# Patient Record
Sex: FEMALE | Race: BLACK OR AFRICAN AMERICAN | Employment: OTHER | URBAN - METROPOLITAN AREA
[De-identification: names, ages, dates, MRNs, and addresses within clinical notes are randomized per-mention and may not be internally consistent; named-entity substitution may affect disease eponyms.]

---

## 2019-02-06 ENCOUNTER — HOSPITAL ENCOUNTER (OUTPATIENT)
Age: 79
Discharge: HOME | End: 2019-02-08
Payer: MEDICARE

## 2019-02-06 LAB
ALBUMIN SERPL-MCNC: 4 G/DL (ref 3.5–5.2)
ALP BLD-CCNC: 128 U/L (ref 35–104)
ALT SERPL-CCNC: 8 U/L (ref 0–32)
ANION GAP SERPL CALCULATED.3IONS-SCNC: 11 MMOL/L (ref 7–16)
AST SERPL-CCNC: 13 U/L (ref 0–31)
BASOPHILS ABSOLUTE: 0.05 E9/L (ref 0–0.2)
BASOPHILS RELATIVE PERCENT: 0.5 % (ref 0–2)
BILIRUB SERPL-MCNC: 0.4 MG/DL (ref 0–1.2)
BUN BLDV-MCNC: 43 MG/DL (ref 8–23)
CALCIUM SERPL-MCNC: 9.5 MG/DL (ref 8.6–10.2)
CHLORIDE BLD-SCNC: 112 MMOL/L (ref 98–107)
CHOLESTEROL, TOTAL: 129 MG/DL (ref 0–199)
CO2: 19 MMOL/L (ref 22–29)
CREAT SERPL-MCNC: 2.7 MG/DL (ref 0.5–1)
EOSINOPHILS ABSOLUTE: 0.17 E9/L (ref 0.05–0.5)
EOSINOPHILS RELATIVE PERCENT: 1.8 % (ref 0–6)
FOLATE: >20 NG/ML (ref 4.8–24.2)
GFR AFRICAN AMERICAN: 21
GFR NON-AFRICAN AMERICAN: 21 ML/MIN/1.73
GLUCOSE BLD-MCNC: 130 MG/DL (ref 74–99)
HCT VFR BLD CALC: 33.3 % (ref 34–48)
HDLC SERPL-MCNC: 37 MG/DL
HEMOGLOBIN: 10.4 G/DL (ref 11.5–15.5)
IMMATURE GRANULOCYTES #: 0.02 E9/L
IMMATURE GRANULOCYTES %: 0.2 % (ref 0–5)
LDL CHOLESTEROL CALCULATED: 68 MG/DL (ref 0–99)
LYMPHOCYTES ABSOLUTE: 3.55 E9/L (ref 1.5–4)
LYMPHOCYTES RELATIVE PERCENT: 38 % (ref 20–42)
MCH RBC QN AUTO: 33.5 PG (ref 26–35)
MCHC RBC AUTO-ENTMCNC: 31.2 % (ref 32–34.5)
MCV RBC AUTO: 107.4 FL (ref 80–99.9)
MONOCYTES ABSOLUTE: 0.64 E9/L (ref 0.1–0.95)
MONOCYTES RELATIVE PERCENT: 6.9 % (ref 2–12)
NEUTROPHILS ABSOLUTE: 4.91 E9/L (ref 1.8–7.3)
NEUTROPHILS RELATIVE PERCENT: 52.6 % (ref 43–80)
PDW BLD-RTO: 13.4 FL (ref 11.5–15)
PLATELET # BLD: 295 E9/L (ref 130–450)
PMV BLD AUTO: 12.2 FL (ref 7–12)
POTASSIUM SERPL-SCNC: 5.4 MMOL/L (ref 3.5–5)
RBC # BLD: 3.1 E12/L (ref 3.5–5.5)
SODIUM BLD-SCNC: 142 MMOL/L (ref 132–146)
T4 FREE: 1.13 NG/DL (ref 0.93–1.7)
TOTAL PROTEIN: 7 G/DL (ref 6.4–8.3)
TRIGL SERPL-MCNC: 120 MG/DL (ref 0–149)
TSH SERPL DL<=0.05 MIU/L-ACNC: 1.87 UIU/ML (ref 0.27–4.2)
VITAMIN B-12: 511 PG/ML (ref 211–946)
VITAMIN D 25-HYDROXY: 30 NG/ML (ref 30–100)
VLDLC SERPL CALC-MCNC: 24 MG/DL
WBC # BLD: 9.3 E9/L (ref 4.5–11.5)

## 2019-02-06 PROCEDURE — 80053 COMPREHEN METABOLIC PANEL: CPT

## 2019-02-06 PROCEDURE — 85025 COMPLETE CBC W/AUTO DIFF WBC: CPT

## 2019-02-06 PROCEDURE — 82607 VITAMIN B-12: CPT

## 2019-02-06 PROCEDURE — 84443 ASSAY THYROID STIM HORMONE: CPT

## 2019-02-06 PROCEDURE — 82306 VITAMIN D 25 HYDROXY: CPT

## 2019-02-06 PROCEDURE — 84439 ASSAY OF FREE THYROXINE: CPT

## 2019-02-06 PROCEDURE — 80061 LIPID PANEL: CPT

## 2019-02-06 PROCEDURE — 82746 ASSAY OF FOLIC ACID SERUM: CPT

## 2019-02-19 ENCOUNTER — HOSPITAL ENCOUNTER (OUTPATIENT)
Age: 79
Discharge: HOME | End: 2019-02-21
Payer: MEDICARE

## 2019-02-19 LAB
ANION GAP SERPL CALCULATED.3IONS-SCNC: 10 MMOL/L (ref 7–16)
BUN BLDV-MCNC: 42 MG/DL (ref 8–23)
CALCIUM SERPL-MCNC: 9.4 MG/DL (ref 8.6–10.2)
CHLORIDE BLD-SCNC: 109 MMOL/L (ref 98–107)
CO2: 17 MMOL/L (ref 22–29)
CREAT SERPL-MCNC: 2.5 MG/DL (ref 0.5–1)
GFR AFRICAN AMERICAN: 23
GFR NON-AFRICAN AMERICAN: 23 ML/MIN/1.73
GLUCOSE BLD-MCNC: 102 MG/DL (ref 74–99)
POTASSIUM SERPL-SCNC: 5.2 MMOL/L (ref 3.5–5)
SODIUM BLD-SCNC: 136 MMOL/L (ref 132–146)

## 2019-02-19 PROCEDURE — 80048 BASIC METABOLIC PNL TOTAL CA: CPT

## 2019-03-13 ENCOUNTER — APPOINTMENT (OUTPATIENT)
Dept: GENERAL RADIOLOGY | Age: 79
End: 2019-03-13
Payer: MEDICARE

## 2019-03-13 ENCOUNTER — HOSPITAL ENCOUNTER (EMERGENCY)
Age: 79
Discharge: HOME | End: 2019-03-13
Attending: EMERGENCY MEDICINE
Payer: MEDICARE

## 2019-03-13 VITALS
WEIGHT: 130 LBS | OXYGEN SATURATION: 97 % | HEART RATE: 66 BPM | SYSTOLIC BLOOD PRESSURE: 129 MMHG | DIASTOLIC BLOOD PRESSURE: 73 MMHG | TEMPERATURE: 97.6 F | HEIGHT: 64 IN | BODY MASS INDEX: 22.2 KG/M2 | RESPIRATION RATE: 16 BRPM

## 2019-03-13 DIAGNOSIS — M25.562 ACUTE PAIN OF LEFT KNEE: Primary | ICD-10-CM

## 2019-03-13 PROCEDURE — 99283 EMERGENCY DEPT VISIT LOW MDM: CPT

## 2019-03-13 PROCEDURE — 73560 X-RAY EXAM OF KNEE 1 OR 2: CPT

## 2019-03-13 PROCEDURE — G0382 LEV 3 HOSP TYPE B ED VISIT: HCPCS

## 2019-03-13 RX ORDER — ERGOCALCIFEROL 1.25 MG/1
50000 CAPSULE ORAL EVERY OTHER DAY
Status: ON HOLD | COMMUNITY
End: 2021-04-19 | Stop reason: HOSPADM

## 2019-03-13 ASSESSMENT — PAIN DESCRIPTION - ORIENTATION: ORIENTATION: LEFT

## 2019-03-13 ASSESSMENT — ENCOUNTER SYMPTOMS
EYE PAIN: 0
EYE REDNESS: 0
SINUS PRESSURE: 0
COUGH: 0
WHEEZING: 0
VOMITING: 0
ABDOMINAL DISTENTION: 0
SORE THROAT: 0
SHORTNESS OF BREATH: 0
BACK PAIN: 0
DIARRHEA: 0
EYE DISCHARGE: 0
NAUSEA: 0

## 2019-03-13 ASSESSMENT — PAIN DESCRIPTION - ONSET: ONSET: SUDDEN

## 2019-03-13 ASSESSMENT — PAIN DESCRIPTION - LOCATION: LOCATION: KNEE

## 2019-03-13 ASSESSMENT — PAIN DESCRIPTION - PAIN TYPE: TYPE: ACUTE PAIN

## 2019-03-13 ASSESSMENT — PAIN DESCRIPTION - DESCRIPTORS: DESCRIPTORS: ACHING;SHARP

## 2019-03-13 ASSESSMENT — PAIN DESCRIPTION - PROGRESSION: CLINICAL_PROGRESSION: NOT CHANGED

## 2019-03-13 ASSESSMENT — PAIN DESCRIPTION - FREQUENCY: FREQUENCY: CONTINUOUS

## 2019-03-13 ASSESSMENT — PAIN SCALES - GENERAL: PAINLEVEL_OUTOF10: 9

## 2019-05-07 ENCOUNTER — HOSPITAL ENCOUNTER (OUTPATIENT)
Age: 79
Discharge: HOME | End: 2019-05-07
Payer: MEDICARE

## 2019-05-07 LAB
ALBUMIN SERPL-MCNC: 3.8 G/DL (ref 3.5–5.2)
ALP BLD-CCNC: 130 U/L (ref 35–104)
ALT SERPL-CCNC: 8 U/L (ref 0–32)
ANION GAP SERPL CALCULATED.3IONS-SCNC: 12 MMOL/L (ref 7–16)
AST SERPL-CCNC: 10 U/L (ref 0–31)
BASOPHILS ABSOLUTE: 0.03 E9/L (ref 0–0.2)
BASOPHILS RELATIVE PERCENT: 0.4 % (ref 0–2)
BILIRUB SERPL-MCNC: 0.3 MG/DL (ref 0–1.2)
BUN BLDV-MCNC: 42 MG/DL (ref 8–23)
BURR CELLS: ABNORMAL
CALCIUM SERPL-MCNC: 9.2 MG/DL (ref 8.6–10.2)
CHLORIDE BLD-SCNC: 104 MMOL/L (ref 98–107)
CO2: 27 MMOL/L (ref 22–29)
CREAT SERPL-MCNC: 2.5 MG/DL (ref 0.5–1)
EOSINOPHILS ABSOLUTE: 0.14 E9/L (ref 0.05–0.5)
EOSINOPHILS RELATIVE PERCENT: 1.7 % (ref 0–6)
GFR AFRICAN AMERICAN: 23
GFR NON-AFRICAN AMERICAN: 23 ML/MIN/1.73
GLUCOSE BLD-MCNC: 199 MG/DL (ref 74–99)
HCT VFR BLD CALC: 29.4 % (ref 34–48)
HEMOGLOBIN: 9.4 G/DL (ref 11.5–15.5)
IMMATURE GRANULOCYTES #: 0.02 E9/L
IMMATURE GRANULOCYTES %: 0.2 % (ref 0–5)
IRON SATURATION: 23 % (ref 15–50)
IRON: 55 MCG/DL (ref 37–145)
LYMPHOCYTES ABSOLUTE: 3.22 E9/L (ref 1.5–4)
LYMPHOCYTES RELATIVE PERCENT: 38.4 % (ref 20–42)
MCH RBC QN AUTO: 34.2 PG (ref 26–35)
MCHC RBC AUTO-ENTMCNC: 32 % (ref 32–34.5)
MCV RBC AUTO: 106.9 FL (ref 80–99.9)
MONOCYTES ABSOLUTE: 0.62 E9/L (ref 0.1–0.95)
MONOCYTES RELATIVE PERCENT: 7.4 % (ref 2–12)
NEUTROPHILS ABSOLUTE: 4.36 E9/L (ref 1.8–7.3)
NEUTROPHILS RELATIVE PERCENT: 51.9 % (ref 43–80)
OVALOCYTES: ABNORMAL
PARATHYROID HORMONE INTACT: 508 PG/ML (ref 15–65)
PDW BLD-RTO: 12.9 FL (ref 11.5–15)
PHOSPHORUS: 3.8 MG/DL (ref 2.5–4.5)
PLATELET # BLD: 276 E9/L (ref 130–450)
PMV BLD AUTO: 10.8 FL (ref 7–12)
POIKILOCYTES: ABNORMAL
POLYCHROMASIA: ABNORMAL
POTASSIUM SERPL-SCNC: 4.2 MMOL/L (ref 3.5–5)
RBC # BLD: 2.75 E12/L (ref 3.5–5.5)
SODIUM BLD-SCNC: 143 MMOL/L (ref 132–146)
TOTAL IRON BINDING CAPACITY: 237 MCG/DL (ref 250–450)
TOTAL PROTEIN: 6.9 G/DL (ref 6.4–8.3)
VITAMIN D 25-HYDROXY: 24 NG/ML (ref 30–100)
WBC # BLD: 8.4 E9/L (ref 4.5–11.5)

## 2019-05-07 PROCEDURE — 80053 COMPREHEN METABOLIC PANEL: CPT

## 2019-05-07 PROCEDURE — 82306 VITAMIN D 25 HYDROXY: CPT

## 2019-05-07 PROCEDURE — 36415 COLL VENOUS BLD VENIPUNCTURE: CPT

## 2019-05-07 PROCEDURE — 85025 COMPLETE CBC W/AUTO DIFF WBC: CPT

## 2019-05-07 PROCEDURE — 84100 ASSAY OF PHOSPHORUS: CPT

## 2019-05-07 PROCEDURE — 84165 PROTEIN E-PHORESIS SERUM: CPT

## 2019-05-07 PROCEDURE — 83550 IRON BINDING TEST: CPT

## 2019-05-07 PROCEDURE — 83970 ASSAY OF PARATHORMONE: CPT

## 2019-05-07 PROCEDURE — 83540 ASSAY OF IRON: CPT

## 2019-05-11 LAB
ALBUMIN SERPL-MCNC: 3.4 G/DL (ref 3.5–4.7)
ALPHA-1-GLOBULIN: 0.2 G/DL (ref 0.2–0.4)
ALPHA-2-GLOBULIN: 1 G/FL (ref 0.5–1)
BETA GLOBULIN: 0.8 G/DL (ref 0.8–1.3)
ELECTROPHORESIS: ABNORMAL
GAMMA GLOBULIN: 1.4 G/DL (ref 0.7–1.6)

## 2019-06-03 ENCOUNTER — HOSPITAL ENCOUNTER (EMERGENCY)
Age: 79
Discharge: HOME | End: 2019-06-03
Payer: MEDICARE

## 2019-06-03 ENCOUNTER — APPOINTMENT (OUTPATIENT)
Dept: GENERAL RADIOLOGY | Age: 79
End: 2019-06-03
Payer: MEDICARE

## 2019-06-03 VITALS
RESPIRATION RATE: 18 BRPM | WEIGHT: 130 LBS | HEART RATE: 66 BPM | BODY MASS INDEX: 21.66 KG/M2 | DIASTOLIC BLOOD PRESSURE: 90 MMHG | OXYGEN SATURATION: 99 % | HEIGHT: 65 IN | TEMPERATURE: 97.3 F | SYSTOLIC BLOOD PRESSURE: 153 MMHG

## 2019-06-03 DIAGNOSIS — L03.115 CELLULITIS OF RIGHT FOOT: Primary | ICD-10-CM

## 2019-06-03 PROCEDURE — 73630 X-RAY EXAM OF FOOT: CPT

## 2019-06-03 PROCEDURE — G0382 LEV 3 HOSP TYPE B ED VISIT: HCPCS

## 2019-06-03 PROCEDURE — 6360000002 HC RX W HCPCS: Performed by: EMERGENCY MEDICINE

## 2019-06-03 PROCEDURE — 99283 EMERGENCY DEPT VISIT LOW MDM: CPT

## 2019-06-03 PROCEDURE — 96372 THER/PROPH/DIAG INJ SC/IM: CPT

## 2019-06-03 RX ORDER — CEFTRIAXONE 1 G/1
1 INJECTION, POWDER, FOR SOLUTION INTRAMUSCULAR; INTRAVENOUS ONCE
Status: COMPLETED | OUTPATIENT
Start: 2019-06-03 | End: 2019-06-03

## 2019-06-03 RX ORDER — CEPHALEXIN 500 MG/1
500 CAPSULE ORAL 3 TIMES DAILY
Qty: 30 CAPSULE | Refills: 0 | Status: SHIPPED | OUTPATIENT
Start: 2019-06-03 | End: 2019-06-13

## 2019-06-03 RX ADMIN — CEFTRIAXONE 1 G: 1 INJECTION, POWDER, FOR SOLUTION INTRAMUSCULAR; INTRAVENOUS at 14:36

## 2019-06-03 ASSESSMENT — PAIN SCALES - GENERAL: PAINLEVEL_OUTOF10: 0

## 2019-06-03 NOTE — ED PROVIDER NOTES
HPI:  6/3/19, Time: 1:55 PM         Jennifer Maruer is a 70 y.o. female presenting to the ED for evaluation of right foot pain, erythema. The patient said she woke up approximately 5 days ago noticed some swelling, erythema and tenderness of her right foot, also reports erythema over the top of her foot at that time. She says the swelling, erythema and pain have significantly improved since that time. This patient is a diabetic, denies any known injury or ulceration of her foot. Review of Systems:   Pertinent positives and negatives are stated within HPI, all other systems reviewed and are negative.          --------------------------------------------- PAST HISTORY ---------------------------------------------  Past Medical History:  has a past medical history of Arthritis, Atrial fibrillation ([facility] Blood circulation, collateral, CHF (congestive heart failure) ([facility] [address] Chronic renal insufficiency, stage IV (severe) ([facility] [address] History of cardiovascular stress test, History of echocardiogram, Hyperlipidemia, and Hypertension. Past Surgical History:  has a past surgical history that includes Ectopic pregnancy surgery. Social History:  reports that she has been smoking. She has been smoking about 0.50 packs per day. She has never used smokeless tobacco. She reports that she drank alcohol. She reports that she does not use drugs. Family History: family history is not on file. The patients home medications have been reviewed. Allergies: Patient has no known allergies. -------------------------------------------------- RESULTS -------------------------------------------------  All laboratory and radiology results have been personally reviewed by myself   LABS:  No results found for this visit on 06/03/19. RADIOLOGY:  Interpreted by Radiologist.  XR FOOT RIGHT (MIN 3 VIEWS)   Final Result   1. No radiographic evidence of osteomyelitis.  If persistent clinical   concern recommend MRI or three-phase bone scan. 2. Fracture distal aspect proximal phalanx fifth digit of uncertain   age.          ------------------------- NURSING NOTES AND VITALS REVIEWED ---------------------------   The nursing notes within the ED encounter and vital signs as below have been reviewed. BP (!) 153/90   Pulse 66   Temp 97.3 °F (36.3 °C)   Resp 18   Ht 5' 4.5\" (1.638 m)   Wt 130 lb (59 kg)   SpO2 99%   BMI 21.97 kg/m²   Oxygen Saturation Interpretation: Normal      ---------------------------------------------------PHYSICAL EXAM--------------------------------------      Constitutional/General: Alert and oriented x3, well appearing, non toxic in NAD  Head: Normocephalic and atraumatic  Eyes: PERRL, EOMI, nonicteric  Cardiovascular:  Regular rate and rhythm, no murmurs, gallops, or rubs. 2+ distal pulses, foot is pink warm and dry other than the erythema as stated below. Abdomen: Soft, non tender, non distended,   Extremities: Moves all extremities x 4. Warm and well perfused. Patient is able ambulate unassisted. Skin: warm and dry, mild erythema about the medial portion of the top of her right foot into the base of the right great toe. The area is edematous, mildly tender. Neurologic: GCS 15,  Psych: Normal Affect      ------------------------------ ED COURSE/MEDICAL DECISION MAKING----------------------  Medications   cefTRIAXone (ROCEPHIN) injection 1 g (1 g Intramuscular Given 6/3/19 1279)         ED COURSE:       Medical Decision Making:    Patient appears to have a mild cellulitis which is improving by her own report. She says the pain and swelling have also improved  She denies any known trauma, denies any cut or wound preceding symptoms. We'll give the patient a shot of Rocephin, get an x-ray of the foot to rule out osteomyelitis. Believe this is a simple cellulitis, and there is no wound or skin lesions found. The patient's foot x-rays are negative for osteomyelitis.   Patient had a small fracture of the

## 2019-10-03 ENCOUNTER — HOSPITAL ENCOUNTER (INPATIENT)
Age: 79
LOS: 5 days | Discharge: HOME HEALTH | DRG: 291 | End: 2019-10-08
Attending: EMERGENCY MEDICINE | Admitting: INTERNAL MEDICINE
Payer: MEDICARE

## 2019-10-03 ENCOUNTER — APPOINTMENT (OUTPATIENT)
Dept: GENERAL RADIOLOGY | Age: 79
DRG: 291 | End: 2019-10-03
Payer: MEDICARE

## 2019-10-03 DIAGNOSIS — N18.9 CHRONIC RENAL IMPAIRMENT, UNSPECIFIED CKD STAGE: ICD-10-CM

## 2019-10-03 DIAGNOSIS — I50.9 ACUTE ON CHRONIC CONGESTIVE HEART FAILURE, UNSPECIFIED HEART FAILURE TYPE (HCC): Primary | ICD-10-CM

## 2019-10-03 PROBLEM — I50.43 ACUTE ON CHRONIC COMBINED SYSTOLIC AND DIASTOLIC CHF (CONGESTIVE HEART FAILURE) (HCC): Status: ACTIVE | Noted: 2019-10-03

## 2019-10-03 LAB
ANION GAP SERPL CALCULATED.3IONS-SCNC: 10 MMOL/L (ref 7–16)
B.E.: -8 MMOL/L (ref -3–3)
BUN BLDV-MCNC: 27 MG/DL (ref 8–23)
CALCIUM SERPL-MCNC: 8.7 MG/DL (ref 8.6–10.2)
CHLORIDE BLD-SCNC: 109 MMOL/L (ref 98–107)
CO2: 20 MMOL/L (ref 22–29)
CREAT SERPL-MCNC: 1.9 MG/DL (ref 0.5–1)
DELIVERY SYSTEMS: ABNORMAL
DEVICE: ABNORMAL
FIO2 ARTERIAL: 4
GFR AFRICAN AMERICAN: 31
GFR NON-AFRICAN AMERICAN: 31 ML/MIN/1.73
GLUCOSE BLD-MCNC: 158 MG/DL (ref 74–99)
HCO3 ARTERIAL: 17.3 MMOL/L (ref 22–26)
HCT VFR BLD CALC: 33 % (ref 34–48)
HEMOGLOBIN: 10.7 G/DL (ref 11.5–15.5)
LACTIC ACID: 1 MMOL/L (ref 0.5–2.2)
MCH RBC QN AUTO: 33.6 PG (ref 26–35)
MCHC RBC AUTO-ENTMCNC: 32.4 % (ref 32–34.5)
MCV RBC AUTO: 103.8 FL (ref 80–99.9)
O2 SATURATION: 95.1 % (ref 92–98.5)
OPERATOR ID: 797
PCO2 ARTERIAL: 34.1 MMHG (ref 35–45)
PDW BLD-RTO: 14.3 FL (ref 11.5–15)
PH BLOOD GAS: 7.31 (ref 7.35–7.45)
PLATELET # BLD: 289 E9/L (ref 130–450)
PMV BLD AUTO: 10.8 FL (ref 7–12)
PO2 ARTERIAL: 82 MMHG (ref 60–80)
POTASSIUM SERPL-SCNC: 5.1 MMOL/L (ref 3.5–5)
PRO-BNP: ABNORMAL PG/ML (ref 0–450)
RBC # BLD: 3.18 E12/L (ref 3.5–5.5)
SODIUM BLD-SCNC: 139 MMOL/L (ref 132–146)
SOURCE, BLOOD GAS: ABNORMAL
TROPONIN: 0.02 NG/ML (ref 0–0.03)
WBC # BLD: 11.9 E9/L (ref 4.5–11.5)

## 2019-10-03 PROCEDURE — 85027 COMPLETE CBC AUTOMATED: CPT

## 2019-10-03 PROCEDURE — 36415 COLL VENOUS BLD VENIPUNCTURE: CPT

## 2019-10-03 PROCEDURE — 2700000000 HC OXYGEN THERAPY PER DAY

## 2019-10-03 PROCEDURE — 1200000000 HC SEMI PRIVATE

## 2019-10-03 PROCEDURE — 6360000002 HC RX W HCPCS: Performed by: EMERGENCY MEDICINE

## 2019-10-03 PROCEDURE — 83605 ASSAY OF LACTIC ACID: CPT

## 2019-10-03 PROCEDURE — 83880 ASSAY OF NATRIURETIC PEPTIDE: CPT

## 2019-10-03 PROCEDURE — 84484 ASSAY OF TROPONIN QUANT: CPT

## 2019-10-03 PROCEDURE — 99285 EMERGENCY DEPT VISIT HI MDM: CPT

## 2019-10-03 PROCEDURE — 93005 ELECTROCARDIOGRAM TRACING: CPT | Performed by: EMERGENCY MEDICINE

## 2019-10-03 PROCEDURE — 71045 X-RAY EXAM CHEST 1 VIEW: CPT

## 2019-10-03 PROCEDURE — 82803 BLOOD GASES ANY COMBINATION: CPT

## 2019-10-03 PROCEDURE — 6370000000 HC RX 637 (ALT 250 FOR IP): Performed by: EMERGENCY MEDICINE

## 2019-10-03 PROCEDURE — 80048 BASIC METABOLIC PNL TOTAL CA: CPT

## 2019-10-03 PROCEDURE — 87040 BLOOD CULTURE FOR BACTERIA: CPT

## 2019-10-03 RX ORDER — ASPIRIN 81 MG/1
324 TABLET, CHEWABLE ORAL ONCE
Status: COMPLETED | OUTPATIENT
Start: 2019-10-03 | End: 2019-10-03

## 2019-10-03 RX ORDER — IPRATROPIUM BROMIDE AND ALBUTEROL SULFATE 2.5; .5 MG/3ML; MG/3ML
3 SOLUTION RESPIRATORY (INHALATION) CONTINUOUS
Status: DISCONTINUED | OUTPATIENT
Start: 2019-10-03 | End: 2019-10-03

## 2019-10-03 RX ORDER — LISINOPRIL 10 MG/1
10 TABLET ORAL DAILY
Status: CANCELLED | OUTPATIENT
Start: 2019-10-04

## 2019-10-03 RX ORDER — FUROSEMIDE 10 MG/ML
40 INJECTION INTRAMUSCULAR; INTRAVENOUS ONCE
Status: COMPLETED | OUTPATIENT
Start: 2019-10-03 | End: 2019-10-03

## 2019-10-03 RX ADMIN — FUROSEMIDE 40 MG: 10 INJECTION, SOLUTION INTRAMUSCULAR; INTRAVENOUS at 23:16

## 2019-10-03 RX ADMIN — NITROGLYCERIN 1 INCH: 20 OINTMENT TOPICAL at 23:13

## 2019-10-03 RX ADMIN — ASPIRIN 81 MG 324 MG: 81 TABLET ORAL at 23:09

## 2019-10-03 ASSESSMENT — ENCOUNTER SYMPTOMS
COUGH: 1
ABDOMINAL DISTENTION: 0
WHEEZING: 0
BACK PAIN: 0
NAUSEA: 0
SORE THROAT: 0
SINUS PRESSURE: 0
EYE REDNESS: 0
DIARRHEA: 0
EYE PAIN: 0
VOMITING: 0
RHINORRHEA: 1
SHORTNESS OF BREATH: 1
EYE DISCHARGE: 0

## 2019-10-04 LAB
ANION GAP SERPL CALCULATED.3IONS-SCNC: 11 MMOL/L (ref 7–16)
BUN BLDV-MCNC: 27 MG/DL (ref 8–23)
CALCIUM SERPL-MCNC: 9.1 MG/DL (ref 8.6–10.2)
CHLORIDE BLD-SCNC: 110 MMOL/L (ref 98–107)
CHOLESTEROL, TOTAL: 116 MG/DL (ref 0–199)
CO2: 19 MMOL/L (ref 22–29)
CREAT SERPL-MCNC: 1.9 MG/DL (ref 0.5–1)
FOLATE: >20 NG/ML (ref 4.8–24.2)
GFR AFRICAN AMERICAN: 31
GFR NON-AFRICAN AMERICAN: 31 ML/MIN/1.73
GLUCOSE BLD-MCNC: 121 MG/DL (ref 74–99)
HBA1C MFR BLD: 5.6 % (ref 4–5.6)
HCT VFR BLD CALC: 28.8 % (ref 34–48)
HDLC SERPL-MCNC: 41 MG/DL
HEMOGLOBIN: 9.5 G/DL (ref 11.5–15.5)
IRON SATURATION: 23 % (ref 15–50)
IRON: 48 MCG/DL (ref 37–145)
LDL CHOLESTEROL CALCULATED: 58 MG/DL (ref 0–99)
LV EF: 28 %
LVEF MODALITY: NORMAL
MAGNESIUM: 1.9 MG/DL (ref 1.6–2.6)
MCH RBC QN AUTO: 33.8 PG (ref 26–35)
MCHC RBC AUTO-ENTMCNC: 33 % (ref 32–34.5)
MCV RBC AUTO: 102.5 FL (ref 80–99.9)
METER GLUCOSE: 116 MG/DL (ref 74–99)
METER GLUCOSE: 119 MG/DL (ref 74–99)
METER GLUCOSE: 172 MG/DL (ref 74–99)
METER GLUCOSE: 216 MG/DL (ref 74–99)
PDW BLD-RTO: 14.3 FL (ref 11.5–15)
PHOSPHORUS: 3.6 MG/DL (ref 2.5–4.5)
PLATELET # BLD: 253 E9/L (ref 130–450)
PMV BLD AUTO: 10.6 FL (ref 7–12)
POTASSIUM SERPL-SCNC: 5 MMOL/L (ref 3.5–5)
RBC # BLD: 2.81 E12/L (ref 3.5–5.5)
SODIUM BLD-SCNC: 140 MMOL/L (ref 132–146)
TOTAL IRON BINDING CAPACITY: 210 MCG/DL (ref 250–450)
TRIGL SERPL-MCNC: 83 MG/DL (ref 0–149)
TROPONIN: 0.02 NG/ML (ref 0–0.03)
TROPONIN: 0.02 NG/ML (ref 0–0.03)
TSH SERPL DL<=0.05 MIU/L-ACNC: 1.37 UIU/ML (ref 0.27–4.2)
VITAMIN B-12: 495 PG/ML (ref 211–946)
VLDLC SERPL CALC-MCNC: 17 MG/DL
WBC # BLD: 10.1 E9/L (ref 4.5–11.5)

## 2019-10-04 PROCEDURE — 82607 VITAMIN B-12: CPT

## 2019-10-04 PROCEDURE — 6370000000 HC RX 637 (ALT 250 FOR IP): Performed by: INTERNAL MEDICINE

## 2019-10-04 PROCEDURE — 80061 LIPID PANEL: CPT

## 2019-10-04 PROCEDURE — 6360000002 HC RX W HCPCS: Performed by: INTERNAL MEDICINE

## 2019-10-04 PROCEDURE — 80048 BASIC METABOLIC PNL TOTAL CA: CPT

## 2019-10-04 PROCEDURE — 84443 ASSAY THYROID STIM HORMONE: CPT

## 2019-10-04 PROCEDURE — 2700000000 HC OXYGEN THERAPY PER DAY

## 2019-10-04 PROCEDURE — 83550 IRON BINDING TEST: CPT

## 2019-10-04 PROCEDURE — 85027 COMPLETE CBC AUTOMATED: CPT

## 2019-10-04 PROCEDURE — 83540 ASSAY OF IRON: CPT

## 2019-10-04 PROCEDURE — 2580000003 HC RX 258: Performed by: INTERNAL MEDICINE

## 2019-10-04 PROCEDURE — 84484 ASSAY OF TROPONIN QUANT: CPT

## 2019-10-04 PROCEDURE — 82746 ASSAY OF FOLIC ACID SERUM: CPT

## 2019-10-04 PROCEDURE — 82962 GLUCOSE BLOOD TEST: CPT

## 2019-10-04 PROCEDURE — 36415 COLL VENOUS BLD VENIPUNCTURE: CPT

## 2019-10-04 PROCEDURE — 93306 TTE W/DOPPLER COMPLETE: CPT

## 2019-10-04 PROCEDURE — 83036 HEMOGLOBIN GLYCOSYLATED A1C: CPT

## 2019-10-04 PROCEDURE — 1200000000 HC SEMI PRIVATE

## 2019-10-04 PROCEDURE — 83735 ASSAY OF MAGNESIUM: CPT

## 2019-10-04 PROCEDURE — 84100 ASSAY OF PHOSPHORUS: CPT

## 2019-10-04 RX ORDER — NICOTINE POLACRILEX 4 MG
15 LOZENGE BUCCAL PRN
Status: DISCONTINUED | OUTPATIENT
Start: 2019-10-04 | End: 2019-10-08 | Stop reason: HOSPADM

## 2019-10-04 RX ORDER — ASPIRIN 81 MG/1
81 TABLET, CHEWABLE ORAL DAILY
Status: DISCONTINUED | OUTPATIENT
Start: 2019-10-04 | End: 2019-10-08 | Stop reason: HOSPADM

## 2019-10-04 RX ORDER — DEXTROSE MONOHYDRATE 50 MG/ML
100 INJECTION, SOLUTION INTRAVENOUS PRN
Status: DISCONTINUED | OUTPATIENT
Start: 2019-10-04 | End: 2019-10-08 | Stop reason: HOSPADM

## 2019-10-04 RX ORDER — CLONIDINE HYDROCHLORIDE 0.2 MG/1
0.2 TABLET ORAL 2 TIMES DAILY
Status: DISCONTINUED | OUTPATIENT
Start: 2019-10-04 | End: 2019-10-04

## 2019-10-04 RX ORDER — DEXTROSE MONOHYDRATE 25 G/50ML
12.5 INJECTION, SOLUTION INTRAVENOUS PRN
Status: DISCONTINUED | OUTPATIENT
Start: 2019-10-04 | End: 2019-10-08 | Stop reason: HOSPADM

## 2019-10-04 RX ORDER — SODIUM CHLORIDE 0.9 % (FLUSH) 0.9 %
10 SYRINGE (ML) INJECTION EVERY 12 HOURS SCHEDULED
Status: DISCONTINUED | OUTPATIENT
Start: 2019-10-04 | End: 2019-10-08 | Stop reason: HOSPADM

## 2019-10-04 RX ORDER — ATORVASTATIN CALCIUM 40 MG/1
40 TABLET, FILM COATED ORAL NIGHTLY
Status: DISCONTINUED | OUTPATIENT
Start: 2019-10-04 | End: 2019-10-08 | Stop reason: HOSPADM

## 2019-10-04 RX ORDER — METOLAZONE 2.5 MG/1
5 TABLET ORAL ONCE
Status: COMPLETED | OUTPATIENT
Start: 2019-10-04 | End: 2019-10-04

## 2019-10-04 RX ORDER — SODIUM CHLORIDE 0.9 % (FLUSH) 0.9 %
10 SYRINGE (ML) INJECTION PRN
Status: DISCONTINUED | OUTPATIENT
Start: 2019-10-04 | End: 2019-10-08 | Stop reason: HOSPADM

## 2019-10-04 RX ORDER — CLONIDINE HYDROCHLORIDE 0.2 MG/1
0.2 TABLET ORAL 2 TIMES DAILY
Status: DISCONTINUED | OUTPATIENT
Start: 2019-10-04 | End: 2019-10-05

## 2019-10-04 RX ORDER — CARVEDILOL 6.25 MG/1
6.25 TABLET ORAL 2 TIMES DAILY
Status: DISCONTINUED | OUTPATIENT
Start: 2019-10-04 | End: 2019-10-08 | Stop reason: HOSPADM

## 2019-10-04 RX ADMIN — CARVEDILOL 6.25 MG: 6.25 TABLET, FILM COATED ORAL at 20:12

## 2019-10-04 RX ADMIN — CLONIDINE HYDROCHLORIDE 0.2 MG: 0.2 TABLET ORAL at 07:37

## 2019-10-04 RX ADMIN — APIXABAN 5 MG: 5 TABLET, FILM COATED ORAL at 20:13

## 2019-10-04 RX ADMIN — FUROSEMIDE 40 MG: 10 INJECTION, SOLUTION INTRAMUSCULAR; INTRAVENOUS at 07:37

## 2019-10-04 RX ADMIN — METOLAZONE 5 MG: 2.5 TABLET ORAL at 20:55

## 2019-10-04 RX ADMIN — FUROSEMIDE 40 MG: 10 INJECTION, SOLUTION INTRAMUSCULAR; INTRAVENOUS at 20:12

## 2019-10-04 RX ADMIN — Medication 10 ML: at 07:38

## 2019-10-04 RX ADMIN — INSULIN LISPRO 1 UNITS: 100 INJECTION, SOLUTION INTRAVENOUS; SUBCUTANEOUS at 20:20

## 2019-10-04 RX ADMIN — CARVEDILOL 6.25 MG: 6.25 TABLET, FILM COATED ORAL at 07:37

## 2019-10-04 RX ADMIN — CLONIDINE HYDROCHLORIDE 0.2 MG: 0.2 TABLET ORAL at 20:13

## 2019-10-04 RX ADMIN — ATORVASTATIN CALCIUM 40 MG: 40 TABLET, FILM COATED ORAL at 20:13

## 2019-10-04 RX ADMIN — ASPIRIN 81 MG 81 MG: 81 TABLET ORAL at 20:13

## 2019-10-04 RX ADMIN — Medication 10 ML: at 20:12

## 2019-10-04 RX ADMIN — INSULIN LISPRO 1 UNITS: 100 INJECTION, SOLUTION INTRAVENOUS; SUBCUTANEOUS at 12:41

## 2019-10-04 RX ADMIN — APIXABAN 5 MG: 5 TABLET, FILM COATED ORAL at 07:37

## 2019-10-04 ASSESSMENT — PAIN SCALES - GENERAL
PAINLEVEL_OUTOF10: 0

## 2019-10-05 LAB
ANION GAP SERPL CALCULATED.3IONS-SCNC: 8 MMOL/L (ref 7–16)
BUN BLDV-MCNC: 35 MG/DL (ref 8–23)
CALCIUM SERPL-MCNC: 9 MG/DL (ref 8.6–10.2)
CHLORIDE BLD-SCNC: 112 MMOL/L (ref 98–107)
CO2: 21 MMOL/L (ref 22–29)
CREAT SERPL-MCNC: 2.3 MG/DL (ref 0.5–1)
GFR AFRICAN AMERICAN: 25
GFR NON-AFRICAN AMERICAN: 25 ML/MIN/1.73
GLUCOSE BLD-MCNC: 114 MG/DL (ref 74–99)
HCT VFR BLD CALC: 29 % (ref 34–48)
HEMOGLOBIN: 9.3 G/DL (ref 11.5–15.5)
MCH RBC QN AUTO: 33.1 PG (ref 26–35)
MCHC RBC AUTO-ENTMCNC: 32.1 % (ref 32–34.5)
MCV RBC AUTO: 103.2 FL (ref 80–99.9)
METER GLUCOSE: 120 MG/DL (ref 74–99)
METER GLUCOSE: 149 MG/DL (ref 74–99)
METER GLUCOSE: 207 MG/DL (ref 74–99)
METER GLUCOSE: 307 MG/DL (ref 74–99)
PDW BLD-RTO: 14.4 FL (ref 11.5–15)
PLATELET # BLD: 244 E9/L (ref 130–450)
PMV BLD AUTO: 11.4 FL (ref 7–12)
POTASSIUM SERPL-SCNC: 4.6 MMOL/L (ref 3.5–5)
RBC # BLD: 2.81 E12/L (ref 3.5–5.5)
SODIUM BLD-SCNC: 141 MMOL/L (ref 132–146)
WBC # BLD: 8.4 E9/L (ref 4.5–11.5)

## 2019-10-05 PROCEDURE — 2580000003 HC RX 258: Performed by: INTERNAL MEDICINE

## 2019-10-05 PROCEDURE — 90653 IIV ADJUVANT VACCINE IM: CPT | Performed by: INTERNAL MEDICINE

## 2019-10-05 PROCEDURE — 36415 COLL VENOUS BLD VENIPUNCTURE: CPT

## 2019-10-05 PROCEDURE — 6370000000 HC RX 637 (ALT 250 FOR IP): Performed by: INTERNAL MEDICINE

## 2019-10-05 PROCEDURE — 85027 COMPLETE CBC AUTOMATED: CPT

## 2019-10-05 PROCEDURE — 2700000000 HC OXYGEN THERAPY PER DAY

## 2019-10-05 PROCEDURE — 99222 1ST HOSP IP/OBS MODERATE 55: CPT | Performed by: INTERNAL MEDICINE

## 2019-10-05 PROCEDURE — 80048 BASIC METABOLIC PNL TOTAL CA: CPT

## 2019-10-05 PROCEDURE — 6360000002 HC RX W HCPCS: Performed by: INTERNAL MEDICINE

## 2019-10-05 PROCEDURE — 1200000000 HC SEMI PRIVATE

## 2019-10-05 PROCEDURE — G0008 ADMIN INFLUENZA VIRUS VAC: HCPCS | Performed by: INTERNAL MEDICINE

## 2019-10-05 PROCEDURE — 82962 GLUCOSE BLOOD TEST: CPT

## 2019-10-05 RX ORDER — HYDRALAZINE HYDROCHLORIDE 25 MG/1
25 TABLET, FILM COATED ORAL EVERY 8 HOURS SCHEDULED
Status: DISCONTINUED | OUTPATIENT
Start: 2019-10-05 | End: 2019-10-08 | Stop reason: HOSPADM

## 2019-10-05 RX ORDER — ISOSORBIDE MONONITRATE 30 MG/1
30 TABLET, EXTENDED RELEASE ORAL DAILY
Status: DISCONTINUED | OUTPATIENT
Start: 2019-10-05 | End: 2019-10-08 | Stop reason: HOSPADM

## 2019-10-05 RX ADMIN — INFLUENZA VACCINE, ADJUVANTED 0.5 ML: 15; 15; 15 INJECTION, SUSPENSION INTRAMUSCULAR at 09:44

## 2019-10-05 RX ADMIN — CARVEDILOL 6.25 MG: 6.25 TABLET, FILM COATED ORAL at 21:14

## 2019-10-05 RX ADMIN — Medication 10 ML: at 21:14

## 2019-10-05 RX ADMIN — HYDRALAZINE HYDROCHLORIDE 25 MG: 25 TABLET, FILM COATED ORAL at 21:14

## 2019-10-05 RX ADMIN — CLONIDINE HYDROCHLORIDE 0.2 MG: 0.2 TABLET ORAL at 09:43

## 2019-10-05 RX ADMIN — INSULIN LISPRO 2 UNITS: 100 INJECTION, SOLUTION INTRAVENOUS; SUBCUTANEOUS at 12:01

## 2019-10-05 RX ADMIN — ASPIRIN 81 MG 81 MG: 81 TABLET ORAL at 09:43

## 2019-10-05 RX ADMIN — INSULIN LISPRO 2 UNITS: 100 INJECTION, SOLUTION INTRAVENOUS; SUBCUTANEOUS at 21:15

## 2019-10-05 RX ADMIN — FUROSEMIDE 40 MG: 10 INJECTION, SOLUTION INTRAMUSCULAR; INTRAVENOUS at 09:43

## 2019-10-05 RX ADMIN — HYDRALAZINE HYDROCHLORIDE 25 MG: 25 TABLET, FILM COATED ORAL at 17:17

## 2019-10-05 RX ADMIN — APIXABAN 5 MG: 5 TABLET, FILM COATED ORAL at 09:43

## 2019-10-05 RX ADMIN — INSULIN LISPRO 1 UNITS: 100 INJECTION, SOLUTION INTRAVENOUS; SUBCUTANEOUS at 17:18

## 2019-10-05 RX ADMIN — CARVEDILOL 6.25 MG: 6.25 TABLET, FILM COATED ORAL at 09:43

## 2019-10-05 RX ADMIN — ATORVASTATIN CALCIUM 40 MG: 40 TABLET, FILM COATED ORAL at 21:14

## 2019-10-05 RX ADMIN — Medication 10 ML: at 09:42

## 2019-10-05 RX ADMIN — APIXABAN 5 MG: 5 TABLET, FILM COATED ORAL at 21:14

## 2019-10-05 RX ADMIN — FUROSEMIDE 40 MG: 10 INJECTION, SOLUTION INTRAMUSCULAR; INTRAVENOUS at 21:14

## 2019-10-05 RX ADMIN — ISOSORBIDE MONONITRATE 30 MG: 30 TABLET, EXTENDED RELEASE ORAL at 17:18

## 2019-10-05 ASSESSMENT — PAIN SCALES - GENERAL
PAINLEVEL_OUTOF10: 0

## 2019-10-06 PROBLEM — I50.21 ACUTE SYSTOLIC CHF (CONGESTIVE HEART FAILURE) (HCC): Status: ACTIVE | Noted: 2019-10-06

## 2019-10-06 LAB
ANION GAP SERPL CALCULATED.3IONS-SCNC: 10 MMOL/L (ref 7–16)
BUN BLDV-MCNC: 39 MG/DL (ref 8–23)
BUN BLDV-MCNC: 43 MG/DL (ref 8–23)
CALCIUM SERPL-MCNC: 8.8 MG/DL (ref 8.6–10.2)
CHLORIDE BLD-SCNC: 112 MMOL/L (ref 98–107)
CHLORIDE URINE RANDOM: 98 MMOL/L
CO2: 22 MMOL/L (ref 22–29)
CREAT SERPL-MCNC: 2.6 MG/DL (ref 0.5–1)
CREATININE URINE: 87 MG/DL (ref 29–226)
EKG ATRIAL RATE: 108 BPM
EKG Q-T INTERVAL: 408 MS
EKG QRS DURATION: 128 MS
EKG QTC CALCULATION (BAZETT): 512 MS
EKG R AXIS: 128 DEGREES
EKG T AXIS: 83 DEGREES
EKG VENTRICULAR RATE: 95 BPM
GFR AFRICAN AMERICAN: 22
GFR NON-AFRICAN AMERICAN: 22 ML/MIN/1.73
GLUCOSE BLD-MCNC: 123 MG/DL (ref 74–99)
HCT VFR BLD CALC: 28.6 % (ref 34–48)
HEMOGLOBIN: 9.1 G/DL (ref 11.5–15.5)
MCH RBC QN AUTO: 32.7 PG (ref 26–35)
MCHC RBC AUTO-ENTMCNC: 31.8 % (ref 32–34.5)
MCV RBC AUTO: 102.9 FL (ref 80–99.9)
METER GLUCOSE: 138 MG/DL (ref 74–99)
METER GLUCOSE: 146 MG/DL (ref 74–99)
METER GLUCOSE: 167 MG/DL (ref 74–99)
METER GLUCOSE: 175 MG/DL (ref 74–99)
PDW BLD-RTO: 14.3 FL (ref 11.5–15)
PLATELET # BLD: 251 E9/L (ref 130–450)
PMV BLD AUTO: 11.1 FL (ref 7–12)
POTASSIUM SERPL-SCNC: 4.9 MMOL/L (ref 3.5–5)
RBC # BLD: 2.78 E12/L (ref 3.5–5.5)
SODIUM BLD-SCNC: 144 MMOL/L (ref 132–146)
SODIUM URINE: 111 MMOL/L
UREA NITROGEN, UR: 162 MG/DL (ref 800–1666)
WBC # BLD: 9.8 E9/L (ref 4.5–11.5)

## 2019-10-06 PROCEDURE — 6370000000 HC RX 637 (ALT 250 FOR IP): Performed by: INTERNAL MEDICINE

## 2019-10-06 PROCEDURE — 6360000002 HC RX W HCPCS: Performed by: INTERNAL MEDICINE

## 2019-10-06 PROCEDURE — 84520 ASSAY OF UREA NITROGEN: CPT

## 2019-10-06 PROCEDURE — 99233 SBSQ HOSP IP/OBS HIGH 50: CPT | Performed by: INTERNAL MEDICINE

## 2019-10-06 PROCEDURE — 90732 PPSV23 VACC 2 YRS+ SUBQ/IM: CPT | Performed by: INTERNAL MEDICINE

## 2019-10-06 PROCEDURE — 1200000000 HC SEMI PRIVATE

## 2019-10-06 PROCEDURE — 36415 COLL VENOUS BLD VENIPUNCTURE: CPT

## 2019-10-06 PROCEDURE — G0009 ADMIN PNEUMOCOCCAL VACCINE: HCPCS | Performed by: INTERNAL MEDICINE

## 2019-10-06 PROCEDURE — 84540 ASSAY OF URINE/UREA-N: CPT

## 2019-10-06 PROCEDURE — 82436 ASSAY OF URINE CHLORIDE: CPT

## 2019-10-06 PROCEDURE — 82962 GLUCOSE BLOOD TEST: CPT

## 2019-10-06 PROCEDURE — 84300 ASSAY OF URINE SODIUM: CPT

## 2019-10-06 PROCEDURE — 80048 BASIC METABOLIC PNL TOTAL CA: CPT

## 2019-10-06 PROCEDURE — 2580000003 HC RX 258: Performed by: INTERNAL MEDICINE

## 2019-10-06 PROCEDURE — 2700000000 HC OXYGEN THERAPY PER DAY

## 2019-10-06 PROCEDURE — 85027 COMPLETE CBC AUTOMATED: CPT

## 2019-10-06 PROCEDURE — 93010 ELECTROCARDIOGRAM REPORT: CPT | Performed by: INTERNAL MEDICINE

## 2019-10-06 PROCEDURE — 82570 ASSAY OF URINE CREATININE: CPT

## 2019-10-06 RX ADMIN — CARVEDILOL 6.25 MG: 6.25 TABLET, FILM COATED ORAL at 20:22

## 2019-10-06 RX ADMIN — ISOSORBIDE MONONITRATE 30 MG: 30 TABLET, EXTENDED RELEASE ORAL at 09:29

## 2019-10-06 RX ADMIN — APIXABAN 5 MG: 5 TABLET, FILM COATED ORAL at 20:22

## 2019-10-06 RX ADMIN — HYDRALAZINE HYDROCHLORIDE 25 MG: 25 TABLET, FILM COATED ORAL at 06:06

## 2019-10-06 RX ADMIN — HYDRALAZINE HYDROCHLORIDE 25 MG: 25 TABLET, FILM COATED ORAL at 14:44

## 2019-10-06 RX ADMIN — APIXABAN 5 MG: 5 TABLET, FILM COATED ORAL at 09:29

## 2019-10-06 RX ADMIN — FUROSEMIDE 40 MG: 10 INJECTION, SOLUTION INTRAMUSCULAR; INTRAVENOUS at 09:28

## 2019-10-06 RX ADMIN — ASPIRIN 81 MG 81 MG: 81 TABLET ORAL at 09:29

## 2019-10-06 RX ADMIN — PNEUMOCOCCAL VACCINE POLYVALENT 0.5 ML
25; 25; 25; 25; 25; 25; 25; 25; 25; 25; 25; 25; 25; 25; 25; 25; 25; 25; 25; 25; 25; 25; 25 INJECTION, SOLUTION INTRAMUSCULAR; SUBCUTANEOUS at 14:46

## 2019-10-06 RX ADMIN — INSULIN LISPRO 1 UNITS: 100 INJECTION, SOLUTION INTRAVENOUS; SUBCUTANEOUS at 11:45

## 2019-10-06 RX ADMIN — FUROSEMIDE 40 MG: 10 INJECTION, SOLUTION INTRAMUSCULAR; INTRAVENOUS at 20:22

## 2019-10-06 RX ADMIN — INSULIN LISPRO 1 UNITS: 100 INJECTION, SOLUTION INTRAVENOUS; SUBCUTANEOUS at 16:44

## 2019-10-06 RX ADMIN — CARVEDILOL 6.25 MG: 6.25 TABLET, FILM COATED ORAL at 09:28

## 2019-10-06 RX ADMIN — HYDRALAZINE HYDROCHLORIDE 25 MG: 25 TABLET, FILM COATED ORAL at 20:22

## 2019-10-06 RX ADMIN — ATORVASTATIN CALCIUM 40 MG: 40 TABLET, FILM COATED ORAL at 20:22

## 2019-10-06 RX ADMIN — Medication 10 ML: at 20:23

## 2019-10-06 RX ADMIN — Medication 10 ML: at 09:29

## 2019-10-06 RX ADMIN — INSULIN LISPRO 1 UNITS: 100 INJECTION, SOLUTION INTRAVENOUS; SUBCUTANEOUS at 20:22

## 2019-10-06 ASSESSMENT — PAIN SCALES - GENERAL
PAINLEVEL_OUTOF10: 0

## 2019-10-07 LAB
ANION GAP SERPL CALCULATED.3IONS-SCNC: 14 MMOL/L (ref 7–16)
ANION GAP SERPL CALCULATED.3IONS-SCNC: 16 MMOL/L (ref 7–16)
BUN BLDV-MCNC: 51 MG/DL (ref 8–23)
BUN BLDV-MCNC: 52 MG/DL (ref 8–23)
CALCIUM SERPL-MCNC: 8.3 MG/DL (ref 8.6–10.2)
CALCIUM SERPL-MCNC: 8.6 MG/DL (ref 8.6–10.2)
CHLORIDE BLD-SCNC: 103 MMOL/L (ref 98–107)
CHLORIDE BLD-SCNC: 104 MMOL/L (ref 98–107)
CO2: 21 MMOL/L (ref 22–29)
CO2: 22 MMOL/L (ref 22–29)
CREAT SERPL-MCNC: 2.7 MG/DL (ref 0.5–1)
CREAT SERPL-MCNC: 2.8 MG/DL (ref 0.5–1)
GFR AFRICAN AMERICAN: 20
GFR AFRICAN AMERICAN: 21
GFR NON-AFRICAN AMERICAN: 20 ML/MIN/1.73
GFR NON-AFRICAN AMERICAN: 21 ML/MIN/1.73
GLUCOSE BLD-MCNC: 135 MG/DL (ref 74–99)
GLUCOSE BLD-MCNC: 191 MG/DL (ref 74–99)
HCT VFR BLD CALC: 29.8 % (ref 34–48)
HEMOGLOBIN: 9.5 G/DL (ref 11.5–15.5)
MAGNESIUM: 1.8 MG/DL (ref 1.6–2.6)
MCH RBC QN AUTO: 32.4 PG (ref 26–35)
MCHC RBC AUTO-ENTMCNC: 31.9 % (ref 32–34.5)
MCV RBC AUTO: 101.7 FL (ref 80–99.9)
METER GLUCOSE: 131 MG/DL (ref 74–99)
METER GLUCOSE: 137 MG/DL (ref 74–99)
METER GLUCOSE: 190 MG/DL (ref 74–99)
METER GLUCOSE: 196 MG/DL (ref 74–99)
PDW BLD-RTO: 14.1 FL (ref 11.5–15)
PHOSPHORUS: 4.8 MG/DL (ref 2.5–4.5)
PLATELET # BLD: 277 E9/L (ref 130–450)
PMV BLD AUTO: 11.4 FL (ref 7–12)
POTASSIUM SERPL-SCNC: 4.4 MMOL/L (ref 3.5–5)
POTASSIUM SERPL-SCNC: 4.8 MMOL/L (ref 3.5–5)
RBC # BLD: 2.93 E12/L (ref 3.5–5.5)
SODIUM BLD-SCNC: 140 MMOL/L (ref 132–146)
SODIUM BLD-SCNC: 140 MMOL/L (ref 132–146)
WBC # BLD: 8.9 E9/L (ref 4.5–11.5)

## 2019-10-07 PROCEDURE — 6370000000 HC RX 637 (ALT 250 FOR IP): Performed by: INTERNAL MEDICINE

## 2019-10-07 PROCEDURE — 2580000003 HC RX 258: Performed by: INTERNAL MEDICINE

## 2019-10-07 PROCEDURE — 99233 SBSQ HOSP IP/OBS HIGH 50: CPT | Performed by: INTERNAL MEDICINE

## 2019-10-07 PROCEDURE — 83735 ASSAY OF MAGNESIUM: CPT

## 2019-10-07 PROCEDURE — 84100 ASSAY OF PHOSPHORUS: CPT

## 2019-10-07 PROCEDURE — 80048 BASIC METABOLIC PNL TOTAL CA: CPT

## 2019-10-07 PROCEDURE — 1200000000 HC SEMI PRIVATE

## 2019-10-07 PROCEDURE — 2580000003 HC RX 258: Performed by: NURSE PRACTITIONER

## 2019-10-07 PROCEDURE — 82962 GLUCOSE BLOOD TEST: CPT

## 2019-10-07 PROCEDURE — 2700000000 HC OXYGEN THERAPY PER DAY

## 2019-10-07 PROCEDURE — 36415 COLL VENOUS BLD VENIPUNCTURE: CPT

## 2019-10-07 PROCEDURE — 85027 COMPLETE CBC AUTOMATED: CPT

## 2019-10-07 RX ORDER — 0.9 % SODIUM CHLORIDE 0.9 %
500 INTRAVENOUS SOLUTION INTRAVENOUS ONCE
Status: COMPLETED | OUTPATIENT
Start: 2019-10-07 | End: 2019-10-07

## 2019-10-07 RX ADMIN — ATORVASTATIN CALCIUM 40 MG: 40 TABLET, FILM COATED ORAL at 21:02

## 2019-10-07 RX ADMIN — APIXABAN 2.5 MG: 2.5 TABLET, FILM COATED ORAL at 21:02

## 2019-10-07 RX ADMIN — HYDRALAZINE HYDROCHLORIDE 25 MG: 25 TABLET, FILM COATED ORAL at 05:57

## 2019-10-07 RX ADMIN — HYDRALAZINE HYDROCHLORIDE 25 MG: 25 TABLET, FILM COATED ORAL at 12:47

## 2019-10-07 RX ADMIN — INSULIN LISPRO 1 UNITS: 100 INJECTION, SOLUTION INTRAVENOUS; SUBCUTANEOUS at 12:50

## 2019-10-07 RX ADMIN — CARVEDILOL 6.25 MG: 6.25 TABLET, FILM COATED ORAL at 08:45

## 2019-10-07 RX ADMIN — INSULIN LISPRO 1 UNITS: 100 INJECTION, SOLUTION INTRAVENOUS; SUBCUTANEOUS at 18:28

## 2019-10-07 RX ADMIN — APIXABAN 5 MG: 5 TABLET, FILM COATED ORAL at 08:45

## 2019-10-07 RX ADMIN — SODIUM CHLORIDE 500 ML: 9 INJECTION, SOLUTION INTRAVENOUS at 11:08

## 2019-10-07 RX ADMIN — ISOSORBIDE MONONITRATE 30 MG: 30 TABLET, EXTENDED RELEASE ORAL at 08:45

## 2019-10-07 RX ADMIN — ASPIRIN 81 MG 81 MG: 81 TABLET ORAL at 08:45

## 2019-10-07 RX ADMIN — CARVEDILOL 6.25 MG: 6.25 TABLET, FILM COATED ORAL at 21:02

## 2019-10-07 RX ADMIN — HYDRALAZINE HYDROCHLORIDE 25 MG: 25 TABLET, FILM COATED ORAL at 21:02

## 2019-10-07 RX ADMIN — Medication 10 ML: at 08:46

## 2019-10-07 RX ADMIN — Medication 10 ML: at 21:03

## 2019-10-07 ASSESSMENT — PAIN SCALES - GENERAL
PAINLEVEL_OUTOF10: 0
PAINLEVEL_OUTOF10: 0

## 2019-10-08 ENCOUNTER — TELEPHONE (OUTPATIENT)
Dept: ADMINISTRATIVE | Age: 79
End: 2019-10-08

## 2019-10-08 VITALS
OXYGEN SATURATION: 99 % | HEART RATE: 82 BPM | BODY MASS INDEX: 19.47 KG/M2 | TEMPERATURE: 98.1 F | WEIGHT: 116.9 LBS | RESPIRATION RATE: 16 BRPM | HEIGHT: 65 IN | DIASTOLIC BLOOD PRESSURE: 90 MMHG | SYSTOLIC BLOOD PRESSURE: 139 MMHG

## 2019-10-08 LAB
ANION GAP SERPL CALCULATED.3IONS-SCNC: 14 MMOL/L (ref 7–16)
BUN BLDV-MCNC: 55 MG/DL (ref 8–23)
CALCIUM SERPL-MCNC: 8.6 MG/DL (ref 8.6–10.2)
CHLORIDE BLD-SCNC: 105 MMOL/L (ref 98–107)
CO2: 21 MMOL/L (ref 22–29)
CREAT SERPL-MCNC: 2.6 MG/DL (ref 0.5–1)
GFR AFRICAN AMERICAN: 22
GFR NON-AFRICAN AMERICAN: 22 ML/MIN/1.73
GLUCOSE BLD-MCNC: 136 MG/DL (ref 74–99)
HCT VFR BLD CALC: 28.8 % (ref 34–48)
HEMOGLOBIN: 9.3 G/DL (ref 11.5–15.5)
MAGNESIUM: 1.8 MG/DL (ref 1.6–2.6)
MCH RBC QN AUTO: 33.3 PG (ref 26–35)
MCHC RBC AUTO-ENTMCNC: 32.3 % (ref 32–34.5)
MCV RBC AUTO: 103.2 FL (ref 80–99.9)
METER GLUCOSE: 134 MG/DL (ref 74–99)
PARATHYROID HORMONE INTACT: 465 PG/ML (ref 15–65)
PDW BLD-RTO: 14.2 FL (ref 11.5–15)
PHOSPHORUS: 4.6 MG/DL (ref 2.5–4.5)
PLATELET # BLD: 277 E9/L (ref 130–450)
PMV BLD AUTO: 11.6 FL (ref 7–12)
POTASSIUM SERPL-SCNC: 4.8 MMOL/L (ref 3.5–5)
RBC # BLD: 2.79 E12/L (ref 3.5–5.5)
SODIUM BLD-SCNC: 140 MMOL/L (ref 132–146)
URIC ACID, SERUM: 10.7 MG/DL (ref 2.4–5.7)
VITAMIN D 25-HYDROXY: 24 NG/ML (ref 30–100)
WBC # BLD: 8.6 E9/L (ref 4.5–11.5)

## 2019-10-08 PROCEDURE — 80048 BASIC METABOLIC PNL TOTAL CA: CPT

## 2019-10-08 PROCEDURE — 36415 COLL VENOUS BLD VENIPUNCTURE: CPT

## 2019-10-08 PROCEDURE — 6370000000 HC RX 637 (ALT 250 FOR IP): Performed by: INTERNAL MEDICINE

## 2019-10-08 PROCEDURE — 83735 ASSAY OF MAGNESIUM: CPT

## 2019-10-08 PROCEDURE — 84550 ASSAY OF BLOOD/URIC ACID: CPT

## 2019-10-08 PROCEDURE — 83970 ASSAY OF PARATHORMONE: CPT

## 2019-10-08 PROCEDURE — 82306 VITAMIN D 25 HYDROXY: CPT

## 2019-10-08 PROCEDURE — 85027 COMPLETE CBC AUTOMATED: CPT

## 2019-10-08 PROCEDURE — 2580000003 HC RX 258: Performed by: INTERNAL MEDICINE

## 2019-10-08 PROCEDURE — 84100 ASSAY OF PHOSPHORUS: CPT

## 2019-10-08 PROCEDURE — 2700000000 HC OXYGEN THERAPY PER DAY

## 2019-10-08 PROCEDURE — 82962 GLUCOSE BLOOD TEST: CPT

## 2019-10-08 RX ORDER — HYDRALAZINE HYDROCHLORIDE 25 MG/1
25 TABLET, FILM COATED ORAL EVERY 8 HOURS SCHEDULED
Qty: 90 TABLET | Refills: 3 | Status: SHIPPED | OUTPATIENT
Start: 2019-10-08

## 2019-10-08 RX ORDER — FUROSEMIDE 40 MG/1
40 TABLET ORAL DAILY
Qty: 60 TABLET | Refills: 3 | Status: ON HOLD | OUTPATIENT
Start: 2019-10-10 | End: 2021-04-19 | Stop reason: HOSPADM

## 2019-10-08 RX ORDER — CARVEDILOL 6.25 MG/1
6.25 TABLET ORAL 2 TIMES DAILY
Qty: 60 TABLET | Refills: 3 | Status: ON HOLD | OUTPATIENT
Start: 2019-10-08 | End: 2021-04-19 | Stop reason: HOSPADM

## 2019-10-08 RX ORDER — ISOSORBIDE MONONITRATE 30 MG/1
30 TABLET, EXTENDED RELEASE ORAL DAILY
Qty: 30 TABLET | Refills: 3 | Status: ON HOLD | OUTPATIENT
Start: 2019-10-09 | End: 2021-04-19 | Stop reason: HOSPADM

## 2019-10-08 RX ADMIN — APIXABAN 2.5 MG: 2.5 TABLET, FILM COATED ORAL at 08:15

## 2019-10-08 RX ADMIN — HYDRALAZINE HYDROCHLORIDE 25 MG: 25 TABLET, FILM COATED ORAL at 05:45

## 2019-10-08 RX ADMIN — Medication 10 ML: at 08:15

## 2019-10-08 RX ADMIN — ASPIRIN 81 MG 81 MG: 81 TABLET ORAL at 08:15

## 2019-10-08 RX ADMIN — ISOSORBIDE MONONITRATE 30 MG: 30 TABLET, EXTENDED RELEASE ORAL at 08:15

## 2019-10-08 RX ADMIN — CARVEDILOL 6.25 MG: 6.25 TABLET, FILM COATED ORAL at 08:15

## 2019-10-08 ASSESSMENT — PAIN SCALES - GENERAL: PAINLEVEL_OUTOF10: 0

## 2019-10-09 ENCOUNTER — TELEPHONE (OUTPATIENT)
Dept: NON INVASIVE DIAGNOSTICS | Age: 79
End: 2019-10-09

## 2019-10-09 LAB
BLOOD CULTURE, ROUTINE: NORMAL
CULTURE, BLOOD 2: NORMAL

## 2021-03-31 ENCOUNTER — APPOINTMENT (OUTPATIENT)
Dept: CT IMAGING | Age: 81
DRG: 871 | End: 2021-03-31
Payer: MEDICARE

## 2021-03-31 ENCOUNTER — HOSPITAL ENCOUNTER (INPATIENT)
Age: 81
LOS: 22 days | Discharge: NURSING, REHAB OR LONG-TERM CARE | DRG: 871 | End: 2021-04-22
Attending: EMERGENCY MEDICINE | Admitting: INTERNAL MEDICINE
Payer: MEDICARE

## 2021-03-31 DIAGNOSIS — K86.89 PANCREATIC MASS: ICD-10-CM

## 2021-03-31 DIAGNOSIS — N18.9 CHRONIC KIDNEY DISEASE, UNSPECIFIED CKD STAGE: ICD-10-CM

## 2021-03-31 DIAGNOSIS — D64.9 ANEMIA, UNSPECIFIED TYPE: ICD-10-CM

## 2021-03-31 DIAGNOSIS — K63.1 BOWEL PERFORATION (HCC): Primary | ICD-10-CM

## 2021-03-31 DIAGNOSIS — K62.5 RECTAL BLEEDING: ICD-10-CM

## 2021-03-31 PROBLEM — K92.2 GI BLEED: Status: ACTIVE | Noted: 2021-03-31

## 2021-03-31 LAB
ABO/RH: NORMAL
ALBUMIN SERPL-MCNC: 2.6 G/DL (ref 3.5–5.2)
ALP BLD-CCNC: 118 U/L (ref 35–104)
ALT SERPL-CCNC: 14 U/L (ref 0–32)
ANION GAP SERPL CALCULATED.3IONS-SCNC: 11 MMOL/L (ref 7–16)
ANISOCYTOSIS: ABNORMAL
ANTIBODY SCREEN: NORMAL
APTT: 29.4 SEC (ref 24.5–35.1)
AST SERPL-CCNC: 20 U/L (ref 0–31)
BASOPHILS ABSOLUTE: 0.12 E9/L (ref 0–0.2)
BASOPHILS RELATIVE PERCENT: 0.9 % (ref 0–2)
BILIRUB SERPL-MCNC: 0.3 MG/DL (ref 0–1.2)
BUN BLDV-MCNC: 42 MG/DL (ref 8–23)
BURR CELLS: ABNORMAL
CALCIUM SERPL-MCNC: 8.1 MG/DL (ref 8.6–10.2)
CEA: 3.2 NG/ML (ref 0–5.2)
CHLORIDE BLD-SCNC: 109 MMOL/L (ref 98–107)
CO2: 20 MMOL/L (ref 22–29)
CREAT SERPL-MCNC: 2.8 MG/DL (ref 0.5–1)
D DIMER: 1723 NG/ML DDU
EOSINOPHILS ABSOLUTE: 0 E9/L (ref 0.05–0.5)
EOSINOPHILS RELATIVE PERCENT: 0.4 % (ref 0–6)
GFR AFRICAN AMERICAN: 20
GFR NON-AFRICAN AMERICAN: 20 ML/MIN/1.73
GLUCOSE BLD-MCNC: 138 MG/DL (ref 74–99)
HCT VFR BLD CALC: 22.3 % (ref 34–48)
HCT VFR BLD CALC: 24.5 % (ref 34–48)
HEMOGLOBIN: 6.9 G/DL (ref 11.5–15.5)
HEMOGLOBIN: 7.6 G/DL (ref 11.5–15.5)
INR BLD: 1.8
LACTIC ACID, SEPSIS: 1.2 MMOL/L (ref 0.5–1.9)
LIPASE: 40 U/L (ref 13–60)
LYMPHOCYTES ABSOLUTE: 1.3 E9/L (ref 1.5–4)
LYMPHOCYTES RELATIVE PERCENT: 9.6 % (ref 20–42)
MCH RBC QN AUTO: 33.6 PG (ref 26–35)
MCHC RBC AUTO-ENTMCNC: 31 % (ref 32–34.5)
MCV RBC AUTO: 108.4 FL (ref 80–99.9)
MONOCYTES ABSOLUTE: 0.39 E9/L (ref 0.1–0.95)
MONOCYTES RELATIVE PERCENT: 2.6 % (ref 2–12)
MYELOCYTE PERCENT: 0.9 % (ref 0–0)
NEUTROPHILS ABSOLUTE: 11.31 E9/L (ref 1.8–7.3)
NEUTROPHILS RELATIVE PERCENT: 86.1 % (ref 43–80)
NUCLEATED RED BLOOD CELLS: 0.9 /100 WBC
OVALOCYTES: ABNORMAL
PDW BLD-RTO: 14.8 FL (ref 11.5–15)
PLATELET # BLD: 511 E9/L (ref 130–450)
PMV BLD AUTO: 11 FL (ref 7–12)
POIKILOCYTES: ABNORMAL
POLYCHROMASIA: ABNORMAL
POTASSIUM REFLEX MAGNESIUM: 3.9 MMOL/L (ref 3.5–5)
PROTHROMBIN TIME: 20.7 SEC (ref 9.3–12.4)
RBC # BLD: 2.26 E12/L (ref 3.5–5.5)
SODIUM BLD-SCNC: 140 MMOL/L (ref 132–146)
TOTAL PROTEIN: 5.6 G/DL (ref 6.4–8.3)
TROPONIN: 0.24 NG/ML (ref 0–0.03)
TROPONIN: 0.27 NG/ML (ref 0–0.03)
WBC # BLD: 13 E9/L (ref 4.5–11.5)

## 2021-03-31 PROCEDURE — 82378 CARCINOEMBRYONIC ANTIGEN: CPT

## 2021-03-31 PROCEDURE — 85730 THROMBOPLASTIN TIME PARTIAL: CPT

## 2021-03-31 PROCEDURE — 6360000002 HC RX W HCPCS: Performed by: INTERNAL MEDICINE

## 2021-03-31 PROCEDURE — 96375 TX/PRO/DX INJ NEW DRUG ADDON: CPT

## 2021-03-31 PROCEDURE — 2580000003 HC RX 258: Performed by: EMERGENCY MEDICINE

## 2021-03-31 PROCEDURE — 85025 COMPLETE CBC W/AUTO DIFF WBC: CPT

## 2021-03-31 PROCEDURE — 99285 EMERGENCY DEPT VISIT HI MDM: CPT

## 2021-03-31 PROCEDURE — 1200000000 HC SEMI PRIVATE

## 2021-03-31 PROCEDURE — 87040 BLOOD CULTURE FOR BACTERIA: CPT

## 2021-03-31 PROCEDURE — 80053 COMPREHEN METABOLIC PANEL: CPT

## 2021-03-31 PROCEDURE — 6360000002 HC RX W HCPCS: Performed by: EMERGENCY MEDICINE

## 2021-03-31 PROCEDURE — 86301 IMMUNOASSAY TUMOR CA 19-9: CPT

## 2021-03-31 PROCEDURE — 2580000003 HC RX 258: Performed by: SURGERY

## 2021-03-31 PROCEDURE — 99222 1ST HOSP IP/OBS MODERATE 55: CPT | Performed by: TRANSPLANT SURGERY

## 2021-03-31 PROCEDURE — 51798 US URINE CAPACITY MEASURE: CPT

## 2021-03-31 PROCEDURE — 86900 BLOOD TYPING SEROLOGIC ABO: CPT

## 2021-03-31 PROCEDURE — 96374 THER/PROPH/DIAG INJ IV PUSH: CPT

## 2021-03-31 PROCEDURE — 84484 ASSAY OF TROPONIN QUANT: CPT

## 2021-03-31 PROCEDURE — 74176 CT ABD & PELVIS W/O CONTRAST: CPT

## 2021-03-31 PROCEDURE — P9016 RBC LEUKOCYTES REDUCED: HCPCS

## 2021-03-31 PROCEDURE — 86923 COMPATIBILITY TEST ELECTRIC: CPT

## 2021-03-31 PROCEDURE — 6370000000 HC RX 637 (ALT 250 FOR IP): Performed by: INTERNAL MEDICINE

## 2021-03-31 PROCEDURE — 86901 BLOOD TYPING SEROLOGIC RH(D): CPT

## 2021-03-31 PROCEDURE — 6360000002 HC RX W HCPCS: Performed by: SPECIALIST

## 2021-03-31 PROCEDURE — 96376 TX/PRO/DX INJ SAME DRUG ADON: CPT

## 2021-03-31 PROCEDURE — C9113 INJ PANTOPRAZOLE SODIUM, VIA: HCPCS | Performed by: INTERNAL MEDICINE

## 2021-03-31 PROCEDURE — 85018 HEMOGLOBIN: CPT

## 2021-03-31 PROCEDURE — 6360000002 HC RX W HCPCS: Performed by: SURGERY

## 2021-03-31 PROCEDURE — 2580000003 HC RX 258: Performed by: INTERNAL MEDICINE

## 2021-03-31 PROCEDURE — 83690 ASSAY OF LIPASE: CPT

## 2021-03-31 PROCEDURE — 36415 COLL VENOUS BLD VENIPUNCTURE: CPT

## 2021-03-31 PROCEDURE — 86850 RBC ANTIBODY SCREEN: CPT

## 2021-03-31 PROCEDURE — 85014 HEMATOCRIT: CPT

## 2021-03-31 PROCEDURE — 83605 ASSAY OF LACTIC ACID: CPT

## 2021-03-31 PROCEDURE — 85378 FIBRIN DEGRADE SEMIQUANT: CPT

## 2021-03-31 PROCEDURE — 85610 PROTHROMBIN TIME: CPT

## 2021-03-31 RX ORDER — MORPHINE SULFATE 2 MG/ML
2 INJECTION, SOLUTION INTRAMUSCULAR; INTRAVENOUS EVERY 4 HOURS PRN
Status: DISCONTINUED | OUTPATIENT
Start: 2021-03-31 | End: 2021-04-22 | Stop reason: HOSPADM

## 2021-03-31 RX ORDER — ERGOCALCIFEROL 1.25 MG/1
50000 CAPSULE ORAL WEEKLY
Status: DISCONTINUED | OUTPATIENT
Start: 2021-04-07 | End: 2021-04-22 | Stop reason: HOSPADM

## 2021-03-31 RX ORDER — ONDANSETRON 2 MG/ML
4 INJECTION INTRAMUSCULAR; INTRAVENOUS EVERY 6 HOURS PRN
Status: DISCONTINUED | OUTPATIENT
Start: 2021-03-31 | End: 2021-04-22 | Stop reason: HOSPADM

## 2021-03-31 RX ORDER — FENTANYL CITRATE 50 UG/ML
50 INJECTION, SOLUTION INTRAMUSCULAR; INTRAVENOUS ONCE
Status: COMPLETED | OUTPATIENT
Start: 2021-03-31 | End: 2021-03-31

## 2021-03-31 RX ORDER — HYDRALAZINE HYDROCHLORIDE 25 MG/1
25 TABLET, FILM COATED ORAL EVERY 8 HOURS SCHEDULED
Status: DISCONTINUED | OUTPATIENT
Start: 2021-03-31 | End: 2021-04-14

## 2021-03-31 RX ORDER — CARVEDILOL 6.25 MG/1
6.25 TABLET ORAL 2 TIMES DAILY
Status: DISCONTINUED | OUTPATIENT
Start: 2021-03-31 | End: 2021-04-14

## 2021-03-31 RX ORDER — ALBUTEROL SULFATE 2.5 MG/3ML
2.5 SOLUTION RESPIRATORY (INHALATION) EVERY 6 HOURS PRN
Status: DISCONTINUED | OUTPATIENT
Start: 2021-03-31 | End: 2021-04-22 | Stop reason: HOSPADM

## 2021-03-31 RX ORDER — SODIUM CHLORIDE 9 MG/ML
INJECTION, SOLUTION INTRAVENOUS EVERY 12 HOURS
Status: DISCONTINUED | OUTPATIENT
Start: 2021-03-31 | End: 2021-03-31 | Stop reason: ALTCHOICE

## 2021-03-31 RX ORDER — FLUCONAZOLE 2 MG/ML
400 INJECTION, SOLUTION INTRAVENOUS EVERY 24 HOURS
Status: DISCONTINUED | OUTPATIENT
Start: 2021-03-31 | End: 2021-04-04

## 2021-03-31 RX ORDER — PANTOPRAZOLE SODIUM 40 MG/10ML
40 INJECTION, POWDER, LYOPHILIZED, FOR SOLUTION INTRAVENOUS 2 TIMES DAILY
Status: CANCELLED | OUTPATIENT
Start: 2021-03-31

## 2021-03-31 RX ORDER — PROMETHAZINE HYDROCHLORIDE 25 MG/ML
25 INJECTION, SOLUTION INTRAMUSCULAR; INTRAVENOUS EVERY 6 HOURS PRN
Status: DISCONTINUED | OUTPATIENT
Start: 2021-03-31 | End: 2021-04-22 | Stop reason: HOSPADM

## 2021-03-31 RX ORDER — SODIUM CHLORIDE 9 MG/ML
INJECTION, SOLUTION INTRAVENOUS CONTINUOUS
Status: DISCONTINUED | OUTPATIENT
Start: 2021-03-31 | End: 2021-04-03

## 2021-03-31 RX ORDER — 0.9 % SODIUM CHLORIDE 0.9 %
1000 INTRAVENOUS SOLUTION INTRAVENOUS ONCE
Status: COMPLETED | OUTPATIENT
Start: 2021-03-31 | End: 2021-03-31

## 2021-03-31 RX ORDER — ISOSORBIDE MONONITRATE 30 MG/1
30 TABLET, EXTENDED RELEASE ORAL DAILY
Status: DISCONTINUED | OUTPATIENT
Start: 2021-04-01 | End: 2021-04-14

## 2021-03-31 RX ORDER — MORPHINE SULFATE 5 MG/ML
5 INJECTION, SOLUTION INTRAMUSCULAR; INTRAVENOUS ONCE
Status: COMPLETED | OUTPATIENT
Start: 2021-03-31 | End: 2021-03-31

## 2021-03-31 RX ORDER — ATORVASTATIN CALCIUM 40 MG/1
40 TABLET, FILM COATED ORAL NIGHTLY
Status: DISCONTINUED | OUTPATIENT
Start: 2021-03-31 | End: 2021-04-22 | Stop reason: HOSPADM

## 2021-03-31 RX ADMIN — MORPHINE SULFATE 5 MG: 5 INJECTION, SOLUTION INTRAMUSCULAR; INTRAVENOUS at 11:05

## 2021-03-31 RX ADMIN — MORPHINE SULFATE 5 MG: 5 INJECTION, SOLUTION INTRAMUSCULAR; INTRAVENOUS at 13:08

## 2021-03-31 RX ADMIN — SODIUM CHLORIDE 8 MG/HR: 9 INJECTION, SOLUTION INTRAVENOUS at 17:05

## 2021-03-31 RX ADMIN — MORPHINE SULFATE 2 MG: 2 INJECTION, SOLUTION INTRAMUSCULAR; INTRAVENOUS at 18:02

## 2021-03-31 RX ADMIN — MEROPENEM 500 MG: 500 INJECTION, POWDER, FOR SOLUTION INTRAVENOUS at 17:31

## 2021-03-31 RX ADMIN — FENTANYL CITRATE 50 MCG: 50 INJECTION, SOLUTION INTRAMUSCULAR; INTRAVENOUS at 11:37

## 2021-03-31 RX ADMIN — SODIUM CHLORIDE: 9 INJECTION, SOLUTION INTRAVENOUS at 17:04

## 2021-03-31 RX ADMIN — FENTANYL CITRATE 50 MCG: 50 INJECTION, SOLUTION INTRAMUSCULAR; INTRAVENOUS at 14:25

## 2021-03-31 RX ADMIN — FLUCONAZOLE 400 MG: 400 INJECTION, SOLUTION INTRAVENOUS at 17:31

## 2021-03-31 RX ADMIN — SODIUM CHLORIDE 1000 ML: 9 INJECTION, SOLUTION INTRAVENOUS at 11:20

## 2021-03-31 ASSESSMENT — ENCOUNTER SYMPTOMS
SHORTNESS OF BREATH: 0
DIARRHEA: 1
NAUSEA: 0
BACK PAIN: 0
CONSTIPATION: 0
RHINORRHEA: 0
BLOOD IN STOOL: 1
SHORTNESS OF BREATH: 1
EYE DISCHARGE: 0
EYE PAIN: 0
COLOR CHANGE: 0
DIARRHEA: 0
ABDOMINAL PAIN: 1
ABDOMINAL DISTENTION: 0
COUGH: 0
PHOTOPHOBIA: 0
VOMITING: 0

## 2021-03-31 ASSESSMENT — PAIN SCALES - GENERAL
PAINLEVEL_OUTOF10: 0
PAINLEVEL_OUTOF10: 10

## 2021-03-31 ASSESSMENT — PAIN DESCRIPTION - ORIENTATION: ORIENTATION: LEFT;LOWER

## 2021-03-31 ASSESSMENT — PAIN DESCRIPTION - DESCRIPTORS: DESCRIPTORS: SHARP

## 2021-03-31 NOTE — LETTER
[facility]  Chelsey Parrish Encounter Date/Time: 3/31/2021 [address] Account: [de-identified]    MRN: [id number]    Patient: Stephen Boyce    Contact Serial #: [id number]      ENCOUNTER          Patient Class: I Private Enc? No Unit RM BD: [address] [place] Service: Intermediate   Encounter DX: GI bleed [K92.2]   ADM Provider: Chery Sahu DO   Procedure:     ATT Provider: Chery Sahu DO   REF Provider:        Admission DX: GI bleed and codes: 578. 9      PATIENT                 Name: Stephen Boyce : 1940 (80 yrs)   Address: [address] [place] [address] Sex: Female   [place] city: [place] [address]         Marital Status:    Employer: RETIRED         [employer]: [employer]   Primary Care Provider: Torie Gupta DO         Primary Phone: [phone]   EMERGENCY CONTACT   Contact Name Legal Guardian? Relationship to Patient Home Phone Work Phone   1. Sabrina Ireland  2. Karen Matters No  No Brother/Sister  Child [phone]              GUARANTOR            Guarantor: Stephen Boyce     : 1940   Address: [place] Sex: Female     [address] [facility] [address]     Relation to Patient: Self       Home Phone: [phone]   Guarantor ID: [id number]       Work Phone:     Guarantor Employer: RETIRED         Status: RETIRED      COVERAGE        PRIMARY INSURANCE   Payor: [employer] MEDICARE Plan: Reeda Puff PLUS HMO   Payor Address: [facility] [id number] [facility] [address]       Group Number:   Insurance Type: [id number] Name: Tony Anthony : 1940   Subscriber ID: [id number] Pat. Rel. to Sub: Self   SECONDARY INSURANCE   Payor:   Plan:     Payor Address:  ,           Group Number:   Insurance Type:     Subscriber Name:   Subscriber :     Subscriber ID:   Pat. Rel. to Sub:              [place] Department Medicaid  CERTIFICATION OF NECESSITY  FOR NON-EMERGENCY TRANSPORTATION   BY GROUND AMBULANCE      Individual Information   1. Name: Stephen Boyce 2.  [facility] Medicaid Billing Number:    3. Address: [address] [place] [address] [address] [address]      Transportation Provider Information   4. Provider Name: Claudette Goldstein   5. [place] Medicaid Provider Number:  National Provider Identifier (NPI):      Certification  7. Criteria:  During transport, this individual requires:  [x] Medical treatment or continuous     supervision by an EMT. [x] The administration or regulation of oxygen by another person. [] Supervised protective restraint. 04/22/2021   9. Length  [x] Not more than {#:01660} day(s)  [] One Year     Additional Information Relevant to Certification   10. Comments or Explanations, If Necessary or Appropriate     CHF, AFIB, PANCREATIC PSEUDO CYST, CHRONIC RESPIRATORY FAILURE WITH HYPOXIA, ACUTE KIDNEY DISEASE- ON HEMODIALYSIS, FALLS RISK, LETHARGY, O2 AT 6 LITERS PNC CONTINUOSE. Certifying Practitioner Information   11. Name of Practitioner: DR Sharita Ordoñez    12. [place] Medicaid Provider Number, If Applicable:  [address] Provider Identifier (NPI): [id number]     Signature Information   14. Date of Signature: 04/22/2021 15. Name of Joanna Pantoja   16. Signature and Professional Designation: Aura Metzger. 4/22/2021.9:35 AM.      ODM 66037  Rev. 7/2015

## 2021-03-31 NOTE — H&P
 Chronic renal insufficiency, stage IV (severe) ([facility] [address] 11/19/2016    History of cardiovascular stress test 07/2015    Lexiscan stress test    History of echocardiogram 07/27/2015    EF 29%    Hyperlipidemia     Hypertension        PAST SURGICAL Hx:   Past Surgical History:   Procedure Laterality Date    ECTOPIC PREGNANCY SURGERY         FAMILY Hx:  History reviewed. No pertinent family history. HOME MEDICATIONS:  Prior to Admission medications    Medication Sig Start Date End Date Taking? Authorizing Provider   apixaban (ELIQUIS) 2.5 MG TABS tablet Take 1 tablet by mouth 2 times daily 10/8/19   [facility], DO   carvedilol (COREG) 6.25 MG tablet Take 1 tablet by mouth 2 times daily 10/8/19   [facility], DO   furosemide (LASIX) 40 MG tablet Take 1 tablet by mouth daily 10/10/19   [facility], DO   hydrALAZINE (APRESOLINE) 25 MG tablet Take 1 tablet by mouth every 8 hours 10/8/19   [facility], DO   isosorbide mononitrate (IMDUR) 30 MG extended release tablet Take 1 tablet by mouth daily 10/9/19   [facility], DO   vitamin D (ERGOCALCIFEROL) 78527 units CAPS capsule Take 50,000 Units by mouth once a week    Historical Provider, MD   atorvastatin (LIPITOR) 40 MG tablet Take 1 tablet by mouth nightly 7/28/15   [facility], DO       ALLERGIES:  Patient has no known allergies.     SOCIAL Hx:  Social History     Socioeconomic History    Marital status:      Spouse name: Not on file    Number of children: Not on file    Years of education: Not on file    Highest education level: Not on file   Occupational History    Not on file   Social Needs    Financial resource strain: Not on file    Food insecurity     Worry: Not on file     Inability: Not on file    Transportation needs     Medical: Not on file     Non-medical: Not on file   Tobacco Use    Smoking status: Current Every Day Smoker     Packs/day: 0.50    Smokeless tobacco: Never Used   Substance and Sexual Activity    Alcohol use: Not Currently Comment: socially    Drug use: Never    Sexual activity: Not Currently   Lifestyle    Physical activity     Days per week: Not on file     Minutes per session: Not on file    Stress: Not on file   Relationships    Social connections     Talks on phone: Not on file     Gets together: Not on file     Attends Rest[employer] service: Not on file     Active member of club or organization: Not on file     Attends meetings of clubs or organizations: Not on file     Relationship status: Not on file    Intimate partner violence     Fear of current or ex partner: Not on file     Emotionally abused: Not on file     Physically abused: Not on file     Forced sexual activity: Not on file   Other Topics Concern    Not on file   Social History Narrative    Not on file       ROS: 12 point review of symptoms was conducted, pertinent positives and negative were reviewed, aside from that all 12 systems were reviewed and negative. PHYSICAL EXAM:  VITALS:  Vitals:    03/31/21 1313   BP: (!) 152/95   Pulse: 83   Resp: 20   Temp:    SpO2: 96%         CONSTITUTIONAL:    Awake, alert, cooperative, uncomfortable appearing, mildly distressed due to pain    EYES:    PERRL, EOMI, sclera clear without icterus, conjunctiva normal    ENT:    Normocephalic, atraumatic, sinuses nontender on palpation. External ears without lesions. Oral pharynx with moist mucus membranes. NECK:    Supple, symmetrical, trachea midline, no adenopathy, thyroid symmetric, not enlarged and no tenderness, skin normal, no bruits, no JVD    HEMATOLOGIC/LYMPHATICS:    No cervical lymphadenopathy and no supraclavicular lymphadenopathy    LUNGS:    Symmetric.  No increased work of breathing, diminished air exchange bibasilar air exchange, clear to auscultation bilaterally, no wheezes, rhonchi, or rales,     CARDIOVASCULAR:    Normal apical impulse, regular rate and rhythm, normal S1 and S2, systolic murmur noted    ABDOMEN:    The abdomen is soft, mildly distended and diffusely tender disproportionate to examination. There is some degree of guarding elicited on examination but there is no rigidity. Bowel sounds are diffusely hypoactive. MUSCULOSKELETAL:    There is no redness, warmth, or swelling of the joints. Tone is normal.    NEUROLOGIC:    Awake, alert, oriented to name, place and time. Cranial nerves II-XII are grossly intact. Generally weak and deconditioned. SKIN:    No bruising or bleeding. No redness, warmth, or swelling    EXTREMITIES:    Peripheral pulses present. No edema, cyanosis, or swelling.     LABORATORY DATA:  CBC with Differential:    Lab Results   Component Value Date    WBC 13.0 03/31/2021    RBC 2.26 03/31/2021    HGB 7.6 03/31/2021    HCT 24.5 03/31/2021     03/31/2021    .4 03/31/2021    MCH 33.6 03/31/2021    MCHC 31.0 03/31/2021    RDW 14.8 03/31/2021    NRBC 0.9 03/31/2021    LYMPHOPCT 9.6 03/31/2021    MONOPCT 2.6 03/31/2021    MYELOPCT 0.9 03/31/2021    BASOPCT 0.9 03/31/2021    MONOSABS 0.39 03/31/2021    LYMPHSABS 1.30 03/31/2021    EOSABS 0.00 03/31/2021    BASOSABS 0.12 03/31/2021     BMP:    Lab Results   Component Value Date     03/31/2021    K 3.9 03/31/2021     03/31/2021    CO2 20 03/31/2021    BUN 42 03/31/2021    LABALBU 2.6 03/31/2021    CREATININE 2.8 03/31/2021    CALCIUM 8.1 03/31/2021    GFRAA 20 03/31/2021    LABGLOM 20 03/31/2021    GLUCOSE 138 03/31/2021    GLUCOSE 102 11/17/2010       ASSESSMENT:  · Sepsis secondary to colitis   · Acute gastrointestinal bleeding with hematochezia  · Acute on chronic macrocytic normochromic anemia  · Chronic kidney disease stage IV  · Non-anion gap metabolic acidosis  · Intractable abdominal pain  · Hemorrhagic pancreatic mass vs pseudocyst measuring 7.4 x 3.2 x 3.3 cm, with well contained perforation with extraluminal air  · 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 She meets criteria for sepsis and based on examination colitis is presumed the source. Further complicating matters would be 2 separate areas of perforation from 2 separate issues. There is a well contained extraluminal perforation associated with what appears to be a pancreatic mass versus cyst as well as a GIST tumor of the stomach with what appears to be gastric perforation. Appropriate broad-spectrum antibiotics have been instituted, symptomatic and supportive care are being given as well and we will have surgery and infectious disease. In the setting of questionable splenic artery aneurysm contributing to the development of the upper quadrant mass as well as abdominal aortic aneurysm that is rather large in size with chronic dissection appearance we will asked the vascular team to provide consultation as well. Anemia is noted secondary to hematochezia and we will monitor blood counts accordingly and transfuse to maintain hemoglobin greater than 7. We will monitor volume status closely in the setting of known chronic systolic and diastolic congestive heart failure. Patient has a history of atrial fibrillation as well and Eliquis is being held in the setting of GI bleeding as well as the potential need for intervention. Underlying co-morbidites will be addressed during hospitalization as well. Labs and vital signs will be monitored closely and addressed accordingly. See additional orders for details.      KAYLA Poole  1:29 PM  3/31/2021

## 2021-03-31 NOTE — PROGRESS NOTES
Full consult to follow, evaluation of CT scan shows concerning for either hemorrhagic conversion of a pseudocyst posterior to the stomach and the lesser sac or gastric malignancy which is less likely. I will ask [facility] surgery to evaluate and review the CT scan I will also await GIs evaluation with upper endoscopy. Should the patient show signs of massive hemorrhage she would need to be transferred to a tertiary center due to the location of the presumed bleed. Eli Soria MD, MS  Minimally Invasive and Bariatric Surgery  [phone] (p)  3/31/2021  2:57 PM

## 2021-03-31 NOTE — CONSULTS
[facility]  Consult Note    [address] [address]  [place], [facility] [address]  Phone [phone]   [phone]      Admit Date: 3/31/2021 10:32 AM  Pt Name: Madeleine Saavedra  MRN: [id number]  : 1940  Reason for Consult:    Chief Complaint   Patient presents with    Abdominal Pain     to er via ems from home for evaluation of LLQ pain and rectal bleeding that started today.  Rectal Bleeding     Requesting Physician:  Chey Horan DO  PCP: Benitez Worthy DO  History Obtained From:  patient, chart   ID consulted for GI bleed [K92.2]  on hospital day 0  CHIEF COMPLAINT       Chief Complaint   Patient presents with    Abdominal Pain     to er via ems from home for evaluation of LLQ pain and rectal bleeding that started today.  Rectal Bleeding     HISTORYOF PRESENT ILLNESS   Madeleine Saavedra is a 68 y.o. female who presents with significant past medical history of  has a past medical history of Arthritis, Atrial fibrillation ([address] Blood circulation, collateral, CHF (congestive heart failure) ([address] Chronic renal insufficiency, stage IV (severe) ([address] History of cardiovascular stress test, History of echocardiogram, Hyperlipidemia, and Hypertension. ED TRIAGEVITALS  BP: (!) 138/90, Temp: 97.4 °F (36.3 °C), Pulse: 102, Resp: 20, SpO2: 95 %  HPI  Pt was brought in to ER for left sided abd pain   SHE WAS HAVING BLOODY STOOLS  SHE IS ON CHRONIC O2 3L AT NIGHT  NO F/C  S/p d/c3/-3/29 from [facility] for kidney infection/Urine cx Klebsiella pan (S) amp (R) pt was d/c with cefdinir for 5 days  CT [facility] \"Chest:  1. Limited study with no acute findings. Abdomen and pelvis:  1. Limited evaluation from motion artifact. 2. Findings of chronic pancreatitis with a cystic lesion in the tail favoring a pseudocyst. Further assessment would require MRI however, this may be nondiagnostic if patient is unable to breath hold.   3. Gas within the urinary bladder may be infectious. 4. 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 \"    She has abd pain/bloody BM that occurred today   No n/v /RASH/ITCH   wbc13 hgb7.6 plt 511 bun42 cr2.8   Ct  1.  Presence of a 7.4 x 3.2 x 3.3 cm expansile complex  density mass lesion   in the left upper quadrant interposed between the posterior wall of the   fundal region of the stomach, the spleen and the splenic flexure of the colon   and the body/tail of the pancreas, likely with a hemorrhagic component. 2. Lenetta Cave is a component of a well contained perforation with extraluminal   air but not conspicuously free intraperitoneal air associated with this mass   lesion. 3. Lenetta Cave are free fluid accumulation in the left subphrenic space and   stranding of the fat planes in between the stomach with spleen in the splenic   flexure of the colon. 4. Differential diagnosis would include an exophytic  GIST tumor of the   stomach with the gastric perforation. 5.  Further evaluation with CT abdomen pelvis with oral and IV contrast is   recommended.  The IV contrast needed to exclude a bleeding aneurysm of the   splenic artery due the location of the lesion.       piperacillin-tazobactam (ZOSYN) 3,375 mg in dextrose 5 % 50 mL IVPB extended infusion (mini-bag), Q12H  meropenem (MERREM) 500 mg in sodium chloride 0.9 % 100 mL IVPB, Q12H  0.9 % sodium chloride infusion, Q12H    Currently in bed has abd pain and c/o bloody bm   No f/c  daughter present  REVIEW OF SYSTEMS       REVIEW OFSYSTEMS:    CONSTITUTIONAL:   No fever, chills, weight loss  ALLERGIES:    No urticaria, hay fever,    EYES:     No blurry vision, loss of vision,eye pain  ENT:      No hearing loss, sore throat  CARDIOVASCULAR:  No chest pain or palpitations  RESPIRATORY:   No cough, sob-CHRONIC O2  ENDOCRINE:    No increase thirst, urination   HEME-LYMPH:   No easy bruising or bleeding  GI:     No nausea, vomiting + diarrhea  :     No urinary complaints NEURO:    No seizures, stroke, HA  MUSCULOSKELETAL:  No muscle aches or pain, no joint pain  SKIN:     No rash or itch  PSYCH:    No depression or anxiety    Medications Prior to Admission: apixaban (ELIQUIS) 2.5 MG TABS tablet, Take 1 tablet by mouth 2 times daily  carvedilol (COREG) 6.25 MG tablet, Take 1 tablet by mouth 2 times daily  furosemide (LASIX) 40 MG tablet, Take 1 tablet by mouth daily  hydrALAZINE (APRESOLINE) 25 MG tablet, Take 1 tablet by mouth every 8 hours  isosorbide mononitrate (IMDUR) 30 MG extended release tablet, Take 1 tablet by mouth daily  vitamin D (ERGOCALCIFEROL) 46505 units CAPS capsule, Take 50,000 Units by mouth every other day   atorvastatin (LIPITOR) 40 MG tablet, Take 1 tablet by mouth nightly  CURRENT MEDICATIONS     Current Facility-Administered Medications:     atorvastatin (LIPITOR) tablet 40 mg, 40 mg, Oral, Nightly, Idella Gain, DO    carvedilol (COREG) tablet 6.25 mg, 6.25 mg, Oral, BID, Idella Gain, DO    hydrALAZINE (APRESOLINE) tablet 25 mg, 25 mg, Oral, 3 times per day, Idella Gain, DO    [START ON 4/1/2021] isosorbide mononitrate (IMDUR) extended release tablet 30 mg, 30 mg, Oral, Daily, Idella Gain, DO    [START ON 4/7/2021] vitamin D (ERGOCALCIFEROL) capsule 50,000 Units, 50,000 Units, Oral, Weekly, Idella Gain, DO    albuterol (PROVENTIL) nebulizer solution 2.5 mg, 2.5 mg, Nebulization, Q6H PRN, Idella Gain, DO    ondansetron [facility]) injection 4 mg, 4 mg, Intravenous, Q6H PRN, Idella Gain, DO    promethazine (PHENERGAN) injection 25 mg, 25 mg, Intravenous, Q6H PRN, Idella Gain, DO    0.9 % sodium chloride infusion, , Intravenous, Continuous, Idella Gain, DO    pantoprazole (PROTONIX) 80 mg in sodium chloride 0.9 % 100 mL infusion, 8 mg/hr, Intravenous, Continuous, Idella Gain, DO    morphine (PF) injection 2 mg, 2 mg, Intravenous, Q4H PRN, Idella Gain, DO    meropenem (MERREM) 500 mg in sodium chloride 0.9 % 100 mL IVPB, 500 mg, Intravenous, Q12H, Parrish Mcclain MD  ALLERGIES     Patient has no known allergies. Immunization History   Administered Date(s) Administered    Influenza, High Dose (Fluzone 65 yrs and older) 10/30/2018    Influenza, Triv, inactivated, subunit, adjuvanted, IM (Fluad 65 yrs and older) 10/05/2019    Pneumococcal Polysaccharide ([id number]) 10/06/2019     PAST MEDICAL HISTORY     Past Medical History:   Diagnosis Date    Arthritis     [facility]     Blood circulation, collateral     CHF (congestive heart failure) ([facility]     Chronic [facility] insufficiency, [facility] IV (severe) [facility] 11/19/2016    History of cardiovascular stress test 07/2015    Lexiscan stress test    History of echocardiogram 07/27/2015    EF 29%    Hyperlipidemia     Hypertension      SURGICAL HISTORY       Past Surgical History:   Procedure Laterality Date    ECTOPIC PREGNANCY SURGERY       FAMILY HISTORY     History reviewed. No pertinent family history.   SOCIAL HISTORY       Social History     Socioeconomic History    Marital status:      Spouse name: None    Number of children: None    Years of education: None    Highest education level: None   Occupational History    None   Social Needs    Financial resource strain: None    Food insecurity     Worry: None     Inability: None    Transportation needs     Medical: None     Non-medical: None   Tobacco Use    Smoking status: Current Every Day Smoker     Packs/day: 0.50    Smokeless tobacco: Never Used   Substance and Sexual Activity    Alcohol use: Not Currently     Comment: socially    Drug use: Never    Sexual activity: Not Currently   Lifestyle    Physical activity     Days per week: None     Minutes per session: None    Stress: None   Relationships    Social connections     Talks on phone: None     Gets together: None     Attends Protestant service: None     Active member of club or organization: None     Attends meetings of clubs or organizations: None Relationship status: None    Intimate partner violence     Fear of current or ex partner: None     Emotionally abused: None     Physically abused: None     Forced sexual activity: None   Other Topics Concern    None   Social History Narrative    None     · LIVES ALONE RETIRED FROM [employer]    PHYSICAL EXAM       ED Triage Vitals [03/31/21 1039]   BP Temp Temp Source Pulse Resp SpO2 Height Weight   (!) 140/87 97.4 °F (36.3 °C) Oral 84 24 98 % 5' 5\" (1.651 m) 134 lb 8 oz (61 kg)     Vitals:    Vitals:    03/31/21 1228 03/31/21 1313 03/31/21 1423 03/31/21 1457   BP: (!) 140/95 (!) 152/95 (!) 137/99 (!) 138/90   Pulse: 79 83 95 102   Resp: 20 20 22 20   Temp:   97.6 °F (36.4 °C) 97.4 °F (36.3 °C)   TempSrc:   Oral Oral   SpO2: 93% 96% 97% 95%   Weight:       Height:         Physical Exam   Constitutional/General: Alert and oriented, NAD  Head: NC/AT  Eyes: PERRL, EOMI  Mouth: Normal mucosa, no thrush   Neck: Supple, full ROM,    Pulmonary: Lungs DEC to auscultation bilaterally. Not in respiratory distress  Cardiovascular:  Regular rate and rhythm, no murmurs, gallops, or rubs. Abdomen: Soft, + BS.    distension. tender. Extremities: Moves all extremities x 4. Warm and well perfused  Pulses:  Distal pulses DEC B EDEMA   Skin: Warm and dry without rash  Neurologic:    No focal deficits  Psych: Normal Affect     DIAGNOSTIC RESULTS   RADIOLOGY:   Ct Abdomen Pelvis Wo Contrast Additional Contrast? None    Result Date: 3/31/2021  EXAMINATION: CT OF THE ABDOMEN AND PELVIS WITHOUT CONTRAST 3/31/2021 12:51 pm TECHNIQUE: CT of the abdomen and pelvis was performed without the administration of intravenous contrast. Multiplanar reformatted images are provided for review. Dose modulation, iterative reconstruction, and/or weight based adjustment of the mA/kV was utilized to reduce the radiation dose to as low as reasonably achievable. COMPARISON: None.  HISTORY: ORDERING SYSTEM PROVIDED HISTORY: abd pain TECHNOLOGIST PROVIDED HISTORY: Reason for exam:->abd pain Additional Contrast?->None Decision Support Exception->Emergency Medical Condition (MA) FINDINGS: In the left upper quadrant there is no expansile amorphous a lesion of mixed density measuring 7.4 x 3.2 x 3.3 cm interposed between the posterior wall of the fundal region of the stomach, the spleen which is posterior located, the tail of the pancreas, and the splenic flexure of the colon particularly the proximal descending colon. There is stranding of the adjacent fat planes and there are indication for extraluminal air associated with this lesion. The findings are restricted to the left upper quadrant. There is also fluid accumulation in the left subphrenic space around the spleen. Stranding of the fat planes are seen. The areas of a relative increased density associated with the core of this lesion can indicate a hemorrhagic component. Differential diagnosis would indicated an exophytic perforate lesion of the stomach as seen with a just a type of tumor but it is in close proximity with the vessels of the body/tail of the spleen and atherosclerotic changes are seen in the splenic artery. The a leaking aneurysm of the splenic artery cannot be entirely excluded the. The proper evaluation will required oral and IV contrast study. There is no evidence for free intraperitoneal air although there is extraluminal air associated with the left upper quadrant mass like lesion formation. Free fluid accumulation is seen in the lower pelvic cavity in the cul-de-sac. The spleen appears to be intrinsically unremarkable though surrounded by stranding of fat planes and the fluid accumulation in the left subphrenic space. Stranding of the pericolonic fat planes seen around the splenic flexure of the colon relate with the amorphous complex most likely hemorrhagic mass lesion with a component of bowel perforation likely from the stomach in the left upper quadrant.  The liver has borderline size. The small size gallstones are seen in a well distended gallbladder. There is no dilatation of the biliary tree or pancreatic ductal system. There is some atrophy of the pancreas. The have calcifications are seen in the area of the head of the pancreas. Additional calcifications seen throughout the pancreas in part are vascular related but can be relate with previous chronic calcified pancreatitis. The kidneys are preserved size. There is no hydronephrosis. Vascular calcifications are seen both kidneys. 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 Bladder has unremarkable appearance. The uterus and ovaries have unremarkable appearance. Calcified myoma is seen in the uterus. Appendix seen and has unremarkable appearance. There is no indication for bowel obstruction. There is a component of anasarca with bilateral pleural effusions of mild-to-moderate degree. Delete there are compression atelectasis in both lower lobes by the pleural effusion. Heart is diffusely enlarged. Degenerative changes are seen in the lumbar spine and particularly in the lower thoracic spine segments. 1.  Presence of a 7.4 x 3.2 x 3.3 cm expansile complex  density mass lesion in the left upper quadrant interposed between the posterior wall of the fundal region of the stomach, the spleen and the splenic flexure of the colon and the body/tail of the pancreas, likely with a hemorrhagic component. 2.  There is a component of a well contained perforation with extraluminal air but not conspicuously free intraperitoneal air associated with this mass lesion. 3.  There are free fluid accumulation in the left subphrenic space and stranding of the fat planes in between the stomach with spleen in the splenic flexure of the colon.  4. Differential diagnosis would include an exophytic  GIST tumor of the stomach with the gastric perforation. 5.  Further evaluation with CT abdomen pelvis with oral and IV contrast is recommended. The IV contrast needed to exclude a bleeding aneurysm of the splenic artery due the location of the lesion. Preliminary report given to Dr. Diana Gates, ER Physician in [address]     03/31/21  1118   WBC 13.0*   HGB 7.6*   HCT 24.5*   .4*   *     Recent Labs     03/31/21  1118      K 3.9   *   CO2 20*   BUN 42*   CREATININE 2.8*   GFRAA 20   LABGLOM 20   GLUCOSE 138*   PROT 5.6*   LABALBU 2.6*   CALCIUM 8.1*   BILITOT 0.3   ALKPHOS 118*   AST 20   ALT 14     COVID-19/TOMAS-COV2 LABS  Recent Labs     03/31/21  1118 03/31/21  1545   TROPONINI  --  0.27*   INR 1.8  --    PROTIME 20.7*  --    AST 20  --    ALT 14  --      Lab Results   Component Value Date    CHOL 116 10/04/2019    TRIG 83 10/04/2019    HDL 41 10/04/2019    LDLCALC 58 10/04/2019    LABVLDL 17 10/04/2019       MICROBIOLOGY:     Cultures :   Lab Results   Component Value Date    BC 5 Days- no growth 10/03/2019     Lab Results   Component Value Date    BLOODCULT2 5 Days- no growth 10/03/2019       Patient is a 68 y.o. female who presented with   Chief Complaint   Patient presents with    Abdominal Pain     to er via ems from home for evaluation of LLQ pain and rectal bleeding that started today.  Rectal Bleeding      FINAL IMPRESSION    PT CAME IN WITH ABD PAIN AND DIARRHEA  SHE HAS LEUKOCYTOSIS/anemia  GIB/pancreatic mass?hemorrhagic/bowel/gi perforation   S/P RX UTI KLEBSIELLA /CEFDINIR  CKD    cover with atbx   Cont meropenem (MERREM) 500 mg in sodium chloride 0.9 % 100 mL IVPB, Q12H    CHECK BLOOD CX  ADD ANTIFUNGAL  BLADDER SCAN    UPDATED DAUGHTER  SURGERY/GI TO see    Imaging and labs were reviewed per medical records and any ID pertinent labs were addressed with the patient.      The patient/FAMILY  was educated about the diagnosis, prognosis, indications, risks and benefits of treatment. An opportunity to ask questions was given to the patient/FAMILY and questions were answered. Thank you for involving me in the care of Enedina Carias. Please do not hesitate to call for any questions or concerns.          Electronically signed by Dominique Solorzano MD on 3/31/2021 at 4:33 PM

## 2021-03-31 NOTE — ED PROVIDER NOTES
Patient presents to the ED for evaluation of left-sided abdominal pain. Patient admits that she was recently admitted at [facility] and was diagnosed with a kidney infection. She states she was having the pain at that time but they did not address it. She was discharged recently and since she has been home the pain is not improved. She reports having blood in her bowel movements. She is also been having diarrhea. No associated nausea or vomiting. No associated chest pain or worsening shortness of breath. She states she is short of breath at baseline and  is on oxygen. Patient states that she has not noted any to make her symptoms better or worse. No associated fever or chills. Symptoms are moderate to severe in severity. They have been persistent. Review of Systems   Constitutional: Negative for chills, diaphoresis, fatigue and fever. HENT: Negative for congestion and rhinorrhea. Eyes: Negative for visual disturbance. Respiratory: Positive for shortness of breath ( Chronic). Negative for cough. Cardiovascular: Negative for chest pain and palpitations. Gastrointestinal: Positive for abdominal pain, blood in stool and diarrhea. Negative for abdominal distention, nausea and vomiting. Genitourinary: Negative for difficulty urinating, frequency, hematuria and urgency. Musculoskeletal: Negative for back pain and myalgias. Skin: Negative for color change and pallor. Neurological: Negative for dizziness, syncope and light-headedness. Hematological: Does not bruise/bleed easily. All other systems reviewed and are negative. Physical Exam  Vitals signs and nursing note reviewed. Constitutional:       General: She is not in acute distress. Appearance: She is well-developed. She is ill-appearing. She is not diaphoretic. Comments: Patient appears uncomfortable but is in no significant distress. HENT:      Head: Normocephalic and atraumatic. Eyes:      General: No scleral icterus. Conjunctiva/sclera: Conjunctivae normal.   Neck:      Musculoskeletal: Normal range of motion and neck supple. Cardiovascular:      Rate and Rhythm: Normal rate and regular rhythm. Heart sounds: Normal heart sounds. No murmur. Pulmonary:      Effort: Pulmonary effort is normal. No respiratory distress. Breath sounds: Normal breath sounds. No wheezing or rales. Abdominal:      General: Bowel sounds are normal. There is no distension. Palpations: Abdomen is soft. Tenderness: There is abdominal tenderness in the left upper quadrant and left lower quadrant. There is no guarding or rebound. Skin:     General: Skin is warm and dry. Coloration: Skin is not jaundiced or pale. Neurological:      Mental Status: She is alert and oriented to person, place, and time. Cranial Nerves: No cranial nerve deficit. Coordination: Coordination normal.          Procedures     MDM   Patient presents to the ED with a complaint of left-sided abdominal pain. She was recently admitted at [facility]. After speaking with the patient's sister they were told that she had a lesion on her pancreas as well as a urinary tract infection and a \"abdominal infection. She was recently discharged from that facility. Today labs reveal a leukocytosis with left shift. She has got anemia which is slightly lower than her baseline. Patient is also had rectal bleeding which was new over the past couple days. CMP shows no electrolyte abnormalities but does show chronic renal insufficiency. CT of the abdomen pelvis was obtained. This does show the presence of a 7.4 x 3.2 x 3.3 cm expansile complex density mass lesion in the left upper quadrant between the wall of the fundal region of the stomach as well as the spleen. There is a component of contained perforation near this mass. There is also the likelihood of a small hemorrhagic component.   Results of the CT from to [facility] showed findings consistent with chronic pancreatitis and a cystic lesion in the tail favoring a pseudocyst.  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 Patient was treated here with multiple pain meds. She has remained hemodynamically stable. She is being admitted to medicine with surgical consult. Surgery has been notified. I did discuss all the findings today with the patient and sister who is present. ED Course as of Mar 31 1413   Wed Mar 31, 2021   1128 Patient requesting something more for pain control. Fentanyl ordered and will reassess. [MS]   8648 Resting comfortably in bed in no distress. Discussed results of labs thus far. She states the pain is much better with the fentanyl.    [MS]   1240 Patient reports that her pain is coming back. Additional medications ordered and will reassess. [MS]      ED Course User Index  [MS] Anumaurora Shows, DO       --------------------------------------------- PAST HISTORY ---------------------------------------------  Past Medical History:  has a past medical history of Arthritis, Atrial fibrillation ([facility] [address] Blood circulation, collateral, CHF (congestive heart failure) ([facility] [address] Chronic renal insufficiency, stage IV (severe) ([facility] [address] History of cardiovascular stress test, History of echocardiogram, Hyperlipidemia, and Hypertension. Past Surgical History:  has a past surgical history that includes Ectopic pregnancy surgery. Social History:  reports that she has been smoking. She has been smoking about 0.50 packs per day. She has never used smokeless tobacco. She reports previous alcohol use. She reports that she does not use drugs. Family History: family history is not on file. The patients home medications have been reviewed. Allergies: Patient has no known allergies.     -------------------------------------------------- RESULTS -------------------------------------------------    LABS:  Results for orders placed or performed during the hospital encounter of 03/31/21   CBC Auto Differential   Result Value Ref Range    WBC 13.0 (H) 4.5 - 11.5 E9/L    RBC 2.26 (L) 3.50 - 5.50 E12/L    Hemoglobin 7.6 (L) 11.5 - 15.5 g/dL    Hematocrit 24.5 (L) 34.0 - 48.0 %    .4 (H) 80.0 - 99.9 fL    MCH 33.6 26.0 - 35.0 pg    MCHC 31.0 (L) 32.0 - 34.5 %    RDW 14.8 11.5 - 15.0 fL    Platelets 152 (H) 949 - 450 E9/L    MPV 11.0 7.0 - 12.0 fL    Neutrophils % 86.1 (H) 43.0 - 80.0 %    Lymphocytes % 9.6 (L) 20.0 - 42.0 %    Monocytes % 2.6 2.0 - 12.0 %    Eosinophils % 0.4 0.0 - 6.0 %    Basophils % 0.9 0.0 - 2.0 %    Neutrophils Absolute 11.31 (H) 1.80 - 7.30 E9/L    Lymphocytes Absolute 1.30 (L) 1.50 - 4.00 E9/L    Monocytes Absolute 0.39 0.10 - 0.95 E9/L    Eosinophils Absolute 0.00 (L) 0.05 - 0.50 E9/L    Basophils Absolute 0.12 0.00 - 0.20 E9/L    Myelocyte Percent 0.9 0 - 0 %    nRBC 0.9 /100 WBC    Anisocytosis 1+     Polychromasia 1+     Poikilocytes 1+     [place] Cells 1+     Ovalocytes 1+    Comprehensive Metabolic Panel w/ Reflex to MG   Result Value Ref Range    Sodium 140 132 - 146 mmol/L    Potassium reflex Magnesium 3.9 3.5 - 5.0 mmol/L    Chloride 109 (H) 98 - 107 mmol/L    CO2 20 (L) 22 - 29 mmol/L    Anion Gap 11 7 - 16 mmol/L    Glucose 138 (H) 74 - 99 mg/dL    BUN 42 (H) 8 - 23 mg/dL    CREATININE 2.8 (H) 0.5 - 1.0 mg/dL    GFR Non-African American 20 >=60 mL/min/1.73    GFR African American 20     Calcium 8.1 (L) 8.6 - 10.2 mg/dL    Total Protein 5.6 (L) 6.4 - 8.3 g/dL    Albumin 2.6 (L) 3.5 - 5.2 g/dL    Total Bilirubin 0.3 0.0 - 1.2 mg/dL    Alkaline Phosphatase 118 (H) 35 - 104 U/L    ALT 14 0 - 32 U/L    AST 20 0 - 31 U/L   Lipase   Result Value Ref Range    Lipase 40 13 - 60 U/L   Protime-INR   Result Value Ref Range    Protime 20.7 (H) 9.3 - 12.4 sec    INR 1.8    APTT   Result Value Ref Range    aPTT 29.4 24.5 - 35.1 sec Lactate, Sepsis   Result Value Ref Range    Lactic Acid, Sepsis 1.2 0.5 - 1.9 mmol/L   TYPE AND SCREEN   Result Value Ref Range    ABO/Rh B POS     Antibody Screen NEG        RADIOLOGY:  CT ABDOMEN PELVIS WO CONTRAST Additional Contrast? None   Final Result   1. Presence of a 7.4 x 3.2 x 3.3 cm expansile complex  density mass lesion   in the left upper quadrant interposed between the posterior wall of the   fundal region of the stomach, the spleen and the splenic flexure of the colon   and the body/tail of the pancreas, likely with a hemorrhagic component. 2.  There is a component of a well contained perforation with extraluminal   air but not conspicuously free intraperitoneal air associated with this mass   lesion. 3.  There are free fluid accumulation in the left subphrenic space and   stranding of the fat planes in between the stomach with spleen in the splenic   flexure of the colon. 4. Differential diagnosis would include an exophytic  GIST tumor of the   stomach with the gastric perforation. 5.  Further evaluation with CT abdomen pelvis with oral and IV contrast is   recommended. The IV contrast needed to exclude a bleeding aneurysm of the   splenic artery due the location of the lesion. Preliminary report given to Dr. Shelbie Pyle, ER Physician in [facility].               ------------------------- NURSING NOTES AND VITALS REVIEWED ---------------------------  Date / Time Roomed:  3/31/2021 10:32 AM  ED Bed Assignment:  12/12    The nursing notes within the ED encounter and vital signs as below have been reviewed.      Patient Vitals for the past 24 hrs:   BP Temp Temp src Pulse Resp SpO2 Height Weight   03/31/21 1313 (!) 152/95   83 20 96 %     03/31/21 1228 (!) 140/95   79 20 93 %     03/31/21 1039 (!) 140/87 97.4 °F (36.3 °C) Oral 84 24 98 % 5' 5\" (1.651 m) 134 lb 8 oz (61 kg)       Oxygen Saturation Interpretation: Normal    ------------------------------------------

## 2021-03-31 NOTE — LETTER
[facility] Encounter Date/Time: 3/31/2021 [address] Account: [de-identified]    MRN: [id number]    Patient: Arnold Butts    Contact Serial #: [id number]      ENCOUNTER          Patient Class: I Private Enc? No Unit RM BD: [facility]    Hospital Service: Med/Surg   Encounter DX: GI bleed [K92.2]   ADM Provider: Rachell Joseph DO   Procedure:     ATT Provider: Rachell Joseph DO   REF Provider:        Admission DX: GI bleed and codes: 578. 9      PATIENT                 Name: Arnold Butts : 1940 (80 yrs)   Address: [address] [place] [address] Street Sex: Female   [place] city: [facility] [address] [place] [address]         Marital Status:    Employer: RETIRED         [employer]: [employer]   Primary Care Provider: Harvey Milton DO         Primary Phone: [phone]   EMERGENCY CONTACT   Contact Name Legal Guardian? Relationship to Patient Home Phone Work Phone   1. Sabrina Ireland  2. [place] Melia No  No Brother/Sister  Child [phone]              GUARANTOR  Guarantor: Arnold Butts     : 1940   Address: [place] Sex: Female     [address] [facility] [address]     Relation to Patient: Self       Home Phone: [phone]   Guarantor ID: [id number]       Work Phone:     Guarantor Employer: RETIRED         Status: RETIRED      COVERAGE        PRIMARY INSURANCE   Payor: [employer] MEDICARE Plan: Hugo Dunn PLUS HMO   Payor Address: [facility] [id number] [place] [address]       Group Number:   Insurance Type: [id number] Name: Tonia Sol : 1940   Subscriber ID: [id number] Pat. Rel. to Sub: Self   SECONDARY INSURANCE   Payor:   Plan:     Payor Address:  ,           Group Number:   Insurance Type:     Subscriber Name:   Subscriber :     Subscriber ID:   Pat. Rel. to Sub:              [place] Department Medicaid  CERTIFICATION OF NECESSITY  FOR NON-EMERGENCY TRANSPORTATION   BY GROUND AMBULANCE      Individual Information   1. Name: Domingomeliton Benjamín 2.  [facility] Number:    Address: [address] [place] [address]     Transportation Provider Information   4. Provider Name:    5. [place] Medicaid Provider Number:  National Provider Identifier (NPI):      Certification  7. Criteria:  During transport, this individual requires:  [x] Medical treatment or continuous     supervision by an EMT. [x] The administration or regulation of oxygen by another person. 4L NC  [] Supervised protective restraint. 8. Period Beginning Date:   5. Length  [x] Not more than 1 day(s)  [] One Year     Additional Information Relevant to Certification   10. Comments or Explanations, If Necessary or Appropriate     Acute blood loss anemia,   AMPAC 13,  MOD A x2, Unsafe to sit in chair unattended for duration of trip,  Disoriented at times , o2 4L NC     Certifying Practitioner Information   11. Name of Practitioner: Dr Jayce Estrada   12. [place] Medicaid Provider Number, If Applicable:  [address] Provider Identifier (NPI):   [id number]     Signature Information   14. Date of Signature:  13. Name of Person Signing:CHRISTINE Kline RN   16.  Signature and Professional Designation: Electronically signed by Lay Meadows RN on 4/8/2021 at 1:40 PM       [facility] [address]  Rev. 7/2015

## 2021-03-31 NOTE — CONSULTS
2.5 mg Nebulization Q6H PRN Kendra Alonzo, DO        ondansetron [facility]) injection 4 mg  4 mg Intravenous Q6H PRN Kendra Alonzo, DO        promethazine (PHENERGAN) injection 25 mg  25 mg Intravenous Q6H PRN Kendar Alonzo, DO        0.9 % sodium chloride infusion   Intravenous Continuous Kendra Alonzo,  mL/hr at 21 1704 New Bag at 21 1704    pantoprazole (PROTONIX) 80 mg in sodium chloride 0.9 % 100 mL infusion  8 mg/hr Intravenous Continuous Kendra Alonzo, DO 10 mL/hr at 21 1705 8 mg/hr at 21 1705    morphine (PF) injection 2 mg  2 mg Intravenous Q4H PRN Kendra Alonzo, DO   2 mg at 21 1802    meropenem (MERREM) 500 mg in sodium chloride 0.9 % 100 mL IVPB  500 mg Intravenous Q12H Bernard Scott  mL/hr at 21 1731 500 mg at 21 1731    fluconazole (DIFLUCAN) in 0.9 % sodium chloride IVPB 400 mg  400 mg Intravenous Q24H Moriah Oliva  mL/hr at 21 1731 400 mg at 21 1731       No Known Allergies    Family history: son with pancreatic cancer whom is .     Social History     Socioeconomic History    Marital status:      Spouse name: Not on file    Number of children: Not on file    Years of education: Not on file    Highest education level: Not on file   Occupational History    Not on file   Social Needs    Financial resource strain: Not on file    Food insecurity     Worry: Not on file     Inability: Not on file    Transportation needs     Medical: Not on file     Non-medical: Not on file   Tobacco Use    Smoking status: Current Every Day Smoker     Packs/day: 0.50    Smokeless tobacco: Never Used   Substance and Sexual Activity    Alcohol use: Not Currently     Comment: socially    Drug use: Never    Sexual activity: Not Currently   Lifestyle    Physical activity     Days per week: Not on file     Minutes per session: Not on file    Stress: Not on file   Relationships    Social connections     Talks on phone: Not on file Gets together: Not on file     Attends Mandaen service: Not on file     Active member of club or organization: Not on file     Attends meetings of clubs or organizations: Not on file     Relationship status: Not on file    Intimate partner violence     Fear of current or ex partner: Not on file     Emotionally abused: Not on file     Physically abused: Not on file     Forced sexual activity: Not on file   Other Topics Concern    Not on file   Social History Narrative    Not on file       ROS:   Review of Systems   Constitutional: Negative for chills, diaphoresis and fever. HENT: Negative for congestion, ear discharge, ear pain, hearing loss, nosebleeds and tinnitus. Eyes: Negative for photophobia, pain and discharge. Respiratory: Negative for shortness of breath. Cardiovascular: Negative for palpitations and leg swelling. Gastrointestinal: Positive for abdominal pain and blood in stool. Negative for constipation, diarrhea, nausea and vomiting. Endocrine: Negative for polydipsia. Genitourinary: Negative for frequency, hematuria and urgency. Musculoskeletal: Negative for back pain and neck pain. Skin: Negative for rash. Allergic/Immunologic: Negative for environmental allergies. Neurological: Negative for tremors and seizures. Psychiatric/Behavioral: Negative for hallucinations and suicidal ideas. The patient is not nervous/anxious. Physical Exam:  Vitals:    03/31/21 1457   BP: (!) 138/90   Pulse: 102   Resp: 20   Temp: 97.4 °F (36.3 °C)   SpO2: 95%       PSYCH: mood and affect normal, alert and oriented x 3: No apparent distress, comfortable  EYES: Sclera white, pupils equal round and reactive to light  ENMT:  Hearing normal, trachea midline, ears externally intact  LYMPH: no obvious lympadenopathy in neck. RESP: Respiratory effort was normal with no retractions or use of accessory muscles.   CV:  No pedal edema  GI/ Abdomen: Soft, nondistended, tender in left lower

## 2021-04-01 ENCOUNTER — APPOINTMENT (OUTPATIENT)
Dept: MRI IMAGING | Age: 81
DRG: 871 | End: 2021-04-01
Payer: MEDICARE

## 2021-04-01 LAB
ALBUMIN SERPL-MCNC: 2.1 G/DL (ref 3.5–5.2)
ALP BLD-CCNC: 102 U/L (ref 35–104)
ALT SERPL-CCNC: 12 U/L (ref 0–32)
ANION GAP SERPL CALCULATED.3IONS-SCNC: 13 MMOL/L (ref 7–16)
AST SERPL-CCNC: 15 U/L (ref 0–31)
BACTERIA: ABNORMAL /HPF
BASOPHILS ABSOLUTE: 0 E9/L (ref 0–0.2)
BASOPHILS RELATIVE PERCENT: 0.1 % (ref 0–2)
BILIRUB SERPL-MCNC: 0.3 MG/DL (ref 0–1.2)
BILIRUBIN URINE: NEGATIVE
BLOOD, URINE: NEGATIVE
BUN BLDV-MCNC: 44 MG/DL (ref 8–23)
BURR CELLS: ABNORMAL
CALCIUM SERPL-MCNC: 7.7 MG/DL (ref 8.6–10.2)
CHLORIDE BLD-SCNC: 112 MMOL/L (ref 98–107)
CHOLESTEROL, TOTAL: 76 MG/DL (ref 0–199)
CLARITY: CLEAR
CO2: 15 MMOL/L (ref 22–29)
COLOR: YELLOW
CREAT SERPL-MCNC: 3.2 MG/DL (ref 0.5–1)
EOSINOPHILS ABSOLUTE: 0 E9/L (ref 0.05–0.5)
EOSINOPHILS RELATIVE PERCENT: 0.1 % (ref 0–6)
EPITHELIAL CELLS, UA: ABNORMAL /HPF
FERRITIN: 428 NG/ML
FOLATE: 10.7 NG/ML (ref 4.8–24.2)
GFR AFRICAN AMERICAN: 17
GFR NON-AFRICAN AMERICAN: 17 ML/MIN/1.73
GLUCOSE BLD-MCNC: 96 MG/DL (ref 74–99)
GLUCOSE URINE: NEGATIVE MG/DL
HCT VFR BLD CALC: 22.5 % (ref 34–48)
HCT VFR BLD CALC: 22.5 % (ref 34–48)
HCT VFR BLD CALC: 23.1 % (ref 34–48)
HCT VFR BLD CALC: 27.5 % (ref 34–48)
HDLC SERPL-MCNC: 22 MG/DL
HEMOGLOBIN: 6.8 G/DL (ref 11.5–15.5)
HEMOGLOBIN: 7 G/DL (ref 11.5–15.5)
HEMOGLOBIN: 7.4 G/DL (ref 11.5–15.5)
HEMOGLOBIN: 8.5 G/DL (ref 11.5–15.5)
IMMATURE RETIC FRACT: 43.2 % (ref 3–15.9)
IRON SATURATION: 17 % (ref 15–50)
IRON: 26 MCG/DL (ref 37–145)
KETONES, URINE: NEGATIVE MG/DL
LDL CHOLESTEROL CALCULATED: 35 MG/DL (ref 0–99)
LEUKOCYTE ESTERASE, URINE: NEGATIVE
LYMPHOCYTES ABSOLUTE: 0.97 E9/L (ref 1.5–4)
LYMPHOCYTES RELATIVE PERCENT: 6.1 % (ref 20–42)
MAGNESIUM: 1.8 MG/DL (ref 1.6–2.6)
MCH RBC QN AUTO: 33.3 PG (ref 26–35)
MCHC RBC AUTO-ENTMCNC: 30.2 % (ref 32–34.5)
MCV RBC AUTO: 110.3 FL (ref 80–99.9)
MONOCYTES ABSOLUTE: 0.48 E9/L (ref 0.1–0.95)
MONOCYTES RELATIVE PERCENT: 2.6 % (ref 2–12)
NEUTROPHILS ABSOLUTE: 14.65 E9/L (ref 1.8–7.3)
NEUTROPHILS RELATIVE PERCENT: 91.3 % (ref 43–80)
NITRITE, URINE: NEGATIVE
OVALOCYTES: ABNORMAL
PDW BLD-RTO: 15.5 FL (ref 11.5–15)
PH UA: 7.5 (ref 5–9)
PHOSPHORUS: 5 MG/DL (ref 2.5–4.5)
PLATELET # BLD: 429 E9/L (ref 130–450)
PMV BLD AUTO: 11 FL (ref 7–12)
POIKILOCYTES: ABNORMAL
POLYCHROMASIA: ABNORMAL
POTASSIUM SERPL-SCNC: 4.4 MMOL/L (ref 3.5–5)
PROTEIN UA: 30 MG/DL
RBC # BLD: 2.04 E12/L (ref 3.5–5.5)
RBC UA: ABNORMAL /HPF (ref 0–2)
RETIC HGB EQUIVALENT: 35.9 PG (ref 28.2–36.6)
RETICULOCYTE ABSOLUTE COUNT: 0.11 E12/L
RETICULOCYTE COUNT PCT: 5.3 % (ref 0.4–1.9)
SODIUM BLD-SCNC: 140 MMOL/L (ref 132–146)
SPECIFIC GRAVITY UA: 1.01 (ref 1–1.03)
TOTAL IRON BINDING CAPACITY: 153 MCG/DL (ref 250–450)
TOTAL PROTEIN: 5.2 G/DL (ref 6.4–8.3)
TRIGL SERPL-MCNC: 95 MG/DL (ref 0–149)
TSH SERPL DL<=0.05 MIU/L-ACNC: 3.48 UIU/ML (ref 0.27–4.2)
UROBILINOGEN, URINE: 0.2 E.U./DL
VITAMIN B-12: 1275 PG/ML (ref 211–946)
VLDLC SERPL CALC-MCNC: 19 MG/DL
WBC # BLD: 16.1 E9/L (ref 4.5–11.5)
WBC UA: ABNORMAL /HPF (ref 0–5)
YEAST: PRESENT /HPF

## 2021-04-01 PROCEDURE — 82728 ASSAY OF FERRITIN: CPT

## 2021-04-01 PROCEDURE — 36430 TRANSFUSION BLD/BLD COMPNT: CPT

## 2021-04-01 PROCEDURE — 6360000002 HC RX W HCPCS: Performed by: SURGERY

## 2021-04-01 PROCEDURE — 82746 ASSAY OF FOLIC ACID SERUM: CPT

## 2021-04-01 PROCEDURE — 1200000000 HC SEMI PRIVATE

## 2021-04-01 PROCEDURE — 74181 MRI ABDOMEN W/O CONTRAST: CPT

## 2021-04-01 PROCEDURE — 99222 1ST HOSP IP/OBS MODERATE 55: CPT | Performed by: SURGERY

## 2021-04-01 PROCEDURE — 36415 COLL VENOUS BLD VENIPUNCTURE: CPT

## 2021-04-01 PROCEDURE — 2580000003 HC RX 258: Performed by: SURGERY

## 2021-04-01 PROCEDURE — 2580000003 HC RX 258: Performed by: INTERNAL MEDICINE

## 2021-04-01 PROCEDURE — C9113 INJ PANTOPRAZOLE SODIUM, VIA: HCPCS | Performed by: INTERNAL MEDICINE

## 2021-04-01 PROCEDURE — 6370000000 HC RX 637 (ALT 250 FOR IP): Performed by: STUDENT IN AN ORGANIZED HEALTH CARE EDUCATION/TRAINING PROGRAM

## 2021-04-01 PROCEDURE — 85025 COMPLETE CBC W/AUTO DIFF WBC: CPT

## 2021-04-01 PROCEDURE — 87088 URINE BACTERIA CULTURE: CPT

## 2021-04-01 PROCEDURE — 6370000000 HC RX 637 (ALT 250 FOR IP): Performed by: INTERNAL MEDICINE

## 2021-04-01 PROCEDURE — 85018 HEMOGLOBIN: CPT

## 2021-04-01 PROCEDURE — 6360000002 HC RX W HCPCS: Performed by: INTERNAL MEDICINE

## 2021-04-01 PROCEDURE — 83550 IRON BINDING TEST: CPT

## 2021-04-01 PROCEDURE — 80053 COMPREHEN METABOLIC PANEL: CPT

## 2021-04-01 PROCEDURE — 83735 ASSAY OF MAGNESIUM: CPT

## 2021-04-01 PROCEDURE — 85045 AUTOMATED RETICULOCYTE COUNT: CPT

## 2021-04-01 PROCEDURE — 84100 ASSAY OF PHOSPHORUS: CPT

## 2021-04-01 PROCEDURE — 83540 ASSAY OF IRON: CPT

## 2021-04-01 PROCEDURE — 85014 HEMATOCRIT: CPT

## 2021-04-01 PROCEDURE — 2700000000 HC OXYGEN THERAPY PER DAY

## 2021-04-01 PROCEDURE — 80061 LIPID PANEL: CPT

## 2021-04-01 PROCEDURE — P9016 RBC LEUKOCYTES REDUCED: HCPCS

## 2021-04-01 PROCEDURE — 84443 ASSAY THYROID STIM HORMONE: CPT

## 2021-04-01 PROCEDURE — 81001 URINALYSIS AUTO W/SCOPE: CPT

## 2021-04-01 PROCEDURE — 82607 VITAMIN B-12: CPT

## 2021-04-01 PROCEDURE — 6360000002 HC RX W HCPCS: Performed by: SPECIALIST

## 2021-04-01 RX ORDER — LORAZEPAM 0.5 MG/1
0.5 TABLET ORAL ONCE
Status: COMPLETED | OUTPATIENT
Start: 2021-04-01 | End: 2021-04-01

## 2021-04-01 RX ORDER — SODIUM CHLORIDE 9 MG/ML
INJECTION, SOLUTION INTRAVENOUS PRN
Status: DISCONTINUED | OUTPATIENT
Start: 2021-04-01 | End: 2021-04-22 | Stop reason: HOSPADM

## 2021-04-01 RX ADMIN — MEROPENEM 500 MG: 500 INJECTION, POWDER, FOR SOLUTION INTRAVENOUS at 03:40

## 2021-04-01 RX ADMIN — ATORVASTATIN CALCIUM 40 MG: 40 TABLET, FILM COATED ORAL at 21:20

## 2021-04-01 RX ADMIN — SODIUM CHLORIDE 8 MG/HR: 9 INJECTION, SOLUTION INTRAVENOUS at 23:19

## 2021-04-01 RX ADMIN — SODIUM CHLORIDE: 9 INJECTION, SOLUTION INTRAVENOUS at 19:04

## 2021-04-01 RX ADMIN — SODIUM CHLORIDE 8 MG/HR: 9 INJECTION, SOLUTION INTRAVENOUS at 03:36

## 2021-04-01 RX ADMIN — LORAZEPAM 0.5 MG: 0.5 TABLET ORAL at 15:06

## 2021-04-01 RX ADMIN — SODIUM CHLORIDE: 9 INJECTION, SOLUTION INTRAVENOUS at 02:10

## 2021-04-01 RX ADMIN — HYDRALAZINE HYDROCHLORIDE 25 MG: 25 TABLET, FILM COATED ORAL at 21:20

## 2021-04-01 RX ADMIN — MEROPENEM 500 MG: 500 INJECTION, POWDER, FOR SOLUTION INTRAVENOUS at 16:33

## 2021-04-01 RX ADMIN — FLUCONAZOLE 400 MG: 400 INJECTION, SOLUTION INTRAVENOUS at 16:53

## 2021-04-01 RX ADMIN — CARVEDILOL 6.25 MG: 6.25 TABLET, FILM COATED ORAL at 21:20

## 2021-04-01 ASSESSMENT — PAIN SCALES - GENERAL
PAINLEVEL_OUTOF10: 0
PAINLEVEL_OUTOF10: 0

## 2021-04-01 NOTE — PROGRESS NOTES
Occupational Therapy  OT SESSION ATTEMPT     Date:2021  Patient Name: Lindsay Raymond  MRN: [id number]  : 1940  Room: [address] [place] [address]     Attempted OT session this date:    [] unavailable due to other medical staff currently with pt   [] on hold per nursing staff   [] on hold per nursing staff secondary to lab / radiology results    [x] pt declined due to _patient sleeping, and receiving blood transfusion. Family request to let rest as also has MRI to go to today.___. Benefits of participation in therapy reviewed with pt.    [] off unit   [] Other:     Will reattempt OT evaluation and/or treatment at a later time.     James Love, OTR/L #[id number]

## 2021-04-01 NOTE — PROGRESS NOTES
[facility]  [facility]  Progress Note    NAME: Burgess Glover  MR:  [id number]  :   1940  DATE OF SERVICE:21    This is a face to face encounter with Burgess Glover 68 y.o. female on 21  ID following for   Chief Complaint   Patient presents with    Abdominal Pain     to er via ems from home for evaluation of LLQ pain and rectal bleeding that started today.  Rectal Bleeding     SUBJECTIVE:  To receive blood  Feels better   no abd pain   Patient is tolerating medications. No reported adverse drug reactions. Review of systems:  As stated above in the chief complaint, otherwise negative. Medications:  Scheduled Meds:   LORazepam  0.5 mg Oral Once    atorvastatin  40 mg Oral Nightly    carvedilol  6.25 mg Oral BID    hydrALAZINE  25 mg Oral 3 times per day    isosorbide mononitrate  30 mg Oral Daily    [START ON 2021] vitamin D  50,000 Units Oral Weekly    meropenem (MERREM) IVPB  500 mg Intravenous Q12H    fluconazole  400 mg Intravenous Q24H     Continuous Infusions:   sodium chloride      sodium chloride      sodium chloride 125 mL/hr at 21 0210    pantoprozole (PROTONIX) infusion 8 mg/hr (21 0336)     PRN Meds:sodium chloride, sodium chloride, albuterol, ondansetron, promethazine, morphine    OBJECTIVE:  BP (!) 118/57   Pulse 89   Temp 98 °F (36.7 °C) (Oral)   Resp 18   Ht 5' 5\" (1.651 m)   Wt 134 lb 8 oz (61 kg)   SpO2 99%   BMI 22.38 kg/m²   Temp  Av.8 °F (36.6 °C)  Min: 97.4 °F (36.3 °C)  Max: 98.2 °F (36.8 °C)  Constitutional:  The patient is awake, alert, and oriented. Skin:    Warm and dry. No rashes were noted. HEENT:   Round and reactive pupils. AT/NC  Neck:    Supple to movements. Chest:   No use of accessory muscles to breathe. Symmetrical expansion. Cardiovascular:  S1 and S2 are rhythmic and regular. No murmurs appreciated. Abdomen:   Positive bowel sounds to auscultation.  Benign to palpation. Extremities:   No clubbing, no cyanosis,  edema. CNS    AAxO   Lines: piv    Radiology:  Laboratory and Tests Review:  Lab Results   Component Value Date    WBC 16.1 (H) 04/01/2021    WBC 13.0 (H) 03/31/2021    WBC 8.6 10/08/2019    HGB 6.8 (L) 04/01/2021    HCT 22.5 (L) 04/01/2021    .3 (H) 04/01/2021     04/01/2021     No results found for: CRP  Lab Results   Component Value Date    ALT 12 04/01/2021    AST 15 04/01/2021    ALKPHOS 102 04/01/2021    BILITOT 0.3 04/01/2021     Lab Results   Component Value Date     04/01/2021    K 4.4 04/01/2021    K 3.9 03/31/2021     04/01/2021    CO2 15 04/01/2021    BUN 44 04/01/2021    CREATININE 3.2 04/01/2021    CREATININE 2.8 03/31/2021    CREATININE 2.6 10/08/2019    GFRAA 17 04/01/2021    LABGLOM 17 04/01/2021    GLUCOSE 96 04/01/2021    GLUCOSE 102 11/17/2010    PROT 5.2 04/01/2021    LABALBU 2.1 04/01/2021    CALCIUM 7.7 04/01/2021    BILITOT 0.3 04/01/2021    ALKPHOS 102 04/01/2021    AST 15 04/01/2021    ALT 12 04/01/2021     No results found for: CRP  No results found for: SEDRATE  Recent Labs     03/31/21  1118 03/31/21  1545 03/31/21  1856 03/31/21  2048 04/01/21  0742   TROPONINI  --  0.27*  --  0.24*  --    DDIMER  --   --  1723  --   --    INR 1.8  --   --   --   --    PROTIME 20.7*  --   --   --   --    AST 20  --   --   --  15   ALT 14  --   --   --  12   TRIG  --   --   --   --  95     Lab Results   Component Value Date    CHOL 76 04/01/2021    TRIG 95 04/01/2021    HDL 22 04/01/2021    LDLCALC 35 04/01/2021    LABVLDL 19 04/01/2021     Lab Results   Component Value Date/Time    VITD25 24 (L) 10/08/2019 05:13 AM       Microbiology:   No results for input(s): COVID19 in the last 72 hours.   Lab Results   Component Value Date    BC 5 Days- no growth 10/03/2019    BLOODCULT2 5 Days- no growth 10/03/2019        ASSESSMENT/PLAN:  PT CAME IN WITH ABD PAIN AND DIARRHEA  SHE HAS LEUKOCYTOSIS/anemia  GIB/pancreatic mass?hemorrhagic/bowel/gi perforation   S/P RX UTI KLEBSIELLA /CEFDINIR from Replaced by [facility]  CKD check bladder scan sanchez today   AAA seen by vasc    meropenem (MERREM) 500 mg in sodium chloride 0.9 % 100 mL IVPB, Q12H  fluconazole (DIFLUCAN) in 0.9 % sodium chloride IVPB 400 mg, Q24H    CHECK BLOOD CX  BLADDER SCAN     followe by GI/surgery   · Monitor labs    Imaging and labs were reviewed per medical records. The patient was educated about the diagnosis, prognosis, indications, risks and benefits of treatment. An opportunity to ask questions was given to the patient/FAMILY. Thank you for involving me in the care of Jessica Oakes will continue to follow. Please do not hesitate to call for any questions or concerns.     Electronically signed by Moriah Oliva MD on 4/1/2021 at 12:47 PM

## 2021-04-01 NOTE — PROGRESS NOTES
Occupational Therapy  OT SESSION ATTEMPT     Date:2021  Patient Name: Kylah Santiago  MRN: [id number]  : 1940  Room: [address] [place] [address]     Attempted OT session this date:    [] unavailable due to other medical staff currently with pt   [] on hold per nursing staff   [] on hold per nursing staff secondary to lab / radiology results    [] pt declined due to ____. Benefits of participation in therapy reviewed with pt.    [] off unit   [x] Other: Pt currently receiving blood    Will reattempt OT evaluation and/or treatment at a later time.     Beni Porter, OTR/L #[id number]

## 2021-04-01 NOTE — CONSULTS
[facility]  Dr. Yasmine Norwood M.D., Dr. Sarita Cranker, M.D., CANDI Dutta.O., Dr. Venkata Barth M.D., Dr. Rita Li D.O. Patient Name: Sohail Sultana  MRN: [id number]  : 1940 (68 y.o. female)  Allergies: has No Known Allergies. Date of Service: 2021       Reason for Consultation: Hematochezia    HISTORY OF PRESENT ILLNESS:      The patient is a 68 y.o. female with history of CKD, HFrEF and pAFIb on Eliquis who presents from home with large volume hematochezia and clots. Patient states that she was feeling unwell for several weeks. Admits to recurrent abdominal pain that she presented to [facility] last week for. Was discharged with no diagnosis 4 days ago. Yesterday, she was still having diffuse abdominal pain, worse on left side. There was associated with large volume bright red blood and diarrhea. Her bleeding and pain have completely resided. CT imaging showed large cystic lesion with possible perforation in the left upper quadrant with unknown origin. GI was consulted for further recommendations. According to patient, she has never had a GI bleed in the past. Denies prior EGD and colonoscopy. Is on Eliquis for Afib with last dose yesterday morning. Denies NSAID and antiplatelet use. REVIEW OF SYSTEMS:   Constitutional: Denies fever, chills, or unintentional weight loss. HEENT: Denies double or blurry vision, headaches, ear pain or ringing in the ears. No drainage from the ears, nose or throat. Cardiovascular: Denies any chest pain, irregular heartbeats, or palpitations. Respiratory: Denies shortness of breath, coughing, sputum production, hemoptysis, or wheezing. Gastrointestinal: admits to resolved hematochezia and abdominal pain  Genitourinary: Denies any urinary urgency, frequency, hematuria. Voiding without difficulty. Endocrine: Denies sensitivity to heat or cold. Denies changes in hair, skin or nails.  Denies excessive thirst, hunger or going to the bathroom more frequently than usual.  Extremities: Denies swelling or calf pain. Musculoskeletal: Denies muscle or joint pain, stiffness, arthritis, gout, or instability. Dermatology / Skin: Denies any rashes, ulcers, or excoriations. Denies bruising. Neurology: Denies any bowel or bladder incontinence, headache or focal neurological deficits. No weakness or paresthesia. Denies numbness or tingling in the hands or feet. Psychiatric: Denies nervousness/anxiety, depression or memory loss. Past Medical History:  Past Medical History:   Diagnosis Date    Arthritis     Atrial fibrillation ([facility] [address]     Blood circulation, collateral     [facility]congestive [facility] failure) ([facility]     Chronic renal insufficiency, stage IV (severe) ([facility] [address] 11/19/2016    History of cardiovascular stress test 07/2015    Lexiscan stress test    History of echocardiogram 07/27/2015    EF 29%    Hyperlipidemia     Hypertension        Past Surgical History:  Past Surgical History:   Procedure Laterality Date    ECTOPIC PREGNANCY SURGERY         Home Medications:  Prior to Admission medications    Medication Sig Start Date End Date Taking?  Authorizing Provider   apixaban (ELIQUIS) 2.5 MG TABS tablet Take 1 tablet by mouth 2 times daily 10/8/19  Yes Chey Horan DO   carvedilol (COREG) 6.25 MG tablet Take 1 tablet by mouth 2 times daily 10/8/19  Yes Chey Horan DO   furosemide (LASIX) 40 MG tablet Take 1 tablet by mouth daily 10/10/19  Yes Chey Horan DO   hydrALAZINE (APRESOLINE) 25 MG tablet Take 1 tablet by mouth every 8 hours 10/8/19  Yes hCey Horan DO   isosorbide mononitrate (IMDUR) 30 MG extended release tablet Take 1 tablet by mouth daily 10/9/19  Yes Chey Horan DO   vitamin D (ERGOCALCIFEROL) 68295 units CAPS capsule Take 50,000 Units by mouth every other day    Yes Historical Provider, MD   atorvastatin (LIPITOR) 40 MG tablet Take 1 tablet by mouth nightly 7/28/15  Yes Chey Horan DO       Allergies: Patient has no known allergies. Social History:  Social History     Socioeconomic History    Marital status:      Spouse name: Not on file    Number of children: Not on file    Years of education: Not on file    Highest education level: Not on file   Occupational History    Not on file   Social Needs    Financial resource strain: Not on file    Food insecurity     Worry: Not on file     Inability: Not on file    Transportation needs     Medical: Not on file     Non-medical: Not on file   Tobacco Use    Smoking status: Current Every Day Smoker     Packs/day: 0.50    Smokeless tobacco: Never Used   Substance and Sexual Activity    Alcohol use: Not Currently     Comment: socially    Drug use: Never    Sexual activity: Not Currently   Lifestyle    Physical activity     Days per week: Not on file     Minutes per session: Not on file    Stress: Not on file   Relationships    Social connections     Talks on phone: Not on file     Gets together: Not on file     Attends Rastafari service: Not on file     Active member of club or organization: Not on file     Attends meetings of clubs or organizations: Not on file     Relationship status: Not on file    Intimate partner violence     Fear of current or ex partner: Not on file     Emotionally abused: Not on file     Physically abused: Not on file     Forced sexual activity: Not on file   Other Topics Concern    Not on file   Social History Narrative    Not on file       Family History:  History reviewed. No pertinent family history.       PHYSICAL EXAM:  Vital Signs: BP (!) 118/57   Pulse 89   Temp 98 °F (36.7 °C) (Oral)   Resp 18   Ht 5' 5\" (1.651 m)   Wt 134 lb 8 oz (61 kg)   SpO2 99%   BMI 22.38 kg/m²   GENERAL APPEARANCE:  awake, alert, oriented, cooperative, and in no acute distress  EYES:  Lids and lashes normal, PERRLA, EOMI, sclera clear, conjunctiva normal  HENT:  Normocephalic, without obvious abnormality, atramatic, oral pharynx with moist mucus membranes, tonsils without erythema or exudates  NECK:  Supple with no carotid bruits, JVD or thyromegaly. No cervical adenopathy  LUNGS:  Clear to auscultation bilaterally with no wheezes, rales or rhonchi. No increased work of breathing, good air exchange. CARDIOVASCULAR: Regular rate and rhythm, no murmur  ABDOMEN:  normal bowel sounds in all 4 quadrants, soft, non-distended, non-tender, no masses palpated, no hepatosplenomegally  MUSCULOSKELETAL:  There is no redness, warmth, or swelling of the joints. Full range of motion noted. Motor strength is 5 out of 5 all extremities bilaterally. Tone is normal.  EXTREMITIES: No edema, 2+ pulses bilaterally (radial and dorsalis pedis)  NEUROLOGIC:  Awake, alert, oriented to name, place and time. Cranial nerves II-XII are grossly intact. Motor is 5 out of 5 bilaterally. SKIN: Normal skin color, texture, and turgor. There is no redness, warmth, or swelling. No bruising or bleeding, no mottling. PSYCH: Affect, behavior and insight are all within normal limits.       DATA:  Results for orders placed or performed during the hospital encounter of 03/31/21   CBC Auto Differential   Result Value Ref Range    WBC 13.0 (H) 4.5 - 11.5 E9/L    RBC 2.26 (L) 3.50 - 5.50 E12/L    Hemoglobin 7.6 (L) 11.5 - 15.5 g/dL    Hematocrit 24.5 (L) 34.0 - 48.0 %    .4 (H) 80.0 - 99.9 fL    MCH 33.6 26.0 - 35.0 pg    MCHC 31.0 (L) 32.0 - 34.5 %    RDW 14.8 11.5 - 15.0 fL    Platelets 239 (H) 363 - 450 E9/L    MPV 11.0 7.0 - 12.0 fL    Neutrophils % 86.1 (H) 43.0 - 80.0 %    Lymphocytes % 9.6 (L) 20.0 - 42.0 %    Monocytes % 2.6 2.0 - 12.0 %    Eosinophils % 0.4 0.0 - 6.0 %    Basophils % 0.9 0.0 - 2.0 %    Neutrophils Absolute 11.31 (H) 1.80 - 7.30 E9/L    Lymphocytes Absolute 1.30 (L) 1.50 - 4.00 E9/L    Monocytes Absolute 0.39 0.10 - 0.95 E9/L    Eosinophils Absolute 0.00 (L) 0.05 - 0.50 E9/L    Basophils Absolute 0.12 0.00 - 0.20 E9/L    Myelocyte Percent 0.9 0 - 0 %    nRBC 0.9 /100 WBC    Anisocytosis 1+     Polychromasia 1+     Poikilocytes 1+     [place] Cells 1+     Ovalocytes 1+    Comprehensive Metabolic Panel w/ Reflex to MG   Result Value Ref Range    Sodium 140 132 - 146 mmol/L    Potassium reflex Magnesium 3.9 3.5 - 5.0 mmol/L    Chloride 109 (H) 98 - 107 mmol/L    CO2 20 (L) 22 - 29 mmol/L    Anion Gap 11 7 - 16 mmol/L    Glucose 138 (H) 74 - 99 mg/dL    BUN 42 (H) 8 - 23 mg/dL    CREATININE 2.8 (H) 0.5 - 1.0 mg/dL    GFR Non-African American 20 >=60 mL/min/1.73    GFR African American 20     Calcium 8.1 (L) 8.6 - 10.2 mg/dL    Total Protein 5.6 (L) 6.4 - 8.3 g/dL    Albumin 2.6 (L) 3.5 - 5.2 g/dL    Total Bilirubin 0.3 0.0 - 1.2 mg/dL    Alkaline Phosphatase 118 (H) 35 - 104 U/L    ALT 14 0 - 32 U/L    AST 20 0 - 31 U/L   Lipase   Result Value Ref Range    Lipase 40 13 - 60 U/L   Protime-INR   Result Value Ref Range    Protime 20.7 (H) 9.3 - 12.4 sec    INR 1.8    APTT   Result Value Ref Range    aPTT 29.4 24.5 - 35.1 sec   Lactate, Sepsis   Result Value Ref Range    Lactic Acid, Sepsis 1.2 0.5 - 1.9 mmol/L   Hemoglobin and hematocrit, blood   Result Value Ref Range    Hemoglobin 6.9 (L) 11.5 - 15.5 g/dL    Hematocrit 22.3 (L) 34.0 - 48.0 %   Troponin   Result Value Ref Range    Troponin 0.27 (H) 0.00 - 0.03 ng/mL   Troponin   Result Value Ref Range    Troponin 0.24 (H) 0.00 - 0.03 ng/mL   CEA   Result Value Ref Range    CEA 3.2 0.0 - 5.2 ng/mL   D-dimer, quantitative   Result Value Ref Range    D-Dimer, Quant 1723 ng/mL DDU   CBC Auto Differential   Result Value Ref Range    WBC 16.1 (H) 4.5 - 11.5 E9/L    RBC 2.04 (L) 3.50 - 5.50 E12/L    Hemoglobin 6.8 (L) 11.5 - 15.5 g/dL    Hematocrit 22.5 (L) 34.0 - 48.0 %    .3 (H) 80.0 - 99.9 fL    MCH 33.3 26.0 - 35.0 pg    MCHC 30.2 (L) 32.0 - 34.5 %    RDW 15.5 (H) 11.5 - 15.0 fL    Platelets 022 358 - 688 E9/L    MPV 11.0 7.0 - 12.0 fL    Neutrophils % 91.3 (H) 43.0 - 80.0 %    Lymphocytes % 6.1 (L) 20.0 - 42.0 % Blood Bank [id number]     Description Blood Bank Red Blood Cells, Leuko-reduced     Unit Number [id number]     Dispense Status Blood Bank issued          IMAGING:  Ct Abdomen Pelvis Wo Contrast Additional Contrast? None    Result Date: 3/31/2021  EXAMINATION: CT OF THE ABDOMEN AND PELVIS WITHOUT CONTRAST 3/31/2021 12:51 pm TECHNIQUE: CT of the abdomen and pelvis was performed without the administration of intravenous contrast. Multiplanar reformatted images are provided for review. Dose modulation, iterative reconstruction, and/or weight based adjustment of the mA/kV was utilized to reduce the radiation dose to as low as reasonably achievable. COMPARISON: None. HISTORY: ORDERING SYSTEM PROVIDED HISTORY: abd pain TECHNOLOGIST PROVIDED HISTORY: Reason for exam:->abd pain Additional Contrast?->None Decision Support Exception->Emergency Medical Condition (MA) FINDINGS: In the left upper quadrant there is no expansile amorphous a lesion of mixed density measuring 7.4 x 3.2 x 3.3 cm interposed between the posterior wall of the fundal region of the stomach, the spleen which is posterior located, the tail of the pancreas, and the splenic flexure of the colon particularly the proximal descending colon. There is stranding of the adjacent fat planes and there are indication for extraluminal air associated with this lesion. The findings are restricted to the left upper quadrant. There is also fluid accumulation in the left subphrenic space around the spleen. Stranding of the fat planes are seen. The areas of a relative increased density associated with the core of this lesion can indicate a hemorrhagic component. Differential diagnosis would indicated an exophytic perforate lesion of the stomach as seen with a just a type of tumor but it is in close proximity with the vessels of the body/tail of the spleen and atherosclerotic changes are seen in the splenic artery.   The a leaking aneurysm of the splenic artery cannot be entirely excluded the. The proper evaluation will required oral and IV contrast study. There is no evidence for free intraperitoneal air although there is extraluminal air associated with the left upper quadrant mass like lesion formation. Free fluid accumulation is seen in the lower pelvic cavity in the cul-de-sac. The spleen appears to be intrinsically unremarkable though surrounded by stranding of fat planes and the fluid accumulation in the left subphrenic space. Stranding of the pericolonic fat planes seen around the splenic flexure of the colon relate with the amorphous complex most likely hemorrhagic mass lesion with a component of bowel perforation likely from the stomach in the left upper quadrant. The liver has borderline size. The small size gallstones are seen in a well distended gallbladder. There is no dilatation of the biliary tree or pancreatic ductal system. There is some atrophy of the pancreas. The have calcifications are seen in the area of the head of the pancreas. Additional calcifications seen throughout the pancreas in part are vascular related but can be relate with previous chronic calcified pancreatitis. The kidneys are preserved size. There is no hydronephrosis. Vascular calcifications are seen both kidneys. 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 Bladder has unremarkable appearance. The uterus and ovaries have unremarkable appearance. Calcified myoma is seen in the uterus. Appendix seen and has unremarkable appearance. There is no indication for bowel obstruction. There is a component of anasarca with bilateral pleural effusions of mild-to-moderate degree. Delete there are compression atelectasis in both lower lobes by the pleural effusion. Heart is diffusely enlarged.  Degenerative changes are seen in the lumbar spine and particularly in the lower thoracic spine segments. 1.  Presence of a 7.4 x 3.2 x 3.3 cm expansile complex  density mass lesion in the left upper quadrant interposed between the posterior wall of the fundal region of the stomach, the spleen and the splenic flexure of the colon and the body/tail of the pancreas, likely with a hemorrhagic component. 2.  There is a component of a well contained perforation with extraluminal air but not conspicuously free intraperitoneal air associated with this mass lesion. 3.  There are free fluid accumulation in the left subphrenic space and stranding of the fat planes in between the stomach with spleen in the splenic flexure of the colon. 4. Differential diagnosis would include an exophytic  GIST tumor of the stomach with the gastric perforation. 5.  Further evaluation with CT abdomen pelvis with oral and IV contrast is recommended. The IV contrast needed to exclude a bleeding aneurysm of the splenic artery due the location of the lesion. Preliminary report given to Dr. Diana Gates, ER Physician in [facility]-ER.         ASSESSMENT/PLAN:      1. Hematochezia, acute blood loss anemia  - likely distal source, possible resolved diverticular/dieulafoy lesion vs. LUQ lesion  - no overt bleeding currently, H/H below baseline and currently being transfused  - continue to monitor H/H and for signs of overt GI bleeding  - continue IV PPI  - eliquis on hold x 24 hours, will need 48 hours prior to endoscopy  - due to possible perforation, will need to obtain further imaging to clarify lesion, see below  - maintain NPO until MRI completed    2.  LUQ abdominal lesion  - indeterminate etiology on non-contrasted CT, possible pancreatic vs gastric vs. Colonic  - obtain records from recent hospitalization at [facility]  - obtain non-contrasted MRI of abdomen to further delineate source   - appreciate general surgery/hepatobiliary surgery     Leandro Jolly D.O.   GI fellow  4/1/2021 at 12:15 PM    Pt seen and independently examined. Pertinent notes and lab work reviewed. D/w Dr. Luis Manuel Jackson with physical exam and A&P. Discussed with family at bedside - all questions answered - agreeable with the plan as delineated. Thank you for the opportunity to see this patient in consultation.     Sue Roth MD  4/1/2021  12:31 PM

## 2021-04-01 NOTE — CONSULTS
Chino Duran is a 68 y.o. female with the following history:    Past Medical History:   Diagnosis Date    Arthritis     Atrial fibrillation ([facility] [address]     Blood circulation, collateral     CHF (congestive heart failure) (HCC)     Chronic renal insufficiency, stage IV (severe) ([facility] [address] 11/19/2016    History of cardiovascular stress test 07/2015    Lexiscan stress test    History of echocardiogram 07/27/2015    EF 29%    Hyperlipidemia     Hypertension      Consult requested for AAA  Past Surgical History:   Procedure Laterality Date    ECTOPIC PREGNANCY SURGERY         History reviewed. No pertinent family history. Prior to Admission medications    Medication Sig Start Date End Date Taking? Authorizing Provider   apixaban (ELIQUIS) 2.5 MG TABS tablet Take 1 tablet by mouth 2 times daily 10/8/19  Yes Claudia Aldana DO   carvedilol (COREG) 6.25 MG tablet Take 1 tablet by mouth 2 times daily 10/8/19  Yes Claudia Aldana DO   furosemide (LASIX) 40 MG tablet Take 1 tablet by mouth daily 10/10/19  Yes Claudia Aldana DO   hydrALAZINE (APRESOLINE) 25 MG tablet Take 1 tablet by mouth every 8 hours 10/8/19  Yes Claudia Aldana DO   isosorbide mononitrate (IMDUR) 30 MG extended release tablet Take 1 tablet by mouth daily 10/9/19  Yes Claudia Aldana DO   vitamin D (ERGOCALCIFEROL) 12631 units CAPS capsule Take 50,000 Units by mouth every other day    Yes Historical Provider, MD   atorvastatin (LIPITOR) 40 MG tablet Take 1 tablet by mouth nightly 7/28/15  Yes Claudia Aldana DO         Patient has no known allergies. Social History     Tobacco Use    Smoking status: Current Every Day Smoker     Packs/day: 0.50    Smokeless tobacco: Never Used   Substance Use Topics    Alcohol use: Not Currently     Comment: socially          Physical Exam:  Vitals:    04/01/21 0730   BP: (!) 120/58   Pulse: 89   Resp: 20   Temp: 98.2 °F (36.8 °C)   SpO2: 96%      Skin:  Warm and dry.   No rash or bruises  HEENT:  PERRLA, EOMI  Neck:  No JVD, No thyromegaly, No carotid bruit  Cardiac:  RRR, No gallop,SYSTOLIC murmur  Lungs:  CTA, Normal percussion  Abdomen: Normal bowel sounds, no HSM, non-tender. +pulsatile mass  Extremities:  No clubbing, edema or cyanosis,  Pulses 1+ bilaterally  Neurological:  Moves all extremities, normal DTR    EXAMINATION:   CT OF THE ABDOMEN AND PELVIS WITHOUT CONTRAST 3/31/2021 12:51 pm       TECHNIQUE:   CT of the abdomen and pelvis was performed without the administration of   intravenous contrast. Multiplanar reformatted images are provided for review.    Dose modulation, iterative reconstruction, and/or weight based adjustment of   the mA/kV was utilized to reduce the radiation dose to as low as reasonably   achievable.       COMPARISON:   None.       HISTORY:   ORDERING SYSTEM PROVIDED HISTORY: abd pain   TECHNOLOGIST PROVIDED HISTORY:   Reason for exam:->abd pain   Additional Contrast?->None   Decision Support Exception->Emergency Medical Condition (MA)       FINDINGS:   In the left upper quadrant there is no expansile amorphous a lesion of mixed   density measuring 7.4 x 3.2 x 3.3 cm interposed between the posterior wall of   the fundal region of the stomach, the spleen which is posterior located, the   tail of the pancreas, and the splenic flexure of the colon particularly the   proximal descending colon.  There is stranding of the adjacent fat planes and   there are indication for extraluminal air associated with this lesion.  The   findings are restricted to the left upper quadrant.  There is also fluid   accumulation in the left subphrenic space around the spleen.  Stranding of   the fat planes are seen.  The areas of a relative increased density   associated with the core of this lesion can indicate a hemorrhagic component.       Differential diagnosis would indicated an exophytic perforate lesion of the   stomach as seen with a just a type of tumor but it is in close proximity with   the vessels of the body/tail of the spleen and atherosclerotic changes are   seen in the splenic artery.  The a leaking aneurysm of the splenic artery   cannot be entirely excluded the.  The proper evaluation will required oral   and IV contrast study.       There is no evidence for free intraperitoneal air although there is   extraluminal air associated with the left upper quadrant mass like lesion   formation.  Free fluid accumulation is seen in the lower pelvic cavity in the   cul-de-sac.       The spleen appears to be intrinsically unremarkable though surrounded by   stranding of fat planes and the fluid accumulation in the left subphrenic   space.       Stranding of the pericolonic fat planes seen around the splenic flexure of   the colon relate with the amorphous complex most likely hemorrhagic mass   lesion with a component of bowel perforation likely from the stomach in the   left upper quadrant.       The liver has borderline size.  The small size gallstones are seen in a well   distended gallbladder. Kreg Bull is no dilatation of the biliary tree or   pancreatic ductal system. Kreg Bull is some atrophy of the pancreas.  The have   calcifications are seen in the area of the head of the pancreas.  Additional   calcifications seen throughout the pancreas in part are vascular related but   can be relate with previous chronic calcified pancreatitis.       The kidneys are preserved size.  There is no hydronephrosis.  Vascular   calcifications are seen both kidneys.       There is a large aneurysm of the distal abdominal aorta towards the   bifurcation measuring up to 3.7 x 4.8 cm.  There is a component of chronic   appearing dissection.       Bladder has unremarkable appearance.       The uterus and ovaries have unremarkable appearance.  Calcified myoma is seen   in the uterus.       Appendix seen and has unremarkable appearance. Kreg Bull is no indication for   bowel obstruction.       There is a component of anasarca with bilateral pleural effusions of mild-to-moderate degree.  Delete there are compression atelectasis in both   lower lobes by the pleural effusion.  Heart is diffusely enlarged.       Degenerative changes are seen in the lumbar spine and particularly in the   lower thoracic spine segments.           Impression   1.  Presence of a 7.4 x 3.2 x 3.3 cm expansile complex  density mass lesion   in the left upper quadrant interposed between the posterior wall of the   fundal region of the stomach, the spleen and the splenic flexure of the colon   and the body/tail of the pancreas, likely with a hemorrhagic component.       2. Josiephine Payer is a component of a well contained perforation with extraluminal   air but not conspicuously free intraperitoneal air associated with this mass   lesion.       3.  There are free fluid accumulation in the left subphrenic space and   stranding of the fat planes in between the stomach with spleen in the splenic   flexure of the colon.       4.  Differential diagnosis would include an exophytic  GIST tumor of the   stomach with the gastric perforation.       5.  Further evaluation with CT abdomen pelvis with oral and IV contrast is   recommended.  The IV contrast needed to exclude a bleeding aneurysm of the   splenic artery due the location of the lesion.       Preliminary report given to Dr. Diana Gates, ER Physician in [facility]-ER.               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

## 2021-04-01 NOTE — PLAN OF CARE
Problem: Falls - Risk of:  Goal: Will remain free from falls  Description: Will remain free from falls  4/1/2021 0139 by Bashir Thomas RN  Outcome: Met This Shift  3/31/2021 2051 by Daphney Green RN  Outcome: Met This Shift  Goal: Absence of physical injury  Description: Absence of physical injury  4/1/2021 0139 by Bashir Thomas RN  Outcome: Met This Shift  3/31/2021 2051 by Daphney Green RN  Outcome: Met This Shift

## 2021-04-01 NOTE — CARE COORDINATION
4/1/2021 1324 CM note: No covid test. Pt trying to sleep. CM met with pt's dtr Sacha Oswald ([phone]) at bedside for transition of care needs. Pt resides alone in 1 story home,no steps. Tavo Galindo reports pt was fairly independent pta and drives. She has home O2 at [place] through [employer], but mostly uses it at night. PCP is Dr Antony Aldridge and she uses [employer]. She owns/uses a cane. Hx [facility], no hx SNF. Current plan is home. Tavo Galindo will be staying to assist her mother until mid April. She will then fly  back to [place] for few days then return to her mothers home. Will await PT/OT onelia to assist with dc plan. CM/SW to follow.  Charlene THEODORE

## 2021-04-01 NOTE — CONSULTS
General Surgery Consult  Sherl Lesch, MD, MS    Patient's Name/Date of Birth: Calvin Record / 1940    Date: April 1, 2021     Consulting Surgeon: Joann Og MD    PCP: Mendez Delong DO     Chief Complaint: GI bleed    HPI:   Calvin Record is a 68 y.o. female who presents for evaluation of GI bleed. Timing is constant, radiation to midline, alleviated by none and started two days ago and severity is 7/10. history of CKD, HFrEF and pAFIb on Eliquis who presents from home with large volume hematochezia and clots. Patient states that she was feeling unwell for several weeks. Admits to recurrent abdominal pain that she presented to [facility] [place] last week for. Was discharged with no diagnosis 4 days ago. Yesterday, she was still having diffuse abdominal pain, worse on left side. There was associated with large volume bright red blood and diarrhea. Her bleeding and pain have completely resided. CT imaging showed large cystic lesion with possible perforation in the left upper quadrant with unknown origin. GI was consulted for further recommendations. According to patient, she has never had a GI bleed in the past. Denies prior EGD and colonoscopy. Is on Eliquis for Afib with last dose yesterday morning. Denies NSAID and antiplatelet use.       Patient Active Problem List   Diagnosis    Shortness of breath    Chronic combined systolic and diastolic congestive heart failure (HCC)    Chronic renal insufficiency, stage IV (severe) (HCC)    Atrial fibrillation (HCC)    Hypertension    Abnormal chest x-ray    Anemia of chronic disease    Opacity of lung on imaging study    Cigarette smoker    Tobacco abuse counseling    Acute on chronic combined systolic and diastolic CHF (congestive heart failure) (HCC)    Acute on chronic congestive heart failure (HCC)    Acute systolic CHF (congestive heart failure) (HCC)    GI bleed       No Known Allergies    Past Medical History:   Diagnosis Date    Arthritis     Atrial fibrillation ([facility] [address]     Blood circulation, collateral     CHF (congestive heart failure) (HCC)     Chronic renal insufficiency, stage IV (severe) ([facility] [address] 11/19/2016    History of cardiovascular stress test 07/2015    Lexiscan stress test    History of echocardiogram 07/27/2015    EF 29%    Hyperlipidemia     Hypertension        Past Surgical History:   Procedure Laterality Date    ECTOPIC PREGNANCY SURGERY         Social History     Socioeconomic History    Marital status:      Spouse name: Not on file    Number of children: Not on file    Years of education: Not on file    Highest education level: Not on file   Occupational History    Not on file   Social Needs    Financial resource strain: Not on file    Food insecurity     Worry: Not on file     Inability: Not on file    Transportation needs     Medical: Not on file     Non-medical: Not on file   Tobacco Use    Smoking status: Current Every Day Smoker     Packs/day: 0.50    Smokeless tobacco: Never Used   Substance and Sexual Activity    Alcohol use: Not Currently     Comment: socially    Drug use: Never    Sexual activity: Not Currently   Lifestyle    Physical activity     Days per week: Not on file     Minutes per session: Not on file    Stress: Not on file   Relationships    Social connections     Talks on phone: Not on file     Gets together: Not on file     Attends Rastafari service: Not on file     Active member of club or organization: Not on file     Attends meetings of clubs or organizations: Not on file     Relationship status: Not on file    Intimate partner violence     Fear of current or ex partner: Not on file     Emotionally abused: Not on file     Physically abused: Not on file     Forced sexual activity: Not on file   Other Topics Concern    Not on file   Social History Narrative    Not on file       The patient has a family history that is negative for severe cardiovascular or respiratory issues, negative for reaction to anesthesia. Recent Labs     03/31/21  1118 03/31/21 2048 04/01/21  0133 04/01/21  0742   WBC 13.0*  --   --  16.1*   HGB 7.6* 6.9* 7.0* 6.8*   HCT 24.5* 22.3* 22.5* 22.5*   .4*  --   --  110.3*   *  --   --  429       Recent Labs     03/31/21  1118 04/01/21  0742    140   K 3.9 4.4   CO2 20* 15*   PHOS  --  5.0*   BUN 42* 44*   CREATININE 2.8* 3.2*       Recent Labs     03/31/21  1118 04/01/21  0742   PROT 5.6* 5.2*   INR 1.8  --    LIPASE 40  --          Intake/Output Summary (Last 24 hours) at 4/1/2021 1027  Last data filed at 4/1/2021 0404  Gross per 24 hour   Intake 1484.83 ml   Output    Net 1484.83 ml       I have reviewed relevant labs from this admission and interpretation is included in my assessment and plan      Review of Systems  A complete 10 system review was performed and are otherwise negative unless mentioned in the above HPI. Specific negatives are listed below but may not include all those reviewed.     General ROS: negative obtundation, AMS  ENT ROS: negative rhinorrhea, epistaxis  Allergy and Immunology ROS: negative itchy/watery eyes or nasal congestion  Hematological and Lymphatic ROS: negative spontaneous bleeding or bruising  Endocrine ROS: negative  lethargy, mood swings, palpitations or polydipsia/polyuria  Respiratory ROS: negative sputum changes, stridor, tachypnea or wheezing  Cardiovascular ROS: negative for - loss of consciousness, murmur or orthopnea  Gastrointestinal ROS: negative for - hematochezia or hematemesis  Genito-Urinary ROS: negative for -  genital discharge or hematuria  Musculoskeletal ROS: negative for - focal weakness, gangrene  Psych/Neuro ROS: negative for - visual or auditory hallucinations, suicidal ideation      Physical exam:   BP (!) 118/57   Pulse 89   Temp 98 °F (36.7 °C) (Oral)   Resp 18   Ht 5' 5\" (1.651 m)   Wt 134 lb 8 oz (61 kg)   SpO2 99%   BMI 22.38 kg/m² General appearance:  NAD, appears stated age  Head: NCAT, PERRLA, EOMI, red conjunctiva  Neck: supple, no masses, trachea midline  Lungs: Equal chest rise bilateral, no retractions, no wheezing  Heart: Reg rate  Abdomen: soft, minimal tenderness epigastric left upper quadrant no signs of peritonitis  Skin; warm and dry, no cyanosis  Gu: no cva tenderness  Extremities: atraumatic, no focal motor deficits, no open wounds  Psych: No tremor, visual hallucinations      Radiology: I reviewed relevant abdominal imaging from this admission and that available in the EMR including CT abd/pel from admission as well as MRI.  My assessment is lesser sac fluid collection with a aerated concerning for possibility of a hemorrhagic pseudocyst less likely pancreatic necrosis also on the differential I would keep a perforated posterior gastric ulcer due to the ongoing evidence of GI bleed    Assessment:  Lindsay Raymond is a 68 y.o. female with GI bleed abdominal pain abnormal fluid collection in the lesser sac at the tail of the pancreas or posterior wall of the stomach    Patient Active Problem List   Diagnosis    Shortness of breath    Chronic combined systolic and diastolic congestive heart failure (HCC)    Chronic renal insufficiency, stage IV (severe) (HCC)    Atrial fibrillation ([address]    Hypertension    Abnormal chest x-ray    Anemia of chronic disease    Opacity of lung on imaging study    Cigarette smoker    Tobacco abuse counseling    Acute on chronic combined systolic and diastolic CHF (congestive heart failure) ([address]    Acute on chronic congestive heart failure (HCC)    Acute systolic CHF (congestive heart failure) (HCC)    GI bleed       Plan:  Is relatively unclear without IV and oral contrast the origin of the fluid collection behind the stomach and near the pancreas  I reviewed the images and discussed them with hepatobiliary surgery and GI  Recommend oral contrast and IV contrasted CT however it appears the family has requested not to pursue any IV contrast due to the patient's history of chronic kidney disease  This severely limits her ability to diagnose her ongoing problem although her pain seems to is improved now her hemoglobin stabilized  Would recommend upper endoscopies if GI agrees to evaluate for evidence of ulcer  Hold anticoagulants  No plans for surgical intervention at this time  Should she show signs of worsening or ongoing hemorrhage would recommend transfer to tertiary center for management in the surgical intensive care unit and possibly by hepatobiliary surgery  Will follow  Time spent reviewing past medical, surgical, social and family history, vitals, nursing assessment and images. Time spent face to face with patient and family counciling and discussing care exceeded 50% of the time of the consult. Additional time spent reviewing images and labs, discussing case with nursing, support staff and other physicians; as well as coordinating care.         Physician Signature: Electronically signed by Dr. Nigel Estrada  [phone] (p)

## 2021-04-01 NOTE — PROGRESS NOTES
Internal Medicine Progress Note    [facility]    ANA ROSA Pineda D.O., F.A.C.O.I. Jeff Ped, D.O. [place], MSN, APRN, NP-C  Ericka Barrientos. Jacquie Hull, MSN, APRN-CNP     Primary Care Physician: Cherylene Rains, DO   Admitting Physician:  Claudia Aldana DO  Admission date and time: 3/31/2021 10:32 AM    Room:  [address] [place] [address]  Admitting diagnosis: GI bleed [K92.2]    Patient Name: Chino Duran  MRN: [id number]    Date of Service: 4/1/2021     Subjective:  Anna Izquierdo is a 68 y.o. female who was seen and examined today,4/1/2021, at the bedside. She has improved dramatically when compared to my examination yesterday. She has near complete resolution of her presenting abdominal pain. In fact, she is requesting escalation in her diet. Her respiratory status is stable. She would like to become more ambulatory today. We discussed the recommendations from the multiple consultants providing care. Review of System:   Constitutional:   Admits to generalized weakness and deconditioning. HEENT:   Denies ear pain, sore throat, sinus or eye problems. Cardiovascular:   Denies any chest pain, irregular heartbeats, or palpitations. Respiratory:   Admits to mild shortness of breath without significant coughing or sputum production. Gastrointestinal:   Significant improvement in her presenting abdominal discomfort. Genitourinary:    Denies any urgency, frequency, hematuria. Voiding without difficulty. Extremities:   Denies lower extremity swelling, edema or cyanosis. Neurology:    Denies any headache or focal neurological deficits, Denies generalized weakness or memory difficulty. Psch:   Denies being anxious or depressed. Musculoskeletal:    Denies  myalgias, joint complaints or back pain. Integumentary:   Denies any rashes, ulcers, or excoriations. Denies bruising. Hematologic/Lymphatic:  Denies bruising or bleeding. Physical Exam:  No intake/output data recorded. Intake/Output Summary (Last 24 hours) at 4/1/2021 0844  Last data filed at 4/1/2021 0404  Gross per 24 hour   Intake 1484.83 ml   Output    Net 1484.83 ml   I/O last 3 completed shifts: In: 1484.8 [I.V.:1484.8]  Out: -   Patient Vitals for the past 96 hrs (Last 3 readings):   Weight   03/31/21 1039 134 lb 8 oz (61 kg)     Vital Signs:   Blood pressure (!) 120/58, pulse 89, temperature 98.2 °F (36.8 °C), temperature source Oral, resp. rate 20, height 5' 5\" (1.651 m), weight 134 lb 8 oz (61 kg), SpO2 96 %. General appearance:  Awake and alert. She is feeling much better today and appears clinically improved as well. Head:  Normocephalic. No masses, lesions or tenderness. Eyes:  PERRLA. EOMI. Sclera clear. Buccal mucosa moist.  ENT:  Ears normal. Mucosa normal.  Nasal cannula oxygen is in place. Neck:    Supple. Trachea midline. No thyromegaly. No JVD. No bruits. Heart:    Rhythm regular. Rate controlled. Mild systolic murmur. Lungs:    Diminished at the bases posteriorly. No overt wheezes or rales. Abdomen:   Soft. Very mildly tender to palpation with some localization to the epigastric and left upper quadrants. Extremities:    Peripheral pulses present. No peripheral edema. No ulcers. No cyanosis. No clubbing. Neurologic:    Alert x 3. No focal deficit. Cranial nerves grossly intact. No focal weakness. Psych:   Behavior is normal. Mood appears normal. Speech is not rapid and/or pressured. Musculoskeletal:   Spine ROM normal. Muscular strength intact. Gait not assessed. Integumentary:  No rashes  Skin normal color and texture.   Genitalia/Breast:  Deferred    Medication:  Scheduled Meds:   atorvastatin  40 mg Oral Nightly    carvedilol  6.25 mg Oral BID    hydrALAZINE  25 mg Oral 3 times per day    isosorbide mononitrate  30 mg Oral Daily    [START ON 4/7/2021] vitamin D  50,000 Units Oral Weekly    meropenem (MERREM) IVPB  500 mg Intravenous Q12H    fluconazole  400 mg Intravenous Q24H     Continuous Infusions:   sodium chloride      sodium chloride      sodium chloride 125 mL/hr at 04/01/21 0210    pantoprozole (PROTONIX) infusion 8 mg/hr (04/01/21 0336)       Objective Data:  CBC with Differential:    Lab Results   Component Value Date    WBC 16.1 04/01/2021    RBC 2.04 04/01/2021    HGB 6.8 04/01/2021    HCT 22.5 04/01/2021     04/01/2021    .3 04/01/2021    MCH 33.3 04/01/2021    MCHC 30.2 04/01/2021    RDW 15.5 04/01/2021    NRBC 0.9 03/31/2021    LYMPHOPCT 6.1 04/01/2021    MONOPCT 2.6 04/01/2021    MYELOPCT 0.9 03/31/2021    BASOPCT 0.1 04/01/2021    MONOSABS 0.48 04/01/2021    LYMPHSABS 0.97 04/01/2021    EOSABS 0.00 04/01/2021    BASOSABS 0.00 04/01/2021     CMP:    Lab Results   Component Value Date     03/31/2021    K 3.9 03/31/2021     03/31/2021    CO2 20 03/31/2021    BUN 42 03/31/2021    CREATININE 2.8 03/31/2021    GFRAA 20 03/31/2021    LABGLOM 20 03/31/2021    GLUCOSE 138 03/31/2021    GLUCOSE 102 11/17/2010    PROT 5.6 03/31/2021    LABALBU 2.6 03/31/2021    CALCIUM 8.1 03/31/2021    BILITOT 0.3 03/31/2021    ALKPHOS 118 03/31/2021    AST 20 03/31/2021    ALT 14 03/31/2021       Assessment:  1. 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 Acute gastrointestinal bleeding with hematochezia with suspicion for colitis  3. Acute on chronic macrocytic normochromic anemia with some component of blood loss  4. Chronic kidney disease stage IV  5. Abdominal aortic aneurysm toward the bifurcation measuring up to 3.7 x 4.8 cm a component of chronic dissection appearance  6. Chronic compensated systolic and diastolic congestive heart failure  7. Paroxysmal atrial fibrillation on chronic anticoagulation with Eliquis  8.  Essential hypertension    Plan:   I have discussed the case extensively with the surgical team.  The hepatobiliary team along with the infectious disease and vascular teams are providing consultation. The patient appears significantly better from a clinical standpoint. We will continue to hold anticoagulation therapy. Further advancement in the patient's diet as per the general surgery team.  EUS is being considered as well. We will maintain broad-spectrum antibiotic and antifungal therapy in the setting of perforation. Continue current therapy. See orders for further plan of care. More than 50% of my  time was spent at the bedside counseling/coordinating care with the patient and/or family with face to face contact. This time was spent reviewing notes and laboratory data as well as instructing and counseling the patient. Time I spent with the family or surrogate(s) is included only if the patient was incapable of providing the necessary information or participating in medical decisions. I also discussed the differential diagnosis and all of the proposed management plans with the patient and individuals accompanying the patient. Kylah Romero requires this high level of physician care and nursing on the IMC/Telemetry unit due the complexity of decision management and chance of rapid decline or death. Continued cardiac monitoring and higher level of nursing are required. I am readily available for any further decision-making and intervention.      Jignesh López DO, F.A.C.O.I.  4/1/2021  8:44 AM

## 2021-04-02 ENCOUNTER — APPOINTMENT (OUTPATIENT)
Dept: ULTRASOUND IMAGING | Age: 81
DRG: 871 | End: 2021-04-02
Payer: MEDICARE

## 2021-04-02 ENCOUNTER — ANESTHESIA EVENT (OUTPATIENT)
Dept: ENDOSCOPY | Age: 81
DRG: 871 | End: 2021-04-02
Payer: MEDICARE

## 2021-04-02 ENCOUNTER — APPOINTMENT (OUTPATIENT)
Dept: GENERAL RADIOLOGY | Age: 81
DRG: 871 | End: 2021-04-02
Payer: MEDICARE

## 2021-04-02 ENCOUNTER — APPOINTMENT (OUTPATIENT)
Dept: NUCLEAR MEDICINE | Age: 81
DRG: 871 | End: 2021-04-02
Payer: MEDICARE

## 2021-04-02 LAB
ALBUMIN SERPL-MCNC: 2.4 G/DL (ref 3.5–5.2)
ALP BLD-CCNC: 108 U/L (ref 35–104)
ALT SERPL-CCNC: 13 U/L (ref 0–32)
ANION GAP SERPL CALCULATED.3IONS-SCNC: 13 MMOL/L (ref 7–16)
AST SERPL-CCNC: 20 U/L (ref 0–31)
BASOPHILS ABSOLUTE: 0.03 E9/L (ref 0–0.2)
BASOPHILS RELATIVE PERCENT: 0.3 % (ref 0–2)
BILIRUB SERPL-MCNC: 0.4 MG/DL (ref 0–1.2)
BUN BLDV-MCNC: 43 MG/DL (ref 8–23)
CALCIUM SERPL-MCNC: 7.7 MG/DL (ref 8.6–10.2)
CHLORIDE BLD-SCNC: 114 MMOL/L (ref 98–107)
CO2: 16 MMOL/L (ref 22–29)
CREAT SERPL-MCNC: 3.5 MG/DL (ref 0.5–1)
EOSINOPHILS ABSOLUTE: 0.08 E9/L (ref 0.05–0.5)
EOSINOPHILS RELATIVE PERCENT: 0.7 % (ref 0–6)
GFR AFRICAN AMERICAN: 15
GFR NON-AFRICAN AMERICAN: 15 ML/MIN/1.73
GLUCOSE BLD-MCNC: 138 MG/DL (ref 74–99)
HCT VFR BLD CALC: 25.1 % (ref 34–48)
HCT VFR BLD CALC: 26.9 % (ref 34–48)
HCT VFR BLD CALC: 29.5 % (ref 34–48)
HCT VFR BLD CALC: 29.9 % (ref 34–48)
HEMOGLOBIN: 7.8 G/DL (ref 11.5–15.5)
HEMOGLOBIN: 8.6 G/DL (ref 11.5–15.5)
HEMOGLOBIN: 9.5 G/DL (ref 11.5–15.5)
HEMOGLOBIN: 9.5 G/DL (ref 11.5–15.5)
IMMATURE GRANULOCYTES #: 0.08 E9/L
IMMATURE GRANULOCYTES %: 0.7 % (ref 0–5)
LYMPHOCYTES ABSOLUTE: 1.91 E9/L (ref 1.5–4)
LYMPHOCYTES RELATIVE PERCENT: 17.9 % (ref 20–42)
MCH RBC QN AUTO: 32.6 PG (ref 26–35)
MCHC RBC AUTO-ENTMCNC: 31.1 % (ref 32–34.5)
MCV RBC AUTO: 105 FL (ref 80–99.9)
MONOCYTES ABSOLUTE: 1.04 E9/L (ref 0.1–0.95)
MONOCYTES RELATIVE PERCENT: 9.7 % (ref 2–12)
NEUTROPHILS ABSOLUTE: 7.53 E9/L (ref 1.8–7.3)
NEUTROPHILS RELATIVE PERCENT: 70.7 % (ref 43–80)
PDW BLD-RTO: 17.2 FL (ref 11.5–15)
PLATELET # BLD: 374 E9/L (ref 130–450)
PMV BLD AUTO: 10.6 FL (ref 7–12)
POTASSIUM SERPL-SCNC: 4 MMOL/L (ref 3.5–5)
RBC # BLD: 2.39 E12/L (ref 3.5–5.5)
SODIUM BLD-SCNC: 143 MMOL/L (ref 132–146)
TOTAL PROTEIN: 5.6 G/DL (ref 6.4–8.3)
WBC # BLD: 10.7 E9/L (ref 4.5–11.5)

## 2021-04-02 PROCEDURE — 6370000000 HC RX 637 (ALT 250 FOR IP): Performed by: STUDENT IN AN ORGANIZED HEALTH CARE EDUCATION/TRAINING PROGRAM

## 2021-04-02 PROCEDURE — APPSS180 APP SPLIT SHARED TIME > 60 MINUTES: Performed by: NURSE PRACTITIONER

## 2021-04-02 PROCEDURE — A9560 TC99M LABELED RBC: HCPCS | Performed by: RADIOLOGY

## 2021-04-02 PROCEDURE — 93971 EXTREMITY STUDY: CPT

## 2021-04-02 PROCEDURE — 6360000002 HC RX W HCPCS: Performed by: INTERNAL MEDICINE

## 2021-04-02 PROCEDURE — C9113 INJ PANTOPRAZOLE SODIUM, VIA: HCPCS | Performed by: INTERNAL MEDICINE

## 2021-04-02 PROCEDURE — 93005 ELECTROCARDIOGRAM TRACING: CPT | Performed by: NURSE PRACTITIONER

## 2021-04-02 PROCEDURE — 1200000000 HC SEMI PRIVATE

## 2021-04-02 PROCEDURE — 78278 ACUTE GI BLOOD LOSS IMAGING: CPT

## 2021-04-02 PROCEDURE — 3430000000 HC RX DIAGNOSTIC RADIOPHARMACEUTICAL: Performed by: RADIOLOGY

## 2021-04-02 PROCEDURE — P9016 RBC LEUKOCYTES REDUCED: HCPCS

## 2021-04-02 PROCEDURE — 85014 HEMATOCRIT: CPT

## 2021-04-02 PROCEDURE — 85018 HEMOGLOBIN: CPT

## 2021-04-02 PROCEDURE — 2580000003 HC RX 258: Performed by: INTERNAL MEDICINE

## 2021-04-02 PROCEDURE — 36430 TRANSFUSION BLD/BLD COMPNT: CPT

## 2021-04-02 PROCEDURE — 6360000002 HC RX W HCPCS: Performed by: STUDENT IN AN ORGANIZED HEALTH CARE EDUCATION/TRAINING PROGRAM

## 2021-04-02 PROCEDURE — 85025 COMPLETE CBC W/AUTO DIFF WBC: CPT

## 2021-04-02 PROCEDURE — 2500000003 HC RX 250 WO HCPCS: Performed by: INTERNAL MEDICINE

## 2021-04-02 PROCEDURE — 99223 1ST HOSP IP/OBS HIGH 75: CPT | Performed by: STUDENT IN AN ORGANIZED HEALTH CARE EDUCATION/TRAINING PROGRAM

## 2021-04-02 PROCEDURE — 6360000002 HC RX W HCPCS: Performed by: SURGERY

## 2021-04-02 PROCEDURE — 6370000000 HC RX 637 (ALT 250 FOR IP): Performed by: INTERNAL MEDICINE

## 2021-04-02 PROCEDURE — 2580000003 HC RX 258: Performed by: SURGERY

## 2021-04-02 PROCEDURE — 99232 SBSQ HOSP IP/OBS MODERATE 35: CPT | Performed by: SURGERY

## 2021-04-02 PROCEDURE — 6360000002 HC RX W HCPCS: Performed by: SPECIALIST

## 2021-04-02 PROCEDURE — 71045 X-RAY EXAM CHEST 1 VIEW: CPT

## 2021-04-02 PROCEDURE — 80053 COMPREHEN METABOLIC PANEL: CPT

## 2021-04-02 PROCEDURE — 36415 COLL VENOUS BLD VENIPUNCTURE: CPT

## 2021-04-02 RX ORDER — FUROSEMIDE 10 MG/ML
20 INJECTION INTRAMUSCULAR; INTRAVENOUS ONCE
Status: COMPLETED | OUTPATIENT
Start: 2021-04-02 | End: 2021-04-02

## 2021-04-02 RX ORDER — SODIUM CHLORIDE 9 MG/ML
INJECTION, SOLUTION INTRAVENOUS PRN
Status: COMPLETED | OUTPATIENT
Start: 2021-04-02 | End: 2021-04-03

## 2021-04-02 RX ORDER — SODIUM BICARBONATE 650 MG/1
650 TABLET ORAL 4 TIMES DAILY
Status: DISCONTINUED | OUTPATIENT
Start: 2021-04-02 | End: 2021-04-11

## 2021-04-02 RX ORDER — POLYETHYLENE GLYCOL 3350, SODIUM CHLORIDE, SODIUM BICARBONATE, POTASSIUM CHLORIDE 420; 11.2; 5.72; 1.48 G/4L; G/4L; G/4L; G/4L
4000 POWDER, FOR SOLUTION ORAL ONCE
Status: COMPLETED | OUTPATIENT
Start: 2021-04-02 | End: 2021-04-02

## 2021-04-02 RX ADMIN — HYDRALAZINE HYDROCHLORIDE 25 MG: 25 TABLET, FILM COATED ORAL at 16:20

## 2021-04-02 RX ADMIN — POLYETHYLENE GLYCOL 3350, SODIUM CHLORIDE, SODIUM BICARBONATE AND POTASSIUM CHLORIDE 4000 ML: 420; 5.72; 11.2; 1.48 POWDER, FOR SOLUTION ORAL at 16:31

## 2021-04-02 RX ADMIN — ISOSORBIDE MONONITRATE 30 MG: 30 TABLET, EXTENDED RELEASE ORAL at 09:06

## 2021-04-02 RX ADMIN — MEROPENEM 500 MG: 500 INJECTION, POWDER, FOR SOLUTION INTRAVENOUS at 03:36

## 2021-04-02 RX ADMIN — CARVEDILOL 6.25 MG: 6.25 TABLET, FILM COATED ORAL at 09:06

## 2021-04-02 RX ADMIN — SODIUM BICARBONATE 650 MG: 650 TABLET ORAL at 22:42

## 2021-04-02 RX ADMIN — Medication 20 MILLICURIE: at 15:40

## 2021-04-02 RX ADMIN — MEROPENEM 500 MG: 500 INJECTION, POWDER, FOR SOLUTION INTRAVENOUS at 16:31

## 2021-04-02 RX ADMIN — BUMETANIDE 1 MG/HR: 0.25 INJECTION INTRAMUSCULAR; INTRAVENOUS at 16:24

## 2021-04-02 RX ADMIN — SODIUM CHLORIDE: 9 INJECTION, SOLUTION INTRAVENOUS at 03:37

## 2021-04-02 RX ADMIN — SODIUM CHLORIDE 8 MG/HR: 9 INJECTION, SOLUTION INTRAVENOUS at 13:02

## 2021-04-02 RX ADMIN — FLUCONAZOLE 400 MG: 400 INJECTION, SOLUTION INTRAVENOUS at 17:13

## 2021-04-02 RX ADMIN — HYDRALAZINE HYDROCHLORIDE 25 MG: 25 TABLET, FILM COATED ORAL at 22:43

## 2021-04-02 RX ADMIN — SODIUM BICARBONATE 650 MG: 650 TABLET ORAL at 17:13

## 2021-04-02 RX ADMIN — CARVEDILOL 6.25 MG: 6.25 TABLET, FILM COATED ORAL at 22:42

## 2021-04-02 RX ADMIN — FUROSEMIDE 20 MG: 10 INJECTION, SOLUTION INTRAMUSCULAR; INTRAVENOUS at 12:17

## 2021-04-02 RX ADMIN — ATORVASTATIN CALCIUM 40 MG: 40 TABLET, FILM COATED ORAL at 22:43

## 2021-04-02 ASSESSMENT — PAIN SCALES - GENERAL: PAINLEVEL_OUTOF10: 2

## 2021-04-02 ASSESSMENT — PAIN DESCRIPTION - DESCRIPTORS: DESCRIPTORS: ACHING

## 2021-04-02 ASSESSMENT — PAIN DESCRIPTION - LOCATION: LOCATION: ABDOMEN

## 2021-04-02 ASSESSMENT — LIFESTYLE VARIABLES: SMOKING_STATUS: 1

## 2021-04-02 ASSESSMENT — ENCOUNTER SYMPTOMS: SHORTNESS OF BREATH: 1

## 2021-04-02 NOTE — PROGRESS NOTES
GENERAL SURGERY  DAILY PROGRESS NOTE  4/2/2021    Chief Complaint   Patient presents with    Abdominal Pain     to er via ems from home for evaluation of LLQ pain and rectal bleeding that started today.  Rectal Bleeding       Subjective:  States that she has not had any pain since she presented to the hospital. Also denies black or bloody BMs     Objective:  /78   Pulse 75   Temp 97.8 °F (36.6 °C) (Oral)   Resp 18   Ht 5' 5\" (1.651 m)   Wt 134 lb 8 oz (61 kg)   SpO2 97%   BMI 22.38 kg/m²     GENERAL:  Laying in bed, awake, alert, cooperative, no apparent distress  LUNGS:  No increased work of breathing  CARDIOVASCULAR:  RR  ABDOMEN:  Soft, non-tender, non-distended  EXTREMITIES: No edema or swelling  SKIN: Warm and dry    Assessment/Plan:  68 y.o. female admitted with abdominal pain resolved, GI bleeding, possible hemorrhagic pancreatic pseudocyst vs mass within the lesser sac between the posterior wall of the stomach and pancreatic tail    Ok for diet from GS perspective  Monitor H&H  Transfuse as needed  Recommended CT with PO and IV contrast which family is deferring at present due to hx CKD  Would recommend endoscopies, will defer to GI  MRI abdomen to further evaluate pancreas/mass as per GI  No plans for surgical intervention at this time     Electronically signed by Mari Arita DO on 4/2/2021 at 7:13 AM      Surgery Progress Note            Chief complaint:   Chief Complaint   Patient presents with    Abdominal Pain     to er via ems from home for evaluation of LLQ pain and rectal bleeding that started today.     Rectal Bleeding      Patient Active Problem List   Diagnosis    Shortness of breath    Chronic combined systolic and diastolic congestive heart failure (HCC)    Chronic renal insufficiency, stage IV (severe) (HCC)    Atrial fibrillation (HCC)    Hypertension    Abnormal chest x-ray    Anemia of chronic disease    Opacity of lung on imaging study    Cigarette smoker  Tobacco abuse counseling    Acute on chronic combined systolic and diastolic CHF (congestive heart failure) (HCC)    Acute on chronic congestive heart failure (HCC)    Acute systolic CHF (congestive heart failure) (HCC)    GI bleed    Preoperative cardiovascular examination       S: as abov    O:   Vitals:    04/02/21 1245   BP: 107/74   Pulse: 81   Resp: 18   Temp: 97.6 °F (36.4 °C)   SpO2: 95%       Intake/Output Summary (Last 24 hours) at 4/2/2021 1538  Last data filed at 4/2/2021 1315  Gross per 24 hour   Intake 350 ml   Output 200 ml   Net 150 ml           Labs:  Lab Results   Component Value Date    WBC 10.7 04/02/2021    WBC 16.1 04/01/2021    WBC 13.0 03/31/2021    HGB 8.6 04/02/2021    HGB 7.8 04/02/2021    HGB 7.4 04/01/2021    HCT 26.9 04/02/2021    HCT 25.1 04/02/2021    HCT 23.1 04/01/2021     Lab Results   Component Value Date    CREATININE 3.5 (H) 04/02/2021    BUN 43 (H) 04/02/2021     04/02/2021    K 4.0 04/02/2021     (H) 04/02/2021    CO2 16 (L) 04/02/2021     Lab Results   Component Value Date    LIPASE 40 03/31/2021         Physical exam:   /74   Pulse 81   Temp 97.6 °F (36.4 °C) (Oral)   Resp 18   Ht 5' 5\" (1.651 m)   Wt 134 lb 8 oz (61 kg)   SpO2 95%   BMI 22.38 kg/m²   General appearance: NAD  Head: NCAT  Neck: supple, no masses  Lungs: equal chest rise bilateral  Heart: S1S2 present  Abdomen: soft, nontender, nondistended  Skin; no lesions  Gu: no cva tenderness  Extremities: extremities normal, atraumatic, no cyanosis or edema    A:  gib and panc tail mass    P: no additional recs, will follow prn    Shubham Silveira MD  4/2/2021

## 2021-04-02 NOTE — PROGRESS NOTES
[facility]  [facility]  Progress Note    NAME: Kylah Santiago  MR:  [id number]  :   1940  DATE OF SERVICE:21    This is a face to face encounter with Kylah Santiago 68 y.o. female on 21  ID following for   Chief Complaint   Patient presents with    Abdominal Pain     to er via ems from home for evaluation of LLQ pain and rectal bleeding that started today.  Rectal Bleeding     SUBJECTIVE:  Receiving PRBC  Feels better - resting in bed. no abd pain   No fevers. No chills. Patient is on Protonix gtt   Patient is tolerating medications. No reported adverse drug reactions. Review of systems:  As stated above in the chief complaint, otherwise negative. Medications:  Scheduled Meds:   polyethylene glycol-electrolytes  4,000 mL Oral Once    sodium bicarbonate  650 mg Oral 4x Daily    atorvastatin  40 mg Oral Nightly    carvedilol  6.25 mg Oral BID    hydrALAZINE  25 mg Oral 3 times per day    isosorbide mononitrate  30 mg Oral Daily    [START ON 2021] vitamin D  50,000 Units Oral Weekly    meropenem (MERREM) IVPB  500 mg Intravenous Q12H    fluconazole  400 mg Intravenous Q24H     Continuous Infusions:   sodium chloride      bumetanide 0.1 mg/mL infusion      sodium chloride      sodium chloride      [Held by provider] sodium chloride 125 mL/hr at 21 0337    pantoprozole (PROTONIX) infusion 8 mg/hr (21 2319)     PRN Meds:sodium chloride, sodium chloride, sodium chloride, albuterol, ondansetron, promethazine, morphine    OBJECTIVE:  /74   Pulse 81   Temp 97.6 °F (36.4 °C) (Oral)   Resp 18   Ht 5' 5\" (1.651 m)   Wt 134 lb 8 oz (61 kg)   SpO2 95%   BMI 22.38 kg/m²   Temp  Av.7 °F (36.5 °C)  Min: 97.5 °F (36.4 °C)  Max: 98 °F (36.7 °C)  Constitutional:  The patient is awake, alert, and oriented. In bed- receiving blood products  Skin:    Warm and dry. No rashes were noted. HEENT:   Round and reactive pupils. AT/NC  Neck:    Supple to movements. Chest:   No use of accessory muscles to breathe. Symmetrical expansion. Cardiovascular:  S1 and S2 are rhythmic and regular. Abdomen:   Positive bowel sounds to auscultation. Benign to palpation. Extremities:   No clubbing, no cyanosis,  edema. CNS    AAxO   Lines: piv  Jane    Radiology:  MRI:  Impression:        Exam is moderately degraded by patient motion. 1. Approximate 5.5 cm probable collection adjacent to the pancreatic tail,   incompletely characterized in the absence of contrast material but felt to   most likely represent an acute peripancreatic fluid collection or pseudocyst   related to prior pancreatitis. 2. Moderate to severe pancreatic ductal dilatation.  No obstructing stone is   seen accounting for noncontrast technique and motion degraded imaging. Recommend further evaluation with contrast-enhanced pancreas protocol MRI. Attention to the finding in impression #1 is recommended at the time of   pancreas protocol MRI. 3. Small bilateral pleural effusions with severe body wall edema.       Laboratory and Tests Review:  Lab Results   Component Value Date    WBC 10.7 04/02/2021    WBC 16.1 (H) 04/01/2021    WBC 13.0 (H) 03/31/2021    HGB 8.6 (L) 04/02/2021    HCT 26.9 (L) 04/02/2021    .0 (H) 04/02/2021     04/02/2021     No results found for: RUST  Lab Results   Component Value Date    ALT 13 04/02/2021    AST 20 04/02/2021    ALKPHOS 108 (H) 04/02/2021    BILITOT 0.4 04/02/2021     Lab Results   Component Value Date     04/02/2021    K 4.0 04/02/2021    K 3.9 03/31/2021     04/02/2021    CO2 16 04/02/2021    BUN 43 04/02/2021    CREATININE 3.5 04/02/2021    CREATININE 3.2 04/01/2021    CREATININE 2.8 03/31/2021    GFRAA 15 04/02/2021    LABGLOM 15 04/02/2021    GLUCOSE 138 04/02/2021    GLUCOSE 102 11/17/2010    PROT 5.6 04/02/2021    LABALBU 2.4 04/02/2021    CALCIUM 7.7 04/02/2021    BILITOT 0.4 04/02/2021 This is a face to face encounter with Mira Hernández on 04/02/21. I have performed and participated in the history, exam, medical decision making, and  POC  with the NURSE PRACTITIONER and provided the instruction and education regarding this patient's care. Imaging and labs were reviewed per medical records and any ID pertinent labs were addressed with the patient. The patient was educated about the diagnosis, prognosis, indications, risks and benefits of treatment. Sounds flat today   No c/o f/c/n/v/d  For egd/cscope  Cont atbx  Wbc better  candiduria     meropenem (MERREM) 500 mg in sodium chloride 0.9 % 100 mL IVPB, Q12H  fluconazole (DIFLUCAN) in 0.9 % sodium chloride IVPB 400 mg, Q24H    no change in RX plan     Pt had the opportunity to ask questions. All questions were answered. Thank you for involving me in the care of Mira Hernández. Please do not hesitate to call for any questions or concerns.     Electronically signed by Sammy Espinosa MD on 4/2/2021 at 1:27 PM    Phone [phone]  Fax [phone]

## 2021-04-02 NOTE — CARE COORDINATION
4-2-Cm note: pt scheduled for upper and lower scopes tomorrow, pt was supposed to have an EGD today, GI cancelled due large bloody stool this am, pt also more short of breath. Pt plans on going home at [place] , her daughter will stay with her and care for her.  Electronically signed by Mai Stock RN on 4/2/2021 at 1:26 PM

## 2021-04-02 NOTE — PROGRESS NOTES
PROGRESS NOTE    By Alejandro Dixon D.O GI Fellow    [facility]  Dr. Diana Patiño MD, Dr. Robert Naylor MD, Dr Danette Hernandez, Dr. Doc Bowen MD, Dr. Sacha Melo, DO Kylah Santiago  68 y.o.  female    SUBJECTIVE:  Patient seen and examined today at bedside. Had another episode of large volume hematochezia this AM. Denies melena and hematemesis. Now more short of breath a rest and fatigue. Requiring 4L NC continuously, baseline 3L. Otherwise, abdominal pain resolved and feeling better. OBJECTIVE:    /78   Pulse 75   Temp 97.8 °F (36.6 °C) (Oral)   Resp 18   Ht 5' 5\" (1.651 m)   Wt 134 lb 8 oz (61 kg)   SpO2 97%   BMI 22.38 kg/m²     Gen: NAD, AAO x 3  HEENT:PEERL, no icterus  Heart: RRR, no M/R/G  Lungs: diminished bilateral bases posterior  Abd.: soft, NT, ND, BS +, no G/R, no HSM, + hematochezia   Extr.: no C/C/E, no bruising      Stool (measured) : 0 mL  Lab Results   Component Value Date    WBC 10.7 04/02/2021    WBC 16.1 04/01/2021    WBC 13.0 03/31/2021    HGB 7.8 04/02/2021    HGB 7.4 04/01/2021    HGB 8.5 04/01/2021    HCT 25.1 04/02/2021    .0 04/02/2021    RDW 17.2 04/02/2021     04/02/2021     04/01/2021     03/31/2021     Lab Results   Component Value Date     04/02/2021    K 4.0 04/02/2021    K 3.9 03/31/2021     04/02/2021    CO2 16 04/02/2021    BUN 43 04/02/2021    CREATININE 3.5 04/02/2021    CALCIUM 7.7 04/02/2021    PROT 5.6 04/02/2021    LABALBU 2.4 04/02/2021    BILITOT 0.4 04/02/2021    BILITOT 0.3 04/01/2021    BILITOT 0.3 03/31/2021    ALKPHOS 108 04/02/2021    ALKPHOS 102 04/01/2021    ALKPHOS 118 03/31/2021    AST 20 04/02/2021    AST 15 04/01/2021    AST 20 03/31/2021    ALT 13 04/02/2021    ALT 12 04/01/2021    ALT 14 03/31/2021     Lab Results   Component Value Date    LIPASE 40 03/31/2021     No results found for: AMYLASE      ASSESSMENT/PLAN:    1.  Hematochezia, acute blood loss anemia  - recurrent hematochezia this AM, VSS, H/H stable  - transfuse 1 unit PRBC as she is actively bleeding, lasix IV 20mg once  - follow H/H closely, transfuse as needed  - continue IV PPI  - eliquis on hold x 48 hours  - MRI shows pancreatic pseduocyst with no perforation or communication with stomach/colon  - will cancel EGD today and plan for EGD/colonoscopy tomorrow  - 4L PEG due to CKD  - cardiology consulted for help in optimization of cardiac function   - clear liquid diet, NPO after midnight    2. LUQ abdominal lesion, Pancreatic pseudocyst  - resolved  - likely related to pancreatic cyst  - continue supportive care  - endoscopy as above  - recommend repeat MRI as outpatient    Jimy Castellano DO  4/2/2021  9:09 AM    Pt seen and independently examined. Pertinent notes and lab work reviewed. Monitor reviewed showing atrial fibrillation  D/w Dr. Colette Paredes with physical exam and A&P. Discussed with patient - all questions answered - agreeable with the plan as delineated.     Bala Lomas MD  4/2/2021  11:41 AM

## 2021-04-02 NOTE — PROGRESS NOTES
Occupational Therapy  OT SESSION ATTEMPT     Date:2021  Patient Name: Vivienne Gama  MRN: [id number]  : 1940  Room: [address] [place] [address]     Attempted OT session this date:    [] unavailable due to other medical staff currently with pt   [] on hold per nursing staff   [] on hold per nursing staff secondary to lab / radiology results    [] pt declined due to ____. Benefits of participation in therapy reviewed with pt. [x] off unit   [] Other:     Will reattempt OT evaluation and/or treatment at a later time.     Monica English, OTR/L #[id number]

## 2021-04-02 NOTE — ANESTHESIA PRE PROCEDURE
Department of Anesthesiology  Preprocedure Note       Name:  Burgess Glover   Age:  68 y.o.  :  1940                                          MRN:  [id number]         Date:  2021      Surgeon: Elaine Murphy):  Pao Hogan DO    Procedure: Procedure(s):  EGD ESOPHAGOGASTRODUODENOSCOPY  COLONOSCOPY DIAGNOSTIC    Medications prior to admission:   Prior to Admission medications    Medication Sig Start Date End Date Taking?  Authorizing Provider   apixaban (ELIQUIS) 2.5 MG TABS tablet Take 1 tablet by mouth 2 times daily 10/8/19  Yes Seble Moore, DO   carvedilol (COREG) 6.25 MG tablet Take 1 tablet by mouth 2 times daily 10/8/19  Yes Seble Moore DO   furosemide (LASIX) 40 MG tablet Take 1 tablet by mouth daily 10/10/19  Yes Seble Moore DO   hydrALAZINE (APRESOLINE) 25 MG tablet Take 1 tablet by mouth every 8 hours 10/8/19  Yes Seble Moore DO   isosorbide mononitrate (IMDUR) 30 MG extended release tablet Take 1 tablet by mouth daily 10/9/19  Yes Seble Moore DO   vitamin D (ERGOCALCIFEROL) 50341 units CAPS capsule Take 50,000 Units by mouth every other day    Yes Historical Provider, MD   atorvastatin (LIPITOR) 40 MG tablet Take 1 tablet by mouth nightly 7/28/15  Yes Seble Moore DO       Current medications:    Current Facility-Administered Medications   Medication Dose Route Frequency Provider Last Rate Last Admin    polyethylene glycol-electrolytes (NULYTELY) solution 4,000 mL  4,000 mL Oral Once Amaya Lion,         0.9 % sodium chloride infusion   Intravenous PRN Suhail Aldrich DO        bumetanide (BUMEX) 12.5 mg in sodium chloride 0.9 % 125 mL infusion  1 mg/hr Intravenous Continuous Stephani English MD        sodium bicarbonate tablet 650 mg  650 mg Oral 4x Daily Stephani English MD        0.9 % sodium chloride infusion   Intravenous PRN Myrtle Lawrence DO        0.9 % sodium chloride infusion   Intravenous PRN Seble Moore DO        atorvastatin (LIPITOR) tablet 40 mg  40 mg Oral Nightly Susu Najjar, DO   40 mg at 04/01/21 2120    carvedilol (COREG) tablet 6.25 mg  6.25 mg Oral BID Susu Najjar, DO   6.25 mg at 04/02/21 5113    hydrALAZINE (APRESOLINE) tablet 25 mg  25 mg Oral 3 times per day Susu Najjar, DO   25 mg at 04/01/21 2120    isosorbide mononitrate (IMDUR) extended release tablet 30 mg  30 mg Oral Daily Susu Najjar, DO   30 mg at 04/02/21 2020    [START ON 4/7/2021] vitamin D (ERGOCALCIFEROL) capsule 50,000 Units  50,000 Units Oral Weekly Susu Najjar, DO        albuterol (PROVENTIL) nebulizer solution 2.5 mg  2.5 mg Nebulization Q6H PRN Susu Najjar, DO        ondansetron [facility]) injection 4 mg  4 mg Intravenous Q6H PRN Susu Najjar, DO        promethazine (PHENERGAN) injection 25 mg  25 mg Intravenous Q6H PRN Susu Najjar, DO        [Held by provider] 0.9 % sodium chloride infusion   Intravenous Continuous Susu Najjar,  mL/hr at 04/02/21 0337 New Bag at 04/02/21 0337    pantoprazole (PROTONIX) 80 mg in sodium chloride 0.9 % 100 mL infusion  8 mg/hr Intravenous Continuous Susu Najjar, DO 10 mL/hr at 04/02/21 1302 8 mg/hr at 04/02/21 1302    morphine (PF) injection 2 mg  2 mg Intravenous Q4H PRN Susu Najjar, DO   2 mg at 03/31/21 1802    meropenem (MERREM) 500 mg in sodium chloride 0.9 % 100 mL IVPB  500 mg Intravenous Q12H Amaryllis Boeck, MD   Stopped at 04/02/21 0421    fluconazole (DIFLUCAN) in 0.9 % sodium chloride IVPB 400 mg  400 mg Intravenous Q24H Alicia Ortega  mL/hr at 04/01/21 1653 400 mg at 04/01/21 1653       Allergies:  No Known Allergies    Problem List:    Patient Active Problem List   Diagnosis Code    Shortness of breath R06.02    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    Chronic renal insufficiency, stage IV (severe) (HCC) N18.4    Atrial fibrillation (HCC) I48.91    Hypertension I10    Abnormal chest x-ray R93.89    Anemia of chronic disease D63.8    Opacity of lung on imaging study R91.8    Cigarette smoker F17.210    Tobacco abuse counseling Z71.6    Acute on chronic combined systolic and diastolic CHF (congestive heart failure) ([facility]) I50.43    Acute on chronic congestive heart failure ([facility]) A07.1    Acute systolic CHF (congestive heart failure) ([facility]) I50.21    GI bleed K92.2    Preoperative cardiovascular examination Z01.810    Bowel perforation ([facility]) K63.1       Past Medical History:        Diagnosis Date    Arthritis     Atrial fibrillation ([facility]     Blood circulation, collateral     CHF (congestive heart failure) ([facility])     Chronic renal insufficiency, stage IV (severe) ([facility] 11/19/2016    History of cardiovascular stress test 07/2015    Lexiscan stress test    History of echocardiogram 07/27/2015    EF 29%    Hyperlipidemia     Hypertension        Past Surgical History:        Procedure Laterality Date    ECTOPIC PREGNANCY SURGERY         Social History:    Social History     Tobacco Use    Smoking status: Current Every Day Smoker     Packs/day: 0.50    Smokeless tobacco: Never Used   Substance Use Topics    Alcohol use: Not Currently     Comment: socially                                Ready to quit: Not Answered  Counseling given: Not Answered      Vital Signs (Current):   Vitals:    04/02/21 0530 04/02/21 0937 04/02/21 1145 04/02/21 1245   BP: 130/78 134/89 (!) 112/55 107/74   Pulse: 75 100 75 81   Resp:  18 18 18   Temp:  98 °F (36.7 °C) 97.6 °F (36.4 °C) 97.6 °F (36.4 °C)   TempSrc:  Oral Oral Oral   SpO2:  93%  95%   Weight:       Height:                                                  BP Readings from Last 3 Encounters:   04/02/21 107/74   10/08/19 (!) 139/90   06/03/19 (!) 153/90       NPO Status:                                                                                 BMI:   Wt Readings from Last 3 Encounters:   03/31/21 134 lb 8 oz (61 kg)   10/08/19 116 lb 14.4 oz (53 kg)   06/03/19 130 lb (59 kg)     Body mass index is 22.38 kg/m².     CBC:   Lab Results Component Value Date    WBC 10.7 04/02/2021    RBC 2.39 04/02/2021    HGB 8.6 04/02/2021    HCT 26.9 04/02/2021    .0 04/02/2021    RDW 17.2 04/02/2021     04/02/2021       CMP:   Lab Results   Component Value Date     04/02/2021    K 4.0 04/02/2021    K 3.9 03/31/2021     04/02/2021    CO2 16 04/02/2021    BUN 43 04/02/2021    CREATININE 3.5 04/02/2021    GFRAA 15 04/02/2021    LABGLOM 15 04/02/2021    GLUCOSE 138 04/02/2021    GLUCOSE 102 11/17/2010    PROT 5.6 04/02/2021    CALCIUM 7.7 04/02/2021    BILITOT 0.4 04/02/2021    ALKPHOS 108 04/02/2021    AST 20 04/02/2021    ALT 13 04/02/2021       POC Tests: No results for input(s): POCGLU, POCNA, POCK, POCCL, POCBUN, POCHEMO, POCHCT in the last 72 hours. Coags:   Lab Results   Component Value Date    PROTIME 20.7 03/31/2021    INR 1.8 03/31/2021    APTT 29.4 03/31/2021       HCG (If Applicable): No results found for: PREGTESTUR, PREGSERUM, HCG, HCGQUANT     ABGs:   Lab Results   Component Value Date    PO2ART 82.0 10/03/2019    [id number] 34.1 10/03/2019    RHH2HJU 17.3 10/03/2019        Type & Screen (If Applicable):  No results found for: LABABO, LABRH    Drug/Infectious Status (If Applicable):  No results found for: HIV, HEPCAB    COVID-19 Screening (If Applicable): No results found for: COVID19        Anesthesia Evaluation    Airway:         Dental:          Pulmonary:   (+) shortness of breath: new,  current smoker                           Cardiovascular:    (+) hypertension:, dysrhythmias: atrial fibrillation, CHF: systolic,          Beta Blocker:  Dose within 24 Hrs         Neuro/Psych:               GI/Hepatic/Renal:   (+) renal disease: CRI,           Endo/Other:    (+) blood dyscrasia: anemia:., .                 Abdominal:           Vascular:                                        Anesthesia Plan      MAC     ASA 3     (GI bleed HGB 8.6)  Induction: intravenous. Chart review pt not in room. Final disposition by attending anesthesiologist.        Daryl Gamble MD   4/2/2021

## 2021-04-02 NOTE — PROGRESS NOTES
Occupational Therapy  OT SESSION ATTEMPT     Date:2021  Patient Name: Kit Record  MRN: [id number]  : 1940  Room: [address] [place] [address]     Attempted OT session this date:    [] unavailable due to other medical staff currently with pt   [] on hold per nursing staff   [] on hold per nursing staff secondary to lab / radiology results    [x] pt adamantly declined due to \"I was just up with that doctor\". Benefits of participation in therapy reviewed with pt.    [] off unit   [] Other:     Will reattempt OT evaluation and/or treatment at a later time.     Antwan Sims, OTR/L #[id number]

## 2021-04-02 NOTE — PROGRESS NOTES
Internal Medicine Progress Note    [facility]    Kelly Cevallos. Kaykay Parrish., LEROY.A.CChepeOChepeI. Sandra Jimenez D.O., DALEOChepeIChepe Zamudio D.O. Conrado Ormond, MSN, APRN, NP-C  Jessica Urias. Faith Ulrich, MSN, APRN-CNP     Primary Care Physician: Haim Elder DO   Admitting Physician:  Hardy Madrigal DO  Admission date and time: 3/31/2021 10:32 AM    Room:  [address] [place] [address]  Admitting diagnosis: GI bleed [K92.2]    Patient Name: Fred Wheeler  MRN: [id number]    Date of Service: 4/2/2021     Subjective:  Lea Rosa is a 68 y.o. female who was seen and examined today,4/2/2021, at the bedside. She had hematochezia earlier today and we discuss that her EGD has been cancelled today and that she will undergo upper and lower endoscopic examination tomorrow. Denies abdominal pain. Admits to being more short of breath. No family present. Review of System:   Constitutional:   Admits to generalized weakness and deconditioning. No fever or chills. HEENT:   Denies ear pain, sore throat, sinus or eye problems. Cardiovascular:   Denies any chest pain, irregular heartbeats, or palpitations. Respiratory:   Admits to mild shortness of breath without significant coughing or sputum production. Gastrointestinal:   No abdominal discomfort.  + for hematochezia. Poor appetite. Genitourinary:    Denies any urgency, frequency, hematuria. Voiding without difficulty. Extremities:   Denies lower extremity swelling, edema or cyanosis. Neurology:    Denies any headache or focal neurological deficits, + generalized weakness    Psch:   Denies being anxious or depressed. Musculoskeletal:    Denies  myalgias, joint complaints or back pain. Integumentary:   Denies any rashes, ulcers, or excoriations. Denies bruising. Hematologic/Lymphatic:  Denies bruising or bleeding.     Physical Exam:  I/O this shift:  In: 350 [Blood:350]  Out: -     Intake/Output Summary (Last 24 hours) at 4/2/2021 1309 Last data filed at 4/2/2021 1157  Gross per 24 hour   Intake 350 ml   Output 200 ml   Net 150 ml   I/O last 3 completed shifts: In: 0   Out: 200 [Urine:200]  Patient Vitals for the past 96 hrs (Last 3 readings):   Weight   03/31/21 1039 134 lb 8 oz (61 kg)     Vital Signs:   Blood pressure 107/74, pulse 81, temperature 97.6 °F (36.4 °C), temperature source Oral, resp. rate 18, height 5' 5\" (1.651 m), weight 134 lb 8 oz (61 kg), SpO2 95 %. General appearance:  Awake and alert. She is feeling much better today and appears clinically improved as well. Head:  Normocephalic. No masses, lesions or tenderness. Eyes:  PERRLA. EOMI. Sclera clear. Impaired vision. ENT:  Ears normal. Mucosa normal.  Nasal cannula oxygen is in place. impaired hearing. Neck:    Supple. Trachea midline. No thyromegaly. No JVD. No bruits. Heart:    Irregularly irregular rate and rhythm. Atrial fibrillation on the monitor. Rate controlled. Mild systolic murmur. Lungs:    Diminished at the bases posteriorly. No overt wheezes or rales. Abdomen:   Soft. Very mildly tender to palpation with some localization to the epigastric and left upper quadrants. Extremities:    Peripheral pulses present. 2+ pitting edema the bilateral lower extremities. Swelling noted of the right upper extremity. Patient indicated there had been an IV there swelling may be consistent with IV infiltrate. .  Neurologic:    Alert x 3. No focal deficit. Cranial nerves grossly intact. No focal weakness. Psych:   Behavior is normal. Mood appears normal. Speech is not rapid and/or pressured. Musculoskeletal:   Spine ROM normal. Muscular strength intact. Gait not assessed. Integumentary:  No rashes  Skin normal color and texture.   Genitalia/Breast:  Deferred    Medication:  Scheduled Meds:   polyethylene glycol-electrolytes  4,000 mL Oral Once    sodium bicarbonate  650 mg Oral 4x Daily    atorvastatin  40 mg Oral Nightly    carvedilol  6.25 mg Oral BID  hydrALAZINE  25 mg Oral 3 times per day    isosorbide mononitrate  30 mg Oral Daily    [START ON 4/7/2021] vitamin D  50,000 Units Oral Weekly    meropenem (MERREM) IVPB  500 mg Intravenous Q12H    fluconazole  400 mg Intravenous Q24H     Continuous Infusions:   sodium chloride      bumetanide 0.1 mg/mL infusion      sodium chloride      sodium chloride      [Held by provider] sodium chloride 125 mL/hr at 04/02/21 0337    pantoprozole (PROTONIX) infusion 8 mg/hr (04/02/21 1302)       Objective Data:  CBC with Differential:    Lab Results   Component Value Date    WBC 10.7 04/02/2021    RBC 2.39 04/02/2021    HGB 8.6 04/02/2021    HCT 26.9 04/02/2021     04/02/2021    .0 04/02/2021    MCH 32.6 04/02/2021    MCHC 31.1 04/02/2021    RDW 17.2 04/02/2021    NRBC 0.9 03/31/2021    LYMPHOPCT 17.9 04/02/2021    MONOPCT 9.7 04/02/2021    MYELOPCT 0.9 03/31/2021    BASOPCT 0.3 04/02/2021    MONOSABS 1.04 04/02/2021    LYMPHSABS 1.91 04/02/2021    EOSABS 0.08 04/02/2021    BASOSABS 0.03 04/02/2021     CMP:    Lab Results   Component Value Date     04/02/2021    K 4.0 04/02/2021    K 3.9 03/31/2021     04/02/2021    CO2 16 04/02/2021    BUN 43 04/02/2021    CREATININE 3.5 04/02/2021    GFRAA 15 04/02/2021    LABGLOM 15 04/02/2021    GLUCOSE 138 04/02/2021    GLUCOSE 102 11/17/2010    PROT 5.6 04/02/2021    LABALBU 2.4 04/02/2021    CALCIUM 7.7 04/02/2021    BILITOT 0.4 04/02/2021    ALKPHOS 108 04/02/2021    AST 20 04/02/2021    ALT 13 04/02/2021       Assessment:  1. 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 Acute gastrointestinal bleeding with hematochezia with suspicion for colitis  3. Acute on chronic macrocytic normochromic anemia with some component of blood loss  4. Chronic kidney disease stage IV  5. Generalized edema  6. Right upper extremity swelling-ultrasound pending.   7. Abdominal aortic aneurysm toward the bifurcation measuring up to 3.7 x 4.8 cm a component of chronic dissection appearance  8. 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 Paroxysmal atrial fibrillation on chronic anticoagulation with Eliquis  10. Essential hypertension    Plan:   Patient had a large amount of hematochezia earlier today. As per discussion with GI team the upper endoscopic examination has been canceled for today and the patient will undergo upper and lower endoscopic examination tomorrow. Patient is aware and agreeable. Patient does have complaint of right upper extremity swelling. Patient did have recent IV in the right antecubital will obtain ultrasound to rule out DVT versus IV infiltrate. The hepatobiliary team along with the infectious disease and vascular teams are providing consultation. Recommendations of been reviewed. Antibiotics are at the discretion of infectious disease. We will continue to hold anticoagulation therapy. Await findings from endoscopic examination and await further recommendations from the GI and surgical team.  Courage patient to increase activity. Continue current therapy. See orders for further plan of care. More than 50% of my  time was spent at the bedside counseling/coordinating care with the patient and/or family with face to face contact. This time was spent reviewing notes and laboratory data as well as instructing and counseling the patient. Time I spent with the family or surrogate(s) is included only if the patient was incapable of providing the necessary information or participating in medical decisions. I also discussed the differential diagnosis and all of the proposed management plans with the patient and individuals accompanying the patient. Mir Yuen requires this high level of physician care and nursing on the IMC/Telemetry unit due the complexity of decision management and chance of rapid decline or death.   Continued cardiac monitoring and higher level of nursing are

## 2021-04-02 NOTE — PROGRESS NOTES
Room #:   2888/2696-67  Date: 2021       Patient Name: Vitaly Grier  : 1940      MRN: [id number]     Patient unavailable for physical therapy eval due to off floor at [address]. Will attempt PT evaluation at a later time. Thank you.        Zoey Pefiffer, PT

## 2021-04-02 NOTE — CONSULTS
Inpatient Cardiology Consultation      Reason for Consult:  Cardiac Evaluation Prior to Endoscopy with Known Hx of HFrEF and PAF    Consulting Physician: Dr. Brendan Preciado     Requesting Physician:  Dr. Anisa Davis    Date of Consultation: 4/2/2021    HISTORY OF PRESENT ILLNESS:   Ms. Arina Kulkarni is a 68year old  female who was most recently seen in consultation with Dr. Nestor Joshi on 10/05/2019. Her medical history includes cardiomyopathy with EF 28% per most recent echo in 10/2019, HLD, HTN, PAF, chronic anticoagulation with Eliquis, CKD Stage IV, CHF, CVA, and continued tobacco abuse. Ms. Arina Kulkarni presented to [facility] ED on 03/31/2021 with complaints of \"feeling sick\". She states that prior to presentation, she was recently discharged from [facility] after reportedly diagnosed with a UTI and pancreas lesion. She states that upon coming home she had GUERRERO and edema to BLE. She denies orthopnea and PND, fever, chills, palpitations. She states that she has chronic, unchanged black stools that she attributes to iron ingestion. She denies abdominal pain, bloody and tarry stools. Upon arrival to the ED her VS were 97.4-84-/87-98% on 4 liters via nasal cannula. CT of the abdomen and pelvis with the presence of a 7.4 x 3.2 x 3.3 cm expansile complex  density mass lesion in the left upper quadrant interposed between the posterior wall of the fundal region of the stomach, the spleen and the splenic flexure of the colon  and the body/tail of the pancreas, likely with a hemorrhagic component. There is a component of a well contained perforation with extraluminal  air but not conspicuously free intraperitoneal air associated with this mass lesion. There are free fluid accumulation in the left subphrenic space and stranding of the fat planes in between the stomach with spleen in the splenic flexure of the colon. Differential diagnosis would include an exophytic  GIST tumor of the stomach with the gastric perforation. Further evaluation with CT abdomen pelvis with oral and IV contrast is recommended.  The IV contrast needed to exclude a bleeding aneurysm of the splenic artery due the location of the lesion. She received Morphine, Fentanyl, and NS. General Surgery and Vascular Surgery were consulted. She underwent an MRI of the abdomen on 04/01/2021 that revealed an approximate 5.5 cm probable collection adjacent to the pancreatic tail, incompletely characterized in the absence of contrast material but felt to most likely represent an acute peripancreatic fluid collection or pseudocyst related to prior pancreatitis. Moderate to severe pancreatic ductal dilatation.  No obstructing stone is seen accounting for noncontrast technique and motion degraded imaging. Recommend further evaluation with contrast-enhanced pancreas protocol MRI. Attention to the finding in impression #1 is recommended at the time of pancreas protocol MRI. Small bilateral pleural effusions with severe body wall edema. She was admitted to a telemetry monitored unit. Cardiology was consulted by Dr. Ambrocio Simms for cardiac preoperative evaluation. Please note: past medical records were reviewed per electronic medical record (EMR) - see detailed reports under Past Medical/ Surgical History. Past Medical and Surgical History:    1. Echocardiogram 07/27/2015 (Dr. Avery Centeno):  Left ventricle is moderately enlarged. Severe asymmetric septal hypertrophy. Ejection fraction is measured at 29%. Posterior and inferior wall akinetic. No evidence of left ventricular mass or thrombus noted. The left atrium is moderately dilated. Interatrial septum appears intact. No evidence of thrombus within left atrium. Mildly dilated right ventricle. No evidence of a thrombus in the right ventricle. Right Atrium is not clearly visualized. Moderate mitral regurgitation is present. No mitral valve stenosis present. Physiologic and/or trace tricuspid regurgitation. Regular rhythm.   2. Lexiscan MPS 07/28/2015: Fixed defect of the inferior wall consistent with scar. Global hypokinesis. EF 21%. 3. Echocardiogram 11/21/2016 (Dr. Galen Márquez): Compared to prior echo, no significant changes noted. Technically adequate study. Severe asymmetric septal hypertrophy. Ejection fraction is visually estimated at 20-25% Inferior wall akinesis. Indeterminate diastolic function. No evidence of left ventricular mass or thrombus noted. Right ventricle global systolic function is mildly reduced. Moderate to severe mitral regurgitation is present. Mild tricuspid regurgitation. 4. Echocardiogram 10/04/2019 (Dr. Geneva Almonter): Mildly dilated left ventricle. Moderate asymmetric septal hypertrophy. Ejection fraction is measured at 28%. No evidence of left ventricular mass or thrombus noted. Posterior wall akinetic. Basal to mid-inferior wall akinetic. The left atrium is mildly dilated. Interatrial septum appears intact. No evidence of thrombus within left atrium. Mildly dilated right ventricle. No evidence of a thrombus in the right ventricle. Mildly enlarged right atrium size. Moderate mitral regurgitation is present. No evidence of mitral valve stenosis. Physiologic and/or trace tricuspid regurgitation. Regular rhythm. 5. PAF  6. Chronic anticoagulation with Eliquis  7. HTN  8. HLD  9. CHF  10. Continued tobacco abuse  11. Hx of CVA  12. CKD Stage IV  13. S/p ectopic pregnancy removal       Medications Prior to admit:  Prior to Admission medications    Medication Sig Start Date End Date Taking?  Authorizing Provider   apixaban (ELIQUIS) 2.5 MG TABS tablet Take 1 tablet by mouth 2 times daily 10/8/19  Yes Getachew Pink DO   carvedilol (COREG) 6.25 MG tablet Take 1 tablet by mouth 2 times daily 10/8/19  Yes Getachew Pink DO   furosemide (LASIX) 40 MG tablet Take 1 tablet by mouth daily 10/10/19  Yes Getachew Pink DO   hydrALAZINE (APRESOLINE) 25 MG tablet Take 1 tablet by mouth every 8 hours 10/8/19  Yes Getachew Pink DO isosorbide mononitrate (IMDUR) 30 MG extended release tablet Take 1 tablet by mouth daily 10/9/19  Yes Cassie Wharton DO   vitamin D (ERGOCALCIFEROL) 82129 units CAPS capsule Take 50,000 Units by mouth every other day    Yes Historical Provider, MD   atorvastatin (LIPITOR) 40 MG tablet Take 1 tablet by mouth nightly 7/28/15  Yes Cassie Wharton DO       Current Medications:    Current Facility-Administered Medications: polyethylene glycol-electrolytes (NULYTELY) solution 4,000 mL, 4,000 mL, Oral, Once  0.9 % sodium chloride infusion, , Intravenous, PRN  furosemide (LASIX) injection 20 mg, 20 mg, Intravenous, Once  0.9 % sodium chloride infusion, , Intravenous, PRN  0.9 % sodium chloride infusion, , Intravenous, PRN  atorvastatin (LIPITOR) tablet 40 mg, 40 mg, Oral, Nightly  carvedilol (COREG) tablet 6.25 mg, 6.25 mg, Oral, BID  hydrALAZINE (APRESOLINE) tablet 25 mg, 25 mg, Oral, 3 times per day  isosorbide mononitrate (IMDUR) extended release tablet 30 mg, 30 mg, Oral, Daily  [START ON 4/7/2021] vitamin D (ERGOCALCIFEROL) capsule 50,000 Units, 50,000 Units, Oral, Weekly  albuterol (PROVENTIL) nebulizer solution 2.5 mg, 2.5 mg, Nebulization, Q6H PRN  ondansetron (ZOFRAN) injection 4 mg, 4 mg, Intravenous, Q6H PRN  promethazine (PHENERGAN) injection 25 mg, 25 mg, Intravenous, Q6H PRN  0.9 % sodium chloride infusion, , Intravenous, Continuous  pantoprazole (PROTONIX) 80 mg in sodium chloride 0.9 % 100 mL infusion, 8 mg/hr, Intravenous, Continuous  morphine (PF) injection 2 mg, 2 mg, Intravenous, Q4H PRN  meropenem (MERREM) 500 mg in sodium chloride 0.9 % 100 mL IVPB, 500 mg, Intravenous, Q12H  fluconazole (DIFLUCAN) in 0.9 % sodium chloride IVPB 400 mg, 400 mg, Intravenous, Q24H    Allergies:  Patient has no known allergies. Social History:    Smokes 0.5ppd for 40+ years. Denies alcohol and illicit drug use. Caffeine intake includes coffee daily.      Family History:   Please note this information was not obtained at 3.2 x 3.3 cm expansile complex  density mass lesion in the left upper quadrant interposed between the posterior wall of the fundal region of the stomach, the spleen and the splenic flexure of the colon and the body/tail of the pancreas, likely with a hemorrhagic component. 2. Suzanne Short is a component of a well contained perforation with extraluminal air but not conspicuously free intraperitoneal air associated with this mass lesion. 3. Suzanne Short are free fluid accumulation in the left subphrenic space and stranding of the fat planes in between the stomach with spleen in the splenic flexure of the colon. 4. Differential diagnosis would include an exophytic  GIST tumor of the stomach with the gastric perforation. 5.  Further evaluation with CT abdomen pelvis with oral and IV contrast is recommended.  The IV contrast needed to exclude a bleeding aneurysm of the splenic artery due the location of the lesion. 04/01/2021 MRI of Abdomen:   1. Approximate 5.5 cm probable collection adjacent to the pancreatic tail, incompletely characterized in the absence of contrast material but felt to most likely represent an acute peripancreatic fluid collection or pseudocyst related to prior pancreatitis. 2. Moderate to severe pancreatic ductal dilatation.  No obstructing stone is seen accounting for noncontrast technique and motion degraded imaging. Recommend further evaluation with contrast-enhanced pancreas protocol MRI. Attention to the finding in impression #1 is recommended at the time of pancreas protocol MRI. 3. Small bilateral pleural effusions with severe body wall edema. 04/02/2021 CXR: Pending     IMPRESSION and PLAN to follow per Dr. Malvin Thompson    Electronically signed by MADELAINE Ceja CNP on 4/2/2021 at 9:30 AM     ______________________________________________________________________  I independently interviewed and examined the patient. I have reviewed the above documentation completed by the ANTHONY.  Please see my additional contributions to the HPI, physical exam, and assessment / medical decision making. HPI, ROS, PMH, PSH, FMH, SH, and medications independently reviewed (agree; see above documentation)    Review of Systems:  Cardiac: As per HPI  General: No fever, chills  Pulmonary: As per HPI  GI: No nausea, vomiting  Musculoskeletal: SZYMANSKI x 4, no focal motor deficits  Skin: Intact, no rashes  Neuro/Psych: No headache or seizures    Physical Exam:  /74   Pulse 81   Temp 97.6 °F (36.4 °C) (Oral)   Resp 18   Ht 5' 5\" (1.651 m)   Wt 134 lb 8 oz (61 kg)   SpO2 95%   BMI 22.38 kg/m²   Appearance: Awake, alert, no acute respiratory distress  Skin: Intact, no rash  Head: Normocephalic, atraumatic  Neck: Supple, no carotid bruits  Lungs: Clear to auscultation bilaterally. No wheezes, rales, or rhonchi. Cardiac: Regular rate and rhythm, +S1S2, no murmurs apparent  Abdomen: Soft, +bowel sounds  Extremities: Moves all extremities x 4, no lower extremity edema  Neurologic: No focal motor deficits apparent, normal mood and affect    Assessment/Plan:  68-year-old female with medical history of ischemic cardiomyopathy ejection fraction 30% with evidence of inferior infarction on nuclear imaging years ago and akinesis of the inferior and posterior walls, chronic kidney disease baseline creatinine 2.6, CVA, smoking, abdominal aortic aneurysm 4.8 cm on most recent CT scan, moderate mitral regurgitation, paroxysmal atrial fibrillation on apixaban, recent pancreatitis and pancreatic pseudocyst recent presents with gastrointestinal bleeding requiring blood transfusion, REYES creatinine 3.5. Labs were also significant for elevated troponin 0.3 trending down since then. We are consulted for preoperative evaluation prior to EGD and colonoscopy. Recommendations  Patient has type II myocardial infarction. She most likely has underlying obstructive coronary artery disease.   However, revascularization is not an option at this point and should be done electively after discussing risks and benefits. I recommend proceeding with any urgent or emergent procedures. Continue beta-blocker perioperatively. Agree with discontinuing apixaban. ICD has not been discussed with the patient in the past.  Will plan on addressing ICD option during this hospitalization. Hold isosorbide, hydralazine in the setting of hypotension.     Holger Almonte MD  [facility]) Cardiology

## 2021-04-02 NOTE — PLAN OF CARE
Problem: Falls - Risk of:  Goal: Will remain free from falls  Description: Will remain free from falls  4/2/2021 0237 by Telly Montesinos  Outcome: Met This Shift  4/1/2021 2238 by Lilia Talley RN  Outcome: Met This Shift  Goal: Absence of physical injury  Description: Absence of physical injury  4/2/2021 0237 by Telly Montesinos  Outcome: Met This Shift  4/1/2021 2238 by Lilia Talley RN  Outcome: Met This Shift

## 2021-04-02 NOTE — CONSULTS
[facility] Nephrology Consult       Patient's Name:  Kylah Santiago  Date of Service:  4/2/2021  Requesting    Tiago Mclaughlino, DO     Reason for Consult:              Acute renal failure superimposed on CKD 4    Chief Complaint:                   Abdo Pains , dark stools     Information obtained from: Chart     History of Present Illness:80 yrs old AAF     Known CKD stage 4 cr at baseline 2.8 range     Came in with Abdo pains , dark stools  Work up suggested anemia , CT abdo   Pancreatic tail mass/cyst , was reviewed by GI , Surgery , patient possible   Going for EGD/Colonoscopy soon     Renal functions worse since admission .  Was receiving IVF which now are on hold   Currently receiving Blood transfusion , feels ok today abdo pain better   However significantly volume overloaded and edematous    IVF are on hold now     Past Medical History:   Diagnosis Date    Arthritis     Atrial fibrillation ([address]     Blood circulation, collateral     CHF (congestive heart failure) ([address]     Chronic renal insufficiency, stage IV (severe) ([address] 11/19/2016    History of cardiovascular stress test 07/2015    Lexiscan stress test    History of echocardiogram 07/27/2015    EF 29%    Hyperlipidemia     Hypertension          Past Surgical History:   Procedure Laterality Date    ECTOPIC PREGNANCY SURGERY           Social History     Socioeconomic History    Marital status:      Spouse name: Not on file    Number of children: Not on file    Years of education: Not on file    Highest education level: Not on file   Occupational History    Not on file   Social Needs    Financial resource strain: Not on file    Food insecurity     Worry: Not on file     Inability: Not on file    Transportation needs     Medical: Not on file     Non-medical: Not on file   Tobacco Use    Smoking status: Current Every Day Smoker     Packs/day: 0.50    Smokeless tobacco: Never Used   Substance and Sexual Activity    20 15 20       LAST 3 CBC:  Recent Labs     03/31/21  1118 03/31/21  1118 04/01/21  0742 04/01/21  1615 04/01/21  2113 04/02/21  0812   WBC 13.0*  --  16.1*  --   --  10.7   RBC 2.26*  --  2.04*  --   --  2.39*   HGB 7.6*   < > 6.8* 8.5* 7.4* 7.8*   HCT 24.5*   < > 22.5* 27.5* 23.1* 25.1*   *  --  429  --   --  374    < > = values in this interval not displayed. Intake/Output Summary (Last 24 hours) at 4/2/2021 1224  Last data filed at 4/2/2021 1157  Gross per 24 hour   Intake 350 ml   Output 200 ml   Net 150 ml     Patient Vitals for the past 24 hrs:   BP Temp Temp src Pulse Resp SpO2   04/02/21 1145 (!) 112/55 97.6 °F (36.4 °C) Oral 75 18    04/02/21 0937 134/89 98 °F (36.7 °C) Oral 100 18 93 %   04/02/21 0530 130/78   75     04/02/21 0236    78     04/01/21 2115 115/69 97.8 °F (36.6 °C) Oral 87 18 97 %   04/01/21 1400 139/66 97.5 °F (36.4 °C) Oral  18 99 %       General appearance:  Appears stated age  Skin: color, texture, turgor normal. No rashes or lesions. Neck: supple, no masses, no JVD, No carotid bruits, No thyromegaly  Lungs: respirations unlabored. Clear to auscultation. No rales, wheezes, no rhonchi. Equal chest excursion with respirations. Heart RRR. pmi not laterally displaced. No S3 or S4, no rub  Abdomen:  Soft, ND, NT. Bowel sounds present. No HSM. No epigastric bruit, no increased Ao pulsation,  Extremities:edema +++ up to the flanks   Neuro: Cr N 2-12 grossly intact. No focal motor neuro deficit. No alteration in recent remote memory.     Assessment/Plan    1) REYES superimposed on CKD 4 , in the setting of GI bleed ,low EF /CHF and receiving       IVF , she is currently significantly volume overloaded -- will initiate Bumex drip / place a        And diurese her     2) S anemia - GI bleed , received a unit of blood today , for EGD / Colonoscopy in due course     3) Mass at the tail of pancreas - GI and surgical team comments noted , CT / MRI results noted     4) CHF low EF at 29% , Chronic a fib , anticoagulants on hold     Her baseline GFR is at around 20 ml/min , current at 15 ml/min , will monitor the response   To diuretics , Metabolic acidosis of REYES , will add po bicarb           Thank you for allowing us to participate in the care of [address] [place] [address]  4/2/2021  12:24 PM

## 2021-04-02 NOTE — PROGRESS NOTES
Room #:   0156/6211-40  Date: 2021       Patient Name: Kit Record  : 1940      MRN: [id number]     Patient unavailable for physical therapy eval due to patient eating breakfast, receiving blood transfusion. . Will attempt PT evaluation at a later time. Thank you.        John Johnson, PT

## 2021-04-02 NOTE — PROGRESS NOTES
[facility] Quality Flow/Interdisciplinary Rounds Progress Note        Quality Flow Rounds held on April 2, 2021    Disciplines Attending:  Bedside Nurse, ,  and Nursing Unit Leadership    Arnold Butts was admitted on 3/31/2021 10:32 AM    Anticipated Discharge Date:  Expected Discharge Date: 04/04/21    Disposition:    Estuardo Score:  Estuardo Scale Score: 19    Readmission Risk              Risk of Unplanned Readmission:        16           Discussed patient goal for the day, patient clinical progression, and barriers to discharge.   The following Goal(s) of the Day/Commitment(s) have been identified:  Scopes upper/lower tomorrow, blood today, activity progression, has home O2, PT/OT      Lien Isabel  April 2, 2021

## 2021-04-03 ENCOUNTER — ANESTHESIA (OUTPATIENT)
Dept: ENDOSCOPY | Age: 81
DRG: 871 | End: 2021-04-03
Payer: MEDICARE

## 2021-04-03 VITALS — OXYGEN SATURATION: 93 % | SYSTOLIC BLOOD PRESSURE: 131 MMHG | DIASTOLIC BLOOD PRESSURE: 86 MMHG

## 2021-04-03 LAB
ACANTHOCYTES: ABNORMAL
ALBUMIN SERPL-MCNC: 2.3 G/DL (ref 3.5–5.2)
ALP BLD-CCNC: 103 U/L (ref 35–104)
ALT SERPL-CCNC: 12 U/L (ref 0–32)
ANION GAP SERPL CALCULATED.3IONS-SCNC: 13 MMOL/L (ref 7–16)
ANISOCYTOSIS: ABNORMAL
AST SERPL-CCNC: 15 U/L (ref 0–31)
BASOPHILS ABSOLUTE: 0 E9/L (ref 0–0.2)
BASOPHILS RELATIVE PERCENT: 0.2 % (ref 0–2)
BILIRUB SERPL-MCNC: 0.5 MG/DL (ref 0–1.2)
BUN BLDV-MCNC: 40 MG/DL (ref 8–23)
BURR CELLS: ABNORMAL
CA 19-9: 17 U/ML (ref 0–37)
CALCIUM SERPL-MCNC: 7.9 MG/DL (ref 8.6–10.2)
CHLORIDE BLD-SCNC: 111 MMOL/L (ref 98–107)
CO2: 16 MMOL/L (ref 22–29)
CREAT SERPL-MCNC: 3.4 MG/DL (ref 0.5–1)
EOSINOPHILS ABSOLUTE: 0 E9/L (ref 0.05–0.5)
EOSINOPHILS RELATIVE PERCENT: 0.4 % (ref 0–6)
GFR AFRICAN AMERICAN: 16
GFR NON-AFRICAN AMERICAN: 16 ML/MIN/1.73
GLUCOSE BLD-MCNC: 110 MG/DL (ref 74–99)
HCT VFR BLD CALC: 27 % (ref 34–48)
HCT VFR BLD CALC: 28.7 % (ref 34–48)
HCT VFR BLD CALC: 29 % (ref 34–48)
HEMOGLOBIN: 8.7 G/DL (ref 11.5–15.5)
HEMOGLOBIN: 9 G/DL (ref 11.5–15.5)
HEMOGLOBIN: 9.2 G/DL (ref 11.5–15.5)
LYMPHOCYTES ABSOLUTE: 0.51 E9/L (ref 1.5–4)
LYMPHOCYTES RELATIVE PERCENT: 5.2 % (ref 20–42)
MAGNESIUM: 1.6 MG/DL (ref 1.6–2.6)
MCH RBC QN AUTO: 31.2 PG (ref 26–35)
MCHC RBC AUTO-ENTMCNC: 32.2 % (ref 32–34.5)
MCV RBC AUTO: 96.8 FL (ref 80–99.9)
MONOCYTES ABSOLUTE: 0.41 E9/L (ref 0.1–0.95)
MONOCYTES RELATIVE PERCENT: 3.5 % (ref 2–12)
MYELOCYTE PERCENT: 0.9 % (ref 0–0)
NEUTROPHILS ABSOLUTE: 9.28 E9/L (ref 1.8–7.3)
NEUTROPHILS RELATIVE PERCENT: 90.4 % (ref 43–80)
OVALOCYTES: ABNORMAL
PAPPENHEIMER BODIES: ABNORMAL
PDW BLD-RTO: 20.8 FL (ref 11.5–15)
PLATELET # BLD: 293 E9/L (ref 130–450)
PMV BLD AUTO: 10.1 FL (ref 7–12)
POIKILOCYTES: ABNORMAL
POLYCHROMASIA: ABNORMAL
POTASSIUM SERPL-SCNC: 3.7 MMOL/L (ref 3.5–5)
RBC # BLD: 2.79 E12/L (ref 3.5–5.5)
SCHISTOCYTES: ABNORMAL
SODIUM BLD-SCNC: 140 MMOL/L (ref 132–146)
TARGET CELLS: ABNORMAL
TEAR DROP CELLS: ABNORMAL
TOTAL PROTEIN: 5.3 G/DL (ref 6.4–8.3)
WBC # BLD: 10.2 E9/L (ref 4.5–11.5)

## 2021-04-03 PROCEDURE — 36415 COLL VENOUS BLD VENIPUNCTURE: CPT

## 2021-04-03 PROCEDURE — 2580000003 HC RX 258: Performed by: INTERNAL MEDICINE

## 2021-04-03 PROCEDURE — 80053 COMPREHEN METABOLIC PANEL: CPT

## 2021-04-03 PROCEDURE — 85018 HEMOGLOBIN: CPT

## 2021-04-03 PROCEDURE — 6360000002 HC RX W HCPCS: Performed by: INTERNAL MEDICINE

## 2021-04-03 PROCEDURE — 6360000002 HC RX W HCPCS: Performed by: SPECIALIST

## 2021-04-03 PROCEDURE — 2720000010 HC SURG SUPPLY STERILE: Performed by: INTERNAL MEDICINE

## 2021-04-03 PROCEDURE — 0DB78ZX EXCISION OF STOMACH, PYLORUS, VIA NATURAL OR ARTIFICIAL OPENING ENDOSCOPIC, DIAGNOSTIC: ICD-10-PCS | Performed by: INTERNAL MEDICINE

## 2021-04-03 PROCEDURE — 83735 ASSAY OF MAGNESIUM: CPT

## 2021-04-03 PROCEDURE — 7100000010 HC PHASE II RECOVERY - FIRST 15 MIN: Performed by: INTERNAL MEDICINE

## 2021-04-03 PROCEDURE — 97110 THERAPEUTIC EXERCISES: CPT

## 2021-04-03 PROCEDURE — 6370000000 HC RX 637 (ALT 250 FOR IP): Performed by: INTERNAL MEDICINE

## 2021-04-03 PROCEDURE — 3609009900 HC COLONOSCOPY W/CONTROL BLEEDING ANY METHOD: Performed by: INTERNAL MEDICINE

## 2021-04-03 PROCEDURE — 0DB98ZX EXCISION OF DUODENUM, VIA NATURAL OR ARTIFICIAL OPENING ENDOSCOPIC, DIAGNOSTIC: ICD-10-PCS | Performed by: INTERNAL MEDICINE

## 2021-04-03 PROCEDURE — 88305 TISSUE EXAM BY PATHOLOGIST: CPT

## 2021-04-03 PROCEDURE — 2580000003 HC RX 258: Performed by: STUDENT IN AN ORGANIZED HEALTH CARE EDUCATION/TRAINING PROGRAM

## 2021-04-03 PROCEDURE — 0DBL8ZX EXCISION OF TRANSVERSE COLON, VIA NATURAL OR ARTIFICIAL OPENING ENDOSCOPIC, DIAGNOSTIC: ICD-10-PCS | Performed by: INTERNAL MEDICINE

## 2021-04-03 PROCEDURE — 6360000002 HC RX W HCPCS: Performed by: NURSE ANESTHETIST, CERTIFIED REGISTERED

## 2021-04-03 PROCEDURE — 88342 IMHCHEM/IMCYTCHM 1ST ANTB: CPT

## 2021-04-03 PROCEDURE — 1200000000 HC SEMI PRIVATE

## 2021-04-03 PROCEDURE — C9113 INJ PANTOPRAZOLE SODIUM, VIA: HCPCS | Performed by: INTERNAL MEDICINE

## 2021-04-03 PROCEDURE — 2709999900 HC NON-CHARGEABLE SUPPLY: Performed by: INTERNAL MEDICINE

## 2021-04-03 PROCEDURE — 3700000001 HC ADD 15 MINUTES (ANESTHESIA): Performed by: INTERNAL MEDICINE

## 2021-04-03 PROCEDURE — 7100000011 HC PHASE II RECOVERY - ADDTL 15 MIN: Performed by: INTERNAL MEDICINE

## 2021-04-03 PROCEDURE — 6360000002 HC RX W HCPCS: Performed by: SURGERY

## 2021-04-03 PROCEDURE — 85014 HEMATOCRIT: CPT

## 2021-04-03 PROCEDURE — 97161 PT EVAL LOW COMPLEX 20 MIN: CPT

## 2021-04-03 PROCEDURE — 2500000003 HC RX 250 WO HCPCS: Performed by: INTERNAL MEDICINE

## 2021-04-03 PROCEDURE — 85025 COMPLETE CBC W/AUTO DIFF WBC: CPT

## 2021-04-03 PROCEDURE — 3700000000 HC ANESTHESIA ATTENDED CARE: Performed by: INTERNAL MEDICINE

## 2021-04-03 PROCEDURE — 3609010400 HC COLONOSCOPY POLYPECTOMY HOT BIOPSY: Performed by: INTERNAL MEDICINE

## 2021-04-03 PROCEDURE — 2580000003 HC RX 258: Performed by: SURGERY

## 2021-04-03 PROCEDURE — 3609010300 HC COLONOSCOPY W/BIOPSY SINGLE/MULTIPLE: Performed by: INTERNAL MEDICINE

## 2021-04-03 PROCEDURE — 3609019800 HC COLONOSCOPY WITH SUBMUCOSAL INJECTION: Performed by: INTERNAL MEDICINE

## 2021-04-03 PROCEDURE — 3609012400 HC EGD TRANSORAL BIOPSY SINGLE/MULTIPLE: Performed by: INTERNAL MEDICINE

## 2021-04-03 DEVICE — WORKING LENGTH 235CM, WORKING CHANNEL 2.8MM
Type: IMPLANTABLE DEVICE | Status: FUNCTIONAL
Brand: RESOLUTION 360 CLIP

## 2021-04-03 RX ORDER — PROPOFOL 10 MG/ML
INJECTION, EMULSION INTRAVENOUS CONTINUOUS PRN
Status: DISCONTINUED | OUTPATIENT
Start: 2021-04-03 | End: 2021-04-03 | Stop reason: SDUPTHER

## 2021-04-03 RX ORDER — PROPOFOL 10 MG/ML
INJECTION, EMULSION INTRAVENOUS PRN
Status: DISCONTINUED | OUTPATIENT
Start: 2021-04-03 | End: 2021-04-03 | Stop reason: SDUPTHER

## 2021-04-03 RX ORDER — PANTOPRAZOLE SODIUM 40 MG/1
40 TABLET, DELAYED RELEASE ORAL
Status: DISCONTINUED | OUTPATIENT
Start: 2021-04-04 | End: 2021-04-22 | Stop reason: HOSPADM

## 2021-04-03 RX ORDER — CHLOROTHIAZIDE SODIUM 500 MG/1
500 INJECTION INTRAVENOUS 2 TIMES DAILY
Status: DISCONTINUED | OUTPATIENT
Start: 2021-04-03 | End: 2021-04-12

## 2021-04-03 RX ADMIN — HYDRALAZINE HYDROCHLORIDE 25 MG: 25 TABLET, FILM COATED ORAL at 20:45

## 2021-04-03 RX ADMIN — SODIUM CHLORIDE 8 MG/HR: 9 INJECTION, SOLUTION INTRAVENOUS at 01:14

## 2021-04-03 RX ADMIN — SODIUM CHLORIDE: 9 INJECTION, SOLUTION INTRAVENOUS at 08:03

## 2021-04-03 RX ADMIN — ATORVASTATIN CALCIUM 40 MG: 40 TABLET, FILM COATED ORAL at 20:45

## 2021-04-03 RX ADMIN — ISOSORBIDE MONONITRATE 30 MG: 30 TABLET, EXTENDED RELEASE ORAL at 10:31

## 2021-04-03 RX ADMIN — BUMETANIDE 1 MG/HR: 0.25 INJECTION INTRAMUSCULAR; INTRAVENOUS at 05:39

## 2021-04-03 RX ADMIN — MEROPENEM 500 MG: 500 INJECTION, POWDER, FOR SOLUTION INTRAVENOUS at 02:50

## 2021-04-03 RX ADMIN — CARVEDILOL 6.25 MG: 6.25 TABLET, FILM COATED ORAL at 10:31

## 2021-04-03 RX ADMIN — CARVEDILOL 6.25 MG: 6.25 TABLET, FILM COATED ORAL at 20:45

## 2021-04-03 RX ADMIN — PROPOFOL 20 MG: 10 INJECTION, EMULSION INTRAVENOUS at 08:12

## 2021-04-03 RX ADMIN — SODIUM BICARBONATE 650 MG: 650 TABLET ORAL at 16:44

## 2021-04-03 RX ADMIN — HYDRALAZINE HYDROCHLORIDE 25 MG: 25 TABLET, FILM COATED ORAL at 05:38

## 2021-04-03 RX ADMIN — BUMETANIDE 1 MG/HR: 0.25 INJECTION INTRAMUSCULAR; INTRAVENOUS at 14:26

## 2021-04-03 RX ADMIN — PROPOFOL 20 MG: 10 INJECTION, EMULSION INTRAVENOUS at 08:09

## 2021-04-03 RX ADMIN — PROPOFOL 80 MCG/KG/MIN: 10 INJECTION, EMULSION INTRAVENOUS at 08:09

## 2021-04-03 RX ADMIN — HYDRALAZINE HYDROCHLORIDE 25 MG: 25 TABLET, FILM COATED ORAL at 13:32

## 2021-04-03 RX ADMIN — SODIUM BICARBONATE 650 MG: 650 TABLET ORAL at 10:31

## 2021-04-03 RX ADMIN — SODIUM BICARBONATE 650 MG: 650 TABLET ORAL at 13:31

## 2021-04-03 RX ADMIN — SODIUM BICARBONATE 650 MG: 650 TABLET ORAL at 20:45

## 2021-04-03 RX ADMIN — FLUCONAZOLE 400 MG: 400 INJECTION, SOLUTION INTRAVENOUS at 16:44

## 2021-04-03 RX ADMIN — CHLOROTHIAZIDE SODIUM 500 MG: 500 INJECTION, POWDER, LYOPHILIZED, FOR SOLUTION INTRAVENOUS at 16:53

## 2021-04-03 RX ADMIN — MEROPENEM 500 MG: 500 INJECTION, POWDER, FOR SOLUTION INTRAVENOUS at 14:26

## 2021-04-03 ASSESSMENT — ENCOUNTER SYMPTOMS: SHORTNESS OF BREATH: 1

## 2021-04-03 ASSESSMENT — LIFESTYLE VARIABLES: SMOKING_STATUS: 1

## 2021-04-03 NOTE — PROCEDURES
Stephen Boyce is a 68 y.o. female patient. 1. Bowel perforation ([address]    2. Pancreatic mass    3. Anemia, unspecified type    4. Rectal bleeding    5. Chronic kidney disease, unspecified CKD stage      Past Medical History:   Diagnosis Date    Arthritis     Atrial fibrillation ([facility]     Blood circulation, collateral     CHF (congestive heart failure) (HCC)     Chronic renal insufficiency, stage IV (severe) ([facility] 11/19/2016    History of cardiovascular stress test 07/2015    Lexiscan stress test    History of echocardiogram 07/27/2015    EF 29%    Hyperlipidemia     Hypertension      Blood pressure (!) 131/91, pulse 92, temperature 98.2 °F (36.8 °C), temperature source Oral, resp. rate 18, height 5' 5\" (1.651 m), weight 140 lb 8 oz (63.7 kg), SpO2 96 %. Procedures     Colonoscopy note    Indication: Hematochezia, acute blood loss anemia, abnormal CT    Sedation:  MAC    Estimated Blood Loss: 2mL      Endoscope was advanced through anus to cecum and terminal ileum, ileocecal valve and appendiceal orifice were identified and pictures were taken. Preparation is good. Patient tolerated procedure well. Terminal ileum x3cm is normal. No fresh or old blood noted. Cecum is normal.  Ascending colon is normal.  Transverse colon showed three flat polyps. 3mm polyp located at hepatic flexure removed via cold forceps biopsy. Two flat polyps, 4mm and 5mm located in distal transverse colon removed via hot snare polypectomy. Descending colon is showed few small flat polyps not removed due to high risk of bleeding and her chronic medical conditions. Sigmoid colon have few small widely scattered diverticula. There was a Dieulafoy lesion with adherent clot at 35cm with mild oozing. Multiple attempts at mechanical deroofing failed. The lesion was then treated with 1 endoclip and APC at 30W with adequate hemostasis. 3cc SPOT tattoo  Rectum direct views are normal  JOSE MIGUEL showed 1 external hemorrhoid. IMPRESSION AND PLAN:    1.  3mm flat polyp at hepatic flexure removed via cold forceps biopsy. 4mm and 5mm flat polyp in distal transverse removed via hot snare polypectomy. 2. Few small (< 3mm) flat polyps located in left colon left in place due to risk of bleeding and chronic medical conditions. 3. Dieulafoy lesion with adherent clot and active oozing located at 35cm. After failed deroofing, the lesion was treated with 1 endoclip and cautery with APC at [address]. Hemostasis was achieved. 4. Few small and widely scattered diverticuli in the sigmoid colon. 5. OK for general diet. 6.  Follow up as outpatient in office, call [phone] to schedule for appointment if needed. Pt was seen and procedure was performed with Dr. Shantanu Mojica present for the entire procedure. Tolu Georges DO  4/3/2021    I was present at bedside for the entire procedure and participated in key and non-key portions. I agree with the above findings and plan. No fresh or old blood seen during exam.  3 polyps removed per above, a few other small polyps not removed that were diminutive and appeared benign given bleeding risk. At ~35cm near proximal sigmoid colon was a visible vessel treated with clip and apc per above with 3cc SPOT to jahaira in future. This was likely bleeding source. Can consider capsule or repeat flex sig if rebleed. Mild scattered DDC with no stigmata of bleeding. Ok to restart diet and our service will follow closely. Consider F/U colonoscopy within 1 year if in good health.     DO Louie  4/3/2021  12:50 PM

## 2021-04-03 NOTE — PROGRESS NOTES
Genitourinary:no urinary retention , no burning , dysuria . No polyuria   Hematologic/lymphatic: no bleeding , no cougulation issues . Musculoskeletal:no joint pain , no swelling . Neurological: no headaches ,no weakness , no numbness . Endocrine: no thirst , no weight issues .      MEDS (scheduled):    [START ON 4/4/2021] pantoprazole  40 mg Oral QAM AC    chlorothiazide  500 mg Intravenous BID    sodium bicarbonate  650 mg Oral 4x Daily    atorvastatin  40 mg Oral Nightly    carvedilol  6.25 mg Oral BID    hydrALAZINE  25 mg Oral 3 times per day    isosorbide mononitrate  30 mg Oral Daily    [START ON 4/7/2021] vitamin D  50,000 Units Oral Weekly    meropenem (MERREM) IVPB  500 mg Intravenous Q12H    fluconazole  400 mg Intravenous Q24H       MEDS (infusions):   bumetanide 0.1 mg/mL infusion 1 mg/hr (04/03/21 0539)    sodium chloride      sodium chloride         MEDS (prn):  sodium chloride, sodium chloride, albuterol, ondansetron, promethazine, morphine    PHYSICAL EXAM:     Patient Vitals for the past 24 hrs:   BP Temp Temp src Pulse Resp SpO2 Weight   04/03/21 1015 (!) 140/86 97.3 °F (36.3 °C) Axillary 101 18 95 %    04/03/21 1001      93 %    04/03/21 1000      92 %    04/03/21 0959      93 %    04/03/21 0958      93 %    04/03/21 0957      93 %    04/03/21 0956      92 %    04/03/21 0955      92 %    04/03/21 0954      92 %    04/03/21 0953      92 %    04/03/21 0945      93 %    04/03/21 0943    85 15 92 %    04/03/21 0939 (!) 161/103   84 18 91 %    04/03/21 0936    86 16 92 %    04/03/21 0935  97.4 °F (36.3 °C) Infrared 93 12 91 %    04/03/21 0930      93 %    04/03/21 0928    91 12 93 %    04/03/21 0927 (!) 139/95   81 14 91 %    04/03/21 0924    84 12 94 %    04/03/21 0922    87 12 95 %    04/03/21 0921    85 16 94 %    04/03/21 0917 (!) 122/91   82 14 94 %    04/03/21 0913    82 13 93 %    04/03/21 0911    86 12 91 %    04/03/21 0907 111/84 97.6 °F (36.4 °C) Infrared 82 21 (!) 88 %    04/03/21 0538 (!) 131/91         04/03/21 0352       140 lb 8 oz (63.7 kg)   04/02/21 2230 (!) 140/89 98.2 °F (36.8 °C) Oral 92 18 96 %    04/02/21 1620 (!) 140/64         04/02/21 1245 107/74 97.6 °F (36.4 °C) Oral 81 18 95 %    04/02/21 1145 (!) 112/55 97.6 °F (36.4 °C) Oral 75 18     @      Intake/Output Summary (Last 24 hours) at 4/3/2021 1059  Last data filed at 4/3/2021 1000  Gross per 24 hour   Intake 1080 ml   Output 1150 ml   Net -70 ml         Wt Readings from Last 3 Encounters:   04/03/21 140 lb 8 oz (63.7 kg)   10/08/19 116 lb 14.4 oz (53 kg)   06/03/19 130 lb (59 kg)       Constitutional:  in no acute distress  Oral: mucus membranes moist  Neck: 2+ JVD   Cardiovascular: S1, S2 regular rhythm, no murmur,or rub  Respiratory:  No crackles, no wheeze  Gastrointestinal:  Soft, nontender, nondistended, NABS  Ext: 2+ edema bl   Skin: dry, no rash  Neuro: awake, alert, interactive      DATA:    Recent Labs     04/01/21  0742 04/01/21  0742 04/02/21  0812 04/02/21  0812 04/02/21  1725 04/02/21  2317 04/03/21  0444   WBC 16.1*  --  10.7  --   --   --  10.2   HGB 6.8*   < > 7.8*   < > 9.5* 9.5* 8.7*   HCT 22.5*   < > 25.1*   < > 29.9* 29.5* 27.0*   .3*  --  105.0*  --   --   --  96.8     --  374  --   --   --  293    < > = values in this interval not displayed.      Recent Labs     04/01/21  0742 04/02/21  0812 04/03/21  0444    143 140   K 4.4 4.0 3.7   * 114* 111*   CO2 15* 16* 16*   MG 1.8  --  1.6   PHOS 5.0*  --   --    BUN 44* 43* 40*   CREATININE 3.2* 3.5* 3.4*   ALT 12 13 12   AST 15 20 15   BILITOT 0.3 0.4 0.5   ALKPHOS 102 108* 103       No results found for: LABPROT    ASSESSMENT / RECOMMENDATIONS:      1) REYES  in the setting of GI bleed ,low EF /CHF     2) Chronic kidney disease stage IV baseline cr 2-2.7     3) Mass at the tail of

## 2021-04-03 NOTE — PROCEDURES
Sarah Estrella is a 68 y.o. female patient. 1. Bowel perforation ([address]    2. Pancreatic mass    3. Anemia, unspecified type    4. Rectal bleeding    5. Chronic kidney disease, unspecified CKD stage      Past Medical History:   Diagnosis Date    Arthritis     [facility]     Blood circulation, collateral     CHF (congestive heart failure) (HCC)     Chronic renal insufficiency, stage IV (severe) ([facility] 11/19/2016    History of cardiovascular stress test 07/2015    Lexiscan stress test    History of echocardiogram 07/27/2015    EF 29%    Hyperlipidemia     Hypertension      Blood pressure (!) 131/91, pulse 92, temperature 98.2 °F (36.8 °C), temperature source Oral, resp. rate 18, height 5' 5\" (1.651 m), weight 140 lb 8 oz (63.7 kg), SpO2 96 %. Procedures     Procedure:  Esophagogastroduodenoscopy    Indication:  Hematochezia/GI Bleed, acute blood loss anemia, abnormal CT    Sedation:  MAC    Estimated Blood Loss: 0mL      Endoscope was advanced easily through mouth to second portion of duodenum      Oropharynx views are limited but grossly normal.    Esophagus:   Mucosa showed four esophageal diverticulum located at 28cm, 30cm, 32cm and 33cm. GEJ at 40 cm. Stomach:   Antrum showed mild gastritis with granular mucosa. Biopsies of granular antral mucosa taken for histology. Gastric body is normal.    Retroflexed views show normal fundus and cardia. Duodenum: Bulb showed 1cm exophytic circular lesion with fibrillary tissue 1cm distal to pylorus on the anterolateral side. Biopsies taken of lesion for histology. Second portion of duodenum is normal. No fresh or old blood seen. Ampulla of Vater seen with free flowing bile and no blood. IMPRESSION AND PLAN:     1.  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 2. Antral gastritis with granular tissue. Biopsies taken for histology.     3. 1cm circular and exophytic lesion located on the anterolateral portion of duodenal bulb 1cm distal to pyloric channel. Biopsies taken for histology. 4. See colonoscopy report same day for further findings and plan. PPI daily x 8 weeks then switch to pepcid. Follow up as outpatient in office, call [phone] to schedule for appointment. Pt was seen and procedure was performed with Dr. Sathya Bradshaw present for the entire procedure. Amaya Lion DO  4/3/2021    I was present at bedside for the entire procedure and participated in key and non-key portions. I agree with the above findings and plan. Pt with 4 epiphrenic esophageal diverticulum per above, no stigmata of bleeding. No fresh or old blood seen on exam.  Mild gastritis with biopsy. In duodenum bulb was 1cm lesion, had central umbilication questionable for GIST lesion, biopsy taken, less likely and did not appear typical for brunner gland hyperplasia. No bile seen coming from this. 2nd portion normal and patent with good bile flow from ampulla. No obvious ampullary lesion with straight viewing scope. Given overall findings on CT and MRI, I would recommend MRCP with no contrast for further investigation of pancreatic dilation to see if dominant stricture or stone in pancreatic duct vs inflammatory stricture. Pt with cholelithiasis, no obvious choledocholithiasis but may need to consider lap juliane here to prevent reoccurence. Pending biopsies of duodenal bulb lesion as well. EUS would be useful but may be difficult or unable to do given the 4 epiphrenic DDC pt has and will be up to the advanced endoscopist.      See colonoscopy report same day for further findings and plan.     DO Louie  4/3/2021  9:16 AM

## 2021-04-03 NOTE — H&P
H&P reviewed. Changes below. BP (!) 131/91   Pulse 92   Temp 98.2 °F (36.8 °C) (Oral)   Resp 18   Ht 5' 5\" (1.651 m)   Wt 140 lb 8 oz (63.7 kg)   SpO2 96%   BMI 23.38 kg/m²     Patient seen and examined today at bedside. Complaining of generalized muscle aches. Tolerated half of prep with still murky brown output. Denies any overt GI bleeding since yesterday. Will proceed with tap water enemas until clear. Proceed with EGD and colonoscopy. Saira Helms DO  GI Fellow    Immediately prior to the procedure the patient's History and Physical was reviewed- there are no changes with the current vitals. BP (!) 131/91   Pulse 92   Temp 98.2 °F (36.8 °C) (Oral)   Resp 18   Ht 5' 5\" (1.651 m)   Wt 140 lb 8 oz (63.7 kg)   SpO2 96%   BMI 23.38 kg/m²     No CP/SOB. Risks/benefits d/w pt. All questions answered. Proceed with EGD and Colonoscopy. All risks d/w pt including her cardiac and renal function. Given large bleeding and need for 2 separate transfusions needs to proceed. Cardiology note reviewed and appreciated. See separate EGD and colonoscopy report.     DO Louie  4/3/2021  9:04 AM

## 2021-04-03 NOTE — PROGRESS NOTES
[facility]  [facility]  Progress Note    NAME: Viktoria Barber  MR:  [id number]  :   1940  DATE OF SERVICE:21    This is a face to face encounter with Viktoria Barber 68 y.o. female on 21  ID following for   Chief Complaint   Patient presents with    Abdominal Pain     to er via ems from home for evaluation of LLQ pain and rectal bleeding that started today.  Rectal Bleeding     SUBJECTIVE:  daughter present and updated  S/p egd/cscope   No f/c/n/v/d/abd pain  tired  Patient is tolerating medications. No reported adverse drug reactions. Review of systems:  As stated above in the chief complaint, otherwise negative. Medications:  Scheduled Meds:   [START ON 2021] pantoprazole  40 mg Oral QAM AC    chlorothiazide  500 mg Intravenous BID    sodium bicarbonate  650 mg Oral 4x Daily    atorvastatin  40 mg Oral Nightly    carvedilol  6.25 mg Oral BID    hydrALAZINE  25 mg Oral 3 times per day    isosorbide mononitrate  30 mg Oral Daily    [START ON 2021] vitamin D  50,000 Units Oral Weekly    meropenem (MERREM) IVPB  500 mg Intravenous Q12H    fluconazole  400 mg Intravenous Q24H     Continuous Infusions:   bumetanide 0.1 mg/mL infusion 1 mg/hr (21 0539)    sodium chloride      sodium chloride       PRN Meds:sodium chloride, sodium chloride, albuterol, ondansetron, promethazine, morphine    OBJECTIVE:  BP (!) 140/86   Pulse 101   Temp 97.3 °F (36.3 °C) (Axillary)   Resp 18   Ht 5' 5\" (1.651 m)   Wt 140 lb 8 oz (63.7 kg)   SpO2 95%   BMI 23.38 kg/m²   Temp  Av.6 °F (36.4 °C)  Min: 97.3 °F (36.3 °C)  Max: 98.2 °F (36.8 °C)  Constitutional:  The patient is awake, alert, and oriented. Skin:    Warm and dry. No rashes were noted. HEENT:      AT/NC   Chest:   No use of accessory muscles to breathe. Symmetrical expansion. Cardiovascular:  S1 and S2 are rhythmic and regular.   Abdomen:   Soft Positive bowel sounds to auscultation. Benign to palpation. Extremities:    ,  edema. CNS    AAxO more awake  Lines: piv  Jane    Radiology:  MRI:  Impression:        Exam is moderately degraded by patient motion. 1. Approximate 5.5 cm probable collection adjacent to the pancreatic tail,   incompletely characterized in the absence of contrast material but felt to   most likely represent an acute peripancreatic fluid collection or pseudocyst   related to prior pancreatitis. 2. Moderate to severe pancreatic ductal dilatation.  No obstructing stone is   seen accounting for noncontrast technique and motion degraded imaging. Recommend further evaluation with contrast-enhanced pancreas protocol MRI. Attention to the finding in impression #1 is recommended at the time of   pancreas protocol MRI. 3. Small bilateral pleural effusions with severe body wall edema.       Laboratory and Tests Review:  Lab Results   Component Value Date    WBC 10.2 04/03/2021    WBC 10.7 04/02/2021    WBC 16.1 (H) 04/01/2021    HGB 8.7 (L) 04/03/2021    HCT 27.0 (L) 04/03/2021    MCV 96.8 04/03/2021     04/03/2021     No results found for: [facility]  Lab Results   Component Value Date    ALT 12 04/03/2021    AST 15 04/03/2021    ALKPHOS 103 04/03/2021    BILITOT 0.5 04/03/2021     Lab Results   Component Value Date     04/03/2021    K 3.7 04/03/2021    K 3.9 03/31/2021     04/03/2021    CO2 16 04/03/2021    BUN 40 04/03/2021    CREATININE 3.4 04/03/2021    CREATININE 3.5 04/02/2021    CREATININE 3.2 04/01/2021    GFRAA 16 04/03/2021    LABGLOM 16 04/03/2021    GLUCOSE 110 04/03/2021    GLUCOSE 102 11/17/2010    PROT 5.3 04/03/2021    LABALBU 2.3 04/03/2021    CALCIUM 7.9 04/03/2021    BILITOT 0.5 04/03/2021    ALKPHOS 103 04/03/2021    AST 15 04/03/2021    ALT 12 04/03/2021     Recent Labs     03/31/21  1545 03/31/21  1856 03/31/21  2048 04/01/21  0742 04/02/21  0812 04/03/21  0444   FERRITIN  --   --   --  428  --   --    TROPONINI 0.27* --  0.24*  --   --   --    DDIMER  --  1723  --   --   --   --    AST  --   --   --  15 20 15   ALT  --   --   --  12 13 12   TRIG  --   --   --  95  --   --      Lab Results   Component Value Date    CHOL 76 04/01/2021    TRIG 95 04/01/2021    HDL 22 04/01/2021    LDLCALC 35 04/01/2021    LABVLDL 19 04/01/2021     Lab Results   Component Value Date/Time    VITD25 24 (L) 10/08/2019 05:13 AM     Microbiology:   No results for input(s): COVID19 in the last 72 hours. Lab Results   Component Value Date    BC 24 Hours no growth 03/31/2021    BC 5 Days- no growth 10/03/2019    BLOODCULT2 24 Hours no growth 03/31/2021    BLOODCULT2 5 Days- no growth 10/03/2019        ASSESSMENT:  PT CAME IN WITH ABD PAIN AND DIARRHEA  SHE HAS LEUKOCYTOSIS/anemia  GIB/pancreatic mass?hemorrhagic/bowel/gi perforation   S/P RX UTI KLEBSIELLA /CEFDINIR from [facility]  CKD check bladder scan sanchez today   AAA seen by vasc    Plan:   · Continue meropenem (MERREM) 500 mg in sodium chloride 0.9 % 100 mL IVPB, Q12H  · fluconazole (DIFLUCAN) in 0.9 % sodium chloride IVPB 400 mg, Q24H  · Monitor labs-    · Follow-up cultures   · S/p  EGD/ colonoscopy           Imaging and labs were reviewed per medical records and any ID pertinent labs were addressed with the patient. The patient was educated about the diagnosis, prognosis, indications, risks and benefits of treatment. Pt and family had the opportunity to ask questions. All questions were answered. Thank you for involving me in the care of Jennifer Rodriguez. Please do not hesitate to call for any questions or concerns.     Electronically signed by Fanny Dyer MD on 4/3/2021 at 12:23 PM    Phone [phone]  Fax [phone]

## 2021-04-03 NOTE — PROGRESS NOTES
Spoke to Dr. Brooks Rodriguez, he was notified of the patient's BP, okay to discharge to the floor. No new orders.

## 2021-04-03 NOTE — PLAN OF CARE
Problem: Falls - Risk of:  Goal: Will remain free from falls  Description: Will remain free from falls  Outcome: Met This Shift     Problem: Falls - Risk of:  Goal: Absence of physical injury  Description: Absence of physical injury  Outcome: Met This Shift     Problem: Skin Integrity:  Goal: Will show no infection signs and symptoms  Description: Will show no infection signs and symptoms  Outcome: Met This Shift     Problem: Skin Integrity:  Goal: Absence of new skin breakdown  Description: Absence of new skin breakdown  Outcome: Met This Shift     Problem: Pain:  Goal: Pain level will decrease  Description: Pain level will decrease  Outcome: Met This Shift     Problem: Pain:  Goal: Control of acute pain  Description: Control of acute pain  Outcome: Met This Shift

## 2021-04-03 NOTE — PROGRESS NOTES
Room #:  5486/1885-27    Date: 4/3/2021       Patient Name: Sohail Sultana  : 1940      MRN: [id number]     Patient unavailable for physical therapy treatment due to off the floor for scope. Will attempt PT evaluation at a later time.      Sia November, PT

## 2021-04-03 NOTE — ANESTHESIA PRE PROCEDURE
Department of Anesthesiology  Preprocedure Note       Name:  Vivienne Gama   Age:  68 y.o.  :  1940                                          MRN:  [id number]         Date:  4/3/2021      Surgeon: Nelson Salas):  Sunil Morales DO    Procedure: Procedure(s):  EGD ESOPHAGOGASTRODUODENOSCOPY  COLONOSCOPY DIAGNOSTIC    Medications prior to admission:   Prior to Admission medications    Medication Sig Start Date End Date Taking? Authorizing Provider   apixaban (ELIQUIS) 2.5 MG TABS tablet Take 1 tablet by mouth 2 times daily 10/8/19   Kendra Alonzo DO   carvedilol (COREG) 6.25 MG tablet Take 1 tablet by mouth 2 times daily 10/8/19   Kendra Alonzo DO   furosemide (LASIX) 40 MG tablet Take 1 tablet by mouth daily 10/10/19   Kendra Alonzo DO   hydrALAZINE (APRESOLINE) 25 MG tablet Take 1 tablet by mouth every 8 hours 10/8/19   Kendra Alonzo DO   isosorbide mononitrate (IMDUR) 30 MG extended release tablet Take 1 tablet by mouth daily 10/9/19   Kendra Alonzo DO   vitamin D (ERGOCALCIFEROL) 22931 units CAPS capsule Take 50,000 Units by mouth every other day     Historical Provider, MD   atorvastatin (LIPITOR) 40 MG tablet Take 1 tablet by mouth nightly 7/28/15   Kendra Alonzo DO       Current medications:    No current facility-administered medications for this visit. No current outpatient medications on file.      Facility-Administered Medications Ordered in Other Visits   Medication Dose Route Frequency Provider Last Rate Last Admin    0.9 % sodium chloride infusion   Intravenous PRN Kalen Limon DO        bumetanide (BUMEX) 12.5 mg in sodium chloride 0.9 % 125 mL infusion  1 mg/hr Intravenous Continuous Stephani Gomez MD 10 mL/hr at 21 0539 1 mg/hr at 2139    sodium bicarbonate tablet 650 mg  650 mg Oral 4x Daily Stephani Gomez MD   650 mg at 21 2242    0.9 % sodium chloride infusion   Intravenous PRN Lacy Garcia DO        0.9 % sodium chloride infusion   Intravenous PRN Kendra Alonzo DO        atorvastatin (LIPITOR) tablet 40 mg  40 mg Oral Nightly Lorenzo Emperor, DO   40 mg at 04/02/21 2243    carvedilol (COREG) tablet 6.25 mg  6.25 mg Oral BID Lorenzo Emperor, DO   6.25 mg at 04/02/21 2242    hydrALAZINE (APRESOLINE) tablet 25 mg  25 mg Oral 3 times per day Lorenzo Emperor, DO   25 mg at 04/03/21 0538    isosorbide mononitrate (IMDUR) extended release tablet 30 mg  30 mg Oral Daily Lorenzo Emperor, DO   30 mg at 04/02/21 0906    [START ON 4/7/2021] vitamin D (ERGOCALCIFEROL) capsule 50,000 Units  50,000 Units Oral Weekly Lorenzo Emperor, DO        albuterol (PROVENTIL) nebulizer solution 2.5 mg  2.5 mg Nebulization Q6H PRN Lorenzo Emperor, DO        ondansetron [facility]) injection 4 mg  4 mg Intravenous Q6H PRN Lorenzo Emperor, DO        promethazine (PHENERGAN) injection 25 mg  25 mg Intravenous Q6H PRN Lorenzo Emperor, DO        [Held by provider] 0.9 % sodium chloride infusion   Intravenous Continuous Lorenzo Emperor,  mL/hr at 04/02/21 0337 New Bag at 04/02/21 0337    pantoprazole (PROTONIX) 80 mg in sodium chloride 0.9 % 100 mL infusion  8 mg/hr Intravenous Continuous Lorenzo Emperor, DO 10 mL/hr at 04/03/21 0114 8 mg/hr at 04/03/21 0114    morphine (PF) injection 2 mg  2 mg Intravenous Q4H PRN Lorenzo Emperor, DO   2 mg at 03/31/21 1802    meropenem (MERREM) 500 mg in sodium chloride 0.9 % 100 mL IVPB  500 mg Intravenous Q12H Rody Knight MD   Stopped at 04/03/21 0400    fluconazole (DIFLUCAN) in 0.9 % sodium chloride IVPB 400 mg  400 mg Intravenous Q24H Justin Chambers  mL/hr at 04/02/21 1713 400 mg at 04/02/21 1713       Allergies:  No Known Allergies    Problem List:    Patient Active Problem List   Diagnosis Code    Shortness of breath R06.02    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    Chronic renal insufficiency, stage IV (severe) (HCC) N18.4    Atrial fibrillation (HCC) I48.91    Hypertension I10    Abnormal chest x-ray R93.89    Anemia of chronic disease D63.8    Opacity of lung on imaging study R91.8    Cigarette smoker F17.210    Tobacco abuse counseling Z71.6    Acute on chronic combined systolic and diastolic CHF (congestive heart failure) ([facility]) I50.43    Acute on chronic congestive heart failure ([facility]) K08.0    Acute systolic CHF (congestive heart failure) ([facility]) I50.21    GI bleed K92.2    Preoperative cardiovascular examination Z01.810    Bowel perforation ([facility]) K63.1       Past Medical History:        Diagnosis Date    Arthritis     Atrial fibrillation ([facility] [address]     Blood circulation, collateral     CHF (congestive heart failure) ([facility])     Chronic renal insufficiency, stage IV (severe) ([facility] [address] 11/19/2016    History of cardiovascular stress test 07/2015    Lexiscan stress test    History of echocardiogram 07/27/2015    EF 29%    Hyperlipidemia     Hypertension        Past Surgical History:        Procedure Laterality Date    ECTOPIC PREGNANCY SURGERY         Social History:    Social History     Tobacco Use    Smoking status: Current Every Day Smoker     Packs/day: 0.50    Smokeless tobacco: Never Used   Substance Use Topics    Alcohol use: Not Currently     Comment: socially                                Ready to quit: Not Answered  Counseling given: Not Answered      Vital Signs (Current): There were no vitals filed for this visit.                                            BP Readings from Last 3 Encounters:   04/03/21 (!) 131/91   10/08/19 (!) 139/90   06/03/19 (!) 153/90       NPO Status:                                                                                 BMI:   Wt Readings from Last 3 Encounters:   04/03/21 140 lb 8 oz (63.7 kg)   10/08/19 116 lb 14.4 oz (53 kg)   06/03/19 130 lb (59 kg)     There is no height or weight on file to calculate BMI.    CBC:   Lab Results   Component Value Date    WBC 10.2 04/03/2021    RBC 2.79 04/03/2021    HGB 8.7 04/03/2021    HCT 27.0 04/03/2021    MCV 96.8 04/03/2021    RDW 20.8 04/03/2021     04/03/2021       CMP:   Lab Results   Component Value Date     04/03/2021    K 3.7 04/03/2021    K 3.9 03/31/2021     04/03/2021    CO2 16 04/03/2021    BUN 40 04/03/2021    CREATININE 3.4 04/03/2021    GFRAA 16 04/03/2021    LABGLOM 16 04/03/2021    GLUCOSE 110 04/03/2021    GLUCOSE 102 11/17/2010    PROT 5.3 04/03/2021    CALCIUM 7.9 04/03/2021    BILITOT 0.5 04/03/2021    ALKPHOS 103 04/03/2021    AST 15 04/03/2021    ALT 12 04/03/2021       POC Tests: No results for input(s): POCGLU, POCNA, POCK, POCCL, POCBUN, POCHEMO, POCHCT in the last 72 hours. Coags:   Lab Results   Component Value Date    PROTIME 20.7 03/31/2021    INR 1.8 03/31/2021    APTT 29.4 03/31/2021       HCG (If Applicable): No results found for: PREGTESTUR, PREGSERUM, HCG, HCGQUANT     ABGs:   Lab Results   Component Value Date    PO2ART 82.0 10/03/2019    BXE2HGJ 34.1 10/03/2019    DZN9BEU 17.3 10/03/2019        Type & Screen (If Applicable):  No results found for: LABABO, LABRH    Drug/Infectious Status (If Applicable):  No results found for: HIV, HEPCAB    COVID-19 Screening (If Applicable): No results found for: COVID19        Anesthesia Evaluation  Patient summary reviewed  Airway: Mallampati: IV  TM distance: <3 FB   Neck ROM: limited  Mouth opening: < 3 FB Dental:    (+) upper dentures, lower dentures and edentulous      Pulmonary: breath sounds clear to auscultation  (+) shortness of breath: new,  current smoker                           Cardiovascular:    (+) hypertension:, dysrhythmias: atrial fibrillation, CHF: systolic,       ECG reviewed  Rhythm: irregular  Rate: abnormal  Echocardiogram reviewed         Beta Blocker:  Dose within 24 Hrs      ROS comment: EKG: Atrial Fibrillation, Rt Bundle Branch Block, Left posterior Winston block. ECHO:   Mildly dilated left ventricle. Moderate asymmetric septal hypertrophy. Ejection fraction is measured at 28%.    No evidence of left ventricular mass or thrombus noted. Posterior wall akinetic. Basal to mid-inferior wall akinetic. The left atrium is mildly dilated. Interatrial septum appears intact. No evidence of thrombus within left atrium. Mildly dilated right ventricle. No evidence of a thrombus in the right ventricle. Mildly enlarged right atrium size. Moderate mitral regurgitation is present. No evidence of mitral valve stenosis. Physiologic and/or trace tricuspid regurgitation. Regular rhythm. Neuro/Psych:               GI/Hepatic/Renal:   (+) renal disease: CRI,           Endo/Other:    (+) blood dyscrasia: anemia:., .                 Abdominal:           Vascular:                                          Anesthesia Plan      MAC     ASA 4     (GI bleed HGB 8.6)  Induction: intravenous. Anesthetic plan and risks discussed with patient. Plan discussed with CRNA. Attending anesthesiologist reviewed and agrees with Pre Eval content      Chart review pt not in room. Final disposition by attending anesthesiologist.        Fabrizio Sorensen MD   4/3/2021

## 2021-04-03 NOTE — PROGRESS NOTES
Last data filed at 4/3/2021 1413  Gross per 24 hour   Intake 850 ml   Output 1900 ml   Net -1050 ml   I/O last 3 completed shifts: In: 830 [P.O.:480; Blood:350]  Out: 1150 [NQNUW:1361]  Patient Vitals for the past 96 hrs (Last 3 readings):   Weight   04/03/21 0352 140 lb 8 oz (63.7 kg)   03/31/21 1039 134 lb 8 oz (61 kg)     Vital Signs:   Blood pressure (!) 140/86, pulse 101, temperature 97.3 °F (36.3 °C), temperature source Axillary, resp. rate 18, height 5' 5\" (1.651 m), weight 140 lb 8 oz (63.7 kg), SpO2 95 %. General appearance:  Awake and alert. She is feeling much better today and appears clinically improved as well. Head:  Normocephalic. No masses, lesions or tenderness. Eyes:  PERRLA. EOMI. Sclera clear. Impaired vision. ENT:  Ears normal. Mucosa normal.  Nasal cannula oxygen is in place. impaired hearing. Neck:    Supple. Trachea midline. No thyromegaly. No JVD. No bruits. Heart:    Irregularly irregular rate and rhythm. Atrial fibrillation on the monitor. Rate controlled. Mild systolic murmur. Lungs:    Diminished at the bases posteriorly. No overt wheezes or rales. Abdomen:   Soft. Very mildly tender to palpation with some localization to the epigastric and left upper quadrants. Extremities:    Peripheral pulses present. 2+ pitting edema the bilateral lower extremities. Swelling noted of the right upper extremity-IV infiltrate. Neurologic:    Alert x 3. No focal deficit. Cranial nerves grossly intact. No focal weakness. Psych:   Behavior is normal. Mood appears normal. Speech is not rapid and/or pressured. Musculoskeletal:   Spine ROM normal. Muscular strength intact. Gait not assessed. Integumentary:  No rashes  Skin normal color and texture.   Genitalia/Breast:  Deferred    Medication:  Scheduled Meds:   [START ON 4/4/2021] pantoprazole  40 mg Oral QAM AC    chlorothiazide  500 mg Intravenous BID    sodium bicarbonate  650 mg Oral 4x Daily    atorvastatin  40 mg Oral Nightly    carvedilol  6.25 mg Oral BID    hydrALAZINE  25 mg Oral 3 times per day    isosorbide mononitrate  30 mg Oral Daily    [START ON 4/7/2021] vitamin D  50,000 Units Oral Weekly    meropenem (MERREM) IVPB  500 mg Intravenous Q12H    fluconazole  400 mg Intravenous Q24H     Continuous Infusions:   bumetanide 0.1 mg/mL infusion 1 mg/hr (04/03/21 1426)    sodium chloride      sodium chloride         Objective Data:  CBC with Differential:    Lab Results   Component Value Date    WBC 10.2 04/03/2021    RBC 2.79 04/03/2021    HGB 8.7 04/03/2021    HCT 27.0 04/03/2021     04/03/2021    MCV 96.8 04/03/2021    MCH 31.2 04/03/2021    MCHC 32.2 04/03/2021    RDW 20.8 04/03/2021    NRBC 0.9 03/31/2021    LYMPHOPCT 5.2 04/03/2021    MONOPCT 3.5 04/03/2021    MYELOPCT 0.9 04/03/2021    BASOPCT 0.2 04/03/2021    MONOSABS 0.41 04/03/2021    LYMPHSABS 0.51 04/03/2021    EOSABS 0.00 04/03/2021    BASOSABS 0.00 04/03/2021     CMP:    Lab Results   Component Value Date     04/03/2021    K 3.7 04/03/2021    K 3.9 03/31/2021     04/03/2021    CO2 16 04/03/2021    BUN 40 04/03/2021    CREATININE 3.4 04/03/2021    GFRAA 16 04/03/2021    LABGLOM 16 04/03/2021    GLUCOSE 110 04/03/2021    GLUCOSE 102 11/17/2010    PROT 5.3 04/03/2021    LABALBU 2.3 04/03/2021    CALCIUM 7.9 04/03/2021    BILITOT 0.5 04/03/2021    ALKPHOS 103 04/03/2021    AST 15 04/03/2021    ALT 12 04/03/2021       Assessment:  1. 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 Acute gastrointestinal bleeding with hematochezia with endoscopic findings of Dieulafoy lesion with adherent clot and active oozing and multiple polyps. 3. Acute on chronic macrocytic normochromic anemia with some component of blood loss  4. Multiple esophageal diverticulum found on upper endoscopic examination. 5. Antral gastritis  6. Chronic kidney disease stage IV  7. Generalized edema  8.  Right upper extremity swelling-secondary to IV infiltrate  9. Abdominal aortic aneurysm toward the bifurcation measuring up to 3.7 x 4.8 cm a component of chronic dissection appearance  10. Chronic compensated systolic and diastolic congestive heart failure  11. Paroxysmal atrial fibrillation on chronic anticoagulation with Eliquis  12. Essential hypertension    Plan:   Patient tolerated upper and lower endoscopic examination earlier today. Patient was found to have dieulafoy lesion with adherent clot and actively oozing for which hemostasis was achieved. Patient also had small and widely scattered diverticuli in the sigmoid colon, 3 mm flat polyp at the hepatic flexure, few small less than 3 mm flat polyps located in the left colon. Upper endoscopic examination revealed multiple esophageal diverticulum and antral gastritis. No reported hematochezia today. General diet as per GI. PPI daily for 8 weeks. Will need outpatient follow-up with GI. Increase activity as tolerated. Discharge planning was discussed. The patient's daughter indicates that she will be here from [place] with the patient for a little while. Has concerned about the patient's weakness and deconditioning. Question placement versus home. Discussed that patient is not quite ready for discharge therefore will have PT/OT continue to evaluate and treat the patient. Will await further recommendations on Monday. Both patient and daughter agree. Labs as ordered. Continue current therapy. See orders for further plan of care. More than 50% of my  time was spent at the bedside counseling/coordinating care with the patient and/or family with face to face contact. This time was spent reviewing notes and laboratory data as well as instructing and counseling the patient. Time I spent with the family or surrogate(s) is included only if the patient was incapable of providing the necessary information or participating in medical decisions.  I also discussed the differential diagnosis and all of the proposed management plans with the patient and individuals accompanying the patient. Roise Overall requires this high level of physician care and nursing on the IMC/Telemetry unit due the complexity of decision management and chance of rapid decline or death. Continued cardiac monitoring and higher level of nursing are required. I am readily available for any further decision-making and intervention. The patient was seen, examined and then discussed with Dr. Lakeisha Jimenez. MADELAINE Prajapati - CNP,  4/3/2021  2:36 PM       I saw and evaluated the patient. I agree with the findings and the plan of care as documented in Yoko Manning NP-C's  note.     Tia Parsons D.O., FACOI  3:16 PM  4/3/2021

## 2021-04-03 NOTE — PLAN OF CARE
Problem: Falls - Risk of:  Goal: Will remain free from falls  Description: Will remain free from falls  4/3/2021 1102 by Olivia Melchor RN  Outcome: Met This Shift  4/3/2021 0336 by Radha Cronin RN  Outcome: Met This Shift  Goal: Absence of physical injury  Description: Absence of physical injury  4/3/2021 1102 by Olivia Melchor RN  Outcome: Met This Shift  4/3/2021 0336 by Radha Cronin RN  Outcome: Met This Shift     Problem: Skin Integrity:  Goal: Will show no infection signs and symptoms  Description: Will show no infection signs and symptoms  4/3/2021 1102 by Olivia Melchor RN  Outcome: Met This Shift  4/3/2021 0336 by Radha Cronin RN  Outcome: Met This Shift  Goal: Absence of new skin breakdown  Description: Absence of new skin breakdown  4/3/2021 1102 by Olivia Melchor RN  Outcome: Met This Shift  4/3/2021 0336 by Radha Cronin RN  Outcome: Met This Shift     Problem: Pain:  Goal: Pain level will decrease  Description: Pain level will decrease  4/3/2021 1102 by Olivia Melchor RN  Outcome: Met This Shift  4/3/2021 0336 by Radha Cronin RN  Outcome: Met This Shift  Goal: Control of acute pain  Description: Control of acute pain  4/3/2021 1102 by Olivia Melchor RN  Outcome: Met This Shift  4/3/2021 0336 by Radha Cronin RN  Outcome: Met This Shift  Goal: Control of chronic pain  Description: Control of chronic pain  4/3/2021 1102 by Olivia Melchor RN  Outcome: Met This Shift  4/3/2021 0336 by Radha Cronin RN  Outcome: Met This Shift

## 2021-04-03 NOTE — ANESTHESIA POSTPROCEDURE EVALUATION
Department of Anesthesiology  Postprocedure Note    Patient: Julian Winn  MRN: [id number]  [place]: 1940  Date of evaluation: 4/3/2021  Time:  9:30 AM     Procedure Summary     Date: 04/03/21 Room / Location: [facility] 01 / [address] [facility]    Anesthesia Start: 3324 Anesthesia Stop: 0908    Procedures:       EGD ESOPHAGOGASTRODUODENOSCOPY (N/A )      COLONOSCOPY DIAGNOSTIC (N/A ) Diagnosis: (GI BLEED)    Surgeons: Bria Altman DO Responsible Provider: Valencia Heath MD    Anesthesia Type: MAC ASA Status: 4          Anesthesia Type: MAC    Meghan Phase I:      Meghan Phase II: Meghan Score: 9    Last vitals: Reviewed and per EMR flowsheets.        Anesthesia Post Evaluation    Patient location during evaluation: PACU  Patient participation: complete - patient participated  Level of consciousness: awake  Pain score: 0  Airway patency: patent  Nausea & Vomiting: no nausea  Complications: no  Cardiovascular status: blood pressure returned to baseline  Respiratory status: acceptable  Hydration status: euvolemic

## 2021-04-03 NOTE — PROGRESS NOTES
Physical Therapy Initial Evaluation    Room #:  0430/0430-01  Patient Name: Robyn Paredes  YOB: 1940  MRN: [id number]    Referring Provider:   Jennifer Bates DO      Date of Service: 4/3/2021    Evaluating Physical Therapist: Paula Elizabeth, PT #[id number]      Diagnosis:   GI bleed [K92.2]       Patient Active Problem List   Diagnosis    Shortness of breath    Chronic combined systolic and diastolic congestive heart failure ([address]    Chronic renal insufficiency, stage IV (severe) (HCC)    Atrial fibrillation ([address]    Hypertension    Abnormal chest x-ray    Anemia of chronic disease    Opacity of lung on imaging study    Cigarette smoker    Tobacco abuse counseling    Acute on chronic combined systolic and diastolic CHF (congestive heart failure) ([address]    Acute on chronic congestive heart failure (HCC)    Acute systolic CHF (congestive heart failure) ([address]    GI bleed    Preoperative cardiovascular examination    Bowel perforation ([address]       Tentative placement recommendation: Subacute rehab  Equipment recommendation: To be determined      Prior Level of Function: Patient ambulated independently   Rehab Potential: good  for baseline    Past medical history:   Past Medical History:   Diagnosis Date    Arthritis     Atrial fibrillation ([address]     Blood circulation, collateral     CHF (congestive heart failure) ([address]     Chronic renal insufficiency, stage IV (severe) ([address] 11/19/2016    History of cardiovascular stress test 07/2015    Lexiscan stress test    History of echocardiogram 07/27/2015    EF 29%    Hyperlipidemia     Hypertension      Past Surgical History:   Procedure Laterality Date    ECTOPIC PREGNANCY SURGERY         Precautions:  Activity as tolerated, falls and alarm , encouragement required for participation, 4L of O2 nc (wears 4L of O2 at  home as well)    SUBJECTIVE:    Social history: Patient lives alone  in a ranch home  with No steps  to enter Tub shower grab bars Equipment owned: Chloé Barth,      [address] [facility],[address] Mobility Inpatient   How much difficulty turning over in bed?: A Little  How much difficulty sitting down on / standing up from a chair with arms?: A Little  How much difficulty moving from lying on back to sitting on side of bed?: A Little  How much help from another person moving to and from a bed to a chair?: A Little  How much help from another person needed to walk in hospital room?: A Lot  How much help from another person for climbing 3-5 steps with a railing?: A Lot  AM-PAC Inpatient Mobility Raw Score : 16  AM-PAC Inpatient T-Scale Score : 40.78  Mobility Inpatient CMS 0-100% Score: 54.16  Mobility Inpatient CMS G-Code Modifier : CK    Nursing cleared patient for PT evaluation. The admitting diagnosis and active problem list as listed above have been reviewed prior to the initiation of this evaluation. Nurse reports patient just had scope done this morning and is very tired but okay for therapy to work with her. OBJECTIVE;   Initial Evaluation  Date: 4/3/2021 Treatment Date:     Short Term/ Long Term   Goals   Was pt agreeable to Eval/treatment? Yes   To be met in 4 days   Pain level   10/10  Stomach      Bed Mobility    Rolling: Supervision    Supine to sit: Minimal assist of 1   Sit to supine: Minimal assist of 1   Scooting: Minimal assist of 1   Rolling: Independent   Supine to sit: Independent   Sit to supine: Independent   Scooting: Independent    Transfers Sit to stand: Not assessed  patient refusing to stand today to due pain and fatigue.    Sit to stand: Independent    Ambulation    not assessed    50 feet using  wheeled walker with Modified Independent    ROM Within functional limits       Strength BUE:  4/5  RLE:  4/5  LLE:  4/5  Increase strength in affected mm groups by 1/3 grade   Balance Sitting EOB:  fair +  Dynamic Standing:  not assessed   Sitting EOB:  good   Dynamic Standing: good      Patient is Alert & Oriented x person, place, time and situation and follows directions   Sensation:  Patient  denies numbness and tingling     Edema:  no   Endurance: fair  -, due to pain and fatigue     Vitals: 4 liters nasal cannula   Blood Pressure at rest  Blood Pressure during session    Heart Rate at rest  Heart Rate during session    SPO2 at rest 98%  SPO2 during session 90-95%     Patient education  Patient educated on role of Physical Therapy, risks of immobility, safety and plan of care, energy conservation, importance of positional changes for oxygen exchange,  importance of mobility while in hospital , ankle pumps, quad set and glut set for edema control, blood clot prevention and safety      Patient response to education:   Pt verbalized understanding Pt demonstrated skill Pt requires further education in this area   Yes Partial Yes      Treatment:  Patient practiced and was instructed/facilitated in the following treatment: Patient required maximal encouragement to participate. Sat edge of bed 10 minutes with Supervision  to increase dynamic sitting balance and activity tolerance. Patient refused to stand, states fear of falling. Patient performed exercises EOB. Therapeutic Exercises:  ankle pumps, long arc quad and seated marching  x 10 reps. At end of session, patient in bed with alarm and daughter present call light and phone within reach,  all lines and tubes intact, nursing notified. Patient would benefit from continued skilled Physical Therapy to improve functional independence and quality of life. Patient's/ family goals   none stated      ASSESSMENT: Patient exhibits decreased strength, balance, endurance and pain in stomach  impairing functional mobility, transfers, gait distance and tolerance to activity.       Plan of Care:     -Sitting Balance: Incorporate reaching activities to activate trunk muscles   -Standing Balance: Perform strengthening exercises in standing to promote motor

## 2021-04-04 LAB
ALBUMIN SERPL-MCNC: 2.4 G/DL (ref 3.5–5.2)
ALP BLD-CCNC: 111 U/L (ref 35–104)
ALT SERPL-CCNC: 10 U/L (ref 0–32)
ANION GAP SERPL CALCULATED.3IONS-SCNC: 13 MMOL/L (ref 7–16)
ANISOCYTOSIS: ABNORMAL
AST SERPL-CCNC: 16 U/L (ref 0–31)
BASOPHILS ABSOLUTE: 0 E9/L (ref 0–0.2)
BASOPHILS RELATIVE PERCENT: 0 % (ref 0–2)
BILIRUB SERPL-MCNC: 0.5 MG/DL (ref 0–1.2)
BLOOD BANK DISPENSE STATUS: NORMAL
BLOOD BANK PRODUCT CODE: NORMAL
BPU ID: NORMAL
BUN BLDV-MCNC: 37 MG/DL (ref 8–23)
CALCIUM SERPL-MCNC: 8 MG/DL (ref 8.6–10.2)
CHLORIDE BLD-SCNC: 113 MMOL/L (ref 98–107)
CO2: 18 MMOL/L (ref 22–29)
CREAT SERPL-MCNC: 3.3 MG/DL (ref 0.5–1)
DESCRIPTION BLOOD BANK: NORMAL
EOSINOPHIL, URINE: 0 % (ref 0–1)
EOSINOPHILS ABSOLUTE: 0 E9/L (ref 0.05–0.5)
EOSINOPHILS RELATIVE PERCENT: 0 % (ref 0–6)
GFR AFRICAN AMERICAN: 16
GFR NON-AFRICAN AMERICAN: 16 ML/MIN/1.73
GLUCOSE BLD-MCNC: 115 MG/DL (ref 74–99)
HCT VFR BLD CALC: 28.8 % (ref 34–48)
HEMOGLOBIN: 9.4 G/DL (ref 11.5–15.5)
LYMPHOCYTES ABSOLUTE: 0.96 E9/L (ref 1.5–4)
LYMPHOCYTES RELATIVE PERCENT: 9 % (ref 20–42)
MCH RBC QN AUTO: 31.2 PG (ref 26–35)
MCHC RBC AUTO-ENTMCNC: 32.6 % (ref 32–34.5)
MCV RBC AUTO: 95.7 FL (ref 80–99.9)
MONOCYTES ABSOLUTE: 0.75 E9/L (ref 0.1–0.95)
MONOCYTES RELATIVE PERCENT: 7 % (ref 2–12)
NEUTROPHILS ABSOLUTE: 8.99 E9/L (ref 1.8–7.3)
NEUTROPHILS RELATIVE PERCENT: 84 % (ref 43–80)
PDW BLD-RTO: 20.5 FL (ref 11.5–15)
PLATELET # BLD: 285 E9/L (ref 130–450)
PMV BLD AUTO: 11 FL (ref 7–12)
POTASSIUM SERPL-SCNC: 3.8 MMOL/L (ref 3.5–5)
RBC # BLD: 3.01 E12/L (ref 3.5–5.5)
SODIUM BLD-SCNC: 144 MMOL/L (ref 132–146)
TOTAL PROTEIN: 5.5 G/DL (ref 6.4–8.3)
URINE CULTURE, ROUTINE: NORMAL
WBC # BLD: 10.7 E9/L (ref 4.5–11.5)

## 2021-04-04 PROCEDURE — 2500000003 HC RX 250 WO HCPCS: Performed by: INTERNAL MEDICINE

## 2021-04-04 PROCEDURE — 36415 COLL VENOUS BLD VENIPUNCTURE: CPT

## 2021-04-04 PROCEDURE — 6370000000 HC RX 637 (ALT 250 FOR IP): Performed by: INTERNAL MEDICINE

## 2021-04-04 PROCEDURE — 1200000000 HC SEMI PRIVATE

## 2021-04-04 PROCEDURE — 80053 COMPREHEN METABOLIC PANEL: CPT

## 2021-04-04 PROCEDURE — 87205 SMEAR GRAM STAIN: CPT

## 2021-04-04 PROCEDURE — 2580000003 HC RX 258: Performed by: INTERNAL MEDICINE

## 2021-04-04 PROCEDURE — 6370000000 HC RX 637 (ALT 250 FOR IP): Performed by: SPECIALIST

## 2021-04-04 PROCEDURE — 85025 COMPLETE CBC W/AUTO DIFF WBC: CPT

## 2021-04-04 PROCEDURE — 6360000002 HC RX W HCPCS: Performed by: SURGERY

## 2021-04-04 PROCEDURE — 6370000000 HC RX 637 (ALT 250 FOR IP): Performed by: STUDENT IN AN ORGANIZED HEALTH CARE EDUCATION/TRAINING PROGRAM

## 2021-04-04 PROCEDURE — 2580000003 HC RX 258: Performed by: SURGERY

## 2021-04-04 PROCEDURE — 6360000002 HC RX W HCPCS: Performed by: INTERNAL MEDICINE

## 2021-04-04 PROCEDURE — P9047 ALBUMIN (HUMAN), 25%, 50ML: HCPCS | Performed by: INTERNAL MEDICINE

## 2021-04-04 RX ORDER — FLUCONAZOLE 100 MG/1
100 TABLET ORAL DAILY
Status: DISCONTINUED | OUTPATIENT
Start: 2021-04-04 | End: 2021-04-11

## 2021-04-04 RX ORDER — ALBUMIN (HUMAN) 12.5 G/50ML
25 SOLUTION INTRAVENOUS EVERY 12 HOURS
Status: COMPLETED | OUTPATIENT
Start: 2021-04-04 | End: 2021-04-06

## 2021-04-04 RX ADMIN — BUMETANIDE 1 MG/HR: 0.25 INJECTION INTRAMUSCULAR; INTRAVENOUS at 05:35

## 2021-04-04 RX ADMIN — FLUCONAZOLE 100 MG: 100 TABLET ORAL at 16:35

## 2021-04-04 RX ADMIN — HYDRALAZINE HYDROCHLORIDE 25 MG: 25 TABLET, FILM COATED ORAL at 22:12

## 2021-04-04 RX ADMIN — ATORVASTATIN CALCIUM 40 MG: 40 TABLET, FILM COATED ORAL at 22:12

## 2021-04-04 RX ADMIN — HYDRALAZINE HYDROCHLORIDE 25 MG: 25 TABLET, FILM COATED ORAL at 13:07

## 2021-04-04 RX ADMIN — BUMETANIDE 1 MG/HR: 0.25 INJECTION INTRAMUSCULAR; INTRAVENOUS at 16:35

## 2021-04-04 RX ADMIN — SODIUM BICARBONATE 650 MG: 650 TABLET ORAL at 08:39

## 2021-04-04 RX ADMIN — PANTOPRAZOLE SODIUM 40 MG: 40 TABLET, DELAYED RELEASE ORAL at 05:35

## 2021-04-04 RX ADMIN — MEROPENEM 500 MG: 500 INJECTION, POWDER, FOR SOLUTION INTRAVENOUS at 03:40

## 2021-04-04 RX ADMIN — CHLOROTHIAZIDE SODIUM 500 MG: 500 INJECTION, POWDER, LYOPHILIZED, FOR SOLUTION INTRAVENOUS at 22:11

## 2021-04-04 RX ADMIN — SODIUM BICARBONATE 650 MG: 650 TABLET ORAL at 13:07

## 2021-04-04 RX ADMIN — SODIUM BICARBONATE 650 MG: 650 TABLET ORAL at 16:35

## 2021-04-04 RX ADMIN — MEROPENEM 500 MG: 500 INJECTION, POWDER, FOR SOLUTION INTRAVENOUS at 14:28

## 2021-04-04 RX ADMIN — SODIUM BICARBONATE 650 MG: 650 TABLET ORAL at 22:12

## 2021-04-04 RX ADMIN — CHLOROTHIAZIDE SODIUM 500 MG: 500 INJECTION, POWDER, LYOPHILIZED, FOR SOLUTION INTRAVENOUS at 09:23

## 2021-04-04 RX ADMIN — ALBUMIN (HUMAN) 25 G: 0.25 INJECTION, SOLUTION INTRAVENOUS at 15:35

## 2021-04-04 RX ADMIN — CARVEDILOL 6.25 MG: 6.25 TABLET, FILM COATED ORAL at 22:12

## 2021-04-04 RX ADMIN — HYDRALAZINE HYDROCHLORIDE 25 MG: 25 TABLET, FILM COATED ORAL at 05:35

## 2021-04-04 RX ADMIN — ISOSORBIDE MONONITRATE 30 MG: 30 TABLET, EXTENDED RELEASE ORAL at 08:39

## 2021-04-04 RX ADMIN — CARVEDILOL 6.25 MG: 6.25 TABLET, FILM COATED ORAL at 08:39

## 2021-04-04 ASSESSMENT — PAIN DESCRIPTION - LOCATION: LOCATION: ABDOMEN

## 2021-04-04 ASSESSMENT — PAIN SCALES - GENERAL: PAINLEVEL_OUTOF10: 2

## 2021-04-04 ASSESSMENT — PAIN DESCRIPTION - ORIENTATION: ORIENTATION: RIGHT;LEFT;LOWER;MID;OUTER

## 2021-04-04 NOTE — PROGRESS NOTES
PROGRESS NOTE    By Aye Sweet D.O GI Fellow    [facility]  Dr. Lula Li MD, Dr. Jan Guthrie MD, Dr Gunner Lowry, Dr. Karla Ortega MD, Dr. James Melgoza, DO Anabell Gonzalez  68 y.o.  female    SUBJECTIVE:  Patient seen and examined today at bedside. No overt bleeding. Tolerating diet. Still with leg swelling and SOB at rest. Overall improved. OBJECTIVE:    BP (!) 155/87   Pulse 90   Temp 98.2 °F (36.8 °C) (Oral)   Resp 18   Ht 5' 5\" (1.651 m)   Wt 135 lb (61.2 kg)   SpO2 96%   BMI 22.47 kg/m²     Gen: NAD, AAO x 3  HEENT:PEERL, no icterus  Heart: RRR, no M/R/G  Lungs: diminished bilateral bases posterior  Abd.: soft, NT, ND, BS +, no G/R, no HSM  Extr.: no C/C, + pitting edema bilaterally, no bruising      Stool (measured) : 0 mL  Lab Results   Component Value Date    WBC 10.7 04/04/2021    WBC 10.2 04/03/2021    WBC 10.7 04/02/2021    HGB 9.4 04/04/2021    HGB 9.0 04/03/2021    HGB 9.2 04/03/2021    HCT 28.8 04/04/2021    MCV 95.7 04/04/2021    RDW 20.5 04/04/2021     04/04/2021     04/03/2021     04/02/2021     Lab Results   Component Value Date     04/04/2021    K 3.8 04/04/2021    K 3.9 03/31/2021     04/04/2021    CO2 18 04/04/2021    BUN 37 04/04/2021    CREATININE 3.3 04/04/2021    CALCIUM 8.0 04/04/2021    PROT 5.5 04/04/2021    LABALBU 2.4 04/04/2021    BILITOT 0.5 04/04/2021    BILITOT 0.5 04/03/2021    BILITOT 0.4 04/02/2021    ALKPHOS 111 04/04/2021    ALKPHOS 103 04/03/2021    ALKPHOS 108 04/02/2021    AST 16 04/04/2021    AST 15 04/03/2021    AST 20 04/02/2021    ALT 10 04/04/2021    ALT 12 04/03/2021    ALT 13 04/02/2021     Lab Results   Component Value Date    LIPASE 40 03/31/2021     No results found for: AMYLASE      ASSESSMENT/PLAN:    1.  Hematochezia, acute blood loss anemia  - no overt GI bleeding, H/H stable, VSS  -EGD revealed multiple esophageal diverticuli, gastritis and duodenal bulb lesion 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 (biopsies pending of antrum and bulb lesion)  - colonoscopy revealed few small polyps s/p polypectomy and sigmoid dieulafoy lesion with adherent clot s/p clip/cautery and tattoo  - continue to follow H/H and monitor for signs of bleeding  - transfuse as needed  - diet as tolerated  - oral PPI once daily x 8 weeks  - follow up as outpatient    2. LUQ abdominal lesion, Pancreatic pseudocyst  - resolved  - likely related to pancreatic cyst  - continue supportive care  - recommend repeat MRI as outpatient    3. HFrEF, pAFib, volume overload, REYES/CKD  - appreciate cardiology/nephrology management and recommendations     Amaya Lion DO  4/4/2021  4:17 PM    I was present at bedside and performed my own exam.  I agree with the above findings and plan. EGD and Colonoscopy reviewed with pt and daughter. Pt with no further bleeding. EGD results and discussed results of duodenum bulb pending for possible GIST, NET, or brunner gland hyperplasia. Will d/w radiology doing MRCP no contrast given renal function. Pt had regular MRI. Need to see if stone in pancreatic duct vs inflammatory stricture. Pt would benefit from EUS but may be technically difficult and risky given the 4 esophageal diverticular pockets but will leave up to advanced endoscopist.  Alessandra chavira, NT, ND. Our service will follow.     DO Louie  4/4/2021  10:25 PM

## 2021-04-04 NOTE — PROGRESS NOTES
this shift:  In: 120 [P.O.:120]  Out: -     Intake/Output Summary (Last 24 hours) at 4/4/2021 1418  Last data filed at 4/4/2021 0819  Gross per 24 hour   Intake 240 ml   Output 3100 ml   Net -2860 ml   I/O last 3 completed shifts: In: 56 [P.O.:240; I.V.:250]  Out: 7886 [Urine:3850]  Patient Vitals for the past 96 hrs (Last 3 readings):   Weight   04/04/21 0533 135 lb (61.2 kg)   04/03/21 0352 140 lb 8 oz (63.7 kg)     Vital Signs:   Blood pressure (!) 155/87, pulse 90, temperature 98.2 °F (36.8 °C), temperature source Oral, resp. rate 18, height 5' 5\" (1.651 m), weight 135 lb (61.2 kg), SpO2 96 %. General appearance:  Awake and alert. She is feeling much better today and appears clinically improved as well. Head:  Normocephalic. No masses, lesions or tenderness. Eyes:  PERRLA. EOMI. Sclera clear. Impaired vision. ENT:  Ears normal. Mucosa normal.  Nasal cannula oxygen is in place. impaired hearing. Neck:    Supple. Trachea midline. No thyromegaly. No JVD. No bruits. Heart:    Irregularly irregular rate and rhythm. Atrial fibrillation on the monitor. Rate controlled. Mild systolic murmur. Lungs:    Diminished at the bases posteriorly. No overt wheezes or rales. Abdomen:   Soft. Very mildly tender to palpation with some localization to the epigastric and left upper quadrants. Extremities:    Peripheral pulses present. 2 to 3+ pitting edema the bilateral lower extremities. Swelling noted of the right upper extremity-IV infiltrate. Neurologic:    Alert x 3. No focal deficit. Cranial nerves grossly intact. No focal weakness. Psych:   Behavior is normal. Mood appears normal. Speech is not rapid and/or pressured. Musculoskeletal:   Spine ROM normal. Muscular strength intact. Gait not assessed. Integumentary:  No rashes  Skin normal color and texture.   Genitalia/Breast:  Deferred    Medication:  Scheduled Meds:   pantoprazole  40 mg Oral QAM AC    chlorothiazide  500 mg Intravenous BID    sodium bicarbonate  650 mg Oral 4x Daily    atorvastatin  40 mg Oral Nightly    carvedilol  6.25 mg Oral BID    hydrALAZINE  25 mg Oral 3 times per day    isosorbide mononitrate  30 mg Oral Daily    [START ON 4/7/2021] vitamin D  50,000 Units Oral Weekly    meropenem (MERREM) IVPB  500 mg Intravenous Q12H    fluconazole  400 mg Intravenous Q24H     Continuous Infusions:   bumetanide 0.1 mg/mL infusion 1 mg/hr (04/04/21 0535)    sodium chloride      sodium chloride         Objective Data:  CBC with Differential:    Lab Results   Component Value Date    WBC 10.7 04/04/2021    RBC 3.01 04/04/2021    HGB 9.4 04/04/2021    HCT 28.8 04/04/2021     04/04/2021    MCV 95.7 04/04/2021    MCH 31.2 04/04/2021    MCHC 32.6 04/04/2021    RDW 20.5 04/04/2021    NRBC 0.9 03/31/2021    LYMPHOPCT 9.0 04/04/2021    MONOPCT 7.0 04/04/2021    MYELOPCT 0.9 04/03/2021    BASOPCT 0.0 04/04/2021    MONOSABS 0.75 04/04/2021    LYMPHSABS 0.96 04/04/2021    EOSABS 0.00 04/04/2021    BASOSABS 0.00 04/04/2021     CMP:    Lab Results   Component Value Date     04/04/2021    K 3.8 04/04/2021    K 3.9 03/31/2021     04/04/2021    CO2 18 04/04/2021    BUN 37 04/04/2021    CREATININE 3.3 04/04/2021    GFRAA 16 04/04/2021    LABGLOM 16 04/04/2021    GLUCOSE 115 04/04/2021    GLUCOSE 102 11/17/2010    PROT 5.5 04/04/2021    LABALBU 2.4 04/04/2021    CALCIUM 8.0 04/04/2021    BILITOT 0.5 04/04/2021    ALKPHOS 111 04/04/2021    AST 16 04/04/2021    ALT 10 04/04/2021       Assessment:  1. 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 Acute gastrointestinal bleeding with hematochezia with endoscopic findings of Dieulafoy lesion with adherent clot and active oozing and multiple polyps. 3. Acute on chronic macrocytic normochromic anemia with some component of blood loss  4. Multiple esophageal diverticulum found on upper endoscopic examination. 5. Antral gastritis  6.  Chronic kidney disease stage IV  7. Generalized edema  8. Right upper extremity swelling-secondary to IV infiltrate  9. Abdominal aortic aneurysm toward the bifurcation measuring up to 3.7 x 4.8 cm a component of chronic dissection appearance  10. Chronic compensated systolic and diastolic congestive heart failure  11. Paroxysmal atrial fibrillation on chronic anticoagulation with Eliquis  12. Essential hypertension    Plan:   No further hematochezia since procedure. Patient did admit to mild normal discomfort. Continue general diet. Recommendation as per GI is to continue PPI daily for 8 weeks. Will need outpatient follow-up with GI. Nephrology is following and the patient remains on a Bumex drip. Patient did have approximately 3800 mL of urinary output over the past 24 hours. Patient does continue to have lower leg edema. Continue to treat and monitor. Antibiotics are at the discretion of infectious disease. Encourage the patient to increase activity as tolerated. Discharge planning-PT/OT to evaluate, treat and make recommendations in regard to home versus rehab at skilled nursing facility at discharge. The patient's daughter, Kranthi Killian, is in town from [place] and we did discuss discharge planning yesterday. She does have concerns that her mother may not be strong enough to come home and we did discuss with her also that PT/OT evaluation will be performed. Labs as ordered. Continue current therapy. See orders for further plan of care. More than 50% of my  time was spent at the bedside counseling/coordinating care with the patient and/or family with face to face contact. This time was spent reviewing notes and laboratory data as well as instructing and counseling the patient. Time I spent with the family or surrogate(s) is included only if the patient was incapable of providing the necessary information or participating in medical decisions.  I also discussed the differential diagnosis and all of the proposed management plans with the patient and individuals accompanying the patient. Ignacio Hampton requires this high level of physician care and nursing on the IMC/Telemetry unit due the complexity of decision management and chance of rapid decline or death. Continued cardiac monitoring and higher level of nursing are required. I am readily available for any further decision-making and intervention. The patient was seen, examined and then discussed with Dr. Shravan Galloway. Francia Saavedra, MADELAINE - CNP,  4/4/2021  2:18 PM       I saw and evaluated the patient. I agree with the findings and the plan of care as documented in Francia Saavedra NP-C's  note.     Lee Lal D.O., [facility]  3:00 PM  4/4/2021

## 2021-04-04 NOTE — PROGRESS NOTES
[facility] [employer] [facility]  [facility]  Progress Note    NAME: Enedina Carias  MR:  [id number]  :   1940  DATE OF SERVICE:21    This is a face to face encounter with Enedina Carias 68 y.o. female on 21  ID following for   Chief Complaint   Patient presents with    Abdominal Pain     to er via ems from home for evaluation of LLQ pain and rectal bleeding that started today.  Rectal Bleeding     SUBJECTIVE:  daughter present and updated  S/p egd/cscope  In chair  Sachin abd discomforrt      Patient is tolerating medications. No reported adverse drug reactions. Review of systems:  As stated above in the chief complaint, otherwise negative. Medications:  Scheduled Meds:   albumin human  25 g Intravenous Q12H    pantoprazole  40 mg Oral QAM AC    chlorothiazide  500 mg Intravenous BID    sodium bicarbonate  650 mg Oral 4x Daily    atorvastatin  40 mg Oral Nightly    carvedilol  6.25 mg Oral BID    hydrALAZINE  25 mg Oral 3 times per day    isosorbide mononitrate  30 mg Oral Daily    [START ON 2021] vitamin D  50,000 Units Oral Weekly    meropenem (MERREM) IVPB  500 mg Intravenous Q12H    fluconazole  400 mg Intravenous Q24H     Continuous Infusions:   bumetanide 0.1 mg/mL infusion 1 mg/hr (21 0535)    sodium chloride      sodium chloride       PRN Meds:sodium chloride, sodium chloride, albuterol, ondansetron, promethazine, morphine    OBJECTIVE:  BP (!) 155/87   Pulse 90   Temp 98.2 °F (36.8 °C) (Oral)   Resp 18   Ht 5' 5\" (1.651 m)   Wt 135 lb (61.2 kg)   SpO2 96%   BMI 22.47 kg/m²   Temp  Av.9 °F (36.6 °C)  Min: 97.8 °F (36.6 °C)  Max: 98.2 °F (36.8 °C)  Constitutional:  The patient is awake, alert,   Skin:     No rashes were noted. HEENT:      AT/NC   Chest:   No use of accessory muscles to breathe. Symmetrical expansion. Dec bs post   Cardiovascular:  S1 and S2 are rhythmic and regular.   Abdomen:   Soft Positive bowel sounds to 04/01/2021    HDL 22 04/01/2021    LDLCALC 35 04/01/2021    LABVLDL 19 04/01/2021     Lab Results   Component Value Date/Time    VITD25 24 (L) 10/08/2019 05:13 AM     Microbiology:   No results for input(s): COVID19 in the last 72 hours. Lab Results   Component Value Date    BC 24 Hours no growth 03/31/2021    BC 5 Days- no growth 10/03/2019    BLOODCULT2 24 Hours no growth 03/31/2021    BLOODCULT2 5 Days- no growth 10/03/2019        ASSESSMENT:  PT CAME IN WITH ABD PAIN AND DIARRHEA  SHE HAS LEUKOCYTOSIS/anemia better  GIB/pancreatic mass?hemorrhagic/bowel/gi perforation   S/P RX UTI KLEBSIELLA /CEFDINIR from [facility]  candiduria   CKD   AAA seen by vasc    Plan:   · Continue meropenem (MERREM) 500 mg in sodium chloride 0.9 % 100 mL IVPB, Q12H  · fluconazole (DIFLUCAN) in 0.9 % sodium chloride IVPB 400 mg, Q24H switch to orals  · Monitor labs-    · Follow-up cultures   · S/p  EGD/ colonoscopy           Imaging and labs were reviewed per medical records and any ID pertinent labs were addressed with the patient. The patient was educated about the diagnosis, prognosis, indications, risks and benefits of treatment. Pt and family had the opportunity to ask questions. All questions were answered. Thank you for involving me in the care of Esperanza Olivarez. Please do not hesitate to call for any questions or concerns.     Electronically signed by Alicia Ortega MD on 4/4/2021 at 3:25 PM    Phone [phone]  Fax [phone]

## 2021-04-04 NOTE — PROGRESS NOTES
[facility] MD Primo Dekcer MD. Fani Billings MD   Progress Note    4/4/2021    SUBJECTIVE:     Pt known to me from office . I actually saw her in [facility] 1-2 weeks back . She was last seen as inpt in [facility] system by our group back in 2016 . Case discussed with Dr Angel Luis Oscar . 4/3: Seen this am in her room , o2/nc at 4 L which is her home o2 . She did undergo EGD/colonscopy this am . Results noted   Continue to look volume overloaded with 2 + edema and JVD . 4/4 ; stable vitlas , good UO on bumex drip/diuril . Will assess in am if we can wean her off drip     PROBLEM LIST:    Principal Problem:    GI bleed  Active Problems:    Preoperative cardiovascular examination    Bowel perforation (HCC)  Resolved Problems:    * No resolved hospital problems. *       DIET:    DIET GENERAL; Low Sodium (2 GM)       Allergies : Patient has no known allergies. Past Medical History:   Diagnosis Date    Arthritis     Atrial fibrillation ([address]     Blood circulation, collateral     CHF (congestive heart failure) (HCC)     Chronic renal insufficiency, stage IV (severe) ([address] 11/19/2016    History of cardiovascular stress test 07/2015    Lexiscan stress test    History of echocardiogram 07/27/2015    EF 29%    Hyperlipidemia     Hypertension        Past Surgical History:   Procedure Laterality Date    ECTOPIC PREGNANCY SURGERY         History reviewed. No pertinent family history. reports that she has been smoking. She has been smoking about 0.50 packs per day. She has never used smokeless tobacco. She reports previous alcohol use. She reports that she does not use drugs. Review of Systems:   Constitutional:weak   Eyes: no eye pain , no itching , no drainage  Ears, nose, mouth, throat, and face: no ear ,nose pain , hearing is ok ,no nasal drainage   Respiratory: no sob ,no cough ,no wheezing .    Cardiovascular: no chest pain , no palpitation ,no sob . Gastrointestinal: no nausea, vomiting , constipation , no abdominal pain . Genitourinary:no urinary retention , no burning , dysuria . No polyuria   Hematologic/lymphatic: no bleeding , no cougulation issues . Musculoskeletal:no joint pain , no swelling . Neurological: no headaches ,no weakness , no numbness . Endocrine: no thirst , no weight issues .      MEDS (scheduled):    pantoprazole  40 mg Oral QAM AC    chlorothiazide  500 mg Intravenous BID    sodium bicarbonate  650 mg Oral 4x Daily    atorvastatin  40 mg Oral Nightly    carvedilol  6.25 mg Oral BID    hydrALAZINE  25 mg Oral 3 times per day    isosorbide mononitrate  30 mg Oral Daily    [START ON 4/7/2021] vitamin D  50,000 Units Oral Weekly    meropenem (MERREM) IVPB  500 mg Intravenous Q12H    fluconazole  400 mg Intravenous Q24H       MEDS (infusions):   bumetanide 0.1 mg/mL infusion 1 mg/hr (04/04/21 0535)    sodium chloride      sodium chloride         MEDS (prn):  sodium chloride, sodium chloride, albuterol, ondansetron, promethazine, morphine    PHYSICAL EXAM:     Patient Vitals for the past 24 hrs:   BP Temp Temp src Pulse Resp SpO2 Weight   04/04/21 0715 (!) 155/87 98.2 °F (36.8 °C) Oral 90 18 96 %    04/04/21 0535 (!) 142/89         04/04/21 0533       135 lb (61.2 kg)   04/03/21 2045 136/84 97.8 °F (36.6 °C) Oral 81 16 97 %    04/03/21 1605 (!) 152/81 97.8 °F (36.6 °C) Oral 96 16 97 %    @      Intake/Output Summary (Last 24 hours) at 4/4/2021 1313  Last data filed at 4/4/2021 0819  Gross per 24 hour   Intake 360 ml   Output 3850 ml   Net -3490 ml         Wt Readings from Last 3 Encounters:   04/04/21 135 lb (61.2 kg)   10/08/19 116 lb 14.4 oz (53 kg)   06/03/19 130 lb (59 kg)       Constitutional:  in no acute distress  Oral: mucus membranes moist  Neck: 2+ JVD   Cardiovascular: S1, S2 regular rhythm, no murmur,or rub  Respiratory:  No crackles, no wheeze  Gastrointestinal:  Soft, nontender, nondistended, NABS  Ext: 2+ edema bl   Skin: dry, no rash  Neuro: awake, alert, interactive      DATA:    Recent Labs     04/02/21  0812 04/02/21  0812 04/03/21 0444 04/03/21  1358 04/03/21  2158 04/04/21  0612   WBC 10.7  --  10.2  --   --  10.7   HGB 7.8*   < > 8.7* 9.2* 9.0* 9.4*   HCT 25.1*   < > 27.0* 29.0* 28.7* 28.8*   .0*  --  96.8  --   --  95.7     --  293  --   --  285    < > = values in this interval not displayed. Recent Labs     04/02/21  0812 04/03/21 0444 04/04/21  0612    140 144   K 4.0 3.7 3.8   * 111* 113*   CO2 16* 16* 18*   MG  --  1.6  --    BUN 43* 40* 37*   CREATININE 3.5* 3.4* 3.3*   ALT 13 12 10   AST 20 15 16   BILITOT 0.4 0.5 0.5   ALKPHOS 108* 103 111*       No results found for: LABPROT    ASSESSMENT / RECOMMENDATIONS:      1) REYES  in the setting of GI bleed ,low EF /CHF     2) Chronic kidney disease stage IV baseline cr 2-2.7     3) 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 Results noted      5) ischaemic cardiomyopathy low EF at 29% decompensated     6) chronic respiratory failure on 3-4 L o2/home       Plan :     Pt continue to look volume overloaded with 2+ edema and 2+ JVD . BP stable . She is s/p EGD/colonscopy   Continue bumex drip at 1mg/hr . Will add diuril 500 mg bid iv .   F/u serial BMPs , UO           Electronically signed by Joe Holt MD on 4/4/2021 at 1:13 PM

## 2021-04-05 LAB
ALBUMIN SERPL-MCNC: 3.3 G/DL (ref 3.5–5.2)
ALP BLD-CCNC: 99 U/L (ref 35–104)
ALT SERPL-CCNC: 9 U/L (ref 0–32)
ANION GAP SERPL CALCULATED.3IONS-SCNC: 11 MMOL/L (ref 7–16)
AST SERPL-CCNC: 29 U/L (ref 0–31)
BASOPHILS ABSOLUTE: 0.03 E9/L (ref 0–0.2)
BASOPHILS RELATIVE PERCENT: 0.3 % (ref 0–2)
BILIRUB SERPL-MCNC: 0.6 MG/DL (ref 0–1.2)
BLOOD CULTURE, ROUTINE: NORMAL
BUN BLDV-MCNC: 36 MG/DL (ref 8–23)
CALCIUM SERPL-MCNC: 8.3 MG/DL (ref 8.6–10.2)
CHLORIDE BLD-SCNC: 108 MMOL/L (ref 98–107)
CO2: 22 MMOL/L (ref 22–29)
CREAT SERPL-MCNC: 3.3 MG/DL (ref 0.5–1)
CULTURE, BLOOD 2: NORMAL
EKG ATRIAL RATE: 125 BPM
EKG Q-T INTERVAL: 420 MS
EKG QRS DURATION: 152 MS
EKG QTC CALCULATION (BAZETT): 490 MS
EKG R AXIS: 117 DEGREES
EKG T AXIS: 81 DEGREES
EKG VENTRICULAR RATE: 82 BPM
EOSINOPHILS ABSOLUTE: 0.08 E9/L (ref 0.05–0.5)
EOSINOPHILS RELATIVE PERCENT: 0.8 % (ref 0–6)
GFR AFRICAN AMERICAN: 16
GFR NON-AFRICAN AMERICAN: 16 ML/MIN/1.73
GLUCOSE BLD-MCNC: 125 MG/DL (ref 74–99)
HCT VFR BLD CALC: 25.7 % (ref 34–48)
HEMOGLOBIN: 8.3 G/DL (ref 11.5–15.5)
IMMATURE GRANULOCYTES #: 0.06 E9/L
IMMATURE GRANULOCYTES %: 0.6 % (ref 0–5)
LYMPHOCYTES ABSOLUTE: 1.82 E9/L (ref 1.5–4)
LYMPHOCYTES RELATIVE PERCENT: 17.4 % (ref 20–42)
MCH RBC QN AUTO: 31.7 PG (ref 26–35)
MCHC RBC AUTO-ENTMCNC: 32.3 % (ref 32–34.5)
MCV RBC AUTO: 98.1 FL (ref 80–99.9)
MONOCYTES ABSOLUTE: 0.9 E9/L (ref 0.1–0.95)
MONOCYTES RELATIVE PERCENT: 8.6 % (ref 2–12)
NEUTROPHILS ABSOLUTE: 7.55 E9/L (ref 1.8–7.3)
NEUTROPHILS RELATIVE PERCENT: 72.3 % (ref 43–80)
PDW BLD-RTO: 19.9 FL (ref 11.5–15)
PLATELET # BLD: 210 E9/L (ref 130–450)
PMV BLD AUTO: 11 FL (ref 7–12)
POTASSIUM SERPL-SCNC: 3.5 MMOL/L (ref 3.5–5)
RBC # BLD: 2.62 E12/L (ref 3.5–5.5)
SODIUM BLD-SCNC: 141 MMOL/L (ref 132–146)
TOTAL PROTEIN: 5.8 G/DL (ref 6.4–8.3)
WBC # BLD: 10.4 E9/L (ref 4.5–11.5)

## 2021-04-05 PROCEDURE — 2580000003 HC RX 258: Performed by: INTERNAL MEDICINE

## 2021-04-05 PROCEDURE — 97165 OT EVAL LOW COMPLEX 30 MIN: CPT

## 2021-04-05 PROCEDURE — 2580000003 HC RX 258: Performed by: SURGERY

## 2021-04-05 PROCEDURE — 36415 COLL VENOUS BLD VENIPUNCTURE: CPT

## 2021-04-05 PROCEDURE — P9047 ALBUMIN (HUMAN), 25%, 50ML: HCPCS | Performed by: INTERNAL MEDICINE

## 2021-04-05 PROCEDURE — 6370000000 HC RX 637 (ALT 250 FOR IP): Performed by: INTERNAL MEDICINE

## 2021-04-05 PROCEDURE — 6360000002 HC RX W HCPCS: Performed by: SURGERY

## 2021-04-05 PROCEDURE — 97535 SELF CARE MNGMENT TRAINING: CPT

## 2021-04-05 PROCEDURE — 80053 COMPREHEN METABOLIC PANEL: CPT

## 2021-04-05 PROCEDURE — 6370000000 HC RX 637 (ALT 250 FOR IP): Performed by: STUDENT IN AN ORGANIZED HEALTH CARE EDUCATION/TRAINING PROGRAM

## 2021-04-05 PROCEDURE — 6370000000 HC RX 637 (ALT 250 FOR IP): Performed by: SPECIALIST

## 2021-04-05 PROCEDURE — 6360000002 HC RX W HCPCS: Performed by: INTERNAL MEDICINE

## 2021-04-05 PROCEDURE — 2500000003 HC RX 250 WO HCPCS: Performed by: INTERNAL MEDICINE

## 2021-04-05 PROCEDURE — 2700000000 HC OXYGEN THERAPY PER DAY

## 2021-04-05 PROCEDURE — 97530 THERAPEUTIC ACTIVITIES: CPT

## 2021-04-05 PROCEDURE — 85025 COMPLETE CBC W/AUTO DIFF WBC: CPT

## 2021-04-05 PROCEDURE — 97110 THERAPEUTIC EXERCISES: CPT

## 2021-04-05 PROCEDURE — 1200000000 HC SEMI PRIVATE

## 2021-04-05 RX ADMIN — CARVEDILOL 6.25 MG: 6.25 TABLET, FILM COATED ORAL at 21:00

## 2021-04-05 RX ADMIN — SODIUM BICARBONATE 650 MG: 650 TABLET ORAL at 18:01

## 2021-04-05 RX ADMIN — ATORVASTATIN CALCIUM 40 MG: 40 TABLET, FILM COATED ORAL at 21:00

## 2021-04-05 RX ADMIN — MEROPENEM 500 MG: 500 INJECTION, POWDER, FOR SOLUTION INTRAVENOUS at 15:25

## 2021-04-05 RX ADMIN — HYDRALAZINE HYDROCHLORIDE 25 MG: 25 TABLET, FILM COATED ORAL at 20:59

## 2021-04-05 RX ADMIN — HYDRALAZINE HYDROCHLORIDE 25 MG: 25 TABLET, FILM COATED ORAL at 15:25

## 2021-04-05 RX ADMIN — CHLOROTHIAZIDE SODIUM 500 MG: 500 INJECTION, POWDER, LYOPHILIZED, FOR SOLUTION INTRAVENOUS at 21:00

## 2021-04-05 RX ADMIN — FLUCONAZOLE 100 MG: 100 TABLET ORAL at 08:25

## 2021-04-05 RX ADMIN — CHLOROTHIAZIDE SODIUM 500 MG: 500 INJECTION, POWDER, LYOPHILIZED, FOR SOLUTION INTRAVENOUS at 09:26

## 2021-04-05 RX ADMIN — BUMETANIDE 1 MG/HR: 0.25 INJECTION INTRAMUSCULAR; INTRAVENOUS at 21:00

## 2021-04-05 RX ADMIN — CARVEDILOL 6.25 MG: 6.25 TABLET, FILM COATED ORAL at 08:25

## 2021-04-05 RX ADMIN — BUMETANIDE 1 MG/HR: 0.25 INJECTION INTRAMUSCULAR; INTRAVENOUS at 05:43

## 2021-04-05 RX ADMIN — SODIUM BICARBONATE 650 MG: 650 TABLET ORAL at 08:25

## 2021-04-05 RX ADMIN — MEROPENEM 500 MG: 500 INJECTION, POWDER, FOR SOLUTION INTRAVENOUS at 03:01

## 2021-04-05 RX ADMIN — SODIUM BICARBONATE 650 MG: 650 TABLET ORAL at 13:12

## 2021-04-05 RX ADMIN — HYDRALAZINE HYDROCHLORIDE 25 MG: 25 TABLET, FILM COATED ORAL at 05:43

## 2021-04-05 RX ADMIN — ALBUMIN (HUMAN) 25 G: 0.25 INJECTION, SOLUTION INTRAVENOUS at 15:25

## 2021-04-05 RX ADMIN — ISOSORBIDE MONONITRATE 30 MG: 30 TABLET, EXTENDED RELEASE ORAL at 08:25

## 2021-04-05 RX ADMIN — ALBUMIN (HUMAN) 25 G: 0.25 INJECTION, SOLUTION INTRAVENOUS at 03:55

## 2021-04-05 RX ADMIN — SODIUM BICARBONATE 650 MG: 650 TABLET ORAL at 20:59

## 2021-04-05 RX ADMIN — PANTOPRAZOLE SODIUM 40 MG: 40 TABLET, DELAYED RELEASE ORAL at 05:43

## 2021-04-05 ASSESSMENT — PAIN SCALES - GENERAL: PAINLEVEL_OUTOF10: 0

## 2021-04-05 NOTE — PROGRESS NOTES
Occupational Therapy  OCCUPATIONAL THERAPY INITIAL EVALUATION      Date:2021  Patient Name: Shraddha Lynn  MRN: [id number]  : 1940  Room: [address] [place] [address]    Referring Provider:       Niya Duffy DO       Evaluating OT: Britany Mclean OTR/L #[id number]    AM-PAC Daily Activity Raw Score:     Recommended Placement: Subacute rehab  Recommended Adaptive Equipment: TBD     Diagnosis:   1. Bowel perforation ([facility]    2. Pancreatic mass    3. Anemia, unspecified type    4. Rectal bleeding    5. Chronic kidney disease, unspecified CKD stage        Surgery: N/A     Pertinent Medical History:    Past Medical History:   Diagnosis Date    Arthritis     Atrial fibrillation ([facility]     Blood circulation, collateral     CHF (congestive heart failure) (HCC)     Chronic renal insufficiency, stage IV (severe) ([facility] 2016    History of cardiovascular stress test 2015    Lexiscan stress test    History of echocardiogram 2015    EF 29%    Hyperlipidemia     Hypertension       Precautions:  Falls, alarm, O2     Home Living: Pt lives alone in a 1 story home with 0 LINDA with 0 rail(s). Bathroom setup: tub/shower with grab bars   Equipment owned: [employer], cane    Prior Level of Function: Independent with ADLs , Independent  with IADLs; ambulated with cane  Driving: No  Occupation: Not reported    Pain Level: -/10  Cognition: A&O: 4/4; Follows 1-2 step directions   Memory:  Fair+   Sequencing:  fair   Problem solving:  fair   Judgement/safety:  fair     Functional Assessment:   Initial Eval Status  Date: 21 Treatment Status  Date: STGs = LTGs  Time frame: 5-7 days   Feeding Independent        Grooming Minimal Assist     Independent    UB Dressing Minimal Assist     Independent    LB Dressing Maximal Assist     Minimal Assist    Bathing Maximal Assist    Minimal Assist    Toileting Maximal Assist   Incontinent of bowel, assist of 2 to stand for hygiene and clothing management.    Pt using bedpan while up in chair, assist with hygiene and clothing management. Cuing on body mechanics, safety, sequencing and PLB. Minimal Assist    Bed Mobility  Supine to sit: Moderate Assist   Sit to supine: NT  Pt up with PT   Assist with BLE due to swelling and weakness, assist with scooting out to EOB. Supine to sit: Minimal Assist   Sit to supine: Minimal Assist    Functional Transfers Moderate Assist of 2   Sit<>stand  Bed, chair  Cuing on hand placement, body mechanics, balance, sequencing, and safety   Stand by Assist    Functional Mobility Minimal Assist of 2  Using [employer]  Several steps at bedside  B knees buckling, L more than R. Cuing on being pelvis forward, balance, posture and body mechanics,   Stand by Assist    Balance Sitting:     Static:  fair    Dynamic:fair  Standing: fair-  Sitting:     Static:  Fair+    Dynamic:fair+  Standing: fair+   Activity Tolerance Fair-  Pt very weak, SOB throughout session. SpO2 94-97% on 4L  Fair+   Visual/  Perceptual Glasses: Not reported   [facility]       Hand Dominance Not reported     Strength ROM Additional Info:    RUE  4-/5  WFL good  and wfl FMC/dexterity noted during ADL tasks       LUE 4-/5  WFL good  and wfl FMC/dexterity noted during ADL tasks       Hearing: WFL   Sensation:  No c/o numbness or tingling   Tone: WFL   Edema: None noted    Comments:   Nursing approved therapy session. Upon arrival, patient supine in bed and agreeable to OT session. Therapist educated pt on role of OT. At end of session, patient up with PT with call light and phone within reach, all lines and tubes intact; nursing aware. Pt demonstrated fair understanding of education/techniques and decreased independence and safety during completion of ADL/functional transfer/mobility tasks. Pt would benefit from continued skilled OT to increase safety and independence with completion of ADL/IADL tasks for functional independence and quality of life.     Treatment:   Skilled occupational therapy services provided include instruction/training on safety and adapted techniques for completion of therapeutic activities, ADLs/IADLs. Skilled monitoring of O2 sats, HR, and pt response throughout treatment. Prior to and at the end of session, environmental modifications  completed for patients safety and efficiency of treatment session.  Therapist facilitated therapeutic activities: bed mobility, functional transfers, graded functional activities (static/dynamic sitting, static/dynamic standing, functional reaching), and functional ambulation - providing mod cuing (verbal, visual, tactile) on AD management, hand placement, body mechanics, posture, breathing techniques, energy conservation, compensatory strategies, and safety.  Therapist facilitated self-care retraining: LB dressing, grooming, toileting tasks - providing mod cuing (verbal, visual, tactile) on body mechanics, posture, breathing techniques, energy conservation, compensatory strategies, and safety. Therapist educated pt on compensatory strategies/energy conservation techniques to safely complete ADLs/IADLs.      Eval Complexity: Low    Assessment of current deficits   Functional mobility [x]  ADLs [x] Strength [x]  Cognition []  Functional transfers  [x] IADLs [x] Safety Awareness [x]  Endurance [x]  Fine Motor Coordination [] Balance [x] Vision/perception [] Sensation []   Gross Motor Coordination [] ROM [] Delirium []                  Motor Control []    Plan of Care: 1-3 days/week for 1-2 weeks PRN   Instruction/training on adapted ADL techniques and AE recommendations to increase functional independence within precautions  Training on energy conservation strategies/techniques to improve independence/tolerance for self-care routine  Functional transfer/mobility training/DME recommendations for increased independence, safety, and fall prevention  Patient/Family education to increase follow through with safety techniques and functional independence Recommendation of environmental modifications for increased safety with functional transfers/mobility and ADLs  Cognitive retraining/development of therapeutic activities to improve problem solving, judgement, memory, and attention for increased safety/participation in ADL/IADL tasks  Therapeutic exercise to improve motor endurance, ROM, and functional strength for ADLs/functional transfers  Therapeutic activities to facilitate/challenge dynamic balance, stand tolerance, fine motor dexterity/in-hand manipulation for increased independence with ADLs  Neuro-muscular re-education: facilitation of righting/equilibrium reactions, midline orientation, scapular stability/mobility, normalization of muscle tone, and facilitation of volitional active controled movement    Rehab Potential: Good for established goals     Patient / Family Goal: Return home with assist from daughter      Patient and/or family were instructed on functional diagnosis, prognosis/goals and OT plan of care. Demonstrated fair understanding. Eval Complexity: Low      Time In: 0840  Time Out: 5563  Total Treatment Time: 10    Min Units   OT Eval Low 97165  X  1   OT Eval Medium 50931      OT Eval High 24315       OT Re-Eval [id number]       Therapeutic Ex 58848       Therapeutic Activities 17054  2     ADL/Self Care 36931  8 1    Orthotic Management 78324       Neuro Re-Ed 21297       Non-Billable Time          Evaluation Time includes thorough review of current medical information, gathering information on past medical history/social history and prior level of function, completion of standardized testing/informal observation of tasks, assessment of data and education on plan of care and goals.     Cari Fagan OTR/L #[id number]

## 2021-04-05 NOTE — PROGRESS NOTES
performed by Cristian Schaffer DO at [address] [facility] GASTROINTESTINAL ENDOSCOPY N/A 4/3/2021    EGD BIOPSY performed by Cristian Schaffer DO at [address] [facility] reviewed. No pertinent family history. reports that she has been smoking. She has been smoking about 0.50 packs per day. She has never used smokeless tobacco. She reports previous alcohol use. She reports that she does not use drugs. Review of Systems:   Constitutional:weak   Eyes: no eye pain , no itching , no drainage  Ears, nose, mouth, throat, and face: no ear ,nose pain , hearing is ok ,no nasal drainage   Respiratory: no sob ,no cough ,no wheezing . Cardiovascular: no chest pain , no palpitation ,no sob . Gastrointestinal: no nausea, vomiting , constipation , no abdominal pain . Genitourinary:no urinary retention , no burning , dysuria . No polyuria   Hematologic/lymphatic: no bleeding , no cougulation issues . Musculoskeletal:no joint pain , no swelling . Neurological: no headaches ,no weakness , no numbness . Endocrine: no thirst , no weight issues .      MEDS (scheduled):    albumin human  25 g Intravenous Q12H    fluconazole  100 mg Oral Daily    pantoprazole  40 mg Oral QAM AC    chlorothiazide  500 mg Intravenous BID    sodium bicarbonate  650 mg Oral 4x Daily    atorvastatin  40 mg Oral Nightly    carvedilol  6.25 mg Oral BID    hydrALAZINE  25 mg Oral 3 times per day    isosorbide mononitrate  30 mg Oral Daily    [START ON 4/7/2021] vitamin D  50,000 Units Oral Weekly    meropenem (MERREM) IVPB  500 mg Intravenous Q12H       MEDS (infusions):   bumetanide 0.1 mg/mL infusion 1 mg/hr (04/05/21 0543)    sodium chloride      sodium chloride         MEDS (prn):  sodium chloride, sodium chloride, albuterol, ondansetron, promethazine, morphine    PHYSICAL EXAM:     Patient Vitals for the past 24 hrs:   BP Temp Temp src Pulse Resp SpO2 Weight   04/05/21 0815 125/86 97.3 °F (36.3 °C) Infrared 87 18 98 %    04/05/21 0604       137 lb 4.8 oz (62.3 kg)   04/05/21 0543 (!) 141/72   94      04/04/21 2200 132/87 98.3 °F (36.8 °C) Oral 96 18 96 %    @      Intake/Output Summary (Last 24 hours) at 4/5/2021 1031  Last data filed at 4/5/2021 0925  Gross per 24 hour   Intake 240 ml   Output 3650 ml   Net -3410 ml         Wt Readings from Last 3 Encounters:   04/05/21 137 lb 4.8 oz (62.3 kg)   10/08/19 116 lb 14.4 oz (53 kg)   06/03/19 130 lb (59 kg)       Constitutional:  in no acute distress  Oral: mucus membranes moist  Neck: 2+ JVD   Cardiovascular: S1, S2 regular rhythm, no murmur,or rub  Respiratory:  No crackles, no wheeze  Gastrointestinal:  Soft, nontender, nondistended, NABS  Ext: 2+ edema bl   Skin: dry, no rash  Neuro: awake, alert, interactive      DATA:    Recent Labs     04/03/21 0444 04/03/21  0444 04/03/21  2158 04/04/21  0612 04/05/21  0532   WBC 10.2  --   --  10.7 10.4   HGB 8.7*   < > 9.0* 9.4* 8.3*   HCT 27.0*   < > 28.7* 28.8* 25.7*   MCV 96.8  --   --  95.7 98.1     --   --  285 210    < > = values in this interval not displayed. Recent Labs     04/03/21 0444 04/04/21  0612 04/05/21  0531    144 141   K 3.7 3.8 3.5   * 113* 108*   CO2 16* 18* 22   MG 1.6  --   --    BUN 40* 37* 36*   CREATININE 3.4* 3.3* 3.3*   ALT 12 10 9   AST 15 16 29   BILITOT 0.5 0.5 0.6   ALKPHOS 103 111* 99       No results found for: LABPROT    ASSESSMENT / RECOMMENDATIONS:      1) REYES  in the setting of GI bleed ,low EF /CHF     2) Chronic kidney disease stage IV baseline cr 2-2.7     3) 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 Results noted      5) ischaemic cardiomyopathy low EF at 29% decompensated     6) chronic respiratory failure on 3-4 L o2/home       Plan :     Pt improving with decreasing volume overloaded with 2+ edema and 2+ JVD . BP stable .  She is s/p EGD/colonscopy   Continue bumex drip at 1mg/hr . Will add diuril 500 mg bid iv .   F/u serial BMPs , UO   Will continue same plan for diuresing today, will reassess tomorrow morning        Electronically signed by Laxmi Bhandari MD on 4/5/2021 at 10:31 AM

## 2021-04-05 NOTE — CARE COORDINATION
4/5/2021 1130 CM note: No covid test. Met with pt for follow up visit. Pt lives alone. She has home O2 at 4L NC through [employer]. Pt gave CM permission to speak with her dtr Jonathan. Spoke with CHANELLE([phone]) for transition of care needs. PT/OT evals/recommendations reviewed. Xiao García requesting [facility] and in network SNF facilities reviewed. Xiao García prefers 1)[facility] 2)[facility] 3)[facility]. Referrals placed to above facility liasons-await review/acceptance. Xiao García states she will speak with her brother and aunt then speak with pt regarding [facility]. For SNF, will need PRECERT, COVID TEST,HENS and signed LUBA.  Charlene THEODORE

## 2021-04-05 NOTE — PROGRESS NOTES
[address] [place] [address] [facility]  Progress Note    Chief complain:  Fatigue       SUBJECTIVE:    Patient is awake, alert , seen at bedside  Daughter present   Afebrile, no leukocytosis   Jane intact   No abd pain, no nausea, vomiting, diarrhea, fever, chills, rash     ROS:  Negative except as above     OBJECTIVE:    Vitals:    04/04/21 2200 04/05/21 0543 04/05/21 0604 04/05/21 0815   BP: 132/87 (!) 141/72  125/86   Pulse: 96 94  87   Resp: 18   18   Temp: 98.3 °F (36.8 °C)   97.3 °F (36.3 °C)   TempSrc: Oral   Infrared   SpO2: 96%   98%   Weight:   137 lb 4.8 oz (62.3 kg)    Height:           HEENT :         unremarkable   Heart:             RRR,  No murmurs, no gallops  Lungs:            clear to auscultation, no wheezing , no rales  Abdomen:       soft, non tender, bowel sounds present  Extremites:     No edema, no ulcers,  Skin:                Normal turgor,normal texture  piv             Jane     Current Facility-Administered Medications   Medication Dose Route Frequency Provider Last Rate Last Admin    albumin human 25 % IV solution 25 g  25 g Intravenous Q12H Jose Rodrigez DO   Stopped at 04/05/21 0455    fluconazole (DIFLUCAN) tablet 100 mg  100 mg Oral Daily Aura Costa MD   100 mg at 04/05/21 0825    pantoprazole (PROTONIX) tablet 40 mg  40 mg Oral QAM AC Suhail Aldrich, DO   40 mg at 04/05/21 0543    chlorothiazide (DIURIL) injection 500 mg  500 mg Intravenous BID Yamilet Chavez MD   500 mg at 04/05/21 0926    bumetanide (BUMEX) 12.5 mg in sodium chloride 0.9 % 125 mL infusion  1 mg/hr Intravenous Continuous Hasit Melissa Ceja MD 10 mL/hr at 04/05/21 0543 1 mg/hr at 04/05/21 0543    sodium bicarbonate tablet 650 mg  650 mg Oral 4x Daily Stephani Gomez MD   650 mg at 04/05/21 0825    0.9 % sodium chloride infusion   Intravenous PRN [address], DO        0.9 % sodium chloride infusion   Intravenous PRN Mariam Mccabe DO        atorvastatin (LIPITOR) tablet 40 mg  40 mg Oral Nightly Niya Clive, DO   40 mg at 04/04/21 2212    carvedilol (COREG) tablet 6.25 mg  6.25 mg Oral BID Niya Duffy, DO   6.25 mg at 04/05/21 0825    hydrALAZINE (APRESOLINE) tablet 25 mg  25 mg Oral 3 times per day Niya Clive, DO   25 mg at 04/05/21 0543    isosorbide mononitrate (IMDUR) extended release tablet 30 mg  30 mg Oral Daily Niya Duffy, DO   30 mg at 04/05/21 0825    [START ON 4/7/2021] vitamin D (ERGOCALCIFEROL) capsule 50,000 Units  50,000 Units Oral Weekly Niya Duffy, DO        albuterol (PROVENTIL) nebulizer solution 2.5 mg  2.5 mg Nebulization Q6H PRN Niya Duffy, DO        ondansetron [facility]) injection 4 mg  4 mg Intravenous Q6H PRN Niya Duffy, DO        promethazine (PHENERGAN) injection 25 mg  25 mg Intravenous Q6H PRN Niya Duffy, DO        morphine (PF) injection 2 mg  2 mg Intravenous Q4H PRN Niya Duffy, DO   2 mg at 03/31/21 1802    meropenem (MERREM) 500 mg in sodium chloride 0.9 % 100 mL IVPB  500 mg Intravenous Q12H Nevaeh Hernandez MD   Stopped at 04/05/21 0331        LABS    CBC:     WBC   Date Value Ref Range Status   04/05/2021 10.4 4.5 - 11.5 E9/L Final   04/04/2021 10.7 4.5 - 11.5 E9/L Final   04/03/2021 10.2 4.5 - 11.5 E9/L Final     Hematocrit   Date Value Ref Range Status   04/05/2021 25.7 (L) 34.0 - 48.0 % Final   04/04/2021 28.8 (L) 34.0 - 48.0 % Final   04/03/2021 28.7 (L) 34.0 - 48.0 % Final     Platelets   Date Value Ref Range Status   04/05/2021 210 130 - 450 E9/L Final   04/04/2021 285 130 - 450 E9/L Final   04/03/2021 293 130 - 450 E9/L Final         BMP:      Sodium   Date Value Ref Range Status   04/05/2021 141 132 - 146 mmol/L Final   04/04/2021 144 132 - 146 mmol/L Final   04/03/2021 140 132 - 146 mmol/L Final     Potassium   Date Value Ref Range Status   04/05/2021 3.5 3.5 - 5.0 mmol/L Final   04/04/2021 3.8 3.5 - 5.0 mmol/L Final   04/03/2021 3.7 3.5 - 5.0 mmol/L Final     Chloride   Date Value Ref Range Status   04/05/2021 108 (H) 98 - 107 mmol/L Final   04/04/2021 113 (H) 98 - 107 mmol/L Final   04/03/2021 111 (H) 98 - 107 mmol/L Final     CO2   Date Value Ref Range Status   04/05/2021 22 22 - 29 mmol/L Final   04/04/2021 18 (L) 22 - 29 mmol/L Final   04/03/2021 16 (L) 22 - 29 mmol/L Final     BUN   Date Value Ref Range Status   04/05/2021 36 (H) 8 - 23 mg/dL Final   04/04/2021 37 (H) 8 - 23 mg/dL Final   04/03/2021 40 (H) 8 - 23 mg/dL Final     CREATININE   Date Value Ref Range Status   04/05/2021 3.3 (H) 0.5 - 1.0 mg/dL Final   04/04/2021 3.3 (H) 0.5 - 1.0 mg/dL Final   04/03/2021 3.4 (H) 0.5 - 1.0 mg/dL Final     Glucose   Date Value Ref Range Status   04/05/2021 125 (H) 74 - 99 mg/dL Final   04/04/2021 115 (H) 74 - 99 mg/dL Final   04/03/2021 110 (H) 74 - 99 mg/dL Final         Hepatic Function Panel:    Lab Results   Component Value Date    ALKPHOS 99 04/05/2021    ALT 9 04/05/2021    AST 29 04/05/2021    PROT 5.8 04/05/2021    BILITOT 0.6 04/05/2021    LABALBU 3.3 04/05/2021        No results found for: SEDRATE    No results found for: CRP    Microbiology :  Blood Culture, Routine   Date Value Ref Range Status   03/31/2021 24 Hours no growth  Preliminary     Culture, Blood 2   Date Value Ref Range Status   03/31/2021 24 Hours no growth  Preliminary     Urine Culture, Routine   Date Value Ref Range Status   04/01/2021 <10,000 CFU/mL  Mixed gram positive organisms    Final     No results found for: CULTRESP  No results found for: [facility]    Radiology :  Reviewed     ASSESSMENT:   PT CAME IN WITH ABD PAIN AND DIARRHEA  SHE HAS LEUKOCYTOSIS/anemia better  GIB/pancreatic mass?hemorrhagic/bowel/gi perforation   S/P RX UTI KLEBSIELLA /CEFDINIR from [facility]  candiduria   CKD   AAA seen by vasc    -EGD revealed multiple esophageal diverticuli, gastritis and 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 I have seen and examined the patient and the key elements of the encounter have been performed by me. I agree with the assessment, plan and orders as documented.       Treatment plan as per my recommendation     Vivek Landa MD, FACP  4/5/2021  10:56 PM

## 2021-04-05 NOTE — PROGRESS NOTES
Physical Therapy Treatment Note    Room #:  0430/0430-01  Patient Name: Shraddha Lynn  YOB: 1940  MRN: [id number]    Referring Provider:   Niya Duffy DO      Date of Service: 4/5/2021    Evaluating Physical Therapist: Yumi Pearson, PT #[id number]      Diagnosis:   GI bleed [K92.2]       Patient Active Problem List   Diagnosis    Shortness of breath    Chronic combined systolic and diastolic congestive heart failure ([address]    Chronic renal insufficiency, stage IV (severe) (HCC)    Atrial fibrillation (HCC)    Hypertension    Abnormal chest x-ray    Anemia of chronic disease    Opacity of lung on imaging study    Cigarette smoker    Tobacco abuse counseling    Acute on chronic combined systolic and diastolic CHF (congestive heart failure) ([address]    Acute on chronic congestive heart failure (HCC)    Acute systolic CHF (congestive heart failure) ([address]    GI bleed    Preoperative cardiovascular examination    Bowel perforation ([address]       Tentative placement recommendation: Subacute rehab  Equipment recommendation:  To be determined      Prior Level of Function: Patient ambulated independently   Rehab Potential: good  for baseline    Past medical history:   Past Medical History:   Diagnosis Date    Arthritis     Atrial fibrillation ([address]     Blood circulation, collateral     CHF (congestive heart failure) ([address]     Chronic renal insufficiency, stage IV (severe) ([address] 11/19/2016    History of cardiovascular stress test 07/2015    Lexiscan stress test    History of echocardiogram 07/27/2015    EF 29%    Hyperlipidemia     Hypertension      Past Surgical History:   Procedure Laterality Date    COLONOSCOPY N/A 4/3/2021    COLONOSCOPY POLYPECTOMY HOT SNARE performed by Kori Smyth DO at [facility]  4/3/2021    COLONOSCOPY WITH BIOPSY performed by Kori Smyth DO at [facility]  4/3/2021    COLONOSCOPY CONTROL HEMORRHAGE performed by Elliot De La O Cheryl Morocho DO at [address]  4/3/2021    COLONOSCOPY SUBMUCOSAL SPOT INJECTION performed by Rafaela Wang DO at [place] [address]      UPPER GASTROINTESTINAL ENDOSCOPY N/A 4/3/2021    EGD BIOPSY performed by Rafaela Wang DO at [facility] ENDOSCOPY       Precautions: Activity as tolerated, falls and alarm , encouragement required for participation, 4L of O2 nc (wears 4L of O2 at  home as well)    SUBJECTIVE:    Social history: Patient lives alone  in a ranch home  with No steps  to enter Tub shower grab bars    Equipment owned: Gene Veloz,      [facility]   How much difficulty turning over in bed?: A Little  How much difficulty sitting down on / standing up from a chair with arms?: A Lot  How much difficulty moving from lying on back to sitting on side of bed?: A Lot  How much help from another person moving to and from a bed to a chair?: A Lot  How much help from another person needed to walk in hospital room?: A Lot  How much help from another person for climbing 3-5 steps with a railing?: A Lot  AM-PAC Inpatient Mobility Raw Score : 13  AM-PAC Inpatient T-Scale Score : 36.74  Mobility Inpatient CMS 0-100% Score: 64.91  Mobility Inpatient CMS G-Code Modifier : CL    Nursing cleared patient for PT treatment. Patient sitting EOB with OT when arriving to room. OBJECTIVE;   Initial Evaluation  Date: 4/3/2021 Treatment Date:  4/5/2021     Short Term/ Long Term   Goals   Was pt agreeable to Eval/treatment? Yes  Yes To be met in 4 days   Pain level   10/10  Stomach  5/10  stomach    Bed Mobility    Rolling: Supervision    Supine to sit: Minimal assist of 1   Sit to supine: Minimal assist of 1   Scooting: Minimal assist of 1  Rolling: Not assessed    Supine to sit: Not assessed    Sit to supine: Moderate assist of  2   Scooting: Minimal assist of 2    Rolling: Independent   Supine to sit:  Independent   Sit to supine: Independent Scooting: Independent    Transfers Sit to stand: Not assessed  patient refusing to stand today to due pain and fatigue. Sit to stand: Moderate assist of  2      Sit to stand: Independent    Ambulation    not assessed  5 steps using  wheeled walker with Minimal assist of 2   to chair by bedside. Right knee buckling greater than left knee. Verbal cues for foot and hand placement. 50 feet using  wheeled walker with Modified Independent    ROM Within functional limits       Strength BUE:  4/5  RLE:  4/5  LLE:  4/5  Increase strength in affected mm groups by 1/3 grade   Balance Sitting EOB:  fair +  Dynamic Standing:  not assessed  Sitting EOB: good   Dynamic Standing: fair - with [employer] Sitting EOB:  good   Dynamic Standing: good      Patient is Alert & Oriented x person, place, time and situation and follows directions   Sensation:  Patient  denies numbness and tingling     Edema:  no   Endurance: fair  -, due to pain and fatigue     Vitals: 4 liters nasal cannula   Blood Pressure at rest  Blood Pressure during session    Heart Rate at rest  Heart Rate during session    SPO2 at rest 98%  SPO2 during session 95-97%     Patient education  Patient educated on role of Physical Therapy, risks of immobility, safety and plan of care, energy conservation, importance of positional changes for oxygen exchange,  importance of mobility while in hospital , ankle pumps, quad set and glut set for edema control, blood clot prevention and safety      Patient response to education:   Pt verbalized understanding Pt demonstrated skill Pt requires further education in this area   Yes Partial Yes      Treatment:  Patient practiced and was instructed/facilitated in the following treatment: Patient required maximal encouragement to participate. Sat edge of bed 5 minutes with Supervision  to increase dynamic sitting balance and activity tolerance.   Patient stood with mod assist of 2 with [employer], dependent with self hygiene, incontinent of stool. Patient took steps to chair and sat down. Knee buckling bilaterally noted. LE exercises in chair. Patient stood again requesting to use bedpan in chair, bedpan placed under patient. Patient assisted in self care and back to supine position in bed. Therapeutic Exercises:  ankle pumps, glut sets, long arc quad and seated marching  x 10 reps. At end of session, patient in bed with alarm call light and phone within reach,  all lines and tubes intact, nursing notified. Patient would benefit from continued skilled Physical Therapy to improve functional independence and quality of life. Patient's/ family goals   none stated      ASSESSMENT: Patient exhibits decreased strength, balance, endurance and pain in stomach  impairing functional mobility, transfers, gait distance and tolerance to activity. Patient presents with decreased balance and endurance today, knee buckling noted and unsteadiness on feet. Plan of Care:     -Sitting Balance: Incorporate reaching activities to activate trunk muscles   -Standing Balance: Perform strengthening exercises in standing to promote motor control with or without upper extremity support   -Transfers: Provide instruction on proper hand and foot position for adequate transfer of weight onto lower extremities and use of gait device  -Gait: Gait training and Standing activities to improve: base of support, weight shift, weight bearing      Patient and or family understand(s) diagnosis, prognosis, and plan of care. Frequency of treatments: Patient will be seen  daily.        Time in  0900  Time out  0924    Total Treatment Time  24 minutes    CPT codes:  Therapeutic activities (29405)   15 minutes  1 unit(s)  Therapeutic exercises (48806)   9 minutes  1 unit(s)    Loraine Fuentes, PT

## 2021-04-05 NOTE — PROGRESS NOTES
Internal Medicine Progress Note    [facility]    Leah Caba. Cleo Rodriguez., [email or web address]. Mariely Good D.O., DALEOCHRISTY Herrera D.O. Rupinder Steiner, MSN, APRN, NP-C  Jagdish Kelly. Freedom Arteaga, MSN, APRN-CNP     Primary Care Physician: Dejan Sales DO   Admitting Physician:  Jennifer Bates DO  Admission date and time: 3/31/2021 10:32 AM    Room:  [address] [place] [address]  Admitting diagnosis: GI bleed [K92.2]    Patient Name: Robyn Paredes  MRN: [id number]    Date of Service: 4/5/2021     Subjective:  Tiffanie Flores is a 68 y.o. female who was seen and examined today,4/5/2021, at the bedside. Tiffanie Flores states that she is feeling better on a day-to-day basis. She would like to become more ambulatory today. Multiple subspecialists are providing consultation. She continues to diurese accordingly and believes she is decompressing. She has no overt abdominal pain during my examination today. Review of System:   Constitutional:   Ongoing weakness and deconditioning. HEENT:   Denies ear pain, sore throat, sinus or eye problems. Admits to irritation associated with the nasal cannula oxygen. Cardiovascular:   Denies any chest pain, irregular heartbeats, or palpitations. Respiratory:   Ongoing exertional dyspnea. No coughing or sputum production. Gastrointestinal:   Resolution of her presenting abdominal pain. She is otherwise tolerating a diet. Genitourinary:    Denies any urgency, frequency, hematuria. Voiding with the use of a Jane catheter. Extremities:   Improving lower extremity edema. Neurology:    Admits to improving weakness and deconditioning. Psch:   Denies being anxious or depressed. Musculoskeletal:    Denies  myalgias, joint complaints or back pain. Integumentary:   Denies any rashes, ulcers, or excoriations. Denies bruising. Hematologic/Lymphatic:  Denies bruising or bleeding.     Physical Exam:  I/O this shift:  In: 120 [P.O.:120]  Out: - Intake/Output Summary (Last 24 hours) at 4/5/2021 0932  Last data filed at 4/5/2021 0925  Gross per 24 hour   Intake 240 ml   Output 3650 ml   Net -3410 ml   I/O last 3 completed shifts: In: 240 [P.O.:240]  Out: 3650 [Urine:3650]  Patient Vitals for the past 96 hrs (Last 3 readings):   Weight   04/05/21 0604 137 lb 4.8 oz (62.3 kg)   04/04/21 0533 135 lb (61.2 kg)   04/03/21 0352 140 lb 8 oz (63.7 kg)     Vital Signs:   Blood pressure 125/86, pulse 87, temperature 97.3 °F (36.3 °C), temperature source Infrared, resp. rate 18, height 5' 5\" (1.651 m), weight 137 lb 4.8 oz (62.3 kg), SpO2 98 %. General appearance:  Awake and alert. No apparent distress. Head:  Normocephalic. No masses, lesions or tenderness. Eyes:  PERRLA. EOMI. Sclera clear. Impaired vision. ENT:  Ears normal. Mucosa normal. Nasal cannula oxygen is in place. Neck:    Supple. Trachea midline. No thyromegaly. No JVD. No bruits. Heart:    Atrial fibrillation with current rate control. Mild systolic murmur. Lungs:    Fairly good aeration throughout. No significant wheezes, rhonchi, or rales. Abdomen:   Sof mildly tender to palpation. Voluntary guarding is elicited. Bowel sounds are active. Extremities:    Peripheral pulses present. 1-2+ pitting edema involving bilateral lower extremities. Neurologic:    Alert x 3. No focal deficit. Cranial nerves grossly intact. No focal weakness. Psych:   Behavior is normal. Mood appears normal. Speech is not rapid and/or pressured. Musculoskeletal:   Spine ROM normal. Muscular strength intact. Gait not assessed. Integumentary:  No rashes  Skin normal color and texture. Genitalia/Breast:  Voiding with the use of a Jane catheter.     Medication:  Scheduled Meds:   albumin human  25 g Intravenous Q12H    fluconazole  100 mg Oral Daily    pantoprazole  40 mg Oral QAM AC    chlorothiazide  500 mg Intravenous BID    sodium bicarbonate  650 mg Oral 4x Daily    atorvastatin  40 mg Oral Nightly  carvedilol  6.25 mg Oral BID    hydrALAZINE  25 mg Oral 3 times per day    isosorbide mononitrate  30 mg Oral Daily    [START ON 4/7/2021] vitamin D  50,000 Units Oral Weekly    meropenem (MERREM) IVPB  500 mg Intravenous Q12H     Continuous Infusions:   bumetanide 0.1 mg/mL infusion 1 mg/hr (04/05/21 0543)    sodium chloride      sodium chloride         Objective Data:  CBC with Differential:    Lab Results   Component Value Date    WBC 10.4 04/05/2021    RBC 2.62 04/05/2021    HGB 8.3 04/05/2021    HCT 25.7 04/05/2021     04/05/2021    MCV 98.1 04/05/2021    MCH 31.7 04/05/2021    MCHC 32.3 04/05/2021    RDW 19.9 04/05/2021    NRBC 0.9 03/31/2021    LYMPHOPCT 17.4 04/05/2021    MONOPCT 8.6 04/05/2021    MYELOPCT 0.9 04/03/2021    BASOPCT 0.3 04/05/2021    MONOSABS 0.90 04/05/2021    LYMPHSABS 1.82 04/05/2021    EOSABS 0.08 04/05/2021    BASOSABS 0.03 04/05/2021     CMP:    Lab Results   Component Value Date     04/05/2021    K 3.5 04/05/2021    K 3.9 03/31/2021     04/05/2021    CO2 22 04/05/2021    BUN 36 04/05/2021    CREATININE 3.3 04/05/2021    GFRAA 16 04/05/2021    LABGLOM 16 04/05/2021    GLUCOSE 125 04/05/2021    GLUCOSE 102 11/17/2010    PROT 5.8 04/05/2021    LABALBU 3.3 04/05/2021    CALCIUM 8.3 04/05/2021    BILITOT 0.6 04/05/2021    ALKPHOS 99 04/05/2021    AST 29 04/05/2021    ALT 9 04/05/2021       Assessment:  1. 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 Acute on chronic kidney disease stage IV currently undergoing diuresis  4. Abdominal aortic aneurysm toward the bifurcation measuring up to 3.7 x 4.8 cm a component of chronic dissection appearance  5.  Chronic, compensated systolic and diastolic congestive heart failure  6. Paroxysmal atrial fibrillation on chronic anticoagulation with Eliquis  7. Essential hypertension    Plan:   Multiple subspecialists are providing consultation and recommendations have been reviewed. Antibiotics are being administered as per the infectious disease team.  I will discuss the case with the GI and surgery teams today regarding MRCP and endoscopic ultrasound. Endoscopic biopsies are pending at this point. The patient is maintained on a Bumex infusion with adequate diuresis being followed closely by the nephrology team.  I have encouraged the patient to become more ambulatory today. We will attempt to wean off the nasal cannula oxygen. Chronic comorbidities are being monitored. More than 50% of my  time was spent at the bedside counseling/coordinating care with the patient and/or family with face to face contact. This time was spent reviewing notes and laboratory data as well as instructing and counseling the patient. Time I spent with the family or surrogate(s) is included only if the patient was incapable of providing the necessary information or participating in medical decisions. I also discussed the differential diagnosis and all of the proposed management plans with the patient and individuals accompanying the patient. Bert England requires this high level of physician care and nursing on the IMC/Telemetry unit due the complexity of decision management and chance of rapid decline or death. Continued cardiac monitoring and higher level of nursing are required. I am readily available for any further decision-making and intervention.      Kendra Alonzo D.O., [facility]  9:32 AM  4/5/2021

## 2021-04-05 NOTE — PROGRESS NOTES
PROGRESS NOTE    By Arnie Oreilly D.O GI Fellow    [facility]  Dr. Lucrecia Lewis MD, Dr. Brianna Pires MD, Dr Justyna Blanca, Dr. Omkar Jolly MD, Dr. Archer,       Yelena Cobos  68 y.o.  female    SUBJECTIVE:  Patient seen and examined today at bedside. Overall feeling better. Still with lower extremity edema and mild GUERRERO. Has some mild abdominal pain that is improved. Denies overt GI bleeding. Tolerating diet. OBJECTIVE:    /86   Pulse 87   Temp 97.3 °F (36.3 °C) (Infrared)   Resp 18   Ht 5' 5\" (1.651 m)   Wt 137 lb 4.8 oz (62.3 kg)   SpO2 98%   BMI 22.85 kg/m²     Gen: NAD, AAO x 3  HEENT:PEERL, no icterus  Heart: RRR, no M/R/G  Lungs: CTAB  Abd.: soft, NT, ND, BS +, no G/R, no HSM  Extr.: no C/C, + pitting edema bilaterally, no bruising      Stool (measured) : 0 mL  Lab Results   Component Value Date    WBC 10.4 04/05/2021    WBC 10.7 04/04/2021    WBC 10.2 04/03/2021    HGB 8.3 04/05/2021    HGB 9.4 04/04/2021    HGB 9.0 04/03/2021    HCT 25.7 04/05/2021    MCV 98.1 04/05/2021    RDW 19.9 04/05/2021     04/05/2021     04/04/2021     04/03/2021     Lab Results   Component Value Date     04/05/2021    K 3.5 04/05/2021    K 3.9 03/31/2021     04/05/2021    CO2 22 04/05/2021    BUN 36 04/05/2021    CREATININE 3.3 04/05/2021    CALCIUM 8.3 04/05/2021    PROT 5.8 04/05/2021    LABALBU 3.3 04/05/2021    BILITOT 0.6 04/05/2021    BILITOT 0.5 04/04/2021    BILITOT 0.5 04/03/2021    ALKPHOS 99 04/05/2021    ALKPHOS 111 04/04/2021    ALKPHOS 103 04/03/2021    AST 29 04/05/2021    AST 16 04/04/2021    AST 15 04/03/2021    ALT 9 04/05/2021    ALT 10 04/04/2021    ALT 12 04/03/2021     Lab Results   Component Value Date    LIPASE 40 03/31/2021     No results found for: AMYLASE      ASSESSMENT/PLAN:    1.  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 LUQ abdominal lesion, Pancreatic pseudocyst  - likely related to pancreatic cyst, currently resolving   - continue supportive care  - recommend repeat MRI/MRCP as outpatient as department will not repeat it during this admission     3. HFrEF, pAFib, volume overload, REYES/CKD  - appreciate cardiology/nephrology management and recommendations     Rosa Isela Borges DO  4/5/2021  10:36 AM    Pt seen and independently examined. Pertinent notes and lab work reviewed. D/w Dr. Betty Leach with physical exam and A&P. Discussed with patient - all questions answered - agreeable with the plan as delineated.     J Luis Kruger MD  4/5/2021  11:01 AM

## 2021-04-06 LAB
ALBUMIN SERPL-MCNC: 3.6 G/DL (ref 3.5–5.2)
ALP BLD-CCNC: 94 U/L (ref 35–104)
ALT SERPL-CCNC: 7 U/L (ref 0–32)
ANION GAP SERPL CALCULATED.3IONS-SCNC: 14 MMOL/L (ref 7–16)
AST SERPL-CCNC: 13 U/L (ref 0–31)
BASOPHILS ABSOLUTE: 0.03 E9/L (ref 0–0.2)
BASOPHILS RELATIVE PERCENT: 0.3 % (ref 0–2)
BILIRUB SERPL-MCNC: 1 MG/DL (ref 0–1.2)
BUN BLDV-MCNC: 35 MG/DL (ref 8–23)
CALCIUM SERPL-MCNC: 8.6 MG/DL (ref 8.6–10.2)
CHLORIDE BLD-SCNC: 102 MMOL/L (ref 98–107)
CO2: 26 MMOL/L (ref 22–29)
CREAT SERPL-MCNC: 2.9 MG/DL (ref 0.5–1)
EOSINOPHILS ABSOLUTE: 0.04 E9/L (ref 0.05–0.5)
EOSINOPHILS RELATIVE PERCENT: 0.3 % (ref 0–6)
GFR AFRICAN AMERICAN: 19
GFR NON-AFRICAN AMERICAN: 19 ML/MIN/1.73
GLUCOSE BLD-MCNC: 118 MG/DL (ref 74–99)
HCT VFR BLD CALC: 27.2 % (ref 34–48)
HEMOGLOBIN: 8.5 G/DL (ref 11.5–15.5)
IMMATURE GRANULOCYTES #: 0.05 E9/L
IMMATURE GRANULOCYTES %: 0.4 % (ref 0–5)
LYMPHOCYTES ABSOLUTE: 1.96 E9/L (ref 1.5–4)
LYMPHOCYTES RELATIVE PERCENT: 16.4 % (ref 20–42)
MAGNESIUM: 1.5 MG/DL (ref 1.6–2.6)
MCH RBC QN AUTO: 30.6 PG (ref 26–35)
MCHC RBC AUTO-ENTMCNC: 31.3 % (ref 32–34.5)
MCV RBC AUTO: 97.8 FL (ref 80–99.9)
MONOCYTES ABSOLUTE: 1.03 E9/L (ref 0.1–0.95)
MONOCYTES RELATIVE PERCENT: 8.6 % (ref 2–12)
NEUTROPHILS ABSOLUTE: 8.85 E9/L (ref 1.8–7.3)
NEUTROPHILS RELATIVE PERCENT: 74 % (ref 43–80)
PDW BLD-RTO: 19.5 FL (ref 11.5–15)
PHOSPHORUS: 4.3 MG/DL (ref 2.5–4.5)
PLATELET # BLD: 196 E9/L (ref 130–450)
PMV BLD AUTO: 11.4 FL (ref 7–12)
POTASSIUM SERPL-SCNC: 3.3 MMOL/L (ref 3.5–5)
RBC # BLD: 2.78 E12/L (ref 3.5–5.5)
SODIUM BLD-SCNC: 142 MMOL/L (ref 132–146)
TOTAL PROTEIN: 5.9 G/DL (ref 6.4–8.3)
WBC # BLD: 12 E9/L (ref 4.5–11.5)

## 2021-04-06 PROCEDURE — 6370000000 HC RX 637 (ALT 250 FOR IP): Performed by: SPECIALIST

## 2021-04-06 PROCEDURE — 1200000000 HC SEMI PRIVATE

## 2021-04-06 PROCEDURE — 36415 COLL VENOUS BLD VENIPUNCTURE: CPT

## 2021-04-06 PROCEDURE — 6360000002 HC RX W HCPCS: Performed by: INTERNAL MEDICINE

## 2021-04-06 PROCEDURE — 80053 COMPREHEN METABOLIC PANEL: CPT

## 2021-04-06 PROCEDURE — 6370000000 HC RX 637 (ALT 250 FOR IP): Performed by: NURSE PRACTITIONER

## 2021-04-06 PROCEDURE — 97530 THERAPEUTIC ACTIVITIES: CPT

## 2021-04-06 PROCEDURE — 84100 ASSAY OF PHOSPHORUS: CPT

## 2021-04-06 PROCEDURE — 6370000000 HC RX 637 (ALT 250 FOR IP): Performed by: INTERNAL MEDICINE

## 2021-04-06 PROCEDURE — 2500000003 HC RX 250 WO HCPCS: Performed by: INTERNAL MEDICINE

## 2021-04-06 PROCEDURE — 6360000002 HC RX W HCPCS: Performed by: SURGERY

## 2021-04-06 PROCEDURE — 6360000002 HC RX W HCPCS: Performed by: NURSE PRACTITIONER

## 2021-04-06 PROCEDURE — 6370000000 HC RX 637 (ALT 250 FOR IP): Performed by: STUDENT IN AN ORGANIZED HEALTH CARE EDUCATION/TRAINING PROGRAM

## 2021-04-06 PROCEDURE — 83735 ASSAY OF MAGNESIUM: CPT

## 2021-04-06 PROCEDURE — 2700000000 HC OXYGEN THERAPY PER DAY

## 2021-04-06 PROCEDURE — 85025 COMPLETE CBC W/AUTO DIFF WBC: CPT

## 2021-04-06 PROCEDURE — 97535 SELF CARE MNGMENT TRAINING: CPT

## 2021-04-06 PROCEDURE — P9047 ALBUMIN (HUMAN), 25%, 50ML: HCPCS | Performed by: INTERNAL MEDICINE

## 2021-04-06 PROCEDURE — 2580000003 HC RX 258: Performed by: INTERNAL MEDICINE

## 2021-04-06 PROCEDURE — 99222 1ST HOSP IP/OBS MODERATE 55: CPT | Performed by: SURGERY

## 2021-04-06 PROCEDURE — 2580000003 HC RX 258: Performed by: SURGERY

## 2021-04-06 RX ORDER — POTASSIUM BICARBONATE 25 MEQ/1
50 TABLET, EFFERVESCENT ORAL ONCE
Status: DISCONTINUED | OUTPATIENT
Start: 2021-04-06 | End: 2021-04-06 | Stop reason: CLARIF

## 2021-04-06 RX ORDER — ACETAMINOPHEN 325 MG/1
650 TABLET ORAL EVERY 6 HOURS PRN
Status: DISCONTINUED | OUTPATIENT
Start: 2021-04-06 | End: 2021-04-22 | Stop reason: HOSPADM

## 2021-04-06 RX ORDER — MAGNESIUM SULFATE IN WATER 40 MG/ML
2000 INJECTION, SOLUTION INTRAVENOUS ONCE
Status: COMPLETED | OUTPATIENT
Start: 2021-04-06 | End: 2021-04-06

## 2021-04-06 RX ADMIN — MEROPENEM 500 MG: 500 INJECTION, POWDER, FOR SOLUTION INTRAVENOUS at 02:06

## 2021-04-06 RX ADMIN — HYDRALAZINE HYDROCHLORIDE 25 MG: 25 TABLET, FILM COATED ORAL at 05:07

## 2021-04-06 RX ADMIN — FLUCONAZOLE 100 MG: 100 TABLET ORAL at 09:02

## 2021-04-06 RX ADMIN — CARVEDILOL 6.25 MG: 6.25 TABLET, FILM COATED ORAL at 21:36

## 2021-04-06 RX ADMIN — HYDRALAZINE HYDROCHLORIDE 25 MG: 25 TABLET, FILM COATED ORAL at 21:36

## 2021-04-06 RX ADMIN — PANTOPRAZOLE SODIUM 40 MG: 40 TABLET, DELAYED RELEASE ORAL at 05:07

## 2021-04-06 RX ADMIN — MEROPENEM 500 MG: 500 INJECTION, POWDER, FOR SOLUTION INTRAVENOUS at 14:16

## 2021-04-06 RX ADMIN — CHLOROTHIAZIDE SODIUM 500 MG: 500 INJECTION, POWDER, LYOPHILIZED, FOR SOLUTION INTRAVENOUS at 09:33

## 2021-04-06 RX ADMIN — POTASSIUM BICARBONATE 40 MEQ: 782 TABLET, EFFERVESCENT ORAL at 11:49

## 2021-04-06 RX ADMIN — BUMETANIDE 1 MG/HR: 0.25 INJECTION INTRAMUSCULAR; INTRAVENOUS at 14:16

## 2021-04-06 RX ADMIN — ALBUMIN (HUMAN) 25 G: 0.25 INJECTION, SOLUTION INTRAVENOUS at 02:35

## 2021-04-06 RX ADMIN — SODIUM BICARBONATE 650 MG: 650 TABLET ORAL at 14:16

## 2021-04-06 RX ADMIN — SODIUM BICARBONATE 650 MG: 650 TABLET ORAL at 09:03

## 2021-04-06 RX ADMIN — MORPHINE SULFATE 2 MG: 2 INJECTION, SOLUTION INTRAMUSCULAR; INTRAVENOUS at 21:37

## 2021-04-06 RX ADMIN — MAGNESIUM SULFATE HEPTAHYDRATE 2000 MG: 40 INJECTION, SOLUTION INTRAVENOUS at 11:49

## 2021-04-06 RX ADMIN — ATORVASTATIN CALCIUM 40 MG: 40 TABLET, FILM COATED ORAL at 21:36

## 2021-04-06 RX ADMIN — SODIUM BICARBONATE 650 MG: 650 TABLET ORAL at 21:36

## 2021-04-06 RX ADMIN — ISOSORBIDE MONONITRATE 30 MG: 30 TABLET, EXTENDED RELEASE ORAL at 09:02

## 2021-04-06 RX ADMIN — CHLOROTHIAZIDE SODIUM 500 MG: 500 INJECTION, POWDER, LYOPHILIZED, FOR SOLUTION INTRAVENOUS at 22:52

## 2021-04-06 RX ADMIN — HYDRALAZINE HYDROCHLORIDE 25 MG: 25 TABLET, FILM COATED ORAL at 14:16

## 2021-04-06 RX ADMIN — CARVEDILOL 6.25 MG: 6.25 TABLET, FILM COATED ORAL at 09:02

## 2021-04-06 ASSESSMENT — PAIN DESCRIPTION - ORIENTATION: ORIENTATION: RIGHT;LEFT

## 2021-04-06 ASSESSMENT — PAIN DESCRIPTION - DESCRIPTORS: DESCRIPTORS: BURNING;SORE

## 2021-04-06 ASSESSMENT — PAIN DESCRIPTION - PAIN TYPE: TYPE: ACUTE PAIN

## 2021-04-06 ASSESSMENT — PAIN SCALES - GENERAL: PAINLEVEL_OUTOF10: 0

## 2021-04-06 NOTE — CONSULTS
Surgical Endoscopy Consult  [facility], [place]    Patient's Name/Date of Birth: Stephen Boyce / 1940    Date: April 6, 2021     Consulting Surgeon: Destin Mann MD    PCP: Torie Gupta DO     Chief Complaint: pancreatic pseudocyst    HPI:   Stephen Boyce is a 68 y.o. female who presents for evaluation of anemia and GI bleed. She had imaging that revealed large peripancreatic collection concerning for pseudocyst. 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 She was seen by [facility] surgery and EUS was recommended for pancreatic collection. I was consulted for further evaluation.        Patient Active Problem List   Diagnosis    Shortness of breath    Chronic combined systolic and diastolic congestive heart failure (HCC)    Chronic renal insufficiency, stage IV (severe) (HCC)    Atrial fibrillation (HCC)    Hypertension    Abnormal chest x-ray    Anemia of chronic disease    Opacity of lung on imaging study    Cigarette smoker    Tobacco abuse counseling    Acute on chronic combined systolic and diastolic CHF (congestive heart failure) (HCC)    Acute on chronic congestive heart failure (HCC)    Acute systolic CHF (congestive heart failure) (HCC)    GI bleed    Preoperative cardiovascular examination    Bowel perforation (HCC)       No Known Allergies    Past Medical History:   Diagnosis Date    Arthritis     Atrial fibrillation ([address]     Blood circulation, collateral     CHF (congestive heart failure) ([address]     Chronic renal insufficiency, stage IV (severe) ([address] 11/19/2016    History of cardiovascular stress test 07/2015    Lexiscan stress test    History of echocardiogram 07/27/2015    EF 29%    Hyperlipidemia     Hypertension        Past Surgical History:   Procedure Laterality Date    COLONOSCOPY N/A 4/3/2021    COLONOSCOPY POLYPECTOMY HOT SNARE performed by Denia Laguna DO at [id number] [place] [address] COLONOSCOPY  4/3/2021    COLONOSCOPY WITH BIOPSY performed by Colin Hahn DO at [facility]  4/3/2021    COLONOSCOPY CONTROL HEMORRHAGE performed by Colin Hanh DO at [facility]  4/3/2021    COLONOSCOPY SUBMUCOSAL SPOT INJECTION performed by Colin Hahn DO at [place] [address]      UPPER GASTROINTESTINAL ENDOSCOPY N/A 4/3/2021    EGD BIOPSY performed by Colin Hahn DO at [facility] Marital status:      Spouse name: Not on file    Number of children: Not on file    Years of education: Not on file    Highest education level: Not on file   Occupational History    Not on file   Social Needs    Financial resource strain: Not on file    Food insecurity     Worry: Not on file     Inability: Not on file    Transportation needs     Medical: Not on file     Non-medical: Not on file   Tobacco Use    Smoking status: Current Every Day Smoker     Packs/day: 0.50    Smokeless tobacco: Never Used   Substance and Sexual Activity    Alcohol use: Not Currently     Comment: socially    Drug use: Never    Sexual activity: Not Currently   Lifestyle    Physical activity     Days per week: Not on file     Minutes per session: Not on file    Stress: Not on file   Relationships    Social connections     Talks on phone: Not on file     Gets together: Not on file     Attends Orthodoxy service: Not on file     Active member of club or organization: Not on file     Attends meetings of clubs or organizations: Not on file     Relationship status: Not on file    Intimate partner violence     Fear of current or ex partner: Not on file     Emotionally abused: Not on file     Physically abused: Not on file     Forced sexual activity: Not on file   Other Topics Concern    Not on file   Social History Narrative    Not on file       The patient has a family history that is negative for severe cardiovascular or respiratory issues, negative for reaction to anesthesia. Recent Labs     04/03/21  2158 04/04/21  0612 04/05/21  0532   WBC  --  10.7 10.4   HGB 9.0* 9.4* 8.3*   HCT 28.7* 28.8* 25.7*   MCV  --  95.7 98.1   PLT  --  285 210       Recent Labs     04/04/21  0612 04/05/21  0531    141   K 3.8 3.5   CO2 18* 22   BUN 37* 36*   CREATININE 3.3* 3.3*       Recent Labs     04/04/21  0612 04/05/21  0531   PROT 5.5* 5.8*         Intake/Output Summary (Last 24 hours) at 4/6/2021 4474  Last data filed at 4/6/2021 0551  Gross per 24 hour   Intake 530 ml   Output 3800 ml   Net -3270 ml       I have reviewed relevant labs from this admission and interpretation is included in my assessment and plan      Review of Systems  A complete 10 system review was performed and are otherwise negative unless mentioned in the above HPI. Specific negatives are listed below but may not include all those reviewed.     General ROS: negative obtundation, AMS  ENT ROS: negative rhinorrhea, epistaxis  Allergy and Immunology ROS: negative itchy/watery eyes or nasal congestion  Hematological and Lymphatic ROS: negative spontaneous bleeding or bruising  Endocrine ROS: negative  lethargy, mood swings, palpitations or polydipsia/polyuria  Respiratory ROS: negative sputum changes, stridor, tachypnea or wheezing  Cardiovascular ROS: negative for - loss of consciousness, murmur or orthopnea  Gastrointestinal ROS: negative for - hematochezia or hematemesis  Genito-Urinary ROS: negative for -  genital discharge or hematuria  Musculoskeletal ROS: negative for - focal weakness, gangrene  Psych/Neuro ROS: negative for - visual or auditory hallucinations, suicidal ideation      Physical exam:   BP (!) 149/81   Pulse 93   Temp 97.5 °F (36.4 °C) (Infrared)   Resp 18   Ht 5' 5\" (1.651 m)   Wt 138 lb (62.6 kg)   SpO2 95%   BMI 22.96 kg/m²   General appearance:  NAD, appears stated age  Head: NCAT, PERRLA, EOMI, red conjunctiva  Neck: supple, no masses, trachea midline  Lungs: Equal chest rise bilateral, no retractions, no wheezing  Heart: Reg rate  Abdomen: soft, Nontender, non distended. Skin; warm and dry, no cyanosis  Gu: no cva tenderness  Extremities: atraumatic, no focal motor deficits, no open wounds  Psych: No tremor, visual hallucinations    Pathology: pending    Radiology: I reviewed relevant abdominal imaging from this admission and that available in the EMR including CT abd/pel from 3/31/21. My assessment is pancreatic tail pseudocyst    Assessment:  Vitaly Grier is a 68 y.o. female with pancreatic pseudocyst, possible subepithelial lesion of duodenal bulb    Patient Active Problem List   Diagnosis    Shortness of breath    Chronic combined systolic and diastolic congestive heart failure (HCC)    Chronic renal insufficiency, stage IV (severe) (HCC)    Atrial fibrillation (HCC)    Hypertension    Abnormal chest x-ray    Anemia of chronic disease    Opacity of lung on imaging study    Cigarette smoker    Tobacco abuse counseling    Acute on chronic combined systolic and diastolic CHF (congestive heart failure) (HCC)    Acute on chronic congestive heart failure (HCC)    Acute systolic CHF (congestive heart failure) (HCC)    GI bleed    Preoperative cardiovascular examination    Bowel perforation ([address]       Plan:  Discussed proceeding with EUS prior to Discharge. Patient is in agreement. Likely 4/7/21  -The procedure, risks, benefits and alternatives were discussed with patient. she   agrees to proceed. Time spent reviewing past medical, surgical, social and family history, vitals, nursing assessment and images. Time spent face to face with patient and family counciling and discussing care exceeded 50% of the time of the consult. Additional time spent reviewing images and labs, discussing case with nursing, support staff and other physicians; as well as coordinating care.         Physician Signature: Electronically

## 2021-04-06 NOTE — PROGRESS NOTES
Intake/Output Summary (Last 24 hours) at 4/6/2021 0946  Last data filed at 4/6/2021 0551  Gross per 24 hour   Intake 410 ml   Output 3800 ml   Net -3390 ml   I/O last 3 completed shifts: In: 530 [P.O.:380; IV Piggyback:150]  Out: 3800 [Urine:3800]  Patient Vitals for the past 96 hrs (Last 3 readings):   Weight   04/06/21 0527 138 lb (62.6 kg)   04/05/21 0604 137 lb 4.8 oz (62.3 kg)   04/04/21 0533 135 lb (61.2 kg)     Vital Signs:   Blood pressure (!) 149/81, pulse 93, temperature 97.5 °F (36.4 °C), temperature source Infrared, resp. rate 18, height 5' 5\" (1.651 m), weight 138 lb (62.6 kg), SpO2 95 %. General appearance:  Awake and alert. No apparent distress. Head:  Normocephalic. No masses, lesions or tenderness. Eyes:  PERRLA. EOMI. Sclera clear. Impaired vision. ENT:  Ears normal. Mucosa normal. Nasal cannula oxygen is in place. Neck:    Supple. Trachea midline. No thyromegaly. No JVD. No bruits. Heart:    S1 and S2, somewhat irregular rhythm with controlled rate, systolic murmur appreciated  Lungs:    Fairly good aeration throughout. No significant wheezes, rhonchi, or rales. Abdomen:   Sof mildly tender to palpation. Voluntary guarding is elicited. Bowel sounds are active. Extremities:    Peripheral pulses present, decreased in bilateral lower extremities. Small partial-thickness wound noted to the lateral aspect of the left fifth toe. 1-2+ pitting edema involving bilateral lower extremities. Neurologic:    Alert x 3. No focal deficit. Cranial nerves grossly intact. No focal weakness. Psych:   Behavior is normal. Mood appears normal. Speech is not rapid and/or pressured. Musculoskeletal:   Spine ROM normal. Muscular strength intact. Gait not assessed. Integumentary:  No rashes  Skin normal color and texture. Genitalia/Breast:  Voiding with the use of a Jane catheter.     Medication:  Scheduled Meds:   fluconazole  100 mg Oral Daily    pantoprazole  40 mg Oral QAM AC    chlorothiazide  500 mg Intravenous BID    sodium bicarbonate  650 mg Oral 4x Daily    atorvastatin  40 mg Oral Nightly    carvedilol  6.25 mg Oral BID    hydrALAZINE  25 mg Oral 3 times per day    isosorbide mononitrate  30 mg Oral Daily    [START ON 4/7/2021] vitamin D  50,000 Units Oral Weekly    meropenem (MERREM) IVPB  500 mg Intravenous Q12H     Continuous Infusions:   bumetanide 0.1 mg/mL infusion 1 mg/hr (04/05/21 2100)    sodium chloride      sodium chloride         Objective Data:  CBC with Differential:    Lab Results   Component Value Date    WBC 12.0 04/06/2021    RBC 2.78 04/06/2021    HGB 8.5 04/06/2021    HCT 27.2 04/06/2021     04/06/2021    MCV 97.8 04/06/2021    MCH 30.6 04/06/2021    MCHC 31.3 04/06/2021    RDW 19.5 04/06/2021    NRBC 0.9 03/31/2021    LYMPHOPCT 16.4 04/06/2021    MONOPCT 8.6 04/06/2021    MYELOPCT 0.9 04/03/2021    BASOPCT 0.3 04/06/2021    MONOSABS 1.03 04/06/2021    LYMPHSABS 1.96 04/06/2021    EOSABS 0.04 04/06/2021    BASOSABS 0.03 04/06/2021     CMP:    Lab Results   Component Value Date     04/06/2021    K 3.3 04/06/2021    K 3.9 03/31/2021     04/06/2021    CO2 26 04/06/2021    BUN 35 04/06/2021    CREATININE 2.9 04/06/2021    GFRAA 19 04/06/2021    LABGLOM 19 04/06/2021    GLUCOSE 118 04/06/2021    GLUCOSE 102 11/17/2010    PROT 5.9 04/06/2021    LABALBU 3.6 04/06/2021    CALCIUM 8.6 04/06/2021    BILITOT 1.0 04/06/2021    ALKPHOS 94 04/06/2021    AST 13 04/06/2021    ALT 7 04/06/2021       Assessment:  1. 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 Acute on chronic kidney disease stage IV currently undergoing diuresis  4.  Abdominal aortic participating in medical decisions. I also discussed the differential diagnosis and all of the proposed management plans with the patient and individuals accompanying the patient. Claribel Jeffers requires this high level of physician care and nursing on the IMC/Telemetry unit due the complexity of decision management and chance of rapid decline or death. Continued cardiac monitoring and higher level of nursing are required. I am readily available for any further decision-making and intervention.      Anastacio Cheadle, MADELAINE-CNP  9:46 AM  4/6/2021

## 2021-04-06 NOTE — PROGRESS NOTES
PROGRESS NOTE  By Fiorella Serna M.D. [facility]  Dr. Mame Porter M.D.,  Dr. Angelo Davison M.D.,   Dr. Rupinder Martinez D.O.,  Dr. Esau Lafleur M.D.,  Dr. Jenae Beltran D.O.,  Maxx Gonzalez D.O. Viktoria Barber  68 y.o.  female    SUBJECTIVE:  Complains of diffuse abdominal pain    OBJECTIVE:    BP (!) 149/81   Pulse 93   Temp 97.5 °F (36.4 °C) (Infrared)   Resp 18   Ht 5' 5\" (1.651 m)   Wt 138 lb (62.6 kg)   SpO2 95%   BMI 22.96 kg/m²     General:NAD/elderly  female. HEENT: Anicteric sclera/moist oral mucosa  Neck: Supple/trachea is midline  Chest: CTA B  Cor: Regular  Abd.: Soft/diffusely tender. No guarding  Extr.:  Decreased muscle tone and bulk throughout  Skin: Warm and dry        DATA:    Monitor data reviewed -sinus rhythm with PACs versus atrial fibrillation . Stool (measured) : 0 mL  Lab Results   Component Value Date    WBC 12.0 04/06/2021    RBC 2.78 04/06/2021    HGB 8.5 04/06/2021    HCT 27.2 04/06/2021    MCV 97.8 04/06/2021    MCH 30.6 04/06/2021    MCHC 31.3 04/06/2021    RDW 19.5 04/06/2021     04/06/2021    MPV 11.4 04/06/2021     Lab Results   Component Value Date     04/06/2021    K 3.3 04/06/2021    K 3.9 03/31/2021     04/06/2021    CO2 26 04/06/2021    BUN 35 04/06/2021    CREATININE 2.9 04/06/2021    CALCIUM 8.6 04/06/2021    PROT 5.9 04/06/2021    LABALBU 3.6 04/06/2021    BILITOT 1.0 04/06/2021    ALKPHOS 94 04/06/2021    AST 13 04/06/2021    ALT 7 04/06/2021     Lab Results   Component Value Date    LIPASE 40 03/31/2021     No results found for: AMYLASE      ASSESSMENT/PLAN:       1.  Hematochezia, acute blood loss anemia  - no overt GI bleeding, H/H within variance, VSS  -EGD revealed multiple esophageal diverticuli, gastritis and duodenal bulb lesion 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 (biopsies pending of antrum and bulb lesion)  - colonoscopy revealed few small polyps s/p polypectomy and sigmoid dieulafoy lesion with adherent clot s/p clip/cautery and tattoo  - continue to follow H/H and monitor for signs of bleeding  -Currently stable H&H  - transfuse as needed  - diet as tolerated  - oral PPI once daily x 8 weeks  - follow up as outpatient     2. LUQ abdominal lesion, Pancreatic pseudocyst  - likely related to pancreatic cyst, currently resolving   - continue supportive care  - recommend repeat MRI/MRCP as outpatient as department will not repeat it during this admission   -General surgery input appreciated     3. HFrEF, pAFib, volume overload, REYES/CKD  - appreciate cardiology/nephrology management and recommendations        Soco Greene MD  4/6/2021  9:52 AM    NOTE:  This report was transcribed using voice recognition software. Every effort was made to ensure accuracy; however, inadvertent computerized transcription errors may be present.

## 2021-04-06 NOTE — PLAN OF CARE
Problem: Falls - Risk of:  Goal: Will remain free from falls  Description: Will remain free from falls  4/6/2021 1438 by Radha Hood RN  Outcome: Met This Shift     Problem: Falls - Risk of:  Goal: Absence of physical injury  Description: Absence of physical injury  Outcome: Met This Shift     Problem: Skin Integrity:  Goal: Will show no infection signs and symptoms  Description: Will show no infection signs and symptoms  Outcome: Met This Shift

## 2021-04-06 NOTE — PROGRESS NOTES
Adiel Ovalle DO at [facility] 9  4/3/2021    COLONOSCOPY SUBMUCOSAL SPOT INJECTION performed by Iker Marsh DO at [place] [address]      UPPER GASTROINTESTINAL ENDOSCOPY N/A 4/3/2021    EGD BIOPSY performed by Iker Marsh DO at [facility] ENDOSCOPY       Precautions: Activity as tolerated, falls and alarm ,  4L of O2 nc (wears 4L of O2 at  home as well)    SUBJECTIVE:    Social history: Patient lives alone  in a ranch home  with No steps  to enter Tub shower grab bars    Equipment owned: Omari Terry 108 cleared patient for PT treatment. pt agreed to therapy, sit at side of bed      OBJECTIVE;   Initial Evaluation  Date: 4/3/2021 Treatment Date:  4/6/2021     Short Term/ Long Term   Goals   Was pt agreeable to Eval/treatment? Yes  Yes To be met in 4 days   Pain level   10/10  Stomach  Not rated    Bed Mobility    Rolling: Supervision    Supine to sit: Minimal assist of 1   Sit to supine: Minimal assist of 1   Scooting: Minimal assist of 1  Rolling: Minimal assist of 1   Supine to sit: Moderate assist of 1performed by SOUSA   Sit to supine: Minimal assist of 2   Scooting: Minimal assist of 2    Rolling: Independent   Supine to sit: Independent   Sit to supine: Independent   Scooting: Independent    Transfers Sit to stand: Not assessed  patient refusing to stand today to due pain and fatigue. Sit to stand: Moderate assist of  2      Sit to stand: Independent    Ambulation    not assessed  3 feet using  wheeled walker with Moderate assist of  2  cues for upright posture, pt sidestepped to head of bed.       50 feet using  wheeled walker with Modified Independent    ROM Within functional limits       Strength BUE:  4/5  RLE:  4/5  LLE:  4/5  Increase strength in affected mm groups by 1/3 grade   Balance Sitting EOB:  fair +  Dynamic Standing:  not assessed  Sitting EOB: good   Dynamic Standing: fair with [employer] Sitting EOB:  good Dynamic Standing: good      Patient is Alert & Oriented x person, place, time and situation and follows directions   Sensation:  Patient  denies numbness and tingling     Edema:  no   Endurance: fair  -, due to pain and fatigue     Vitals: 4 liters nasal cannula   Blood Pressure at rest  Blood Pressure during session    Heart Rate at rest  Heart Rate during session 104   SPO2 at rest%  SPO2 during session 94%     Patient education  Patient educated on role of Physical Therapy, risks of immobility, safety and plan of care, energy conservation, importance of positional changes for oxygen exchange,  importance of mobility while in hospital , ankle pumps, quad set and glut set for edema control, blood clot prevention and safety    Patient was educated and facilitated on techniques to increase safety and independence with bed mobility, balance, functional transfers, and functional mobility. Patient was explained the the benefits of mobility and risks of immobility. Patient response to education:   Pt verbalized understanding Pt demonstrated skill Pt requires further education in this area   Yes Partial Yes      Treatment:  Patient practiced and was instructed/facilitated in the following treatment: Sat edge of bed 15 minutes with Supervision  to increase dynamic sitting balance and activity tolerance. Patient stood with mod assist of 2 with WW, sidestepped to head of bed, pt stood x 2 reps. Returned to supine with head of bed elevated, bilateral lower extremities elevated on pillow. Therapeutic Exercises:  ankle pumps and long arc quad   x 10-20 reps. At end of session, patient in bed with alarm call light and phone within reach,  all lines and tubes intact, nursing notified. Patient would benefit from continued skilled Physical Therapy to improve functional independence and quality of life.      Patient required co-treat with occupational therapy due to the level of physical assistance needed and the patients endurance level to tolerate separate sessions. Patient's/ family goals   none stated      ASSESSMENT: Patient exhibits decreased strength, balance, endurance and pain in stomach  impairing functional mobility, transfers, gait distance and tolerance to activity. Patient  with decreased balance and endurance today limiting function. At risk for falls . Pt with increased fatigue . Plan of Care:     -Sitting Balance: Incorporate reaching activities to activate trunk muscles   -Standing Balance: Perform strengthening exercises in standing to promote motor control with or without upper extremity support   -Transfers: Provide instruction on proper hand and foot position for adequate transfer of weight onto lower extremities and use of gait device  -Gait: Gait training and Standing activities to improve: base of support, weight shift, weight bearing      Patient and or family understand(s) diagnosis, prognosis, and plan of care. Frequency of treatments: Patient will be seen  daily.        Time in  322  Time out  347    Total Treatment Time  8 minutes    CPT codes:  Therapeutic activities (44856)   8 minutes  1 unit(s)  Non billable time 17 minutes    Dani [place], [place] [address]

## 2021-04-06 NOTE — PROGRESS NOTES
OT BEDSIDE TREATMENT NOTE      Date:2021  Patient Name: Enedina Carias  MRN: [id number]  : 1940  Room: [address] [place] [address]     Referring Provider:        Jessy Oro DO         Evaluating OT: GÓMEZ Canales/L #[id number]     AM-PAC Daily Activity Raw Score:      Recommended Placement: Subacute rehab  Recommended Adaptive Equipment: TBD      Diagnosis:   1. Bowel perforation ([facility]    2. Pancreatic mass    3. Anemia, unspecified type    4. Rectal bleeding    5. Chronic kidney disease, unspecified CKD stage          Surgery: N/A      Pertinent Medical History:    Past Medical History        Past Medical History:   Diagnosis Date    Arthritis      Atrial fibrillation (HCC)      Blood circulation, collateral      CHF (congestive heart failure) (HCC)      Chronic renal insufficiency, stage IV (severe) ([facility] 2016    History of cardiovascular stress test 2015     Lexiscan stress test    History of echocardiogram 2015     EF 29%    Hyperlipidemia      Hypertension           Precautions:  Falls, alarm, O2     Home Living: Pt lives alone in a 1 story home with 0 LINDA with 0 rail(s).   Bathroom setup: tub/shower with grab bars   Equipment owned: [employer], cane     Prior Level of Function: Independent with ADLs , Independent  with IADLs; ambulated with cane  Driving: No  Occupation: Not reported     Pain Level: -/10  Cognition: A&O: 4/4; Follows 1-2 step directions              Memory:  Fair+              Sequencing:  fair              Problem solving:  fair              Judgement/safety:  fair                Functional Assessment:    Initial Eval Status  Date: 21 Treatment Status  Date: 21 STGs = LTGs  Time frame: 5-7 days   Feeding Independent     n/t      Grooming Minimal Assist    MIN A seated EOB  Independent    UB Dressing Minimal Assist     MIN A pt requiring assist to thread hospital gown over B UE shoulders, tie/adjust in back  Independent    LB Dressing Maximal Assist     MAX A to raghu/doff socks bed level  Minimal Assist    Bathing Maximal Assist    n/t Minimal Assist    Toileting Maximal Assist   Incontinent of bowel, assist of 2 to stand for hygiene and clothing management. Pt using bedpan while up in chair, assist with hygiene and clothing management. Cuing on body mechanics, safety, sequencing and PLB. n/t Minimal Assist    Bed Mobility  Supine to sit: Moderate Assist   Sit to supine: NT  Pt up with PT   Assist with BLE due to swelling and weakness, assist with scooting out to EOB. Supine>sit MOD A   Sit>supine MIN A x2   Pt requiring assist to bring B LE in/out of bed v/c's for hand placement   Supine to sit: Minimal Assist   Sit to supine: Minimal Assist    Functional Transfers Moderate Assist of 2   Sit<>stand  Bed, chair  Cuing on hand placement, body mechanics, balance, sequencing, and safety  MOD A x2 sit<>stand from EOB v/c's for hand placement and safety  Stand by Assist    Functional Mobility Minimal Assist of 2  Using [employer]  Several steps at bedside  B knees buckling, L more than R. Cuing on being pelvis forward, balance, posture and body mechanics,  MOD A x2 side step to [facility] with HHA  Stand by Assist    Balance Sitting:     Static:  fair    Dynamic:fair  Standing: fair- Sitting:   Static: MIN A   Dynamic: MIN A   Standing: MOD A   Sitting:     Static:  Fair+    Dynamic:fair+  Standing: fair+   Activity Tolerance Fair-  Pt very weak, SOB throughout session. SpO2 94-97% on 4L Fair- completing light functional transfers, sitting balance, and ADL tasks  Fair+   Visual/  Perceptual Glasses: Not reported   [facility]             Comments: Upon arrival pt supine in bed agreeable to therapy session. Pt educated with regards to bed mobility, functional transfers, static/dynamic sitting balance, UE/LE dressing. At end of session pt supine in bed,  all lines and tubes intact, call light within reach. · Pt has made fair  progress towards set goals.    · Continue with current plan of care      Treatment Time STU:6157            Treatment Time Out: 2447                Treatment Charges: Mins Units   Ther Ex  81557     Manual Therapy 03110     Thera Activities 60424 14 1   ADL/Home Mgt [id number] 10    [facility] Re-ed [id number]     Group Therapy      Orthotic manage/training  98415     Non-Billable Time 8     Total Timed Treatment 32 [address] [facility] [id number]

## 2021-04-06 NOTE — CARE COORDINATION
4/6/2021 1245 CM note: No covid test. Pt has home O2 at 4L NC through [facility]. Currently on IV bumex gtt. Per [facility], pt accepted. Pt;s dtr Tyler Morris informed. João Munroe at [facility] informed) For SNF, will need PRECERT, rapid COVID TEST,HENS and signed LUBA.  Charlene THEODORE

## 2021-04-06 NOTE — PROGRESS NOTES
[facility] MD Sachin Cantrell MD. Salma Carranza MD   Progress Note    4/6/2021    SUBJECTIVE:     Pt known to me from office . I actually saw her in [facility] 1-2 weeks back . She was last seen as inpt in [facility] by our group back in 2016 . Case discussed with Dr Gautam Juarez . 4/3: Seen this am in her room , o2/nc at 4 L which is her home o2 . She did undergo EGD/colonscopy this am . Results noted   Continue to look volume overloaded with 2 + edema and JVD . 4/4 ; stable vitlas , good UO on bumex drip/diuril . Will assess in am if we can wean her off drip   4/5: Patient was doing well this morning, she is awake and alert with no acute distress. We will continue same medications and same plan of care for today. 4/6 \" Seen this am , pleasant , o2/nc . Good UO . Cr stable . Plan for EUS in am     PROBLEM LIST:    Principal Problem:    GI bleed  Active Problems:    Preoperative cardiovascular examination    Bowel perforation (HCC)  Resolved Problems:    * No resolved hospital problems. *       DIET:    DIET GENERAL; Low Sodium (2 GM)  Diet NPO, After Midnight Exceptions are: Sips of Water with Meds       Allergies : Patient has no known allergies.     Past Medical History:   Diagnosis Date    Arthritis     Atrial fibrillation ([address]     Blood circulation, collateral     CHF (congestive heart failure) (HCC)     Chronic renal insufficiency, stage IV (severe) ([address] 11/19/2016    History of cardiovascular stress test 07/2015    Lexiscan stress test    History of echocardiogram 07/27/2015    EF 29%    Hyperlipidemia     Hypertension        Past Surgical History:   Procedure Laterality Date    COLONOSCOPY N/A 4/3/2021    COLONOSCOPY POLYPECTOMY HOT SNARE performed by Linda Diez DO at [address]  4/3/2021    COLONOSCOPY WITH BIOPSY performed by Linda Diez DO at [address] 4/3/2021    COLONOSCOPY CONTROL HEMORRHAGE performed by Leticia Sainz DO at [address]  4/3/2021    COLONOSCOPY SUBMUCOSAL SPOT INJECTION performed by Leticia Sainz DO at [address] GASTROINTESTINAL ENDOSCOPY N/A 4/3/2021    EGD BIOPSY performed by Leticia Sainz DO at [address] reviewed. No pertinent family history. reports that she has been smoking. She has been smoking about 0.50 packs per day. She has never used smokeless tobacco. She reports previous alcohol use. She reports that she does not use drugs. Review of Systems:   Constitutional:weak   Eyes: no eye pain , no itching , no drainage  Ears, nose, mouth, throat, and face: no ear ,nose pain , hearing is ok ,no nasal drainage   Respiratory: no sob ,no cough ,no wheezing . Cardiovascular: no chest pain , no palpitation ,no sob . Gastrointestinal: no nausea, vomiting , constipation , no abdominal pain . Genitourinary:no urinary retention , no burning , dysuria . No polyuria   Hematologic/lymphatic: no bleeding , no cougulation issues . Musculoskeletal:no joint pain , no swelling . Neurological: no headaches ,no weakness , no numbness . Endocrine: no thirst , no weight issues .      MEDS (scheduled):    fluconazole  100 mg Oral Daily    pantoprazole  40 mg Oral QAM AC    chlorothiazide  500 mg Intravenous BID    sodium bicarbonate  650 mg Oral 4x Daily    atorvastatin  40 mg Oral Nightly    carvedilol  6.25 mg Oral BID    hydrALAZINE  25 mg Oral 3 times per day    isosorbide mononitrate  30 mg Oral Daily    [START ON 4/7/2021] vitamin D  50,000 Units Oral Weekly    meropenem (MERREM) IVPB  500 mg Intravenous Q12H       MEDS (infusions):   bumetanide 0.1 mg/mL infusion 1 mg/hr (04/06/21 1416)    sodium chloride      sodium chloride         MEDS (prn):  acetaminophen, sodium chloride, sodium chloride, albuterol, ondansetron, promethazine, morphine PHYSICAL EXAM:     Patient Vitals for the past 24 hrs:   BP Temp Temp src Pulse Resp SpO2 Weight   04/06/21 0930 (!) 156/85 98.6 °F (37 °C) Oral 98 18 93 %    04/06/21 0527       138 lb (62.6 kg)   04/06/21 0507 (!) 149/81         04/05/21 1930 (!) 152/89 97.5 °F (36.4 °C) Infrared 93 18 95 %    @      Intake/Output Summary (Last 24 hours) at 4/6/2021 1526  Last data filed at 4/6/2021 1512  Gross per 24 hour   Intake 550 ml   Output 5200 ml   Net -4650 ml         Wt Readings from Last 3 Encounters:   04/06/21 138 lb (62.6 kg)   10/08/19 116 lb 14.4 oz (53 kg)   06/03/19 130 lb (59 kg)       Constitutional:  in no acute distress  Oral: mucus membranes moist  Neck: 2+ JVD   Cardiovascular: S1, S2 regular rhythm, no murmur,or rub  Respiratory:  No crackles, no wheeze  Gastrointestinal:  Soft, nontender, nondistended, NABS  Ext: 2+ edema bl   Skin: dry, no rash  Neuro: awake, alert, interactive      DATA:    Recent Labs     04/04/21  0612 04/05/21  0532 04/06/21  0644   WBC 10.7 10.4 12.0*   HGB 9.4* 8.3* 8.5*   HCT 28.8* 25.7* 27.2*   MCV 95.7 98.1 97.8    210 196     Recent Labs     04/04/21  0612 04/05/21  0531 04/06/21  0644    141 142   K 3.8 3.5 3.3*   * 108* 102   CO2 18* 22 26   MG  --   --  1.5*   PHOS  --   --  4.3   BUN 37* 36* 35*   CREATININE 3.3* 3.3* 2.9*   ALT 10 9 7   AST 16 29 13   BILITOT 0.5 0.6 1.0   ALKPHOS 111* 99 94       No results found for: LABPROT    ASSESSMENT / RECOMMENDATIONS:      1) REYES  in the setting of GI bleed ,low EF /CHF     2) Chronic kidney disease stage IV baseline cr 2-2.7     3) 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  Results noted (multiple esophageal diverticuli, gastritis and duodenal bulb lesion 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 (biopsies pending of antrum and bulb lesion))     5) ischaemic cardiomyopathy low EF at 29% decompensated     6) chronic respiratory failure on 3-4 L o2/home Plan :     Continue bumex drip at 1mg/hr /diuril 500 mg bid iv .   F/u serial BMPs , UO           Electronically signed by Fabrizio Palomo MD on 4/6/2021 at 3:26 PM

## 2021-04-06 NOTE — PROGRESS NOTES
Patient refusing both ultrasound and vascular studies today due to fatigue and wants them done tomorrow per [facility]/transport. Nurse notified by Theodoro Baumgarten.

## 2021-04-06 NOTE — CARE COORDINATION
4/6/2021 1140 CM note: No covid test. Pt has home O2 at 4L NC through [address]. Currently on IV bumex gtt. Per [place] [facility], facility can accept pt pending iv abx/ EUS findings/plan of treatment. Pt's dtr Laurel Saint informed of above and now prefers [facility] as first choice-will await review/acceptance. For SNF, will need PRECERT, COVID TEST,HENS and signed LUBA.  Charlene THEODORE

## 2021-04-07 ENCOUNTER — APPOINTMENT (OUTPATIENT)
Dept: INTERVENTIONAL RADIOLOGY/VASCULAR | Age: 81
DRG: 871 | End: 2021-04-07
Payer: MEDICARE

## 2021-04-07 ENCOUNTER — ANESTHESIA (OUTPATIENT)
Dept: ENDOSCOPY | Age: 81
DRG: 871 | End: 2021-04-07
Payer: MEDICARE

## 2021-04-07 ENCOUNTER — ANESTHESIA EVENT (OUTPATIENT)
Dept: ENDOSCOPY | Age: 81
DRG: 871 | End: 2021-04-07
Payer: MEDICARE

## 2021-04-07 ENCOUNTER — APPOINTMENT (OUTPATIENT)
Dept: GENERAL RADIOLOGY | Age: 81
DRG: 871 | End: 2021-04-07
Payer: MEDICARE

## 2021-04-07 VITALS — OXYGEN SATURATION: 89 % | DIASTOLIC BLOOD PRESSURE: 61 MMHG | SYSTOLIC BLOOD PRESSURE: 94 MMHG

## 2021-04-07 LAB
ALBUMIN SERPL-MCNC: 3.1 G/DL (ref 3.5–5.2)
ALP BLD-CCNC: 107 U/L (ref 35–104)
ALT SERPL-CCNC: 7 U/L (ref 0–32)
ANION GAP SERPL CALCULATED.3IONS-SCNC: 13 MMOL/L (ref 7–16)
AST SERPL-CCNC: 18 U/L (ref 0–31)
BASOPHILS ABSOLUTE: 0.05 E9/L (ref 0–0.2)
BASOPHILS RELATIVE PERCENT: 0.4 % (ref 0–2)
BILIRUB SERPL-MCNC: 0.9 MG/DL (ref 0–1.2)
BUN BLDV-MCNC: 38 MG/DL (ref 8–23)
CALCIUM SERPL-MCNC: 8.7 MG/DL (ref 8.6–10.2)
CHLORIDE BLD-SCNC: 98 MMOL/L (ref 98–107)
CO2: 28 MMOL/L (ref 22–29)
CREAT SERPL-MCNC: 2.8 MG/DL (ref 0.5–1)
EOSINOPHILS ABSOLUTE: 0.03 E9/L (ref 0.05–0.5)
EOSINOPHILS RELATIVE PERCENT: 0.2 % (ref 0–6)
GFR AFRICAN AMERICAN: 20
GFR NON-AFRICAN AMERICAN: 20 ML/MIN/1.73
GLUCOSE BLD-MCNC: 118 MG/DL (ref 74–99)
HCT VFR BLD CALC: 29.4 % (ref 34–48)
HEMOGLOBIN: 9.3 G/DL (ref 11.5–15.5)
IMMATURE GRANULOCYTES #: 0.06 E9/L
IMMATURE GRANULOCYTES %: 0.4 % (ref 0–5)
LYMPHOCYTES ABSOLUTE: 1.86 E9/L (ref 1.5–4)
LYMPHOCYTES RELATIVE PERCENT: 13.4 % (ref 20–42)
MCH RBC QN AUTO: 30.8 PG (ref 26–35)
MCHC RBC AUTO-ENTMCNC: 31.6 % (ref 32–34.5)
MCV RBC AUTO: 97.4 FL (ref 80–99.9)
MONOCYTES ABSOLUTE: 1.18 E9/L (ref 0.1–0.95)
MONOCYTES RELATIVE PERCENT: 8.5 % (ref 2–12)
NEUTROPHILS ABSOLUTE: 10.71 E9/L (ref 1.8–7.3)
NEUTROPHILS RELATIVE PERCENT: 77.1 % (ref 43–80)
PDW BLD-RTO: 19.1 FL (ref 11.5–15)
PLATELET # BLD: 197 E9/L (ref 130–450)
PMV BLD AUTO: 11.8 FL (ref 7–12)
POTASSIUM SERPL-SCNC: 3.8 MMOL/L (ref 3.5–5)
RBC # BLD: 3.02 E12/L (ref 3.5–5.5)
SARS-COV-2, NAAT: NOT DETECTED
SODIUM BLD-SCNC: 139 MMOL/L (ref 132–146)
TOTAL PROTEIN: 6 G/DL (ref 6.4–8.3)
WBC # BLD: 13.9 E9/L (ref 4.5–11.5)

## 2021-04-07 PROCEDURE — 2500000003 HC RX 250 WO HCPCS: Performed by: INTERNAL MEDICINE

## 2021-04-07 PROCEDURE — 3609020800 HC EGD W/EUS FNA: Performed by: SURGERY

## 2021-04-07 PROCEDURE — 2700000000 HC OXYGEN THERAPY PER DAY

## 2021-04-07 PROCEDURE — 2580000003 HC RX 258: Performed by: INTERNAL MEDICINE

## 2021-04-07 PROCEDURE — 93970 EXTREMITY STUDY: CPT

## 2021-04-07 PROCEDURE — 88305 TISSUE EXAM BY PATHOLOGIST: CPT

## 2021-04-07 PROCEDURE — 6360000002 HC RX W HCPCS: Performed by: INTERNAL MEDICINE

## 2021-04-07 PROCEDURE — 7100000011 HC PHASE II RECOVERY - ADDTL 15 MIN: Performed by: SURGERY

## 2021-04-07 PROCEDURE — 0FBD8ZX EXCISION OF PANCREATIC DUCT, VIA NATURAL OR ARTIFICIAL OPENING ENDOSCOPIC, DIAGNOSTIC: ICD-10-PCS | Performed by: SURGERY

## 2021-04-07 PROCEDURE — 6360000002 HC RX W HCPCS: Performed by: SURGERY

## 2021-04-07 PROCEDURE — 6370000000 HC RX 637 (ALT 250 FOR IP): Performed by: INTERNAL MEDICINE

## 2021-04-07 PROCEDURE — 1200000000 HC SEMI PRIVATE

## 2021-04-07 PROCEDURE — 87635 SARS-COV-2 COVID-19 AMP PRB: CPT

## 2021-04-07 PROCEDURE — 88173 CYTOPATH EVAL FNA REPORT: CPT

## 2021-04-07 PROCEDURE — 85025 COMPLETE CBC W/AUTO DIFF WBC: CPT

## 2021-04-07 PROCEDURE — 6370000000 HC RX 637 (ALT 250 FOR IP): Performed by: STUDENT IN AN ORGANIZED HEALTH CARE EDUCATION/TRAINING PROGRAM

## 2021-04-07 PROCEDURE — 7100000010 HC PHASE II RECOVERY - FIRST 15 MIN: Performed by: SURGERY

## 2021-04-07 PROCEDURE — 6370000000 HC RX 637 (ALT 250 FOR IP): Performed by: SPECIALIST

## 2021-04-07 PROCEDURE — 36415 COLL VENOUS BLD VENIPUNCTURE: CPT

## 2021-04-07 PROCEDURE — 43242 EGD US FINE NEEDLE BX/ASPIR: CPT | Performed by: SURGERY

## 2021-04-07 PROCEDURE — 3700000001 HC ADD 15 MINUTES (ANESTHESIA): Performed by: SURGERY

## 2021-04-07 PROCEDURE — 2709999900 HC NON-CHARGEABLE SUPPLY: Performed by: SURGERY

## 2021-04-07 PROCEDURE — 2580000003 HC RX 258: Performed by: SURGERY

## 2021-04-07 PROCEDURE — 2720000010 HC SURG SUPPLY STERILE: Performed by: SURGERY

## 2021-04-07 PROCEDURE — 6360000002 HC RX W HCPCS: Performed by: NURSE ANESTHETIST, CERTIFIED REGISTERED

## 2021-04-07 PROCEDURE — 80053 COMPREHEN METABOLIC PANEL: CPT

## 2021-04-07 PROCEDURE — 3700000000 HC ANESTHESIA ATTENDED CARE: Performed by: SURGERY

## 2021-04-07 PROCEDURE — 74018 RADEX ABDOMEN 1 VIEW: CPT

## 2021-04-07 RX ORDER — PROPOFOL 10 MG/ML
INJECTION, EMULSION INTRAVENOUS CONTINUOUS PRN
Status: DISCONTINUED | OUTPATIENT
Start: 2021-04-07 | End: 2021-04-07 | Stop reason: SDUPTHER

## 2021-04-07 RX ADMIN — MEROPENEM 500 MG: 500 INJECTION, POWDER, FOR SOLUTION INTRAVENOUS at 03:34

## 2021-04-07 RX ADMIN — HYDRALAZINE HYDROCHLORIDE 25 MG: 25 TABLET, FILM COATED ORAL at 05:15

## 2021-04-07 RX ADMIN — MORPHINE SULFATE 2 MG: 2 INJECTION, SOLUTION INTRAMUSCULAR; INTRAVENOUS at 18:24

## 2021-04-07 RX ADMIN — CHLOROTHIAZIDE SODIUM 500 MG: 500 INJECTION, POWDER, LYOPHILIZED, FOR SOLUTION INTRAVENOUS at 08:57

## 2021-04-07 RX ADMIN — SODIUM BICARBONATE 650 MG: 650 TABLET ORAL at 08:58

## 2021-04-07 RX ADMIN — CHLOROTHIAZIDE SODIUM 500 MG: 500 INJECTION, POWDER, LYOPHILIZED, FOR SOLUTION INTRAVENOUS at 22:27

## 2021-04-07 RX ADMIN — HYDRALAZINE HYDROCHLORIDE 25 MG: 25 TABLET, FILM COATED ORAL at 22:26

## 2021-04-07 RX ADMIN — BUMETANIDE 1 MG/HR: 0.25 INJECTION INTRAMUSCULAR; INTRAVENOUS at 01:45

## 2021-04-07 RX ADMIN — ERGOCALCIFEROL 50000 UNITS: 1.25 CAPSULE ORAL at 08:58

## 2021-04-07 RX ADMIN — CARVEDILOL 6.25 MG: 6.25 TABLET, FILM COATED ORAL at 08:58

## 2021-04-07 RX ADMIN — MORPHINE SULFATE 2 MG: 2 INJECTION, SOLUTION INTRAMUSCULAR; INTRAVENOUS at 22:24

## 2021-04-07 RX ADMIN — PANTOPRAZOLE SODIUM 40 MG: 40 TABLET, DELAYED RELEASE ORAL at 05:15

## 2021-04-07 RX ADMIN — SODIUM BICARBONATE 650 MG: 650 TABLET ORAL at 14:22

## 2021-04-07 RX ADMIN — FLUCONAZOLE 100 MG: 100 TABLET ORAL at 08:58

## 2021-04-07 RX ADMIN — ISOSORBIDE MONONITRATE 30 MG: 30 TABLET, EXTENDED RELEASE ORAL at 08:58

## 2021-04-07 RX ADMIN — CARVEDILOL 6.25 MG: 6.25 TABLET, FILM COATED ORAL at 22:26

## 2021-04-07 RX ADMIN — HYDRALAZINE HYDROCHLORIDE 25 MG: 25 TABLET, FILM COATED ORAL at 14:21

## 2021-04-07 RX ADMIN — SODIUM BICARBONATE 650 MG: 650 TABLET ORAL at 22:25

## 2021-04-07 RX ADMIN — MEROPENEM 500 MG: 500 INJECTION, POWDER, FOR SOLUTION INTRAVENOUS at 18:21

## 2021-04-07 RX ADMIN — PROPOFOL 50 MCG/KG/MIN: 10 INJECTION, EMULSION INTRAVENOUS at 16:36

## 2021-04-07 RX ADMIN — BUMETANIDE 1 MG/HR: 0.25 INJECTION INTRAMUSCULAR; INTRAVENOUS at 19:18

## 2021-04-07 RX ADMIN — ATORVASTATIN CALCIUM 40 MG: 40 TABLET, FILM COATED ORAL at 22:26

## 2021-04-07 ASSESSMENT — LIFESTYLE VARIABLES: SMOKING_STATUS: 1

## 2021-04-07 ASSESSMENT — ENCOUNTER SYMPTOMS: SHORTNESS OF BREATH: 1

## 2021-04-07 ASSESSMENT — PAIN SCALES - GENERAL: PAINLEVEL_OUTOF10: 10

## 2021-04-07 NOTE — OP NOTE
Endoscopic Ultrasound Procedure Note    Date of Procedure: 4/7/2021    Pre-procedure Diagnosis: Pancreatic tail cystic mass    Post-procedure Diagnosis: Pancreatic tail mass with solid component, choledocholithiasis, gallbladder sludge, pancreatic duct stone, chronic pancreatitis    Physician: Abena Arriaga MD    Assistant: None    Estimated Blood Loss: Estimated amount of blood loss is 5ml. Anesthesia: LMAC     Complications: None    Indications and History:  The patient is a 68 y.o. female. The risks, benefits, complications, treatment options and expected outcomes were discussed with the patient. The possibilities of reaction to medication, pulmonary aspiration, perforation of the gastrointestinal tract, bleeding requiring transfusion or operation, respiratory failure requiring placement on a ventilator and failure to diagnose a condition were discussed with the patient who freely signed the consent. Description of Procedure: The patient was taken to the endoscopy suite, identified as Julian Winn and the procedure verified as Endoscopic Ultrasound (EUS). A Time Out was held and the above information confirmed. The patient was positioned in the left lateral position with an oral bite block and anesthesia was provided for sedation and comfort. The echoendoscope was passed to the duodenal bulb. EGD/EUS findings:   Esophagus: normal   Stomach: Gastritis   Duodenum: normal   Pancreas: 1.2 cm oblong cyst with multiple septations adjacent to the distal common bile duct. Dilated pancreatic duct to 5 mm in the pancreatic head with a large pancreatic duct stone in the pancreatic body. Lobular contour of the pancreas with calcifications consistent with chronic pancreatitis. At least 5 cm irregular hyperdense mass in the tail end of the pancreas. This is likely a large calcified collection versus a walled off necrotic collection.   It does not have the appearance of a pancreatic pseudocyst or hematoma. This mass was biopsied using it 22-gauge FNA needle making 2 passes. There was a solid component of the mass and passing the needle. Bile Duct: Dilated to 11 mm with 5 mm shadowing stone in the distal common bile duct   Gallbladder: Gallbladder wall with sludge      Specimens:  1. FNA pancreatic tail abnormality    The Patient was taken to the Endoscopy Recovery area in satisfactory condition.       Electronically signed by Jack Farmer MD on 4/7/2021 at 5:05 PM

## 2021-04-07 NOTE — CARE COORDINATION
4/7/2021 1317 CM note: Rapid covid to be sent today 4/7/21-await results. Pt has home O2 at 4L NC through [facility]. Currently on IV bumex gtt and for EUS today. Per [facility], pt accepted and will initiate PRECERT. For SNF, will need PRECERT, rapid COVID TEST, and signed LUBA. SW completed AMA Chang RN

## 2021-04-07 NOTE — PROGRESS NOTES
Internal Medicine Progress Note    [facility]    Leah Caba. Cleo Rodriguez., F.KRISTY.[email or web address]. Mariely Good D.O., DALEOCHRISTY Herrera D.O. Rupinder Steiner, MSN, APRN, NP-C  Jagdish Kelly. Freedom Arteaga, MSN, APRN-CNP     Primary Care Physician: Dejan Sales DO   Admitting Physician:  Jennifer Bates DO  Admission date and time: 3/31/2021 10:32 AM    Room:  [facility]6139-54  Admitting diagnosis: GI bleed [K92.2]    Patient Name: Robyn Paredes  MRN: [id number]    Date of Service: 4/7/2021     Subjective:  Tiffanie Flores is a 68 y.o. female who was seen and examined today,4/7/2021, at the bedside. Tiffanie Flores is feeling much better today. She is more energetic. She is not complaining of pain any longer. She is tentatively scheduled for EUS today. She is eager for discharge once this can be arranged. Review of System:   Constitutional:   Ongoing weakness and deconditioning. HEENT:   Denies ear pain, sore throat, sinus or eye problems. Admits to irritation associated with the nasal cannula oxygen. Cardiovascular:   Denies any chest pain, irregular heartbeats, or palpitations. Respiratory:   No resting dyspnea, no coughing or sputum production. Gastrointestinal:   Resolution of her presenting abdominal pain. She is otherwise tolerating a diet. Genitourinary:    Voiding with the use of a Jane catheter. Extremities:   Improving lower extremity edema, calf and foot cramping reported. Denies sensation changes or loss. Neurology:    Admits to improving weakness and deconditioning. Psch:   Denies being anxious or depressed. Musculoskeletal:    Denies  myalgias, joint complaints or back pain. Integumentary:   Denies any rashes, ulcers, or excoriations. Denies bruising. Hematologic/Lymphatic:  Denies bruising or bleeding. Physical Exam:  No intake/output data recorded.     Intake/Output Summary (Last 24 hours) at 4/7/2021 1128  Last data filed at 4/7/2021 0845  Gross per 24 hour   Intake 160 ml   Output 2650 ml   Net -2490 ml   I/O last 3 completed shifts: In: 220 [P.O.:220]  Out: 2650 [Urine:2650]  Patient Vitals for the past 96 hrs (Last 3 readings):   Weight   04/07/21 0525 143 lb (64.9 kg)   04/06/21 0527 138 lb (62.6 kg)   04/05/21 0604 137 lb 4.8 oz (62.3 kg)     Vital Signs:   Blood pressure (!) 164/83, pulse 90, temperature 98.6 °F (37 °C), temperature source Oral, resp. rate 18, height 5' 5\" (1.651 m), weight 143 lb (64.9 kg), SpO2 95 %. General appearance:  Awake and alert. More comfortable without distress. Head:  Normocephalic. No masses, lesions or tenderness. Eyes:  PERRLA. EOMI. Sclera clear. Impaired vision. ENT:  Ears normal. Mucosa normal. Nasal cannula oxygen is in place. Neck:    Supple. Trachea midline. No thyromegaly. No JVD. No bruits. Heart:    S1 and S2, regular rate and rhythm, systolic murmur  Lungs:    Fairly good aeration throughout. No significant wheezes, rhonchi, or rales. Abdomen:   Sof mildly tender to palpation. Voluntary guarding is elicited. Bowel sounds are active. Extremities:    Peripheral pulses present, decreased in bilateral lower extremities. Small partial-thickness wound noted to the lateral aspect of the left fifth toe. Trace to 1+ pitting edema involving bilateral lower extremities. Neurologic:    Alert x 3. No focal deficit. Cranial nerves grossly intact. No focal weakness. Psych:   Behavior is normal. Mood appears normal. Speech is not rapid and/or pressured. Musculoskeletal:   Spine ROM normal. Muscular strength intact. Gait not assessed. Integumentary:  No rashes  Skin normal color and texture. Genitalia/Breast:  Voiding with the use of a Jane catheter.     Medication:  Scheduled Meds:   fluconazole  100 mg Oral Daily    pantoprazole  40 mg Oral QAM AC    chlorothiazide  500 mg Intravenous BID    sodium bicarbonate  650 mg Oral 4x Daily    atorvastatin  40 mg Oral Nightly    carvedilol  6.25 mg Oral BID  hydrALAZINE  25 mg Oral 3 times per day    isosorbide mononitrate  30 mg Oral Daily    vitamin D  50,000 Units Oral Weekly    meropenem (MERREM) IVPB  500 mg Intravenous Q12H     Continuous Infusions:   bumetanide 0.1 mg/mL infusion 1 mg/hr (04/07/21 0145)    sodium chloride      sodium chloride         Objective Data:  CBC with Differential:    Lab Results   Component Value Date    WBC 13.9 04/07/2021    RBC 3.02 04/07/2021    HGB 9.3 04/07/2021    HCT 29.4 04/07/2021     04/07/2021    MCV 97.4 04/07/2021    MCH 30.8 04/07/2021    MCHC 31.6 04/07/2021    RDW 19.1 04/07/2021    NRBC 0.9 03/31/2021    LYMPHOPCT 13.4 04/07/2021    MONOPCT 8.5 04/07/2021    MYELOPCT 0.9 04/03/2021    BASOPCT 0.4 04/07/2021    MONOSABS 1.18 04/07/2021    LYMPHSABS 1.86 04/07/2021    EOSABS 0.03 04/07/2021    BASOSABS 0.05 04/07/2021     CMP:    Lab Results   Component Value Date     04/07/2021    K 3.8 04/07/2021    K 3.9 03/31/2021    CL 98 04/07/2021    CO2 28 04/07/2021    BUN 38 04/07/2021    CREATININE 2.8 04/07/2021    GFRAA 20 04/07/2021    LABGLOM 20 04/07/2021    GLUCOSE 118 04/07/2021    GLUCOSE 102 11/17/2010    PROT 6.0 04/07/2021    LABALBU 3.1 04/07/2021    CALCIUM 8.7 04/07/2021    BILITOT 0.9 04/07/2021    ALKPHOS 107 04/07/2021    AST 18 04/07/2021    ALT 7 04/07/2021       Assessment:  1. 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 Acute on chronic kidney disease stage IV currently undergoing diuresis  4. Abdominal aortic aneurysm toward the bifurcation measuring up to 3.7 x 4.8 cm a component of chronic dissection appearance  5.  Acutely decompensated systolic and diastolic congestive heart failure medical decisions. I also discussed the differential diagnosis and all of the proposed management plans with the patient and individuals accompanying the patient. Tiffanie Flores requires this high level of physician care and nursing on the IMC/Telemetry unit due the complexity of decision management and chance of rapid decline or death. Continued cardiac monitoring and higher level of nursing are required. I am readily available for any further decision-making and intervention.      MADELAINE Lyle-CNP  11:28 AM  4/7/2021

## 2021-04-07 NOTE — DISCHARGE INSTR - COC
Continuity of Care Form    Patient Name: Aquiles Keller   :  1940  MRN:  [id number]    Admit date:  3/31/2021  Discharge date:  21    Code Status Order: Full Code   Advance Directives:   [facility] Documentation       Date/Time Healthcare Directive Type of Healthcare Directive Copy in [facility] [address] Agent's Name Healthcare Agent's Phone Number    21 1500  No, patient does not have an advance directive for healthcare treatment -- -- -- -- --            Admitting Physician:  Jignesh López DO  PCP: Natale Sandifer, DO    Discharging Nurse: [facility] Unit/Room#: 7394/1529-27  Discharging Unit Phone Number: [phone]    Emergency Contact:   Extended Emergency Contact Information  Primary Emergency Contact: [employer] Phone: [phone]  Mobile Phone: [phone]  Relation: Brother/Sister   needed? No  Secondary Emergency Contact: [facility] [address] Phone: [phone]  Mobile Phone: [phone]  Relation: Child   needed?  No    Past Surgical History:  Past Surgical History:   Procedure Laterality Date    COLONOSCOPY N/A 4/3/2021    COLONOSCOPY POLYPECTOMY HOT SNARE performed by Ruy Gillespie DO at [place] [address]  4/3/2021    COLONOSCOPY WITH BIOPSY performed by Ruy Gillespie DO at [place] [address]  4/3/2021    COLONOSCOPY CONTROL HEMORRHAGE performed by Ruy Gillespie DO at [place] [address]  4/3/2021    COLONOSCOPY SUBMUCOSAL SPOT INJECTION performed by Ruy Gillespie DO at [address] [facility] GASTROINTESTINAL ENDOSCOPY N/A 4/3/2021    EGD BIOPSY performed by Ruy Gillespie DO at [address] [facility] History:   Immunization History   Administered Date(s) Administered    Influenza, High Dose (Fluzone 65 yrs and older) 10/30/2018    Influenza, Triv, inactivated, subunit, adjuvanted, IM (Fluad 65 yrs and older) 10/05/2019    Pneumococcal Polysaccharide ([id number]) 10/06/2019       Active Problems:  Patient Active Problem List   Diagnosis Code    Shortness of breath R06.02    Chronic combined systolic and diastolic congestive heart failure ([facility]) I50.42    Chronic renal insufficiency, stage IV (severe) ([facility]) N18.4    Atrial fibrillation ([facility]) I48.91    Hypertension I10    Abnormal chest x-ray R93.89    Anemia of chronic disease D63.8    Opacity of lung on imaging study R91.8    Cigarette smoker F17.210    Tobacco abuse counseling Z71.6    Acute on chronic combined systolic and diastolic CHF (congestive heart failure) ([facility]) I50.43    Acute on chronic congestive heart failure ([facility]) H16.8    Acute systolic CHF (congestive heart failure) ([facility]) I50.21    GI bleed K92.2    Preoperative cardiovascular examination Z01.810    Bowel perforation ([facility]) K63.1       Isolation/Infection:   Isolation            No Isolation          Patient Infection Status       None to display            Nurse Assessment:  Last Vital Signs: BP (!) 164/83   Pulse 90   Temp 98.6 °F (37 °C) (Oral)   Resp 18   Ht 5' 5\" (1.651 m)   Wt 143 lb (64.9 kg)   SpO2 95%   BMI 23.80 kg/m²     Last documented pain score (0-10 scale): Pain Level: 0  Last Weight:   Wt Readings from Last 1 Encounters:   04/07/21 143 lb (64.9 kg)     Mental Status:  oriented and alert    IV Access:  - Dialysis Catheter  - site  right, insertion date: 4/15/21    Nursing Mobility/ADLs:  Walking   Assisted  Transfer  Assisted  Bathing  Assisted  Dressing  Assisted  Toileting  Assisted  Feeding  Independent  Med Admin  Independent  Med Delivery   whole    Wound Care Documentation and Therapy:        Elimination:  Continence:   · Bowel:  Yes  · Bladder: Yes and anuric  Urinary Catheter: None   Colostomy/Ileostomy/Ileal Conduit: No       Date of Last BM: 4/21/21    Intake/Output Summary (Last 24 hours) at 4/7/2021 1323  Last data filed at 4/7/2021 0845  Gross per 24 hour Intake 160 ml   Output 2650 ml   Net -2490 ml     I/O last 3 completed shifts: In: 220 [P.O.:220]  Out: 2650 [Urine:2650]    Safety Concerns: At Risk for Falls    Impairments/Disabilities:      None    Nutrition Therapy:  Current Nutrition Therapy:   - Oral Diet:  Low Fat and Low Sodium (3-4gm)    Routes of Feeding: Oral  Liquids: Thin Liquids  Daily Fluid Restriction: no  Last Modified Barium Swallow with Video (Video Swallowing Test): not done    Treatments at the Time of Hospital Discharge:   Respiratory Treatments: NA  Oxygen Therapy:  is on oxygen at 6 L/min per nasal cannula.   Ventilator:    - No ventilator support  - BiPAP   IPAP: 14 cmH20, CPAP/EPAP: 6 cmH2O only when sleeping    Rehab Therapies: Physical Therapy and Occupational Therapy  Weight Bearing Status/Restrictions: No weight bearing restirctions  Other Medical Equipment (for information only, NOT a DME order):  walker  Other Treatments: NONE    Patient's personal belongings (please select all that are sent with patient):  Dentures upper and lower    RN SIGNATURE:  Electronically signed by Jia Oreilly RN on 4/22/21 at 12:14 PM EDT    CASE MANAGEMENT/SOCIAL WORK SECTION    Inpatient Status Date: 3/31/2021     Readmission Risk Assessment Score:  Readmission Risk              Risk of Unplanned Readmission:        18           Discharging to Facility/ Agency   · Name: [facility]  · Address: [address]  · Phone: [phone]  · Fax:[phone]    Dialysis Facility (if applicable)   · [address] [facility]  · Address:  · Dialysis Schedule: Benita Moreno 11:15am  · Phone: [phone]  · Fax: [phone]    / signature: Electronically signed by Bahman Herrera RN on 4/7/21 at 1:24 PM EDT    PHYSICIAN SECTION    Prognosis: Fair    Condition at Discharge: Stable    Rehab Potential (if transferring to Rehab): Good    Recommended Labs or Other Treatments After Discharge:   CBC with dif and CMP on Monday    Physician Certification: I certify the above information and transfer of Sarah Estrella  is necessary for the continuing treatment of the diagnosis listed and that she requires [facility] for less 30 days.      Update Admission H&P: {CHP DME Changes in EDEZJ:[id number]}    PHYSICIAN SIGNATURE:  Electronically signed by Irwin Ram DO on 4/19/21 at 11:21 AM EDT

## 2021-04-07 NOTE — ANESTHESIA POSTPROCEDURE EVALUATION
Department of Anesthesiology  Postprocedure Note    Patient: Lazarus Rancher  MRN: [id number]  YOB: 1940  Date of evaluation: 4/7/2021  Time:  6:19 PM     Procedure Summary     Date: 04/07/21 Room / Location: [address] [place] [address] / [address] [place] [address]    Anesthesia Start: 8776 Anesthesia Stop: 9142    Procedure: EGD W/EUS FNA (N/A ) Diagnosis: (GIST)    Surgeons: Santo Cuello MD Responsible Provider: Brian Spencer MD    Anesthesia Type: MAC ASA Status: 4          Anesthesia Type: MAC    Meghan Phase I:      Meghan Phase II: Meghan Score: 9    Last vitals: Reviewed and per EMR flowsheets.        Anesthesia Post Evaluation    Patient location during evaluation: PACU  Patient participation: complete - patient participated  Level of consciousness: awake  Pain score: 0  Airway patency: patent  Nausea & Vomiting: no nausea  Complications: no  Cardiovascular status: blood pressure returned to baseline  Respiratory status: acceptable  Hydration status: euvolemic

## 2021-04-07 NOTE — PLAN OF CARE
Problem: Falls - Risk of:  Goal: Will remain free from falls  Description: Will remain free from falls  4/6/2021 2334 by Lucio Mcclain RN  Outcome: Met This Shift     Problem: Falls - Risk of:  Goal: Absence of physical injury  Description: Absence of physical injury  4/6/2021 2334 by Lucio Mcclain RN  Outcome: Met This Shift     Problem: Skin Integrity:  Goal: Will show no infection signs and symptoms  Description: Will show no infection signs and symptoms  4/6/2021 2334 by Lucio Mcclain RN  Outcome: Met This Shift     Problem: Skin Integrity:  Goal: Absence of new skin breakdown  Description: Absence of new skin breakdown  4/6/2021 2334 by Lucio Mcclain RN  Outcome: Met This Shift     Problem: Pain:  Goal: Pain level will decrease  Description: Pain level will decrease  4/6/2021 2334 by Lucio Mcclain RN  Outcome: Met This Shift     Problem: Pain:  Goal: Control of acute pain  Description: Control of acute pain  4/6/2021 2334 by Lucio Mcclain RN  Outcome: Met This Shift

## 2021-04-07 NOTE — PROGRESS NOTES
Nightly Rachell Nettle, DO   40 mg at 04/06/21 2136    carvedilol (COREG) tablet 6.25 mg  6.25 mg Oral BID Rachell Nettle, DO   6.25 mg at 04/07/21 0834    hydrALAZINE (APRESOLINE) tablet 25 mg  25 mg Oral 3 times per day Rachell Nettle, DO   25 mg at 04/07/21 0515    isosorbide mononitrate (IMDUR) extended release tablet 30 mg  30 mg Oral Daily Rachell Nettle, DO   30 mg at 04/07/21 5079    vitamin D (ERGOCALCIFEROL) capsule 50,000 Units  50,000 Units Oral Weekly Rachell Nettle, DO   50,000 Units at 04/07/21 0858    albuterol (PROVENTIL) nebulizer solution 2.5 mg  2.5 mg Nebulization Q6H PRN Rachell Nettle, DO        ondansetron [facility]) injection 4 mg  4 mg Intravenous Q6H PRN Rachell Nettle, DO        promethazine (PHENERGAN) injection 25 mg  25 mg Intravenous Q6H PRN Rachell Nettle, DO        morphine (PF) injection 2 mg  2 mg Intravenous Q4H PRN Rachell Nettle, DO   2 mg at 04/06/21 2137    meropenem (MERREM) 500 mg in sodium chloride 0.9 % 100 mL IVPB  500 mg Intravenous Q12H Brain Felix MD   Stopped at 04/07/21 0400        LABS    CBC:     WBC   Date Value Ref Range Status   04/07/2021 13.9 (H) 4.5 - 11.5 E9/L Final   04/06/2021 12.0 (H) 4.5 - 11.5 E9/L Final   04/05/2021 10.4 4.5 - 11.5 E9/L Final     Hematocrit   Date Value Ref Range Status   04/07/2021 29.4 (L) 34.0 - 48.0 % Final   04/06/2021 27.2 (L) 34.0 - 48.0 % Final   04/05/2021 25.7 (L) 34.0 - 48.0 % Final     Platelets   Date Value Ref Range Status   04/07/2021 197 130 - 450 E9/L Final   04/06/2021 196 130 - 450 E9/L Final   04/05/2021 210 130 - 450 E9/L Final         BMP:      Sodium   Date Value Ref Range Status   04/07/2021 139 132 - 146 mmol/L Final   04/06/2021 142 132 - 146 mmol/L Final   04/05/2021 141 132 - 146 mmol/L Final     Potassium   Date Value Ref Range Status   04/07/2021 3.8 3.5 - 5.0 mmol/L Final   04/06/2021 3.3 (L) 3.5 - 5.0 mmol/L Final   04/05/2021 3.5 3.5 - 5.0 mmol/L Final     Chloride   Date Value Ref Range Status   04/07/2021 98 98 - 107 mmol/L Final   04/06/2021 102 98 - 107 mmol/L Final   04/05/2021 108 (H) 98 - 107 mmol/L Final     CO2   Date Value Ref Range Status   04/07/2021 28 22 - 29 mmol/L Final   04/06/2021 26 22 - 29 mmol/L Final   04/05/2021 22 22 - 29 mmol/L Final     BUN   Date Value Ref Range Status   04/07/2021 38 (H) 8 - 23 mg/dL Final   04/06/2021 35 (H) 8 - 23 mg/dL Final   04/05/2021 36 (H) 8 - 23 mg/dL Final     CREATININE   Date Value Ref Range Status   04/07/2021 2.8 (H) 0.5 - 1.0 mg/dL Final   04/06/2021 2.9 (H) 0.5 - 1.0 mg/dL Final   04/05/2021 3.3 (H) 0.5 - 1.0 mg/dL Final     Glucose   Date Value Ref Range Status   04/07/2021 118 (H) 74 - 99 mg/dL Final   04/06/2021 118 (H) 74 - 99 mg/dL Final   04/05/2021 125 (H) 74 - 99 mg/dL Final         Hepatic Function Panel:    Lab Results   Component Value Date    ALKPHOS 107 04/07/2021    ALT 7 04/07/2021    AST 18 04/07/2021    PROT 6.0 04/07/2021    BILITOT 0.9 04/07/2021    LABALBU 3.1 04/07/2021        No results found for: [employer]    No results found for: CRP    Microbiology :  Blood Culture, Routine   Date Value Ref Range Status   03/31/2021 5 Days no growth  Final     Culture, Blood 2   Date Value Ref Range Status   03/31/2021 5 Days no growth  Final     Urine Culture, Routine   Date Value Ref Range Status   04/01/2021 <10,000 CFU/mL  Mixed gram positive organisms    Final     No results found for: CULTRESP  No results found for: [facility]    Radiology :  Reviewed     ASSESSMENT:   PT CAME IN WITH ABD PAIN AND DIARRHEA  SHE HAS LEUKOCYTOSIS/anemia better  GIB/pancreatic mass?hemorrhagic/bowel/gi perforation   S/P RX UTI KLEBSIELLA /CEFDINIR from [facility]  candiduria   CKD   AAA seen by vasc    -EGD revealed multiple esophageal diverticuli, gastritis and duodenal bulb lesion 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

## 2021-04-07 NOTE — PROGRESS NOTES
[facility] MD Gloria Villeda MD. Joanna Costa MD   Progress Note    4/7/2021    SUBJECTIVE:     Pt known to me from office . I actually saw her in [facility] 1-2 weeks back . She was last seen as inpt in [facility] system by our group back in 2016 . Case discussed with Dr Yoselyn Ramos . 4/3: Seen this am in her room , o2/nc at 4 L which is her home o2 . She did undergo EGD/colonscopy this am . Results noted   Continue to look volume overloaded with 2 + edema and JVD . 4/4 ; stable vitlas , good UO on bumex drip/diuril . Will assess in am if we can wean her off drip   4/5: Patient was doing well this morning, she is awake and alert with no acute distress. We will continue same medications and same plan of care for today. 4/6 \" Seen this am , pleasant , o2/nc . Good UO . Cr stable . Plan for EUS in am     4/7 : good UO with negative balance . In good spirit . For EUS today . PROBLEM LIST:    Principal Problem:    GI bleed  Active Problems:    Preoperative cardiovascular examination    Bowel perforation (HCC)  Resolved Problems:    * No resolved hospital problems. *       DIET:    Diet NPO, After Midnight Exceptions are: Sips of Water with Meds       Allergies : Patient has no known allergies.     Past Medical History:   Diagnosis Date    Arthritis     Atrial fibrillation ([address]     Blood circulation, collateral     CHF (congestive heart failure) (HCC)     Chronic renal insufficiency, stage IV (severe) ([address] 11/19/2016    History of cardiovascular stress test 07/2015    Lexiscan stress test    History of echocardiogram 07/27/2015    EF 29%    Hyperlipidemia     Hypertension        Past Surgical History:   Procedure Laterality Date    COLONOSCOPY N/A 4/3/2021    COLONOSCOPY POLYPECTOMY HOT SNARE performed by Yusef Rubi DO at [address]  4/3/2021    COLONOSCOPY WITH BIOPSY performed by Yusef Rubi DO at [address] [facility] COLONOSCOPY  4/3/2021    COLONOSCOPY CONTROL HEMORRHAGE performed by Aidee Maria DO at [address]  4/3/2021    COLONOSCOPY SUBMUCOSAL SPOT INJECTION performed by Aidee Maria DO at [address] GASTROINTESTINAL ENDOSCOPY N/A 4/3/2021    EGD BIOPSY performed by Aidee Maria DO at [address] [facility] reviewed. No pertinent family history. reports that she has been smoking. She has been smoking about 0.50 packs per day. She has never used smokeless tobacco. She reports previous alcohol use. She reports that she does not use drugs. Review of Systems:   Constitutional:weak   Eyes: no eye pain , no itching , no drainage  Ears, nose, mouth, throat, and face: no ear ,nose pain , hearing is ok ,no nasal drainage   Respiratory: no sob ,no cough ,no wheezing . Cardiovascular: no chest pain , no palpitation ,no sob . Gastrointestinal: no nausea, vomiting , constipation , no abdominal pain . Genitourinary:no urinary retention , no burning , dysuria . No polyuria   Hematologic/lymphatic: no bleeding , no cougulation issues . Musculoskeletal:no joint pain , no swelling . Neurological: no headaches ,no weakness , no numbness . Endocrine: no thirst , no weight issues .      MEDS (scheduled):    fluconazole  100 mg Oral Daily    pantoprazole  40 mg Oral QAM AC    chlorothiazide  500 mg Intravenous BID    sodium bicarbonate  650 mg Oral 4x Daily    atorvastatin  40 mg Oral Nightly    carvedilol  6.25 mg Oral BID    hydrALAZINE  25 mg Oral 3 times per day    isosorbide mononitrate  30 mg Oral Daily    vitamin D  50,000 Units Oral Weekly    meropenem (MERREM) IVPB  500 mg Intravenous Q12H       MEDS (infusions):   bumetanide 0.1 mg/mL infusion Stopped (04/07/21 1532)    sodium chloride      sodium chloride         MEDS (prn):  acetaminophen, sodium chloride, sodium chloride, albuterol, ondansetron, promethazine, morphine    PHYSICAL EXAM:     Patient Vitals for the past 24 hrs:   BP Temp Temp src Pulse Resp SpO2 Height Weight   04/07/21 1415 136/85 98.5 °F (36.9 °C) Oral 82 16 95 %     04/07/21 1341       5' 5\" (1.651 m)    04/07/21 0845 (!) 164/83 98.6 °F (37 °C) Oral 90 18 95 %     04/07/21 0525        143 lb (64.9 kg)   04/07/21 0515 (!) 160/72          04/06/21 2130 (!) 156/74 98 °F (36.7 °C) Oral 96 18 96 %     04/06/21 1645 (!) 171/94 98.7 °F (37.1 °C) Oral 87 18 95 %     @      Intake/Output Summary (Last 24 hours) at 4/7/2021 1633  Last data filed at 4/7/2021 1405  Gross per 24 hour   Intake 80 ml   Output 2550 ml   Net -2470 ml         Wt Readings from Last 3 Encounters:   04/07/21 143 lb (64.9 kg)   10/08/19 116 lb 14.4 oz (53 kg)   06/03/19 130 lb (59 kg)       Constitutional:  in no acute distress  Oral: mucus membranes moist  Neck: 2+ JVD   Cardiovascular: S1, S2 regular rhythm, no murmur,or rub  Respiratory:  No crackles, no wheeze  Gastrointestinal:  Soft, nontender, nondistended, NABS  Ext: 2+ edema bl   Skin: dry, no rash  Neuro: awake, alert, interactive      DATA:    Recent Labs     04/05/21  0532 04/06/21  0644 04/07/21  0653   WBC 10.4 12.0* 13.9*   HGB 8.3* 8.5* 9.3*   HCT 25.7* 27.2* 29.4*   MCV 98.1 97.8 97.4    196 197     Recent Labs     04/05/21  0531 04/06/21  0644 04/07/21  0653    142 139   K 3.5 3.3* 3.8   * 102 98   CO2 22 26 28   MG  --  1.5*  --    PHOS  --  4.3  --    BUN 36* 35* 38*   CREATININE 3.3* 2.9* 2.8*   ALT 9 7 7   AST 29 13 18   BILITOT 0.6 1.0 0.9   ALKPHOS 99 94 107*       No results found for: LABPROT    ASSESSMENT / RECOMMENDATIONS:      1) REYES  in the setting of GI bleed ,low EF /CHF     2) Chronic kidney disease stage IV baseline cr 2-2.7     3) 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 She continue to look hypervolemic  Continue bumex drip at 1mg/hr withdiuril 500 mg bid iv .   F/u serial BMPs , UO           Electronically signed by Joe Holt MD on 4/7/2021 at 4:33 PM

## 2021-04-07 NOTE — PLAN OF CARE
Problem: Falls - Risk of:  Goal: Will remain free from falls  Description: Will remain free from falls  4/7/2021 1139 by Vivienne Casiano RN  Outcome: Met This Shift     Problem: Skin Integrity:  Goal: Absence of new skin breakdown  Description: Absence of new skin breakdown  4/7/2021 1139 by Vivienne Casiano RN  Outcome: Met This Shift     Problem: Pain:  Goal: Pain level will decrease  Description: Pain level will decrease  4/7/2021 1139 by Vivienne Casiano RN  Outcome: Met This Shift

## 2021-04-07 NOTE — PROGRESS NOTES
Physical Therapy      0430/0430-01    Patient unavailable for physical therapy treatment due to patient refusal d/t fatigue. Sister present and encouraging to pt, however pt refused exercises in bed. Assisted pt to middle of bed as R LE was hanging off. Will try later, however pt does have procedure at 2:30.     Elias Lee, PTA #[id number]

## 2021-04-07 NOTE — ANESTHESIA PRE PROCEDURE
Continuous Hasit Maxx Power MD 10 mL/hr at 04/07/21 0145 1 mg/hr at 04/07/21 0145    sodium bicarbonate tablet 650 mg  650 mg Oral 4x Daily Hasit BRIAN Lerma MD   650 mg at 04/06/21 2136    0.9 % sodium chloride infusion   Intravenous PRN Taniya Bustillo DO        0.9 % sodium chloride infusion   Intravenous PRN Jennifer Bates DO        atorvastatin (LIPITOR) tablet 40 mg  40 mg Oral Nightly Jennifer Bates DO   40 mg at 04/06/21 2136    carvedilol (COREG) tablet 6.25 mg  6.25 mg Oral BID Jennifer Bates DO   6.25 mg at 04/06/21 2136    hydrALAZINE (APRESOLINE) tablet 25 mg  25 mg Oral 3 times per day Jennifer Bates DO   25 mg at 04/07/21 0515    isosorbide mononitrate (IMDUR) extended release tablet 30 mg  30 mg Oral Daily Jennifer Bates DO   30 mg at 04/06/21 0972    vitamin D (ERGOCALCIFEROL) capsule 50,000 Units  50,000 Units Oral Weekly Jennifer Bates DO        albuterol (PROVENTIL) nebulizer solution 2.5 mg  2.5 mg Nebulization Q6H PRN Jennifer Bates DO        ondansetron [facility]) injection 4 mg  4 mg Intravenous Q6H PRN Jennifer Bates DO        promethazine (PHENERGAN) injection 25 mg  25 mg Intravenous Q6H PRN Jennifer Bates DO        morphine (PF) injection 2 mg  2 mg Intravenous Q4H PRN Jennifer Bates DO   2 mg at 04/06/21 2137    meropenem (MERREM) 500 mg in sodium chloride 0.9 % 100 mL IVPB  500 mg Intravenous Q12H Bess Go MD   Stopped at 04/07/21 0400       Allergies:  No Known Allergies    Problem List:    Patient Active Problem List   Diagnosis Code    Shortness of breath R06.02    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    Chronic renal insufficiency, stage IV (severe) (HCC) N18.4    Atrial fibrillation (HCC) I48.91    Hypertension I10    Abnormal chest x-ray R93.89    Anemia of chronic disease D63.8    Opacity of lung on imaging study R91.8    Cigarette smoker F17.210    Tobacco abuse counseling Z71.6    Acute on chronic combined systolic and diastolic CHF (congestive heart failure) ([facility]) I50.43    Acute on chronic congestive heart failure ([facility]) E05.4    Acute systolic CHF (congestive heart failure) ([facility]) I50.21    GI bleed K92.2    Preoperative cardiovascular examination Z01.810    Bowel perforation ([facility]) K63.1       Past Medical History:        Diagnosis Date    Arthritis     Atrial fibrillation ([facility]     Blood circulation, collateral     CHF (congestive heart failure) ([facility])     Chronic renal insufficiency, stage IV (severe) ([facility] [address] 11/19/2016    History of cardiovascular stress test 07/2015    Lexiscan stress test    History of echocardiogram 07/27/2015    EF 29%    Hyperlipidemia     Hypertension        Past Surgical History:        Procedure Laterality Date    COLONOSCOPY N/A 4/3/2021    COLONOSCOPY POLYPECTOMY HOT SNARE performed by Radha Bernard DO at [address]  4/3/2021    COLONOSCOPY WITH BIOPSY performed by Radha Bernard DO at [address]  4/3/2021    COLONOSCOPY CONTROL HEMORRHAGE performed by Radha Bernard DO at [address]  4/3/2021    COLONOSCOPY SUBMUCOSAL SPOT INJECTION performed by Radha Bernard DO at [address] GASTROINTESTINAL ENDOSCOPY N/A 4/3/2021    EGD BIOPSY performed by Radha Bernard DO at [address] History:    Social History     Tobacco Use    Smoking status: Current Every Day Smoker     Packs/day: 0.50    Smokeless tobacco: Never Used   Substance Use Topics    Alcohol use: Not Currently     Comment: socially                                Ready to quit: Not Answered  Counseling given: Not Answered      Vital Signs (Current): There were no vitals filed for this visit.                                            BP Readings from Last 3 Encounters:   04/07/21 (!) 164/83   04/03/21 131/86   10/08/19 (!) 139/90       NPO Status: BMI:   Wt Readings from Last 3 Encounters:   04/07/21 143 lb (64.9 kg)   10/08/19 116 lb 14.4 oz (53 kg)   06/03/19 130 lb (59 kg)     There is no height or weight on file to calculate BMI.    CBC:   Lab Results   Component Value Date    WBC 13.9 04/07/2021    RBC 3.02 04/07/2021    HGB 9.3 04/07/2021    HCT 29.4 04/07/2021    MCV 97.4 04/07/2021    RDW 19.1 04/07/2021     04/07/2021       CMP:   Lab Results   Component Value Date     04/07/2021    K 3.8 04/07/2021    K 3.9 03/31/2021    CL 98 04/07/2021    CO2 28 04/07/2021    BUN 38 04/07/2021    CREATININE 2.8 04/07/2021    GFRAA 20 04/07/2021    LABGLOM 20 04/07/2021    GLUCOSE 118 04/07/2021    GLUCOSE 102 11/17/2010    PROT 6.0 04/07/2021    CALCIUM 8.7 04/07/2021    BILITOT 0.9 04/07/2021    ALKPHOS 107 04/07/2021    AST 18 04/07/2021    ALT 7 04/07/2021       POC Tests: No results for input(s): POCGLU, POCNA, POCK, POCCL, POCBUN, POCHEMO, POCHCT in the last 72 hours.     Coags:   Lab Results   Component Value Date    PROTIME 20.7 03/31/2021    INR 1.8 03/31/2021    APTT 29.4 03/31/2021       HCG (If Applicable): No results found for: PREGTESTUR, PREGSERUM, HCG, HCGQUANT     ABGs:   Lab Results   Component Value Date    PO2ART 82.0 10/03/2019    AJL8ARJ 34.1 10/03/2019    GGQ7LUG 17.3 10/03/2019        Type & Screen (If Applicable):  No results found for: LABABO, LABRH    Drug/Infectious Status (If Applicable):  No results found for: HIV, HEPCAB    COVID-19 Screening (If Applicable): No results found for: COVID19        Anesthesia Evaluation  Patient summary reviewed  Airway: Mallampati: IV  TM distance: <3 FB   Neck ROM: limited  Mouth opening: < 3 FB Dental:    (+) upper dentures, lower dentures and edentulous      Pulmonary: breath sounds clear to auscultation  (+) shortness of breath: new,  current smoker                           Cardiovascular:    (+) hypertension:, dysrhythmias: atrial fibrillation, CHF: systolic,       ECG reviewed Rhythm: irregular  Rate: abnormal  Echocardiogram reviewed         Beta Blocker:  Dose within 24 Hrs      ROS comment: EKG: Atrial Fibrillation 82, Rt Bundle Branch Block, Left posterior Winston block. ECHO:    Mildly dilated left ventricle. Moderate asymmetric septal hypertrophy. Ejection fraction is measured at 28%. No evidence of left ventricular mass or thrombus noted. Posterior wall akinetic. Basal to mid-inferior wall akinetic. The left atrium is mildly dilated. Interatrial septum appears intact. No evidence of thrombus within left atrium. Mildly dilated right ventricle. No evidence of a thrombus in the right ventricle. Mildly enlarged right atrium size. Moderate mitral regurgitation is present. No evidence of mitral valve stenosis. Physiologic and/or trace tricuspid regurgitation. Regular rhythm. Neuro/Psych:               GI/Hepatic/Renal:   (+) renal disease: CRI,           Endo/Other:    (+) blood dyscrasia: anemia:., .                 Abdominal:           Vascular:                                          Anesthesia Plan      MAC     ASA 4     (GI bleed HGB 8.6)  Induction: intravenous. Anesthetic plan and risks discussed with patient. Plan discussed with CRNA. Attending anesthesiologist reviewed and agrees with Pre Eval content      Chart review pt not in room. Final disposition by attending anesthesiologist.        Harpreet Oconnor MD   4/7/2021

## 2021-04-07 NOTE — PROGRESS NOTES
Comprehensive Nutrition Assessment    Type and Reason for Visit:  Initial, RD Nutrition Re-Screen/LOS    Nutrition Recommendations/Plan:  Continue NPO, Monitor Nutrition Progression     Nutrition Assessment:  Pt admit d/t abd pain & rectal bleeding 2/2 GI bleed. Abd pain since resolved. H/o CHF & CKD. NPO d/t pending EUS. Will monitor nutrition progression. Malnutrition Assessment:  Malnutrition Status: At risk for malnutrition (Comment)    Context:  Acute Illness     Findings of the 6 clinical characteristics of malnutrition:  Energy Intake:  1 - 75% or less of estimated energy requirements for 7 or more days  Weight Loss:  No significant weight loss     Body Fat Loss:  No significant body fat loss     Muscle Mass Loss:  No significant muscle mass loss    Fluid Accumulation:  No significant fluid accumulation     Strength:  Not Performed    Estimated Daily Nutrient Needs:  Energy (kcal):  5389-6091(MSJ x 1.2 SF); Weight Used for Energy Requirements:  Current     Protein (g):  75-90(1.2-1.4g/kg CBW); Weight Used for Protein Requirements:  Current  Fluid (ml/day):  3745-2418; Method Used for Fluid Requirements:  1 ml/kcal      Nutrition Related Findings:  A&Ox4, -I&Os (-11.5L), U/L dentures, active BS, RUE & LUE +1 edema, RLE +4 pitting edema, LLE +4 trace edema, elevated renal labs.       Wounds:  None       Current Nutrition Therapies:    Diet NPO, After Midnight Exceptions are: Sips of Water with Meds    Anthropometric Measures:  · Height: 5' 5\" (165.1 cm)  · Current Body Weight: 138 lb (62.6 kg)(4/6, bed scale)   · Admission Body Weight: 134 lb (60.8 kg)(3/31, actual)    · Usual Body Weight: (UTO d/t no wt hx in EMR)     · Ideal Body Weight: 125 lbs; % Ideal Body Weight 110.4 %   · BMI: 23  · BMI Categories: Normal Weight (BMI 22.0 to 24.9) age over 72       Nutrition Diagnosis:   · Inadequate energy intake related to altered GI function as evidenced by NPO or clear liquid status due to medical condition    Nutrition Interventions:   Food and/or Nutrient Delivery:  Continue NPO  Nutrition Education/Counseling:  Education not indicated   Coordination of Nutrition Care:  Continue to monitor while inpatient    Goals:  Nutrition Progression       Nutrition Monitoring and Evaluation:   Behavioral-Environmental Outcomes:  None Identified   Food/Nutrient Intake Outcomes:  Diet Advancement/Tolerance  Physical Signs/Symptoms Outcomes:  Biochemical Data, GI Status, Fluid Status or Edema, Nutrition Focused Physical Findings, Skin, Weight     Discharge Planning:     Too soon to determine     Electronically signed by Collette Sale, RD, LD on 4/7/21 at 2:03 PM EDT    Contact: [phone]

## 2021-04-08 ENCOUNTER — APPOINTMENT (OUTPATIENT)
Dept: INTERVENTIONAL RADIOLOGY/VASCULAR | Age: 81
DRG: 871 | End: 2021-04-08
Payer: MEDICARE

## 2021-04-08 LAB
ALBUMIN SERPL-MCNC: 3 G/DL (ref 3.5–5.2)
ALP BLD-CCNC: 110 U/L (ref 35–104)
ALT SERPL-CCNC: 7 U/L (ref 0–32)
ANION GAP SERPL CALCULATED.3IONS-SCNC: 13 MMOL/L (ref 7–16)
AST SERPL-CCNC: 20 U/L (ref 0–31)
BASOPHILS ABSOLUTE: 0.04 E9/L (ref 0–0.2)
BASOPHILS RELATIVE PERCENT: 0.3 % (ref 0–2)
BILIRUB SERPL-MCNC: 0.9 MG/DL (ref 0–1.2)
BUN BLDV-MCNC: 43 MG/DL (ref 8–23)
CALCIUM SERPL-MCNC: 8.6 MG/DL (ref 8.6–10.2)
CHLORIDE BLD-SCNC: 95 MMOL/L (ref 98–107)
CO2: 34 MMOL/L (ref 22–29)
CREAT SERPL-MCNC: 2.9 MG/DL (ref 0.5–1)
EOSINOPHILS ABSOLUTE: 0.06 E9/L (ref 0.05–0.5)
EOSINOPHILS RELATIVE PERCENT: 0.4 % (ref 0–6)
GFR AFRICAN AMERICAN: 19
GFR NON-AFRICAN AMERICAN: 19 ML/MIN/1.73
GLUCOSE BLD-MCNC: 119 MG/DL (ref 74–99)
HCT VFR BLD CALC: 30 % (ref 34–48)
HEMOGLOBIN: 9.4 G/DL (ref 11.5–15.5)
IMMATURE GRANULOCYTES #: 0.05 E9/L
IMMATURE GRANULOCYTES %: 0.4 % (ref 0–5)
LYMPHOCYTES ABSOLUTE: 1.81 E9/L (ref 1.5–4)
LYMPHOCYTES RELATIVE PERCENT: 12.7 % (ref 20–42)
MCH RBC QN AUTO: 30.6 PG (ref 26–35)
MCHC RBC AUTO-ENTMCNC: 31.3 % (ref 32–34.5)
MCV RBC AUTO: 97.7 FL (ref 80–99.9)
MONOCYTES ABSOLUTE: 1.35 E9/L (ref 0.1–0.95)
MONOCYTES RELATIVE PERCENT: 9.5 % (ref 2–12)
NEUTROPHILS ABSOLUTE: 10.89 E9/L (ref 1.8–7.3)
NEUTROPHILS RELATIVE PERCENT: 76.7 % (ref 43–80)
PDW BLD-RTO: 18.5 FL (ref 11.5–15)
PLATELET # BLD: 209 E9/L (ref 130–450)
PMV BLD AUTO: 12.1 FL (ref 7–12)
POTASSIUM SERPL-SCNC: 3.7 MMOL/L (ref 3.5–5)
RBC # BLD: 3.07 E12/L (ref 3.5–5.5)
SODIUM BLD-SCNC: 142 MMOL/L (ref 132–146)
TOTAL PROTEIN: 5.9 G/DL (ref 6.4–8.3)
WBC # BLD: 14.2 E9/L (ref 4.5–11.5)

## 2021-04-08 PROCEDURE — 6370000000 HC RX 637 (ALT 250 FOR IP): Performed by: STUDENT IN AN ORGANIZED HEALTH CARE EDUCATION/TRAINING PROGRAM

## 2021-04-08 PROCEDURE — 2580000003 HC RX 258: Performed by: SURGERY

## 2021-04-08 PROCEDURE — 2500000003 HC RX 250 WO HCPCS: Performed by: INTERNAL MEDICINE

## 2021-04-08 PROCEDURE — 2580000003 HC RX 258: Performed by: INTERNAL MEDICINE

## 2021-04-08 PROCEDURE — 2700000000 HC OXYGEN THERAPY PER DAY

## 2021-04-08 PROCEDURE — 6360000002 HC RX W HCPCS: Performed by: INTERNAL MEDICINE

## 2021-04-08 PROCEDURE — 6370000000 HC RX 637 (ALT 250 FOR IP): Performed by: INTERNAL MEDICINE

## 2021-04-08 PROCEDURE — 1200000000 HC SEMI PRIVATE

## 2021-04-08 PROCEDURE — 6370000000 HC RX 637 (ALT 250 FOR IP): Performed by: SPECIALIST

## 2021-04-08 PROCEDURE — 36415 COLL VENOUS BLD VENIPUNCTURE: CPT

## 2021-04-08 PROCEDURE — 85025 COMPLETE CBC W/AUTO DIFF WBC: CPT

## 2021-04-08 PROCEDURE — 80053 COMPREHEN METABOLIC PANEL: CPT

## 2021-04-08 PROCEDURE — 6360000002 HC RX W HCPCS: Performed by: SURGERY

## 2021-04-08 PROCEDURE — 93923 UPR/LXTR ART STDY 3+ LVLS: CPT

## 2021-04-08 RX ORDER — BUMETANIDE 1 MG/1
2 TABLET ORAL DAILY
Status: DISCONTINUED | OUTPATIENT
Start: 2021-04-09 | End: 2021-04-12

## 2021-04-08 RX ADMIN — MEROPENEM 500 MG: 500 INJECTION, POWDER, FOR SOLUTION INTRAVENOUS at 05:04

## 2021-04-08 RX ADMIN — ISOSORBIDE MONONITRATE 30 MG: 30 TABLET, EXTENDED RELEASE ORAL at 08:39

## 2021-04-08 RX ADMIN — HYDRALAZINE HYDROCHLORIDE 25 MG: 25 TABLET, FILM COATED ORAL at 21:18

## 2021-04-08 RX ADMIN — BUMETANIDE 1 MG/HR: 0.25 INJECTION INTRAMUSCULAR; INTRAVENOUS at 08:41

## 2021-04-08 RX ADMIN — MEROPENEM 500 MG: 500 INJECTION, POWDER, FOR SOLUTION INTRAVENOUS at 17:41

## 2021-04-08 RX ADMIN — SODIUM BICARBONATE 650 MG: 650 TABLET ORAL at 17:40

## 2021-04-08 RX ADMIN — SODIUM BICARBONATE 650 MG: 650 TABLET ORAL at 13:06

## 2021-04-08 RX ADMIN — SODIUM BICARBONATE 650 MG: 650 TABLET ORAL at 21:18

## 2021-04-08 RX ADMIN — PANTOPRAZOLE SODIUM 40 MG: 40 TABLET, DELAYED RELEASE ORAL at 05:04

## 2021-04-08 RX ADMIN — CARVEDILOL 6.25 MG: 6.25 TABLET, FILM COATED ORAL at 08:39

## 2021-04-08 RX ADMIN — CHLOROTHIAZIDE SODIUM 500 MG: 500 INJECTION, POWDER, LYOPHILIZED, FOR SOLUTION INTRAVENOUS at 21:18

## 2021-04-08 RX ADMIN — CARVEDILOL 6.25 MG: 6.25 TABLET, FILM COATED ORAL at 21:18

## 2021-04-08 RX ADMIN — FLUCONAZOLE 100 MG: 100 TABLET ORAL at 08:39

## 2021-04-08 RX ADMIN — ATORVASTATIN CALCIUM 40 MG: 40 TABLET, FILM COATED ORAL at 21:18

## 2021-04-08 RX ADMIN — HYDRALAZINE HYDROCHLORIDE 25 MG: 25 TABLET, FILM COATED ORAL at 13:06

## 2021-04-08 RX ADMIN — HYDRALAZINE HYDROCHLORIDE 25 MG: 25 TABLET, FILM COATED ORAL at 05:04

## 2021-04-08 RX ADMIN — SODIUM BICARBONATE 650 MG: 650 TABLET ORAL at 08:39

## 2021-04-08 RX ADMIN — CHLOROTHIAZIDE SODIUM 500 MG: 500 INJECTION, POWDER, LYOPHILIZED, FOR SOLUTION INTRAVENOUS at 10:25

## 2021-04-08 ASSESSMENT — PAIN SCALES - GENERAL: PAINLEVEL_OUTOF10: 0

## 2021-04-08 NOTE — PROGRESS NOTES
Room #:  2961/3355-78    Date: 2021       Patient Name: Kit Record  : 1940      MRN: [id number]     Patient unavailable for physical therapy treatment due to refusal. Patient reports she didn't get to eat her lunch and is very tired and does not want to work with therapy today.      Guerda Coleman, PT

## 2021-04-08 NOTE — PROGRESS NOTES
Internal Medicine Progress Note    [facility]    Reyes Prima. Soniya Rey., LEROY.A.CChepeOLIZETH. Daniel Santo D.O., DALEOCHRISTY Wynne D.O. Kiley Villeda, MSN, APRN, NP-C  Ayush Rodney. Tess Hilario, MSN, APRN-CNP     Primary Care Physician: Edil Nielsen DO   Admitting Physician:  Ninfa Mulligan DO  Admission date and time: 3/31/2021 10:32 AM    Room:  [facility]9378-60  Admitting diagnosis: GI bleed [K92.2]    Patient Name: Jennifer Rodriguez  MRN: [id number]    Date of Service: 4/8/2021     Subjective:  Tricia Ricci is a 68 y.o. female who was seen and examined today,4/8/2021, at the bedside. Patient just returned from arterial Doppler studies. Overall she does seem to be improving. Renal function seem to be improving with pulse dose diuretics. Results of endoscopy biopsy were discussed      Review of System:   Constitutional:   Ongoing weakness and deconditioning. HEENT:   Denies ear pain, sore throat, sinus or eye problems. Admits to irritation associated with the nasal cannula oxygen. Cardiovascular:   Denies any chest pain, irregular heartbeats, or palpitations. Respiratory:   No resting dyspnea, no coughing or sputum production. Gastrointestinal:   Resolution of her presenting abdominal pain. She is otherwise tolerating a diet. Genitourinary:    Voiding with the use of a Jane catheter. Extremities:   Improving lower extremity edema, calf and foot cramping reported. Denies sensation changes or loss. Neurology:    Admits to improving weakness and deconditioning. Psch:   Denies being anxious or depressed. Musculoskeletal:    Denies  myalgias, joint complaints or back pain. Integumentary:   Denies any rashes, ulcers, or excoriations. Denies bruising. Hematologic/Lymphatic:  Denies bruising or bleeding. Physical Exam:  No intake/output data recorded.     Intake/Output Summary (Last 24 hours) at 4/8/2021 1533  Last data filed at 4/8/2021 1438  Gross per 24 hour   Intake 60 ml   Output 2525 ml   Net -2465 ml   I/O last 3 completed shifts: In: 61 [P.O.:60]  Out: 2525 [Urine:2525]  Patient Vitals for the past 96 hrs (Last 3 readings):   Weight   04/08/21 0515 145 lb 4.8 oz (65.9 kg)   04/07/21 1930 144 lb (65.3 kg)   04/07/21 0525 143 lb (64.9 kg)     Vital Signs:   Blood pressure 126/78, pulse 85, temperature 98.1 °F (36.7 °C), temperature source Oral, resp. rate 16, height 5' 5\" (1.651 m), weight 145 lb 4.8 oz (65.9 kg), SpO2 95 %. General appearance:  Awake and alert. More comfortable without distress. Head:  Normocephalic. No masses, lesions or tenderness. Eyes:  PERRLA. EOMI. Sclera clear. Impaired vision. ENT:  Ears normal. Mucosa normal. Nasal cannula oxygen is in place. Neck:    Supple. Trachea midline. No thyromegaly. No JVD. No bruits. Heart:    S1 and S2, regular rate and rhythm, systolic murmur  Lungs:    Fairly good aeration throughout. No significant wheezes, rhonchi, or rales. Abdomen:   Sof mildly tender to palpation. Voluntary guarding is elicited. Bowel sounds are active. Extremities:    Peripheral pulses present, decreased in bilateral lower extremities. Small partial-thickness wound noted to the lateral aspect of the left fifth toe. Trace to 1+ pitting edema involving bilateral lower extremities. Neurologic:    Alert x 3. No focal deficit. Cranial nerves grossly intact. No focal weakness. Psych:   Behavior is normal. Mood appears normal. Speech is not rapid and/or pressured. Musculoskeletal:   Spine ROM normal. Muscular strength intact. Gait not assessed. Integumentary:  No rashes  Skin normal color and texture. Genitalia/Breast:  Voiding with the use of a Jane catheter.     Medication:  Scheduled Meds:   fluconazole  100 mg Oral Daily    pantoprazole  40 mg Oral QAM AC    chlorothiazide  500 mg Intravenous BID    sodium bicarbonate  650 mg Oral 4x Daily    atorvastatin  40 mg Oral Nightly    carvedilol  6.25 mg Oral BID    hydrALAZINE  25 mg Oral 3 times per day    isosorbide mononitrate  30 mg Oral Daily    vitamin D  50,000 Units Oral Weekly    meropenem (MERREM) IVPB  500 mg Intravenous Q12H     Continuous Infusions:   sodium chloride      sodium chloride         Objective Data:  CBC with Differential:    Lab Results   Component Value Date    WBC 14.2 04/08/2021    RBC 3.07 04/08/2021    HGB 9.4 04/08/2021    HCT 30.0 04/08/2021     04/08/2021    MCV 97.7 04/08/2021    MCH 30.6 04/08/2021    MCHC 31.3 04/08/2021    RDW 18.5 04/08/2021    NRBC 0.9 03/31/2021    LYMPHOPCT 12.7 04/08/2021    MONOPCT 9.5 04/08/2021    MYELOPCT 0.9 04/03/2021    BASOPCT 0.3 04/08/2021    MONOSABS 1.35 04/08/2021    LYMPHSABS 1.81 04/08/2021    EOSABS 0.06 04/08/2021    BASOSABS 0.04 04/08/2021     CMP:    Lab Results   Component Value Date     04/08/2021    K 3.7 04/08/2021    K 3.9 03/31/2021    CL 95 04/08/2021    CO2 34 04/08/2021    BUN 43 04/08/2021    CREATININE 2.9 04/08/2021    GFRAA 19 04/08/2021    LABGLOM 19 04/08/2021    GLUCOSE 119 04/08/2021    GLUCOSE 102 11/17/2010    PROT 5.9 04/08/2021    LABALBU 3.0 04/08/2021    CALCIUM 8.6 04/08/2021    BILITOT 0.9 04/08/2021    ALKPHOS 110 04/08/2021    AST 20 04/08/2021    ALT 7 04/08/2021       Assessment:      · Large pancreatic pseudocyst with concern for hemorrhagic transformation  · Acute blood loss anemia with EGD revealing multiple esophageal diverticuli, gastritis, and duodenal bulb lesion 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 as well as colonoscopic results revealing small polyps in sigmoid Dieulafoy lesion with adherent clot status post clip/cautery  · Acute on chronic kidney disease stage IV currently undergoing diuresis  · Abdominal aortic aneurysm toward the bifurcation measuring up to 3.7 x 4.8 cm a component of chronic dissection appearance  · Acutely decompensated systolic and diastolic congestive heart failure  · Paroxysmal atrial fibrillation on chronic anticoagulation with Eliquis  · Essential hypertension  · Moderate peripheral artery disease    Plan:       · Patient appears relatively stable at the present time  · Patient being followed by GI and surgery  · Renal function seem to be improving  · Antibiotic care per infectious disease  · Plan for nursing home upon discharge      More than 50% of my  time was spent at the bedside counseling/coordinating care with the patient and/or family with face to face contact. This time was spent reviewing notes and laboratory data as well as instructing and counseling the patient. Time I spent with the family or surrogate(s) is included only if the patient was incapable of providing the necessary information or participating in medical decisions. I also discussed the differential diagnosis and all of the proposed management plans with the patient and individuals accompanying the patient. Enedina Begin requires this high level of physician care and nursing on the Telemetry unit due the complexity of decision management and chance of rapid decline or death. Continued cardiac monitoring and higher level of nursing are required. I am readily available for any further decision-making and intervention.      Edel Sol DO [facility]  3:33 PM  4/8/2021

## 2021-04-08 NOTE — PROGRESS NOTES
[address] [place] [address] [facility]  [facility]  Progress Note  F/u atbx  Chief complain:  Fatigue       SUBJECTIVE:  S/p EUS  DX  Pancreatic tail mass with solid component, choledocholithiasis, gallbladder sludge, pancreatic duct stone, chronic pancreatitis    Patient is awake,   seen at bedside  No abd pain, no nausea, vomiting, diarrhea, fever, chills, rash     ROS:  Negative except as above     OBJECTIVE:    Vitals:    04/08/21 0504 04/08/21 0515 04/08/21 0715 04/08/21 1230   BP: 113/78  130/67 126/78   Pulse:   85    Resp:   16    Temp:   98.1 °F (36.7 °C)    TempSrc:   Oral    SpO2:   95%    Weight:  145 lb 4.8 oz (65.9 kg)     Height:           HEENT :         unremarkable  At/nc  Heart:             RRR,  No murmurs, no gallops  Lungs:            clear to auscultation, no wheezing , no rales  Abdomen:       soft, non tender, bowel sounds present  Extremites:      BLE Edema,   Skin:                Normal turgor,normal texture  piv             Jane     Current Facility-Administered Medications   Medication Dose Route Frequency Provider Last Rate Last Admin    acetaminophen (TYLENOL) tablet 650 mg  650 mg Oral Q6H PRN Anastacio Cheadle, APRN - CNP        fluconazole (DIFLUCAN) tablet 100 mg  100 mg Oral Daily Thomas Mark MD   100 mg at 04/08/21 0839    pantoprazole (PROTONIX) tablet 40 mg  40 mg Oral QAM AC Suhail Aldrich, DO   40 mg at 04/08/21 0504    chlorothiazide (DIURIL) injection 500 mg  500 mg Intravenous BID Bill Gardner MD   500 mg at 04/08/21 1025    sodium bicarbonate tablet 650 mg  650 mg Oral 4x Daily Stephani Gomez MD   650 mg at 04/08/21 1306    0.9 % sodium chloride infusion   Intravenous PRN Thuan Arias, DO        0.9 % sodium chloride infusion   Intravenous PRN Mani Turner DO        atorvastatin (LIPITOR) tablet 40 mg  40 mg Oral Nightly Mani Turner, DO   40 mg at 04/07/21 2226    carvedilol (COREG) tablet 6.25 mg  6.25 mg Oral BID Mani Hernándezh, DO   6.25 mg at 04/08/21 0839    hydrALAZINE (APRESOLINE) tablet 25 mg  25 mg Oral 3 times per day Cassie Roup, DO   25 mg at 04/08/21 1306    isosorbide mononitrate (IMDUR) extended release tablet 30 mg  30 mg Oral Daily Cassie Roup, DO   30 mg at 04/08/21 3436    vitamin D (ERGOCALCIFEROL) capsule 50,000 Units  50,000 Units Oral Weekly Cassie Roup, DO   50,000 Units at 04/07/21 0858    albuterol (PROVENTIL) nebulizer solution 2.5 mg  2.5 mg Nebulization Q6H PRN Cassie Roup, DO        ondansetron [facility]) injection 4 mg  4 mg Intravenous Q6H PRN Cassie Roup, DO        promethazine (PHENERGAN) injection 25 mg  25 mg Intravenous Q6H PRN Cassie Roup, DO        morphine (PF) injection 2 mg  2 mg Intravenous Q4H PRN Cassie Roup, DO   2 mg at 04/07/21 2224    meropenem (MERREM) 500 mg in sodium chloride 0.9 % 100 mL IVPB  500 mg Intravenous Q12H Taylor Diaz MD   Stopped at 04/08/21 0534        LABS     Recent Labs     04/06/21  0644 04/07/21  0653 04/08/21  0822    139 142   K 3.3* 3.8 3.7    98 95*   CO2 26 28 34*   BUN 35* 38* 43*   CREATININE 2.9* 2.8* 2.9*   GLUCOSE 118* 118* 119*   CALCIUM 8.6 8.7 8.6   PROT 5.9* 6.0* 5.9*   LABALBU 3.6 3.1* 3.0*   BILITOT 1.0 0.9 0.9   ALKPHOS 94 107* 110*   AST 13 18 20   ALT 7 7 7          Recent Labs     04/06/21  0644 04/07/21  0653 04/08/21  0822   WBC 12.0* 13.9* 14.2*   RBC 2.78* 3.02* 3.07*   HGB 8.5* 9.3* 9.4*   HCT 27.2* 29.4* 30.0*   MCV 97.8 97.4 97.7   MCH 30.6 30.8 30.6   MCHC 31.3* 31.6* 31.3*   RDW 19.5* 19.1* 18.5*    197 209   MPV 11.4 11.8 12.1*        Microbiology :  Blood Culture, Routine   Date Value Ref Range Status   03/31/2021 5 Days no growth  Final     Culture, Blood 2   Date Value Ref Range Status   03/31/2021 5 Days no growth  Final     Urine Culture, Routine   Date Value Ref Range Status   04/01/2021 <10,000 CFU/mL  Mixed gram positive organisms    Final        Radiology :  Reviewed     ASSESSMENT:   PT CAME IN WITH ABD PAIN AND

## 2021-04-08 NOTE — PLAN OF CARE
Problem: Falls - Risk of:  Goal: Will remain free from falls  Description: Will remain free from falls  4/8/2021 1205 by Sergio Ruff RN  Outcome: Met This Shift     Problem: Skin Integrity:  Goal: Will show no infection signs and symptoms  Description: Will show no infection signs and symptoms  4/8/2021 1205 by Sergio Ruff RN  Outcome: Met This Shift     Problem: Pain:  Goal: Control of acute pain  Description: Control of acute pain  4/8/2021 1205 by Sergio Ruff RN  Outcome: Met This Shift

## 2021-04-08 NOTE — PROGRESS NOTES
PROGRESS NOTE  By J Luis Kruger M.D. [facility]  Dr. Benigno Polo M.D.,  Dr. Bell August M.D.,   Dr. Kori Smyth D.O.,  Dr. Arden Poole M.D.,  Dr. Danisha Esposito D.O.,  Lane Mobley D.O. Shraddha Lynn  68 y.o.  female    SUBJECTIVE:  No new complaints. Resting comfortably. Daughter at bedside    OBJECTIVE:    /78   Pulse 85   Temp 98.1 °F (36.7 °C) (Oral)   Resp 16   Ht 5' 5\" (1.651 m)   Wt 145 lb 4.8 oz (65.9 kg)   SpO2 95%   BMI 24.18 kg/m²     General: NAD/-American female  HEENT: No discharge no seizures  Neck: Supple  Chest: Symmetric excursion/labored respirations  Cor: Monitor reviewed showing atrial fibrillation  Abd.: Nondistended  Extr.:  Decreased muscle tone above throughout  Skin: Warm and dry      DATA:    Monitor data reviewed -atrial fibrillation noted. Stool (measured) : 0 mL  Lab Results   Component Value Date    WBC 14.2 04/08/2021    RBC 3.07 04/08/2021    HGB 9.4 04/08/2021    HCT 30.0 04/08/2021    MCV 97.7 04/08/2021    MCH 30.6 04/08/2021    MCHC 31.3 04/08/2021    RDW 18.5 04/08/2021     04/08/2021    MPV 12.1 04/08/2021     Lab Results   Component Value Date     04/08/2021    K 3.7 04/08/2021    K 3.9 03/31/2021    CL 95 04/08/2021    CO2 34 04/08/2021    BUN 43 04/08/2021    CREATININE 2.9 04/08/2021    CALCIUM 8.6 04/08/2021    PROT 5.9 04/08/2021    LABALBU 3.0 04/08/2021    BILITOT 0.9 04/08/2021    ALKPHOS 110 04/08/2021    AST 20 04/08/2021    ALT 7 04/08/2021     Lab Results   Component Value Date    LIPASE 40 03/31/2021     No results found for: AMYLASE      ASSESSMENT/PLAN:     1.  Hematochezia, acute blood loss anemia  - no overt GI bleeding, H/H within variance, VSS  -EGD revealed multiple esophageal diverticuli, gastritis and duodenal bulb lesion 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 (biopsies pending of antrum and bulb lesion)  - colonoscopy revealed few small polyps s/p polypectomy and sigmoid dieulafoy lesion with adherent clot s/p clip/cautery and tattoo  - continue to follow H/H and monitor for signs of bleeding  -Currently stable H&H  - transfuse as needed  - diet as tolerated  - oral PPI once daily x 8 weeks  - follow up as outpatient  -EUS  4/7 -biopsy of pancreatic tail (FNA)     2. LUQ abdominal lesion, Pancreatic pseudocyst  - likely related to pancreatic cyst, currently resolving   - continue supportive care  -EUS by general surgery as above    3. HFrEF, pAFib, volume overload, REYES/CKD  - appreciate cardiology/nephrology management and recommendations     Laura Terrell MD  4/8/2021  2:14 PM    NOTE:  This report was transcribed using voice recognition software. Every effort was made to ensure accuracy; however, inadvertent computerized transcription errors may be present.

## 2021-04-08 NOTE — CARE COORDINATION
4/8/2021 1418 CM note: NEGATIVE COVID 4/7/21. Follow up visit made to room and pt's dtr Roxy Franklin informed that [facility]  has initiated prior authorization,awaiting insurance approval. Roxy Abelelen was given [facility] phone # to inquire about current visitation policy and other facility questions. For SNF, will need PRECERT and signed LUBA. SW completed AMA Chang RN

## 2021-04-08 NOTE — PLAN OF CARE
Problem: Falls - Risk of:  Goal: Will remain free from falls  Description: Will remain free from falls  4/8/2021 1623 by Baldo Membreno RN  Outcome: Met This Shift     Problem: Skin Integrity:  Goal: Will show no infection signs and symptoms  Description: Will show no infection signs and symptoms  4/8/2021 1623 by Baldo Membreno RN  Outcome: Met This Shift     Problem: Pain:  Goal: Control of acute pain  Description: Control of acute pain  4/8/2021 1623 by Baldo Membreno RN  Outcome: Met This Shift

## 2021-04-08 NOTE — PROGRESS NOTES
Physical Therapy        0430/0430-01    Patient unavailable for physical therapy treatment due to patient asleep. Will try back later.      Cynthea Romberg, PTA  #[id number]

## 2021-04-09 ENCOUNTER — APPOINTMENT (OUTPATIENT)
Dept: GENERAL RADIOLOGY | Age: 81
DRG: 871 | End: 2021-04-09
Payer: MEDICARE

## 2021-04-09 ENCOUNTER — APPOINTMENT (OUTPATIENT)
Dept: ULTRASOUND IMAGING | Age: 81
DRG: 871 | End: 2021-04-09
Payer: MEDICARE

## 2021-04-09 LAB
ALBUMIN SERPL-MCNC: 2.9 G/DL (ref 3.5–5.2)
ALP BLD-CCNC: 104 U/L (ref 35–104)
ALT SERPL-CCNC: 7 U/L (ref 0–32)
ANION GAP SERPL CALCULATED.3IONS-SCNC: 13 MMOL/L (ref 7–16)
ANISOCYTOSIS: ABNORMAL
APPEARANCE FLUID: NORMAL
AST SERPL-CCNC: 19 U/L (ref 0–31)
BASOPHILS ABSOLUTE: 0.13 E9/L (ref 0–0.2)
BASOPHILS RELATIVE PERCENT: 0.9 % (ref 0–2)
BILIRUB SERPL-MCNC: 1.1 MG/DL (ref 0–1.2)
BUN BLDV-MCNC: 47 MG/DL (ref 8–23)
CALCIUM SERPL-MCNC: 8.3 MG/DL (ref 8.6–10.2)
CELL COUNT FLUID TYPE: NORMAL
CHLORIDE BLD-SCNC: 88 MMOL/L (ref 98–107)
CO2: 37 MMOL/L (ref 22–29)
COLOR FLUID: YELLOW
CREAT SERPL-MCNC: 2.8 MG/DL (ref 0.5–1)
CRITICAL: ABNORMAL
DATE ANALYZED: ABNORMAL
DATE OF COLLECTION: ABNORMAL
EOSINOPHILS ABSOLUTE: 0 E9/L (ref 0.05–0.5)
EOSINOPHILS RELATIVE PERCENT: 0.4 % (ref 0–6)
FLUID TYPE: NORMAL
GFR AFRICAN AMERICAN: 20
GFR NON-AFRICAN AMERICAN: 20 ML/MIN/1.73
GLUCOSE BLD-MCNC: 122 MG/DL (ref 74–99)
GLUCOSE, FLUID: 155 MG/DL
HCT VFR BLD CALC: 29.1 % (ref 34–48)
HEMOGLOBIN: 9.4 G/DL (ref 11.5–15.5)
LAB: ABNORMAL
LD, FLUID: 158 U/L
LV EF: 30 %
LVEF MODALITY: NORMAL
LYMPHOCYTES ABSOLUTE: 0.71 E9/L (ref 1.5–4)
LYMPHOCYTES RELATIVE PERCENT: 5.2 % (ref 20–42)
Lab: ABNORMAL
Lab: ABNORMAL
MCH RBC QN AUTO: 31 PG (ref 26–35)
MCHC RBC AUTO-ENTMCNC: 32.3 % (ref 32–34.5)
MCV RBC AUTO: 96 FL (ref 80–99.9)
MONOCYTE, FLUID: 52 %
MONOCYTES ABSOLUTE: 0.7 E9/L (ref 0.1–0.95)
MONOCYTES RELATIVE PERCENT: 5.2 % (ref 2–12)
NEUTROPHIL, FLUID: 48 %
NEUTROPHILS ABSOLUTE: 12.55 E9/L (ref 1.8–7.3)
NEUTROPHILS RELATIVE PERCENT: 88.7 % (ref 43–80)
NUCLEATED CELLS FLUID: 1373 /UL
OPERATOR ID: 1102
OVALOCYTES: ABNORMAL
PDW BLD-RTO: 17.7 FL (ref 11.5–15)
PH FLUID: ABNORMAL
PLATELET # BLD: 211 E9/L (ref 130–450)
PMV BLD AUTO: 12.7 FL (ref 7–12)
POIKILOCYTES: ABNORMAL
POLYCHROMASIA: ABNORMAL
POTASSIUM SERPL-SCNC: 3.3 MMOL/L (ref 3.5–5)
PROTEIN FLUID: 2.6 G/DL
RBC # BLD: 3.03 E12/L (ref 3.5–5.5)
RBC FLUID: <2000 /UL
SODIUM BLD-SCNC: 138 MMOL/L (ref 132–146)
SOURCE, BLOOD GAS: ABNORMAL
TIME ANALYZED: 1624
TOTAL PROTEIN: 5.7 G/DL (ref 6.4–8.3)
WBC # BLD: 14.1 E9/L (ref 4.5–11.5)

## 2021-04-09 PROCEDURE — 85025 COMPLETE CBC W/AUTO DIFF WBC: CPT

## 2021-04-09 PROCEDURE — 83986 ASSAY PH BODY FLUID NOS: CPT

## 2021-04-09 PROCEDURE — 6370000000 HC RX 637 (ALT 250 FOR IP): Performed by: STUDENT IN AN ORGANIZED HEALTH CARE EDUCATION/TRAINING PROGRAM

## 2021-04-09 PROCEDURE — 89051 BODY FLUID CELL COUNT: CPT

## 2021-04-09 PROCEDURE — 6370000000 HC RX 637 (ALT 250 FOR IP): Performed by: SPECIALIST

## 2021-04-09 PROCEDURE — 87015 SPECIMEN INFECT AGNT CONCNTJ: CPT

## 2021-04-09 PROCEDURE — 93306 TTE W/DOPPLER COMPLETE: CPT

## 2021-04-09 PROCEDURE — 6360000002 HC RX W HCPCS: Performed by: INTERNAL MEDICINE

## 2021-04-09 PROCEDURE — 82947 ASSAY GLUCOSE BLOOD QUANT: CPT

## 2021-04-09 PROCEDURE — 80053 COMPREHEN METABOLIC PANEL: CPT

## 2021-04-09 PROCEDURE — 88305 TISSUE EXAM BY PATHOLOGIST: CPT

## 2021-04-09 PROCEDURE — 0W993ZZ DRAINAGE OF RIGHT PLEURAL CAVITY, PERCUTANEOUS APPROACH: ICD-10-PCS | Performed by: RADIOLOGY

## 2021-04-09 PROCEDURE — 88112 CYTOPATH CELL ENHANCE TECH: CPT

## 2021-04-09 PROCEDURE — 84157 ASSAY OF PROTEIN OTHER: CPT

## 2021-04-09 PROCEDURE — 71046 X-RAY EXAM CHEST 2 VIEWS: CPT

## 2021-04-09 PROCEDURE — 6370000000 HC RX 637 (ALT 250 FOR IP): Performed by: INTERNAL MEDICINE

## 2021-04-09 PROCEDURE — 87070 CULTURE OTHR SPECIMN AEROBIC: CPT

## 2021-04-09 PROCEDURE — 2580000003 HC RX 258: Performed by: SURGERY

## 2021-04-09 PROCEDURE — 83615 LACTATE (LD) (LDH) ENZYME: CPT

## 2021-04-09 PROCEDURE — 2700000000 HC OXYGEN THERAPY PER DAY

## 2021-04-09 PROCEDURE — 87205 SMEAR GRAM STAIN: CPT

## 2021-04-09 PROCEDURE — 87116 MYCOBACTERIA CULTURE: CPT

## 2021-04-09 PROCEDURE — C1729 CATH, DRAINAGE: HCPCS

## 2021-04-09 PROCEDURE — 87206 SMEAR FLUORESCENT/ACID STAI: CPT

## 2021-04-09 PROCEDURE — 1200000000 HC SEMI PRIVATE

## 2021-04-09 PROCEDURE — 2500000003 HC RX 250 WO HCPCS: Performed by: INTERNAL MEDICINE

## 2021-04-09 PROCEDURE — 36415 COLL VENOUS BLD VENIPUNCTURE: CPT

## 2021-04-09 PROCEDURE — 71045 X-RAY EXAM CHEST 1 VIEW: CPT

## 2021-04-09 PROCEDURE — 6360000002 HC RX W HCPCS: Performed by: SURGERY

## 2021-04-09 RX ORDER — BUMETANIDE 0.25 MG/ML
2 INJECTION, SOLUTION INTRAMUSCULAR; INTRAVENOUS ONCE
Status: COMPLETED | OUTPATIENT
Start: 2021-04-09 | End: 2021-04-09

## 2021-04-09 RX ADMIN — ATORVASTATIN CALCIUM 40 MG: 40 TABLET, FILM COATED ORAL at 21:20

## 2021-04-09 RX ADMIN — CARVEDILOL 6.25 MG: 6.25 TABLET, FILM COATED ORAL at 21:21

## 2021-04-09 RX ADMIN — SODIUM BICARBONATE 650 MG: 650 TABLET ORAL at 21:20

## 2021-04-09 RX ADMIN — SODIUM BICARBONATE 650 MG: 650 TABLET ORAL at 14:27

## 2021-04-09 RX ADMIN — BUMETANIDE 2 MG: 1 TABLET ORAL at 08:50

## 2021-04-09 RX ADMIN — CARVEDILOL 6.25 MG: 6.25 TABLET, FILM COATED ORAL at 08:50

## 2021-04-09 RX ADMIN — CHLOROTHIAZIDE SODIUM 500 MG: 500 INJECTION, POWDER, LYOPHILIZED, FOR SOLUTION INTRAVENOUS at 22:08

## 2021-04-09 RX ADMIN — MEROPENEM 500 MG: 500 INJECTION, POWDER, FOR SOLUTION INTRAVENOUS at 06:56

## 2021-04-09 RX ADMIN — BUMETANIDE 2 MG: 0.25 INJECTION INTRAMUSCULAR; INTRAVENOUS at 21:21

## 2021-04-09 RX ADMIN — SODIUM BICARBONATE 650 MG: 650 TABLET ORAL at 08:50

## 2021-04-09 RX ADMIN — SODIUM BICARBONATE 650 MG: 650 TABLET ORAL at 17:35

## 2021-04-09 RX ADMIN — HYDRALAZINE HYDROCHLORIDE 25 MG: 25 TABLET, FILM COATED ORAL at 14:27

## 2021-04-09 RX ADMIN — ISOSORBIDE MONONITRATE 30 MG: 30 TABLET, EXTENDED RELEASE ORAL at 08:50

## 2021-04-09 RX ADMIN — HYDRALAZINE HYDROCHLORIDE 25 MG: 25 TABLET, FILM COATED ORAL at 22:08

## 2021-04-09 RX ADMIN — FLUCONAZOLE 100 MG: 100 TABLET ORAL at 08:50

## 2021-04-09 RX ADMIN — HYDRALAZINE HYDROCHLORIDE 25 MG: 25 TABLET, FILM COATED ORAL at 07:00

## 2021-04-09 RX ADMIN — MEROPENEM 500 MG: 500 INJECTION, POWDER, FOR SOLUTION INTRAVENOUS at 17:35

## 2021-04-09 RX ADMIN — PANTOPRAZOLE SODIUM 40 MG: 40 TABLET, DELAYED RELEASE ORAL at 07:00

## 2021-04-09 ASSESSMENT — PAIN SCALES - GENERAL
PAINLEVEL_OUTOF10: 0

## 2021-04-09 NOTE — PROGRESS NOTES
[facility] MD Juliet Vaughn MD. Migdalia Goldmann MD [place] Sherry MD   Progress Note    4/9/2021    SUBJECTIVE:     Pt known to me from office . I actually saw her in [facility] 1-2 weeks back . She was last seen as inpt in [facility] system by our group back in 2016 . Case discussed with Dr Rox Clifford . 4/3: Seen this am in her room , o2/nc at 4 L which is her home o2 . She did undergo EGD/colonscopy this am . Results noted   Continue to look volume overloaded with 2 + edema and JVD . 4/4 ; stable vitlas , good UO on bumex drip/diuril . Will assess in am if we can wean her off drip   4/5: Patient was doing well this morning, she is awake and alert with no acute distress. We will continue same medications and same plan of care for today. 4/6 \" Seen this am , pleasant , o2/nc . Good UO . Cr stable . Plan for EUS in am     4/7 : good UO with negative balance . In good spirit . For EUS today . 4/8 : in bed , lying flat in NAD . Edema much improved     4/9 : report of pt being more sob . she is for cxr today . EUS with bile duct stone       PROBLEM LIST:    Principal Problem:    GI bleed  Active Problems:    Preoperative cardiovascular examination    Bowel perforation (HCC)  Resolved Problems:    * No resolved hospital problems. *       DIET:    DIET LOW FAT; Low Sodium (2 GM)       Allergies : Patient has no known allergies.     Past Medical History:   Diagnosis Date    Arthritis     Atrial fibrillation ([address]     Blood circulation, collateral     CHF (congestive heart failure) (HCC)     Chronic renal insufficiency, stage IV (severe) ([address] 11/19/2016    History of cardiovascular stress test 07/2015    Lexiscan stress test    History of echocardiogram 07/27/2015    EF 29%    Hyperlipidemia     Hypertension        Past Surgical History:   Procedure Laterality Date    COLONOSCOPY N/A 4/3/2021    COLONOSCOPY POLYPECTOMY HOT SNARE performed by Georgina Hodge Gopi Aguilar DO at [address]  4/3/2021    COLONOSCOPY WITH BIOPSY performed by Yusef Rubi DO at [address]  4/3/2021    COLONOSCOPY CONTROL HEMORRHAGE performed by Yusef Rubi DO at [address]  4/3/2021    COLONOSCOPY SUBMUCOSAL SPOT INJECTION performed by Yusef Rubi DO at [address] [facility] GASTROINTESTINAL ENDOSCOPY N/A 4/3/2021    EGD BIOPSY performed by Yusef Rubi DO at [address] [place] [address] N/A 4/7/2021    EGD W/EUS FNA performed by Kristie De Santiago MD at [address] [facility] reviewed. No pertinent family history. reports that she has been smoking. She has been smoking about 0.50 packs per day. She has never used smokeless tobacco. She reports previous alcohol use. She reports that she does not use drugs. Review of Systems:   Constitutional:weak   Eyes: no eye pain , no itching , no drainage  Ears, nose, mouth, throat, and face: no ear ,nose pain , hearing is ok ,no nasal drainage   Respiratory: no sob ,no cough ,no wheezing . Cardiovascular: no chest pain , no palpitation ,no sob . Gastrointestinal: no nausea, vomiting , constipation , no abdominal pain . Genitourinary:no urinary retention , no burning , dysuria . No polyuria   Hematologic/lymphatic: no bleeding , no cougulation issues . Musculoskeletal:no joint pain , no swelling . Neurological: no headaches ,no weakness , no numbness . Endocrine: no thirst , no weight issues .      MEDS (scheduled):    bumetanide  2 mg Intravenous Once    [Held by provider] bumetanide  2 mg Oral Daily    fluconazole  100 mg Oral Daily    pantoprazole  40 mg Oral QAM AC    chlorothiazide  500 mg Intravenous BID    sodium bicarbonate  650 mg Oral 4x Daily    atorvastatin  40 mg Oral Nightly    carvedilol  6.25 mg Oral BID    hydrALAZINE  25 mg Oral 3 times per day    isosorbide mononitrate  30 mg Oral Daily    vitamin D  50,000 Units Oral Weekly    meropenem (MERREM) IVPB  500 mg Intravenous Q12H       MEDS (infusions):   sodium chloride      sodium chloride         MEDS (prn):  perflutren lipid microspheres, acetaminophen, sodium chloride, sodium chloride, albuterol, ondansetron, promethazine, morphine    PHYSICAL EXAM:     Patient Vitals for the past 24 hrs:   BP Temp Temp src Pulse Resp SpO2 Weight   04/09/21 0730 (!) 124/59 99 °F (37.2 °C) Oral 88 17 95 %    04/09/21 0645 131/70  Oral 84 15 91 % 130 lb (59 kg)   04/09/21 0304    85      04/08/21 2115 138/64 98.1 °F (36.7 °C) Oral 80 16 93 %    @      Intake/Output Summary (Last 24 hours) at 4/9/2021 1539  Last data filed at 4/9/2021 1425  Gross per 24 hour   Intake 360 ml   Output 4450 ml   Net -4090 ml         Wt Readings from Last 3 Encounters:   04/09/21 130 lb (59 kg)   10/08/19 116 lb 14.4 oz (53 kg)   06/03/19 130 lb (59 kg)       Constitutional:  in no acute distress  Oral: mucus membranes moist  Neck: 2+ JVD   Cardiovascular: S1, S2 regular rhythm, no murmur,or rub  Respiratory:  No crackles, no wheeze  Gastrointestinal:  Soft, nontender, nondistended, NABS  Ext: 2+ edema bl   Skin: dry, no rash  Neuro: awake, alert, interactive      DATA:    Recent Labs     04/07/21  0653 04/08/21  0822 04/09/21  0757   WBC 13.9* 14.2* 14.1*   HGB 9.3* 9.4* 9.4*   HCT 29.4* 30.0* 29.1*   MCV 97.4 97.7 96.0    209 211     Recent Labs     04/07/21  0653 04/08/21  0822 04/09/21  0757    142 138   K 3.8 3.7 3.3*   CL 98 95* 88*   CO2 28 34* 37*   BUN 38* 43* 47*   CREATININE 2.8* 2.9* 2.8*   ALT 7 7 7   AST 18 20 19   BILITOT 0.9 0.9 1.1   ALKPHOS 107* 110* 104       No results found for: LABPROT    ASSESSMENT / RECOMMENDATIONS:      1) REYES  in the setting of GI bleed ,low EF /CHF     2) Chronic kidney disease stage IV baseline cr 2-2.7     3) 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  Results

## 2021-04-09 NOTE — PROGRESS NOTES
Internal Medicine Progress Note    [facility]    Dione Bales. Neema Collins., LEROY.KRISTY.CChepeOChepeI. Jaja Suazo D.O., F.A.C.O.I. Saintclair Popper, D.O. Garth Lacks, MSN, APRN, NP-C  Shon Roca. Henri Soriano, MSN, APRN-CNP     Primary Care Physician: Veneda Essex, DO   Admitting Physician:  Seble Moore DO  Admission date and time: 3/31/2021 10:32 AM    Room:  [facility]/1992-91  Admitting diagnosis: GI bleed [K92.2]    Patient Name: Burgess Glover  MRN: [id number]    Date of Service: 4/9/2021     Subjective:  Urban Paget is a 68 y.o. female who was seen and examined today,4/9/2021, at the bedside. Patient appear more short of breath today. We will obtain repeat chest x-ray. The patient does not feel as well as he did yesterday. Endoscopic ultrasound does show 5 mm stone in distal common bile duct    Review of System:   Constitutional:   Ongoing weakness and deconditioning. HEENT:   Denies ear pain, sore throat, sinus or eye problems. Admits to irritation associated with the nasal cannula oxygen. Cardiovascular:   Denies any chest pain, irregular heartbeats, or palpitations. Respiratory:   Appears more short of breath  Gastrointestinal:   Resolution of her presenting abdominal pain. She is otherwise tolerating a diet. Genitourinary:    Voiding with the use of a Jane catheter. Extremities:   Improving lower extremity edema, calf and foot cramping reported. Denies sensation changes or loss. Neurology:    Admits to improving weakness and deconditioning. Psch:   Denies being anxious or depressed. Musculoskeletal:    Denies  myalgias, joint complaints or back pain. Integumentary:   Denies any rashes, ulcers, or excoriations. Denies bruising. Hematologic/Lymphatic:  Denies bruising or bleeding.     Physical Exam:  I/O this shift:  In: 120 [P.O.:120]  Out: 1950 [Urine:1950]    Intake/Output Summary (Last 24 hours) at 4/9/2021 1409  Last data filed at 4/9/2021 0830  Gross per 24 hour   Intake 300 ml   Output 5075 ml   Net -4775 ml   I/O last 3 completed shifts: In: 180 [P.O.:180]  Out: 3125 [Urine:3125]  Patient Vitals for the past 96 hrs (Last 3 readings):   Weight   04/09/21 0645 130 lb (59 kg)   04/08/21 0515 145 lb 4.8 oz (65.9 kg)   04/07/21 1930 144 lb (65.3 kg)     Vital Signs:   Blood pressure (!) 124/59, pulse 88, temperature 99 °F (37.2 °C), temperature source Oral, resp. rate 17, height 5' 5\" (1.651 m), weight 130 lb (59 kg), SpO2 95 %. General appearance:  Awake and alert. More comfortable without distress. Head:  Normocephalic. No masses, lesions or tenderness. Eyes:  PERRLA. EOMI. Sclera clear. Impaired vision. ENT:  Ears normal. Mucosa normal. Nasal cannula oxygen is in place. Neck:    Supple. Trachea midline. No thyromegaly. No JVD. No bruits. Heart:    S1 and S2, regular rate and rhythm, systolic murmur  Lungs:    Diminished on the right  Abdomen:   Soft mildly tender to palpation. Voluntary guarding is elicited. Bowel sounds are active. Extremities:    Peripheral pulses present, decreased in bilateral lower extremities. Small partial-thickness wound noted to the lateral aspect of the left fifth toe. Trace to 1+ pitting edema involving bilateral lower extremities. Neurologic:    Alert x 3. No focal deficit. Cranial nerves grossly intact. No focal weakness. Psych:   Behavior is normal. Mood appears normal. Speech is not rapid and/or pressured. Musculoskeletal:   Spine ROM normal. Muscular strength intact. Gait not assessed. Integumentary:  No rashes  Skin normal color and texture. Genitalia/Breast:  Voiding with the use of a Jane catheter.     Medication:  Scheduled Meds:   bumetanide  2 mg Oral Daily    fluconazole  100 mg Oral Daily    pantoprazole  40 mg Oral QAM AC    [Held by provider] chlorothiazide  500 mg Intravenous BID    sodium bicarbonate  650 mg Oral 4x Daily    atorvastatin  40 mg Oral Nightly    carvedilol  6.25 mg Oral BID  hydrALAZINE  25 mg Oral 3 times per day    isosorbide mononitrate  30 mg Oral Daily    vitamin D  50,000 Units Oral Weekly    meropenem (MERREM) IVPB  500 mg Intravenous Q12H     Continuous Infusions:   sodium chloride      sodium chloride         Objective Data:  CBC with Differential:    Lab Results   Component Value Date    WBC 14.1 04/09/2021    RBC 3.03 04/09/2021    HGB 9.4 04/09/2021    HCT 29.1 04/09/2021     04/09/2021    MCV 96.0 04/09/2021    MCH 31.0 04/09/2021    MCHC 32.3 04/09/2021    RDW 17.7 04/09/2021    NRBC 0.9 03/31/2021    LYMPHOPCT 5.2 04/09/2021    MONOPCT 5.2 04/09/2021    MYELOPCT 0.9 04/03/2021    BASOPCT 0.9 04/09/2021    MONOSABS 0.70 04/09/2021    LYMPHSABS 0.71 04/09/2021    EOSABS 0.00 04/09/2021    BASOSABS 0.13 04/09/2021     CMP:    Lab Results   Component Value Date     04/09/2021    K 3.3 04/09/2021    K 3.9 03/31/2021    CL 88 04/09/2021    CO2 37 04/09/2021    BUN 47 04/09/2021    CREATININE 2.8 04/09/2021    GFRAA 20 04/09/2021    LABGLOM 20 04/09/2021    GLUCOSE 122 04/09/2021    GLUCOSE 102 11/17/2010    PROT 5.7 04/09/2021    LABALBU 2.9 04/09/2021    CALCIUM 8.3 04/09/2021    BILITOT 1.1 04/09/2021    ALKPHOS 104 04/09/2021    AST 19 04/09/2021    ALT 7 04/09/2021       Assessment:      · Large pancreatic pseudocyst with concern for hemorrhagic transformation  · Acute blood loss anemia with EGD revealing multiple esophageal diverticuli, gastritis, and duodenal bulb lesion 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 as well as colonoscopic results revealing small polyps in sigmoid Dieulafoy lesion with adherent clot status post clip/cautery  · Endoscopic ultrasound does show a 5 mm stone in the distal common bile duct  · Acute on chronic kidney disease stage IV currently undergoing diuresis  · Abdominal aortic aneurysm toward the bifurcation measuring up to 3.7 x 4.8 cm a component of chronic dissection appearance  · Acutely decompensated systolic and diastolic congestive heart failure  · Paroxysmal atrial fibrillation on chronic anticoagulation with Eliquis--on hold  · Essential hypertension  · Moderate peripheral artery disease    Plan:       · Patient appear more  short of breath today we will obtain chest x-ray. Possibly might need thoracentesis  · Surgical input on whether ERCP should be done. Continue to follow with GI  · Continue to follow with nephrology  · Continue physical therapy      More than 50% of my  time was spent at the bedside counseling/coordinating care with the patient and/or family with face to face contact. This time was spent reviewing notes and laboratory data as well as instructing and counseling the patient. Time I spent with the family or surrogate(s) is included only if the patient was incapable of providing the necessary information or participating in medical decisions. I also discussed the differential diagnosis and all of the proposed management plans with the patient and individuals accompanying the patient. Taylor Long requires this high level of physician care and nursing on the Telemetry unit due the complexity of decision management and chance of rapid decline or death. Continued cardiac monitoring and higher level of nursing are required. I am readily available for any further decision-making and intervention.      Omari Shane 31, DO FACOI  2:09 PM  4/9/2021

## 2021-04-09 NOTE — PROGRESS NOTES
MEDS (infusions):   sodium chloride      sodium chloride         MEDS (prn):  acetaminophen, sodium chloride, sodium chloride, albuterol, ondansetron, promethazine, morphine    PHYSICAL EXAM:     Patient Vitals for the past 24 hrs:   BP Temp Temp src Pulse Resp SpO2 Weight   04/08/21 2115 138/64 98.1 °F (36.7 °C) Oral 80 16 93 %    04/08/21 1230 126/78         04/08/21 0715 130/67 98.1 °F (36.7 °C) Oral 85 16 95 %    04/08/21 0515       145 lb 4.8 oz (65.9 kg)   04/08/21 0504 113/78         04/07/21 2215 119/69 98.4 °F (36.9 °C) Oral 70 17 97 %    @      Intake/Output Summary (Last 24 hours) at 4/8/2021 2201  Last data filed at 4/8/2021 2105  Gross per 24 hour   Intake 180 ml   Output 2725 ml   Net -2545 ml         Wt Readings from Last 3 Encounters:   04/08/21 145 lb 4.8 oz (65.9 kg)   10/08/19 116 lb 14.4 oz (53 kg)   06/03/19 130 lb (59 kg)       Constitutional:  in no acute distress  Oral: mucus membranes moist  Neck: 2+ JVD   Cardiovascular: S1, S2 regular rhythm, no murmur,or rub  Respiratory:  No crackles, no wheeze  Gastrointestinal:  Soft, nontender, nondistended, NABS  Ext: 2+ edema bl   Skin: dry, no rash  Neuro: awake, alert, interactive      DATA:    Recent Labs     04/06/21  0644 04/07/21  0653 04/08/21  0822   WBC 12.0* 13.9* 14.2*   HGB 8.5* 9.3* 9.4*   HCT 27.2* 29.4* 30.0*   MCV 97.8 97.4 97.7    197 209     Recent Labs     04/06/21  0644 04/07/21  0653 04/08/21  0822    139 142   K 3.3* 3.8 3.7    98 95*   CO2 26 28 34*   MG 1.5*  --   --    PHOS 4.3  --   --    BUN 35* 38* 43*   CREATININE 2.9* 2.8* 2.9*   ALT 7 7 7   AST 13 18 20   BILITOT 1.0 0.9 0.9   ALKPHOS 94 107* 110*       No results found for: LABPROT    ASSESSMENT / RECOMMENDATIONS:      1) REYES  in the setting of GI bleed ,low EF /CHF     2) Chronic kidney disease stage IV baseline cr 2-2.7     3) 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 Her edema is much improved . So far net negative by 15 L   Will plan to transition to po diuretics next 12-24 hours .    F/u serial BMPs , UO           Electronically signed by Danni Cerna MD on 4/8/2021 at 10:01 PM

## 2021-04-09 NOTE — CARE COORDINATION
4/9/2021 1135 CM note: NEGATIVE COVID 4/7/21. [facility] initiated [address] 2/8/77-[id number] determination. SNF liason hopeful to get authorization today or Saturday.  For SNF, will need PRECERT and signed LUBA. DYLAN completed AMA Chang RN

## 2021-04-09 NOTE — PLAN OF CARE
Problem: Falls - Risk of:  Goal: Will remain free from falls  Description: Will remain free from falls  4/9/2021 0951 by Josh Sosa RN  Outcome: Met This Shift  4/9/2021 0705 by Abi Das RN  Outcome: Met This Shift  4/8/2021 2305 by Jay Agosto RN  Outcome: Met This Shift  Goal: Absence of physical injury  Description: Absence of physical injury  4/9/2021 0951 by Josh Sosa RN  Outcome: Met This Shift  4/9/2021 0705 by Abi Das RN  Outcome: Met This Shift     Problem: Skin Integrity:  Goal: Will show no infection signs and symptoms  Description: Will show no infection signs and symptoms  4/9/2021 0951 by Josh Sosa RN  Outcome: Met This Shift  4/9/2021 0705 by Abi Das RN  Outcome: Met This Shift  4/8/2021 2305 by Jay Agosto RN  Outcome: Met This Shift  Goal: Absence of new skin breakdown  Description: Absence of new skin breakdown  4/9/2021 0951 by Josh Sosa RN  Outcome: Met This Shift  4/9/2021 0705 by Abi Das RN  Outcome: Met This Shift     Problem: Pain:  Goal: Pain level will decrease  Description: Pain level will decrease  4/9/2021 0951 by Josh Sosa RN  Outcome: Met This Shift  4/9/2021 0705 by Abi Das RN  Outcome: Met This Shift  Goal: Control of acute pain  Description: Control of acute pain  4/9/2021 0951 by Josh Sosa RN  Outcome: Met This Shift  4/9/2021 0705 by Abi Das RN  Outcome: Met This Shift  Goal: Control of chronic pain  Description: Control of chronic pain  4/9/2021 0951 by Josh Sosa RN  Outcome: Met This Shift  4/9/2021 0705 by Abi Das RN  Outcome: Met This Shift

## 2021-04-09 NOTE — PROGRESS NOTES
[address] [facility]  [facility]  Progress Note  F/u atbx  Chief complain:  Fatigue       SUBJECTIVE:  In bed   Daughter present  Has no c/o tired  S/p EUS  DX  Pancreatic tail mass with solid component, choledocholithiasis, gallbladder sludge, pancreatic duct stone, chronic pancreatitis    Patient is awake,   seen at bedside  No abd pain, no nausea, vomiting, diarrhea, fever, chills, rash     ROS:  Negative except as above     OBJECTIVE:    Vitals:    04/08/21 2115 04/09/21 0304 04/09/21 0645 04/09/21 0730   BP: 138/64  131/70 (!) 124/59   Pulse: 80 85 84 88   Resp: 16  15 17   Temp: 98.1 °F (36.7 °C)   99 °F (37.2 °C)   TempSrc: Oral  Oral Oral   SpO2: 93%  91% 95%   Weight:   130 lb (59 kg)    Height:           HEENT :         unremarkable  At/nc  Heart:             RRR,  No murmurs, no gallops  Lungs:            Dec  to auscultation, no wheezing , no rales post   Abdomen:       soft, non tender, bowel sounds present  Extremites:     Dec  BLE Edema,   Skin:                Normal turgor,normal texture  piv             Jane     Current Facility-Administered Medications   Medication Dose Route Frequency Provider Last Rate Last Admin    perflutren lipid microspheres (DEFINITY) injection 1.65 mg  1.5 mL Intravenous ONCE PRN Laverne Rodrigez DO        bumetanide (BUMEX) tablet 2 mg  2 mg Oral Daily Norberto Wilder MD   2 mg at 04/09/21 0850    acetaminophen (TYLENOL) tablet 650 mg  650 mg Oral Q6H PRN MADELAINE Colby - CNP        fluconazole (DIFLUCAN) tablet 100 mg  100 mg Oral Daily Fadi Schmid MD   100 mg at 04/09/21 0850    pantoprazole (PROTONIX) tablet 40 mg  40 mg Oral QAM AC Suhail Aldrich DO   40 mg at 04/09/21 0700    [Held by provider] chlorothiazide (DIURIL) injection 500 mg  500 mg Intravenous BID Norberto Wilder MD   500 mg at 04/08/21 2118    sodium bicarbonate tablet 650 mg  650 mg Oral 4x Daily Hasit BRIAN Gomez MD   650 mg at 04/09/21 1427    0.9 % sodium chloride infusion   Intravenous PRN Buffalo Milch, DO        0.9 % sodium chloride infusion   Intravenous PRN Otelia Ang, DO        atorvastatin (LIPITOR) tablet 40 mg  40 mg Oral Nightly Otelia Ang, DO   40 mg at 04/08/21 2118    carvedilol (COREG) tablet 6.25 mg  6.25 mg Oral BID Otelia Ang, DO   6.25 mg at 04/09/21 8134    hydrALAZINE (APRESOLINE) tablet 25 mg  25 mg Oral 3 times per day Otelia Ang, DO   25 mg at 04/09/21 1427    isosorbide mononitrate (IMDUR) extended release tablet 30 mg  30 mg Oral Daily Otelia Ang, DO   30 mg at 04/09/21 0850    vitamin D (ERGOCALCIFEROL) capsule 50,000 Units  50,000 Units Oral Weekly Otelia Ang, DO   50,000 Units at 04/07/21 0858    albuterol (PROVENTIL) nebulizer solution 2.5 mg  2.5 mg Nebulization Q6H PRN Otelia Ang, DO        ondansetron [facility]) injection 4 mg  4 mg Intravenous Q6H PRN Otelia Ang, DO        promethazine (PHENERGAN) injection 25 mg  25 mg Intravenous Q6H PRN Otelia Ang, DO        morphine (PF) injection 2 mg  2 mg Intravenous Q4H PRN Otelia Ang, DO   2 mg at 04/07/21 2224    meropenem (MERREM) 500 mg in sodium chloride 0.9 % 100 mL IVPB  500 mg Intravenous Q12H Dee Cruz MD   Stopped at 04/09/21 0974        LABS     Recent Labs     04/07/21  0653 04/08/21  0822 04/09/21  0757    142 138   K 3.8 3.7 3.3*   CL 98 95* 88*   CO2 28 34* 37*   BUN 38* 43* 47*   CREATININE 2.8* 2.9* 2.8*   GLUCOSE 118* 119* 122*   CALCIUM 8.7 8.6 8.3*   PROT 6.0* 5.9* 5.7*   LABALBU 3.1* 3.0* 2.9*   BILITOT 0.9 0.9 1.1   ALKPHOS 107* 110* 104   AST 18 20 19   ALT 7 7 7          Recent Labs     04/07/21  0653 04/08/21  0822 04/09/21  0757   WBC 13.9* 14.2* 14.1*   RBC 3.02* 3.07* 3.03*   HGB 9.3* 9.4* 9.4*   HCT 29.4* 30.0* 29.1*   MCV 97.4 97.7 96.0   MCH 30.8 30.6 31.0   MCHC 31.6* 31.3* 32.3   RDW 19.1* 18.5* 17.7*    209 211   MPV 11.8 12.1* 12.7*        Microbiology :  Blood Culture, Routine   Date Value Ref Range Status   03/31/2021 5 Days no growth  Final     Culture, Blood 2   Date Value Ref Range Status   03/31/2021 5 Days no growth  Final     Urine Culture, Routine   Date Value Ref Range Status   04/01/2021 <10,000 CFU/mL  Mixed gram positive organisms    Final        Radiology :  Reviewed     ASSESSMENT:   PT CAME IN WITH ABD PAIN AND DIARRHEA  SHE HAS LEUKOCYTOSIS post EUS    GIB/pancreatic mass?hemorrhagic/bowel/gi perforation  s/p EUS s/p FNA pancreatic tail  S/P RX UTI KLEBSIELLA /CEFDINIR from [facility]  candiduria   CKD   AAA seen by vasc       PLAN:     fluconazole (DIFLUCAN) tablet 100 mg, Daily  meropenem (MERREM) 500 mg in sodium chloride 0.9 % 100 mL IVPB, Q12H      Will stop atbx in few days if cx neg     Deidre Chung MD    4/9/2021  3:13 PM

## 2021-04-09 NOTE — PLAN OF CARE
Problem: Falls - Risk of:  Goal: Will remain free from falls  Description: Will remain free from falls  4/9/2021 0705 by Zac Ochoa RN  Outcome: Met This Shift  4/8/2021 2305 by Rico Lynch RN  Outcome: Met This Shift  Goal: Absence of physical injury  Description: Absence of physical injury  Outcome: Met This Shift     Problem: Skin Integrity:  Goal: Will show no infection signs and symptoms  Description: Will show no infection signs and symptoms  4/9/2021 0705 by Zac Ochoa RN  Outcome: Met This Shift  4/8/2021 2305 by Rico Lynch RN  Outcome: Met This Shift  Goal: Absence of new skin breakdown  Description: Absence of new skin breakdown  Outcome: Met This Shift     Problem: Pain:  Goal: Pain level will decrease  Description: Pain level will decrease  Outcome: Met This Shift  Goal: Control of acute pain  Description: Control of acute pain  Outcome: Met This Shift  Goal: Control of chronic pain  Description: Control of chronic pain  Outcome: Met This Shift

## 2021-04-10 LAB
ALBUMIN SERPL-MCNC: 2.8 G/DL (ref 3.5–5.2)
ALP BLD-CCNC: 103 U/L (ref 35–104)
ALT SERPL-CCNC: 7 U/L (ref 0–32)
ANION GAP SERPL CALCULATED.3IONS-SCNC: 11 MMOL/L (ref 7–16)
AST SERPL-CCNC: 19 U/L (ref 0–31)
BASOPHILS ABSOLUTE: 0.05 E9/L (ref 0–0.2)
BASOPHILS RELATIVE PERCENT: 0.4 % (ref 0–2)
BILIRUB SERPL-MCNC: 1.1 MG/DL (ref 0–1.2)
BUN BLDV-MCNC: 51 MG/DL (ref 8–23)
CALCIUM SERPL-MCNC: 8.4 MG/DL (ref 8.6–10.2)
CHLORIDE BLD-SCNC: 88 MMOL/L (ref 98–107)
CO2: 40 MMOL/L (ref 22–29)
CREAT SERPL-MCNC: 3.1 MG/DL (ref 0.5–1)
EOSINOPHILS ABSOLUTE: 0.05 E9/L (ref 0.05–0.5)
EOSINOPHILS RELATIVE PERCENT: 0.4 % (ref 0–6)
GFR AFRICAN AMERICAN: 18
GFR NON-AFRICAN AMERICAN: 18 ML/MIN/1.73
GLUCOSE BLD-MCNC: 168 MG/DL (ref 74–99)
GRAM STAIN ORDERABLE: NORMAL
HCT VFR BLD CALC: 29.7 % (ref 34–48)
HEMOGLOBIN: 9.4 G/DL (ref 11.5–15.5)
IMMATURE GRANULOCYTES #: 0.12 E9/L
IMMATURE GRANULOCYTES %: 0.9 % (ref 0–5)
LYMPHOCYTES ABSOLUTE: 1.64 E9/L (ref 1.5–4)
LYMPHOCYTES RELATIVE PERCENT: 12.7 % (ref 20–42)
MCH RBC QN AUTO: 30.6 PG (ref 26–35)
MCHC RBC AUTO-ENTMCNC: 31.6 % (ref 32–34.5)
MCV RBC AUTO: 96.7 FL (ref 80–99.9)
MONOCYTES ABSOLUTE: 1.33 E9/L (ref 0.1–0.95)
MONOCYTES RELATIVE PERCENT: 10.3 % (ref 2–12)
NEUTROPHILS ABSOLUTE: 9.68 E9/L (ref 1.8–7.3)
NEUTROPHILS RELATIVE PERCENT: 75.3 % (ref 43–80)
PDW BLD-RTO: 17.7 FL (ref 11.5–15)
PLATELET # BLD: 224 E9/L (ref 130–450)
PMV BLD AUTO: 12.4 FL (ref 7–12)
POTASSIUM SERPL-SCNC: 3.1 MMOL/L (ref 3.5–5)
RBC # BLD: 3.07 E12/L (ref 3.5–5.5)
SODIUM BLD-SCNC: 139 MMOL/L (ref 132–146)
TOTAL PROTEIN: 5.9 G/DL (ref 6.4–8.3)
WBC # BLD: 12.9 E9/L (ref 4.5–11.5)

## 2021-04-10 PROCEDURE — 85025 COMPLETE CBC W/AUTO DIFF WBC: CPT

## 2021-04-10 PROCEDURE — 36415 COLL VENOUS BLD VENIPUNCTURE: CPT

## 2021-04-10 PROCEDURE — 1200000000 HC SEMI PRIVATE

## 2021-04-10 PROCEDURE — 6360000002 HC RX W HCPCS: Performed by: INTERNAL MEDICINE

## 2021-04-10 PROCEDURE — 2580000003 HC RX 258: Performed by: SURGERY

## 2021-04-10 PROCEDURE — 80053 COMPREHEN METABOLIC PANEL: CPT

## 2021-04-10 PROCEDURE — 6370000000 HC RX 637 (ALT 250 FOR IP): Performed by: INTERNAL MEDICINE

## 2021-04-10 PROCEDURE — 6370000000 HC RX 637 (ALT 250 FOR IP): Performed by: SPECIALIST

## 2021-04-10 PROCEDURE — 2700000000 HC OXYGEN THERAPY PER DAY

## 2021-04-10 PROCEDURE — 6370000000 HC RX 637 (ALT 250 FOR IP): Performed by: NURSE PRACTITIONER

## 2021-04-10 PROCEDURE — 6370000000 HC RX 637 (ALT 250 FOR IP): Performed by: STUDENT IN AN ORGANIZED HEALTH CARE EDUCATION/TRAINING PROGRAM

## 2021-04-10 PROCEDURE — 6360000002 HC RX W HCPCS: Performed by: SURGERY

## 2021-04-10 PROCEDURE — 2580000003 HC RX 258: Performed by: INTERNAL MEDICINE

## 2021-04-10 RX ORDER — SODIUM CHLORIDE 9 MG/ML
INJECTION, SOLUTION INTRAVENOUS CONTINUOUS
Status: DISCONTINUED | OUTPATIENT
Start: 2021-04-10 | End: 2021-04-12

## 2021-04-10 RX ORDER — DOCUSATE SODIUM 100 MG/1
100 CAPSULE, LIQUID FILLED ORAL DAILY
Status: DISCONTINUED | OUTPATIENT
Start: 2021-04-10 | End: 2021-04-22 | Stop reason: HOSPADM

## 2021-04-10 RX ADMIN — CARVEDILOL 6.25 MG: 6.25 TABLET, FILM COATED ORAL at 21:57

## 2021-04-10 RX ADMIN — CHLOROTHIAZIDE SODIUM 500 MG: 500 INJECTION, POWDER, LYOPHILIZED, FOR SOLUTION INTRAVENOUS at 09:15

## 2021-04-10 RX ADMIN — MORPHINE SULFATE 2 MG: 2 INJECTION, SOLUTION INTRAMUSCULAR; INTRAVENOUS at 21:58

## 2021-04-10 RX ADMIN — ATORVASTATIN CALCIUM 40 MG: 40 TABLET, FILM COATED ORAL at 21:56

## 2021-04-10 RX ADMIN — SODIUM BICARBONATE 650 MG: 650 TABLET ORAL at 14:29

## 2021-04-10 RX ADMIN — ACETAMINOPHEN 650 MG: 325 TABLET, FILM COATED ORAL at 00:03

## 2021-04-10 RX ADMIN — HYDRALAZINE HYDROCHLORIDE 25 MG: 25 TABLET, FILM COATED ORAL at 06:31

## 2021-04-10 RX ADMIN — ISOSORBIDE MONONITRATE 30 MG: 30 TABLET, EXTENDED RELEASE ORAL at 08:45

## 2021-04-10 RX ADMIN — SODIUM BICARBONATE 650 MG: 650 TABLET ORAL at 21:55

## 2021-04-10 RX ADMIN — SODIUM BICARBONATE 650 MG: 650 TABLET ORAL at 16:57

## 2021-04-10 RX ADMIN — DOCUSATE SODIUM 100 MG: 100 CAPSULE ORAL at 14:29

## 2021-04-10 RX ADMIN — FLUCONAZOLE 100 MG: 100 TABLET ORAL at 08:45

## 2021-04-10 RX ADMIN — MORPHINE SULFATE 2 MG: 2 INJECTION, SOLUTION INTRAMUSCULAR; INTRAVENOUS at 07:34

## 2021-04-10 RX ADMIN — MEROPENEM 500 MG: 500 INJECTION, POWDER, FOR SOLUTION INTRAVENOUS at 16:57

## 2021-04-10 RX ADMIN — PANTOPRAZOLE SODIUM 40 MG: 40 TABLET, DELAYED RELEASE ORAL at 06:34

## 2021-04-10 RX ADMIN — CARVEDILOL 6.25 MG: 6.25 TABLET, FILM COATED ORAL at 08:44

## 2021-04-10 RX ADMIN — SODIUM CHLORIDE: 9 INJECTION, SOLUTION INTRAVENOUS at 16:20

## 2021-04-10 RX ADMIN — SODIUM BICARBONATE 650 MG: 650 TABLET ORAL at 08:45

## 2021-04-10 RX ADMIN — MEROPENEM 500 MG: 500 INJECTION, POWDER, FOR SOLUTION INTRAVENOUS at 06:31

## 2021-04-10 RX ADMIN — ONDANSETRON 4 MG: 2 INJECTION INTRAMUSCULAR; INTRAVENOUS at 07:25

## 2021-04-10 ASSESSMENT — PAIN SCALES - GENERAL: PAINLEVEL_OUTOF10: 6

## 2021-04-10 NOTE — PROGRESS NOTES
Name:  Shraddha Lynn  :  1940  MRN:  [id number]  Room:  [facility]/3714-14  DOS:  4/10/2021    Patricia Ferraro  [facility]  Dr. Benigno Polo M.D. Dr. Bell August M.D. ÓSCAR Sanders Dr., M.D. Dr. Danisha Esposito D.O.    -NP Progress Note-    PCP:  Cody Hughes DO  Admitting Physician:  Niya Duffy DO  Chief Complaint:    Chief Complaint   Patient presents with    Abdominal Pain     to er via ems from home for evaluation of LLQ pain and rectal bleeding that started today.  Rectal Bleeding       Subjective  Rasheed Nova was seen and examined at bedside today; No family was present for the examination. No acute overnight events were noted by staff. Nurse states that patient had some pain this morning. I spoke with patient she stated after eating had some cramping pain in lower abdomen, relieved by pain meds. No further complaints. Patient states she moved her bowels this morning. Otherwise, she expresses no new complaints at this time, including no fevers, chills, headaches, dizziness, lightheadedness, chest pain, shortness of breath, abdominal pain, nausea, diarrhea, constipation, or calf pain.       Physical Examination  Vitals:  /65   Pulse 81   Temp 98 °F (36.7 °C) (Oral)   Resp 18   Ht 5' 5\" (1.651 m)   Wt 130 lb 3.2 oz (59.1 kg)   SpO2 93%   BMI 21.67 kg/m²   General Appearance:  awake, alert, and oriented to person, place, time, and purpose; appears stated age and cooperative; no apparent distress no labored breathing  HEENT:  PERRL; EOMI; sclera clear; buccal mucosa moist  Neck:  supple; trachea midline; no thyromegaly; no JVD; no bruits  Heart:  rhythm regular; rate controlled; no murmurs  Lungs:  symmetrical; clear to auscultation bilaterally; no wheezes; no rhonchi; no rales  Abdomen:  soft, non-tender, non-distended; bowel sounds positive; no organomegaly or masses; no pain on palpation  Extremities:  peripheral pulses present; no peripheral edema; no ulcers  Neurologic:  alert and oriented x 3; no focal deficit; cranial nerves grossly intact  Skin:  no petechia; no hemorrhage; no wounds    Medications  Scheduled Meds    [Held by provider] bumetanide  2 mg Oral Daily    fluconazole  100 mg Oral Daily    pantoprazole  40 mg Oral QAM AC    chlorothiazide  500 mg Intravenous BID    sodium bicarbonate  650 mg Oral 4x Daily    atorvastatin  40 mg Oral Nightly    carvedilol  6.25 mg Oral BID    hydrALAZINE  25 mg Oral 3 times per day    isosorbide mononitrate  30 mg Oral Daily    vitamin D  50,000 Units Oral Weekly    meropenem (MERREM) IVPB  500 mg Intravenous Q12H     Infusion Meds    sodium chloride      sodium chloride       PRN Meds perflutren lipid microspheres, acetaminophen, sodium chloride, sodium chloride, albuterol, ondansetron, promethazine, morphine    Laboratory Data  Recent Results (from the past 24 hour(s))   Body Fluid Cell Count with Differential    Collection Time: 04/09/21  3:55 PM   Result Value Ref Range    Cell Count Fluid Type Thoracentesis     Color, Fluid Yellow     Appearance, Fluid Hazy     Nucl Cell, Fluid 1,373 /uL    RBC, Fluid <2,000 /uL    Neutrophil Count, Fluid 48 %    Monocyte Count, Fluid 52 %   Gram stain    Collection Time: 04/09/21  3:55 PM    Specimen: Fluid; Chest   Result Value Ref Range    Gram Stain Orderable       Gram stain performed on unspun fluid  Rare Polymorphonuclear leukocytes  Epithelial cells not seen  No organisms seen     Culture, Body Fluid    Collection Time: 04/09/21  3:55 PM    Specimen: Fluid;  Chest   Result Value Ref Range    Gram Stain Result Refer to ordered Gram stain for results    Lactate dehydrogenase, body fluid    Collection Time: 04/09/21  3:55 PM   Result Value Ref Range    LD, Fluid 158 Not Established U/L    Fluid Type Thoracentesis    Glucose, body fluid    Collection Time: 04/09/21  3:55 PM   Result Value Ref Range    Glucose, Fluid 155 Not Established Albumin 2.8 (L) 3.5 - 5.2 g/dL    Total Bilirubin 1.1 0.0 - 1.2 mg/dL    Alkaline Phosphatase 103 35 - 104 U/L    ALT 7 0 - 32 U/L    AST 19 0 - 31 U/L       Imaging  Echo Complete    Result Date: 4/9/2021  Transthoracic Echocardiography Report (TTE)  Demographics   Patient Name       Jimy Blue          Gender               Female                     [address] [facility]     [id number]       Room Number          0430  Number   Account #          [de-identified]      Procedure Date       04/09/2021   Corporate ID                      Ordering Physician   Nikia Canada MD   Accession Number   [id number]     Referring Physician   Date of Birth      1940     Sonographer          Jay Clark [facility]   Age                68 year(s)     Interpreting         Nori Chong DO                                    Physician                                     Any Other  Procedure Type of Study   TTE procedure:Echo Complete W/Doppler & Color Flow. Procedure Date Date: 04/09/2021 Start: 12:08 PM Study Location: Echo Lab Technical Quality: Adequate visualization Indications:LV function. Patient Status: Routine Height: 65 inches Weight: 130 pounds BSA: 1.65 m^2 BMI: 21.63 kg/m^2 BP: 124/59 mmHg  Findings   Left Ventricle  Left ventricular size is grossly normal.  Moderate left ventricular concentric hypertrophy noted. Ejection fraction is measured at 30%. No evidence of left ventricular mass or thrombus noted. Right Ventricle  Mildly dilated right ventricle. No evidence of a thrombus in the right ventricle. Left Atrium  The left atrium is mildly dilated. Interatrial septum appears intact. No evidence of thrombus within left atrium. Right Atrium  Mildly enlarged right atrium size. Mitral Valve  Mild mitral regurgitation is present. No evidence of mitral valve stenosis. Tricuspid Valve  Mild tricuspid regurgitation. RVSP is 34.49 mmHg. Pulmonary hypertension is mild . Aortic Valve  Aortic valve opens well.   The aortic valve is trileaflet. No evidence of aortic valve regurgitation. No hemodynamically significant aortic stenosis is present. Pulmonic Valve  Pulmonic valve is structurally normal.   Pericardial Effusion  No evidence of pericardial effusion. Aorta  The aorta is within normal limits. Miscellaneous  Irregular rhythm--atrial fibrillation   Conclusions   Summary  Left ventricular size is grossly normal.  Moderate left ventricular concentric hypertrophy noted. Ejection fraction is measured at 30%. No evidence of left ventricular mass or thrombus noted. The left atrium is mildly dilated. Interatrial septum appears intact. No evidence of thrombus within left atrium. Mildly dilated right ventricle. No evidence of a thrombus in the right ventricle. Mildly enlarged right atrium size. Mild mitral regurgitation is present. No evidence of mitral valve stenosis. Mild tricuspid regurgitation. RVSP is 34.49 mmHg. Pulmonary hypertension is mild . Irregular rhythm--atrial fibrillation   Signature   ----------------------------------------------------------------  Electronically signed by Leona Baumgarten DO(Interpreting  physician) on 04/09/2021 07:56 PM  ----------------------------------------------------------------  M-Mode/2D Measurements & Calculations   LV Diastolic    LV Systolic Dimension: 4.7   AV Cusp Separation: 2 cmLA  Dimension: 5.5  cm                           Dimension: 4.7 cmAO Root  cm              LV Volume Diastolic: 249. 1   Dimension: 3.3 cm  LV FS:14.6 %    ml  LV PW           LV Volume Systolic: 663 ml  Diastolic: 1.4  LV EDV/LV EDV Index: 145.1  cm              ml/88 ml/m^2LV ESV/LV ESV    RV Diastolic Dimension: 3.2  LV PW Systolic: Index: 437 QO/92OZ/ m^2      cm  1.5 cm          EF Calculated: 29.7 %        RV Systolic Dimension: 2.9 cm  Septum          LV Mass Index: 215 l/min*m^2 LA/Aorta: 8.43  Diastolic: 1.5  cm                                           LA volume/Index: 132.8 ml Septum          LVOT: 2 cm  Systolic: 1.5  cm   LV Mass: 355.31  g  Doppler Measurements & Calculations   MV Peak E-Wave:   AV Peak Velocity: 1.39 m/s     LVOT Peak Velocity: 0.92  0.79 m/s          AV Peak Gradient: 7.7 mmHg     m/s  MV Peak A-Wave: 0 AV Mean Velocity: 1.02 m/s     LVOT Mean Velocity: 0.55  m/s               AV Mean Gradient: 4.6 mmHg     m/s  MV E/A Ratio: 197 AV VTI: 21.9 cm                LVOT Peak Gradient: 3.4  MV Peak Gradient: AV Area (Continuity):1.95 cm^2 mmHgLVOT Mean Gradient:  5.9 mmHg                                         1.5 mmHg  MV Mean Gradient: LVOT VTI: 13.6 cm              Estimated RVSP: 34.5 mmHg  1.4 mmHg                                         Estimated RAP:10 mmHg  MV Mean Velocity: Estimated PASP: 34.5 mmHg  0.51 m/s          Pulm. Vein A Reversal  MV Deceleration   Duration:438.3 msec            TR Velocity:2.48 m/s  Time: 162.8 msec  Pulm. Vein D Velocity:0.29     TR Gradient:24.5 mmHg  MV P1/2t: 41 msec m/sPulm. Vein A Reversal       PV Peak Velocity: 0.87  MVA by PHT:5.37   Velocity:0.33 m/s              m/s  cm^2              Pulm. Vein S Velocity: 0.27    PV Peak Gradient: 3.03  MV Area           m/s                            mmHg  (continuity): 1.4                                PV Mean Velocity: 0.64  cm^2                                             m/s                                                   PV Mean Gradient: 1.8                                                   mmHg  [email or web address] [id number]    Ct Abdomen Pelvis Wo Contrast Additional Contrast? None    Result Date: 3/31/2021  EXAMINATION: CT OF THE ABDOMEN AND PELVIS WITHOUT CONTRAST 3/31/2021 12:51 pm TECHNIQUE: CT of the abdomen and pelvis was performed without the administration of intravenous contrast. Multiplanar reformatted images are provided for review.  Dose modulation, planes seen around the splenic flexure of the colon relate with the amorphous complex most likely hemorrhagic mass lesion with a component of bowel perforation likely from the stomach in the left upper quadrant. The liver has borderline size. The small size gallstones are seen in a well distended gallbladder. There is no dilatation of the biliary tree or pancreatic ductal system. There is some atrophy of the pancreas. The have calcifications are seen in the area of the head of the pancreas. Additional calcifications seen throughout the pancreas in part are vascular related but can be relate with previous chronic calcified pancreatitis. The kidneys are preserved size. There is no hydronephrosis. Vascular calcifications are seen both kidneys. 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 Bladder has unremarkable appearance. The uterus and ovaries have unremarkable appearance. Calcified myoma is seen in the uterus. Appendix seen and has unremarkable appearance. There is no indication for bowel obstruction. There is a component of anasarca with bilateral pleural effusions of mild-to-moderate degree. Delete there are compression atelectasis in both lower lobes by the pleural effusion. Heart is diffusely enlarged. Degenerative changes are seen in the lumbar spine and particularly in the lower thoracic spine segments. 1.  Presence of a 7.4 x 3.2 x 3.3 cm expansile complex  density mass lesion in the left upper quadrant interposed between the posterior wall of the fundal region of the stomach, the spleen and the splenic flexure of the colon and the body/tail of the pancreas, likely with a hemorrhagic component. 2.  There is a component of a well contained perforation with extraluminal air but not conspicuously free intraperitoneal air associated with this mass lesion.  3.  There are free fluid accumulation in the left subphrenic space and stranding of the fat planes in between the stomach with spleen in the splenic flexure of the colon. 4. Differential diagnosis would include an exophytic  GIST tumor of the stomach with the gastric perforation. 5.  Further evaluation with CT abdomen pelvis with oral and IV contrast is recommended. The IV contrast needed to exclude a bleeding aneurysm of the splenic artery due the location of the lesion. Preliminary report given to Dr. Josse Cornejo, ER Physician in [facility].     Xr Chest (2 Vw)    Result Date: 4/9/2021  EXAMINATION: TWO XRAY VIEWS OF THE CHEST 4/9/2021 12:10 pm COMPARISON: 04/02/2021 HISTORY: ORDERING SYSTEM PROVIDED HISTORY: Dyspnea TECHNOLOGIST PROVIDED HISTORY: Reason for exam:->Dyspnea FINDINGS: The heart is enlarged. The thoracic aorta is tortuous. There is a moderate size right pleural effusion. A right perihilar infiltrate cannot be excluded. The chest x-ray is limited due to the rotation. There is no appreciable left lung infiltrate. There is a small left pleural effusion. 1. Moderate size right pleural effusion. 2. Small left pleural effusion. 3. Significant rotation of the chest x-ray. A right perihilar infiltrate cannot be excluded. 4. Cardiomegaly. Xr Abdomen (kub) (single Ap View)    Result Date: 4/7/2021  EXAMINATION: ONE SUPINE XRAY VIEW(S) OF THE ABDOMEN 4/7/2021 8:06 am COMPARISON: None. HISTORY: ORDERING SYSTEM PROVIDED HISTORY: Abdominal pain TECHNOLOGIST PROVIDED HISTORY: Reason for exam:->Abdominal pain FINDINGS: There is osteopenia. There is a moderate amount of vascular calcification including renal arterial plaque. Small left urinary tract calculi are not excluded. There is a Jane catheter in the bladder. Small globular calcification in the pelvis in the midline could reflect a small calcified fibroid. There is some right lower lobe scarring/atelectasis.  Bowel gas pattern is normal.     Moderate generalized vascular plaque. Small left renal calculi are not excluded. Possible small calcified uterine fibroid. Nm Gi Blood Loss    Result Date: 4/2/2021  EXAMINATION: NUCLEAR MEDICINE GASTRIC BLEEDING STUDY 4/2/2021 TECHNIQUE: Following the intravenous injection of 21.1 mCi of 99 mTc-labeled RBC's, a flow study and standard images of the abdomen was obtained over a total period of 60 minutes COMPARISON: None. HISTORY: ORDERING SYSTEM PROVIDED HISTORY: gi bleed TECHNOLOGIST PROVIDED HISTORY: Reason for exam:->gi bleed What reading provider will be dictating this exam?->CRC FINDINGS: Activity is seen within the blood pool, including the great vessels, heart and liver. No tubular peristalsing activity was demonstrated during the exam.     No evidence of active GI bleeding during acquisition. RECOMMENDATIONS: If the patient shows hemodynamic signs of an active bleed in the next 20 hours, additional images can be acquired. Xr Chest Portable    Result Date: 4/2/2021  EXAMINATION: ONE XRAY VIEW OF THE CHEST PORTABLE UPRIGHT 4/2/2021 12:20 pm COMPARISON: CT abdomen and pelvis 03/31/2021 and portable chest 10/03/2019 HISTORY: ORDERING SYSTEM PROVIDED HISTORY: SOB TECHNOLOGIST PROVIDED HISTORY: Reason for exam:->SOB FINDINGS: Portable technique with poor visualization of the left lung base and retrocardiac region. Moderate cardiomegaly, unchanged. Mild pulmonary venous congestion without overt CHF. No focal infiltrate or significant pleural effusion identified. Atherosclerotic thoracic aorta. No pneumothorax. Moderate cardiomegaly. Mild pulmonary venous congestion without overt CHF. No pneumonia seen. Xr Chest 1 View    Result Date: 4/9/2021  EXAMINATION: ONE XRAY VIEW OF THE CHEST 4/9/2021 3:04 pm COMPARISON: 04/09/2021 HISTORY: ORDERING SYSTEM PROVIDED HISTORY: post thoracentesis TECHNOLOGIST PROVIDED HISTORY: Reason for exam:->post thoracentesis FINDINGS: The heart is moderately enlarged.  Lung aeration is improved, there is persistent consolidation/atelectasis in the right base. Small pleural effusions lie posteriorly     No sign of post thoracentesis pneumothorax. Improved lung aeration, residual small effusions and right basilar consolidation. Us Thoracentesis Which Side Should The Procedure Be Performed? Right    Result Date: 4/9/2021  EXAMINATION: ULTRASOUND right THORACENTESIS 4/9/2021 3:18 pm COMPARISON: None HISTORY: ORDERING SYSTEM PROVIDED HISTORY: rt thoracentesis TECHNOLOGIST PROVIDED HISTORY: Reason for exam:->rt thoracentesis Which side should the procedure be performed?->Right What reading provider will be dictating this exam?->CRC FINDINGS: The thoracentesis procedure was explained to the patient and consent was obtained. A time-out was performed Ultrasound images of the  right chest were obtained. There is a very small amount of  right pleural fluid. Request was made for sampling of the pleural fluid. The patient's back was prepped and draped in a sterile fashion. Following the uneventful administration of lidocaine introduced a 25 gauge needle into the small amount of fluid. 13 mL of fluid was obtained for testing. The aspirated fluid was clear and yellow. Fluid was sent to the laboratory for testing and pH. A post thoracentesis chest x-ray was obtained. There is no pneumothorax. 1. Very small right pleural effusion. I was able to aspirate 13 mL of fluid and send the fluid off for pH and the requested testing. 2. There is no evidence of a pneumothorax on the post thoracentesis chest x-ray. Us Dup Upper Extremity Right Venous    Result Date: 4/2/2021  EXAMINATION: DUPLEX ULTRASOUND OF THE RIGHT UPPER EXTREMITY FOR DVT, 4/2/2021 1:13 pm TECHNIQUE: Duplex ultrasound using B-mode/gray scaled imaging and Doppler spectral analysis and color flow was obtained of the deep venous structures of the upper extremity. COMPARISON: None used.  HISTORY: ORDERING SYSTEM PROVIDED HISTORY: swelling TECHNOLOGIST PROVIDED HISTORY: Reason for exam:->swelling What reading provider will be dictating this exam?->CRC FINDINGS: There is normal flow and compressibility of the visualized venous structures of the right upper extremity. There is no evidence of echogenic thrombus. The veins demonstrate good compressibility with normal color flow study and spectral analysis. No evidence of DVT of the right upper extremity. Vl Lower Extremity Arterial Segmental Pressures W Ppg Bilateral    Result Date: 4/8/2021  EXAMINATION: ARTERIAL DUPLEX ULTRASOUND OF THE BILATERAL LOWER EXTREMITIES WITH SEGMENTAL PRESSURES AND PULSE VOLUME RECORDINGS 4/8/2021 7:54 am TECHNIQUE: Arterial duplex RUBÉN ultrasound. Segmental pressures and PVR performed with Doppler. COMPARISON: None. HISTORY: ORDERING SYSTEM PROVIDED HISTORY: pain and decreased pulses in BLE, concern for moderate to severe arterial disease TECHNOLOGIST PROVIDED HISTORY: Reason for exam:->pain and decreased pulses in BLE, concern for moderate to severe arterial disease What reading provider will be dictating this exam?->CRC The FINDINGS: The RUBÉN on the right is 0.94. The RUBÉN on the left is 0.66. Indices for the right lower extremity: High thigh: 1.06, low thigh: 1.09, calf: 0.97, PT: 0.81, DP: 0.94. Indices for the left lower extremity:  High thigh: 0.63, low thigh: 0.64, calf: 0.57, posterior tibial, 0.66, DP: 0.61. Doppler waveforms demonstrate blunted upslope, dampened amplitude in loss of triphasic morphology in the left lower extremity consistent with significant stenotic arterial disease. There is dampened pulse volume waveform in the left thigh and both digital tracings with moderately reduced amplitude in the digital tracings bilaterally. Decreased left RUBÉN consistent with moderate peripheral arterial disease. There is correlating loss of triphasic waveform in the left lower extremity.  Dampened bilateral distal pulse volume recordings suggestive of moderate disease in the distal vasculature. Mri Abdomen Wo Contrast    Result Date: 4/1/2021  EXAMINATION: MRI OF THE ABDOMEN WITHOUT CONTRAST, 4/1/2021 3:19 pm TECHNIQUE: Multiplanar multisequence MRI of the abdomen was performed without the administration of intravenous contrast. COMPARISON: 03/31/2021. HISTORY: ORDERING SYSTEM PROVIDED HISTORY: pepper-gastric/pancreatic/colonic lesion TECHNOLOGIST PROVIDED HISTORY: Reason for exam:->pepper-gastric/pancreatic/colonic lesion FINDINGS: Multiple sequences are moderately degraded by patient motion. Lower Chest: Small bilateral pleural effusions. Organs: Liver is normal in contour and enhancement. Cholelithiasis. No biliary ductal diltation. Diffuse moderate to severe pancreatic ductal dilatation. No obstructing stone or mass is seen accounting for noncontrast technique and motion degraded imaging. Into the pancreatic tail there is an approximate 5.5 x 3.5 cm collection which is primarily T2 hyperintense, T1 hyperintense and demonstrates apparent restricted diffusion adrenals are unremarkable. Spleen is normal in size. Bilateral simple renal cysts. Vasculature is unremarkable. GI/Bowel: Bowel is non-dilated without wall thickening. Peritoneum/Retroperitoneum:No free fluid, free air, organized fluid collection or lymphadenopathy. Bones: Multilevel degenerative disc disease. Severe body wall edema. Exam is moderately degraded by patient motion. 1. Approximate 5.5 cm probable collection adjacent to the pancreatic tail, incompletely characterized in the absence of contrast material but felt to most likely represent an acute peripancreatic fluid collection or pseudocyst related to prior pancreatitis. 2. Moderate to severe pancreatic ductal dilatation. No obstructing stone is seen accounting for noncontrast technique and motion degraded imaging. Recommend further evaluation with contrast-enhanced pancreas protocol MRI.  Attention to the finding in impression #1 is recommended at the time of pancreas protocol MRI. 3. Small bilateral pleural effusions with severe body wall edema. Us Dup Lower Extremities Bilateral Venous    Result Date: 4/7/2021  EXAMINATION: DUPLEX VENOUS ULTRASOUND OF THE BILATERAL LOWER EXTREMITIES, 4/7/2021 3:24 pm TECHNIQUE: Duplex ultrasound using B-mode/gray scaled imaging and Doppler spectral analysis and color flow was obtained of the bilateral lower extremities. COMPARISON: None. HISTORY: ORDERING SYSTEM PROVIDED HISTORY: pain and cramping, concern for DVT TECHNOLOGIST PROVIDED HISTORY: Reason for exam:->pain and cramping, concern for DVT What reading provider will be dictating this exam?->CRC FINDINGS: The visualized veins of the bilateral lower extremities are patent and free of echogenic thrombus. The veins demonstrate good compressibility with normal color flow study and spectral analysis. No evidence of DVT in either lower extremity. ASSESSMENT/PLAN:     1. 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     2. LUQ abdominal lesion, Pancreatic pseudocyst  - likely related to pancreatic cyst, currently resolving   - continue supportive care  -EUS by general surgery as above     3. HFrEF, pAFib, volume overload, REYES/CKD  - appreciate cardiology/nephrology management and recommendations      4. Abdominal pain, acute - Reported this morning after eating, cramping in nature in lower abdomen, small bowel movement reported by nursing staff, add colace daily.  Continue to monitor    MADELAINE Gaston CNP  9:21 AM  4/10/2021

## 2021-04-10 NOTE — PROGRESS NOTES
Internal Medicine Progress Note    [facility]    Reyes Prima. Soniya Rey., LEROY.A.C.OLIZETH. Daniel Santo D.O., CLARISA.JOSEOCHRISTY Wynne D.O. Kiley Villeda, MSN, APRN, NP-C  Ayush Rodney. Tess Hilario, MSN, APRN-CNP     Primary Care Physician: Edil Nielsen DO   Admitting Physician:  Ninfa Mulligan DO  Admission date and time: 3/31/2021 10:32 AM    Room:  [address] [place] [address]  Admitting diagnosis: GI bleed [K92.2]    Patient Name: Jennifer Rodriguez  MRN: [id number]    Date of Service: 4/10/2021     Subjective:  Tricia Ricci is a 68 y.o. female who was seen and examined today,4/10/2021, at the bedside. Tricia Ricci seems to be resting comfortably today. She has diuresed more than 21 L since hospitalization and her volume status has improved. She admits to no significant shortness of breath today. Multiple subspecialists are providing consultation. She is very anxious for discharge. Review of System:   Constitutional:   Improving weakness and deconditioning. HEENT:   Denies ear pain, sore throat, sinus or eye problems. Admits to irritation associated with the nasal cannula oxygen. Cardiovascular:   Denies any chest pain, irregular heartbeats, or palpitations. Respiratory:   Less shortness of breath today. Gastrointestinal:   1 episode of abdominal pain this morning but with resolution. Genitourinary:    Voiding with the use of a Jane catheter. Extremities:   Complete resolution of her presenting lower extremity edema. Neurology:    Admits to improving weakness and deconditioning. Psch:   Denies being anxious or depressed. Musculoskeletal:    Denies  myalgias, joint complaints or back pain. Integumentary:   Denies any rashes, ulcers, or excoriations. Denies bruising. Hematologic/Lymphatic:  Denies bruising or bleeding.     Physical Exam:  I/O this shift:  In: -   Out: 200 [Urine:200]    Intake/Output Summary (Last 24 hours) at 4/10/2021 1102  Last data filed at 4/10/2021 8723  Gross per 24 hour   Intake 300 ml   Output 2475 ml   Net -2175 ml   I/O last 3 completed shifts: In: 420 [P.O.:420]  Out: 4225 [Urine:4225]  Patient Vitals for the past 96 hrs (Last 3 readings):   Weight   04/10/21 0704 130 lb 3.2 oz (59.1 kg)   04/09/21 0645 130 lb (59 kg)   04/08/21 0515 145 lb 4.8 oz (65.9 kg)     Vital Signs:   Blood pressure 128/65, pulse 81, temperature 98 °F (36.7 °C), temperature source Oral, resp. rate 18, height 5' 5\" (1.651 m), weight 130 lb 3.2 oz (59.1 kg), SpO2 93 %. General appearance:  Awake and alert. More comfortable without distress. Head:  Normocephalic. No masses, lesions or tenderness. Eyes:  PERRLA. EOMI. Sclera clear. Impaired vision. ENT:  Ears normal. Mucosa normal. Nasal cannula oxygen is in place. Neck:    Supple. Trachea midline. No thyromegaly. No JVD. No bruits. Heart:    S1 and S2, regular rate and rhythm, systolic murmur  Lungs:    Diminished on the right  Abdomen:   Soft mildly tender to palpation. Voluntary guarding is elicited. Bowel sounds are active. Extremities:    Peripheral pulses present, decreased in bilateral lower extremities. Small partial-thickness wound noted to the lateral aspe complete resolution of her lower extremity edema. ct of the left fifth toe. Trace to 1+ pitting edema involving bilateral lower extremities. Neurologic:    Alert x 3. No focal deficit. Cranial nerves grossly intact. No focal weakness. Psych:   Behavior is normal. Mood appears normal. Speech is not rapid and/or pressured. Musculoskeletal:   Spine ROM normal. Muscular strength intact. Gait not assessed. Integumentary:  No rashes  Skin normal color and texture. Genitalia/Breast:  Voiding with the use of a Jane catheter.     Medication:  Scheduled Meds:   [Held by provider] bumetanide  2 mg Oral Daily    fluconazole  100 mg Oral Daily    pantoprazole  40 mg Oral QAM AC    chlorothiazide  500 mg Intravenous BID    sodium bicarbonate  650 mg Oral chronic dissection appearance  6. 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 Paroxysmal atrial fibrillation on chronic anticoagulation with Eliquis--on hold  8. Essential hypertension  9. Moderate peripheral artery disease    Plan:   Lea Rosa has diuresed greater than 21 L since hospitalization and appears stable from a volume standpoint. Further fluid/diuretic management as per the nephrology team.  Her respiratory status is stable as well. Considerations for discontinuation of antibiotic therapy in the next couple of days. Multiple subspecialists are providing consultation and recommendations have been reviewed. We are awaiting precertification for transfer to the skilled nursing facility. She is very appreciative of the care she is currently receiving. More than 50% of my  time was spent at the bedside counseling/coordinating care with the patient and/or family with face to face contact. This time was spent reviewing notes and laboratory data as well as instructing and counseling the patient. Time I spent with the family or surrogate(s) is included only if the patient was incapable of providing the necessary information or participating in medical decisions. I also discussed the differential diagnosis and all of the proposed management plans with the patient and individuals accompanying the patient. Lea Rosa requires this high level of physician care and nursing on the Telemetry unit due the complexity of decision management and chance of rapid decline or death. Continued cardiac monitoring and higher level of nursing are required. I am readily available for any further decision-making and intervention.      DO Martell Rajan  11:02 AM  4/10/2021

## 2021-04-10 NOTE — CARE COORDINATION
Note: Covid negative 4/7/21. Per [employer], [address], Fax [phone], [facility] 4/13. [employer] Exemption form completed. LUBA will need signed by physician.   Electronically signed by GARY Snyder on 4/10/2021 at 2:09 PM

## 2021-04-10 NOTE — PROGRESS NOTES
[address] [place] [address] [facility]  FABRICE    Chief complain:  Fatigue       SUBJECTIVE:  Awake and alert, seen at bedside  No family present  DX  Pancreatic tail mass with solid component, choledocholithiasis, gallbladder sludge, pancreatic duct stone, chronic pancreatitis    No abd pain, no nausea, vomiting, diarrhea, fever, chills, rash   Tolerating medications, no side effects    ROS:  Negative except as above     OBJECTIVE:    Vitals:    04/09/21 2200 04/10/21 0631 04/10/21 0704 04/10/21 0734   BP: 104/63 104/61  128/65   Pulse: 91   81   Resp:    18   Temp:    98 °F (36.7 °C)   TempSrc:    Oral   SpO2:    93%   Weight:   130 lb 3.2 oz (59.1 kg)    Height:           HEENT :         unremarkable  At/nc  Heart:             RRR,  No murmurs, no gallops  Lungs:            Dec  to auscultation, no wheezing , no rales post   Abdomen:       soft, non tender, bowel sounds present  Extremites:     Dec  BLE Edema,   Skin:                Normal turgor,normal texture  piv             Jane     LABS     Recent Labs     04/08/21  0822 04/09/21  0757 04/10/21  0802    138 139   K 3.7 3.3* 3.1*   CL 95* 88* 88*   CO2 34* 37* 40*   BUN 43* 47* 51*   CREATININE 2.9* 2.8* 3.1*   GLUCOSE 119* 122* 168*   CALCIUM 8.6 8.3* 8.4*   PROT 5.9* 5.7* 5.9*   LABALBU 3.0* 2.9* 2.8*   BILITOT 0.9 1.1 1.1   ALKPHOS 110* 104 103   AST 20 19 19   ALT 7 7 7          Recent Labs     04/08/21  0822 04/09/21  0757 04/10/21  0802   WBC 14.2* 14.1* 12.9*   RBC 3.07* 3.03* 3.07*   HGB 9.4* 9.4* 9.4*   HCT 30.0* 29.1* 29.7*   MCV 97.7 96.0 96.7   MCH 30.6 31.0 30.6   MCHC 31.3* 32.3 31.6*   RDW 18.5* 17.7* 17.7*    211 224   MPV 12.1* 12.7* 12.4*        Microbiology :  Blood Culture, Routine   Date Value Ref Range Status   03/31/2021 5 Days no growth  Final     Culture, Blood 2   Date Value Ref Range Status   03/31/2021 5 Days no growth  Final     Urine Culture, Routine   Date Value Ref Range Status   04/01/2021 <10,000 CFU/mL  Mixed gram positive organisms    Final        Radiology :  Reviewed     ASSESSMENT:   PT CAME IN WITH ABD PAIN AND DIARRHEA  SHE HAS LEUKOCYTOSIS post EUS    GIB/pancreatic mass?hemorrhagic/bowel/gi perforation  s/p EUS s/p FNA pancreatic tail  S/P RX UTI KLEBSIELLA /CEFDINIR from [facility]  candiduria   CKD   [facility]       PLAN:  fluconazole (DIFLUCAN) tablet 100 mg, Daily  meropenem (MERREM) 500 mg in sodium chloride 0.9 % 100 mL IVPB, Q12H  Will stop atbx 4/11 if cx neg   Monitor labs  Follow cultures    MADELAINE Rojo - NP    4/10/2021  12:13 PM    Patient examined along with the nurse practitioner  Agree with above  Patient with questionable mass on the pancreas  Patient at present on Diflucan and meropenem seems to be tolerating antibiotic well    I have discussed the case, including pertinent history and physical  exam findings . I have seen and examined the patient and the key elements of the encounter have been performed by me. I agree with the assessment, plan and orders as documented.       Treatment plan as per my recommendation     Porfirio Reeves MD, FACP  4/10/2021  4:48 PM

## 2021-04-10 NOTE — PROGRESS NOTES
[facility] MD Parag Aguilar MD Jewell Larry, MD Carma Ness, MD   Progress Note    4/10/2021    SUBJECTIVE:     Pt known to Dr. Brigitte Zapata from office . I actually saw her in [facility] 1-2 weeks back . She was last seen as inpt in [facility] system by our group back in 2016 . Case discussed with Dr Karyna Freitas . 4/3: Seen this am in her room , o2/nc at 4 L which is her home o2 . She did undergo EGD/colonscopy this am . Results noted   Continue to look volume overloaded with 2 + edema and JVD . 4/4 ; stable vitlas , good UO on bumex drip/diuril . Will assess in am if we can wean her off drip   4/5: Patient was doing well this morning, she is awake and alert with no acute distress. We will continue same medications and same plan of care for today. 4/6 \" Seen this am , pleasant , o2/nc . Good UO . Cr stable . Plan for EUS in am     4/7 : good UO with negative balance . In good spirit . For EUS today . 4/8 : in bed , lying flat in NAD . Edema much improved     4/9 : report of pt being more sob . she is for cxr today . EUS with bile duct stone     4/10: Recurrent left lower quadrant pain, ongoing anorexia with poor intake of food and fluid, intermittent mild nausea though no emesis. No dyspnea. No peripheral swelling. Mildly hypotensive. PROBLEM LIST:    Principal Problem:    GI bleed  Active Problems:    Preoperative cardiovascular examination    Bowel perforation (HCC)  Resolved Problems:    * No resolved hospital problems. *       DIET:    DIET LOW FAT; Low Sodium (2 GM)       Allergies : Patient has no known allergies.     Past Medical History:   Diagnosis Date    Arthritis     Atrial fibrillation ([address]     Blood circulation, collateral     CHF (congestive heart failure) (HCC)     Chronic renal insufficiency, stage IV (severe) ([address] 11/19/2016    History of cardiovascular stress test 07/2015    Lexiscan stress test    History of echocardiogram 07/27/2015    EF 29%    Hyperlipidemia     Hypertension        Past Surgical History:   Procedure Laterality Date    COLONOSCOPY N/A 4/3/2021    COLONOSCOPY POLYPECTOMY HOT SNARE performed by Kamryn Bales DO at [address]  4/3/2021    COLONOSCOPY WITH BIOPSY performed by Kamryn Bales DO at [address]  4/3/2021    COLONOSCOPY CONTROL HEMORRHAGE performed by Kamryn Bales DO at [address]  4/3/2021    COLONOSCOPY SUBMUCOSAL SPOT INJECTION performed by Kamryn Bales DO at [address] GASTROINTESTINAL ENDOSCOPY N/A 4/3/2021    EGD BIOPSY performed by Kamryn Bales DO at [address] [facility] N/A 4/7/2021    EGD W/EUS FNA performed by Nola Gill MD at [address] [facility] reviewed. No pertinent family history. reports that she has been smoking. She has been smoking about 0.50 packs per day. She has never used smokeless tobacco. She reports previous alcohol use. She reports that she does not use drugs. Review of Systems:   Constitutional:weak   Eyes: no eye pain , no itching , no drainage  Ears, nose, mouth, throat, and face: no ear ,nose pain , hearing is ok ,no nasal drainage   Respiratory: no sob ,no cough ,no wheezing . Cardiovascular: no chest pain , no palpitation ,no sob . Gastrointestinal: no nausea, vomiting , constipation , no abdominal pain . Genitourinary:no urinary retention , no burning , dysuria . No polyuria   Hematologic/lymphatic: no bleeding , no cougulation issues . Musculoskeletal:no joint pain , no swelling . Neurological: no headaches ,no weakness , no numbness . Endocrine: no thirst , no weight issues .      MEDS (scheduled):    docusate sodium  100 mg Oral Daily    [Held by provider] bumetanide  2 mg Oral Daily    fluconazole  100 mg Oral Daily    pantoprazole  40 mg Oral QAM AC    chlorothiazide  500 mg Intravenous BID    sodium bicarbonate  650 mg Oral 4x Daily    atorvastatin  40 mg Oral Nightly    carvedilol  6.25 mg Oral BID    hydrALAZINE  25 mg Oral 3 times per day    isosorbide mononitrate  30 mg Oral Daily    vitamin D  50,000 Units Oral Weekly    meropenem (MERREM) IVPB  500 mg Intravenous Q12H       MEDS (infusions):   sodium chloride      sodium chloride         MEDS (prn):  perflutren lipid microspheres, acetaminophen, sodium chloride, sodium chloride, albuterol, ondansetron, promethazine, morphine    PHYSICAL EXAM:     Patient Vitals for the past 24 hrs:   BP Temp Temp src Pulse Resp SpO2 Weight   04/10/21 1415 (!) 74/40   82      04/10/21 0734 128/65 98 °F (36.7 °C) Oral 81 18 93 %    04/10/21 0704       130 lb 3.2 oz (59.1 kg)   04/10/21 0631 104/61         04/09/21 2200 104/63   91      04/09/21 2100 (!) 108/57 100 °F (37.8 °C) Oral 82 18 91 %    @      Intake/Output Summary (Last 24 hours) at 4/10/2021 1525  Last data filed at 4/10/2021 0715  Gross per 24 hour   Intake 180 ml   Output 2275 ml   Net -2095 ml         Wt Readings from Last 3 Encounters:   04/10/21 130 lb 3.2 oz (59.1 kg)   10/08/19 116 lb 14.4 oz (53 kg)   06/03/19 130 lb (59 kg)       Constitutional:  in no acute distress  Oral: mucus membranes moist  Neck: 2+ JVD   Cardiovascular: S1, S2 regular rhythm, no murmur,or rub  Respiratory:  No crackles, no wheeze  Gastrointestinal:  Soft, nontender, nondistended, NABS  Ext: Scant  edema bl   Skin: dry, no rash  Neuro: awake, alert, interactive      DATA:    Recent Labs     04/08/21 0822 04/09/21  0757 04/10/21  0802   WBC 14.2* 14.1* 12.9*   HGB 9.4* 9.4* 9.4*   HCT 30.0* 29.1* 29.7*   MCV 97.7 96.0 96.7    211 224     Recent Labs     04/08/21  0822 04/09/21  0757 04/10/21  0802    138 139   K 3.7 3.3* 3.1*   CL 95* 88* 88*   CO2 34* 37* 40*   BUN 43* 47* 51*   CREATININE 2.9* 2.8* 3.1*   ALT 7 7 7   AST 20 19 19   BILITOT 0.9 1.1 1.1 ALKPHOS 110* 104 103       No results found for: LABPROT    ASSESSMENT     1) REYES  in the setting of GI bleed ,low EF /CHF     2) Chronic kidney disease stage IV baseline cr 2-2.7     3) 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

## 2021-04-10 NOTE — PLAN OF CARE
Problem: Falls - Risk of:  Goal: Will remain free from falls  Description: Will remain free from falls  4/10/2021 1105 by Sandrine Vanessa RN  Outcome: Met This Shift  4/10/2021 0041 by Abdon Arevalo RN  Outcome: Met This Shift  Goal: Absence of physical injury  Description: Absence of physical injury  4/10/2021 1105 by Sandrine Vanessa RN  Outcome: Met This Shift  4/10/2021 0041 by Abdon Arevalo RN  Outcome: Met This Shift     Problem: Skin Integrity:  Goal: Will show no infection signs and symptoms  Description: Will show no infection signs and symptoms  4/10/2021 1105 by Sandrine Vanessa RN  Outcome: Met This Shift  4/10/2021 0041 by Abdon Arevalo RN  Outcome: Met This Shift  Goal: Absence of new skin breakdown  Description: Absence of new skin breakdown  4/10/2021 1105 by Sandrine Vanessa RN  Outcome: Met This Shift  4/10/2021 0041 by Abdon Arevalo RN  Outcome: Met This Shift     Problem: Pain:  Goal: Pain level will decrease  Description: Pain level will decrease  Outcome: Met This Shift  Goal: Control of acute pain  Description: Control of acute pain  Outcome: Met This Shift  Goal: Control of chronic pain  Description: Control of chronic pain  Outcome: Met This Shift

## 2021-04-11 LAB
ALBUMIN SERPL-MCNC: 2.5 G/DL (ref 3.5–5.2)
ALP BLD-CCNC: 99 U/L (ref 35–104)
ALT SERPL-CCNC: 6 U/L (ref 0–32)
ANION GAP SERPL CALCULATED.3IONS-SCNC: 11 MMOL/L (ref 7–16)
ANION GAP SERPL CALCULATED.3IONS-SCNC: 12 MMOL/L (ref 7–16)
AST SERPL-CCNC: 18 U/L (ref 0–31)
BASOPHILS ABSOLUTE: 0.06 E9/L (ref 0–0.2)
BASOPHILS RELATIVE PERCENT: 0.5 % (ref 0–2)
BILIRUB SERPL-MCNC: 0.8 MG/DL (ref 0–1.2)
BODY FLUID CULTURE, STERILE: NORMAL
BUN BLDV-MCNC: 55 MG/DL (ref 8–23)
BUN BLDV-MCNC: 57 MG/DL (ref 8–23)
CALCIUM SERPL-MCNC: 8.1 MG/DL (ref 8.6–10.2)
CALCIUM SERPL-MCNC: 8.1 MG/DL (ref 8.6–10.2)
CHLORIDE BLD-SCNC: 88 MMOL/L (ref 98–107)
CHLORIDE BLD-SCNC: 89 MMOL/L (ref 98–107)
CHLORIDE URINE RANDOM: 100 MMOL/L
CO2: 39 MMOL/L (ref 22–29)
CO2: 40 MMOL/L (ref 22–29)
CREAT SERPL-MCNC: 3.7 MG/DL (ref 0.5–1)
CREAT SERPL-MCNC: 3.8 MG/DL (ref 0.5–1)
CREATININE URINE: 56 MG/DL (ref 29–226)
EOSINOPHIL, URINE: 0 % (ref 0–1)
EOSINOPHILS ABSOLUTE: 0.13 E9/L (ref 0.05–0.5)
EOSINOPHILS RELATIVE PERCENT: 1.1 % (ref 0–6)
GFR AFRICAN AMERICAN: 14
GFR AFRICAN AMERICAN: 14
GFR NON-AFRICAN AMERICAN: 14 ML/MIN/1.73
GFR NON-AFRICAN AMERICAN: 14 ML/MIN/1.73
GLUCOSE BLD-MCNC: 112 MG/DL (ref 74–99)
GLUCOSE BLD-MCNC: 134 MG/DL (ref 74–99)
GRAM STAIN RESULT: NORMAL
HCT VFR BLD CALC: 26.9 % (ref 34–48)
HEMOGLOBIN: 8.5 G/DL (ref 11.5–15.5)
IMMATURE GRANULOCYTES #: 0.07 E9/L
IMMATURE GRANULOCYTES %: 0.6 % (ref 0–5)
LYMPHOCYTES ABSOLUTE: 1.97 E9/L (ref 1.5–4)
LYMPHOCYTES RELATIVE PERCENT: 16.2 % (ref 20–42)
MAGNESIUM: 1.7 MG/DL (ref 1.6–2.6)
MCH RBC QN AUTO: 30.7 PG (ref 26–35)
MCHC RBC AUTO-ENTMCNC: 31.6 % (ref 32–34.5)
MCV RBC AUTO: 97.1 FL (ref 80–99.9)
MONOCYTES ABSOLUTE: 1.38 E9/L (ref 0.1–0.95)
MONOCYTES RELATIVE PERCENT: 11.3 % (ref 2–12)
NEUTROPHILS ABSOLUTE: 8.55 E9/L (ref 1.8–7.3)
NEUTROPHILS RELATIVE PERCENT: 70.3 % (ref 43–80)
OSMOLALITY URINE: 315 MOSM/KG (ref 300–900)
PDW BLD-RTO: 17.2 FL (ref 11.5–15)
PHOSPHORUS: 6 MG/DL (ref 2.5–4.5)
PLATELET # BLD: 211 E9/L (ref 130–450)
PMV BLD AUTO: 12.6 FL (ref 7–12)
POTASSIUM SERPL-SCNC: 3.2 MMOL/L (ref 3.5–5)
POTASSIUM SERPL-SCNC: 3.2 MMOL/L (ref 3.5–5)
PROTEIN PROTEIN: 18 MG/DL (ref 0–12)
PROTEIN/CREAT RATIO: 0.3
PROTEIN/CREAT RATIO: 0.3 (ref 0–0.2)
RBC # BLD: 2.77 E12/L (ref 3.5–5.5)
SODIUM BLD-SCNC: 139 MMOL/L (ref 132–146)
SODIUM BLD-SCNC: 140 MMOL/L (ref 132–146)
SODIUM URINE: 105 MMOL/L
TOTAL PROTEIN: 5.5 G/DL (ref 6.4–8.3)
WBC # BLD: 12.2 E9/L (ref 4.5–11.5)

## 2021-04-11 PROCEDURE — 97530 THERAPEUTIC ACTIVITIES: CPT

## 2021-04-11 PROCEDURE — 84100 ASSAY OF PHOSPHORUS: CPT

## 2021-04-11 PROCEDURE — 85025 COMPLETE CBC W/AUTO DIFF WBC: CPT

## 2021-04-11 PROCEDURE — 97110 THERAPEUTIC EXERCISES: CPT

## 2021-04-11 PROCEDURE — 82570 ASSAY OF URINE CREATININE: CPT

## 2021-04-11 PROCEDURE — 36415 COLL VENOUS BLD VENIPUNCTURE: CPT

## 2021-04-11 PROCEDURE — 6370000000 HC RX 637 (ALT 250 FOR IP): Performed by: STUDENT IN AN ORGANIZED HEALTH CARE EDUCATION/TRAINING PROGRAM

## 2021-04-11 PROCEDURE — 82436 ASSAY OF URINE CHLORIDE: CPT

## 2021-04-11 PROCEDURE — 83935 ASSAY OF URINE OSMOLALITY: CPT

## 2021-04-11 PROCEDURE — 87205 SMEAR GRAM STAIN: CPT

## 2021-04-11 PROCEDURE — 1200000000 HC SEMI PRIVATE

## 2021-04-11 PROCEDURE — 2580000003 HC RX 258: Performed by: INTERNAL MEDICINE

## 2021-04-11 PROCEDURE — 6370000000 HC RX 637 (ALT 250 FOR IP): Performed by: INTERNAL MEDICINE

## 2021-04-11 PROCEDURE — 80048 BASIC METABOLIC PNL TOTAL CA: CPT

## 2021-04-11 PROCEDURE — 6360000002 HC RX W HCPCS: Performed by: SURGERY

## 2021-04-11 PROCEDURE — 84300 ASSAY OF URINE SODIUM: CPT

## 2021-04-11 PROCEDURE — 84156 ASSAY OF PROTEIN URINE: CPT

## 2021-04-11 PROCEDURE — 80053 COMPREHEN METABOLIC PANEL: CPT

## 2021-04-11 PROCEDURE — 83735 ASSAY OF MAGNESIUM: CPT

## 2021-04-11 PROCEDURE — 2580000003 HC RX 258: Performed by: SURGERY

## 2021-04-11 PROCEDURE — 6370000000 HC RX 637 (ALT 250 FOR IP): Performed by: SPECIALIST

## 2021-04-11 PROCEDURE — 2700000000 HC OXYGEN THERAPY PER DAY

## 2021-04-11 PROCEDURE — P9047 ALBUMIN (HUMAN), 25%, 50ML: HCPCS | Performed by: INTERNAL MEDICINE

## 2021-04-11 PROCEDURE — 6360000002 HC RX W HCPCS: Performed by: INTERNAL MEDICINE

## 2021-04-11 PROCEDURE — 6370000000 HC RX 637 (ALT 250 FOR IP): Performed by: NURSE PRACTITIONER

## 2021-04-11 RX ORDER — SODIUM BICARBONATE 650 MG/1
1300 TABLET ORAL 2 TIMES DAILY
Status: DISCONTINUED | OUTPATIENT
Start: 2021-04-11 | End: 2021-04-12

## 2021-04-11 RX ORDER — POTASSIUM CHLORIDE 20 MEQ/1
20 TABLET, EXTENDED RELEASE ORAL ONCE
Status: COMPLETED | OUTPATIENT
Start: 2021-04-11 | End: 2021-04-11

## 2021-04-11 RX ORDER — ALBUMIN (HUMAN) 12.5 G/50ML
50 SOLUTION INTRAVENOUS EVERY 8 HOURS
Status: COMPLETED | OUTPATIENT
Start: 2021-04-11 | End: 2021-04-12

## 2021-04-11 RX ADMIN — ISOSORBIDE MONONITRATE 30 MG: 30 TABLET, EXTENDED RELEASE ORAL at 09:19

## 2021-04-11 RX ADMIN — SODIUM CHLORIDE: 9 INJECTION, SOLUTION INTRAVENOUS at 05:57

## 2021-04-11 RX ADMIN — PANTOPRAZOLE SODIUM 40 MG: 40 TABLET, DELAYED RELEASE ORAL at 05:50

## 2021-04-11 RX ADMIN — DOCUSATE SODIUM 100 MG: 100 CAPSULE ORAL at 09:19

## 2021-04-11 RX ADMIN — CARVEDILOL 6.25 MG: 6.25 TABLET, FILM COATED ORAL at 22:25

## 2021-04-11 RX ADMIN — SODIUM BICARBONATE 1300 MG: 650 TABLET ORAL at 22:24

## 2021-04-11 RX ADMIN — SODIUM BICARBONATE 650 MG: 650 TABLET ORAL at 09:20

## 2021-04-11 RX ADMIN — HYDRALAZINE HYDROCHLORIDE 25 MG: 25 TABLET, FILM COATED ORAL at 22:24

## 2021-04-11 RX ADMIN — ALBUMIN (HUMAN) 50 G: 0.25 INJECTION, SOLUTION INTRAVENOUS at 17:16

## 2021-04-11 RX ADMIN — SODIUM BICARBONATE 650 MG: 650 TABLET ORAL at 14:42

## 2021-04-11 RX ADMIN — ALBUMIN (HUMAN) 50 G: 0.25 INJECTION, SOLUTION INTRAVENOUS at 11:02

## 2021-04-11 RX ADMIN — ATORVASTATIN CALCIUM 40 MG: 40 TABLET, FILM COATED ORAL at 22:24

## 2021-04-11 RX ADMIN — ONDANSETRON 4 MG: 2 INJECTION INTRAMUSCULAR; INTRAVENOUS at 15:33

## 2021-04-11 RX ADMIN — HYDRALAZINE HYDROCHLORIDE 25 MG: 25 TABLET, FILM COATED ORAL at 14:42

## 2021-04-11 RX ADMIN — MORPHINE SULFATE 2 MG: 2 INJECTION, SOLUTION INTRAMUSCULAR; INTRAVENOUS at 17:57

## 2021-04-11 RX ADMIN — FLUCONAZOLE 100 MG: 100 TABLET ORAL at 09:20

## 2021-04-11 RX ADMIN — POTASSIUM CHLORIDE 20 MEQ: 20 TABLET, EXTENDED RELEASE ORAL at 16:33

## 2021-04-11 RX ADMIN — CARVEDILOL 6.25 MG: 6.25 TABLET, FILM COATED ORAL at 09:19

## 2021-04-11 RX ADMIN — MEROPENEM 500 MG: 500 INJECTION, POWDER, FOR SOLUTION INTRAVENOUS at 05:56

## 2021-04-11 NOTE — PROGRESS NOTES
Name:  Lazarus Rancher  :  1940  MRN:  [id number]  Room:  [facility]/0516-36  DOS:  2021    [facility]  [facility]  Dr. Bob Polo M.D. Dr. Clarita Goodell, M.D. Dr. Rafaela Wang D.O. Dr. Radha Rodriguez M.D. Dr. Daylin Acharya D.O.    -NP Progress Note-    PCP:  Jeri John DO  Admitting Physician:  Mariam Mccabe DO  Chief Complaint:    Chief Complaint   Patient presents with    Abdominal Pain     to er via ems from home for evaluation of LLQ pain and rectal bleeding that started today.  Rectal Bleeding       Subjective  Ilean Blotter was seen and examined at bedside today; No family was present for the examination. No acute overnight events were noted by staff. Nurse states that patient had some pain this morning. I spoke with patient she stated after eating had some cramping pain in lower abdomen, relieved by pain meds. No further complaints. Patient states she moved her bowels this morning. Otherwise, she expresses no new complaints at this time, including no fevers, chills, headaches, dizziness, lightheadedness, chest pain, shortness of breath, abdominal pain, nausea, diarrhea, constipation, or calf pain.       Physical Examination  Vitals:  BP (!) 97/55   Pulse 78   Temp 98.4 °F (36.9 °C) (Oral)   Resp 18   Ht 5' 5\" (1.651 m)   Wt 135 lb (61.2 kg)   SpO2 92%   BMI 22.47 kg/m²   General Appearance:  awake, alert, and oriented to person, place, time, and purpose; appears stated age and cooperative; no apparent distress no labored breathing  HEENT:  PERRL; EOMI; sclera clear; buccal mucosa moist  Neck:  supple; trachea midline; no thyromegaly; no JVD; no bruits  Heart:  rhythm regular; rate controlled; no murmurs  Lungs:  symmetrical; clear to auscultation bilaterally; no wheezes; no rhonchi; no rales  Abdomen:  soft, non-tender, non-distended; bowel sounds positive; no organomegaly or masses; no pain on palpation  Extremities:  peripheral pulses present; no peripheral edema; no ulcers  Neurologic:  alert and oriented x 3; no focal deficit; cranial nerves grossly intact  Skin:  no petechia; no hemorrhage; no wounds    Medications  Scheduled Meds    albumin human  50 g Intravenous Q8H    docusate sodium  100 mg Oral Daily    [Held by provider] bumetanide  2 mg Oral Daily    pantoprazole  40 mg Oral QAM AC    [Held by provider] chlorothiazide  500 mg Intravenous BID    sodium bicarbonate  650 mg Oral 4x Daily    atorvastatin  40 mg Oral Nightly    carvedilol  6.25 mg Oral BID    hydrALAZINE  25 mg Oral 3 times per day    isosorbide mononitrate  30 mg Oral Daily    vitamin D  50,000 Units Oral Weekly     Infusion Meds    sodium chloride 125 mL/hr at 04/11/21 1200    sodium chloride      sodium chloride       PRN Meds perflutren lipid microspheres, acetaminophen, sodium chloride, sodium chloride, albuterol, ondansetron, promethazine, morphine    Laboratory Data  Recent Results (from the past 24 hour(s))   CBC Auto Differential    Collection Time: 04/11/21  5:33 AM   Result Value Ref Range    WBC 12.2 (H) 4.5 - 11.5 E9/L    RBC 2.77 (L) 3.50 - 5.50 E12/L    Hemoglobin 8.5 (L) 11.5 - 15.5 g/dL    Hematocrit 26.9 (L) 34.0 - 48.0 %    MCV 97.1 80.0 - 99.9 fL    MCH 30.7 26.0 - 35.0 pg    MCHC 31.6 (L) 32.0 - 34.5 %    RDW 17.2 (H) 11.5 - 15.0 fL    Platelets 058 206 - 958 E9/L    MPV 12.6 (H) 7.0 - 12.0 fL    Neutrophils % 70.3 43.0 - 80.0 %    Immature Granulocytes % 0.6 0.0 - 5.0 %    Lymphocytes % 16.2 (L) 20.0 - 42.0 %    Monocytes % 11.3 2.0 - 12.0 %    Eosinophils % 1.1 0.0 - 6.0 %    Basophils % 0.5 0.0 - 2.0 %    Neutrophils Absolute 8.55 (H) 1.80 - 7.30 E9/L    Immature Granulocytes # 0.07 E9/L    Lymphocytes Absolute 1.97 1.50 - 4.00 E9/L    Monocytes Absolute 1.38 (H) 0.10 - 0.95 E9/L    Eosinophils Absolute 0.13 0.05 - 0.50 E9/L    Basophils Absolute 0.06 0.00 - 0.20 E9/L   Comprehensive metabolic panel    Collection Time: 04/11/21  5:33 AM   Result Value Ref Range    Sodium 140 132 - 146 mmol/L    Potassium 3.2 (L) 3.5 - 5.0 mmol/L    Chloride 88 (L) 98 - 107 mmol/L    CO2 40 (H) 22 - 29 mmol/L    Anion Gap 12 7 - 16 mmol/L    Glucose 112 (H) 74 - 99 mg/dL    BUN 55 (H) 8 - 23 mg/dL    CREATININE 3.8 (H) 0.5 - 1.0 mg/dL    GFR Non-African American 14 >=60 mL/min/1.73    GFR African American 14     Calcium 8.1 (L) 8.6 - 10.2 mg/dL    Total Protein 5.5 (L) 6.4 - 8.3 g/dL    Albumin 2.5 (L) 3.5 - 5.2 g/dL    Total Bilirubin 0.8 0.0 - 1.2 mg/dL    Alkaline Phosphatase 99 35 - 104 U/L    ALT 6 0 - 32 U/L    AST 18 0 - 31 U/L   Phosphorus    Collection Time: 04/11/21  5:33 AM   Result Value Ref Range    Phosphorus 6.0 (H) 2.5 - 4.5 mg/dL   Magnesium    Collection Time: 04/11/21  5:33 AM   Result Value Ref Range    Magnesium 1.7 1.6 - 2.6 mg/dL       Imaging  Echo Complete    Result Date: 4/9/2021  Transthoracic Echocardiography Report (TTE)  Demographics   Patient Name       Chey Alarcon          Gender               Female                     [address] Record     [id number]       Room Number          0430  Number   Account #          [de-identified]      Procedure Date       04/09/2021   Corporate ID                      Ordering Physician   Manuel Lafleur MD   Accession Number   [id number]     Referring Physician   Date of Birth      1940     Sonographer          Jay Clark JENNI   Age                68 year(s)     Interpreting         Abram Hitchcock DO                                    Physician                                     Any Other  Procedure Type of Study   TTE procedure:Echo Complete W/Doppler & Color Flow. Procedure Date Date: 04/09/2021 Start: 12:08 PM Study Location: Echo Lab Technical Quality: Adequate visualization Indications:LV function.  Patient Status: Routine Height: 65 inches Weight: 130 pounds BSA: 1.65 m^2 BMI: 21.63 kg/m^2 BP: 124/59 mmHg  Findings   Left Ventricle  Left ventricular size is grossly normal.  Moderate left ventricular concentric hypertrophy noted. Ejection fraction is measured at 30%. No evidence of left ventricular mass or thrombus noted. Right Ventricle  Mildly dilated right ventricle. No evidence of a thrombus in the right ventricle. Left Atrium  The left atrium is mildly dilated. Interatrial septum appears intact. No evidence of thrombus within left atrium. Right Atrium  Mildly enlarged right atrium size. Mitral Valve  Mild mitral regurgitation is present. No evidence of mitral valve stenosis. Tricuspid Valve  Mild tricuspid regurgitation. RVSP is 34.49 mmHg. Pulmonary hypertension is mild . Aortic Valve  Aortic valve opens well. The aortic valve is trileaflet. No evidence of aortic valve regurgitation. No hemodynamically significant aortic stenosis is present. Pulmonic Valve  Pulmonic valve is structurally normal.   Pericardial Effusion  No evidence of pericardial effusion. Aorta  The aorta is within normal limits. Miscellaneous  Irregular rhythm--atrial fibrillation   Conclusions   Summary  Left ventricular size is grossly normal.  Moderate left ventricular concentric hypertrophy noted. Ejection fraction is measured at 30%. No evidence of left ventricular mass or thrombus noted. The left atrium is mildly dilated. Interatrial septum appears intact. No evidence of thrombus within left atrium. Mildly dilated right ventricle. No evidence of a thrombus in the right ventricle. Mildly enlarged right atrium size. Mild mitral regurgitation is present. No evidence of mitral valve stenosis. Mild tricuspid regurgitation. RVSP is 34.49 mmHg. Pulmonary hypertension is mild .   Irregular rhythm--atrial fibrillation   Signature   ----------------------------------------------------------------  Electronically signed by Jasmin Roman DO(Interpreting  physician) on 04/09/2021 07:56 PM  ----------------------------------------------------------------  M-Mode/2D Measurements & Calculations   LV Diastolic LV Systolic Dimension: 4.7   AV Cusp Separation: 2 cmLA  Dimension: 5.5  cm                           Dimension: 4.7 cmAO Root  cm              LV Volume Diastolic: 061. 1   Dimension: 3.3 cm  LV FS:14.6 %    ml  LV PW           LV Volume Systolic: 700 ml  Diastolic: 1.4  LV EDV/LV EDV Index: 145.1  cm              ml/88 ml/m^2LV ESV/LV ESV    RV Diastolic Dimension: 3.2  LV PW Systolic: Index: 045 BY/15CJ/ m^2      cm  1.5 cm          EF Calculated: 29.7 %        RV Systolic Dimension: 2.9 cm  Septum          LV Mass Index: 215 l/min*m^2 LA/Aorta: 9.65  Diastolic: 1.5  cm                                           LA volume/Index: 132.8 ml  Septum          LVOT: 2 cm  Systolic: 1.5  cm   LV Mass: 355.31  g  Doppler Measurements & Calculations   MV Peak E-Wave:   AV Peak Velocity: 1.39 m/s     LVOT Peak Velocity: 0.92  0.79 m/s          AV Peak Gradient: 7.7 mmHg     m/s  MV Peak A-Wave: 0 AV Mean Velocity: 1.02 m/s     LVOT Mean Velocity: 0.55  m/s               AV Mean Gradient: 4.6 mmHg     m/s  MV E/A Ratio: 197 AV VTI: 21.9 cm                LVOT Peak Gradient: 3.4  MV Peak Gradient: AV Area (Continuity):1.95 cm^2 mmHgLVOT Mean Gradient:  5.9 mmHg                                         1.5 mmHg  MV Mean Gradient: LVOT VTI: 13.6 cm              Estimated RVSP: 34.5 mmHg  1.4 mmHg                                         Estimated RAP:10 mmHg  MV Mean Velocity: Estimated PASP: 34.5 mmHg  0.51 m/s          Pulm. Vein A Reversal  MV Deceleration   Duration:438.3 msec            TR Velocity:2.48 m/s  Time: 162.8 msec  Pulm. Vein D Velocity:0.29     TR Gradient:24.5 mmHg  MV P1/2t: 41 msec m/sPulm. Vein A Reversal       PV Peak Velocity: 0.87  MVA by PHT:5.37   Velocity:0.33 m/s              m/s  cm^2              Pulm.  Vein S Velocity: 0.27    PV Peak Gradient: 3.03  MV Area           m/s                            mmHg  (continuity): 1.4                                PV Mean Velocity: 0.64  cm^2 m/s                                                   PV Mean Gradient: 1.8                                                   mmHg  [email or web address] [id number]    Ct Abdomen Pelvis Wo Contrast Additional Contrast? None    Result Date: 3/31/2021  EXAMINATION: CT OF THE ABDOMEN AND PELVIS WITHOUT CONTRAST 3/31/2021 12:51 pm TECHNIQUE: CT of the abdomen and pelvis was performed without the administration of intravenous contrast. Multiplanar reformatted images are provided for review. Dose modulation, iterative reconstruction, and/or weight based adjustment of the mA/kV was utilized to reduce the radiation dose to as low as reasonably achievable. COMPARISON: None. HISTORY: ORDERING SYSTEM PROVIDED HISTORY: abd pain TECHNOLOGIST PROVIDED HISTORY: Reason for exam:->abd pain Additional Contrast?->None Decision Support Exception->Emergency Medical Condition (MA) FINDINGS: In the left upper quadrant there is no expansile amorphous a lesion of mixed density measuring 7.4 x 3.2 x 3.3 cm interposed between the posterior wall of the fundal region of the stomach, the spleen which is posterior located, the tail of the pancreas, and the splenic flexure of the colon particularly the proximal descending colon. There is stranding of the adjacent fat planes and there are indication for extraluminal air associated with this lesion. The findings are restricted to the left upper quadrant. There is also fluid accumulation in the left subphrenic space around the spleen. Stranding of the fat planes are seen. The areas of a relative increased density associated with the core of this lesion can indicate a hemorrhagic component.  Differential diagnosis would indicated an exophytic perforate lesion of the stomach as seen with a just a type of tumor but it is in close proximity with the vessels of the body/tail of the spleen and atherosclerotic changes are seen in the splenic artery. The a leaking aneurysm of the splenic artery cannot be entirely excluded the. The proper evaluation will required oral and IV contrast study. There is no evidence for free intraperitoneal air although there is extraluminal air associated with the left upper quadrant mass like lesion formation. Free fluid accumulation is seen in the lower pelvic cavity in the cul-de-sac. The spleen appears to be intrinsically unremarkable though surrounded by stranding of fat planes and the fluid accumulation in the left subphrenic space. Stranding of the pericolonic fat planes seen around the splenic flexure of the colon relate with the amorphous complex most likely hemorrhagic mass lesion with a component of bowel perforation likely from the stomach in the left upper quadrant. The liver has borderline size. The small size gallstones are seen in a well distended gallbladder. There is no dilatation of the biliary tree or pancreatic ductal system. There is some atrophy of the pancreas. The have calcifications are seen in the area of the head of the pancreas. Additional calcifications seen throughout the pancreas in part are vascular related but can be relate with previous chronic calcified pancreatitis. The kidneys are preserved size. There is no hydronephrosis. Vascular calcifications are seen both kidneys. 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 Bladder has unremarkable appearance. The uterus and ovaries have unremarkable appearance. Calcified myoma is seen in the uterus. Appendix seen and has unremarkable appearance. There is no indication for bowel obstruction. There is a component of anasarca with bilateral pleural effusions of mild-to-moderate degree.   Delete there are compression atelectasis in both lower lobes by the pleural effusion. Heart is diffusely enlarged. Degenerative changes are seen in the lumbar spine and particularly in the lower thoracic spine segments. 1.  Presence of a 7.4 x 3.2 x 3.3 cm expansile complex  density mass lesion in the left upper quadrant interposed between the posterior wall of the fundal region of the stomach, the spleen and the splenic flexure of the colon and the body/tail of the pancreas, likely with a hemorrhagic component. 2.  There is a component of a well contained perforation with extraluminal air but not conspicuously free intraperitoneal air associated with this mass lesion. 3.  There are free fluid accumulation in the left subphrenic space and stranding of the fat planes in between the stomach with spleen in the splenic flexure of the colon. 4. Differential diagnosis would include an exophytic  GIST tumor of the stomach with the gastric perforation. 5.  Further evaluation with CT abdomen pelvis with oral and IV contrast is recommended. The IV contrast needed to exclude a bleeding aneurysm of the splenic artery due the location of the lesion. Preliminary report given to Dr. Inga Kruse, ER Physician in [facility].     Xr Chest (2 Vw)    Result Date: 4/9/2021  EXAMINATION: TWO XRAY VIEWS OF THE CHEST 4/9/2021 12:10 pm COMPARISON: 04/02/2021 HISTORY: ORDERING SYSTEM PROVIDED HISTORY: Dyspnea TECHNOLOGIST PROVIDED HISTORY: Reason for exam:->Dyspnea FINDINGS: The heart is enlarged. The thoracic aorta is tortuous. There is a moderate size right pleural effusion. A right perihilar infiltrate cannot be excluded. The chest x-ray is limited due to the rotation. There is no appreciable left lung infiltrate. There is a small left pleural effusion. 1. Moderate size right pleural effusion. 2. Small left pleural effusion. 3. Significant rotation of the chest x-ray. A right perihilar infiltrate cannot be excluded. 4. Cardiomegaly.      Xr Abdomen (kub) (single Ap View) base and retrocardiac region. Moderate cardiomegaly, unchanged. Mild pulmonary venous congestion without overt CHF. No focal infiltrate or significant pleural effusion identified. Atherosclerotic thoracic aorta. No pneumothorax. Moderate cardiomegaly. Mild pulmonary venous congestion without overt CHF. No pneumonia seen. Xr Chest 1 View    Result Date: 4/9/2021  EXAMINATION: ONE XRAY VIEW OF THE CHEST 4/9/2021 3:04 pm COMPARISON: 04/09/2021 HISTORY: ORDERING SYSTEM PROVIDED HISTORY: post thoracentesis TECHNOLOGIST PROVIDED HISTORY: Reason for exam:->post thoracentesis FINDINGS: The heart is moderately enlarged. Lung aeration is improved, there is persistent consolidation/atelectasis in the right base. Small pleural effusions lie posteriorly     No sign of post thoracentesis pneumothorax. Improved lung aeration, residual small effusions and right basilar consolidation. Us Thoracentesis Which Side Should The Procedure Be Performed? Right    Result Date: 4/9/2021  EXAMINATION: ULTRASOUND right THORACENTESIS 4/9/2021 3:18 pm COMPARISON: None HISTORY: ORDERING SYSTEM PROVIDED HISTORY: rt thoracentesis TECHNOLOGIST PROVIDED HISTORY: Reason for exam:->rt thoracentesis Which side should the procedure be performed?->Right What reading provider will be dictating this exam?->CRC FINDINGS: The thoracentesis procedure was explained to the patient and consent was obtained. A time-out was performed Ultrasound images of the  right chest were obtained. There is a very small amount of  right pleural fluid. Request was made for sampling of the pleural fluid. The patient's back was prepped and draped in a sterile fashion. Following the uneventful administration of lidocaine introduced a 25 gauge needle into the small amount of fluid. 13 mL of fluid was obtained for testing. The aspirated fluid was clear and yellow. Fluid was sent to the laboratory for testing and pH. A post thoracentesis chest x-ray was obtained. There is no pneumothorax. 1. Very small right pleural effusion. I was able to aspirate 13 mL of fluid and send the fluid off for pH and the requested testing. 2. There is no evidence of a pneumothorax on the post thoracentesis chest x-ray. Us Dup Upper Extremity Right Venous    Result Date: 4/2/2021  EXAMINATION: DUPLEX ULTRASOUND OF THE RIGHT UPPER EXTREMITY FOR DVT, 4/2/2021 1:13 pm TECHNIQUE: Duplex ultrasound using B-mode/gray scaled imaging and Doppler spectral analysis and color flow was obtained of the deep venous structures of the upper extremity. COMPARISON: None used. HISTORY: ORDERING SYSTEM PROVIDED HISTORY: swelling TECHNOLOGIST PROVIDED HISTORY: Reason for exam:->swelling What reading provider will be dictating this exam?->CRC FINDINGS: There is normal flow and compressibility of the visualized venous structures of the right upper extremity. There is no evidence of echogenic thrombus. The veins demonstrate good compressibility with normal color flow study and spectral analysis. No evidence of DVT of the right upper extremity. Vl Lower Extremity Arterial Segmental Pressures W Ppg Bilateral    Result Date: 4/8/2021  EXAMINATION: ARTERIAL DUPLEX ULTRASOUND OF THE BILATERAL LOWER EXTREMITIES WITH SEGMENTAL PRESSURES AND PULSE VOLUME RECORDINGS 4/8/2021 7:54 am TECHNIQUE: Arterial duplex RUBÉN ultrasound. Segmental pressures and PVR performed with Doppler. COMPARISON: None. HISTORY: ORDERING SYSTEM PROVIDED HISTORY: pain and decreased pulses in BLE, concern for moderate to severe arterial disease TECHNOLOGIST PROVIDED HISTORY: Reason for exam:->pain and decreased pulses in BLE, concern for moderate to severe arterial disease What reading provider will be dictating this exam?->CRC The FINDINGS: The RUBÉN on the right is 0.94. The RUBÉN on the left is 0.66. Indices for the right lower extremity: High thigh: 1.06, low thigh: 1.09, calf: 0.97, PT: 0.81, DP: 0.94.  Indices for the left lower extremity:  High thigh: 0.63, low thigh: 0.64, calf: 0.57, posterior tibial, 0.66, DP: 0.61. Doppler waveforms demonstrate blunted upslope, dampened amplitude in loss of triphasic morphology in the left lower extremity consistent with significant stenotic arterial disease. There is dampened pulse volume waveform in the left thigh and both digital tracings with moderately reduced amplitude in the digital tracings bilaterally. Decreased left RUBÉN consistent with moderate peripheral arterial disease. There is correlating loss of triphasic waveform in the left lower extremity. Dampened bilateral distal pulse volume recordings suggestive of moderate disease in the distal vasculature. Mri Abdomen Wo Contrast    Result Date: 4/1/2021  EXAMINATION: MRI OF THE ABDOMEN WITHOUT CONTRAST, 4/1/2021 3:19 pm TECHNIQUE: Multiplanar multisequence MRI of the abdomen was performed without the administration of intravenous contrast. COMPARISON: 03/31/2021. HISTORY: ORDERING SYSTEM PROVIDED HISTORY: pepper-gastric/pancreatic/colonic lesion TECHNOLOGIST PROVIDED HISTORY: Reason for exam:->pepper-gastric/pancreatic/colonic lesion FINDINGS: Multiple sequences are moderately degraded by patient motion. Lower Chest: Small bilateral pleural effusions. Organs: Liver is normal in contour and enhancement. Cholelithiasis. No biliary ductal diltation. Diffuse moderate to severe pancreatic ductal dilatation. No obstructing stone or mass is seen accounting for noncontrast technique and motion degraded imaging. Into the pancreatic tail there is an approximate 5.5 x 3.5 cm collection which is primarily T2 hyperintense, T1 hyperintense and demonstrates apparent restricted diffusion adrenals are unremarkable. Spleen is normal in size. Bilateral simple renal cysts. Vasculature is unremarkable. GI/Bowel: Bowel is non-dilated without wall thickening.  Peritoneum/Retroperitoneum:No free fluid, free air, organized fluid collection or lymphadenopathy. Bones: Multilevel degenerative disc disease. Severe body wall edema. Exam is moderately degraded by patient motion. 1. Approximate 5.5 cm probable collection adjacent to the pancreatic tail, incompletely characterized in the absence of contrast material but felt to most likely represent an acute peripancreatic fluid collection or pseudocyst related to prior pancreatitis. 2. Moderate to severe pancreatic ductal dilatation. No obstructing stone is seen accounting for noncontrast technique and motion degraded imaging. Recommend further evaluation with contrast-enhanced pancreas protocol MRI. Attention to the finding in impression #1 is recommended at the time of pancreas protocol MRI. 3. Small bilateral pleural effusions with severe body wall edema. Us Dup Lower Extremities Bilateral Venous    Result Date: 4/7/2021  EXAMINATION: DUPLEX VENOUS ULTRASOUND OF THE BILATERAL LOWER EXTREMITIES, 4/7/2021 3:24 pm TECHNIQUE: Duplex ultrasound using B-mode/gray scaled imaging and Doppler spectral analysis and color flow was obtained of the bilateral lower extremities. COMPARISON: None. HISTORY: ORDERING SYSTEM PROVIDED HISTORY: pain and cramping, concern for DVT TECHNOLOGIST PROVIDED HISTORY: Reason for exam:->pain and cramping, concern for DVT What reading provider will be dictating this exam?->CRC FINDINGS: The visualized veins of the bilateral lower extremities are patent and free of echogenic thrombus. The veins demonstrate good compressibility with normal color flow study and spectral analysis. No evidence of DVT in either lower extremity. ASSESSMENT/PLAN:     1.  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

## 2021-04-11 NOTE — PLAN OF CARE
Problem: Falls - Risk of:  Goal: Will remain free from falls  Description: Will remain free from falls  4/11/2021 1211 by Nabil Hager RN  Outcome: Met This Shift  4/10/2021 2243 by Helen Rawls RN  Outcome: Met This Shift  Goal: Absence of physical injury  Description: Absence of physical injury  4/11/2021 1211 by Nabil Hager RN  Outcome: Met This Shift  4/10/2021 2243 by Helen Rawls RN  Outcome: Met This Shift     Problem: Skin Integrity:  Goal: Will show no infection signs and symptoms  Description: Will show no infection signs and symptoms  4/11/2021 1211 by Nabil Hager RN  Outcome: Met This Shift  4/10/2021 2243 by Helen Rawls RN  Outcome: Met This Shift  Goal: Absence of new skin breakdown  Description: Absence of new skin breakdown  4/11/2021 1211 by Nabil Hager RN  Outcome: Met This Shift  4/10/2021 2243 by Helen Rawls RN  Outcome: Met This Shift     Problem: Pain:  Goal: Pain level will decrease  Description: Pain level will decrease  4/11/2021 1211 by Nabil Hager RN  Outcome: Met This Shift  4/10/2021 2243 by Helen Rawls RN  Outcome: Met This Shift  Goal: Control of acute pain  Description: Control of acute pain  4/11/2021 1211 by Nabil Hager RN  Outcome: Met This Shift  4/10/2021 2243 by Helen Rawls RN  Outcome: Met This Shift  Goal: Control of chronic pain  Description: Control of chronic pain  4/11/2021 1211 by Nabil Hager RN  Outcome: Met This Shift  4/10/2021 2243 by Helen Rawls RN  Outcome: Met This Shift

## 2021-04-11 NOTE — PROGRESS NOTES
[facility] MD Gloria Villeda MD  [facility] Garcia Castrejon MD   Progress Note    4/11/2021    SUBJECTIVE:     Pt known to Dr. Charlie Ashton from office, and followed at [facility] 2 weeks prior to this admission    4/3: Seen this am in her room , o2/nc at 4 L which is her home o2 . She did undergo EGD/colonscopy this am . Results noted   Continue to look volume overloaded with 2 + edema and JVD . 4/4 ; stable vitlas , good UO on bumex drip/diuril . Will assess in am if we can wean her off drip     4/5: Patient was doing well this morning, she is awake and alert with no acute distress. We will continue same medications and same plan of care for today. 4/6 \" Seen this am , pleasant , o2/nc . Good UO . Cr stable . Plan for EUS in am     4/7 : good UO with negative balance . In good spirit . For EUS today . 4/8 : in bed , lying flat in NAD . Edema much improved     4/9 : report of pt being more sob . she is for cxr today . EUS with bile duct stone     4/10: Recurrent left lower quadrant pain, ongoing anorexia with poor intake of food and fluid, intermittent mild nausea though no emesis. No dyspnea. No peripheral swelling. Mildly hypotensive. 4/11: Fatigue, generalized weakness, ongoing anorexia and poor intake. Some lower abdominal discomfort though no dez pain. Indigestion and mild nausea intermittently. No swelling. No dyspnea on nasal cannula. No cough or wheeze. Started normal saline at conservative rate yesterday, increase the rate this morning      PROBLEM LIST:    Principal Problem:    GI bleed  Active Problems:    Preoperative cardiovascular examination    Bowel perforation (HCC)  Resolved Problems:    * No resolved hospital problems. *       DIET:    DIET LOW FAT; Low Sodium (2 GM)       Allergies : Patient has no known allergies.     Review of Systems:   Constitutional:weak   Eyes: no eye pain , no itching , no drainage Ears, nose, mouth, throat, and face: no ear ,nose pain , hearing is ok ,no nasal drainage   Respiratory: no sob ,no cough ,no wheezing . Cardiovascular: no chest pain , no palpitation ,no sob . Gastrointestinal: no nausea, vomiting , constipation , no abdominal pain . Genitourinary:no urinary retention , no burning , dysuria . No polyuria   Hematologic/lymphatic: no bleeding , no cougulation issues . Musculoskeletal:no joint pain , no swelling . Neurological: no headaches ,no weakness , no numbness . Endocrine: no thirst , no weight issues .      MEDS (scheduled):    albumin human  50 g Intravenous Q8H    docusate sodium  100 mg Oral Daily    [Held by provider] bumetanide  2 mg Oral Daily    pantoprazole  40 mg Oral QAM AC    [Held by provider] chlorothiazide  500 mg Intravenous BID    sodium bicarbonate  650 mg Oral 4x Daily    atorvastatin  40 mg Oral Nightly    carvedilol  6.25 mg Oral BID    hydrALAZINE  25 mg Oral 3 times per day    isosorbide mononitrate  30 mg Oral Daily    vitamin D  50,000 Units Oral Weekly       MEDS (infusions):   sodium chloride 125 mL/hr at 04/11/21 1200    sodium chloride      sodium chloride         MEDS (prn):  perflutren lipid microspheres, acetaminophen, sodium chloride, sodium chloride, albuterol, ondansetron, promethazine, morphine    PHYSICAL EXAM:     Patient Vitals for the past 24 hrs:   BP Temp Temp src Pulse Resp SpO2 Weight   04/11/21 1440 110/64   74      04/11/21 0715 (!) 97/55 98.4 °F (36.9 °C) Oral 78 18 92 %    04/11/21 0625       135 lb (61.2 kg)   04/11/21 0552       135 lb 9.6 oz (61.5 kg)   04/11/21 0545 (!) 104/56   79  90 %    04/10/21 2145 (!) 102/57 97.7 °F (36.5 °C) Oral 81 16 90 %    @      Intake/Output Summary (Last 24 hours) at 4/11/2021 1558  Last data filed at 4/11/2021 1449  Gross per 24 hour   Intake 0 ml   Output 750 ml   Net -750 ml         Wt Readings from Last 3 Encounters:   04/11/21 135 lb (61.2 kg)

## 2021-04-11 NOTE — PROGRESS NOTES
[address] [place] [address] [facility]  FABRICE    Chief complain:  Fatigue       SUBJECTIVE:  Awake and alert, seen at bedside  Daughter present   Patient is refusing to allow physical examination   No abd pain, no nausea, vomiting, diarrhea, fever, chills, rash   Tolerating medications, no side effects    ROS:  Negative except as above     OBJECTIVE:    Vitals:    04/11/21 0545 04/11/21 0552 04/11/21 0625 04/11/21 0715   BP: (!) 104/56   (!) 97/55   Pulse: 79   78   Resp:    18   Temp:    98.4 °F (36.9 °C)   TempSrc:    Oral   SpO2: 90%   92%   Weight:  135 lb 9.6 oz (61.5 kg) 135 lb (61.2 kg)    Height:           HEENT :         unremarkable  At/nc  Heart:             RRR,  No murmurs, no gallops  Lungs:            Dec  to auscultation, no wheezing , no rales post   Abdomen:       soft, non tender, bowel sounds present  Extremites:     Dec  BLE Edema,   Skin:                Normal turgor,normal texture  piv             Jane     LABS     Recent Labs     04/09/21  0757 04/10/21  0802 04/11/21  0533    139 140   K 3.3* 3.1* 3.2*   CL 88* 88* 88*   CO2 37* 40* 40*   BUN 47* 51* 55*   CREATININE 2.8* 3.1* 3.8*   GLUCOSE 122* 168* 112*   CALCIUM 8.3* 8.4* 8.1*   PROT 5.7* 5.9* 5.5*   LABALBU 2.9* 2.8* 2.5*   BILITOT 1.1 1.1 0.8   ALKPHOS 104 103 99   AST 19 19 18   ALT 7 7 6          Recent Labs     04/09/21  0757 04/10/21  0802 04/11/21  0533   WBC 14.1* 12.9* 12.2*   RBC 3.03* 3.07* 2.77*   HGB 9.4* 9.4* 8.5*   HCT 29.1* 29.7* 26.9*   MCV 96.0 96.7 97.1   MCH 31.0 30.6 30.7   MCHC 32.3 31.6* 31.6*   RDW 17.7* 17.7* 17.2*    224 211   MPV 12.7* 12.4* 12.6*        Microbiology :  Blood Culture, Routine   Date Value Ref Range Status   03/31/2021 5 Days no growth  Final     Culture, Blood 2   Date Value Ref Range Status   03/31/2021 5 Days no growth  Final     Urine Culture, Routine   Date Value Ref Range Status   04/01/2021 <10,000 CFU/mL  Mixed gram positive organisms    Final Radiology :  Reviewed     ASSESSMENT:   PT CAME IN WITH ABD PAIN AND DIARRHEA  SHE HAS LEUKOCYTOSIS post EUS    GIB/pancreatic mass?hemorrhagic/bowel/gi perforation  s/p EUS s/p FNA pancreatic tail  S/P RX UTI KLEBSIELLA /CEFDINIR from [facility]  candiduria   CKD   AAA seen by vasc    DX  Pancreatic tail mass with solid component, choledocholithiasis, gallbladder sludge, pancreatic duct stone, chronic pancreatitis    Hypotension   REYES     PLAN:  Follow off ATB   Monitor labs  Follow cultures    MADELAINE Meneses - NP    4/11/2021  12:24 PM     Patient examined along with the nurse practitioner  Agree with above  Patient with pancreatic mass and GI bleed  Continue to follow the patient off antibiotics    I have discussed the case, including pertinent history and physical  exam findings . I have seen and examined the patient and the key elements of the encounter have been performed by me. I agree with the assessment, plan and orders as documented.       Treatment plan as per my recommendation     Zack Vasquez MD, FACP  4/11/2021  4:11 PM

## 2021-04-11 NOTE — PROGRESS NOTES
recorded. Intake/Output Summary (Last 24 hours) at 4/11/2021 0947  Last data filed at 4/11/2021 0629  Gross per 24 hour   Intake    Output 750 ml   Net -750 ml   I/O last 3 completed shifts:  In: -   Out: 1150 [Urine:1150]  Patient Vitals for the past 96 hrs (Last 3 readings):   Weight   04/11/21 0625 135 lb (61.2 kg)   04/11/21 0552 135 lb 9.6 oz (61.5 kg)   04/10/21 0704 130 lb 3.2 oz (59.1 kg)     Vital Signs:   Blood pressure (!) 97/55, pulse 78, temperature 98.4 °F (36.9 °C), temperature source Oral, resp. rate 18, height 5' 5\" (1.651 m), weight 135 lb (61.2 kg), SpO2 92 %. General appearance:  Somewhat apathetic today. She does not appear overtly uncomfortable. Head:  Normocephalic. No masses, lesions or tenderness. Eyes:  PERRLA. EOMI. Sclera clear. Impaired vision. ENT:  Ears normal. Mucosa normal. Nasal cannula oxygen is in place. Neck:    Supple. Trachea midline. No thyromegaly. No JVD. No bruits. Heart:    S1 and S2, regular rate and rhythm, systolic murmur  Lungs:    Diminished on the right  Abdomen:   Soft mildly tender to palpation. Voluntary guarding is elicited. Bowel sounds are active. Extremities:    Complete resolution of her presenting edema. Neurologic:    Alert x 3. No focal deficit. Cranial nerves grossly intact. No focal weakness. Psych:   Behavior is normal. Mood appears normal. Speech is not rapid and/or pressured. Musculoskeletal:   Spine ROM normal. Muscular strength intact. Gait not assessed. Integumentary:  No rashes  Skin normal color and texture. Genitalia/Breast:  Voiding with the use of a Jane catheter.     Medication:  Scheduled Meds:   albumin human  50 g Intravenous Q8H    docusate sodium  100 mg Oral Daily    [Held by provider] bumetanide  2 mg Oral Daily    fluconazole  100 mg Oral Daily    pantoprazole  40 mg Oral QAM AC    [Held by provider] chlorothiazide  500 mg Intravenous BID    sodium bicarbonate  650 mg Oral 4x Daily    atorvastatin 40 mg Oral Nightly    carvedilol  6.25 mg Oral BID    hydrALAZINE  25 mg Oral 3 times per day    isosorbide mononitrate  30 mg Oral Daily    vitamin D  50,000 Units Oral Weekly    meropenem (MERREM) IVPB  500 mg Intravenous Q12H     Continuous Infusions:   sodium chloride 65 mL/hr at 04/11/21 0557    sodium chloride      sodium chloride         Objective Data:  CBC with Differential:    Lab Results   Component Value Date    WBC 12.2 04/11/2021    RBC 2.77 04/11/2021    HGB 8.5 04/11/2021    HCT 26.9 04/11/2021     04/11/2021    MCV 97.1 04/11/2021    MCH 30.7 04/11/2021    MCHC 31.6 04/11/2021    RDW 17.2 04/11/2021    NRBC 0.9 03/31/2021    LYMPHOPCT 16.2 04/11/2021    MONOPCT 11.3 04/11/2021    MYELOPCT 0.9 04/03/2021    BASOPCT 0.5 04/11/2021    MONOSABS 1.38 04/11/2021    LYMPHSABS 1.97 04/11/2021    EOSABS 0.13 04/11/2021    BASOSABS 0.06 04/11/2021     CMP:    Lab Results   Component Value Date     04/11/2021    K 3.2 04/11/2021    K 3.9 03/31/2021    CL 88 04/11/2021    CO2 40 04/11/2021    BUN 55 04/11/2021    CREATININE 3.8 04/11/2021    GFRAA 14 04/11/2021    LABGLOM 14 04/11/2021    GLUCOSE 112 04/11/2021    GLUCOSE 102 11/17/2010    PROT 5.5 04/11/2021    LABALBU 2.5 04/11/2021    CALCIUM 8.1 04/11/2021    BILITOT 0.8 04/11/2021    ALKPHOS 99 04/11/2021    AST 18 04/11/2021    ALT 6 04/11/2021       Assessment:  1. 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 Endoscopic ultrasound does show a 5 mm stone in the distal common bile duct  4. Acute on chronic kidney disease stage IV   5.  Abdominal aortic aneurysm toward the bifurcation measuring up to 3.7 x 4.8 cm a component of chronic dissection appearance  6. 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 Paroxysmal atrial fibrillation on chronic anticoagulation with Eliquis--on hold  8. Essential hypertension  9. Moderate peripheral artery disease    Plan:   Unfortunately, creatinine has trended upward and she remains hypotensive with overall hypoperfusion. Hold parameters have been placed on blood pressure medications. I agree with resumption of gentle fluid resuscitation and will add pulse-dosed albumin for further blood pressure support. She is unmotivated today and apathetic. We discussed the need for increased work with the therapy teams today and she voiced understanding and agreement. More than 50% of my  time was spent at the bedside counseling/coordinating care with the patient and/or family with face to face contact. This time was spent reviewing notes and laboratory data as well as instructing and counseling the patient. Time I spent with the family or surrogate(s) is included only if the patient was incapable of providing the necessary information or participating in medical decisions. I also discussed the differential diagnosis and all of the proposed management plans with the patient and individuals accompanying the patient. Anna Izquierdo requires this high level of physician care and nursing on the Telemetry unit due the complexity of decision management and chance of rapid decline or death. Continued cardiac monitoring and higher level of nursing are required. I am readily available for any further decision-making and intervention.      Claudia Aldana DO [facility]  9:47 AM  4/11/2021

## 2021-04-11 NOTE — PROGRESS NOTES
Esteban Bedolla at [address]  4/3/2021    COLONOSCOPY SUBMUCOSAL SPOT INJECTION performed by Antonina Ojeda DO at [address] [facility] GASTROINTESTINAL ENDOSCOPY N/A 4/3/2021    EGD BIOPSY performed by [facility]ina Ojeda DO at [facility] N/A 4/7/2021    EGD W/EUS FNA performed by Gaby Avitia MD at [facility] ENDOSCOPY       Precautions: Activity as tolerated, falls and alarm ,  4L of O2 nc (wears 4L of O2 at  home as well)    SUBJECTIVE:    Social history: Patient lives alone  in a ranch home  with No steps  to enter Tub shower grab bars    Equipment owned: Bailey Pelletier,      [address] [place], [place] [address]   How much difficulty turning over in bed?: A Little  How much difficulty sitting down on / standing up from a chair with arms?: A Little  How much difficulty moving from lying on back to sitting on side of bed?: A Little  How much help from another person moving to and from a bed to a chair?: A Little  How much help from another person needed to walk in hospital room?: A Little  How much help from another person for climbing 3-5 steps with a railing?: A Lot  AM-PAC Inpatient Mobility Raw Score : 17  AM-PAC Inpatient T-Scale Score : 42.13  Mobility Inpatient CMS 0-100% Score: 50.57  Mobility Inpatient CMS G-Code Modifier : CK    Nursing cleared patient for PT treatment. OBJECTIVE;   Initial Evaluation  Date: 4/3/2021 Treatment Date:  4/11/2021     Short Term/ Long Term   Goals   Was pt agreeable to Eval/treatment?  Yes  Yes To be met in 4 days   Pain level   10/10  Stomach  No number given    Bed Mobility    Rolling: Supervision    Supine to sit: Minimal assist of 1   Sit to supine: Minimal assist of 1   Scooting: Minimal assist of 1  Rolling: Minimal assist of 1   Supine to sit: Minimal assist of 1 verbal cues for sequencing  Sit to supine: Minimal assist of 1   Scooting: Minimal assist of 1 Rolling: Independent   Supine to sit: Independent   Sit to supine: Independent   Scooting: Independent    Transfers Sit to stand: Not assessed  patient refusing to stand today to due pain and fatigue. Sit to stand: Minimal assist of 1      Sit to stand: Independent    Ambulation    not assessed  3x3 side steps using  wheeled walker with Minimal assist of 1   cues for safety and use of wheeled walker, pt sidestepped to head of bed multiple times     50 feet using  wheeled walker with Modified Independent    ROM Within functional limits       Strength BUE:  4/5  RLE:  4/5  LLE:  4/5  Increase strength in affected mm groups by 1/3 grade   Balance Sitting EOB:  fair +  Dynamic Standing:  not assessed  Sitting EOB: good   Dynamic Standing: fair with [address] Saturnino Sitting EOB:  good   Dynamic Standing: good      Patient is Alert & Oriented x person, place, time and situation and follows directions   Sensation:  Patient  denies numbness and tingling     Edema:  no   Endurance: fair  -    Vitals: 4 liters nasal cannula   Blood Pressure at rest  Blood Pressure during session    Heart Rate at rest  Heart Rate during session    SPO2 at rest%  SPO2 during session 90%     Patient education  Patient educated on role of Physical Therapy, risks of immobility, safety and plan of care, energy conservation,  importance of mobility while in hospital , ankle pumps, quad set and glut set for edema control, blood clot prevention and safety     Patient response to education:   Pt verbalized understanding Pt demonstrated skill Pt requires further education in this area   Yes Partial Yes      Treatment:  Patient practiced and was instructed/facilitated in the following treatment: Sat edge of bed 10 minutes with Supervision  to increase dynamic sitting balance and activity tolerance. Patient stood with min assist of 1 with 88 RooT, hygiene completed as pt had a BM, standing dynamic balance activities at wheeled walker.  Pt completed sit to stand x3 with Min A all times. Pt sidestepped to head of bed required Michael with bilateral LE to get back into bed. Pt reports dizziness and irritated today and not positive about being in hospital still. Education on the importance of getting up and moving in order to be discharged from hospital.  Therapeutic Exercises:  ankle pumps and long arc quad   x 10-20 reps. At end of session, patient in bed with alarm call light and phone within reach,  all lines and tubes intact, nursing present. Patient would benefit from continued skilled Physical Therapy to improve functional independence and quality of life. Patient's/ family goals   none stated      ASSESSMENT: Patient exhibits decreased strength, balance, endurance and pain in stomach  impairing functional mobility, transfers, gait distance and tolerance to activity. Patient slightly impulsive and irritated today, however requiring less assist with Bed Mobility and transfers today. Pt at risk for falls because of impulsiveness . Pt with increased fatigue. Plan of Care:     -Sitting Balance: Incorporate reaching activities to activate trunk muscles   -Standing Balance: Perform strengthening exercises in standing to promote motor control with or without upper extremity support   -Transfers: Provide instruction on proper hand and foot position for adequate transfer of weight onto lower extremities and use of gait device  -Gait: Gait training and Standing activities to improve: base of support, weight shift, weight bearing      Patient and or family understand(s) diagnosis, prognosis, and plan of care. Frequency of treatments: Patient will be seen  daily.        Time in  849  Time out  915    Total Treatment Time  26 minutes    CPT codes:  Therapeutic activities (95114)   18 minutes  1 unit(s)  Therapeutic exercises (40583)   8 minutes  1 unit(s)    Kanwal Mayer, PTA [id number]

## 2021-04-11 NOTE — PLAN OF CARE
Problem: Falls - Risk of:  Goal: Will remain free from falls  Description: Will remain free from falls  4/10/2021 2243 by Wendy Owen RN  Outcome: Met This Shift  4/10/2021 1105 by Nathanael Hugo RN  Outcome: Met This Shift  Goal: Absence of physical injury  Description: Absence of physical injury  4/10/2021 2243 by Wendy Owen RN  Outcome: Met This Shift  4/10/2021 1105 by Nathanael Hugo RN  Outcome: Met This Shift     Problem: Skin Integrity:  Goal: Will show no infection signs and symptoms  Description: Will show no infection signs and symptoms  4/10/2021 2243 by Wendy Owen RN  Outcome: Met This Shift  4/10/2021 1105 by Nathanael Hugo RN  Outcome: Met This Shift  Goal: Absence of new skin breakdown  Description: Absence of new skin breakdown  4/10/2021 2243 by Wendy Owen RN  Outcome: Met This Shift  4/10/2021 1105 by Nathanael Hugo RN  Outcome: Met This Shift     Problem: Pain:  Goal: Pain level will decrease  Description: Pain level will decrease  4/10/2021 2243 by Wendy Owen RN  Outcome: Met This Shift  4/10/2021 1105 by Nathanael Hugo RN  Outcome: Met This Shift  Goal: Control of acute pain  Description: Control of acute pain  4/10/2021 2243 by Wendy Owen RN  Outcome: Met This Shift  4/10/2021 1105 by Nathanael Hugo RN  Outcome: Met This Shift  Goal: Control of chronic pain  Description: Control of chronic pain  4/10/2021 2243 by Wendy Owen RN  Outcome: Met This Shift  4/10/2021 1105 by Nathanael Hugo RN  Outcome: Met This Shift

## 2021-04-12 ENCOUNTER — APPOINTMENT (OUTPATIENT)
Dept: GENERAL RADIOLOGY | Age: 81
DRG: 871 | End: 2021-04-12
Payer: MEDICARE

## 2021-04-12 LAB
ALBUMIN SERPL-MCNC: 4 G/DL (ref 3.5–5.2)
ALP BLD-CCNC: 234 U/L (ref 35–104)
ALT SERPL-CCNC: 5 U/L (ref 0–32)
ANION GAP SERPL CALCULATED.3IONS-SCNC: 14 MMOL/L (ref 7–16)
AST SERPL-CCNC: 25 U/L (ref 0–31)
BASOPHILS ABSOLUTE: 0.06 E9/L (ref 0–0.2)
BASOPHILS RELATIVE PERCENT: 0.6 % (ref 0–2)
BILIRUB SERPL-MCNC: 1.1 MG/DL (ref 0–1.2)
BUN BLDV-MCNC: 55 MG/DL (ref 8–23)
CALCIUM SERPL-MCNC: 8.3 MG/DL (ref 8.6–10.2)
CHLORIDE BLD-SCNC: 92 MMOL/L (ref 98–107)
CO2: 35 MMOL/L (ref 22–29)
CREAT SERPL-MCNC: 3.6 MG/DL (ref 0.5–1)
EOSINOPHILS ABSOLUTE: 0.17 E9/L (ref 0.05–0.5)
EOSINOPHILS RELATIVE PERCENT: 1.6 % (ref 0–6)
GFR AFRICAN AMERICAN: 15
GFR NON-AFRICAN AMERICAN: 15 ML/MIN/1.73
GLUCOSE BLD-MCNC: 100 MG/DL (ref 74–99)
HCT VFR BLD CALC: 23.8 % (ref 34–48)
HEMOGLOBIN: 7.4 G/DL (ref 11.5–15.5)
IMMATURE GRANULOCYTES #: 0.04 E9/L
IMMATURE GRANULOCYTES %: 0.4 % (ref 0–5)
LYMPHOCYTES ABSOLUTE: 1.95 E9/L (ref 1.5–4)
LYMPHOCYTES RELATIVE PERCENT: 17.9 % (ref 20–42)
MAGNESIUM: 1.7 MG/DL (ref 1.6–2.6)
MCH RBC QN AUTO: 30.8 PG (ref 26–35)
MCHC RBC AUTO-ENTMCNC: 31.1 % (ref 32–34.5)
MCV RBC AUTO: 99.2 FL (ref 80–99.9)
MONOCYTES ABSOLUTE: 1.13 E9/L (ref 0.1–0.95)
MONOCYTES RELATIVE PERCENT: 10.4 % (ref 2–12)
NEUTROPHILS ABSOLUTE: 7.53 E9/L (ref 1.8–7.3)
NEUTROPHILS RELATIVE PERCENT: 69.1 % (ref 43–80)
PDW BLD-RTO: 17.4 FL (ref 11.5–15)
PHOSPHORUS: 5.9 MG/DL (ref 2.5–4.5)
PLATELET # BLD: 202 E9/L (ref 130–450)
PMV BLD AUTO: 12.1 FL (ref 7–12)
POTASSIUM SERPL-SCNC: 3.3 MMOL/L (ref 3.5–5)
RBC # BLD: 2.4 E12/L (ref 3.5–5.5)
SODIUM BLD-SCNC: 141 MMOL/L (ref 132–146)
TOTAL PROTEIN: 6.1 G/DL (ref 6.4–8.3)
WBC # BLD: 10.9 E9/L (ref 4.5–11.5)

## 2021-04-12 PROCEDURE — 84100 ASSAY OF PHOSPHORUS: CPT

## 2021-04-12 PROCEDURE — 36415 COLL VENOUS BLD VENIPUNCTURE: CPT

## 2021-04-12 PROCEDURE — 6370000000 HC RX 637 (ALT 250 FOR IP): Performed by: INTERNAL MEDICINE

## 2021-04-12 PROCEDURE — 2580000003 HC RX 258: Performed by: INTERNAL MEDICINE

## 2021-04-12 PROCEDURE — 85025 COMPLETE CBC W/AUTO DIFF WBC: CPT

## 2021-04-12 PROCEDURE — 97530 THERAPEUTIC ACTIVITIES: CPT

## 2021-04-12 PROCEDURE — 6360000002 HC RX W HCPCS: Performed by: INTERNAL MEDICINE

## 2021-04-12 PROCEDURE — 2700000000 HC OXYGEN THERAPY PER DAY

## 2021-04-12 PROCEDURE — 6370000000 HC RX 637 (ALT 250 FOR IP): Performed by: STUDENT IN AN ORGANIZED HEALTH CARE EDUCATION/TRAINING PROGRAM

## 2021-04-12 PROCEDURE — 71045 X-RAY EXAM CHEST 1 VIEW: CPT

## 2021-04-12 PROCEDURE — 80053 COMPREHEN METABOLIC PANEL: CPT

## 2021-04-12 PROCEDURE — 6370000000 HC RX 637 (ALT 250 FOR IP): Performed by: NURSE PRACTITIONER

## 2021-04-12 PROCEDURE — 97535 SELF CARE MNGMENT TRAINING: CPT

## 2021-04-12 PROCEDURE — 83735 ASSAY OF MAGNESIUM: CPT

## 2021-04-12 PROCEDURE — P9047 ALBUMIN (HUMAN), 25%, 50ML: HCPCS | Performed by: INTERNAL MEDICINE

## 2021-04-12 PROCEDURE — 1200000000 HC SEMI PRIVATE

## 2021-04-12 RX ORDER — CHLOROTHIAZIDE SODIUM 500 MG/1
500 INJECTION INTRAVENOUS ONCE
Status: COMPLETED | OUTPATIENT
Start: 2021-04-12 | End: 2021-04-12

## 2021-04-12 RX ORDER — FUROSEMIDE 10 MG/ML
40 INJECTION INTRAMUSCULAR; INTRAVENOUS ONCE
Status: COMPLETED | OUTPATIENT
Start: 2021-04-12 | End: 2021-04-12

## 2021-04-12 RX ORDER — BUMETANIDE 0.25 MG/ML
2 INJECTION, SOLUTION INTRAMUSCULAR; INTRAVENOUS ONCE
Status: DISCONTINUED | OUTPATIENT
Start: 2021-04-12 | End: 2021-04-12 | Stop reason: ALTCHOICE

## 2021-04-12 RX ADMIN — HYDRALAZINE HYDROCHLORIDE 25 MG: 25 TABLET, FILM COATED ORAL at 21:36

## 2021-04-12 RX ADMIN — ALBUMIN (HUMAN) 50 G: 0.25 INJECTION, SOLUTION INTRAVENOUS at 03:05

## 2021-04-12 RX ADMIN — FUROSEMIDE 40 MG: 10 INJECTION, SOLUTION INTRAMUSCULAR; INTRAVENOUS at 21:37

## 2021-04-12 RX ADMIN — POTASSIUM BICARBONATE 20 MEQ: 782 TABLET, EFFERVESCENT ORAL at 11:18

## 2021-04-12 RX ADMIN — POTASSIUM BICARBONATE 20 MEQ: 782 TABLET, EFFERVESCENT ORAL at 21:36

## 2021-04-12 RX ADMIN — PANTOPRAZOLE SODIUM 40 MG: 40 TABLET, DELAYED RELEASE ORAL at 05:47

## 2021-04-12 RX ADMIN — HYDRALAZINE HYDROCHLORIDE 25 MG: 25 TABLET, FILM COATED ORAL at 05:47

## 2021-04-12 RX ADMIN — CARVEDILOL 6.25 MG: 6.25 TABLET, FILM COATED ORAL at 21:36

## 2021-04-12 RX ADMIN — SODIUM CHLORIDE: 9 INJECTION, SOLUTION INTRAVENOUS at 05:48

## 2021-04-12 RX ADMIN — DOCUSATE SODIUM 100 MG: 100 CAPSULE ORAL at 08:49

## 2021-04-12 RX ADMIN — HYDRALAZINE HYDROCHLORIDE 25 MG: 25 TABLET, FILM COATED ORAL at 14:12

## 2021-04-12 RX ADMIN — ISOSORBIDE MONONITRATE 30 MG: 30 TABLET, EXTENDED RELEASE ORAL at 08:49

## 2021-04-12 RX ADMIN — CARVEDILOL 6.25 MG: 6.25 TABLET, FILM COATED ORAL at 08:49

## 2021-04-12 RX ADMIN — ATORVASTATIN CALCIUM 40 MG: 40 TABLET, FILM COATED ORAL at 21:36

## 2021-04-12 RX ADMIN — SODIUM BICARBONATE 1300 MG: 650 TABLET ORAL at 08:49

## 2021-04-12 RX ADMIN — SODIUM BICARBONATE 1300 MG: 650 TABLET ORAL at 21:36

## 2021-04-12 RX ADMIN — CHLOROTHIAZIDE SODIUM 500 MG: 500 INJECTION, POWDER, LYOPHILIZED, FOR SOLUTION INTRAVENOUS at 23:12

## 2021-04-12 ASSESSMENT — PAIN SCALES - GENERAL: PAINLEVEL_OUTOF10: 0

## 2021-04-12 NOTE — PROGRESS NOTES
No intake/output data recorded. Intake/Output Summary (Last 24 hours) at 4/12/2021 0957  Last data filed at 4/12/2021 0510  Gross per 24 hour   Intake 20 ml   Output 750 ml   Net -730 ml   I/O last 3 completed shifts: In: 20 [P.O.:20]  Out: 750 [Urine:750]  Patient Vitals for the past 96 hrs (Last 3 readings):   Weight   04/11/21 0625 135 lb (61.2 kg)   04/11/21 0552 135 lb 9.6 oz (61.5 kg)   04/10/21 0704 130 lb 3.2 oz (59.1 kg)     Vital Signs:   Blood pressure 129/60, pulse 81, temperature 97.8 °F (36.6 °C), temperature source Oral, resp. rate 18, height 5' 5\" (1.651 m), weight 135 lb (61.2 kg), SpO2 92 %. General appearance:  Somewhat apathetic today. She does not appear overtly uncomfortable. Head:  Normocephalic. No masses, lesions or tenderness. Eyes:  PERRLA. EOMI. Sclera clear. Impaired vision. ENT:  Ears normal. Mucosa normal. Nasal cannula oxygen is in place. Neck:    Supple. Trachea midline. No thyromegaly. No JVD. No bruits. Heart:    S1 and S2, regular rate and rhythm, systolic murmur  Lungs:    Diminished on the right  Abdomen:   Soft mildly tender to palpation. Voluntary guarding is elicited. Bowel sounds are active. Extremities:    Complete resolution of her presenting edema. Neurologic:    Alert x 3. No focal deficit. Cranial nerves grossly intact. No focal weakness. Psych:   Behavior is normal. Mood appears normal. Speech is not rapid and/or pressured. Musculoskeletal:   Spine ROM normal. Muscular strength intact. Gait not assessed. Integumentary:  No rashes  Skin normal color and texture. Genitalia/Breast:  Voiding with the use of a Jane catheter.     Medication:  Scheduled Meds:   potassium bicarbonate  25 mEq Oral BID    sodium bicarbonate  1,300 mg Oral BID    docusate sodium  100 mg Oral Daily    [Held by provider] bumetanide  2 mg Oral Daily    pantoprazole  40 mg Oral QAM AC    [Held by provider] chlorothiazide  500 mg Intravenous BID    atorvastatin  40 mg

## 2021-04-12 NOTE — PLAN OF CARE
Problem: Falls - Risk of:  Goal: Will remain free from falls  Description: Will remain free from falls  4/12/2021 0111 by Vanessa Jesus RN  Outcome: Met This Shift     Problem: Falls - Risk of:  Goal: Absence of physical injury  Description: Absence of physical injury  4/12/2021 0111 by Vanessa Jesus RN  Outcome: Met This Shift     Problem: Skin Integrity:  Goal: Will show no infection signs and symptoms  Description: Will show no infection signs and symptoms  4/12/2021 0111 by Vanessa Jesus RN  Outcome: Met This Shift     Problem: Skin Integrity:  Goal: Absence of new skin breakdown  Description: Absence of new skin breakdown  4/12/2021 0111 by Vanessa Jesus RN  Outcome: Met This Shift

## 2021-04-12 NOTE — PROGRESS NOTES
OT BEDSIDE TREATMENT NOTE      Date:2021  Patient Name: Roxie Gaspar  MRN: [id number]  : 1940  Room: [address] [place] [address]     Referring Provider:        Getachew Pink DO         Evaluating OT: Daljit Nati OTR/L #[id number]     AM-PAC Daily Activity Raw Score: 1624     Recommended Placement: Subacute rehab  Recommended Adaptive Equipment: TBD      Diagnosis:   1. Bowel perforation ([facility]    2. Pancreatic mass    3. Anemia, unspecified type    4. Rectal bleeding    5. Chronic kidney disease, unspecified CKD stage          Surgery: N/A      Pertinent Medical History:    Past Medical History        Past Medical History:   Diagnosis Date    Arthritis      Atrial fibrillation (HCC)      Blood circulation, collateral      CHF (congestive heart failure) (HCC)      Chronic renal insufficiency, stage IV (severe) ([facility] 2016    History of cardiovascular stress test 2015     Lexiscan stress test    History of echocardiogram 2015     EF 29%    Hyperlipidemia      Hypertension           Precautions:  Falls, alarm, O2     Home Living: Pt lives alone in a 1 story home with 0 LINDA with 0 rail(s).   Bathroom setup: tub/shower with grab bars   Equipment owned: [employer], cane     Prior Level of Function: Independent with ADLs , Independent  with IADLs; ambulated with cane  Driving: No  Occupation: Not reported     Pain Level: -/10  Cognition: A&O: 4/4; Follows 1-2 step directions              Memory:  Fair+              Sequencing:  fair              Problem solving:  fair              Judgement/safety:  fair                Functional Assessment:    Initial Eval Status  Date: 21 Treatment Status  Date: 21 STGs = LTGs  Time frame: 5-7 days   Feeding Independent     n/t      Grooming Minimal Assist    MIN A   Independent    UB Dressing Minimal Assist     MOD A pt requiring assist to thread hospital gown over B UE shoulders, tie/adjust in back pt requiring increased assist due to fatigue   Independent    LB Dressing Maximal Assist     MAX A to raghu/doff socks bed level  Minimal Assist    Bathing Maximal Assist    n/t Minimal Assist    Toileting Maximal Assist   Incontinent of bowel, assist of 2 to stand for hygiene and clothing management. Pt using bedpan while up in chair, assist with hygiene and clothing management. Cuing on body mechanics, safety, sequencing and PLB. Dep pt incontinent of bowel requiring assist with hygiene and clothing management  Minimal Assist    Bed Mobility  Supine to sit: Moderate Assist   Sit to supine: NT  Pt up with PT   Assist with BLE due to swelling and weakness, assist with scooting out to EOB. Rolling MAX A side/side v/c's for hand placement     Supine>sit n/t pt declined out of bed activity due to fatigue    Supine to sit: Minimal Assist   Sit to supine: Minimal Assist    Functional Transfers Moderate Assist of 2   Sit<>stand  Bed, chair  Cuing on hand placement, body mechanics, balance, sequencing, and safety  N/t MOD A x2 per last report   Stand by Assist    Functional Mobility Minimal Assist of 2  Using [employer]  Several steps at bedside  B knees buckling, L more than R. Cuing on being pelvis forward, balance, posture and body mechanics,  N/t MOD A x2 per last report  Stand by Assist    Balance Sitting:     Static:  fair    Dynamic:fair  Standing: fair- Sitting:   N/t pt declined out of bed activity   Sitting:     Static:  Fair+    Dynamic:fair+  Standing: fair+   Activity Tolerance Fair-  Pt very weak, SOB throughout session. SpO2 94-97% on 4L Poor  Fair+   Visual/  Perceptual Glasses: Not reported   [facility]             Comments: Upon arrival pt supine in bed agreeable to therapy session at bed level. Pt educated with regards to toileting/hygiene, UE/LE dressing. At end of session pt supine in bed,  all lines and tubes intact, call light within reach. · Pt has made fair  progress towards set goals.    · Continue with current plan of care      Treatment Time In:1330 Treatment Time Out: 6920                Treatment Charges: Mins Units   Ther Ex  86722     Manual Therapy 47678     Thera Activities 21799  10 1   ADL/Home Mgt 98814 15 1   Neuro Re-ed 37451     Group Therapy      Orthotic manage/training  22179     Non-Billable Time      Total Timed Treatment 25  [address] [facility] [address]

## 2021-04-12 NOTE — PROGRESS NOTES
OT BEDSIDE TREATMENT NOTE      Date:2021  Patient Name: Robyn Paredes  MRN: [id number]  : 1940  Room: [address] [place] [address]     Referring Provider:        Jennifer Bates DO         Evaluating OT: Karen Marie OTR/L #[id number]          Attempted occupational therapy this date, however pt declined due to c/o fatigue, will continue to assess at later date/time. Continue current POC.          Anita Huerta SOUSA/L [id number]

## 2021-04-12 NOTE — PROGRESS NOTES
Called and updated Dr. China Mckoy that patient's oxygen needs were increasing and patient was placed on a non-rebreather. Stat portable chest x-ray ordered and radiology was notified.   Electronically signed by Shai Day RN on 4/12/2021 at 6:21 PM

## 2021-04-12 NOTE — PROGRESS NOTES
Physical Therapy Treatment Note    Room #:  0430/0430-01  Patient Name: Shraddha Lynn  YOB: 1940  MRN: [id number]    Referring Provider:   Niya Duffy DO      Date of Service: 4/12/2021    Evaluating Physical Therapist: Yumi Pearson, PT #[id number]      Diagnosis:   GI bleed [K92.2]       Patient Active Problem List   Diagnosis    Shortness of breath    Chronic combined systolic and diastolic congestive heart failure ([address]    Chronic renal insufficiency, stage IV (severe) (HCC)    Atrial fibrillation (HCC)    Hypertension    Abnormal chest x-ray    Anemia of chronic disease    Opacity of lung on imaging study    Cigarette smoker    Tobacco abuse counseling    Acute on chronic combined systolic and diastolic CHF (congestive heart failure) ([address]    Acute on chronic congestive heart failure (HCC)    Acute systolic CHF (congestive heart failure) ([address]    GI bleed    Preoperative cardiovascular examination    Bowel perforation ([address]       Tentative placement recommendation: Subacute rehab  Equipment recommendation:  To be determined      Prior Level of Function: Patient ambulated independently   Rehab Potential: good  for baseline    Past medical history:   Past Medical History:   Diagnosis Date    Arthritis     Atrial fibrillation ([address]     Blood circulation, collateral     CHF (congestive heart failure) ([address]     Chronic renal insufficiency, stage IV (severe) ([address] 11/19/2016    History of cardiovascular stress test 07/2015    Lexiscan stress test    History of echocardiogram 07/27/2015    EF 29%    Hyperlipidemia     Hypertension      Past Surgical History:   Procedure Laterality Date    COLONOSCOPY N/A 4/3/2021    COLONOSCOPY POLYPECTOMY HOT SNARE performed by Kori Smyth DO at [employer]  4/3/2021    COLONOSCOPY WITH BIOPSY performed by Kori Smyth DO at [employer]  4/3/2021    COLONOSCOPY CONTROL HEMORRHAGE performed by Elliot De La O Shashank Phillips at [address]  4/3/2021    COLONOSCOPY SUBMUCOSAL SPOT INJECTION performed by Therese Sunshine DO at [facility] GASTROINTESTINAL ENDOSCOPY N/A 4/3/2021    EGD BIOPSY performed by Therese Sunshine DO at [facility] N/A 4/7/2021    EGD W/EUS FNA performed by Lewis Jimenez MD at [facility] ENDOSCOPY       Precautions: Activity as tolerated, falls and alarm ,  4L of O2 nc (wears 4L of O2 at  home as well)    SUBJECTIVE:    Social history: Patient lives alone  in a ranch home  with No steps  to enter Tub shower grab bars    Equipment owned: [address]  [employer],      [facility]   How much difficulty turning over in bed?: A Little  How much difficulty sitting down on / standing up from a chair with arms?: A Little  How much difficulty moving from lying on back to sitting on side of bed?: A Little  How much help from another person moving to and from a bed to a chair?: A Little  How much help from another person needed to walk in hospital room?: A Little  How much help from another person for climbing 3-5 steps with a railing?: A Lot  AM-PAC Inpatient Mobility Raw Score : 17  AM-PAC Inpatient T-Scale Score : 42.13  Mobility Inpatient CMS 0-100% Score: 50.57  Mobility Inpatient CMS G-Code Modifier : CK    Nursing cleared patient for PT treatment. OBJECTIVE;   Initial Evaluation  Date: 4/3/2021 Treatment Date:  4/12/2021     Short Term/ Long Term   Goals   Was pt agreeable to Eval/treatment?  Yes  Yes To be met in 4 days   Pain level   10/10  Stomach  No number given    Bed Mobility    Rolling: Supervision    Supine to sit: Minimal assist of 1   Sit to supine: Minimal assist of 1   Scooting: Minimal assist of 1  Rolling: Minimal assist of 1   Supine to sit: Not assessed    Sit to supine: Not assessed    Scooting: Not assessed     Rolling: Independent   Supine to sit: Independent   Sit to supine: Independent   Scooting: Independent    Transfers Sit to stand: Not assessed  patient refusing to stand today to due pain and fatigue. Sit to stand: Not assessed       Sit to stand: Independent    Ambulation    not assessed  not assessed     50 feet using  wheeled walker with Modified Independent    ROM Within functional limits       Strength BUE:  4/5  RLE:  4/5  LLE:  4/5  Increase strength in affected mm groups by 1/3 grade   Balance Sitting EOB:  fair +  Dynamic Standing:  not assessed  Sitting EOB: not assessed   Dynamic Standing: not assessed  Sitting EOB:  good   Dynamic Standing: good      Patient is Alert & Oriented x person, place, time and situation and follows directions   Sensation:  Patient  denies numbness and tingling     Edema:  no   Endurance: fair  -    Vitals: 4 liters nasal cannula   Blood Pressure at rest  Blood Pressure during session    Heart Rate at rest  Heart Rate during session    SPO2 at rest%  SPO2 during session %     Patient education  Patient educated on role of Physical Therapy, risks of immobility, safety and plan of care, energy conservation,  importance of mobility while in hospital , ankle pumps, quad set and glut set for edema control, blood clot prevention and safety     Patient response to education:   Pt verbalized understanding Pt demonstrated skill Pt requires further education in this area   Yes Partial Yes      Treatment:  Patient practiced and was instructed/facilitated in the following treatment: Patient completed rolling in bed for hygiene today. Patient limited in all other activity d/t fatigue. Therapeutic Exercises:  not performed          At end of session, patient in bed with alarm call light and phone within reach,  all lines and tubes intact, nursing present. Patient would benefit from continued skilled Physical Therapy to improve functional independence and quality of life.      Patient's/ family goals   none stated ASSESSMENT: Patient exhibits decreased strength, balance, endurance and pain   impairing functional mobility, transfers, gait distance and tolerance to activity. Pt limited d/t fatigue, however requires less assist with rolling. Plan of Care:     -Sitting Balance: Incorporate reaching activities to activate trunk muscles   -Standing Balance: Perform strengthening exercises in standing to promote motor control with or without upper extremity support   -Transfers: Provide instruction on proper hand and foot position for adequate transfer of weight onto lower extremities and use of gait device  -Gait: Gait training and Standing activities to improve: base of support, weight shift, weight bearing      Patient and or family understand(s) diagnosis, prognosis, and plan of care. Frequency of treatments: Patient will be seen  daily.        Time in  242  Time out  257    Total Treatment Time  15 minutes    CPT codes:  Therapeutic activities (76743)   15 minutes  1 unit(s)    Gonzalez Irvin, PTA [id number]

## 2021-04-12 NOTE — PROGRESS NOTES
Physician Progress Note      PATIENTIrena Chavez  [facility] #:                  [id number]  :                       1940  ADMIT DATE:       3/31/2021 10:32 AM  DISCH DATE:  RESPONDING  PROVIDER #:        Xenia Harry DO          QUERY TEXT:    Dear Attending Provider,    Patient admitted with pancreatic pseudocyst. Documentation reflects Sepsis   secondary to colitis in H&P. If possible, please document in the progress   notes and discharge summary if Sepsis was: The medical record reflects the following:  Risk Factors: suspicion for colitis  Clinical Indicators: 3/31 H&P:Sepsis secondary to colitis ; ED Note:Today labs   reveal a leukocytosis with left shift. 3/31: 140/87, 97.4, 84, 24, wbc:13,   :16.1  Treatment: Meropenem iv, Diflucan, ID consult, and assessments. Thank you,  Ronny Moe RN, BSN, [facility]  [phone]  Options provided:  -- Sepsis confirmed after study  -- Sepsis treated and resolved  -- Sepsis ruled out after study  -- Other - I will add my own diagnosis  -- Disagree - Not applicable / Not valid  -- Disagree - Clinically unable to determine / Unknown  -- Refer to Clinical Documentation Reviewer    PROVIDER RESPONSE TEXT:    Sepsis confirmed after study. Query created by:  Juliana Bedolla on 2021 2:18 PM      Electronically signed by:  Xenia Harry DO 2021 6:20 AM

## 2021-04-12 NOTE — PROGRESS NOTES
PROGRESS NOTE  By Fernanda Harrison M.D. [facility]  Dr. Vaibhav Urbano M.D.,  Dr. Timo Khanna M.D.,   Dr. Ruiz Baeza D.O.,  Dr. Debi Meyer M.D.,  Dr. Goran Lira D.O.,  Josh Berumen D.O. Mira Hernández  68 y.o.  female    SUBJECTIVE:  Patient denies abdominal pain. Reports feeling better. Daughter at bedside    OBJECTIVE:    /60   Pulse 81   Temp 97.8 °F (36.6 °C) (Oral)   Resp 18   Ht 5' 5\" (1.651 m)   Wt 135 lb (61.2 kg)   SpO2 92%   BMI 22.47 kg/m²     General: NAD/-American female  HEENT: Anicteric sclera/moist oral mucosa  Neck: Supple with trachea midline  Chest: Symmetric excursion/now with respirations  Cor: Irregular  Abd.: Soft/nontender  Extr.:  Decreased muscle tone and bulk throughout  Skin: Warm and dry      DATA:    Monitor data reviewed -atrial fibrillation noted. Stool (measured) : 0 mL  Lab Results   Component Value Date    WBC 10.9 04/12/2021    RBC 2.40 04/12/2021    HGB 7.4 04/12/2021    HCT 23.8 04/12/2021    MCV 99.2 04/12/2021    MCH 30.8 04/12/2021    MCHC 31.1 04/12/2021    RDW 17.4 04/12/2021     04/12/2021    MPV 12.1 04/12/2021     Lab Results   Component Value Date     04/12/2021    K 3.3 04/12/2021    K 3.9 03/31/2021    CL 92 04/12/2021    CO2 35 04/12/2021    BUN 55 04/12/2021    CREATININE 3.6 04/12/2021    CALCIUM 8.3 04/12/2021    PROT 6.1 04/12/2021    LABALBU 4.0 04/12/2021    BILITOT 1.1 04/12/2021    ALKPHOS 234 04/12/2021    AST 25 04/12/2021    ALT 5 04/12/2021     Lab Results   Component Value Date    LIPASE 40 03/31/2021     No results found for: AMYLASE      ASSESSMENT/PLAN:    1.  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 2. LUQ abdominal lesion, Pancreatic pseudocyst  - likely related to pancreatic cyst, currently resolving   - continue supportive care  -EUS by general surgery as above     3. HFrEF, pAFib, volume overload, REYES/CKD  - appreciate cardiology/nephrology management and recommendations      4. Abdominal pain, acute - Resolved per patient, continue to monitor    Brant Streeter MD  4/12/2021  1:58 PM    NOTE:  This report was transcribed using voice recognition software. Every effort was made to ensure accuracy; however, inadvertent computerized transcription errors may be present.

## 2021-04-12 NOTE — CARE COORDINATION
4/12/2021 1120 CM note: NEGATIVE COVID 4/7/21. Per [employer] of [facility] 4/13. HENS completed. LUBA will need signed by Physician. N-N [id number], Fax [phone]. J[facility]reet[facility] [address], notified of possible dc for 4/13/21.  Charlene THEODORE

## 2021-04-13 LAB
ALBUMIN SERPL-MCNC: 3.4 G/DL (ref 3.5–5.2)
ALP BLD-CCNC: 201 U/L (ref 35–104)
ALT SERPL-CCNC: <5 U/L (ref 0–32)
ANION GAP SERPL CALCULATED.3IONS-SCNC: 13 MMOL/L (ref 7–16)
AST SERPL-CCNC: 21 U/L (ref 0–31)
BASOPHILS ABSOLUTE: 0.07 E9/L (ref 0–0.2)
BASOPHILS RELATIVE PERCENT: 0.7 % (ref 0–2)
BILIRUB SERPL-MCNC: 0.9 MG/DL (ref 0–1.2)
BUN BLDV-MCNC: 54 MG/DL (ref 8–23)
CALCIUM SERPL-MCNC: 8.7 MG/DL (ref 8.6–10.2)
CHLORIDE BLD-SCNC: 92 MMOL/L (ref 98–107)
CO2: 35 MMOL/L (ref 22–29)
CREAT SERPL-MCNC: 3.5 MG/DL (ref 0.5–1)
EOSINOPHILS ABSOLUTE: 0.22 E9/L (ref 0.05–0.5)
EOSINOPHILS RELATIVE PERCENT: 2.2 % (ref 0–6)
GFR AFRICAN AMERICAN: 15
GFR NON-AFRICAN AMERICAN: 15 ML/MIN/1.73
GLUCOSE BLD-MCNC: 92 MG/DL (ref 74–99)
HCT VFR BLD CALC: 24.3 % (ref 34–48)
HEMOGLOBIN: 7.8 G/DL (ref 11.5–15.5)
IMMATURE GRANULOCYTES #: 0.05 E9/L
IMMATURE GRANULOCYTES %: 0.5 % (ref 0–5)
LYMPHOCYTES ABSOLUTE: 1.87 E9/L (ref 1.5–4)
LYMPHOCYTES RELATIVE PERCENT: 18.7 % (ref 20–42)
MCH RBC QN AUTO: 31.1 PG (ref 26–35)
MCHC RBC AUTO-ENTMCNC: 32.1 % (ref 32–34.5)
MCV RBC AUTO: 96.8 FL (ref 80–99.9)
MONOCYTES ABSOLUTE: 1.16 E9/L (ref 0.1–0.95)
MONOCYTES RELATIVE PERCENT: 11.6 % (ref 2–12)
NEUTROPHILS ABSOLUTE: 6.62 E9/L (ref 1.8–7.3)
NEUTROPHILS RELATIVE PERCENT: 66.3 % (ref 43–80)
PDW BLD-RTO: 17.6 FL (ref 11.5–15)
PLATELET # BLD: 238 E9/L (ref 130–450)
PMV BLD AUTO: 12.4 FL (ref 7–12)
POTASSIUM SERPL-SCNC: 3.6 MMOL/L (ref 3.5–5)
PROCALCITONIN: 0.66 NG/ML (ref 0–0.08)
RBC # BLD: 2.51 E12/L (ref 3.5–5.5)
SODIUM BLD-SCNC: 140 MMOL/L (ref 132–146)
TOTAL PROTEIN: 5.6 G/DL (ref 6.4–8.3)
WBC # BLD: 10 E9/L (ref 4.5–11.5)

## 2021-04-13 PROCEDURE — 2500000003 HC RX 250 WO HCPCS: Performed by: INTERNAL MEDICINE

## 2021-04-13 PROCEDURE — 94664 DEMO&/EVAL PT USE INHALER: CPT

## 2021-04-13 PROCEDURE — 1200000000 HC SEMI PRIVATE

## 2021-04-13 PROCEDURE — 6360000002 HC RX W HCPCS: Performed by: INTERNAL MEDICINE

## 2021-04-13 PROCEDURE — 84145 PROCALCITONIN (PCT): CPT

## 2021-04-13 PROCEDURE — 36415 COLL VENOUS BLD VENIPUNCTURE: CPT

## 2021-04-13 PROCEDURE — 6360000002 HC RX W HCPCS: Performed by: NURSE PRACTITIONER

## 2021-04-13 PROCEDURE — 85025 COMPLETE CBC W/AUTO DIFF WBC: CPT

## 2021-04-13 PROCEDURE — 80053 COMPREHEN METABOLIC PANEL: CPT

## 2021-04-13 PROCEDURE — P9047 ALBUMIN (HUMAN), 25%, 50ML: HCPCS | Performed by: NURSE PRACTITIONER

## 2021-04-13 PROCEDURE — 6370000000 HC RX 637 (ALT 250 FOR IP): Performed by: STUDENT IN AN ORGANIZED HEALTH CARE EDUCATION/TRAINING PROGRAM

## 2021-04-13 PROCEDURE — 6370000000 HC RX 637 (ALT 250 FOR IP): Performed by: NURSE PRACTITIONER

## 2021-04-13 PROCEDURE — 2700000000 HC OXYGEN THERAPY PER DAY

## 2021-04-13 PROCEDURE — 6370000000 HC RX 637 (ALT 250 FOR IP): Performed by: INTERNAL MEDICINE

## 2021-04-13 PROCEDURE — 97530 THERAPEUTIC ACTIVITIES: CPT

## 2021-04-13 RX ORDER — FUROSEMIDE 10 MG/ML
40 INJECTION INTRAMUSCULAR; INTRAVENOUS 2 TIMES DAILY
Status: DISCONTINUED | OUTPATIENT
Start: 2021-04-13 | End: 2021-04-13 | Stop reason: ALTCHOICE

## 2021-04-13 RX ORDER — BUMETANIDE 0.25 MG/ML
1 INJECTION, SOLUTION INTRAMUSCULAR; INTRAVENOUS 2 TIMES DAILY
Status: DISCONTINUED | OUTPATIENT
Start: 2021-04-13 | End: 2021-04-14

## 2021-04-13 RX ORDER — ALBUMIN (HUMAN) 12.5 G/50ML
50 SOLUTION INTRAVENOUS 2 TIMES DAILY
Status: COMPLETED | OUTPATIENT
Start: 2021-04-13 | End: 2021-04-13

## 2021-04-13 RX ADMIN — DOCUSATE SODIUM 100 MG: 100 CAPSULE ORAL at 10:02

## 2021-04-13 RX ADMIN — CARVEDILOL 6.25 MG: 6.25 TABLET, FILM COATED ORAL at 22:01

## 2021-04-13 RX ADMIN — ALBUMIN (HUMAN) 50 G: 0.25 INJECTION, SOLUTION INTRAVENOUS at 22:00

## 2021-04-13 RX ADMIN — MORPHINE SULFATE 2 MG: 2 INJECTION, SOLUTION INTRAMUSCULAR; INTRAVENOUS at 12:41

## 2021-04-13 RX ADMIN — HYDRALAZINE HYDROCHLORIDE 25 MG: 25 TABLET, FILM COATED ORAL at 05:57

## 2021-04-13 RX ADMIN — ONDANSETRON 4 MG: 2 INJECTION INTRAMUSCULAR; INTRAVENOUS at 12:42

## 2021-04-13 RX ADMIN — HYDRALAZINE HYDROCHLORIDE 25 MG: 25 TABLET, FILM COATED ORAL at 22:01

## 2021-04-13 RX ADMIN — ATORVASTATIN CALCIUM 40 MG: 40 TABLET, FILM COATED ORAL at 22:01

## 2021-04-13 RX ADMIN — MORPHINE SULFATE 2 MG: 2 INJECTION, SOLUTION INTRAMUSCULAR; INTRAVENOUS at 23:45

## 2021-04-13 RX ADMIN — PANTOPRAZOLE SODIUM 40 MG: 40 TABLET, DELAYED RELEASE ORAL at 05:57

## 2021-04-13 RX ADMIN — POTASSIUM BICARBONATE 20 MEQ: 782 TABLET, EFFERVESCENT ORAL at 10:01

## 2021-04-13 RX ADMIN — CARVEDILOL 6.25 MG: 6.25 TABLET, FILM COATED ORAL at 10:02

## 2021-04-13 RX ADMIN — ISOSORBIDE MONONITRATE 30 MG: 30 TABLET, EXTENDED RELEASE ORAL at 10:02

## 2021-04-13 RX ADMIN — ALBUTEROL SULFATE 2.5 MG: 2.5 SOLUTION RESPIRATORY (INHALATION) at 10:33

## 2021-04-13 RX ADMIN — BUMETANIDE 1 MG: 0.25 INJECTION INTRAMUSCULAR; INTRAVENOUS at 22:01

## 2021-04-13 RX ADMIN — POTASSIUM BICARBONATE 20 MEQ: 782 TABLET, EFFERVESCENT ORAL at 22:01

## 2021-04-13 RX ADMIN — BUMETANIDE 1 MG: 0.25 INJECTION INTRAMUSCULAR; INTRAVENOUS at 12:01

## 2021-04-13 RX ADMIN — ALBUMIN (HUMAN) 50 G: 0.25 INJECTION, SOLUTION INTRAVENOUS at 10:32

## 2021-04-13 RX ADMIN — HYDRALAZINE HYDROCHLORIDE 25 MG: 25 TABLET, FILM COATED ORAL at 15:50

## 2021-04-13 ASSESSMENT — PAIN SCALES - GENERAL: PAINLEVEL_OUTOF10: 0

## 2021-04-13 NOTE — PROGRESS NOTES
Food and/or Nutrient Delivery:  Continue Current Diet, Modify Oral Nutrition Supplement  Nutrition Education/Counseling:  Education not indicated   Coordination of Nutrition Care:  Continue to monitor while inpatient    Goals:  Pt to consume >50-75% most meals/ONS       Nutrition Monitoring and Evaluation:   Behavioral-Environmental Outcomes:  None Identified   Food/Nutrient Intake Outcomes:  Food and Nutrient Intake, Supplement Intake  Physical Signs/Symptoms Outcomes:  Biochemical Data, GI Status, Fluid Status or Edema, Hemodynamic Status, Nutrition Focused Physical Findings, Skin, Weight     Discharge Planning:     Too soon to determine     Electronically signed by Coy Duran RD, LD on 4/13/21 at 1:01 PM EDT    Contact: [phone]

## 2021-04-13 NOTE — PROGRESS NOTES
PROGRESS NOTE  By Abdelrahman Hazel M.D. [facility]  Dr. Ishan Jackson M.D.,  Dr. Bhavani Stern M.D.,   Dr. Brandin Eng D.O.,  Dr. Ryan Huston M.D.,  Dr. Joann Cramer D.O.,  Kavya Cho D.O. Mer Flores  68 y.o.  female    SUBJECTIVE:  Denies abdominal pain. Denies nausea vomiting. No family at bedside    OBJECTIVE:    /78   Pulse 82   Temp 97.6 °F (36.4 °C) (Oral)   Resp 18   Ht 5' 5\" (1.651 m)   Wt 135 lb (61.2 kg)   SpO2 90%   BMI 22.47 kg/m²     General: NAD/-American female  HEENT: Anicteric sclera/moist oral mucosa  Neck: Supple with trachea midline  Chest: CTA B  Cor: Regular/S1-S2  Abd.: Soft/NT/ND  Extr.: Decreased muscle tone and bulk throughout  Skin: Warm and dry/anicteric      DATA:    Monitor data reviewed -atrial fibrillation noted.     Stool (measured) : 0 mL  Lab Results   Component Value Date    WBC 10.0 04/13/2021    RBC 2.51 04/13/2021    HGB 7.8 04/13/2021    HCT 24.3 04/13/2021    MCV 96.8 04/13/2021    MCH 31.1 04/13/2021    MCHC 32.1 04/13/2021    RDW 17.6 04/13/2021     04/13/2021    MPV 12.4 04/13/2021     Lab Results   Component Value Date     04/13/2021    K 3.6 04/13/2021    K 3.9 03/31/2021    CL 92 04/13/2021    CO2 35 04/13/2021    BUN 54 04/13/2021    CREATININE 3.5 04/13/2021    CALCIUM 8.7 04/13/2021    PROT 5.6 04/13/2021    LABALBU 3.4 04/13/2021    BILITOT 0.9 04/13/2021    ALKPHOS 201 04/13/2021    AST 21 04/13/2021    ALT <5 04/13/2021     Lab Results   Component Value Date    LIPASE 40 03/31/2021     No results found for: AMYLASE      ASSESSMENT/PLAN:    Camryn Goldstein MD   Physician   Gastroenterology   Progress Notes   Signed   Date of Service:  4/12/2021  1:58 PM               Signed             Show:Clear all  [x]Manual[x]Template[x]Copied    Added by:  [x]Leno Crowder MD    []Tory for details  PROGRESS NOTE  By Abdelrahman Hazel M.D.     [facility]  Dr. Dung Caba Desi Villagomez M.D.,  Dr. Dottie Sethi M.D.,   Dr. Cristian Schaffer D.O.,  Dr. Leeanna Bass M.D.,  Dr. Rudy Brumfield D.O.,  NOELLE Fontenot Marce Salvatore        Shoshana Person  68 y.o.  female     SUBJECTIVE:  Patient denies abdominal pain. Reports feeling better. Daughter at bedside     OBJECTIVE:     /60   Pulse 81   Temp 97.8 °F (36.6 °C) (Oral)   Resp 18   Ht 5' 5\" (1.651 m)   Wt 135 lb (61.2 kg)   SpO2 92%   BMI 22.47 kg/m²      General: NAD/-American female  HEENT: Anicteric sclera/moist oral mucosa  Neck: Supple with trachea midline  Chest: Symmetric excursion/now with respirations  Cor: Irregular  Abd.: Soft/nontender  Extr.:  Decreased muscle tone and bulk throughout  Skin: Warm and dry        DATA:     Monitor data reviewed -atrial fibrillation noted.     Stool (measured) : 0 mL        Lab Results   Component Value Date     WBC 10.9 04/12/2021     RBC 2.40 04/12/2021     HGB 7.4 04/12/2021     HCT 23.8 04/12/2021     MCV 99.2 04/12/2021     MCH 30.8 04/12/2021     MCHC 31.1 04/12/2021     RDW 17.4 04/12/2021      04/12/2021     MPV 12.1 04/12/2021            Lab Results   Component Value Date      04/12/2021     K 3.3 04/12/2021     K 3.9 03/31/2021     CL 92 04/12/2021     CO2 35 04/12/2021     BUN 55 04/12/2021     CREATININE 3.6 04/12/2021     CALCIUM 8.3 04/12/2021     PROT 6.1 04/12/2021     LABALBU 4.0 04/12/2021     BILITOT 1.1 04/12/2021     ALKPHOS 234 04/12/2021     AST 25 04/12/2021     ALT 5 04/12/2021            Lab Results   Component Value Date     LIPASE 40 03/31/2021      No results found for: AMYLASE        ASSESSMENT/PLAN:     1.  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     2. LUQ abdominal lesion, Pancreatic pseudocyst  - likely related to pancreatic cyst, currently resolving   - continue supportive care  -EUS by general surgery as above     3. HFrEF, pAFib, volume overload, REYES/CKD  - appreciate cardiology/nephrology management and recommendations      4. Abdominal pain, acute - Resolved per patient, continue to monitor                     Elly Ohara MD  4/13/2021  10:45 AM    NOTE:  This report was transcribed using voice recognition software. Every effort was made to ensure accuracy; however, inadvertent computerized transcription errors may be present.

## 2021-04-13 NOTE — PROGRESS NOTES
[facility] MD Juliet Vaughn MD Regan Comber, MD Glenwood Sherry, MD   Progress Note    4/12/2021    SUBJECTIVE:     Pt known to Dr. aMdalyn Wall from office, and followed at [facility] 2 weeks prior to this admission    4/3: Seen this am in her room , o2/nc at 4 L which is her home o2 . She did undergo EGD/colonscopy this am . Results noted   Continue to look volume overloaded with 2 + edema and JVD . 4/4 ; stable vitlas , good UO on bumex drip/diuril . Will assess in am if we can wean her off drip     4/5: Patient was doing well this morning, she is awake and alert with no acute distress. We will continue same medications and same plan of care for today. 4/6 \" Seen this am , pleasant , o2/nc . Good UO . Cr stable . Plan for EUS in am     4/7 : good UO with negative balance . In good spirit . For EUS today . 4/8 : in bed , lying flat in NAD . Edema much improved     4/9 : report of pt being more sob . she is for cxr today . EUS with bile duct stone     4/10: Recurrent left lower quadrant pain, ongoing anorexia with poor intake of food and fluid, intermittent mild nausea though no emesis. No dyspnea. No peripheral swelling. Mildly hypotensive. 4/11: Fatigue, generalized weakness, ongoing anorexia and poor intake. Some lower abdominal discomfort though no dez pain. Indigestion and mild nausea intermittently. No swelling. No dyspnea on nasal cannula. No cough or wheeze. Started normal saline at conservative rate yesterday, increase the rate this morning    4/12 : weak . bp stable   High o2 requirement this evening   cxr with HF       PROBLEM LIST:    Principal Problem:    GI bleed  Active Problems:    Preoperative cardiovascular examination    Bowel perforation (HCC)  Resolved Problems:    * No resolved hospital problems. *       DIET:    DIET LOW FAT; Low Sodium (2 GM)       Allergies : Patient has no known allergies.     Review of Systems:   Constitutional:weak   Eyes: no eye pain , no itching , no drainage  Ears, nose, mouth, throat, and face: no ear ,nose pain , hearing is ok ,no nasal drainage   Respiratory: no sob ,no cough ,no wheezing . Cardiovascular: no chest pain , no palpitation ,no sob . Gastrointestinal: no nausea, vomiting , constipation , no abdominal pain . Genitourinary:no urinary retention , no burning , dysuria . No polyuria   Hematologic/lymphatic: no bleeding , no cougulation issues . Musculoskeletal:no joint pain , no swelling . Neurological: no headaches ,no weakness , no numbness . Endocrine: no thirst , no weight issues .      MEDS (scheduled):    potassium bicarb-citric acid  20 mEq Oral BID    bumetanide  2 mg Intravenous Once    chlorothiazide  500 mg Intravenous Once    sodium bicarbonate  1,300 mg Oral BID    docusate sodium  100 mg Oral Daily    pantoprazole  40 mg Oral QAM AC    atorvastatin  40 mg Oral Nightly    carvedilol  6.25 mg Oral BID    hydrALAZINE  25 mg Oral 3 times per day    isosorbide mononitrate  30 mg Oral Daily    vitamin D  50,000 Units Oral Weekly       MEDS (infusions):   sodium chloride      sodium chloride         MEDS (prn):  perflutren lipid microspheres, acetaminophen, sodium chloride, sodium chloride, albuterol, ondansetron, promethazine, morphine    PHYSICAL EXAM:     Patient Vitals for the past 24 hrs:   BP Temp Temp src Pulse Resp SpO2   04/12/21 2130 137/70 98.2 °F (36.8 °C) Oral 83 18 95 %   04/12/21 1748      97 %   04/12/21 1745      (!) 78 %   04/12/21 1642      91 %   04/12/21 1630 133/73 98.8 °F (37.1 °C) Oral 73 16 (!) 87 %   04/12/21 1412 (!) 143/63        04/12/21 0820 129/60 97.8 °F (36.6 °C) Oral 81 18 92 %   04/12/21 0547 124/83        @      Intake/Output Summary (Last 24 hours) at 4/12/2021 2231  Last data filed at 4/12/2021 2141  Gross per 24 hour   Intake 60 ml   Output 550 ml   Net -490 ml         Wt Readings from encounter progressive azotemia with diuresis then we might need to consider RRT .              Electronically signed by Joe Holt MD on 4/12/2021

## 2021-04-13 NOTE — PROGRESS NOTES
[facility]  [facility]  Progress Note    NAME: Dee Atkinson  MR:  [id number]  :   1940  DATE OF SERVICE:21    This is a face to face encounter with Dee Atkinson 68 y.o. female on 21    CHIEF COMPLAINT     ID following for   Chief Complaint   Patient presents with    Abdominal Pain     to er via ems from home for evaluation of LLQ pain and rectal bleeding that started today.  Rectal Bleeding     HISTORYOF PRESENT ILLNESS   Pt is on nrb  Did not want to be bothered   Denies pain  Patient is tolerating medications. No reported adverse drug reactions. REVIEW OF SYSTEMS     As stated above in the chief complaint, otherwise negative. CURRENT MEDICATIONS     Scheduled Meds:   albumin human  50 g Intravenous BID    bumetanide  1 mg Intravenous BID    potassium bicarb-citric acid  20 mEq Oral BID    docusate sodium  100 mg Oral Daily    pantoprazole  40 mg Oral QAM AC    atorvastatin  40 mg Oral Nightly    carvedilol  6.25 mg Oral BID    hydrALAZINE  25 mg Oral 3 times per day    isosorbide mononitrate  30 mg Oral Daily    vitamin D  50,000 Units Oral Weekly     Continuous Infusions:   sodium chloride      sodium chloride       PRN Meds:perflutren lipid microspheres, acetaminophen, sodium chloride, sodium chloride, albuterol, ondansetron, promethazine, morphine    PHYSICAL EXAM     /78   Pulse 82   Temp 97.6 °F (36.4 °C) (Oral)   Resp 18   Ht 5' 5\" (1.651 m)   Wt 135 lb (61.2 kg)   SpO2 90%   BMI 22.47 kg/m²   Temp  Av.2 °F (36.8 °C)  Min: 97.6 °F (36.4 °C)  Max: 98.8 °F (37.1 °C)  Constitutional:  The patient is awake, alert, and oriented. Skin:    Warm and dry. No rashes were noted. HEENT:   Round and reactive pupils. AT/NC  Neck:    Supple to movements. Chest:   No use of accessory muscles to breathe. Symmetrical expansion. Dec bs   Cardiovascular:  S1 and S2 are rhythmic and regular. No murmurs appreciated.    Abdomen: Positive bowel sounds to auscultation. Benign to palpation. Extremities:   No clubbing, no cyanosis,  edema. CNS    AAxO   Lines: piv  Jane       DIAGNOSTIC RESULTS   Radiology:  Laboratory and Tests Review:  Lab Results   Component Value Date    WBC 10.0 04/13/2021    WBC 10.9 04/12/2021    WBC 12.2 (H) 04/11/2021    HGB 7.8 (L) 04/13/2021    HCT 24.3 (L) 04/13/2021    MCV 96.8 04/13/2021     04/13/2021     No results found for: CRPHS  Lab Results   Component Value Date    ALT <5 04/13/2021    AST 21 04/13/2021    ALKPHOS 201 (H) 04/13/2021    BILITOT 0.9 04/13/2021     Lab Results   Component Value Date     04/13/2021    K 3.6 04/13/2021    K 3.9 03/31/2021    CL 92 04/13/2021    CO2 35 04/13/2021    BUN 54 04/13/2021    CREATININE 3.5 04/13/2021    CREATININE 3.6 04/12/2021    CREATININE 3.7 04/11/2021    GFRAA 15 04/13/2021    LABGLOM 15 04/13/2021    GLUCOSE 92 04/13/2021    GLUCOSE 102 11/17/2010    PROT 5.6 04/13/2021    LABALBU 3.4 04/13/2021    CALCIUM 8.7 04/13/2021    BILITOT 0.9 04/13/2021    ALKPHOS 201 04/13/2021    AST 21 04/13/2021    ALT <5 04/13/2021     No results found for: CRP  No results found for: SEDRATE  Recent Labs     04/11/21  0533 04/12/21  0931 04/13/21  0719   PROCAL  --   --  0.66*   AST 18 25 21   ALT 6 5 <5     Lab Results   Component Value Date    CHOL 76 04/01/2021    TRIG 95 04/01/2021    HDL 22 04/01/2021    LDLCALC 35 04/01/2021    LABVLDL 19 04/01/2021     Lab Results   Component Value Date/Time    VITD25 24 (L) 10/08/2019 05:13 AM       Microbiology:   No results for input(s): COVID19 in the last 72 hours.   Lab Results   Component Value Date    BC 5 Days no growth 03/31/2021    BC 5 Days- no growth 10/03/2019    BLOODCULT2 5 Days no growth 03/31/2021    BLOODCULT2 5 Days- no growth 10/03/2019       FINAL IMPRESSION    PT CAME IN WITH ABD PAIN AND DIARRHEA  Currently has sob chest to be done  SHE HAS LEUKOCYTOSIS post EUS  resolved  GIB/pancreatic mass?hemorrhagic/bowel/gi perforation  s/p EUS s/p FNA pancreatic tail  S/P RX UTI KLEBSIELLA /CEFDINIR from [facility]  candiduria   CKD   AAA seen by vasc     DX  Pancreatic tail mass with solid component, choledocholithiasis, gallbladder sludge, pancreatic duct stone, chronic pancreatitis    REYES  Follow      PLAN:  F/u  cxry noted  Following  off ATB   Repeat procal     · Monitor labs    Imaging and labs were reviewed per medical records. The patient was educated about the diagnosis, prognosis, indications, risks and benefits of treatment. An opportunity to ask questions was given to the patient/FAMILY. Thank you for involving me in the care of Jasper Topete will continue to follow. Please do not hesitate to call for any questions or concerns.     Electronically signed by Solomon Pelaez MD on 4/13/2021 at 12:38 PM

## 2021-04-13 NOTE — PROGRESS NOTES
Physical Therapy Treatment Note    Room #:  0430/0430-01  Patient Name: Aquiles Keller  YOB: 1940  MRN: [id number]    Referring Provider:   Jignesh López DO      Date of Service: 4/13/2021    Evaluating Physical Therapist: Artur Zarate, PT #[id number]      Diagnosis:   GI bleed [K92.2]       Patient Active Problem List   Diagnosis    Shortness of breath    Chronic combined systolic and diastolic congestive heart failure ([address]    Chronic renal insufficiency, stage IV (severe) (HCC)    Atrial fibrillation (HCC)    Hypertension    Abnormal chest x-ray    Anemia of chronic disease    Opacity of lung on imaging study    Cigarette smoker    Tobacco abuse counseling    Acute on chronic combined systolic and diastolic CHF (congestive heart failure) ([address]    Acute on chronic congestive heart failure (HCC)    Acute systolic CHF (congestive heart failure) ([address]    GI bleed    Preoperative cardiovascular examination    Bowel perforation ([address]       Tentative placement recommendation: Subacute rehab  Equipment recommendation:  To be determined      Prior Level of Function: Patient ambulated independently   Rehab Potential: good  for baseline    Past medical history:   Past Medical History:   Diagnosis Date    Arthritis     Atrial fibrillation ([address]     Blood circulation, collateral     CHF (congestive heart failure) ([address]     Chronic renal insufficiency, stage IV (severe) ([address] 11/19/2016    History of cardiovascular stress test 07/2015    Lexiscan stress test    History of echocardiogram 07/27/2015    EF 29%    Hyperlipidemia     Hypertension      Past Surgical History:   Procedure Laterality Date    COLONOSCOPY N/A 4/3/2021    COLONOSCOPY POLYPECTOMY HOT SNARE performed by Ruy Gillespie DO at [address] [facility]  4/3/2021    COLONOSCOPY WITH BIOPSY performed by Ruy Gillespie DO at [facility]  4/3/2021    COLONOSCOPY CONTROL HEMORRHAGE performed by Naga Haines Francisco Saldana at [address]  4/3/2021    COLONOSCOPY SUBMUCOSAL SPOT INJECTION performed by Rupinder Martinez DO at [address] [facility] GASTROINTESTINAL ENDOSCOPY N/A 4/3/2021    [address]GD [address]SY [address] by Rupinder Martinez DO [address] N/A 4/7/2021    EGD W/EUS FNA performed by Zion Hathaway MD at [address] [facility] ENDOSCOPY       Precautions: Activity as tolerated, falls and alarm ,  hiflow 15L O2    SUBJECTIVE:    Social history: Patient lives alone  in a ranch home  with No steps  to enter Tub shower grab bars    Equipment owned: Rabia [place],      [address] [facility]   How much difficulty turning over in bed?: A Little  How much difficulty sitting down on / standing up from a chair with arms?: A Little  How much difficulty moving from lying on back to sitting on side of bed?: A Little  How much help from another person moving to and from a bed to a chair?: A Little  How much help from another person needed to walk in hospital room?: A Little  How much help from another person for climbing 3-5 steps with a railing?: A Lot  AM-PAC Inpatient Mobility Raw Score : 17  AM-PAC Inpatient T-Scale Score : 42.13  Mobility Inpatient CMS 0-100% Score: 50.57  Mobility Inpatient CMS G-Code Modifier : CK    Nursing cleared patient for PT treatment. Nursing reports patient increased to 15L hiflow    OBJECTIVE;   Initial Evaluation  Date: 4/3/2021 Treatment Date:  4/13/2021     Short Term/ Long Term   Goals   Was pt agreeable to Eval/treatment?  Yes  Yes To be met in 4 days   Pain level   10/10  Stomach  No number given    Bed Mobility    Rolling: Supervision    Supine to sit: Minimal assist of 1   Sit to supine: Minimal assist of 1   Scooting: Minimal assist of 1  Rolling: Minimal assist of 1   Supine to sit: Minimal assist of 1   Sit to supine: Minimal assist of 1   Scooting: Minimal assist of 1    Rolling: Independent   Supine to sit: Independent   Sit to supine: Independent   Scooting: Independent    Transfers Sit to stand: Not assessed  patient refusing to stand today to due pain and fatigue. Sit to stand: Minimal assist of 1      Sit to stand: Independent    Ambulation    not assessed  2-3 side steps using  wheeled walker with Minimal assist of 1  cuing for sequencing     50 feet using  wheeled walker with Modified Independent    ROM Within functional limits       Strength BUE:  4/5  RLE:  4/5  LLE:  4/5  Increase strength in affected mm groups by 1/3 grade   Balance Sitting EOB:  fair +  Dynamic Standing:  not assessed  Sitting EOB: good   Dynamic Standing: fair with wheeled walker  Sitting EOB:  good   Dynamic Standing: good      Patient is Alert & Oriented x person, place, time and situation and follows directions   Sensation:  Patient  denies numbness and tingling     Edema:  no   Endurance: fair  -    Vitals: 15 liters high flow nasal cannula   Blood Pressure at rest  Blood Pressure during session    Heart Rate at rest  Heart Rate during session    SPO2 at rest 96%  SPO2 during session 92%     Patient education  Patient educated on role of Physical Therapy, risks of immobility, safety and plan of care, energy conservation,  importance of mobility while in hospital , purse lip breathing and ankle pumps, quad set and glut set for edema control, blood clot prevention    Patient response to education:   Pt verbalized understanding Pt demonstrated skill Pt requires further education in this area   Yes Partial Yes      Treatment:  Patient practiced and was instructed/facilitated in the following treatment: Patient assisted to edge of bed and Sat edge of bed 10 minutes with Supervision  to increase dynamic sitting balance and activity tolerance. Patient completed seated exercises at edge of bed and stood to wheeled walker to take 2-3 steps to head of bed before returning to bed.  Patient required Michael to assist with LE to get back into bed. Therapeutic Exercises:  ankle pumps, long arc quad and seated marching [id number]          At end of session, patient in bed with   call light and phone within reach,  all lines and tubes intact, nursing present. Patient would benefit from continued skilled Physical Therapy to improve functional independence and quality of life. Patient's/ family goals   none stated      ASSESSMENT: Patient exhibits decreased strength, balance, endurance and pain   impairing functional mobility, transfers, gait distance and tolerance to activity. Patient fatigued throughout session d/t inc in )2 levels. Patient education on importance of pursed lip breathing as SPO2 levels were at 92% during session. Patient more active during session today. Plan of Care:     -Sitting Balance: Incorporate reaching activities to activate trunk muscles   -Standing Balance: Perform strengthening exercises in standing to promote motor control with or without upper extremity support   -Transfers: Provide instruction on proper hand and foot position for adequate transfer of weight onto lower extremities and use of gait device  -Gait: Gait training and Standing activities to improve: base of support, weight shift, weight bearing      Patient and or family understand(s) diagnosis, prognosis, and plan of care. Frequency of treatments: Patient will be seen  daily.        Time in  1054  Time out  1110    Total Treatment Time  16 minutes    CPT codes:  Therapeutic activities (02786)   16 minutes  1 unit(s)    Cynthea Romberg, PTA [id number]

## 2021-04-13 NOTE — CARE COORDINATION
2021 1010 CM note: NEGATIVE COVID 21. Pt on 10L NC high flow. Per [facility] precert , will resubmit for extension in am 21-pending pt medical status. HENS completed. [address] will need signed by Physician.  N-N [id number], Fax [phone] Ruffing RN

## 2021-04-13 NOTE — PROGRESS NOTES
Internal Medicine Progress Note    [facility]    Jigna Chancy. Louis Darby., DALEOChepeI. Rosemary Doyle D.O., F.A.C.O.I. Venida Simmonds, D.O. Britney Gayle, MSN, APRN, NP-C  Brittnee Bahena. Tash Joshi, MSN, APRN-CNP     Primary Care Physician: Ramya Hernandez DO   Admitting Physician:  Ron Santo DO  Admission date and time: 3/31/2021 10:32 AM    Room:  [facility]  Admitting diagnosis: GI bleed [K92.2]    Patient Name: Philip Boss  MRN: [id number]    Date of Service: 4/13/2021     Subjective:  Lucie Messina is a 68 y.o. female who was seen and examined today,4/13/2021, at the bedside. Lucie Messina regressed somewhat clinically and appears to have developed some mild volume overload. She was given IV diuresis last evening but still requires significant amount of submental oxygen. We discussed moving forward with pulsed dose albumin and diuretics. Multiple subspecialists continue to follow in consultation and discharge planning is underway. No family members were present during my examination. Review of System:   Constitutional:   Less weakness and deconditioning today. HEENT:   Denies ear pain, sore throat, sinus or eye problems. Admits to irritation associated with the nasal cannula oxygen. Cardiovascular:   Denies any chest pain, irregular heartbeats, or palpitations. Respiratory:   Subtle improvement in resting dyspnea but remains short of breath above baseline. Gastrointestinal:   No further abdominal discomfort today. Genitourinary:    Voiding with the use of a Jane catheter. Extremities:   Complete resolution of her presenting lower extremity edema. Neurology:    Admits to improving weakness and deconditioning which remains profound. Psch:   Denies being anxious or depressed. Musculoskeletal:    Denies  myalgias, joint complaints or back pain. Integumentary:   Denies any rashes, ulcers, or excoriations. Denies bruising.   Hematologic/Lymphatic: pantoprazole  40 mg Oral QAM AC    atorvastatin  40 mg Oral Nightly    carvedilol  6.25 mg Oral BID    hydrALAZINE  25 mg Oral 3 times per day    isosorbide mononitrate  30 mg Oral Daily    vitamin D  50,000 Units Oral Weekly     Continuous Infusions:   sodium chloride      sodium chloride         Objective Data:  CBC with Differential:    Lab Results   Component Value Date    WBC 10.0 04/13/2021    RBC 2.51 04/13/2021    HGB 7.8 04/13/2021    HCT 24.3 04/13/2021     04/13/2021    MCV 96.8 04/13/2021    MCH 31.1 04/13/2021    MCHC 32.1 04/13/2021    RDW 17.6 04/13/2021    NRBC 0.9 03/31/2021    LYMPHOPCT 18.7 04/13/2021    MONOPCT 11.6 04/13/2021    MYELOPCT 0.9 04/03/2021    BASOPCT 0.7 04/13/2021    MONOSABS 1.16 04/13/2021    LYMPHSABS 1.87 04/13/2021    EOSABS 0.22 04/13/2021    BASOSABS 0.07 04/13/2021     CMP:    Lab Results   Component Value Date     04/13/2021    K 3.6 04/13/2021    K 3.9 03/31/2021    CL 92 04/13/2021    CO2 35 04/13/2021    BUN 54 04/13/2021    CREATININE 3.5 04/13/2021    GFRAA 15 04/13/2021    LABGLOM 15 04/13/2021    GLUCOSE 92 04/13/2021    GLUCOSE 102 11/17/2010    PROT 5.6 04/13/2021    LABALBU 3.4 04/13/2021    CALCIUM 8.7 04/13/2021    BILITOT 0.9 04/13/2021    ALKPHOS 201 04/13/2021    AST 21 04/13/2021    ALT <5 04/13/2021       Assessment:  1. 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 Endoscopic ultrasound does show a 5 mm stone in the distal common bile duct  4. Acute on chronic kidney disease stage IV   5. Abdominal aortic aneurysm toward the bifurcation measuring up to 3.7 x 4.8 cm a component of chronic dissection appearance  6.  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 Paroxysmal atrial fibrillation on chronic anticoagulation with Eliquis--on hold  8. Essential hypertension  9. Moderate peripheral artery disease    Plan:   Claribel Jeffers was given fluids slowly over a period of a day and 1/2 to 2 days due to developing renal insufficiency while on diuretics and a significantly negative intake and output record. She developed some volume overload following this and required IV diuresis with Lasix and Diuril last evening as per the nephrologist.  She remains on high flow nasal cannula oxygen at present and looks to be in an uncomfortable degree of dyspnea this morning. We will diurese again today and follow volume status closely. Chest x-ray last evening confirmed pulmonary edema. Further input as per the nephrology team.  Blood counts remained stable and GI is following, she is on PPI therapy. EUS reviewed, further determination regarding common bile duct stone will be deferred to joint decision between GI and general surgery. We discussed the need for increased work with the therapy teams today and she voiced understanding and agreement. More than 50% of my  time was spent at the bedside counseling/coordinating care with the patient and/or family with face to face contact. This time was spent reviewing notes and laboratory data as well as instructing and counseling the patient. Time I spent with the family or surrogate(s) is included only if the patient was incapable of providing the necessary information or participating in medical decisions. I also discussed the differential diagnosis and all of the proposed management plans with the patient and individuals accompanying the patient. Claribel Jeffers requires this high level of physician care and nursing on the Telemetry unit due the complexity of decision management and chance of rapid decline or death. Continued cardiac monitoring and higher level of nursing are required.  I

## 2021-04-13 NOTE — PROGRESS NOTES
[facility] MD Ania Trinidad MD Juli Shu, MD Karlynn Dee, MD   Progress Note    4/13/2021    SUBJECTIVE:     Pt known to Dr. Messi Crowe from office, and followed at [facility] 2 weeks prior to this admission    4/3: Seen this am in her room , o2/nc at 4 L which is her home o2 . She did undergo EGD/colonscopy this am . Results noted   Continue to look volume overloaded with 2 + edema and JVD . 4/4 ; stable vitlas , good UO on bumex drip/diuril . Will assess in am if we can wean her off drip     4/5: Patient was doing well this morning, she is awake and alert with no acute distress. We will continue same medications and same plan of care for today. 4/6 \" Seen this am , pleasant , o2/nc . Good UO . Cr stable . Plan for EUS in am     4/7 : good UO with negative balance . In good spirit . For EUS today . 4/8 : in bed , lying flat in NAD . Edema much improved     4/9 : report of pt being more sob . she is for cxr today . EUS with bile duct stone     4/10: Recurrent left lower quadrant pain, ongoing anorexia with poor intake of food and fluid, intermittent mild nausea though no emesis. No dyspnea. No peripheral swelling. Mildly hypotensive. 4/11: Fatigue, generalized weakness, ongoing anorexia and poor intake. Some lower abdominal discomfort though no dez pain. Indigestion and mild nausea intermittently. No swelling. No dyspnea on nasal cannula. No cough or wheeze. Started normal saline at conservative rate yesterday, increase the rate this morning    4/12 : weak . bp stable   High o2 requirement this evening   cxr with HF       4/13 : seen today , feels better . No LE edema . JVD 2+ .  We disussed we are likely looking at need for RRT in the future     PROBLEM LIST:    Principal Problem:    GI bleed  Active Problems:    Preoperative cardiovascular examination    Bowel perforation ([address]  Resolved Problems:    * No resolved hospital problems. *       DIET:    DIET LOW FAT; Low Sodium (2 GM)       Allergies : Patient has no known allergies. Review of Systems:   Constitutional:weak   Eyes: no eye pain , no itching , no drainage  Ears, nose, mouth, throat, and face: no ear ,nose pain , hearing is ok ,no nasal drainage   Respiratory: no sob ,no cough ,no wheezing . Cardiovascular: no chest pain , no palpitation ,no sob . Gastrointestinal: no nausea, vomiting , constipation , no abdominal pain . Genitourinary:no urinary retention , no burning , dysuria . No polyuria   Hematologic/lymphatic: no bleeding , no cougulation issues . Musculoskeletal:no joint pain , no swelling . Neurological: no headaches ,no weakness , no numbness . Endocrine: no thirst , no weight issues .      MEDS (scheduled):    albumin human  50 g Intravenous BID    furosemide  40 mg Intravenous BID    bumetanide  1 mg Intravenous BID    potassium bicarb-citric acid  20 mEq Oral BID    docusate sodium  100 mg Oral Daily    pantoprazole  40 mg Oral QAM AC    atorvastatin  40 mg Oral Nightly    carvedilol  6.25 mg Oral BID    hydrALAZINE  25 mg Oral 3 times per day    isosorbide mononitrate  30 mg Oral Daily    vitamin D  50,000 Units Oral Weekly       MEDS (infusions):   sodium chloride      sodium chloride         MEDS (prn):  perflutren lipid microspheres, acetaminophen, sodium chloride, sodium chloride, albuterol, ondansetron, promethazine, morphine    PHYSICAL EXAM:     Patient Vitals for the past 24 hrs:   BP Temp Temp src Pulse Resp SpO2   04/13/21 1033      90 %   04/13/21 1030      (!) 84 %   04/13/21 0818 133/78 97.6 °F (36.4 °C) Oral 82 18 93 %   04/13/21 0557 131/81        04/13/21 0145      93 %   04/12/21 2353      98 %   04/12/21 2345      99 %   04/12/21 2130 137/70 98.2 °F (36.8 °C) Oral 83 18 95 %   04/12/21 1748      97 %   04/12/21 1745      (!) 78 %   04/12/21 1642      91 % 04/12/21 1630 133/73 98.8 °F (37.1 °C) Oral 73 16 (!) 87 %   04/12/21 1412 (!) 143/63        @      Intake/Output Summary (Last 24 hours) at 4/13/2021 1045  Last data filed at 4/13/2021 1043  Gross per 24 hour   Intake 160 ml   Output 750 ml   Net -590 ml         Wt Readings from Last 3 Encounters:   04/11/21 135 lb (61.2 kg)   10/08/19 116 lb 14.4 oz (53 kg)   06/03/19 130 lb (59 kg)       Constitutional:  in no acute distress  Oral: mucus membranes moist  Neck: Trachea midline. No JVD  Cardiovascular: S1, S2 regular rhythm, no murmur,or rub  Respiratory: Poor inspiratory effort, difficult to encourage better, though no crackles, no wheeze  Gastrointestinal:  Soft, nontender, nondistended, NABS  Ext: No edema dependently or distally  Skin: dry, no rash  Neuro: awake, alert, interactive      DATA:    Recent Labs     04/11/21  0533 04/12/21  0931 04/13/21  0719   WBC 12.2* 10.9 10.0   HGB 8.5* 7.4* 7.8*   HCT 26.9* 23.8* 24.3*   MCV 97.1 99.2 96.8    202 238     Recent Labs     04/11/21  0533 04/11/21  1425 04/12/21  0931 04/13/21  0719    139 141 140   K 3.2* 3.2* 3.3* 3.6   CL 88* 89* 92* 92*   CO2 40* 39* 35* 35*   MG 1.7  --  1.7  --    PHOS 6.0*  --  5.9*  --    BUN 55* 57* 55* 54*   CREATININE 3.8* 3.7* 3.6* 3.5*   ALT 6  --  5 <5   AST 18  --  25 21   BILITOT 0.8  --  1.1 0.9   ALKPHOS 99  --  234* 201*       Lab Results   Component Value Date    LABPROT 0.3 (H) 04/11/2021    LABPROT 0.3 04/11/2021       ASSESSMENT     1) REYES  in the setting of GI bleed ,low EF/CHF     2) Chronic kidney disease stage IV baseline cr 2-2.7     3) 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  Results noted (multiple esophageal diverticuli, gastritis and duodenal bulb lesion 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 (biopsies pending of antrum and bulb lesion))     5) ischaemic cardiomyopathy low EF at 29% decompensated     6) chronic respiratory failure on 3-4 L o2/home RECOMMENDATIONS    -start bumex 1 mg iv bid     -if we continue to encounter progressive azotemia with diuresis then we might need to consider RRT sooner than later     -am labs .              Electronically signed by Nano Gonsalez MD on 4/13/2021

## 2021-04-13 NOTE — PROGRESS NOTES
Reviewed     ASSESSMENT:   PT CAME IN WITH ABD PAIN AND DIARRHEA  Currently has sob chest to be done  SHE HAS LEUKOCYTOSIS post EUS  resolved  GIB/pancreatic mass?hemorrhagic/bowel/gi perforation  s/p EUS s/p FNA pancreatic tail  S/P RX UTI KLEBSIELLA /CEFDINIR from [facility]  candiduria   CKD   AAA seen by vasc    DX  Pancreatic tail mass with solid component, choledocholithiasis, gallbladder sludge, pancreatic duct stone, chronic pancreatitis    Hypotension   REYES  Follow     PLAN:  For cxry  Follow off ATB   Monitor labs check procal   Follow cultures     Imaging and labs were reviewed per medical records and any ID pertinent labs were addressed with the patient. The patient was educated about the diagnosis, prognosis, indications, risks and benefits of treatment. Pt had the opportunity to ask questions. All questions were answered. Thank you for involving me in the care of Jennifer Rodriguez. Please do not hesitate to call for any questions or concerns.     Electronically signed by Fanny Dyer MD on 4/12/2021 at 8:27 PM    Phone [phone]  Fax [phone]

## 2021-04-14 ENCOUNTER — APPOINTMENT (OUTPATIENT)
Dept: NUCLEAR MEDICINE | Age: 81
DRG: 871 | End: 2021-04-14
Payer: MEDICARE

## 2021-04-14 ENCOUNTER — APPOINTMENT (OUTPATIENT)
Dept: GENERAL RADIOLOGY | Age: 81
DRG: 871 | End: 2021-04-14
Payer: MEDICARE

## 2021-04-14 LAB
ADENOVIRUS BY PCR: NOT DETECTED
ALBUMIN SERPL-MCNC: 4.5 G/DL (ref 3.5–5.2)
ALP BLD-CCNC: 307 U/L (ref 35–104)
ALT SERPL-CCNC: 7 U/L (ref 0–32)
ANION GAP SERPL CALCULATED.3IONS-SCNC: 12 MMOL/L (ref 7–16)
ANION GAP SERPL CALCULATED.3IONS-SCNC: 13 MMOL/L (ref 7–16)
AST SERPL-CCNC: 32 U/L (ref 0–31)
BASOPHILS ABSOLUTE: 0.06 E9/L (ref 0–0.2)
BASOPHILS RELATIVE PERCENT: 0.7 % (ref 0–2)
BILIRUB SERPL-MCNC: 1.3 MG/DL (ref 0–1.2)
BORDETELLA PARAPERTUSSIS BY PCR: NOT DETECTED
BORDETELLA PERTUSSIS BY PCR: NOT DETECTED
BUN BLDV-MCNC: 56 MG/DL (ref 8–23)
BUN BLDV-MCNC: 59 MG/DL (ref 8–23)
CALCIUM SERPL-MCNC: 9.4 MG/DL (ref 8.6–10.2)
CALCIUM SERPL-MCNC: 9.5 MG/DL (ref 8.6–10.2)
CHLAMYDOPHILIA PNEUMONIAE BY PCR: NOT DETECTED
CHLORIDE BLD-SCNC: 88 MMOL/L (ref 98–107)
CHLORIDE BLD-SCNC: 90 MMOL/L (ref 98–107)
CO2: 37 MMOL/L (ref 22–29)
CO2: 38 MMOL/L (ref 22–29)
CORONAVIRUS 229E BY PCR: NOT DETECTED
CORONAVIRUS HKU1 BY PCR: NOT DETECTED
CORONAVIRUS NL63 BY PCR: NOT DETECTED
CORONAVIRUS OC43 BY PCR: NOT DETECTED
CREAT SERPL-MCNC: 3.7 MG/DL (ref 0.5–1)
CREAT SERPL-MCNC: 4 MG/DL (ref 0.5–1)
EKG ATRIAL RATE: 89 BPM
EKG Q-T INTERVAL: 410 MS
EKG QRS DURATION: 136 MS
EKG QTC CALCULATION (BAZETT): 520 MS
EKG R AXIS: 112 DEGREES
EKG T AXIS: -12 DEGREES
EKG VENTRICULAR RATE: 97 BPM
EOSINOPHILS ABSOLUTE: 0.2 E9/L (ref 0.05–0.5)
EOSINOPHILS RELATIVE PERCENT: 2.2 % (ref 0–6)
GFR AFRICAN AMERICAN: 13
GFR AFRICAN AMERICAN: 14
GFR NON-AFRICAN AMERICAN: 13 ML/MIN/1.73
GFR NON-AFRICAN AMERICAN: 14 ML/MIN/1.73
GLUCOSE BLD-MCNC: 107 MG/DL (ref 74–99)
GLUCOSE BLD-MCNC: 118 MG/DL (ref 74–99)
HCT VFR BLD CALC: 24.7 % (ref 34–48)
HEMOGLOBIN: 7.5 G/DL (ref 11.5–15.5)
HUMAN METAPNEUMOVIRUS BY PCR: NOT DETECTED
HUMAN RHINOVIRUS/ENTEROVIRUS BY PCR: NOT DETECTED
IMMATURE GRANULOCYTES #: 0.04 E9/L
IMMATURE GRANULOCYTES %: 0.4 % (ref 0–5)
INFLUENZA A BY PCR: NOT DETECTED
INFLUENZA B BY PCR: NOT DETECTED
LYMPHOCYTES ABSOLUTE: 2.07 E9/L (ref 1.5–4)
LYMPHOCYTES RELATIVE PERCENT: 22.7 % (ref 20–42)
MAGNESIUM: 1.6 MG/DL (ref 1.6–2.6)
MCH RBC QN AUTO: 30.4 PG (ref 26–35)
MCHC RBC AUTO-ENTMCNC: 30.4 % (ref 32–34.5)
MCV RBC AUTO: 100 FL (ref 80–99.9)
MONOCYTES ABSOLUTE: 1.17 E9/L (ref 0.1–0.95)
MONOCYTES RELATIVE PERCENT: 12.8 % (ref 2–12)
MYCOPLASMA PNEUMONIAE BY PCR: NOT DETECTED
NEUTROPHILS ABSOLUTE: 5.59 E9/L (ref 1.8–7.3)
NEUTROPHILS RELATIVE PERCENT: 61.2 % (ref 43–80)
PARAINFLUENZA VIRUS 1 BY PCR: NOT DETECTED
PARAINFLUENZA VIRUS 2 BY PCR: NOT DETECTED
PARAINFLUENZA VIRUS 3 BY PCR: NOT DETECTED
PARAINFLUENZA VIRUS 4 BY PCR: NOT DETECTED
PDW BLD-RTO: 17.4 FL (ref 11.5–15)
PHOSPHORUS: 4.6 MG/DL (ref 2.5–4.5)
PLATELET # BLD: 237 E9/L (ref 130–450)
PMV BLD AUTO: 12.8 FL (ref 7–12)
POTASSIUM SERPL-SCNC: 4.2 MMOL/L (ref 3.5–5)
POTASSIUM SERPL-SCNC: 4.6 MMOL/L (ref 3.5–5)
PRO-BNP: ABNORMAL PG/ML (ref 0–450)
PROCALCITONIN: 0.64 NG/ML (ref 0–0.08)
RBC # BLD: 2.47 E12/L (ref 3.5–5.5)
REPORT: NORMAL
RESPIRATORY SYNCYTIAL VIRUS BY PCR: NOT DETECTED
SARS-COV-2, PCR: NOT DETECTED
SODIUM BLD-SCNC: 138 MMOL/L (ref 132–146)
SODIUM BLD-SCNC: 140 MMOL/L (ref 132–146)
THIS TEST SENT TO: NORMAL
TOTAL PROTEIN: 6.6 G/DL (ref 6.4–8.3)
TROPONIN: 0.06 NG/ML (ref 0–0.03)
TROPONIN: 0.06 NG/ML (ref 0–0.03)
TROPONIN: 0.09 NG/ML (ref 0–0.03)
WBC # BLD: 9.1 E9/L (ref 4.5–11.5)

## 2021-04-14 PROCEDURE — 6360000002 HC RX W HCPCS: Performed by: INTERNAL MEDICINE

## 2021-04-14 PROCEDURE — 2500000003 HC RX 250 WO HCPCS: Performed by: INTERNAL MEDICINE

## 2021-04-14 PROCEDURE — 6370000000 HC RX 637 (ALT 250 FOR IP): Performed by: INTERNAL MEDICINE

## 2021-04-14 PROCEDURE — 71045 X-RAY EXAM CHEST 1 VIEW: CPT

## 2021-04-14 PROCEDURE — 6370000000 HC RX 637 (ALT 250 FOR IP): Performed by: NURSE PRACTITIONER

## 2021-04-14 PROCEDURE — 83735 ASSAY OF MAGNESIUM: CPT

## 2021-04-14 PROCEDURE — 97530 THERAPEUTIC ACTIVITIES: CPT

## 2021-04-14 PROCEDURE — 1200000000 HC SEMI PRIVATE

## 2021-04-14 PROCEDURE — 3430000000 HC RX DIAGNOSTIC RADIOPHARMACEUTICAL: Performed by: RADIOLOGY

## 2021-04-14 PROCEDURE — 83880 ASSAY OF NATRIURETIC PEPTIDE: CPT

## 2021-04-14 PROCEDURE — 0202U NFCT DS 22 TRGT SARS-COV-2: CPT

## 2021-04-14 PROCEDURE — 2700000000 HC OXYGEN THERAPY PER DAY

## 2021-04-14 PROCEDURE — 80053 COMPREHEN METABOLIC PANEL: CPT

## 2021-04-14 PROCEDURE — 84145 PROCALCITONIN (PCT): CPT

## 2021-04-14 PROCEDURE — 84100 ASSAY OF PHOSPHORUS: CPT

## 2021-04-14 PROCEDURE — 84484 ASSAY OF TROPONIN QUANT: CPT

## 2021-04-14 PROCEDURE — 85025 COMPLETE CBC W/AUTO DIFF WBC: CPT

## 2021-04-14 PROCEDURE — 6370000000 HC RX 637 (ALT 250 FOR IP): Performed by: STUDENT IN AN ORGANIZED HEALTH CARE EDUCATION/TRAINING PROGRAM

## 2021-04-14 PROCEDURE — 36415 COLL VENOUS BLD VENIPUNCTURE: CPT

## 2021-04-14 PROCEDURE — 2500000003 HC RX 250 WO HCPCS: Performed by: NURSE PRACTITIONER

## 2021-04-14 PROCEDURE — 2580000003 HC RX 258: Performed by: NURSE PRACTITIONER

## 2021-04-14 PROCEDURE — 78582 LUNG VENTILAT&PERFUS IMAGING: CPT

## 2021-04-14 PROCEDURE — A9540 TC99M MAA: HCPCS | Performed by: RADIOLOGY

## 2021-04-14 PROCEDURE — 80048 BASIC METABOLIC PNL TOTAL CA: CPT

## 2021-04-14 PROCEDURE — 94660 CPAP INITIATION&MGMT: CPT

## 2021-04-14 PROCEDURE — 93005 ELECTROCARDIOGRAM TRACING: CPT | Performed by: NURSE PRACTITIONER

## 2021-04-14 RX ORDER — CHLOROTHIAZIDE SODIUM 500 MG/1
500 INJECTION INTRAVENOUS 2 TIMES DAILY
Status: COMPLETED | OUTPATIENT
Start: 2021-04-14 | End: 2021-04-14

## 2021-04-14 RX ORDER — CARVEDILOL 6.25 MG/1
12.5 TABLET ORAL 2 TIMES DAILY
Status: DISCONTINUED | OUTPATIENT
Start: 2021-04-14 | End: 2021-04-22 | Stop reason: HOSPADM

## 2021-04-14 RX ORDER — HYDRALAZINE HYDROCHLORIDE 50 MG/1
50 TABLET, FILM COATED ORAL EVERY 8 HOURS SCHEDULED
Status: DISCONTINUED | OUTPATIENT
Start: 2021-04-14 | End: 2021-04-16

## 2021-04-14 RX ADMIN — Medication 6 MILLICURIE: at 15:38

## 2021-04-14 RX ADMIN — NITROGLYCERIN 1 INCH: 20 OINTMENT TOPICAL at 13:17

## 2021-04-14 RX ADMIN — PANTOPRAZOLE SODIUM 40 MG: 40 TABLET, DELAYED RELEASE ORAL at 05:59

## 2021-04-14 RX ADMIN — CARVEDILOL 12.5 MG: 6.25 TABLET, FILM COATED ORAL at 22:43

## 2021-04-14 RX ADMIN — CHLOROTHIAZIDE SODIUM 500 MG: 500 INJECTION, POWDER, LYOPHILIZED, FOR SOLUTION INTRAVENOUS at 13:15

## 2021-04-14 RX ADMIN — HYDRALAZINE HYDROCHLORIDE 25 MG: 25 TABLET, FILM COATED ORAL at 05:59

## 2021-04-14 RX ADMIN — BUMETANIDE 1 MG: 0.25 INJECTION INTRAMUSCULAR; INTRAVENOUS at 09:54

## 2021-04-14 RX ADMIN — ISOSORBIDE MONONITRATE 30 MG: 30 TABLET, EXTENDED RELEASE ORAL at 09:44

## 2021-04-14 RX ADMIN — HYDRALAZINE HYDROCHLORIDE 50 MG: 50 TABLET ORAL at 22:40

## 2021-04-14 RX ADMIN — POTASSIUM BICARBONATE 20 MEQ: 782 TABLET, EFFERVESCENT ORAL at 22:39

## 2021-04-14 RX ADMIN — BUMETANIDE 0.5 MG/HR: 0.25 INJECTION INTRAMUSCULAR; INTRAVENOUS at 13:17

## 2021-04-14 RX ADMIN — ATORVASTATIN CALCIUM 40 MG: 40 TABLET, FILM COATED ORAL at 22:39

## 2021-04-14 RX ADMIN — NITROGLYCERIN 1 INCH: 20 OINTMENT TOPICAL at 19:54

## 2021-04-14 RX ADMIN — ERGOCALCIFEROL 50000 UNITS: 1.25 CAPSULE ORAL at 09:48

## 2021-04-14 RX ADMIN — CARVEDILOL 6.25 MG: 6.25 TABLET, FILM COATED ORAL at 09:44

## 2021-04-14 RX ADMIN — POTASSIUM BICARBONATE 20 MEQ: 782 TABLET, EFFERVESCENT ORAL at 09:44

## 2021-04-14 RX ADMIN — DOCUSATE SODIUM 100 MG: 100 CAPSULE ORAL at 09:55

## 2021-04-14 RX ADMIN — HYDRALAZINE HYDROCHLORIDE 50 MG: 50 TABLET ORAL at 14:42

## 2021-04-14 RX ADMIN — CHLOROTHIAZIDE SODIUM 500 MG: 500 INJECTION, POWDER, LYOPHILIZED, FOR SOLUTION INTRAVENOUS at 22:35

## 2021-04-14 ASSESSMENT — PAIN SCALES - GENERAL
PAINLEVEL_OUTOF10: 0
PAINLEVEL_OUTOF10: 0

## 2021-04-14 ASSESSMENT — ENCOUNTER SYMPTOMS: TACHYPNEA: 1

## 2021-04-14 NOTE — PROGRESS NOTES
PROGRESS NOTE  By J Luis Kruger M.D. [facility]  Dr. Benigno Polo M.D.,  Dr. Bell August M.D.,   Dr. Kori Smyth D.O.,  Dr. Arden Poole M.D.,  Dr. Danisha Esposito D.O.,  NOELLE JeffriesO. Shraddha Lynn  68 y.o.  female    SUBJECTIVE:  Denies abdominal pain. Daughter at bedside    OBJECTIVE:    BP (!) 151/79   Pulse 95   Temp 98.1 °F (36.7 °C) (Infrared)   Resp 23   Ht 5' 5\" (1.651 m)   Wt 135 lb (61.2 kg)   SpO2 93%   BMI 22.47 kg/m²     General: NAD/-American female  HEENT: Anicteric sclera/moist oral mucosa  Neck: Supple  Abd.: Soft and not distended  Extr.:  Decreased muscle tone and bulk throughout  Skin: Warm and dry      DATA:    Monitor data reviewed -atrial fibrillation noted. Stool (measured) : 0 mL  Lab Results   Component Value Date    WBC 9.1 04/14/2021    RBC 2.47 04/14/2021    HGB 7.5 04/14/2021    HCT 24.7 04/14/2021    .0 04/14/2021    MCH 30.4 04/14/2021    MCHC 30.4 04/14/2021    RDW 17.4 04/14/2021     04/14/2021    MPV 12.8 04/14/2021     Lab Results   Component Value Date     04/14/2021    K 4.2 04/14/2021    K 3.9 03/31/2021    CL 90 04/14/2021    CO2 37 04/14/2021    BUN 56 04/14/2021    CREATININE 3.7 04/14/2021    CALCIUM 9.4 04/14/2021    PROT 6.6 04/14/2021    LABALBU 4.5 04/14/2021    BILITOT 1.3 04/14/2021    ALKPHOS 307 04/14/2021    AST 32 04/14/2021    ALT 7 04/14/2021     Lab Results   Component Value Date    LIPASE 40 03/31/2021     No results found for: AMYLASE      ASSESSMENT/PLAN:    1.  Hematochezia, acute blood loss anemia  - no overt GI bleeding, H/H within variance, VSS  -EGD revealed multiple esophageal diverticuli, gastritis and duodenal bulb lesion 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 (biopsies pending of antrum and bulb lesion)  - colonoscopy revealed few small polyps s/p polypectomy and sigmoid Dieulafoy lesion with adherent clot s/p clip/cautery and tattoo  - continue to follow H/H and monitor for signs of bleeding  -Currently stable H&H  - transfuse as needed  - diet as tolerated  -Continue oral PPI once daily x 8 weeks  - follow up as outpatient  -EUS  4/7 -biopsy of pancreatic tail (FNA), negative for malignant cells, 5mm stone in distal common bile duct   -Defer to surgical service regarding ERCP for choledocholithiasis.     2. LUQ abdominal lesion, Pancreatic pseudocyst  - likely related to pancreatic cyst, currently resolving   - continue supportive care  -EUS by general surgery as above     3. HFrEF, pAFib, volume overload, REYES/CKD  - appreciate cardiology/nephrology management and recommendations      4. Abdominal pain, acute - Resolved per patient, continue to monitor    No immediate plans for intervention from GI POV. Follow in the office in 2 to 3 weeks after discharge. Patient/family to call for appointment and with questions/concerns at [phone]   Deferred disposition and physical therapy to admitting service. Jamaal Espinosa MD  4/14/2021  1:25 PM    NOTE:  This report was transcribed using voice recognition software. Every effort was made to ensure accuracy; however, inadvertent computerized transcription errors may be present.

## 2021-04-14 NOTE — PROGRESS NOTES
Internal Medicine Progress Note    [facility]    Leela Aguilar, DALEOCHRISTY Nath D.O., ÓSCAR Alejandre Cap, MSN, APRN, NP-C  Elvis Nunez. Wyatt Tenorio, MSN, APRN-CNP     Primary Care Physician: Natale Sandifer, DO   Admitting Physician:  Jignesh López DO  Admission date and time: 3/31/2021 10:32 AM    Room:  [address] [place] [address]  Admitting diagnosis: GI bleed [K92.2]    Patient Name: Aquiles Keller  MRN: [id number]    Date of Service: 4/14/2021     Subjective:  Kylah Romero is a 68 y.o. female who was seen and examined today,4/14/2021, at the bedside. Kylah Romero continues to regress from a respiratory standpoint. She exhibits volume overload both clinically and on diagnostic imaging despite being over 23 L negative since hospitalization. It was the mutual agreement between our service as well as nephrology the patient would benefit from pulsed dose IV diuresis yesterday which was administered with mild worsening of renal insufficiency without clinical improvement. She was assisted into a chair seated position in the hospital bed today while on nonrebreather, and decompensated to 70% after our examination as she removed her cell from oxygen and became distress. We are now suggesting reconsideration of renal replacement therapy and will ask nephrology to speak with the patient regarding this. Review of System:   Constitutional:   Less weakness and deconditioning today. HEENT:   Denies ear pain, sore throat, sinus or eye problems. Admits to irritation associated with the nasal cannula oxygen. Cardiovascular:   Denies any chest pain, irregular heartbeats, or palpitations. Respiratory:   Dyspneic at rest, at least moderate. Repositioning provoke significant dyspnea. Gastrointestinal:   No further abdominal discomfort today. Genitourinary:    Voiding with the use of a Jane catheter.   Extremities:   Complete resolution of her Musculoskeletal:   Spine ROM normal. Muscular strength intact. Gait not assessed. Integumentary:  No rashes  Skin normal color and texture. Genitalia/Breast:  Voiding with the use of a Jane catheter. Medication:  Scheduled Meds:   carvedilol  12.5 mg Oral BID    hydrALAZINE  50 mg Oral 3 times per day    potassium bicarb-citric acid  20 mEq Oral BID    docusate sodium  100 mg Oral Daily    pantoprazole  40 mg Oral QAM AC    atorvastatin  40 mg Oral Nightly    isosorbide mononitrate  30 mg Oral Daily    vitamin D  50,000 Units Oral Weekly     Continuous Infusions:   bumetanide 0.1 mg/mL infusion      sodium chloride      sodium chloride         Objective Data:  CBC with Differential:    Lab Results   Component Value Date    WBC 9.1 04/14/2021    RBC 2.47 04/14/2021    HGB 7.5 04/14/2021    HCT 24.7 04/14/2021     04/14/2021    .0 04/14/2021    MCH 30.4 04/14/2021    MCHC 30.4 04/14/2021    RDW 17.4 04/14/2021    NRBC 0.9 03/31/2021    LYMPHOPCT 22.7 04/14/2021    MONOPCT 12.8 04/14/2021    MYELOPCT 0.9 04/03/2021    BASOPCT 0.7 04/14/2021    MONOSABS 1.17 04/14/2021    LYMPHSABS 2.07 04/14/2021    EOSABS 0.20 04/14/2021    BASOSABS 0.06 04/14/2021     CMP:    Lab Results   Component Value Date     04/14/2021    K 4.2 04/14/2021    K 3.9 03/31/2021    CL 90 04/14/2021    CO2 37 04/14/2021    BUN 56 04/14/2021    CREATININE 3.7 04/14/2021    GFRAA 14 04/14/2021    LABGLOM 14 04/14/2021    GLUCOSE 107 04/14/2021    GLUCOSE 102 11/17/2010    PROT 6.6 04/14/2021    LABALBU 4.5 04/14/2021    CALCIUM 9.4 04/14/2021    BILITOT 1.3 04/14/2021    ALKPHOS 307 04/14/2021    AST 32 04/14/2021    ALT 7 04/14/2021       Assessment:  1. 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 Worsening respiratory failure with hypoxia due to decompensated Acutely decompensated systolic and diastolic congestive heart failure  3.  Blood loss anemia with EGD revealing multiple Renal function is preclusive for Entresto or ACE/ARB. EUS reviewed, further determination regarding common bile duct stone will be deferred to joint decision between GI and general surgery. Due to worsening clinical status we will transition to intermediate care unit. Covid testing will be performed as well given the respiratory decompensation while hospitalized in a prolonged fashion. More than 50% of my  time was spent at the bedside counseling/coordinating care with the patient and/or family with face to face contact. This time was spent reviewing notes and laboratory data as well as instructing and counseling the patient. Time I spent with the family or surrogate(s) is included only if the patient was incapable of providing the necessary information or participating in medical decisions. I also discussed the differential diagnosis and all of the proposed management plans with the patient and individuals accompanying the patient. [place] requires this high level of physician care and nursing on the Telemetry unit due the complexity of decision management and chance of rapid decline or death. Continued cardiac monitoring and higher level of nursing are required. I am readily available for any further decision-making and intervention. Anastacio Cheadle, APRN-CNP  10:07 AM  4/14/2021    CRITICAL CARE:   40 MINUTES. Please note that the withdrawal or failure to initiate urgent interventions for this patient would likely result in a life threatening deterioration or permanent disability. Accordingly this patient received the above mentioned time, excluding separately billable procedures.

## 2021-04-14 NOTE — PROGRESS NOTES
[facility]  [facility]  Progress Note    NAME: Julian Winn  MR:  [id number]  :   1940  DATE OF SERVICE:21    This is a face to face encounter with Julian Winn 68 y.o. female on 21    CHIEF COMPLAINT     ID following for   Chief Complaint   Patient presents with    Abdominal Pain     to er via ems from home for evaluation of LLQ pain and rectal bleeding that started today.  Rectal Bleeding     HISTORYOF PRESENT ILLNESS   Pt is on high flow  Daughter present  -no appetite  -working with pt  Denies pain  Patient is tolerating medications. No reported adverse drug reactions. REVIEW OF SYSTEMS     As stated above in the chief complaint, otherwise negative. CURRENT MEDICATIONS     Scheduled Meds:   carvedilol  12.5 mg Oral BID    hydrALAZINE  50 mg Oral 3 times per day    nitroglycerin  1 inch Topical 4 times per day    chlorothiazide  500 mg Intravenous BID    potassium bicarb-citric acid  20 mEq Oral BID    docusate sodium  100 mg Oral Daily    pantoprazole  40 mg Oral QAM AC    atorvastatin  40 mg Oral Nightly    vitamin D  50,000 Units Oral Weekly     Continuous Infusions:   bumetanide 0.1 mg/mL infusion 0.5 mg/hr (21 1317)    sodium chloride      sodium chloride       PRN Meds:perflutren lipid microspheres, acetaminophen, sodium chloride, sodium chloride, albuterol, ondansetron, promethazine, morphine    PHYSICAL EXAM     BP (!) 151/79   Pulse 95   Temp 98.1 °F (36.7 °C) (Infrared)   Resp 23   Ht 5' 5\" (1.651 m)   Wt 135 lb (61.2 kg)   SpO2 93%   BMI 22.47 kg/m²   Temp  Av.3 °F (36.8 °C)  Min: 97.2 °F (36.2 °C)  Max: 99.6 °F (37.6 °C)  Constitutional:  The patient is awake, alert, and oriented. Skin:    Warm and dry. HEENT:      AT/NC. Chest:   No use of accessory muscles to breathe. Symmetrical expansion. Dec bs post   Cardiovascular:  S1 and S2 are rhythmic and regular.     Abdomen:   Positive bowel sounds to auscultation. Benign to palpation. Extremities:   edema. CNS    AAxO   Lines: piv  Jane   DIAGNOSTIC RESULTS   Radiology:  Laboratory and Tests Review:  Lab Results   Component Value Date    WBC 9.1 04/14/2021    WBC 10.0 04/13/2021    WBC 10.9 04/12/2021    HGB 7.5 (L) 04/14/2021    HCT 24.7 (L) 04/14/2021    .0 (H) 04/14/2021     04/14/2021     No results found for: CRPHS  Lab Results   Component Value Date    ALT 7 04/14/2021    AST 32 (H) 04/14/2021    ALKPHOS 307 (H) 04/14/2021    BILITOT 1.3 (H) 04/14/2021     Lab Results   Component Value Date     04/14/2021    K 4.2 04/14/2021    K 3.9 03/31/2021    CL 90 04/14/2021    CO2 37 04/14/2021    BUN 56 04/14/2021    CREATININE 3.7 04/14/2021    CREATININE 3.5 04/13/2021    CREATININE 3.6 04/12/2021    GFRAA 14 04/14/2021    LABGLOM 14 04/14/2021    GLUCOSE 107 04/14/2021    GLUCOSE 102 11/17/2010    PROT 6.6 04/14/2021    LABALBU 4.5 04/14/2021    CALCIUM 9.4 04/14/2021    BILITOT 1.3 04/14/2021    ALKPHOS 307 04/14/2021    AST 32 04/14/2021    ALT 7 04/14/2021     No results found for: CRP  No results found for: SEDRATE  Recent Labs     04/12/21  0931 04/13/21  0719 04/14/21  0727 04/14/21  1128   PROCAL  --  0.66*  --  0.64*   TROPONINI  --   --   --  0.06*   AST 25 21 32*  --    ALT 5 <5 7  --      Lab Results   Component Value Date    CHOL 76 04/01/2021    TRIG 95 04/01/2021    HDL 22 04/01/2021    LDLCALC 35 04/01/2021    LABVLDL 19 04/01/2021     Lab Results   Component Value Date/Time    VITD25 24 (L) 10/08/2019 05:13 AM       Microbiology:   No results for input(s): COVID19 in the last 72 hours.   Lab Results   Component Value Date    BC 5 Days no growth 03/31/2021    BC 5 Days- no growth 10/03/2019    BLOODCULT2 5 Days no growth 03/31/2021    BLOODCULT2 5 Days- no growth 10/03/2019       FINAL IMPRESSION    PT CAME IN WITH ABD PAIN AND DIARRHE  systolic and diastolic congestive heart failure ef30%  SHE HAS LEUKOCYTOSIS post EUS

## 2021-04-14 NOTE — PROGRESS NOTES
Physical Therapy Treatment Note    Room #:  0430/0430-01  Patient Name: Jamia Waters  YOB: 1940  MRN: [id number]    Referring Provider:   Rocky Altman DO      Date of Service: 4/14/2021    Evaluating Physical Therapist: Wilfredo Mcpherson, PT #[id number]      Diagnosis:   GI bleed [K92.2]       Patient Active Problem List   Diagnosis    Shortness of breath    Chronic combined systolic and diastolic congestive heart failure ([address]    Chronic renal insufficiency, stage IV (severe) (HCC)    Atrial fibrillation (HCC)    Hypertension    Abnormal chest x-ray    Anemia of chronic disease    Opacity of lung on imaging study    Cigarette smoker    Tobacco abuse counseling    Acute on chronic combined systolic and diastolic CHF (congestive heart failure) ([address]    Acute on chronic congestive heart failure (HCC)    Acute systolic CHF (congestive heart failure) ([address]    GI bleed    Preoperative cardiovascular examination    Bowel perforation ([address]       Tentative placement recommendation: Subacute rehab  Equipment recommendation:  To be determined      Prior Level of Function: Patient ambulated independently   Rehab Potential: good  for baseline    Past medical history:   Past Medical History:   Diagnosis Date    Arthritis     Atrial fibrillation ([address]     Blood circulation, collateral     CHF (congestive heart failure) ([address]     Chronic renal insufficiency, stage IV (severe) ([address] 11/19/2016    History of cardiovascular stress test 07/2015    Lexiscan stress test    History of echocardiogram 07/27/2015    EF 29%    Hyperlipidemia     Hypertension      Past Surgical History:   Procedure Laterality Date    COLONOSCOPY N/A 4/3/2021    COLONOSCOPY POLYPECTOMY HOT SNARE performed by Jody Moraes DO at [facility]  4/3/2021    COLONOSCOPY WITH BIOPSY performed by Jody oMraes DO at [facility]  4/3/2021    COLONOSCOPY CONTROL HEMORRHAGE performed by Ji Amor Feliciano Worley at [address]  4/3/2021    COLONOSCOPY SUBMUCOSAL SPOT INJECTION performed by Radha Villeda DO at [address] [facility] GASTROINTESTINAL ENDOSCOPY N/A 4/3/2021    EGD BIOPSY performed by Radha Villeda DO at [facility] N/A 4/7/2021    EGD W/EUS FNA performed by Edwar Morales MD at [facility] ENDOSCOPY       Precautions: Activity as tolerated, falls and alarm ,  hiflow 15L O2    SUBJECTIVE:    Social history: Patient lives alone  in a ranch home  with No steps  to enter Tub shower grab bars    Equipment owned: Chloé Barth,      [address] [facility]   How much difficulty turning over in bed?: A Little  How much difficulty sitting down on / standing up from a chair with arms?: A Little  How much difficulty moving from lying on back to sitting on side of bed?: A Little  How much help from another person moving to and from a bed to a chair?: A Little  How much help from another person needed to walk in hospital room?: A Little  How much help from another person for climbing 3-5 steps with a railing?: A Lot  AM-PAC Inpatient Mobility Raw Score : 17  AM-PAC Inpatient T-Scale Score : 42.13  Mobility Inpatient CMS 0-100% Score: 50.57  Mobility Inpatient CMS G-Code Modifier : CK    Nursing cleared patient for PT treatment. OBJECTIVE;   Initial Evaluation  Date: 4/3/2021 Treatment Date:  4/14/2021     Short Term/ Long Term   Goals   Was pt agreeable to Eval/treatment? Yes  Yes To be met in 4 days   Pain level   10/10  Stomach  No number given    Bed Mobility    Rolling: Supervision    Supine to sit: Minimal assist of 1   Sit to supine: Minimal assist of 1   Scooting: Minimal assist of 1  Rolling: Minimal assist of 1   Supine to sit: Minimal assist of 1   Sit to supine: Minimal assist of 1   Scooting: Minimal assist of 1    Rolling: Independent   Supine to sit:  Independent   Sit to supine: Independent   Scooting: Independent    Transfers Sit to stand: Not assessed  patient refusing to stand today to due pain and fatigue. Sit to stand: Minimal assist of 1      Sit to stand: Independent    Ambulation    not assessed  2-3 side steps using  wheeled walker with Minimal assist of 1  cuing for sequencing     50 feet using  wheeled walker with Modified Independent    ROM Within functional limits       Strength BUE:  4/5  RLE:  4/5  LLE:  4/5  Increase strength in affected mm groups by 1/3 grade   Balance Sitting EOB:  fair +  Dynamic Standing:  not assessed  Sitting EOB: good   Dynamic Standing: fair with wheeled walker  Sitting EOB:  good   Dynamic Standing: good      Patient is Alert & Oriented x person, place, time and situation and follows directions   Sensation:  Patient  denies numbness and tingling     Edema:  yes bilateral feet   Endurance: fair  -    Vitals: 15 liters high flow nasal cannula   Blood Pressure at rest  Blood Pressure during session    Heart Rate at rest  Heart Rate during session    SPO2 at rest 93%  SPO2 during session 92%     Patient education  Patient educated on role of Physical Therapy, risks of immobility, safety and plan of care, energy conservation,  importance of mobility while in hospital , purse lip breathing, ankle pumps, quad set and glut set for edema control, blood clot prevention and proper use/technique of incentive spirometer    Patient response to education:   Pt verbalized understanding Pt demonstrated skill Pt requires further education in this area   Yes Partial Yes      Treatment:  Patient practiced and was instructed/facilitated in the following treatment: Patient assisted to edge of bed and Sat edge of bed 10 minutes with Supervision  to increase dynamic sitting balance and activity tolerance. Patient agitated at times. Patient took steps to head of bed for better positioning. Patient returned to bed at end of session.    Therapeutic Exercises:  not

## 2021-04-14 NOTE — PROGRESS NOTES
Physical Therapy        0430/0430-01    Patient unavailable for physical therapy treatment due to morning hold per nursing. Will attempt treatment later in day.     Berny Skaggs, PTA  #[id number]

## 2021-04-15 ENCOUNTER — APPOINTMENT (OUTPATIENT)
Dept: GENERAL RADIOLOGY | Age: 81
DRG: 871 | End: 2021-04-15
Payer: MEDICARE

## 2021-04-15 ENCOUNTER — APPOINTMENT (OUTPATIENT)
Dept: INTERVENTIONAL RADIOLOGY/VASCULAR | Age: 81
DRG: 871 | End: 2021-04-15
Payer: MEDICARE

## 2021-04-15 LAB
ALBUMIN SERPL-MCNC: 4.1 G/DL (ref 3.5–5.2)
ALP BLD-CCNC: 250 U/L (ref 35–104)
ALT SERPL-CCNC: 5 U/L (ref 0–32)
ANION GAP SERPL CALCULATED.3IONS-SCNC: 12 MMOL/L (ref 7–16)
ANION GAP SERPL CALCULATED.3IONS-SCNC: 14 MMOL/L (ref 7–16)
ANION GAP SERPL CALCULATED.3IONS-SCNC: 9 MMOL/L (ref 7–16)
APPEARANCE FLUID: NORMAL
AST SERPL-CCNC: 16 U/L (ref 0–31)
BASOPHILS ABSOLUTE: 0.05 E9/L (ref 0–0.2)
BASOPHILS RELATIVE PERCENT: 0.5 % (ref 0–2)
BILIRUB SERPL-MCNC: 1.1 MG/DL (ref 0–1.2)
BILIRUBIN DIRECT: 0.4 MG/DL (ref 0–0.3)
BILIRUBIN, INDIRECT: 0.7 MG/DL (ref 0–1)
BUN BLDV-MCNC: 34 MG/DL (ref 8–23)
BUN BLDV-MCNC: 61 MG/DL (ref 8–23)
BUN BLDV-MCNC: 63 MG/DL (ref 8–23)
CALCIUM SERPL-MCNC: 9.4 MG/DL (ref 8.6–10.2)
CALCIUM SERPL-MCNC: 9.6 MG/DL (ref 8.6–10.2)
CALCIUM SERPL-MCNC: 9.7 MG/DL (ref 8.6–10.2)
CELL COUNT FLUID TYPE: NORMAL
CHLORIDE BLD-SCNC: 88 MMOL/L (ref 98–107)
CHLORIDE BLD-SCNC: 89 MMOL/L (ref 98–107)
CHLORIDE BLD-SCNC: 94 MMOL/L (ref 98–107)
CO2: 34 MMOL/L (ref 22–29)
CO2: 36 MMOL/L (ref 22–29)
CO2: 38 MMOL/L (ref 22–29)
COLOR FLUID: YELLOW
CREAT SERPL-MCNC: 2.8 MG/DL (ref 0.5–1)
CREAT SERPL-MCNC: 4.1 MG/DL (ref 0.5–1)
CREAT SERPL-MCNC: 4.1 MG/DL (ref 0.5–1)
EOSINOPHILS ABSOLUTE: 0.1 E9/L (ref 0.05–0.5)
EOSINOPHILS RELATIVE PERCENT: 1.1 % (ref 0–6)
FLUID TYPE: NORMAL
GFR AFRICAN AMERICAN: 13
GFR AFRICAN AMERICAN: 13
GFR AFRICAN AMERICAN: 20
GFR NON-AFRICAN AMERICAN: 13 ML/MIN/1.73
GFR NON-AFRICAN AMERICAN: 13 ML/MIN/1.73
GFR NON-AFRICAN AMERICAN: 20 ML/MIN/1.73
GLUCOSE BLD-MCNC: 113 MG/DL (ref 74–99)
GLUCOSE BLD-MCNC: 117 MG/DL (ref 74–99)
GLUCOSE BLD-MCNC: 162 MG/DL (ref 74–99)
GLUCOSE, FLUID: 139 MG/DL
HCT VFR BLD CALC: 25.6 % (ref 34–48)
HEMOGLOBIN: 7.9 G/DL (ref 11.5–15.5)
IMMATURE GRANULOCYTES #: 0.04 E9/L
IMMATURE GRANULOCYTES %: 0.4 % (ref 0–5)
INR BLD: 1.4
LACTATE DEHYDROGENASE: 266 U/L (ref 135–214)
LD, FLUID: 167 U/L
LYMPHOCYTES ABSOLUTE: 1.88 E9/L (ref 1.5–4)
LYMPHOCYTES RELATIVE PERCENT: 20.1 % (ref 20–42)
MAGNESIUM: 1.5 MG/DL (ref 1.6–2.6)
MAGNESIUM: 1.7 MG/DL (ref 1.6–2.6)
MCH RBC QN AUTO: 30.4 PG (ref 26–35)
MCHC RBC AUTO-ENTMCNC: 30.9 % (ref 32–34.5)
MCV RBC AUTO: 98.5 FL (ref 80–99.9)
MONOCYTE, FLUID: 90 %
MONOCYTES ABSOLUTE: 0.97 E9/L (ref 0.1–0.95)
MONOCYTES RELATIVE PERCENT: 10.4 % (ref 2–12)
NEUTROPHIL, FLUID: 10 %
NEUTROPHILS ABSOLUTE: 6.33 E9/L (ref 1.8–7.3)
NEUTROPHILS RELATIVE PERCENT: 67.5 % (ref 43–80)
NUCLEATED CELLS FLUID: 1302 /UL
PDW BLD-RTO: 17.6 FL (ref 11.5–15)
PHOSPHORUS: 3.1 MG/DL (ref 2.5–4.5)
PHOSPHORUS: 4.8 MG/DL (ref 2.5–4.5)
PLATELET # BLD: 255 E9/L (ref 130–450)
PMV BLD AUTO: 12.1 FL (ref 7–12)
POTASSIUM SERPL-SCNC: 4.6 MMOL/L (ref 3.5–5)
POTASSIUM SERPL-SCNC: 4.6 MMOL/L (ref 3.5–5)
POTASSIUM SERPL-SCNC: 5.5 MMOL/L (ref 3.5–5)
PROTEIN FLUID: 3.4 G/DL
PROTHROMBIN TIME: 16.8 SEC (ref 9.3–12.4)
RBC # BLD: 2.6 E12/L (ref 3.5–5.5)
RBC FLUID: <2000 /UL
SODIUM BLD-SCNC: 137 MMOL/L (ref 132–146)
SODIUM BLD-SCNC: 138 MMOL/L (ref 132–146)
SODIUM BLD-SCNC: 139 MMOL/L (ref 132–146)
TOTAL PROTEIN: 6.4 G/DL (ref 6.4–8.3)
WBC # BLD: 9.4 E9/L (ref 4.5–11.5)

## 2021-04-15 PROCEDURE — 89051 BODY FLUID CELL COUNT: CPT

## 2021-04-15 PROCEDURE — 2700000000 HC OXYGEN THERAPY PER DAY

## 2021-04-15 PROCEDURE — 5A1D70Z PERFORMANCE OF URINARY FILTRATION, INTERMITTENT, LESS THAN 6 HOURS PER DAY: ICD-10-PCS | Performed by: INTERNAL MEDICINE

## 2021-04-15 PROCEDURE — 94660 CPAP INITIATION&MGMT: CPT

## 2021-04-15 PROCEDURE — 90935 HEMODIALYSIS ONE EVALUATION: CPT

## 2021-04-15 PROCEDURE — 6370000000 HC RX 637 (ALT 250 FOR IP): Performed by: INTERNAL MEDICINE

## 2021-04-15 PROCEDURE — 87116 MYCOBACTERIA CULTURE: CPT

## 2021-04-15 PROCEDURE — 84157 ASSAY OF PROTEIN OTHER: CPT

## 2021-04-15 PROCEDURE — 84100 ASSAY OF PHOSPHORUS: CPT

## 2021-04-15 PROCEDURE — C1894 INTRO/SHEATH, NON-LASER: HCPCS

## 2021-04-15 PROCEDURE — 02HV33Z INSERTION OF INFUSION DEVICE INTO SUPERIOR VENA CAVA, PERCUTANEOUS APPROACH: ICD-10-PCS | Performed by: INTERNAL MEDICINE

## 2021-04-15 PROCEDURE — 88112 CYTOPATH CELL ENHANCE TECH: CPT

## 2021-04-15 PROCEDURE — 6370000000 HC RX 637 (ALT 250 FOR IP): Performed by: NURSE PRACTITIONER

## 2021-04-15 PROCEDURE — 83735 ASSAY OF MAGNESIUM: CPT

## 2021-04-15 PROCEDURE — 87102 FUNGUS ISOLATION CULTURE: CPT

## 2021-04-15 PROCEDURE — 99233 SBSQ HOSP IP/OBS HIGH 50: CPT | Performed by: INTERNAL MEDICINE

## 2021-04-15 PROCEDURE — 36556 INSERT NON-TUNNEL CV CATH: CPT

## 2021-04-15 PROCEDURE — 88305 TISSUE EXAM BY PATHOLOGIST: CPT

## 2021-04-15 PROCEDURE — 0W993ZZ DRAINAGE OF RIGHT PLEURAL CAVITY, PERCUTANEOUS APPROACH: ICD-10-PCS | Performed by: RADIOLOGY

## 2021-04-15 PROCEDURE — 2580000003 HC RX 258

## 2021-04-15 PROCEDURE — 83615 LACTATE (LD) (LDH) ENZYME: CPT

## 2021-04-15 PROCEDURE — 76937 US GUIDE VASCULAR ACCESS: CPT

## 2021-04-15 PROCEDURE — 32555 ASPIRATE PLEURA W/ IMAGING: CPT

## 2021-04-15 PROCEDURE — 71045 X-RAY EXAM CHEST 1 VIEW: CPT

## 2021-04-15 PROCEDURE — 77001 FLUOROGUIDE FOR VEIN DEVICE: CPT

## 2021-04-15 PROCEDURE — 80048 BASIC METABOLIC PNL TOTAL CA: CPT

## 2021-04-15 PROCEDURE — C1729 CATH, DRAINAGE: HCPCS

## 2021-04-15 PROCEDURE — 87205 SMEAR GRAM STAIN: CPT

## 2021-04-15 PROCEDURE — 87206 SMEAR FLUORESCENT/ACID STAI: CPT

## 2021-04-15 PROCEDURE — 80076 HEPATIC FUNCTION PANEL: CPT

## 2021-04-15 PROCEDURE — 2500000003 HC RX 250 WO HCPCS: Performed by: NURSE PRACTITIONER

## 2021-04-15 PROCEDURE — 2500000003 HC RX 250 WO HCPCS: Performed by: RADIOLOGY

## 2021-04-15 PROCEDURE — 85025 COMPLETE CBC W/AUTO DIFF WBC: CPT

## 2021-04-15 PROCEDURE — 87070 CULTURE OTHR SPECIMN AEROBIC: CPT

## 2021-04-15 PROCEDURE — 36415 COLL VENOUS BLD VENIPUNCTURE: CPT

## 2021-04-15 PROCEDURE — 82947 ASSAY GLUCOSE BLOOD QUANT: CPT

## 2021-04-15 PROCEDURE — 87015 SPECIMEN INFECT AGNT CONCNTJ: CPT

## 2021-04-15 PROCEDURE — 2060000000 HC ICU INTERMEDIATE R&B

## 2021-04-15 PROCEDURE — 85610 PROTHROMBIN TIME: CPT

## 2021-04-15 PROCEDURE — 2580000003 HC RX 258: Performed by: NURSE PRACTITIONER

## 2021-04-15 PROCEDURE — 6370000000 HC RX 637 (ALT 250 FOR IP): Performed by: STUDENT IN AN ORGANIZED HEALTH CARE EDUCATION/TRAINING PROGRAM

## 2021-04-15 PROCEDURE — 6360000002 HC RX W HCPCS: Performed by: INTERNAL MEDICINE

## 2021-04-15 PROCEDURE — 80074 ACUTE HEPATITIS PANEL: CPT

## 2021-04-15 RX ORDER — LIDOCAINE HYDROCHLORIDE 10 MG/ML
10 INJECTION, SOLUTION INFILTRATION; PERINEURAL ONCE
Status: COMPLETED | OUTPATIENT
Start: 2021-04-15 | End: 2021-04-15

## 2021-04-15 RX ORDER — ISOSORBIDE DINITRATE 20 MG/1
20 TABLET ORAL 3 TIMES DAILY
Status: DISCONTINUED | OUTPATIENT
Start: 2021-04-15 | End: 2021-04-16

## 2021-04-15 RX ORDER — SODIUM CHLORIDE 0.9 % (FLUSH) 0.9 %
SYRINGE (ML) INJECTION
Status: COMPLETED
Start: 2021-04-15 | End: 2021-04-15

## 2021-04-15 RX ADMIN — ISOSORBIDE DINITRATE 20 MG: 20 TABLET ORAL at 21:52

## 2021-04-15 RX ADMIN — CARVEDILOL 12.5 MG: 6.25 TABLET, FILM COATED ORAL at 10:29

## 2021-04-15 RX ADMIN — HYDRALAZINE HYDROCHLORIDE 50 MG: 50 TABLET ORAL at 10:29

## 2021-04-15 RX ADMIN — NITROGLYCERIN 1 INCH: 20 OINTMENT TOPICAL at 00:00

## 2021-04-15 RX ADMIN — ATORVASTATIN CALCIUM 40 MG: 40 TABLET, FILM COATED ORAL at 21:51

## 2021-04-15 RX ADMIN — POTASSIUM BICARBONATE 20 MEQ: 782 TABLET, EFFERVESCENT ORAL at 21:52

## 2021-04-15 RX ADMIN — ONDANSETRON 4 MG: 2 INJECTION INTRAMUSCULAR; INTRAVENOUS at 04:13

## 2021-04-15 RX ADMIN — NITROGLYCERIN 1 INCH: 20 OINTMENT TOPICAL at 07:08

## 2021-04-15 RX ADMIN — HYDRALAZINE HYDROCHLORIDE 50 MG: 50 TABLET ORAL at 21:51

## 2021-04-15 RX ADMIN — CARVEDILOL 12.5 MG: 6.25 TABLET, FILM COATED ORAL at 21:52

## 2021-04-15 RX ADMIN — PANTOPRAZOLE SODIUM 40 MG: 40 TABLET, DELAYED RELEASE ORAL at 10:30

## 2021-04-15 RX ADMIN — SODIUM CHLORIDE, PRESERVATIVE FREE 10 ML: 5 INJECTION INTRAVENOUS at 21:51

## 2021-04-15 RX ADMIN — POTASSIUM BICARBONATE 20 MEQ: 782 TABLET, EFFERVESCENT ORAL at 10:30

## 2021-04-15 RX ADMIN — LIDOCAINE HYDROCHLORIDE 10 ML: 10 INJECTION, SOLUTION INFILTRATION; PERINEURAL at 14:53

## 2021-04-15 RX ADMIN — ISOSORBIDE DINITRATE 20 MG: 20 TABLET ORAL at 10:29

## 2021-04-15 RX ADMIN — MORPHINE SULFATE 2 MG: 2 INJECTION, SOLUTION INTRAMUSCULAR; INTRAVENOUS at 12:45

## 2021-04-15 RX ADMIN — BUMETANIDE 0.5 MG/HR: 0.25 INJECTION INTRAMUSCULAR; INTRAVENOUS at 15:32

## 2021-04-15 RX ADMIN — DOCUSATE SODIUM 100 MG: 100 CAPSULE ORAL at 10:29

## 2021-04-15 ASSESSMENT — PAIN SCALES - GENERAL
PAINLEVEL_OUTOF10: 0
PAINLEVEL_OUTOF10: 0
PAINLEVEL_OUTOF10: 10
PAINLEVEL_OUTOF10: 5

## 2021-04-15 ASSESSMENT — PAIN DESCRIPTION - LOCATION: LOCATION: ABDOMEN

## 2021-04-15 ASSESSMENT — PAIN SCALES - WONG BAKER: WONGBAKER_NUMERICALRESPONSE: 0

## 2021-04-15 NOTE — PLAN OF CARE
Problem: Falls - Risk of:  Goal: Will remain free from falls  Description: Will remain free from falls  Outcome: Met This Shift  Goal: Absence of physical injury  Description: Absence of physical injury  Outcome: Met This Shift     Problem: Skin Integrity:  Goal: Will show no infection signs and symptoms  Description: Will show no infection signs and symptoms  Outcome: Met This Shift  Goal: Absence of new skin breakdown  Description: Absence of new skin breakdown  Outcome: Met This Shift  Goal: Risk for impaired skin integrity will decrease  Description: Risk for impaired skin integrity will decrease  Outcome: Met This Shift     Problem: Respiratory:  Goal: Ability to maintain a clear airway will improve  Description: Ability to maintain a clear airway will improve  Outcome: Met This Shift  Goal: Ability to maintain adequate ventilation will improve  Description: Ability to maintain adequate ventilation will improve  Outcome: Met This Shift  Goal: Complications related to the disease process, condition or treatment will be avoided or minimized  Description: Complications related to the disease process, condition or treatment will be avoided or minimized  Outcome: Met This Shift

## 2021-04-15 NOTE — CONSULTS
INPATIENT CARDIOLOGY FOLLOW-UP    Name: Viktoria Barber    Age: 68 y.o. Date of Admission: 3/31/2021 10:32 AM    Date of Service: 4/15/2021    Primary Cardiologist: Known to Latisha Arrieta (inpt consultation 2019) and William Jain from this admission    Chief Complaint: Follow-up for cardiomyopathy    Interim History:  Complicated patient admitted on 3/31/2021 for hematochezia, history notable for longstanding cardiomyopathy, CKD and permanent A. fib initially seen by Dr. Rere Escamilla for preoperative evaluation but is not been followed by cardiology otherwise this admission. Was reconsulted for an abnormal EKG. Ongoing issues at this point include worsening respiratory failure, CHF with volume overload, worsening renal failure approaching need for dialysis. Currently on a bumetanide drip since yesterday and net -27 L with worsening respiratory status.     Review of Systems:   Negative except as described above    Problem List:  Patient Active Problem List   Diagnosis    Shortness of breath    Chronic combined systolic and diastolic congestive heart failure (HCC)    Chronic renal insufficiency, stage IV (severe) (HCC)    Atrial fibrillation ([address]    Hypertension    Abnormal chest x-ray    Anemia of chronic disease    Opacity of lung on imaging study    Cigarette smoker    Tobacco abuse counseling    Acute on chronic combined systolic and diastolic CHF (congestive heart failure) ([address]    Acute on chronic congestive heart failure (HCC)    Acute systolic CHF (congestive heart failure) (HCC)    GI bleed    Preoperative cardiovascular examination    Bowel perforation (HCC)       Current Medications:    Current Facility-Administered Medications:     carvedilol (COREG) tablet 12.5 mg, 12.5 mg, Oral, BID, MADELAINE Poole CNP, 12.5 mg at 04/14/21 2243    bumetanide (BUMEX) 12.5 mg in sodium chloride 0.9 % 125 mL infusion, 0.5 mg/hr, Intravenous, Continuous, MADELAINE Poole CNP, Last Rate: 5 mL/hr at 04/14/21 1317, 0.5 mg/hr at 04/14/21 1317    hydrALAZINE (APRESOLINE) tablet 50 mg, 50 mg, Oral, 3 times per day, [facility] - CNP, 50 mg at 04/14/21 2240    nitroglycerin (NITRO-BID) 2 % ointment 1 inch, 1 inch, Topical, 4 times per day, Valaria Peasant, DO, 1 inch at 04/15/21 0708    potassium bicarb-citric acid (EFFER-K) effervescent tablet 20 mEq, 20 mEq, Oral, BID, [facility] - CNP, 20 mEq at 04/14/21 2239    docusate sodium (COLACE) capsule 100 mg, 100 mg, Oral, Daily, [place], APRN - CNP, 100 mg at 04/14/21 9002    perflutren lipid microspheres (DEFINITY) injection 1.65 mg, 1.5 mL, Intravenous, ONCE PRN, Salma Rodrigez, DO    acetaminophen (TYLENOL) tablet 650 mg, 650 mg, Oral, Q6H PRN, [facility], APRN - CNP, 650 mg at 04/10/21 0003    pantoprazole (PROTONIX) tablet 40 mg, 40 mg, Oral, QAM AC, Suhail Aldrich, DO, 40 mg at 04/14/21 0559    0.9 % sodium chloride infusion, , Intravenous, PRN, Cleo Cid, DO    0.9 % sodium chloride infusion, , Intravenous, PRN, Valaria Peasant, DO    atorvastatin (LIPITOR) tablet 40 mg, 40 mg, Oral, Nightly, Valaria Peasant, DO, 40 mg at 04/14/21 2239    vitamin D (ERGOCALCIFEROL) capsule 50,000 Units, 50,000 Units, Oral, Weekly, Valaria Peasant, DO, 50,000 Units at 04/14/21 0948    albuterol (PROVENTIL) nebulizer solution 2.5 mg, 2.5 mg, Nebulization, Q6H PRN, Valaria Peasant, DO, 2.5 mg at 04/13/21 1033    ondansetron (ZOFRAN) injection 4 mg, 4 mg, Intravenous, Q6H PRN, Valaria Peasant, DO, 4 mg at 04/15/21 0413    promethazine (PHENERGAN) injection 25 mg, 25 mg, Intravenous, Q6H PRN, Valaria Peasant, DO    morphine (PF) injection 2 mg, 2 mg, Intravenous, Q4H PRN, Valaria Peasant, DO, 2 mg at 04/13/21 4377    Physical Exam:  /72   Pulse 76   Temp 97.8 °F (36.6 °C) (Axillary)   Resp 16   Ht 5' 5\" (1.651 m)   Wt 135 lb (61.2 kg)   SpO2 98%   BMI 22.47 kg/m²   Wt Readings from Last 3 Encounters:   04/11/21 135 lb (61.2 kg)   10/08/19 116 lb 14.4 oz (53 kg)   06/03/19 130 lb (59 kg)     Appearance: Awake, alert, currently on BiPAP with a poorly fitting mask  Skin: Intact, no rash  Head: Normocephalic, atraumatic  Eyes: EOMI, no conjunctival erythema  ENMT: No pharyngeal erythema, MMM, no rhinorrhea  Neck: Supple, no elevated JVP, no carotid bruits  Lungs: Diminished with bibasilar rales  Cardiac: PMI nondisplaced, irregular rhythm with a normal rate, S1 & S2 normal, no murmurs  Abdomen: Soft, nontender, +bowel sounds  Extremities: Moves all extremities x 4, trace lower extremity edema  Neurologic: No focal motor deficits apparent, normal mood and affect  Peripheral Pulses: Intact posterior tibial pulses bilaterally    Intake/Output:    Intake/Output Summary (Last 24 hours) at 4/15/2021 0717  Last data filed at 4/15/2021 0417  Gross per 24 hour   Intake 40 ml   Output 2050 ml   Net -2010 ml     No intake/output data recorded.     Laboratory Tests:  Recent Labs     04/13/21  0719 04/14/21  0727 04/14/21  2245    140 138   K 3.6 4.2 4.6   CL 92* 90* 88*   CO2 35* 37* 38*   BUN 54* 56* 59*   CREATININE 3.5* 3.7* 4.0*   GLUCOSE 92 107* 118*   CALCIUM 8.7 9.4 9.5     Lab Results   Component Value Date    MG 1.6 04/14/2021     Recent Labs     04/12/21  0931 04/13/21 0719 04/14/21  0727   ALKPHOS 234* 201* 307*   ALT 5 <5 7   AST 25 21 32*   PROT 6.1* 5.6* 6.6   BILITOT 1.1 0.9 1.3*   LABALBU 4.0 3.4* 4.5     Recent Labs     04/12/21  0931 04/13/21  0719 04/14/21  0727   WBC 10.9 10.0 9.1   RBC 2.40* 2.51* 2.47*   HGB 7.4* 7.8* 7.5*   HCT 23.8* 24.3* 24.7*   MCV 99.2 96.8 100.0*   MCH 30.8 31.1 30.4   MCHC 31.1* 32.1 30.4*   RDW 17.4* 17.6* 17.4*    238 237   MPV 12.1* 12.4* 12.8*     Lab Results   Component Value Date    CKTOTAL 58 07/25/2015    CKMB 3.8 07/25/2015    TROPONINI 0.06 (H) 04/14/2021    TROPONINI 0.09 (H) 04/14/2021    TROPONINI 0.06 (H) 04/14/2021     Lab Results   Component Value Date    INR 1.8 03/31/2021    INR 1.2 disease in the distal vasculature. Cardiac catheterization: na    ----------------------------------------------------------------------------------------------------------------------------------------------------------------  IMPRESSION:  1. Acute on chronic heart failure with reduced ejection fraction. proBNP greater than 70,000. Net negative thus far 27.8 L  2. Longstanding cardiomyopathy, EF about 30%, dating back to 2015 at least.  Unclear etiology, likely mixed. Only ischemic eval 2015 showing fixed inferior wall defect. 3. Acute hypoxic respiratory failure, currently on BiPAP  4. Pleural effusions  5. Elevated troponin 0.24 (3/31/2021, not rechecked until yesterday) -> 0.06. Likely demand ischemia versus NSTEMI  6. Permanent atrial fibrillation. AC with apixaban, currently held due to GI bleed. Rate controlled  7. Chronic RBBB/LPFB  8. Severe REYES on CKD4 creatinine 2.8 -> 4.0  9. Pancreatic pseudocyst with hemorrhagic component  10. Abdominal aortic aneurysm 3.7 x 4.8 cm with chronic dissection  11. Hematochezia with acute blood loss anemia Hgb 7.9  12. 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 Hypertension  14. Hyperlipidemia  15. Tobacco abuse  16. History of CVA    RECOMMENDATIONS:  Very complex patient.      Continue bumetanide infusion with intermittent chlorothiazide   Further plans re: initiation of dialysis per nephrology   Consider noncontrast chest CT and thoracentesis   Continue guideline directed medical therapy for cardiomyopathy:   o Carvedilol, recently increased yesterday to 12.5 mg twice daily  o Hydralazine, increased to 50 mg 3 times daily yesterday  o Would change nitroglycerin paste to isosorbide dinitrate 20 mg 3 times daily to optimize afterload reduction  o No ACE/ARB/ARNI or mineralocorticoid antagonist due to renal failure   With longstanding cardiomyopathy ICD may be indicated, but this would also depend on her compliance, which can be addressed as an outpatient if she follows up with cardiology which she has not done, and poor compliance would be a contraindication to ICD placement   Aggressive risk factor modification   Further care per primary, nephrology, ID, GI    Discussed with Dr. Janessa Chong MD, [address] [place] [address] Cardiology    NOTE: This report was transcribed using voice recognition software. Every effort was made to ensure accuracy; however, inadvertent computerized transcription errors may be present.

## 2021-04-15 NOTE — PROGRESS NOTES
[facility] MD Beth Tucker MD Tessie Brightly, MD Isabel Rumps, MD   Progress Note    4/14/2021    SUBJECTIVE:     Pt known to Dr. Pam Leung from office, and followed at [facility] 2 weeks prior to this admission    4/3: Seen this am in her room , o2/nc at 4 L which is her home o2 . She did undergo EGD/colonscopy this am . Results noted   Continue to look volume overloaded with 2 + edema and JVD . 4/4 ; stable vitlas , good UO on bumex drip/diuril . Will assess in am if we can wean her off drip     4/5: Patient was doing well this morning, she is awake and alert with no acute distress. We will continue same medications and same plan of care for today. 4/6 \" Seen this am , pleasant , o2/nc . Good UO . Cr stable . Plan for EUS in am     4/7 : good UO with negative balance . In good spirit . For EUS today . 4/8 : in bed , lying flat in NAD . Edema much improved     4/9 : report of pt being more sob . she is for cxr today . EUS with bile duct stone     4/10: Recurrent left lower quadrant pain, ongoing anorexia with poor intake of food and fluid, intermittent mild nausea though no emesis. No dyspnea. No peripheral swelling. Mildly hypotensive. 4/11: Fatigue, generalized weakness, ongoing anorexia and poor intake. Some lower abdominal discomfort though no dez pain. Indigestion and mild nausea intermittently. No swelling. No dyspnea on nasal cannula. No cough or wheeze. Started normal saline at conservative rate yesterday, increase the rate this morning    4/12 : weak . bp stable   High o2 requirement this evening   cxr with HF       4/13 : seen today , feels better . No LE edema . JVD 2+ . We disussed we are likely looking at need for RRT in the future     4/14 : continue to require high amount of o2 . She is alert oriened when seeing her . We discussed dialysis and she is said she is agreeable .  She is telling me she d like to try diuretics another day prior to proceeding with dialysis     PROBLEM LIST:    Principal Problem:    GI bleed  Active Problems:    Preoperative cardiovascular examination    Bowel perforation (HCC)  Resolved Problems:    * No resolved hospital problems. *       DIET:    DIET LOW FAT; Low Sodium (2 GM)  Dietary Nutrition Supplements: Frozen Oral Supplement  Diet NPO, After Midnight Exceptions are: Sips of Water with Meds       Allergies : Patient has no known allergies. Review of Systems:   Constitutional:weak   Eyes: no eye pain , no itching , no drainage  Ears, nose, mouth, throat, and face: no ear ,nose pain , hearing is ok ,no nasal drainage   Respiratory: no sob ,no cough ,no wheezing . Cardiovascular: no chest pain , no palpitation ,no sob . Gastrointestinal: no nausea, vomiting , constipation , no abdominal pain . Genitourinary:no urinary retention , no burning , dysuria . No polyuria   Hematologic/lymphatic: no bleeding , no cougulation issues . Musculoskeletal:no joint pain , no swelling . Neurological: no headaches ,no weakness , no numbness . Endocrine: no thirst , no weight issues .      MEDS (scheduled):    carvedilol  12.5 mg Oral BID    hydrALAZINE  50 mg Oral 3 times per day    nitroglycerin  1 inch Topical 4 times per day    chlorothiazide  500 mg Intravenous BID    potassium bicarb-citric acid  20 mEq Oral BID    docusate sodium  100 mg Oral Daily    pantoprazole  40 mg Oral QAM AC    atorvastatin  40 mg Oral Nightly    vitamin D  50,000 Units Oral Weekly       MEDS (infusions):   bumetanide 0.1 mg/mL infusion 0.5 mg/hr (04/14/21 1317)    sodium chloride      sodium chloride         MEDS (prn):  perflutren lipid microspheres, acetaminophen, sodium chloride, sodium chloride, albuterol, ondansetron, promethazine, morphine    PHYSICAL EXAM:     Patient Vitals for the past 24 hrs:   BP Temp Temp src Pulse Resp SpO2   04/14/21 1945 (!) 155/80   90     04/14/21 1703     21    04/14/21 1701      93 %   04/14/21 1656 135/85 97.9 °F (36.6 °C) Axillary 92 24 90 %   04/14/21 1640      (!) 78 %   04/14/21 1615    92 20 94 %   04/14/21 1430 138/78        04/14/21 1246     23    04/14/21 1124     20    04/14/21 0815 (!) 151/79 98.1 °F (36.7 °C) Infrared 95 20 93 %   04/14/21 0559 138/82        @      Intake/Output Summary (Last 24 hours) at 4/14/2021 2223  Last data filed at 4/14/2021 0951  Gross per 24 hour   Intake 90 ml   Output 525 ml   Net -435 ml         Wt Readings from Last 3 Encounters:   04/11/21 135 lb (61.2 kg)   10/08/19 116 lb 14.4 oz (53 kg)   06/03/19 130 lb (59 kg)       Constitutional:  in no acute distress  Oral: mucus membranes moist  Neck: Trachea midline. No JVD  Cardiovascular: S1, S2 regular rhythm, no murmur,or rub  Respiratory: Poor inspiratory effort, difficult to encourage better, though no crackles, no wheeze  Gastrointestinal:  Soft, nontender, nondistended, NABS  Ext: No edema dependently or distally  Skin: dry, no rash  Neuro: awake, alert, interactive      DATA:    Recent Labs     04/12/21  0931 04/13/21  0719 04/14/21  0727   WBC 10.9 10.0 9.1   HGB 7.4* 7.8* 7.5*   HCT 23.8* 24.3* 24.7*   MCV 99.2 96.8 100.0*    238 237     Recent Labs     04/12/21  0931 04/13/21  0719 04/14/21  0727    140 140   K 3.3* 3.6 4.2   CL 92* 92* 90*   CO2 35* 35* 37*   MG 1.7  --   --    PHOS 5.9*  --   --    BUN 55* 54* 56*   CREATININE 3.6* 3.5* 3.7*   ALT 5 <5 7   AST 25 21 32*   BILITOT 1.1 0.9 1.3*   ALKPHOS 234* 201* 307*       Lab Results   Component Value Date    LABPROT 0.3 (H) 04/11/2021    LABPROT 0.3 04/11/2021       ASSESSMENT     1) REYES  in the setting of GI bleed ,low EF/CHF     2) Chronic kidney disease stage IV baseline cr 2-2.7     3) 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  Results noted (multiple esophageal diverticuli, gastritis and duodenal bulb lesion 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 (biopsies pending of antrum and bulb lesion))     5) ischaemic cardiomyopathy low EF at 29% decompensated     6) chronic respiratory failure on 3-4 L o2/home               RECOMMENDATIONS    -bumex drip/diuril     -will keep NPO after midnight in case we need to proceed with dialysis cathether     -am labs             Electronically signed by Norberto Wilder MD on 4/14/2021

## 2021-04-15 NOTE — PROGRESS NOTES
Physical Therapy        0517/0517-01    Patient unavailable for physical therapy treatment due to patient receiving dialysis in room.     Alley George, PTA  #[id number]

## 2021-04-15 NOTE — PROGRESS NOTES
Internal Medicine Progress Note    [facility]    Jigna Kruger. Louis Darby., DALEOChepeIChepe Doyle D.O., F.A.C.O.I. Venida Simmonds, D.O. Britney Gayle, MSN, APRN, NP-C  Brittnee Bahena. Tash Joshi, MSN, APRN-CNP     Primary Care Physician: Ramya Hernandez DO   Admitting Physician:  Ron Santo DO  Admission date and time: 3/31/2021 10:32 AM    Room:  [address] [place] [address]  Admitting diagnosis: GI bleed [K92.2]    Patient Name: Philip Boss  MRN: [id number]    Date of Service: 4/15/2021     Subjective:  Lucie Messina is a 68 y.o. female who was seen and examined today,4/15/2021, at the bedside. Lucie Messina did diurese accordingly yesterday but unfortunately has developed worsening renal dysfunction. She appears to have developed worsening pleural effusion as well. We had a long conversation today and she agrees for repeat thoracentesis as well as temporary dialysis institution. Review of System:   Constitutional:   Less weakness and deconditioning today. HEENT:   Denies ear pain, sore throat, sinus or eye problems. Admits to irritation associated with the nasal cannula oxygen. Cardiovascular:   Denies any chest pain, irregular heartbeats, or palpitations. Respiratory:   Ongoing dyspnea but significantly improved from yesterday. She admits to substantial irritation associated with BiPAP therapy. Gastrointestinal:   No further abdominal discomfort today. Genitourinary:    Voiding with the use of a Jane catheter. Extremities:   Complete resolution of her presenting lower extremity edema. Neurology:    Admits to weakness and deconditioning which remains profound. Psch:   Denies being anxious or depressed. Musculoskeletal:    Denies  myalgias, joint complaints or back pain. Integumentary:   Denies any rashes, ulcers, or excoriations. Denies bruising. Hematologic/Lymphatic:  Denies bruising or bleeding.     Physical Exam:  I/O this shift:  In: -   Out: 400 [Urine:400] Intake/Output Summary (Last 24 hours) at 4/15/2021 1113  Last data filed at 4/15/2021 0859  Gross per 24 hour   Intake    Output 2450 ml   Net -2450 ml   I/O last 3 completed shifts: In: 36 [P.O.:40]  Out: 2050 [Urine:2050]  No data found. Vital Signs:   Blood pressure 133/86, pulse 111, temperature 98.3 °F (36.8 °C), temperature source Oral, resp. rate 18, height 5' 5\" (1.651 m), weight 135 lb (61.2 kg), SpO2 91 %. General appearance:  Better appearing today but obviously agitated in the setting of BiPAP therapy. Head:  Normocephalic. No masses, lesions or tenderness. Eyes:  PERRLA. EOMI. Sclera clear. Impaired vision. ENT:  Ears normal. Mucosa normal. BiPAP is in place. Neck:    Supple. Trachea midline. No thyromegaly. No JVD. No bruits. Heart:    S1 and S2, regular rate and rhythm, systolic murmur  Lungs:    Significantly diminished at the bases posteriorly. Abdomen:   Soft mildly tender to palpation. Voluntary guarding is elicited. Bowel sounds are active. Extremities:    Complete resolution of her presenting edema. Neurologic:    Alert x 3. Lethargic and acutely ill. Cranial nerves grossly intact. No focal weakness. Psych:   Behavior is normal. Mood appears normal. Speech is not rapid and/or pressured. Musculoskeletal:   Spine ROM normal. Muscular strength intact. Gait not assessed. Integumentary:  No rashes  Skin normal color and texture. Genitalia/Breast:  Voiding with the use of a Jane catheter.     Medication:  Scheduled Meds:   isosorbide dinitrate  20 mg Oral TID    carvedilol  12.5 mg Oral BID    hydrALAZINE  50 mg Oral 3 times per day    potassium bicarb-citric acid  20 mEq Oral BID    docusate sodium  100 mg Oral Daily    pantoprazole  40 mg Oral QAM AC    atorvastatin  40 mg Oral Nightly    vitamin D  50,000 Units Oral Weekly     Continuous Infusions:   bumetanide 0.1 mg/mL infusion 0.5 mg/hr (04/14/21 1317)    sodium chloride      sodium chloride Objective Data:  CBC with Differential:    Lab Results   Component Value Date    WBC 9.4 04/15/2021    RBC 2.60 04/15/2021    HGB 7.9 04/15/2021    HCT 25.6 04/15/2021     04/15/2021    MCV 98.5 04/15/2021    MCH 30.4 04/15/2021    MCHC 30.9 04/15/2021    RDW 17.6 04/15/2021    NRBC 0.9 03/31/2021    LYMPHOPCT 20.1 04/15/2021    MONOPCT 10.4 04/15/2021    MYELOPCT 0.9 04/03/2021    BASOPCT 0.5 04/15/2021    MONOSABS 0.97 04/15/2021    LYMPHSABS 1.88 04/15/2021    EOSABS 0.10 04/15/2021    BASOSABS 0.05 04/15/2021     CMP:    Lab Results   Component Value Date     04/14/2021    K 4.6 04/14/2021    K 3.9 03/31/2021    CL 88 04/14/2021    CO2 38 04/14/2021    BUN 59 04/14/2021    CREATININE 4.0 04/14/2021    GFRAA 13 04/14/2021    LABGLOM 13 04/14/2021    GLUCOSE 118 04/14/2021    GLUCOSE 102 11/17/2010    PROT 6.4 04/15/2021    LABALBU 4.1 04/15/2021    CALCIUM 9.5 04/14/2021    BILITOT 1.1 04/15/2021    ALKPHOS 250 04/15/2021    AST 16 04/15/2021    ALT 5 04/15/2021       Assessment:  1. 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 Worsening respiratory failure with hypoxia due to decompensated Acutely decompensated systolic and diastolic congestive heart failure  3. 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 Endoscopic ultrasound does show a 5 mm stone in the distal common bile duct  5. Acute on chronic kidney disease stage IV   6. Abdominal aortic aneurysm toward the bifurcation measuring up to 3.7 x 4.8 cm a component of chronic dissection appearance  7. Acutely decompensated systolic and diastolic congestive heart failure  8. Paroxysmal atrial fibrillation on chronic anticoagulation with Eliquis--on hold  9. Essential hypertension  10.  Moderate peripheral artery disease    Plan:   Yvette Herrmann was transferred to the [facility] floor yesterday and resumed on Bumex drip with pulse-dosed Diuril. She has diuresed and her respiratory status has improved to some extent. Unfortunately, pleural effusions have again formed and we will attempt thoracentesis today. As we are unable to maintain appropriate fluid balance with ongoing decompensation, we will move forward with temporary dialysis catheter placement and the institution of hemodialysis. I have discussed the case with the cardiovascular team today. The nephrology team continues to follow. This remains a very complicated case. No family members were present during my examination. More than 50% of my  time was spent at the bedside counseling/coordinating care with the patient and/or family with face to face contact. This time was spent reviewing notes and laboratory data as well as instructing and counseling the patient. Time I spent with the family or surrogate(s) is included only if the patient was incapable of providing the necessary information or participating in medical decisions. I also discussed the differential diagnosis and all of the proposed management plans with the patient and individuals accompanying the patient. Yvette Herrmann requires this high level of physician care and nursing on the Telemetry unit due the complexity of decision management and chance of rapid decline or death. Continued cardiac monitoring and higher level of nursing are required. I am readily available for any further decision-making and intervention.      Martell Allen,   11:13 AM  4/15/2021

## 2021-04-15 NOTE — PROGRESS NOTES
Patient came down to special procedures for right thoracentesis and temporary dialysis catheter placement. First procedure right thoracentesis    1415 procedure start  1417 procedure end    650 cc of straw colored pleural fluid drained. 2x2 tegaderm applied to right back. 1420 During procedure bumex drip ran empty, disconnected and iv flushed. Second procedure temporary dialysis catheter placement     1438 Procedure start       1444 Procedure end     Oxygen increased to 10 liters nasal canula during procedure, patient laying flat. Temporary dialysis catheter to right IJ.  2 x 2 tegaderm applied.      nurse to nurse called, spoke with kerry coppola

## 2021-04-15 NOTE — CARE COORDINATION
SS NOTE: NEGATIVE COVID 21. Pt on continuous BIPAP - she will begin a small trial of nasal cannula. She was scheduled for a VQ scan but was unable to complete d/t anxiety. Pt to have a temporary HC catheder placed today and begin hemodialysis. Pt is also to have a thoracentesis today. Current discharge plan is [address] [facility] [address]  ,[id number] will resubmit when pt is medically ready. HENS completed. LUBA will need signed by Physician. (Pt does not have 3 day critical care stay for LTAC criteria and [employer] insurance can be difficult to approve LTAC.) CM/SW will follow for possible dialysis need. Melina Metzger. 4/15/2021.12:15 PM.

## 2021-04-16 LAB
ALBUMIN SERPL-MCNC: 4.2 G/DL (ref 3.5–5.2)
ALP BLD-CCNC: 214 U/L (ref 35–104)
ALT SERPL-CCNC: 5 U/L (ref 0–32)
ANION GAP SERPL CALCULATED.3IONS-SCNC: 11 MMOL/L (ref 7–16)
ANION GAP SERPL CALCULATED.3IONS-SCNC: 13 MMOL/L (ref 7–16)
ANISOCYTOSIS: ABNORMAL
AST SERPL-CCNC: 16 U/L (ref 0–31)
BASOPHILS ABSOLUTE: 0 E9/L (ref 0–0.2)
BASOPHILS RELATIVE PERCENT: 0 % (ref 0–2)
BILIRUB SERPL-MCNC: 1.1 MG/DL (ref 0–1.2)
BILIRUBIN DIRECT: 0.3 MG/DL (ref 0–0.3)
BILIRUBIN, INDIRECT: 0.8 MG/DL (ref 0–1)
BUN BLDV-MCNC: 14 MG/DL (ref 8–23)
BUN BLDV-MCNC: 19 MG/DL (ref 8–23)
CALCIUM SERPL-MCNC: 9.1 MG/DL (ref 8.6–10.2)
CALCIUM SERPL-MCNC: 9.5 MG/DL (ref 8.6–10.2)
CHLORIDE BLD-SCNC: 96 MMOL/L (ref 98–107)
CHLORIDE BLD-SCNC: 97 MMOL/L (ref 98–107)
CO2: 30 MMOL/L (ref 22–29)
CO2: 31 MMOL/L (ref 22–29)
CREAT SERPL-MCNC: 1.7 MG/DL (ref 0.5–1)
CREAT SERPL-MCNC: 2.4 MG/DL (ref 0.5–1)
EOSINOPHILS ABSOLUTE: 0.27 E9/L (ref 0.05–0.5)
EOSINOPHILS RELATIVE PERCENT: 3 % (ref 0–6)
GFR AFRICAN AMERICAN: 24
GFR AFRICAN AMERICAN: 35
GFR NON-AFRICAN AMERICAN: 24 ML/MIN/1.73
GFR NON-AFRICAN AMERICAN: 35 ML/MIN/1.73
GLUCOSE BLD-MCNC: 122 MG/DL (ref 74–99)
GLUCOSE BLD-MCNC: 167 MG/DL (ref 74–99)
GRAM STAIN ORDERABLE: NORMAL
HAV IGM SER IA-ACNC: NORMAL
HCT VFR BLD CALC: 24.3 % (ref 34–48)
HEMOGLOBIN: 7.9 G/DL (ref 11.5–15.5)
HEPATITIS B CORE IGM ANTIBODY: NORMAL
HEPATITIS B SURFACE ANTIGEN INTERPRETATION: NORMAL
HEPATITIS C ANTIBODY INTERPRETATION: NORMAL
HYPOCHROMIA: ABNORMAL
LYMPHOCYTES ABSOLUTE: 2.55 E9/L (ref 1.5–4)
LYMPHOCYTES RELATIVE PERCENT: 28 % (ref 20–42)
MAGNESIUM: 1.6 MG/DL (ref 1.6–2.6)
MAGNESIUM: 1.7 MG/DL (ref 1.6–2.6)
MCH RBC QN AUTO: 31 PG (ref 26–35)
MCHC RBC AUTO-ENTMCNC: 32.5 % (ref 32–34.5)
MCV RBC AUTO: 95.3 FL (ref 80–99.9)
MONOCYTES ABSOLUTE: 1 E9/L (ref 0.1–0.95)
MONOCYTES RELATIVE PERCENT: 11 % (ref 2–12)
NEUTROPHILS ABSOLUTE: 5.28 E9/L (ref 1.8–7.3)
NEUTROPHILS RELATIVE PERCENT: 58 % (ref 43–80)
OVALOCYTES: ABNORMAL
PDW BLD-RTO: 17.5 FL (ref 11.5–15)
PHOSPHORUS: 1.6 MG/DL (ref 2.5–4.5)
PHOSPHORUS: 2 MG/DL (ref 2.5–4.5)
PLATELET # BLD: 171 E9/L (ref 130–450)
PMV BLD AUTO: 13.3 FL (ref 7–12)
POIKILOCYTES: ABNORMAL
POLYCHROMASIA: ABNORMAL
POTASSIUM SERPL-SCNC: 4.3 MMOL/L (ref 3.5–5)
POTASSIUM SERPL-SCNC: 5.2 MMOL/L (ref 3.5–5)
RBC # BLD: 2.55 E12/L (ref 3.5–5.5)
SODIUM BLD-SCNC: 138 MMOL/L (ref 132–146)
SODIUM BLD-SCNC: 140 MMOL/L (ref 132–146)
TARGET CELLS: ABNORMAL
TEAR DROP CELLS: ABNORMAL
TOTAL PROTEIN: 6.4 G/DL (ref 6.4–8.3)
WBC # BLD: 9.1 E9/L (ref 4.5–11.5)

## 2021-04-16 PROCEDURE — 2060000000 HC ICU INTERMEDIATE R&B

## 2021-04-16 PROCEDURE — 99233 SBSQ HOSP IP/OBS HIGH 50: CPT | Performed by: INTERNAL MEDICINE

## 2021-04-16 PROCEDURE — 90935 HEMODIALYSIS ONE EVALUATION: CPT

## 2021-04-16 PROCEDURE — 6370000000 HC RX 637 (ALT 250 FOR IP): Performed by: INTERNAL MEDICINE

## 2021-04-16 PROCEDURE — 6370000000 HC RX 637 (ALT 250 FOR IP): Performed by: STUDENT IN AN ORGANIZED HEALTH CARE EDUCATION/TRAINING PROGRAM

## 2021-04-16 PROCEDURE — 2700000000 HC OXYGEN THERAPY PER DAY

## 2021-04-16 PROCEDURE — 6370000000 HC RX 637 (ALT 250 FOR IP): Performed by: NURSE PRACTITIONER

## 2021-04-16 PROCEDURE — 94660 CPAP INITIATION&MGMT: CPT

## 2021-04-16 PROCEDURE — 6360000002 HC RX W HCPCS: Performed by: INTERNAL MEDICINE

## 2021-04-16 PROCEDURE — 80076 HEPATIC FUNCTION PANEL: CPT

## 2021-04-16 PROCEDURE — P9047 ALBUMIN (HUMAN), 25%, 50ML: HCPCS | Performed by: INTERNAL MEDICINE

## 2021-04-16 PROCEDURE — 36415 COLL VENOUS BLD VENIPUNCTURE: CPT

## 2021-04-16 PROCEDURE — 83735 ASSAY OF MAGNESIUM: CPT

## 2021-04-16 PROCEDURE — 84100 ASSAY OF PHOSPHORUS: CPT

## 2021-04-16 PROCEDURE — 80048 BASIC METABOLIC PNL TOTAL CA: CPT

## 2021-04-16 PROCEDURE — 85025 COMPLETE CBC W/AUTO DIFF WBC: CPT

## 2021-04-16 RX ORDER — HYDRALAZINE HYDROCHLORIDE 25 MG/1
25 TABLET, FILM COATED ORAL EVERY 8 HOURS SCHEDULED
Status: DISCONTINUED | OUTPATIENT
Start: 2021-04-16 | End: 2021-04-22 | Stop reason: HOSPADM

## 2021-04-16 RX ORDER — ISOSORBIDE DINITRATE 20 MG/1
10 TABLET ORAL 3 TIMES DAILY
Status: DISCONTINUED | OUTPATIENT
Start: 2021-04-16 | End: 2021-04-22 | Stop reason: HOSPADM

## 2021-04-16 RX ORDER — ALBUMIN (HUMAN) 12.5 G/50ML
25 SOLUTION INTRAVENOUS ONCE
Status: COMPLETED | OUTPATIENT
Start: 2021-04-16 | End: 2021-04-16

## 2021-04-16 RX ORDER — BUMETANIDE 1 MG/1
2 TABLET ORAL DAILY
Status: DISCONTINUED | OUTPATIENT
Start: 2021-04-19 | End: 2021-04-22 | Stop reason: HOSPADM

## 2021-04-16 RX ORDER — BUMETANIDE 1 MG/1
2 TABLET ORAL DAILY
Status: DISCONTINUED | OUTPATIENT
Start: 2021-04-16 | End: 2021-04-16

## 2021-04-16 RX ADMIN — POTASSIUM & SODIUM PHOSPHATES POWDER PACK 280-160-250 MG 250 MG: 280-160-250 PACK at 20:47

## 2021-04-16 RX ADMIN — POTASSIUM BICARBONATE 20 MEQ: 782 TABLET, EFFERVESCENT ORAL at 20:47

## 2021-04-16 RX ADMIN — ALBUMIN (HUMAN) 25 G: 25 SOLUTION INTRAVENOUS at 11:05

## 2021-04-16 RX ADMIN — HYDRALAZINE HYDROCHLORIDE 50 MG: 50 TABLET ORAL at 06:07

## 2021-04-16 RX ADMIN — CARVEDILOL 12.5 MG: 6.25 TABLET, FILM COATED ORAL at 13:25

## 2021-04-16 RX ADMIN — HYDRALAZINE HYDROCHLORIDE 50 MG: 50 TABLET ORAL at 13:26

## 2021-04-16 RX ADMIN — ISOSORBIDE DINITRATE 10 MG: 20 TABLET ORAL at 20:48

## 2021-04-16 RX ADMIN — POTASSIUM BICARBONATE 20 MEQ: 782 TABLET, EFFERVESCENT ORAL at 13:26

## 2021-04-16 RX ADMIN — BUMETANIDE 2 MG: 1 TABLET ORAL at 18:32

## 2021-04-16 RX ADMIN — ATORVASTATIN CALCIUM 40 MG: 40 TABLET, FILM COATED ORAL at 20:48

## 2021-04-16 RX ADMIN — ISOSORBIDE DINITRATE 20 MG: 20 TABLET ORAL at 13:26

## 2021-04-16 RX ADMIN — PANTOPRAZOLE SODIUM 40 MG: 40 TABLET, DELAYED RELEASE ORAL at 06:08

## 2021-04-16 RX ADMIN — DOCUSATE SODIUM 100 MG: 100 CAPSULE ORAL at 13:26

## 2021-04-16 ASSESSMENT — PAIN SCALES - WONG BAKER: WONGBAKER_NUMERICALRESPONSE: 0

## 2021-04-16 ASSESSMENT — PAIN SCALES - GENERAL
PAINLEVEL_OUTOF10: 0
PAINLEVEL_OUTOF10: 0

## 2021-04-16 NOTE — PROGRESS NOTES
[facility] MD Castro Aden MD Macario Han, MD Bonnell Lauth, MD   Progress Note    4/16/2021    SUBJECTIVE:     Pt known to Dr. Bob Allen from office, and followed at [facility] 2 weeks prior to this admission    4/3: Seen this am in her room , o2/nc at 4 L which is her home o2 . She did undergo EGD/colonscopy this am . Results noted   Continue to look volume overloaded with 2 + edema and JVD . 4/4 ; stable vitlas , good UO on bumex drip/diuril . Will assess in am if we can wean her off drip     4/5: Patient was doing well this morning, she is awake and alert with no acute distress. We will continue same medications and same plan of care for today. 4/6 \" Seen this am , pleasant , o2/nc . Good UO . Cr stable . Plan for EUS in am     4/7 : good UO with negative balance . In good spirit . For EUS today . 4/8 : in bed , lying flat in NAD . Edema much improved     4/9 : report of pt being more sob . she is for cxr today . EUS with bile duct stone     4/10: Recurrent left lower quadrant pain, ongoing anorexia with poor intake of food and fluid, intermittent mild nausea though no emesis. No dyspnea. No peripheral swelling. Mildly hypotensive. 4/11: Fatigue, generalized weakness, ongoing anorexia and poor intake. Some lower abdominal discomfort though no dez pain. Indigestion and mild nausea intermittently. No swelling. No dyspnea on nasal cannula. No cough or wheeze. Started normal saline at conservative rate yesterday, increase the rate this morning    4/12 : weak . bp stable   High o2 requirement this evening   cxr with HF       4/13 : seen today , feels better . No LE edema . JVD 2+ . We disussed we are likely looking at need for RRT in the future     4/14 : continue to require high amount of o2 . She is alert oriened when seeing her . We discussed dialysis and she is said she is agreeable .  She is telling me she d like to try diuretics another day prior to proceeding with dialysis     4/15 : seen this am , UO ok on bumex drip . She continue to require high amount of o2 when seeing her   D/w her this treatment plan including initiation of dialysis for volume and solute management . I did alse based on her request discuss with her daughter and sister     4/16 : seen on HD tolerating well, o2 requirement down . bp stable . Alert orietned     PROBLEM LIST:    Principal Problem:    GI bleed  Active Problems:    Preoperative cardiovascular examination    Bowel perforation (HCC)  Resolved Problems:    * No resolved hospital problems. *       DIET:    DIET LOW FAT; Low Sodium (2 GM)       Allergies : Patient has no known allergies. Review of Systems:   Constitutional:weak   Eyes: no eye pain , no itching , no drainage  Ears, nose, mouth, throat, and face: no ear ,nose pain , hearing is ok ,no nasal drainage   Respiratory: no sob ,no cough ,no wheezing . Cardiovascular: no chest pain , no palpitation ,no sob . Gastrointestinal: no nausea, vomiting , constipation , no abdominal pain . Genitourinary:no urinary retention , no burning , dysuria . No polyuria   Hematologic/lymphatic: no bleeding , no cougulation issues . Musculoskeletal:no joint pain , no swelling . Neurological: no headaches ,no weakness , no numbness . Endocrine: no thirst , no weight issues .      MEDS (scheduled):    isosorbide dinitrate  20 mg Oral TID    carvedilol  12.5 mg Oral BID    hydrALAZINE  50 mg Oral 3 times per day    potassium bicarb-citric acid  20 mEq Oral BID    docusate sodium  100 mg Oral Daily    pantoprazole  40 mg Oral QAM AC    atorvastatin  40 mg Oral Nightly    vitamin D  50,000 Units Oral Weekly       MEDS (infusions):   bumetanide 0.1 mg/mL infusion 0.5 mg/hr (04/15/21 1532)    sodium chloride      sodium chloride         MEDS (prn):  perflutren lipid microspheres, acetaminophen, sodium chloride, sodium chloride, albuterol, ondansetron, promethazine, morphine    PHYSICAL EXAM:     Patient Vitals for the past 24 hrs:   BP Temp Temp src Pulse Resp SpO2 Weight   04/16/21 1323 127/67 98.2 °F (36.8 °C) Oral 99 19 95 %    04/16/21 1215 108/68 98.1 °F (36.7 °C)  81 18  130 lb 4.7 oz (59.1 kg)   04/16/21 1130 103/78   58      04/16/21 1100 (!) 102/58   66      04/16/21 1030 (!) 92/48   62      04/16/21 1000 119/71   63      04/16/21 0930 114/82   74      04/16/21 0902 113/83   69      04/16/21 0842 128/69 98 °F (36.7 °C)  66 14  134 lb (60.8 kg)   04/16/21 0740 138/83 98.1 °F (36.7 °C) Oral 67 17 94 %    04/16/21 0545 (!) 115/97 99.3 °F (37.4 °C) Oral 79 16 96 %    04/15/21 2045 (!) 144/78 98.6 °F (37 °C)  92 18     04/15/21 2030 130/62   83      04/15/21 2015 (!) 105/55   84      04/15/21 2000 (!) 145/73   93      04/15/21 1945 (!) 118/55   89      04/15/21 1915 115/67   91      04/15/21 1900 112/69   84      04/15/21 1845 113/65   85      04/15/21 1815 (!) 143/85   86      04/15/21 1800 136/79   83      04/15/21 1730 (!) 150/76   88      04/15/21 1715 (!) 148/98   86      04/15/21 1700 (!) 150/88   79      04/15/21 1645 (!) 140/86   91 18     04/15/21 1620 136/70   89 18     04/15/21 1605 116/69 98.6 °F (37 °C)  84 19     04/15/21 1600 117/71 98.6 °F (37 °C) Oral 107 16 93 %    @      Intake/Output Summary (Last 24 hours) at 4/16/2021 1414  Last data filed at 4/16/2021 1215  Gross per 24 hour   Intake 900 ml   Output 5300 ml   Net -4400 ml         Wt Readings from Last 3 Encounters:   04/16/21 130 lb 4.7 oz (59.1 kg)   10/08/19 116 lb 14.4 oz (53 kg)   06/03/19 130 lb (59 kg)       Constitutional:  in no acute distress  Oral: mucus membranes moist  Neck: Trachea midline.   No JVD  Cardiovascular: S1, S2 regular rhythm, no murmur,or rub  Respiratory: Poor inspiratory effort, difficult to encourage better, though no crackles, no wheeze  Gastrointestinal:  Soft, nontender, nondistended, NABS  Ext: No edema dependently or distally  Skin: dry, no rash  Neuro: awake, alert, interactive      DATA:    Recent Labs     04/14/21  0727 04/15/21  0706 04/16/21  0709   WBC 9.1 9.4 9.1   HGB 7.5* 7.9* 7.9*   HCT 24.7* 25.6* 24.3*   .0* 98.5 95.3    255 171     Recent Labs     04/14/21  0727 04/14/21  2245 04/15/21  0706 04/15/21  1238 04/15/21  2214 04/16/21  0709    138 139 138 137  --    K 4.2 4.6 4.6 5.5* 4.6  --    CL 90* 88* 89* 88* 94*  --    CO2 37* 38* 36* 38* 34*  --    MG  --  1.6 1.7  --  1.5*  --    PHOS  --  4.6* 4.8*  --  3.1  --    BUN 56* 59* 61* 63* 34*  --    CREATININE 3.7* 4.0* 4.1* 4.1* 2.8*  --    ALT 7  --  5  --   --  5   AST 32*  --  16  --   --  16   BILIDIR  --   --  0.4*  --   --  0.3   BILITOT 1.3*  --  1.1  --   --  1.1   ALKPHOS 307*  --  250*  --   --  214*       Lab Results   Component Value Date    LABPROT 0.3 (H) 04/11/2021    LABPROT 0.3 04/11/2021       ASSESSMENT     1) REYES  in the setting of GI bleed ,low EF/CHF     2) Chronic kidney disease stage IV baseline cr 2-2.7     3) 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 She continue to be hypervolemic   I feel pt would benefit from initiation of dialysis for control of her volume status, to prevent recurrent episodes of HF and for her to continue with her life .      2th HD treatment today tolerating well   Will arrange for tesio placement early next week   Will discontinue bumex drip and plan on po bumex 2 mg daily             Electronically signed by Luis A Guido MD on 4/16/2021

## 2021-04-16 NOTE — FLOWSHEET NOTE
Pt ran 3 hours; 3251 bath; 1.9 L removed; stable/tolerated well; denies c/o. HD CVC heparin locked per lumen fill volume, capped & clamped post Tx. good Access flow no issues achieving prescribed BFR.         04/16/21 1215   Vital Signs   /68   Temp 98.1 °F (36.7 °C)   Pulse 81   Resp 18   Weight 130 lb 4.7 oz (59.1 kg)   Percent Weight Change -2.77   Pain Assessment   Pain Assessment 0-10   Pain Level 0   Post-Hemodialysis Assessment   Post-Treatment Procedures Blood returned;Catheter capped, clamped and heparinized x 2 ports   Machine Disinfection Process Bleach; Exterior Machine Disinfection;Machine Absence of Bleach Machine   Rinseback Volume (ml) 300 ml   Total Liters Processed (l/min) 41 l/min   Dialyzer Clearance Moderately streaked   Duration of Treatment (minutes) 180 minutes   Heparin amount administered during treatment (units) 0 units   Hemodialysis Intake (ml) 300 ml   Hemodialysis Output (ml) 2200 ml   NET Removed (ml) 1900 ml   Tolerated Treatment Good   Bilateral Breath Sounds Diminished   Edema Generalized   Edema Generalized Trace   Physician Notified?  Yes

## 2021-04-16 NOTE — PROGRESS NOTES
[facility] MD Coy Maher MD Glenice Sauger, MD Ada Spear, MD   Progress Note    4/15/2021    SUBJECTIVE:     Pt known to Dr. Tacho Leung from office, and followed at [facility] 2 weeks prior to this admission    4/3: Seen this am in her room , o2/nc at 4 L which is her home o2 . She did undergo EGD/colonscopy this am . Results noted   Continue to look volume overloaded with 2 + edema and JVD . 4/4 ; stable vitlas , good UO on bumex drip/diuril . Will assess in am if we can wean her off drip     4/5: Patient was doing well this morning, she is awake and alert with no acute distress. We will continue same medications and same plan of care for today. 4/6 \" Seen this am , pleasant , o2/nc . Good UO . Cr stable . Plan for EUS in am     4/7 : good UO with negative balance . In good spirit . For EUS today . 4/8 : in bed , lying flat in NAD . Edema much improved     4/9 : report of pt being more sob . she is for cxr today . EUS with bile duct stone     4/10: Recurrent left lower quadrant pain, ongoing anorexia with poor intake of food and fluid, intermittent mild nausea though no emesis. No dyspnea. No peripheral swelling. Mildly hypotensive. 4/11: Fatigue, generalized weakness, ongoing anorexia and poor intake. Some lower abdominal discomfort though no dez pain. Indigestion and mild nausea intermittently. No swelling. No dyspnea on nasal cannula. No cough or wheeze. Started normal saline at conservative rate yesterday, increase the rate this morning    4/12 : weak . bp stable   High o2 requirement this evening   cxr with HF       4/13 : seen today , feels better . No LE edema . JVD 2+ . We disussed we are likely looking at need for RRT in the future     4/14 : continue to require high amount of o2 . She is alert oriened when seeing her . We discussed dialysis and she is said she is agreeable .  She is telling me she d like to try diuretics another day prior to proceeding with dialysis     4/15 : seen this am , UO ok on bumex drip . She continue to require high amount of o2 when seeing her   D/w her this treatment plan including initiation of dialysis for volume and solute management . I did alse based on her request discuss with her daughter and sister     PROBLEM LIST:    Principal Problem:    GI bleed  Active Problems:    Preoperative cardiovascular examination    Bowel perforation ([address]  Resolved Problems:    * No resolved hospital problems. *       DIET:    DIET LOW FAT; Low Sodium (2 GM)       Allergies : Patient has no known allergies. Review of Systems:   Constitutional:weak   Eyes: no eye pain , no itching , no drainage  Ears, nose, mouth, throat, and face: no ear ,nose pain , hearing is ok ,no nasal drainage   Respiratory: no sob ,no cough ,no wheezing . Cardiovascular: no chest pain , no palpitation ,no sob . Gastrointestinal: no nausea, vomiting , constipation , no abdominal pain . Genitourinary:no urinary retention , no burning , dysuria . No polyuria   Hematologic/lymphatic: no bleeding , no cougulation issues . Musculoskeletal:no joint pain , no swelling . Neurological: no headaches ,no weakness , no numbness . Endocrine: no thirst , no weight issues .      MEDS (scheduled):    isosorbide dinitrate  20 mg Oral TID    sodium chloride flush        carvedilol  12.5 mg Oral BID    hydrALAZINE  50 mg Oral 3 times per day    potassium bicarb-citric acid  20 mEq Oral BID    docusate sodium  100 mg Oral Daily    pantoprazole  40 mg Oral QAM AC    atorvastatin  40 mg Oral Nightly    vitamin D  50,000 Units Oral Weekly       MEDS (infusions):   bumetanide 0.1 mg/mL infusion 0.5 mg/hr (04/15/21 1532)    sodium chloride      sodium chloride         MEDS (prn):  perflutren lipid microspheres, acetaminophen, sodium chloride, sodium chloride, albuterol, ondansetron, promethazine, morphine    PHYSICAL EXAM: Patient Vitals for the past 24 hrs:   BP Temp Temp src Pulse Resp SpO2   04/15/21 2045 (!) 144/78 98.6 °F (37 °C)  92 18    04/15/21 2030 130/62   83     04/15/21 2015 (!) 105/55   84     04/15/21 2000 (!) 145/73   93     04/15/21 1945 (!) 118/55   89     04/15/21 1915 115/67   91     04/15/21 1900 112/69   84     04/15/21 1845 113/65   85     04/15/21 1815 (!) 143/85   86     04/15/21 1800 136/79   83     04/15/21 1730 (!) 150/76   88     04/15/21 1715 (!) 148/98   86     04/15/21 1700 (!) 150/88   79     04/15/21 1645 (!) 140/86   91 18    04/15/21 1620 136/70   89 18    04/15/21 1605 116/69 98.6 °F (37 °C)  84 19    04/15/21 1600 117/71 98.6 °F (37 °C) Oral 107 16 93 %   04/15/21 1145 122/72 97.2 °F (36.2 °C) Axillary 99 24 (!) 89 %   04/15/21 1028 133/86 98.3 °F (36.8 °C) Oral 111 18 91 %   04/15/21 0800 (!) 165/89 98.1 °F (36.7 °C) Axillary 85 16 93 %   04/15/21 0134     16    04/15/21 0051 138/72 97.8 °F (36.6 °C) Axillary 76 19 98 %   04/15/21 0004 (!) 149/80 98.1 °F (36.7 °C) Axillary 91 18 92 %   04/14/21 2219     16    @      Intake/Output Summary (Last 24 hours) at 4/15/2021 2118  Last data filed at 4/15/2021 2117  Gross per 24 hour   Intake 600 ml   Output 5850 ml   Net -5250 ml         Wt Readings from Last 3 Encounters:   04/11/21 135 lb (61.2 kg)   10/08/19 116 lb 14.4 oz (53 kg)   06/03/19 130 lb (59 kg)       Constitutional:  in no acute distress  Oral: mucus membranes moist  Neck: Trachea midline.   No JVD  Cardiovascular: S1, S2 regular rhythm, no murmur,or rub  Respiratory: Poor inspiratory effort, difficult to encourage better, though no crackles, no wheeze  Gastrointestinal:  Soft, nontender, nondistended, NABS  Ext: No edema dependently or distally  Skin: dry, no rash  Neuro: awake, alert, interactive      DATA:    Recent Labs     04/13/21  0719 04/14/21  0727 04/15/21  0706   WBC 10.0 9.1 9.4   HGB 7.8* 7.5* 7.9*   HCT 24.3* 24.7* 25.6*   MCV 96.8 100.0* 98.5    237 255     Recent Labs     04/13/21  0719 04/14/21  0727 04/14/21  2245 04/15/21  0706 04/15/21  1238    140 138 139 138   K 3.6 4.2 4.6 4.6 5.5*   CL 92* 90* 88* 89* 88*   CO2 35* 37* 38* 36* 38*   MG  --   --  1.6 1.7  --    PHOS  --   --  4.6* 4.8*  --    BUN 54* 56* 59* 61* 63*   CREATININE 3.5* 3.7* 4.0* 4.1* 4.1*   ALT <5 7  --  5  --    AST 21 32*  --  16  --    BILIDIR  --   --   --  0.4*  --    BILITOT 0.9 1.3*  --  1.1  --    ALKPHOS 201* 307*  --  250*  --        Lab Results   Component Value Date    LABPROT 0.3 (H) 04/11/2021    LABPROT 0.3 04/11/2021       ASSESSMENT     1) REYES  in the setting of GI bleed ,low EF/CHF     2) Chronic kidney disease stage IV baseline cr 2-2.7     3) 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 She continue to be hypervolemic   I feel pt would benefit from initiation of dialysis for control of her volume status, to prevent recurrent episodes of HF and for her to continue with her life . Will plan 3 HD session in row .    Will evaluate early next week for tesio placement             Electronically signed by Susan Del Cid MD on 4/15/2021

## 2021-04-16 NOTE — FLOWSHEET NOTE
Pt ran 2 hours Hd and 2 hours UF only; 3251 bath; 2.4 L removed; stable/tolerated well; denies c/o. HD CVC heparin locked per lumen fill volume, capped & clamped post Tx. good Access flow no issues achieving prescribed BFR. Maintained A profile throughout entire treatment. 04/15/21 2045   Vital Signs   BP (!) 144/78   Temp 98.6 °F (37 °C)   Pulse 92   Resp 18   Pain Assessment   Pain Assessment 0-10   Pain Level 0   Post-Hemodialysis Assessment   Post-Treatment Procedures Blood returned;Catheter capped, clamped and heparinized x 2 ports   Machine Disinfection Process Acid/Vinegar Clean;Bleach; Exterior Machine Disinfection   Rinseback Volume (ml) 300 ml   Total Liters Processed (l/min) 29 l/min   Dialyzer Clearance Clotted   Duration of Treatment (minutes) 240 minutes   Heparin amount administered during treatment (units) 0 units   Hemodialysis Intake (ml) 600 ml   Hemodialysis Output (ml) 3000 ml   NET Removed (ml) 2400 ml   Tolerated Treatment Good   Bilateral Breath Sounds Diminished   Edema Generalized   Edema Generalized Trace   Physician Notified?  No

## 2021-04-16 NOTE — PLAN OF CARE
Problem: Falls - Risk of:  Goal: Will remain free from falls  Description: Will remain free from falls  Outcome: Met This Shift  Goal: Absence of physical injury  Description: Absence of physical injury  Outcome: Met This Shift     Problem: Skin Integrity:  Goal: Will show no infection signs and symptoms  Description: Will show no infection signs and symptoms  Outcome: Met This Shift  Goal: Absence of new skin breakdown  Description: Absence of new skin breakdown  Outcome: Met This Shift  Goal: Risk for impaired skin integrity will decrease  Description: Risk for impaired skin integrity will decrease  Outcome: Met This Shift     Problem: Pain:  Goal: Pain level will decrease  Description: Pain level will decrease  Outcome: Met This Shift  Goal: Control of acute pain  Description: Control of acute pain  Outcome: Met This Shift  Goal: Control of chronic pain  Description: Control of chronic pain  Outcome: Met This Shift     Problem: Respiratory:  Goal: Ability to maintain a clear airway will improve  Description: Ability to maintain a clear airway will improve  Outcome: Met This Shift  Goal: Ability to maintain adequate ventilation will improve  Description: Ability to maintain adequate ventilation will improve  Outcome: Met This Shift

## 2021-04-16 NOTE — PROGRESS NOTES
Internal Medicine Progress Note    [facility]    Karen Cloud. Nadege Stokes., DALEOLIZETH. Jaswant Licea D.O., ANA ROSA Metz D.O. Kristofer Sexton, MSN, APRN, NP-C  Elizabeth Khalil. Татьяна Hayes, MSN, APRN-CNP     Primary Care Physician: Moise Grimes DO   Admitting Physician:  Cassie Wharton DO  Admission date and time: 3/31/2021 10:32 AM    Room:  [address] [place] [address]  Admitting diagnosis: GI bleed [K92.2]    Patient Name: Shoshana Person  MRN: [id number]    Date of Service: 4/16/2021     Subjective:  Amor Mendez is a 68 y.o. female who was seen and examined today,4/16/2021, at the bedside. Amor Mendez underwent dialysis earlier today. No reported issues. Patient does have complaint of abdominal pain. Denies nausea or vomiting. Also has complaint of right mid upper back pain-thoracentesis puncture site. Admits to fatigue/malaise. No family present    Review of System:   Constitutional:    Weakness and deconditioning. Positive for fatigue and malaise. HEENT:   Denies ear pain, sore throat, sinus or eye problems. Admits to irritation associated with the nasal cannula oxygen. Cardiovascular:   Denies any chest pain, irregular heartbeats, or palpitations. Respiratory:   Ongoing dyspnea but significantly improved from yesterday. Does not like BiPAP therapy. Gastrointestinal:   Patient has complaint of abdominal discomfort. Denies diarrhea. No nausea or vomiting. Genitourinary:    Voiding with the use of a Jane catheter. On hemodialysis  Extremities:   Denies lower leg edema  Neurology:    Admits to weakness and deconditioning which remains profound. Psch:   Denies being anxious. Its to depression associated with being in the hospital and current illness. Musculoskeletal:    Denies  myalgias, joint complaints or back pain. Integumentary:   Denies any rashes, ulcers, or excoriations. Denies bruising. Hematologic/Lymphatic:  Denies bruising or bleeding. pantoprazole  40 mg Oral QAM AC    atorvastatin  40 mg Oral Nightly    vitamin D  50,000 Units Oral Weekly     Continuous Infusions:   sodium chloride      sodium chloride         Objective Data:  CBC with Differential:    Lab Results   Component Value Date    WBC 9.1 04/16/2021    RBC 2.55 04/16/2021    HGB 7.9 04/16/2021    HCT 24.3 04/16/2021     04/16/2021    MCV 95.3 04/16/2021    MCH 31.0 04/16/2021    MCHC 32.5 04/16/2021    RDW 17.5 04/16/2021    NRBC 0.9 03/31/2021    LYMPHOPCT 28.0 04/16/2021    MONOPCT 11.0 04/16/2021    MYELOPCT 0.9 04/03/2021    BASOPCT 0.0 04/16/2021    MONOSABS 1.00 04/16/2021    LYMPHSABS 2.55 04/16/2021    EOSABS 0.27 04/16/2021    BASOSABS 0.00 04/16/2021     CMP:    Lab Results   Component Value Date     04/16/2021    K 4.3 04/16/2021    K 3.9 03/31/2021    CL 96 04/16/2021    CO2 31 04/16/2021    BUN 14 04/16/2021    CREATININE 1.7 04/16/2021    GFRAA 35 04/16/2021    LABGLOM 35 04/16/2021    GLUCOSE 167 04/16/2021    GLUCOSE 102 11/17/2010    PROT 6.4 04/16/2021    LABALBU 4.2 04/16/2021    CALCIUM 9.5 04/16/2021    BILITOT 1.1 04/16/2021    ALKPHOS 214 04/16/2021    AST 16 04/16/2021    ALT 5 04/16/2021       Assessment:  1. 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 Cute on chronic respiratory failure with hypoxia due to acute on chronic systolic and diastolic congestive heart failure  3. 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 Endoscopic ultrasound does show a 5 mm stone in the distal common bile duct  5. Acute on chronic kidney disease stage IV   6. Abdominal aortic aneurysm toward the bifurcation measuring up to 3.7 x 4.8 cm a component of chronic dissection appearance  7.  Paroxysmal atrial fibrillation on chronic anticoagulation with Eliquis--on hold  8. Essential hypertension  9. Moderate peripheral artery disease    Plan:   Jani Leyva underwent dialysis earlier today. Tolerated without issue. Lower leg edema has resolved. Still with abdominal pain as well as pain associated with thoracentesis puncture site-continue to monitor and treat. Diuretics and dialysis as per nephrology. Monitor daily weights. Strict intake and output. PT/OT to evaluate and treat. Encourage the patient to increase activity as tolerated. Services for discharge planning. Pain at this time is the patient to be discharged to [facility]. Pre-CERT will need to be submitted when medically stable for discharge. More than 50% of my  time was spent at the bedside counseling/coordinating care with the patient and/or family with face to face contact. This time was spent reviewing notes and laboratory data as well as instructing and counseling the patient. Time I spent with the family or surrogate(s) is included only if the patient was incapable of providing the necessary information or participating in medical decisions. I also discussed the differential diagnosis and all of the proposed management plans with the patient and individuals accompanying the patient. Jani Leyva requires this high level of physician care and nursing on the Telemetry unit due the complexity of decision management and chance of rapid decline or death. Continued cardiac monitoring and higher level of nursing are required. I am readily available for any further decision-making and intervention. The patient was seen, examined and then discussed with Dr. Pro Tejada. Afia Davis NP-C  3:34 PM  4/16/2021        I agree with the findings and the plan of care as documented in Afia RUIZ's  note.     Lucein Sandoval D.O., FACOI  3:49 PM  4/16/2021

## 2021-04-16 NOTE — PROGRESS NOTES
INPATIENT CARDIOLOGY FOLLOW-UP    Name: Kat Pritchard    Age: 68 y.o. Date of Admission: 3/31/2021 10:32 AM    Date of Service: 4/16/2021    Primary Cardiologist: Known to Latisha Murphy (inpt consultation 2019) and Tracy Quintero from this admission    Chief Complaint: Follow-up for cardiomyopathy    Interim History:  Complicated patient admitted on 3/31/2021 for hematochezia, history notable for longstanding cardiomyopathy, CKD and permanent A. fib initially seen by Dr. Alethea Reid for preoperative evaluation but had not been followed by cardiology otherwise this admission. Was reconsulted for an abnormal EKG. Patient was started on dialysis last evening and also had a thoracentesis yesterday. Net -32 L  She seems lethargic today. She also stated \"I want to die. \"    Review of Systems:   Negative except as described above    Problem List:  Patient Active Problem List   Diagnosis    Shortness of breath    Chronic combined systolic and diastolic congestive heart failure (HCC)    Chronic renal insufficiency, stage IV (severe) (HCC)    Atrial fibrillation ([address]    Hypertension    Abnormal chest x-ray    Anemia of chronic disease    Opacity of lung on imaging study    Cigarette smoker    Tobacco abuse counseling    Acute on chronic combined systolic and diastolic CHF (congestive heart failure) ([address]    Acute on chronic congestive heart failure (HCC)    Acute systolic CHF (congestive heart failure) (HCC)    GI bleed    Preoperative cardiovascular examination    Bowel perforation (HCC)       Current Medications:    Current Facility-Administered Medications:     bumetanide (BUMEX) tablet 2 mg, 2 mg, Oral, Daily, Nano Gonsalez MD    isosorbide dinitrate (ISORDIL) tablet 20 mg, 20 mg, Oral, TID, Danay Carlos MD, 20 mg at 04/16/21 1326    carvedilol (COREG) tablet 12.5 mg, 12.5 mg, Oral, BID, MADELAINE Pennington - CNP, 12.5 mg at 04/16/21 1325    hydrALAZINE (APRESOLINE) tablet 50 mg, 50 mg, Oral, 3 times per day, Ny Dines, APRN - CNP, 50 mg at 04/16/21 1326    potassium bicarb-citric acid (EFFER-K) effervescent tablet 20 mEq, 20 mEq, Oral, BID, Ny Dines, APRN - CNP, 20 mEq at 04/16/21 1326    docusate sodium (COLACE) capsule 100 mg, 100 mg, Oral, Daily, Sheng Bowena, APRN - CNP, 100 mg at 04/16/21 1326    perflutren lipid microspheres (DEFINITY) injection 1.65 mg, 1.5 mL, Intravenous, ONCE PRN, Shraddha Rodrigez, DO    acetaminophen (TYLENOL) tablet 650 mg, 650 mg, Oral, Q6H PRN, Ny Dines, APRN - CNP, 650 mg at 04/10/21 0003    pantoprazole (PROTONIX) tablet 40 mg, 40 mg, Oral, QAM AC, Suhail Carbaugh, DO, 40 mg at 04/16/21 0608    0.9 % sodium chloride infusion, , Intravenous, PRN, Margarie Ly, DO    0.9 % sodium chloride infusion, , Intravenous, PRN, Darius Flako, DO    atorvastatin (LIPITOR) tablet 40 mg, 40 mg, Oral, Nightly, Darius Mccloud, DO, 40 mg at 04/15/21 2151    vitamin D (ERGOCALCIFEROL) capsule 50,000 Units, 50,000 Units, Oral, Weekly, Darius Flako, DO, 50,000 Units at 04/14/21 0948    albuterol (PROVENTIL) nebulizer solution 2.5 mg, 2.5 mg, Nebulization, Q6H PRN, Darius Mccloud, DO, 2.5 mg at 04/13/21 1033    ondansetron (ZOFRAN) injection 4 mg, 4 mg, Intravenous, Q6H PRN, Darius Flako, DO, 4 mg at 04/15/21 0413    promethazine (PHENERGAN) injection 25 mg, 25 mg, Intravenous, Q6H PRN, Darius Mccloud, DO    morphine (PF) injection 2 mg, 2 mg, Intravenous, Q4H PRN, Darius Flako, DO, 2 mg at 04/15/21 1245    Physical Exam:  /67   Pulse 99   Temp 98.2 °F (36.8 °C) (Oral)   Resp 19   Ht 5' 5\" (1.651 m)   Wt 130 lb 4.7 oz (59.1 kg)   SpO2 95%   BMI 21.68 kg/m²   Wt Readings from Last 3 Encounters:   04/16/21 130 lb 4.7 oz (59.1 kg)   10/08/19 116 lb 14.4 oz (53 kg)   06/03/19 130 lb (59 kg)     Appearance: Lethargic  Skin: Intact, no rash  Head: Normocephalic, atraumatic  Eyes: EOMI, no conjunctival erythema  ENMT: No pharyngeal erythema, MMM, no rhinorrhea Neck: Supple, no elevated JVP, no carotid bruits  Lungs: Diminished with bibasilar rales  Cardiac: PMI nondisplaced, irregular rhythm with a normal rate, S1 & S2 normal, no murmurs  Abdomen: Soft, nontender, +bowel sounds  Extremities: Moves all extremities x 4, trace lower extremity edema  Neurologic: No focal motor deficits apparent, normal mood and affect  Peripheral Pulses: Intact posterior tibial pulses bilaterally    Intake/Output:    Intake/Output Summary (Last 24 hours) at 4/16/2021 1637  Last data filed at 4/16/2021 1447  Gross per 24 hour   Intake 1140 ml   Output 5300 ml   Net -4160 ml     No intake/output data recorded.     Laboratory Tests:  Recent Labs     04/15/21  1238 04/15/21  2214 04/16/21  1402    137 140   K 5.5* 4.6 4.3   CL 88* 94* 96*   CO2 38* 34* 31*   BUN 63* 34* 14   CREATININE 4.1* 2.8* 1.7*   GLUCOSE 162* 113* 167*   CALCIUM 9.6 9.4 9.5     Lab Results   Component Value Date    MG 1.7 04/16/2021     Recent Labs     04/14/21  0727 04/15/21  0706 04/16/21  0709   ALKPHOS 307* 250* 214*   ALT 7 5 5   AST 32* 16 16   PROT 6.6 6.4 6.4   BILITOT 1.3* 1.1 1.1   BILIDIR  --  0.4* 0.3   LABALBU 4.5 4.1 4.2     Recent Labs     04/14/21  0727 04/15/21  0706 04/16/21  0709   WBC 9.1 9.4 9.1   RBC 2.47* 2.60* 2.55*   HGB 7.5* 7.9* 7.9*   HCT 24.7* 25.6* 24.3*   .0* 98.5 95.3   MCH 30.4 30.4 31.0   MCHC 30.4* 30.9* 32.5   RDW 17.4* 17.6* 17.5*    255 171   MPV 12.8* 12.1* 13.3*     Lab Results   Component Value Date    CKTOTAL 58 07/25/2015    CKMB 3.8 07/25/2015    TROPONINI 0.06 (H) 04/14/2021    TROPONINI 0.09 (H) 04/14/2021    TROPONINI 0.06 (H) 04/14/2021     Lab Results   Component Value Date    INR 1.4 04/15/2021    INR 1.8 03/31/2021    INR 1.2 11/20/2016    PROTIME 16.8 (H) 04/15/2021    PROTIME 20.7 (H) 03/31/2021    PROTIME 12.7 (H) 11/20/2016     Lab Results   Component Value Date    TSH 3.480 04/01/2021     Lab Results   Component Value Date    LABA1C 5.6 10/04/2019 location of the lesion. Echocardiogram:   TTE 4/9/21 Dr Benjy Oconnor interpretation   Summary   Left ventricular size is grossly normal.   Moderate left ventricular concentric hypertrophy noted. Ejection fraction is measured at 30%. No evidence of left ventricular mass or thrombus noted. The left atrium is mildly dilated. Interatrial septum appears intact. No evidence of thrombus within left atrium. Mildly dilated right ventricle. No evidence of a thrombus in the right ventricle. Mildly enlarged right atrium size. Mild mitral regurgitation is present. No evidence of mitral valve stenosis. Mild tricuspid regurgitation. RVSP is 34.49 mmHg. Pulmonary hypertension is mild . Irregular rhythm--atrial fibrillation    Stress test:    2015      Impression   IMPRESSION:     1. Fixed defect over the inferior wall on stress and rest images,   suggesting a scar. 2. The left ventricle is dilated with a thin wall on both stress   and rest images. 3. Otherwise, stress-induced ischemia is not identified.                LV Wall Motion       Gated wall motion exam was performed in conjunction with cardiac   SPECT imaging. There is global hypokinesis.        IMPRESSION: Global hypokinesis.             LV Ejection Fraction       Left ventricular ejection fraction calculation was performed in   conjunction with cardiac SPECT imaging.  The LVEF is calculated at   21%.       IMPRESSION: LVEF equals 21%. RUBÉN 4/8/21  Impression   Decreased left RUBÉN consistent with moderate peripheral arterial disease. There is correlating loss of triphasic waveform in the left lower extremity.       Dampened bilateral distal pulse volume recordings suggestive of moderate   disease in the distal vasculature. Cardiac catheterization: na    ----------------------------------------------------------------------------------------------------------------------------------------------------------------  IMPRESSION:  1. Acute on chronic heart failure with reduced ejection fraction. proBNP greater than 70,000. Net negative thus far 32.2 L  2. Longstanding cardiomyopathy, EF about 30%, dating back to 2015 at least.  Unclear etiology, likely mixed. Only ischemic eval 2015 showing fixed inferior wall defect. 3. Acute hypoxic respiratory failure, 8 L nasal cannula  4. Pleural effusions, s/p thoracentesis 4/15/2021  5. Elevated troponin 0.24 (3/31/2021, not rechecked until 4/14/21) -> 0.06. Likely demand ischemia versus NSTEMI  6. Permanent atrial fibrillation. AC with apixaban, currently held due to GI bleed. Rate controlled  7. Chronic RBBB/LPFB  8. Severe REYES on CKD4 creatinine 2.8 -> 4.0. Initiated on HD 4/15/2021 creatinine improved 1.7  9. Pancreatic pseudocyst with hemorrhagic component  10. Abdominal aortic aneurysm 3.7 x 4.8 cm with chronic dissection  11. Hematochezia with acute blood loss anemia Hgb 7.9  12. 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 Hypertension  14. Hyperlipidemia  15. Tobacco abuse  16.  History of CVA    RECOMMENDATIONS:     Volume management per nephrology via dialysis   Continue guideline directed medical therapy for cardiomyopathy:   o Carvedilol, recently increased to 12.5 mg twice daily  o Hydralazine, increased to 50 mg 3 times daily   o Isosorbide dinitrate 20 mg 3 times daily  o No ACE/ARB/ARNI or mineralocorticoid antagonist due to renal failure   With longstanding cardiomyopathy ICD may be indicated, but this would also depend on her compliance, which can be addressed as an outpatient if she follows up with cardiology which she has not done, and poor compliance would be a contraindication to ICD placement   Aggressive risk factor modification   Further care per primary, nephrology, ID, GI   Will be available as needed over the weekend, please call with questions    Jamie June MD, [facility]    NOTE: This report was transcribed using voice recognition software. Every effort was made to ensure accuracy; however, inadvertent computerized transcription errors may be present.

## 2021-04-16 NOTE — PROGRESS NOTES
Physical Therapy    0517/0517-01    Patient unavailable for physical therapy treatment due to being to tired. Eliu Horan.  James  [facility] [place]  LIC # [id number]

## 2021-04-17 LAB
ALBUMIN SERPL-MCNC: 4.3 G/DL (ref 3.5–5.2)
ALP BLD-CCNC: 183 U/L (ref 35–104)
ALT SERPL-CCNC: 10 U/L (ref 0–32)
ANION GAP SERPL CALCULATED.3IONS-SCNC: 11 MMOL/L (ref 7–16)
ANION GAP SERPL CALCULATED.3IONS-SCNC: 9 MMOL/L (ref 7–16)
AST SERPL-CCNC: 15 U/L (ref 0–31)
BASOPHILS ABSOLUTE: 0.04 E9/L (ref 0–0.2)
BASOPHILS RELATIVE PERCENT: 0.5 % (ref 0–2)
BILIRUB SERPL-MCNC: 1 MG/DL (ref 0–1.2)
BILIRUBIN DIRECT: 0.3 MG/DL (ref 0–0.3)
BILIRUBIN, INDIRECT: 0.7 MG/DL (ref 0–1)
BUN BLDV-MCNC: 23 MG/DL (ref 8–23)
BUN BLDV-MCNC: 9 MG/DL (ref 8–23)
CALCIUM SERPL-MCNC: 9.3 MG/DL (ref 8.6–10.2)
CALCIUM SERPL-MCNC: 9.6 MG/DL (ref 8.6–10.2)
CHLORIDE BLD-SCNC: 100 MMOL/L (ref 98–107)
CHLORIDE BLD-SCNC: 96 MMOL/L (ref 98–107)
CO2: 29 MMOL/L (ref 22–29)
CO2: 33 MMOL/L (ref 22–29)
CREAT SERPL-MCNC: 2 MG/DL (ref 0.5–1)
CREAT SERPL-MCNC: 3.3 MG/DL (ref 0.5–1)
EOSINOPHILS ABSOLUTE: 0.2 E9/L (ref 0.05–0.5)
EOSINOPHILS RELATIVE PERCENT: 2.4 % (ref 0–6)
GFR AFRICAN AMERICAN: 16
GFR AFRICAN AMERICAN: 29
GFR NON-AFRICAN AMERICAN: 16 ML/MIN/1.73
GFR NON-AFRICAN AMERICAN: 29 ML/MIN/1.73
GLUCOSE BLD-MCNC: 124 MG/DL (ref 74–99)
GLUCOSE BLD-MCNC: 126 MG/DL (ref 74–99)
HCT VFR BLD CALC: 25 % (ref 34–48)
HEMOGLOBIN: 7.6 G/DL (ref 11.5–15.5)
IMMATURE GRANULOCYTES #: 0.05 E9/L
IMMATURE GRANULOCYTES %: 0.6 % (ref 0–5)
LYMPHOCYTES ABSOLUTE: 2.07 E9/L (ref 1.5–4)
LYMPHOCYTES RELATIVE PERCENT: 24.7 % (ref 20–42)
MAGNESIUM: 1.5 MG/DL (ref 1.6–2.6)
MAGNESIUM: 1.7 MG/DL (ref 1.6–2.6)
MCH RBC QN AUTO: 30.2 PG (ref 26–35)
MCHC RBC AUTO-ENTMCNC: 30.4 % (ref 32–34.5)
MCV RBC AUTO: 99.2 FL (ref 80–99.9)
MONOCYTES ABSOLUTE: 0.86 E9/L (ref 0.1–0.95)
MONOCYTES RELATIVE PERCENT: 10.3 % (ref 2–12)
NEUTROPHILS ABSOLUTE: 5.15 E9/L (ref 1.8–7.3)
NEUTROPHILS RELATIVE PERCENT: 61.5 % (ref 43–80)
PDW BLD-RTO: 17.5 FL (ref 11.5–15)
PHOSPHORUS: 1.9 MG/DL (ref 2.5–4.5)
PHOSPHORUS: 2.6 MG/DL (ref 2.5–4.5)
PLATELET # BLD: 236 E9/L (ref 130–450)
PMV BLD AUTO: 13 FL (ref 7–12)
POTASSIUM SERPL-SCNC: 4.3 MMOL/L (ref 3.5–5)
POTASSIUM SERPL-SCNC: 4.9 MMOL/L (ref 3.5–5)
RBC # BLD: 2.52 E12/L (ref 3.5–5.5)
SODIUM BLD-SCNC: 138 MMOL/L (ref 132–146)
SODIUM BLD-SCNC: 140 MMOL/L (ref 132–146)
TOTAL PROTEIN: 6.8 G/DL (ref 6.4–8.3)
WBC # BLD: 8.4 E9/L (ref 4.5–11.5)

## 2021-04-17 PROCEDURE — 84100 ASSAY OF PHOSPHORUS: CPT

## 2021-04-17 PROCEDURE — 6370000000 HC RX 637 (ALT 250 FOR IP): Performed by: STUDENT IN AN ORGANIZED HEALTH CARE EDUCATION/TRAINING PROGRAM

## 2021-04-17 PROCEDURE — 83735 ASSAY OF MAGNESIUM: CPT

## 2021-04-17 PROCEDURE — 85025 COMPLETE CBC W/AUTO DIFF WBC: CPT

## 2021-04-17 PROCEDURE — 94660 CPAP INITIATION&MGMT: CPT

## 2021-04-17 PROCEDURE — 90935 HEMODIALYSIS ONE EVALUATION: CPT

## 2021-04-17 PROCEDURE — 2060000000 HC ICU INTERMEDIATE R&B

## 2021-04-17 PROCEDURE — 2700000000 HC OXYGEN THERAPY PER DAY

## 2021-04-17 PROCEDURE — 80076 HEPATIC FUNCTION PANEL: CPT

## 2021-04-17 PROCEDURE — 80048 BASIC METABOLIC PNL TOTAL CA: CPT

## 2021-04-17 PROCEDURE — 6370000000 HC RX 637 (ALT 250 FOR IP): Performed by: NURSE PRACTITIONER

## 2021-04-17 PROCEDURE — 6370000000 HC RX 637 (ALT 250 FOR IP): Performed by: INTERNAL MEDICINE

## 2021-04-17 PROCEDURE — 36415 COLL VENOUS BLD VENIPUNCTURE: CPT

## 2021-04-17 RX ADMIN — CARVEDILOL 12.5 MG: 6.25 TABLET, FILM COATED ORAL at 10:12

## 2021-04-17 RX ADMIN — CARVEDILOL 12.5 MG: 6.25 TABLET, FILM COATED ORAL at 22:53

## 2021-04-17 RX ADMIN — PANTOPRAZOLE SODIUM 40 MG: 40 TABLET, DELAYED RELEASE ORAL at 06:32

## 2021-04-17 RX ADMIN — HYDRALAZINE HYDROCHLORIDE 25 MG: 25 TABLET, FILM COATED ORAL at 22:53

## 2021-04-17 RX ADMIN — ISOSORBIDE DINITRATE 10 MG: 20 TABLET ORAL at 10:13

## 2021-04-17 RX ADMIN — ISOSORBIDE DINITRATE 10 MG: 20 TABLET ORAL at 22:53

## 2021-04-17 RX ADMIN — DOCUSATE SODIUM 100 MG: 100 CAPSULE ORAL at 10:14

## 2021-04-17 RX ADMIN — ATORVASTATIN CALCIUM 40 MG: 40 TABLET, FILM COATED ORAL at 22:52

## 2021-04-17 ASSESSMENT — PAIN SCALES - GENERAL
PAINLEVEL_OUTOF10: 0
PAINLEVEL_OUTOF10: 0

## 2021-04-17 ASSESSMENT — PAIN SCALES - WONG BAKER
WONGBAKER_NUMERICALRESPONSE: 0

## 2021-04-17 NOTE — PROGRESS NOTES
Date:2021  Patient Name: Vivienne Gaam  MRN: [id number]  : 1940  ROOM #: [id number]    Physical Therapy order received, chart reviewed and evaluation attempted this date. Patient is unavailable for PT evaluation due to pt being off floor for dialysis. Will attempt PT evaluation at a later time. Thank you.    Dayna Musa, PT, DPT

## 2021-04-17 NOTE — PROGRESS NOTES
[facility] MD Tim Martínez MD Marry Nettles, MD Earlyne Atta, MD   Progress Note    4/17/2021    SUBJECTIVE:     Pt known to Dr. Brissa Connelly from office, and followed at [facility] 2 weeks prior to this admission    4/3: Seen this am in her room , o2/nc at 4 L which is her home o2 . She did undergo EGD/colonscopy this am . Results noted   Continue to look volume overloaded with 2 + edema and JVD . 4/4 ; stable vitlas , good UO on bumex drip/diuril . Will assess in am if we can wean her off drip     4/5: Patient was doing well this morning, she is awake and alert with no acute distress. We will continue same medications and same plan of care for today. 4/6 \" Seen this am , pleasant , o2/nc . Good UO . Cr stable . Plan for EUS in am     4/7 : good UO with negative balance . In good spirit . For EUS today . 4/8 : in bed , lying flat in NAD . Edema much improved     4/9 : report of pt being more sob . she is for cxr today . EUS with bile duct stone     4/10: Recurrent left lower quadrant pain, ongoing anorexia with poor intake of food and fluid, intermittent mild nausea though no emesis. No dyspnea. No peripheral swelling. Mildly hypotensive. 4/11: Fatigue, generalized weakness, ongoing anorexia and poor intake. Some lower abdominal discomfort though no dez pain. Indigestion and mild nausea intermittently. No swelling. No dyspnea on nasal cannula. No cough or wheeze. Started normal saline at conservative rate yesterday, increase the rate this morning    4/12 : weak . bp stable   High o2 requirement this evening   cxr with HF       4/13 : seen today , feels better . No LE edema . JVD 2+ . We disussed we are likely looking at need for RRT in the future     4/14 : continue to require high amount of o2 . She is alert oriened when seeing her . We discussed dialysis and she is said she is agreeable .  She is telling me she d like to try Oral Weekly       MEDS (infusions):   sodium chloride      sodium chloride         MEDS (prn):  perflutren lipid microspheres, acetaminophen, sodium chloride, sodium chloride, albuterol, ondansetron, promethazine, morphine    PHYSICAL EXAM:     Patient Vitals for the past 24 hrs:   BP Temp Temp src Pulse Resp SpO2 Weight   04/17/21 0845 123/73 98.8 °F (37.1 °C) Oral 86 16 99 %    04/17/21 0600       124 lb (56.2 kg)   04/17/21 0500 (!) 107/59 98.9 °F (37.2 °C) Axillary 81 14 99 %    04/17/21 0220    99      04/16/21 2245      99 %    04/16/21 2015 97/61         04/16/21 1945 (!) 81/52 99.1 °F (37.3 °C) Oral 90 18 93 %    04/16/21 1323 127/67 98.2 °F (36.8 °C) Oral 99 19 95 %    @      Intake/Output Summary (Last 24 hours) at 4/17/2021 1304  Last data filed at 4/17/2021 0932  Gross per 24 hour   Intake 480 ml   Output 275 ml   Net 205 ml         Wt Readings from Last 3 Encounters:   04/17/21 124 lb (56.2 kg)   10/08/19 116 lb 14.4 oz (53 kg)   06/03/19 130 lb (59 kg)       Constitutional:  in no acute distress  Oral: mucus membranes moist  Neck: Trachea midline.   No JVD  Cardiovascular: S1, S2 regular rhythm, no murmur,or rub  Respiratory: Poor inspiratory effort, difficult to encourage better, though no crackles, no wheeze  Gastrointestinal:  Soft, nontender, nondistended, NABS  Ext: No edema dependently or distally  Skin: dry, no rash  Neuro: awake, alert, interactive      DATA:    Recent Labs     04/15/21  0706 04/16/21  0709 04/17/21  0641   WBC 9.4 9.1 8.4   HGB 7.9* 7.9* 7.6*   HCT 25.6* 24.3* 25.0*   MCV 98.5 95.3 99.2    171 236     Recent Labs     04/15/21  0706 04/15/21  0706 04/16/21  0709 04/16/21  1402 04/16/21  2218 04/17/21  0641 04/17/21  0954      < >  --  140 138  --  140   K 4.6   < >  --  4.3 5.2*  --  4.9   CL 89*   < >  --  96* 97*  --  96*   CO2 36*   < >  --  31* 30*  --  33*   MG 1.7   < >  --  1.7 1.6  --  1.7   PHOS 4.8*   < >  --  2.0* 1.6*  -- 2. 6   BUN 61*   < >  --  14 19  --  23   CREATININE 4.1*   < >  --  1.7* 2.4*  --  3.3*   ALT 5  --  5  --   --  10  --    AST 16  --  16  --   --  15  --    BILIDIR 0.4*  --  0.3  --   --  0.3  --    BILITOT 1.1  --  1.1  --   --  1.0  --    ALKPHOS 250*  --  214*  --   --  183*  --     < > = values in this interval not displayed. Lab Results   Component Value Date    LABPROT 0.3 (H) 04/11/2021    LABPROT 0.3 04/11/2021       ASSESSMENT     1) REYES  in the setting of GI bleed ,low EF/CHF     2) Chronic kidney disease stage IV baseline cr 2-2.7     3) 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 She continue to be hypervolemic   I feel pt would benefit from initiation of dialysis for control of her volume status, to prevent recurrent episodes of HF and for her to continue with her life .     3rd.  HD treatment today tolerating well   Will arrange for tesio placement early next week   Will discontinue bumex drip and plan on po bumex 2 mg daily   We will follow clinical course with medical team.          Electronically signed by Shabbir Estes MD on 4/17/2021

## 2021-04-17 NOTE — PLAN OF CARE
Problem: Falls - Risk of:  Goal: Will remain free from falls  Description: Will remain free from falls  4/17/2021 1143 by Naina Max RN  Outcome: Ongoing  4/17/2021 0648 by Storm Wilson RN  Outcome: Ongoing  Note: Pt attempted to get out of bed twice.      Problem: Skin Integrity:  Goal: Will show no infection signs and symptoms  Description: Will show no infection signs and symptoms  4/17/2021 1143 by Naina Max RN  Outcome: Ongoing  4/17/2021 0648 by Storm Wilson RN  Outcome: Met This Shift

## 2021-04-17 NOTE — PROGRESS NOTES
Internal Medicine Progress Note    [facility]    Leah Caba. Cleo Rodriguez., F.A.C.OChepeI. Mariely Good D.O., DALEOCHRISTY Herrera D.O. Rupinder Steiner, MSN, APRN, NP-C  Jagdish Arteaga, MSN, APRN-CNP     Primary Care Physician: Dejan Sales DO   Admitting Physician:  Jennifer Bates DO  Admission date and time: 3/31/2021 10:32 AM    Room:  [address] [place] [address]  Admitting diagnosis: GI bleed [K92.2]    Patient Name: Robyn Paredes  MRN: [id number]    Date of Service: 4/17/2021     Subjective:  Tiffanie Flores is a 68 y.o. female who was seen and examined today,4/17/2021, at the bedside. Tiffanie Flores states that she is doing much better today. In better spirits today. Is anxious for discharge and questions when she can go home. Patient did pull her Jane catheter out on her own earlier. She denies any pain or pressure. No family present    Review of System:   Constitutional:    Weakness and deconditioning. Positive for fatigue. HEENT:   Denies ear pain, sore throat, sinus or eye problems. Admits to irritation associated with the nasal cannula oxygen. Cardiovascular:   Denies any chest pain, irregular heartbeats, or palpitations. Respiratory:   Ongoing dyspnea but significantly improved from yesterday. Does not like BiPAP therapy. Gastrointestinal:   Patient has complaint of abdominal discomfort. Denies diarrhea. No nausea or vomiting. Genitourinary:    Pulled Jane catheter out earlier today. Denies any pain or pressure. On hemodialysis  Extremities:   Denies lower leg edema  Neurology:    Admits to weakness and deconditioning which remains profound. Psch:   Denies being anxious. Admits to depression associated with being in the hospital and current illness. Musculoskeletal:    Denies  myalgias, joint complaints or back pain. Integumentary:   Denies any rashes, ulcers, or excoriations. Denies bruising.   Hematologic/Lymphatic:  Denies bruising or bleeding. Physical Exam:  No intake/output data recorded. Intake/Output Summary (Last 24 hours) at 4/17/2021 1412  Last data filed at 4/17/2021 0932  Gross per 24 hour   Intake 480 ml   Output 175 ml   Net 305 ml   I/O last 3 completed shifts: In: 780 [P.O.:480]  Out: 2475 [Urine:275]  Patient Vitals for the past 96 hrs (Last 3 readings):   Weight   04/17/21 0600 124 lb (56.2 kg)   04/16/21 1215 130 lb 4.7 oz (59.1 kg)   04/16/21 0842 134 lb (60.8 kg)     Vital Signs:   Blood pressure 107/60, pulse 88, temperature 98.8 °F (37.1 °C), temperature source Oral, resp. rate 16, height 5' 5\" (1.651 m), weight 124 lb (56.2 kg), SpO2 98 %. General appearance: In no acute distress. Resting in bed. Alert and awake. Head:  Normocephalic. No masses, lesions or tenderness. Eyes:  PERRLA. EOMI. Sclera clear. Impaired vision. ENT:  Ears normal. Mucosa normal.  Oxygen via nasal cannula. Neck:    Supple. Trachea midline. No thyromegaly. No JVD. No bruits. Right-sided dialysis catheter. See intact. No overt signs of infection. Heart:    S1 and S2, regular rate and rhythm, systolic murmur  Lungs:    Significantly diminished at the bases posteriorly. Abdomen:   Soft mildly tender to palpation. Voluntary guarding is elicited. Bowel sounds are active. Extremities:    No lower leg edema. Neurologic:    Alert x 3. Cranial nerves grossly intact. Analyzed weakness. Psych:   Behavior is normal. Mood appears normal. Speech is not rapid and/or pressured. Musculoskeletal:   Spine ROM normal.  Observed moving all extremities. Gait not assessed. Integumentary:  No rashes  Skin normal color and texture. Genitalia/Breast:  Voiding with the use of a Jane catheter.     Medication:  Scheduled Meds:   hydrALAZINE  25 mg Oral 3 times per day    [START ON 4/19/2021] bumetanide  2 mg Oral Daily    isosorbide dinitrate  10 mg Oral TID    carvedilol  12.5 mg Oral BID    docusate sodium  100 mg Oral Daily    pantoprazole  40 mg Oral QAM AC    atorvastatin  40 mg Oral Nightly    vitamin D  50,000 Units Oral Weekly     Continuous Infusions:   sodium chloride      sodium chloride         Objective Data:  CBC with Differential:    Lab Results   Component Value Date    WBC 8.4 04/17/2021    RBC 2.52 04/17/2021    HGB 7.6 04/17/2021    HCT 25.0 04/17/2021     04/17/2021    MCV 99.2 04/17/2021    MCH 30.2 04/17/2021    MCHC 30.4 04/17/2021    RDW 17.5 04/17/2021    NRBC 0.9 03/31/2021    LYMPHOPCT 24.7 04/17/2021    MONOPCT 10.3 04/17/2021    MYELOPCT 0.9 04/03/2021    BASOPCT 0.5 04/17/2021    MONOSABS 0.86 04/17/2021    LYMPHSABS 2.07 04/17/2021    EOSABS 0.20 04/17/2021    BASOSABS 0.04 04/17/2021     CMP:    Lab Results   Component Value Date     04/17/2021    K 4.9 04/17/2021    K 3.9 03/31/2021    CL 96 04/17/2021    CO2 33 04/17/2021    BUN 23 04/17/2021    CREATININE 3.3 04/17/2021    GFRAA 16 04/17/2021    LABGLOM 16 04/17/2021    GLUCOSE 124 04/17/2021    GLUCOSE 102 11/17/2010    PROT 6.8 04/17/2021    LABALBU 4.3 04/17/2021    CALCIUM 9.6 04/17/2021    BILITOT 1.0 04/17/2021    ALKPHOS 183 04/17/2021    AST 15 04/17/2021    ALT 10 04/17/2021       Assessment:  1. 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 Cute on chronic respiratory failure with hypoxia due to acute on chronic systolic and diastolic congestive heart failure  3. 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 Endoscopic ultrasound does show a 5 mm stone in the distal common bile duct  5. Acute on chronic kidney disease stage IV   6. Abdominal aortic aneurysm toward the bifurcation measuring up to 3.7 x 4.8 cm a component of chronic dissection appearance  7.  Paroxysmal atrial Meagan RUIZ's  note.     Jennifer Hodge D.O., FACOI  2:17 PM  4/17/2021

## 2021-04-18 LAB
ALBUMIN SERPL-MCNC: 4 G/DL (ref 3.5–5.2)
ALP BLD-CCNC: 173 U/L (ref 35–104)
ALT SERPL-CCNC: 6 U/L (ref 0–32)
ANION GAP SERPL CALCULATED.3IONS-SCNC: 10 MMOL/L (ref 7–16)
AST SERPL-CCNC: 15 U/L (ref 0–31)
BASOPHILS ABSOLUTE: 0.05 E9/L (ref 0–0.2)
BASOPHILS RELATIVE PERCENT: 0.6 % (ref 0–2)
BILIRUB SERPL-MCNC: 1 MG/DL (ref 0–1.2)
BILIRUBIN DIRECT: 0.3 MG/DL (ref 0–0.3)
BILIRUBIN, INDIRECT: 0.7 MG/DL (ref 0–1)
BODY FLUID CULTURE, STERILE: NORMAL
BUN BLDV-MCNC: 12 MG/DL (ref 8–23)
CALCIUM SERPL-MCNC: 9.4 MG/DL (ref 8.6–10.2)
CHLORIDE BLD-SCNC: 99 MMOL/L (ref 98–107)
CO2: 29 MMOL/L (ref 22–29)
CREAT SERPL-MCNC: 2.8 MG/DL (ref 0.5–1)
EOSINOPHILS ABSOLUTE: 0.22 E9/L (ref 0.05–0.5)
EOSINOPHILS RELATIVE PERCENT: 2.5 % (ref 0–6)
GFR AFRICAN AMERICAN: 20
GFR NON-AFRICAN AMERICAN: 20 ML/MIN/1.73
GLUCOSE BLD-MCNC: 204 MG/DL (ref 74–99)
GRAM STAIN RESULT: NORMAL
HCT VFR BLD CALC: 26.9 % (ref 34–48)
HEMOGLOBIN: 7.9 G/DL (ref 11.5–15.5)
IMMATURE GRANULOCYTES #: 0.05 E9/L
IMMATURE GRANULOCYTES %: 0.6 % (ref 0–5)
LYMPHOCYTES ABSOLUTE: 2.55 E9/L (ref 1.5–4)
LYMPHOCYTES RELATIVE PERCENT: 28.7 % (ref 20–42)
MAGNESIUM: 1.9 MG/DL (ref 1.6–2.6)
MCH RBC QN AUTO: 30.3 PG (ref 26–35)
MCHC RBC AUTO-ENTMCNC: 29.4 % (ref 32–34.5)
MCV RBC AUTO: 103.1 FL (ref 80–99.9)
MONOCYTES ABSOLUTE: 0.91 E9/L (ref 0.1–0.95)
MONOCYTES RELATIVE PERCENT: 10.2 % (ref 2–12)
NEUTROPHILS ABSOLUTE: 5.1 E9/L (ref 1.8–7.3)
NEUTROPHILS RELATIVE PERCENT: 57.4 % (ref 43–80)
PDW BLD-RTO: 17.7 FL (ref 11.5–15)
PHOSPHORUS: 2.6 MG/DL (ref 2.5–4.5)
PLATELET # BLD: 227 E9/L (ref 130–450)
PMV BLD AUTO: 12.7 FL (ref 7–12)
POTASSIUM SERPL-SCNC: 4.2 MMOL/L (ref 3.5–5)
RBC # BLD: 2.61 E12/L (ref 3.5–5.5)
SODIUM BLD-SCNC: 138 MMOL/L (ref 132–146)
TOTAL PROTEIN: 6.6 G/DL (ref 6.4–8.3)
WBC # BLD: 8.9 E9/L (ref 4.5–11.5)

## 2021-04-18 PROCEDURE — 83735 ASSAY OF MAGNESIUM: CPT

## 2021-04-18 PROCEDURE — 51798 US URINE CAPACITY MEASURE: CPT

## 2021-04-18 PROCEDURE — 36415 COLL VENOUS BLD VENIPUNCTURE: CPT

## 2021-04-18 PROCEDURE — 80076 HEPATIC FUNCTION PANEL: CPT

## 2021-04-18 PROCEDURE — 94660 CPAP INITIATION&MGMT: CPT

## 2021-04-18 PROCEDURE — 6370000000 HC RX 637 (ALT 250 FOR IP): Performed by: NURSE PRACTITIONER

## 2021-04-18 PROCEDURE — 6370000000 HC RX 637 (ALT 250 FOR IP): Performed by: INTERNAL MEDICINE

## 2021-04-18 PROCEDURE — 2060000000 HC ICU INTERMEDIATE R&B

## 2021-04-18 PROCEDURE — 84100 ASSAY OF PHOSPHORUS: CPT

## 2021-04-18 PROCEDURE — 6360000002 HC RX W HCPCS: Performed by: NURSE PRACTITIONER

## 2021-04-18 PROCEDURE — 2700000000 HC OXYGEN THERAPY PER DAY

## 2021-04-18 PROCEDURE — 80048 BASIC METABOLIC PNL TOTAL CA: CPT

## 2021-04-18 PROCEDURE — 85025 COMPLETE CBC W/AUTO DIFF WBC: CPT

## 2021-04-18 PROCEDURE — 6370000000 HC RX 637 (ALT 250 FOR IP): Performed by: STUDENT IN AN ORGANIZED HEALTH CARE EDUCATION/TRAINING PROGRAM

## 2021-04-18 RX ORDER — MAGNESIUM SULFATE IN WATER 40 MG/ML
2000 INJECTION, SOLUTION INTRAVENOUS ONCE
Status: COMPLETED | OUTPATIENT
Start: 2021-04-18 | End: 2021-04-18

## 2021-04-18 RX ADMIN — HYDRALAZINE HYDROCHLORIDE 25 MG: 25 TABLET, FILM COATED ORAL at 20:40

## 2021-04-18 RX ADMIN — ATORVASTATIN CALCIUM 40 MG: 40 TABLET, FILM COATED ORAL at 20:40

## 2021-04-18 RX ADMIN — ISOSORBIDE DINITRATE 10 MG: 20 TABLET ORAL at 20:40

## 2021-04-18 RX ADMIN — ISOSORBIDE DINITRATE 10 MG: 20 TABLET ORAL at 09:13

## 2021-04-18 RX ADMIN — DOCUSATE SODIUM 100 MG: 100 CAPSULE ORAL at 09:13

## 2021-04-18 RX ADMIN — CARVEDILOL 12.5 MG: 6.25 TABLET, FILM COATED ORAL at 20:40

## 2021-04-18 RX ADMIN — MAGNESIUM SULFATE HEPTAHYDRATE 2000 MG: 40 INJECTION, SOLUTION INTRAVENOUS at 09:22

## 2021-04-18 RX ADMIN — CARVEDILOL 12.5 MG: 6.25 TABLET, FILM COATED ORAL at 09:13

## 2021-04-18 RX ADMIN — PANTOPRAZOLE SODIUM 40 MG: 40 TABLET, DELAYED RELEASE ORAL at 05:26

## 2021-04-18 ASSESSMENT — PAIN SCALES - GENERAL
PAINLEVEL_OUTOF10: 0
PAINLEVEL_OUTOF10: 0

## 2021-04-18 ASSESSMENT — PAIN SCALES - WONG BAKER: WONGBAKER_NUMERICALRESPONSE: 0

## 2021-04-18 NOTE — PROGRESS NOTES
Internal Medicine Progress Note    [facility]    Bandar Fournier. Kennedi Nazario., F.A.C.O.I. Maximo Salazar D.O., DALEOÓSCAR Acuña, MSN, APRN, NP-C  Armando Cateso. Robert Perry, MSN, APRN-CNP     Primary Care Physician: Mabel Chavez DO   Admitting Physician:  David Pastrana DO  Admission date and time: 3/31/2021 10:32 AM    Room:  [address] [place] [address]  Admitting diagnosis: GI bleed [K92.2]    Patient Name: Lina Gonzalez  MRN: [id number]    Date of Service: 4/18/2021     Subjective:  Lavelle Kincaid is a 68 y.o. female who was seen and examined today,4/18/2021, at the bedside. Lavelle Kincaid. She is eager to be more active and more ambulatory. We discussed the placement of a permanent dialysis catheter tomorrow and she is agreeable. No family present    Review of System:   Constitutional:   Admits to improving degree of weakness and deconditioning. HEENT:   Denies ear pain, sore throat, sinus or eye problems. Admits to irritation associated with the nasal cannula oxygen. Cardiovascular:   Denies any chest pain, irregular heartbeats, or palpitations. Respiratory:   Ongoing dyspnea but significantly improved from yesterday. Does not like BiPAP therapy. Gastrointestinal:   Patient has complaint of abdominal discomfort. Denies diarrhea. No nausea or vomiting. Genitourinary:    Somewhat oliguric in the setting of renal dysfunction. Jane catheter has been removed. Extremities:   Denies lower leg edema  Neurology:    Admits to weakness and deconditioning which remains profound. Psch:   Denies being anxious. Admits to depression associated with being in the hospital and current illness. Musculoskeletal:    Denies  myalgias, joint complaints or back pain. Integumentary:   Denies any rashes, ulcers, or excoriations. Denies bruising. Hematologic/Lymphatic:  Denies bruising or bleeding. Physical Exam:  No intake/output data recorded.     Intake/Output Summary (Last 24 hours) at 4/18/2021 0859  Last data filed at 4/17/2021 1725  Gross per 24 hour   Intake 600 ml   Output 1400 ml   Net -800 ml   I/O last 3 completed shifts: In: 600   Out: 1400   Patient Vitals for the past 96 hrs (Last 3 readings):   Weight   04/18/21 0522 125 lb 12.8 oz (57.1 kg)   04/17/21 1407 127 lb 8 oz (57.8 kg)   04/17/21 0600 124 lb (56.2 kg)     Vital Signs:   Blood pressure 121/73, pulse 68, temperature 98.2 °F (36.8 °C), temperature source Oral, resp. rate 14, height 5' 5\" (1.651 m), weight 125 lb 12.8 oz (57.1 kg), SpO2 95 %. General appearance: In no acute distress. Resting in bed. Alert and awake. Head:  Normocephalic. No masses, lesions or tenderness. Eyes:  PERRLA. EOMI. Sclera clear. Impaired vision. ENT:  Ears normal. Mucosa normal.  Oxygen via nasal cannula. Neck:    Supple. Trachea midline. No thyromegaly. No JVD. No bruits. Right-sided dialysis catheter. See intact. No overt signs of infection. Heart:    S1 and S2, regular rate and rhythm, systolic murmur  Lungs:    Improved air exchange throughout. Pattern of respiration is regular and even without labor. No wheezing rales or rhonchi. Abdomen:   Soft mildly tender to palpation. Voluntary guarding is elicited. Bowel sounds are active. Extremities:    No lower leg edema. Neurologic:    Alert x 3. Cranial nerves grossly intact. Generalized weakness and deconditioning. Psych:   Behavior is normal. Mood appears normal. Speech is not rapid and/or pressured. Musculoskeletal:   Spine ROM normal.  Observed moving all extremities. Gait not assessed. Integumentary:  No rashes  Skin normal color and texture. Genitalia/Breast:  Voiding with the use of a Jane catheter.     Medication:  Scheduled Meds:   magnesium sulfate  2,000 mg Intravenous Once    hydrALAZINE  25 mg Oral 3 times per day    [START ON 4/19/2021] bumetanide  2 mg Oral Daily    isosorbide dinitrate  10 mg Oral TID    carvedilol  12.5 mg Oral BID    docusate sodium  100 mg Oral Daily    pantoprazole  40 mg Oral QAM AC    atorvastatin  40 mg Oral Nightly    vitamin D  50,000 Units Oral Weekly     Continuous Infusions:   sodium chloride      sodium chloride         Objective Data:  CBC with Differential:    Lab Results   Component Value Date    WBC 8.9 04/18/2021    RBC 2.61 04/18/2021    HGB 7.9 04/18/2021    HCT 26.9 04/18/2021     04/18/2021    .1 04/18/2021    MCH 30.3 04/18/2021    MCHC 29.4 04/18/2021    RDW 17.7 04/18/2021    NRBC 0.9 03/31/2021    LYMPHOPCT 28.7 04/18/2021    MONOPCT 10.2 04/18/2021    MYELOPCT 0.9 04/03/2021    BASOPCT 0.6 04/18/2021    MONOSABS 0.91 04/18/2021    LYMPHSABS 2.55 04/18/2021    EOSABS 0.22 04/18/2021    BASOSABS 0.05 04/18/2021     CMP:    Lab Results   Component Value Date     04/17/2021    K 4.3 04/17/2021    K 3.9 03/31/2021     04/17/2021    CO2 29 04/17/2021    BUN 9 04/17/2021    CREATININE 2.0 04/17/2021    GFRAA 29 04/17/2021    LABGLOM 29 04/17/2021    GLUCOSE 126 04/17/2021    GLUCOSE 102 11/17/2010    PROT 6.6 04/18/2021    LABALBU 4.0 04/18/2021    CALCIUM 9.3 04/17/2021    BILITOT 1.0 04/18/2021    ALKPHOS 173 04/18/2021    AST 15 04/18/2021    ALT 6 04/18/2021       Assessment:  1. 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 Acute on chronic respiratory failure with hypoxia due to acute on chronic systolic and diastolic congestive heart failure  3. 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 Endoscopic ultrasound does show a 5 mm stone in the distal common bile duct  5. Acute on chronic kidney disease stage IV   6.  Abdominal aortic aneurysm toward the bifurcation measuring up to 3.7 x 4.8 cm a component of chronic dissection appearance  7. Paroxysmal atrial fibrillation on chronic anticoagulation with Eliquis--on hold  8. Essential hypertension  9. Moderate peripheral artery disease    Plan:   Elfego Elam appears to be doing better today. She has tolerated the institution of dialysis and has had 3 consecutive treatments. We have discussed the plan for tunneled dialysis catheter placement tomorrow with the need to arrange for outpatient dialysis. She is for skilled nursing facility transfer as well which will likely occur tomorrow if all can be arranged. We have encouraged increase activity and dietary advancement today. Chronic comorbidities labs and vital signs are being monitored and addressed accordingly. More than 50% of my  time was spent at the bedside counseling/coordinating care with the patient and/or family with face to face contact. This time was spent reviewing notes and laboratory data as well as instructing and counseling the patient. Time I spent with the family or surrogate(s) is included only if the patient was incapable of providing the necessary information or participating in medical decisions. I also discussed the differential diagnosis and all of the proposed management plans with the patient and individuals accompanying the patient. Elfego Elam requires this high level of physician care and nursing on the Telemetry unit due the complexity of decision management and chance of rapid decline or death. Continued cardiac monitoring and higher level of nursing are required. I am readily available for any further decision-making and intervention.        KAYLA Benton  8:59 AM  4/18/2021

## 2021-04-18 NOTE — PROGRESS NOTES
[facility] MD Beni Elise MD Myrtie Coombs, MD Nona Musca, MD   Progress Note    4/18/2021    SUBJECTIVE:     Pt known to Dr. Chanelle Herrera from office, and followed at [facility] 2 weeks prior to this admission    4/3: Seen this am in her room , o2/nc at 4 L which is her home o2 . She did undergo EGD/colonscopy this am . Results noted   Continue to look volume overloaded with 2 + edema and JVD . 4/4 ; stable vitlas , good UO on bumex drip/diuril . Will assess in am if we can wean her off drip     4/5: Patient was doing well this morning, she is awake and alert with no acute distress. We will continue same medications and same plan of care for today. 4/6 \" Seen this am , pleasant , o2/nc . Good UO . Cr stable . Plan for EUS in am     4/7 : good UO with negative balance . In good spirit . For EUS today . 4/8 : in bed , lying flat in NAD . Edema much improved     4/9 : report of pt being more sob . she is for cxr today . EUS with bile duct stone     4/10: Recurrent left lower quadrant pain, ongoing anorexia with poor intake of food and fluid, intermittent mild nausea though no emesis. No dyspnea. No peripheral swelling. Mildly hypotensive. 4/11: Fatigue, generalized weakness, ongoing anorexia and poor intake. Some lower abdominal discomfort though no dez pain. Indigestion and mild nausea intermittently. No swelling. No dyspnea on nasal cannula. No cough or wheeze. Started normal saline at conservative rate yesterday, increase the rate this morning    4/12 : weak . bp stable   High o2 requirement this evening   cxr with HF       4/13 : seen today , feels better . No LE edema . JVD 2+ . We disussed we are likely looking at need for RRT in the future     4/14 : continue to require high amount of o2 . She is alert oriened when seeing her . We discussed dialysis and she is said she is agreeable .  She is telling me she d like to try diuretics another day prior to proceeding with dialysis     4/15 : seen this am , UO ok on bumex drip . She continue to require high amount of o2 when seeing her   D/w her this treatment plan including initiation of dialysis for volume and solute management . I did alse based on her request discuss with her daughter and sister     4/16 : seen on HD tolerating well, o2 requirement down . bp stable . Alert orietned   4/17: Patient was seen and interviewed in her room, after finishing dialysis today she has no new complaints I also discussed with the dialysis nurse she tolerated dialysis very well with no problems. 4/18: Patient to be in the room doing well no further complaints, discharge planning getting done for which she would have to have a tunneled catheter placed an outpatient dialysis chair advocated prior to SNF transfer and follow dialysis. PROBLEM LIST:    Principal Problem:    GI bleed  Active Problems:    Preoperative cardiovascular examination    Bowel perforation (HCC)  Resolved Problems:    * No resolved hospital problems. *       DIET:    DIET LOW FAT; Low Sodium (2 GM)       Allergies : Patient has no known allergies. Review of Systems:   Constitutional:weak   Eyes: no eye pain , no itching , no drainage  Ears, nose, mouth, throat, and face: no ear ,nose pain , hearing is ok ,no nasal drainage   Respiratory: no sob ,no cough ,no wheezing . Cardiovascular: no chest pain , no palpitation ,no sob . Gastrointestinal: no nausea, vomiting , constipation , no abdominal pain . Genitourinary:no urinary retention , no burning , dysuria . No polyuria   Hematologic/lymphatic: no bleeding , no cougulation issues . Musculoskeletal:no joint pain , no swelling . Neurological: no headaches ,no weakness , no numbness . Endocrine: no thirst , no weight issues .      MEDS (scheduled):    hydrALAZINE  25 mg Oral 3 times per day    [START ON 4/19/2021] bumetanide  2 mg Oral Daily    isosorbide dinitrate  10 mg Oral TID    carvedilol  12.5 mg Oral BID    docusate sodium  100 mg Oral Daily    pantoprazole  40 mg Oral QAM AC    atorvastatin  40 mg Oral Nightly    vitamin D  50,000 Units Oral Weekly       MEDS (infusions):   sodium chloride      sodium chloride         MEDS (prn):  perflutren lipid microspheres, acetaminophen, sodium chloride, sodium chloride, albuterol, ondansetron, promethazine, morphine    PHYSICAL EXAM:     Patient Vitals for the past 24 hrs:   BP Temp Temp src Pulse Resp SpO2 Weight   04/18/21 1415 (!) 103/56 98 °F (36.7 °C) Oral 68 16 98 %    04/18/21 1345      94 %    04/18/21 1330      (!) 78 %    04/18/21 0730 121/73 98.2 °F (36.8 °C) Oral 68 14 95 %    04/18/21 0522 (!) 102/53 98.4 °F (36.9 °C) Oral 81 16 95 % 125 lb 12.8 oz (57.1 kg)   04/18/21 0040    109      04/17/21 2251 130/67 98.7 °F (37.1 °C) Oral 65 17 97 %    04/17/21 2241     17     04/17/21 1745 115/62 98.5 °F (36.9 °C) Oral 88 16 97 %    04/17/21 1725 (!) 141/75 98 °F (36.7 °C)  84 16     04/17/21 1715 130/72   74      04/17/21 1700 119/76   83      04/17/21 1630 (!) 122/58   81      04/17/21 1615 122/71   60      04/17/21 1600 (!) 89/57   61      04/17/21 1545 (!) 103/55   62      04/17/21 1530 (!) 80/48   60      04/17/21 1500 (!) 97/53   57      @      Intake/Output Summary (Last 24 hours) at 4/18/2021 1440  Last data filed at 4/17/2021 1725  Gross per 24 hour   Intake 600 ml   Output 1400 ml   Net -800 ml         Wt Readings from Last 3 Encounters:   04/18/21 125 lb 12.8 oz (57.1 kg)   10/08/19 116 lb 14.4 oz (53 kg)   06/03/19 130 lb (59 kg)       Constitutional:  in no acute distress  Oral: mucus membranes moist  Neck: Trachea midline.   No JVD  Cardiovascular: S1, S2 regular rhythm, no murmur,or rub  Respiratory: Poor inspiratory effort, difficult to encourage better, though no crackles, no wheeze  Gastrointestinal:  Soft, nontender,

## 2021-04-19 ENCOUNTER — APPOINTMENT (OUTPATIENT)
Dept: INTERVENTIONAL RADIOLOGY/VASCULAR | Age: 81
DRG: 871 | End: 2021-04-19
Payer: MEDICARE

## 2021-04-19 LAB
ALBUMIN SERPL-MCNC: 3.9 G/DL (ref 3.5–5.2)
ALP BLD-CCNC: 161 U/L (ref 35–104)
ALT SERPL-CCNC: 5 U/L (ref 0–32)
ANION GAP SERPL CALCULATED.3IONS-SCNC: 12 MMOL/L (ref 7–16)
AST SERPL-CCNC: 13 U/L (ref 0–31)
BASOPHILS ABSOLUTE: 0.03 E9/L (ref 0–0.2)
BASOPHILS RELATIVE PERCENT: 0.3 % (ref 0–2)
BILIRUB SERPL-MCNC: 0.8 MG/DL (ref 0–1.2)
BILIRUBIN DIRECT: 0.3 MG/DL (ref 0–0.3)
BILIRUBIN, INDIRECT: 0.5 MG/DL (ref 0–1)
BUN BLDV-MCNC: 18 MG/DL (ref 8–23)
CALCIUM SERPL-MCNC: 9.5 MG/DL (ref 8.6–10.2)
CHLORIDE BLD-SCNC: 97 MMOL/L (ref 98–107)
CO2: 29 MMOL/L (ref 22–29)
CREAT SERPL-MCNC: 4.1 MG/DL (ref 0.5–1)
EOSINOPHILS ABSOLUTE: 0.16 E9/L (ref 0.05–0.5)
EOSINOPHILS RELATIVE PERCENT: 1.7 % (ref 0–6)
GFR AFRICAN AMERICAN: 13
GFR NON-AFRICAN AMERICAN: 13 ML/MIN/1.73
GLUCOSE BLD-MCNC: 120 MG/DL (ref 74–99)
HCT VFR BLD CALC: 25.5 % (ref 34–48)
HEMOGLOBIN: 7.8 G/DL (ref 11.5–15.5)
IMMATURE GRANULOCYTES #: 0.05 E9/L
IMMATURE GRANULOCYTES %: 0.5 % (ref 0–5)
LYMPHOCYTES ABSOLUTE: 2.56 E9/L (ref 1.5–4)
LYMPHOCYTES RELATIVE PERCENT: 27.7 % (ref 20–42)
MAGNESIUM: 2.3 MG/DL (ref 1.6–2.6)
MCH RBC QN AUTO: 31 PG (ref 26–35)
MCHC RBC AUTO-ENTMCNC: 30.6 % (ref 32–34.5)
MCV RBC AUTO: 101.2 FL (ref 80–99.9)
MONOCYTES ABSOLUTE: 0.73 E9/L (ref 0.1–0.95)
MONOCYTES RELATIVE PERCENT: 7.9 % (ref 2–12)
NEUTROPHILS ABSOLUTE: 5.7 E9/L (ref 1.8–7.3)
NEUTROPHILS RELATIVE PERCENT: 61.9 % (ref 43–80)
PDW BLD-RTO: 18.1 FL (ref 11.5–15)
PHOSPHORUS: 2.5 MG/DL (ref 2.5–4.5)
PLATELET # BLD: 241 E9/L (ref 130–450)
PMV BLD AUTO: 12.4 FL (ref 7–12)
POTASSIUM SERPL-SCNC: 4.3 MMOL/L (ref 3.5–5)
RBC # BLD: 2.52 E12/L (ref 3.5–5.5)
SODIUM BLD-SCNC: 138 MMOL/L (ref 132–146)
TOTAL PROTEIN: 6.5 G/DL (ref 6.4–8.3)
WBC # BLD: 9.2 E9/L (ref 4.5–11.5)

## 2021-04-19 PROCEDURE — 2700000000 HC OXYGEN THERAPY PER DAY

## 2021-04-19 PROCEDURE — 80048 BASIC METABOLIC PNL TOTAL CA: CPT

## 2021-04-19 PROCEDURE — 80076 HEPATIC FUNCTION PANEL: CPT

## 2021-04-19 PROCEDURE — 6370000000 HC RX 637 (ALT 250 FOR IP): Performed by: INTERNAL MEDICINE

## 2021-04-19 PROCEDURE — 02H633Z INSERTION OF INFUSION DEVICE INTO RIGHT ATRIUM, PERCUTANEOUS APPROACH: ICD-10-PCS | Performed by: RADIOLOGY

## 2021-04-19 PROCEDURE — 6370000000 HC RX 637 (ALT 250 FOR IP): Performed by: NURSE PRACTITIONER

## 2021-04-19 PROCEDURE — 94660 CPAP INITIATION&MGMT: CPT

## 2021-04-19 PROCEDURE — 85025 COMPLETE CBC W/AUTO DIFF WBC: CPT

## 2021-04-19 PROCEDURE — 77001 FLUOROGUIDE FOR VEIN DEVICE: CPT | Performed by: RADIOLOGY

## 2021-04-19 PROCEDURE — C1881 DIALYSIS ACCESS SYSTEM: HCPCS

## 2021-04-19 PROCEDURE — 83735 ASSAY OF MAGNESIUM: CPT

## 2021-04-19 PROCEDURE — 36558 INSERT TUNNELED CV CATH: CPT | Performed by: RADIOLOGY

## 2021-04-19 PROCEDURE — 90935 HEMODIALYSIS ONE EVALUATION: CPT | Performed by: INTERNAL MEDICINE

## 2021-04-19 PROCEDURE — 6370000000 HC RX 637 (ALT 250 FOR IP): Performed by: STUDENT IN AN ORGANIZED HEALTH CARE EDUCATION/TRAINING PROGRAM

## 2021-04-19 PROCEDURE — 0JH63XZ INSERTION OF TUNNELED VASCULAR ACCESS DEVICE INTO CHEST SUBCUTANEOUS TISSUE AND FASCIA, PERCUTANEOUS APPROACH: ICD-10-PCS | Performed by: RADIOLOGY

## 2021-04-19 PROCEDURE — 36415 COLL VENOUS BLD VENIPUNCTURE: CPT

## 2021-04-19 PROCEDURE — 2060000000 HC ICU INTERMEDIATE R&B

## 2021-04-19 PROCEDURE — 84100 ASSAY OF PHOSPHORUS: CPT

## 2021-04-19 RX ORDER — CARVEDILOL 12.5 MG/1
12.5 TABLET ORAL 2 TIMES DAILY
Qty: 60 TABLET | Refills: 3 | Status: SHIPPED | OUTPATIENT
Start: 2021-04-19

## 2021-04-19 RX ORDER — PSEUDOEPHEDRINE HCL 30 MG
100 TABLET ORAL DAILY
Qty: 60 CAPSULE | Refills: 0 | Status: SHIPPED | OUTPATIENT
Start: 2021-04-19 | End: 2021-05-19

## 2021-04-19 RX ORDER — ISOSORBIDE DINITRATE 10 MG/1
10 TABLET ORAL 3 TIMES DAILY
Qty: 90 TABLET | Refills: 3 | Status: SHIPPED | OUTPATIENT
Start: 2021-04-19

## 2021-04-19 RX ORDER — ERGOCALCIFEROL 1.25 MG/1
50000 CAPSULE ORAL WEEKLY
Qty: 5 CAPSULE | DISCHARGE
Start: 2021-04-21 | End: 2021-05-21

## 2021-04-19 RX ORDER — BUMETANIDE 2 MG/1
2 TABLET ORAL DAILY
Qty: 30 TABLET | Refills: 3 | Status: SHIPPED | OUTPATIENT
Start: 2021-04-19

## 2021-04-19 RX ORDER — ALBUTEROL SULFATE 2.5 MG/3ML
2.5 SOLUTION RESPIRATORY (INHALATION) EVERY 6 HOURS PRN
Qty: 120 EACH | Refills: 3 | Status: SHIPPED | OUTPATIENT
Start: 2021-04-19

## 2021-04-19 RX ORDER — PANTOPRAZOLE SODIUM 40 MG/1
40 TABLET, DELAYED RELEASE ORAL
Qty: 30 TABLET | Refills: 3 | Status: SHIPPED | OUTPATIENT
Start: 2021-04-20

## 2021-04-19 RX ADMIN — ISOSORBIDE DINITRATE 10 MG: 20 TABLET ORAL at 14:48

## 2021-04-19 RX ADMIN — ISOSORBIDE DINITRATE 10 MG: 20 TABLET ORAL at 20:30

## 2021-04-19 RX ADMIN — HYDRALAZINE HYDROCHLORIDE 25 MG: 25 TABLET, FILM COATED ORAL at 14:48

## 2021-04-19 RX ADMIN — BUMETANIDE 2 MG: 1 TABLET ORAL at 14:48

## 2021-04-19 RX ADMIN — DOCUSATE SODIUM 100 MG: 100 CAPSULE ORAL at 14:48

## 2021-04-19 RX ADMIN — ATORVASTATIN CALCIUM 40 MG: 40 TABLET, FILM COATED ORAL at 20:30

## 2021-04-19 RX ADMIN — PANTOPRAZOLE SODIUM 40 MG: 40 TABLET, DELAYED RELEASE ORAL at 06:06

## 2021-04-19 RX ADMIN — ACETAMINOPHEN 650 MG: 325 TABLET, FILM COATED ORAL at 20:30

## 2021-04-19 RX ADMIN — CARVEDILOL 12.5 MG: 6.25 TABLET, FILM COATED ORAL at 14:49

## 2021-04-19 ASSESSMENT — PAIN SCALES - GENERAL
PAINLEVEL_OUTOF10: 0

## 2021-04-19 ASSESSMENT — PAIN DESCRIPTION - ORIENTATION: ORIENTATION: RIGHT;LOWER

## 2021-04-19 ASSESSMENT — PAIN DESCRIPTION - DESCRIPTORS: DESCRIPTORS: SHARP;SORE

## 2021-04-19 ASSESSMENT — PAIN DESCRIPTION - PAIN TYPE: TYPE: ACUTE PAIN

## 2021-04-19 NOTE — PLAN OF CARE
Problem: Falls - Risk of:  Goal: Will remain free from falls  Description: Will remain free from falls  4/18/2021 2332 by Kirstin Cook RN  Outcome: Met This Shift  4/18/2021 2331 by Kirstin Cook RN  Outcome: Met This Shift  4/18/2021 1801 by Dejon Cannon RN  Outcome: Ongoing  Goal: Absence of physical injury  Description: Absence of physical injury  4/18/2021 2332 by Kirstin Cook RN  Outcome: Met This Shift  4/18/2021 2331 by Kirstin Cook RN  Outcome: Met This Shift  4/18/2021 1801 by Dejon Cannon RN  Outcome: Ongoing     Problem: Skin Integrity:  Goal: Will show no infection signs and symptoms  Description: Will show no infection signs and symptoms  4/18/2021 2332 by Kirstin Cook RN  Outcome: Met This Shift  4/18/2021 2331 by Kirstin Cook RN  Outcome: Met This Shift  4/18/2021 1801 by Dejon Cannon RN  Outcome: Ongoing  Goal: Absence of new skin breakdown  Description: Absence of new skin breakdown  4/18/2021 2332 by Kirstin Cook RN  Outcome: Met This Shift  4/18/2021 2331 by Kirstin Cook RN  Outcome: Met This Shift  4/18/2021 1801 by Dejon Cannon RN  Outcome: Ongoing  Goal: Risk for impaired skin integrity will decrease  Description: Risk for impaired skin integrity will decrease  4/18/2021 2332 by Kirstin Cook RN  Outcome: Met This Shift  4/18/2021 2331 by Kirstin Cook RN  Outcome: Met This Shift  4/18/2021 1801 by Dejon Cannon RN  Outcome: Ongoing  Goal: Status of oral mucous membranes will improve  Description: Status of oral mucous membranes will improve  Outcome: Met This Shift  Goal: Skin integrity will be maintained  Description: Skin integrity will be maintained  Outcome: Met This Shift

## 2021-04-19 NOTE — PROGRESS NOTES
Patient came down to special procedures for conversion of temporary dialysis catheter to tunneled dialysis catheter placement.        7399 Procedure start       9279 Procedure end      Tunneled dialysis catheter to right IJ/chest.  2 x 2 tegaderm applied to both IJ/chest.     nurse to nurse called, spoke with Candy Montanez RN

## 2021-04-19 NOTE — DISCHARGE SUMMARY
Component Value Date    HDL 22 04/01/2021    HDL 41 10/04/2019    HDL 37 02/06/2019     Lab Results   Component Value Date    LDLCALC 35 04/01/2021    LDLCALC 58 10/04/2019    LDLCALC 68 02/06/2019     Lab Results   Component Value Date    LABA1C 5.6 10/04/2019       IMAGING:  Echo Complete    Result Date: 4/9/2021  Transthoracic Echocardiography Report (TTE)  Demographics   Patient Name       Jenny Goldman          Gender               Female                     [address] [facility]     [id number]       Room Number          0430  Number   Account #          [de-identified]      Procedure Date       04/09/2021   Corporate ID                      Ordering Physician   Carole Wilcox MD   Accession Number   [id number]     Referring Physician   Date of Birth      1940     Sonographer          Jay Clark RDCS   Age                68 year(s)     Interpreting         Anastasia Elliott DO                                    Physician                                     Any Other  Procedure Type of Study   TTE procedure:Echo Complete W/Doppler & Color Flow. Procedure Date Date: 04/09/2021 Start: 12:08 PM Study Location: Echo Lab Technical Quality: Adequate visualization Indications:LV function. Patient Status: Routine Height: 65 inches Weight: 130 pounds BSA: 1.65 m^2 BMI: 21.63 kg/m^2 BP: 124/59 mmHg  Findings   Left Ventricle  Left ventricular size is grossly normal.  Moderate left ventricular concentric hypertrophy noted. Ejection fraction is measured at 30%. No evidence of left ventricular mass or thrombus noted. Right Ventricle  Mildly dilated right ventricle. No evidence of a thrombus in the right ventricle. Left Atrium  The left atrium is mildly dilated. Interatrial septum appears intact. No evidence of thrombus within left atrium. Right Atrium  Mildly enlarged right atrium size. Mitral Valve  Mild mitral regurgitation is present. No evidence of mitral valve stenosis.    Tricuspid Valve  Mild tricuspid regurgitation. RVSP is 34.49 mmHg. Pulmonary hypertension is mild . Aortic Valve  Aortic valve opens well. The aortic valve is trileaflet. No evidence of aortic valve regurgitation. No hemodynamically significant aortic stenosis is present. Pulmonic Valve  Pulmonic valve is structurally normal.   Pericardial Effusion  No evidence of pericardial effusion. Aorta  The aorta is within normal limits. Miscellaneous  Irregular rhythm--atrial fibrillation   Conclusions   Summary  Left ventricular size is grossly normal.  Moderate left ventricular concentric hypertrophy noted. Ejection fraction is measured at 30%. No evidence of left ventricular mass or thrombus noted. The left atrium is mildly dilated. Interatrial septum appears intact. No evidence of thrombus within left atrium. Mildly dilated right ventricle. No evidence of a thrombus in the right ventricle. Mildly enlarged right atrium size. Mild mitral regurgitation is present. No evidence of mitral valve stenosis. Mild tricuspid regurgitation. RVSP is 34.49 mmHg. Pulmonary hypertension is mild . Irregular rhythm--atrial fibrillation   Signature   ----------------------------------------------------------------  Electronically signed by Afua Auguste DO(Interpreting  physician) on 04/09/2021 07:56 PM  ----------------------------------------------------------------  M-Mode/2D Measurements & Calculations   LV Diastolic    LV Systolic Dimension: 4.7   AV Cusp Separation: 2 cmLA  Dimension: 5.5  cm                           Dimension: 4.7 cmAO Root  cm              LV Volume Diastolic: 741. 1   Dimension: 3.3 cm  LV FS:14.6 %    ml  LV PW           LV Volume Systolic: 799 ml  Diastolic: 1.4  LV EDV/LV EDV Index: 145.1  cm              ml/88 ml/m^2LV ESV/LV ESV    RV Diastolic Dimension: 3.2  LV PW Systolic: Index: 515 MS/76ES/ m^2      cm  1.5 cm          EF Calculated: 29.7 %        RV Systolic Dimension: 2.9 cm  Septum          LV Mass Index: 215 administration of intravenous contrast. Multiplanar reformatted images are provided for review. Dose modulation, iterative reconstruction, and/or weight based adjustment of the mA/kV was utilized to reduce the radiation dose to as low as reasonably achievable. COMPARISON: None. HISTORY: ORDERING SYSTEM PROVIDED HISTORY: abd pain TECHNOLOGIST PROVIDED HISTORY: Reason for exam:->abd pain Additional Contrast?->None Decision Support Exception->Emergency Medical Condition (MA) FINDINGS: In the left upper quadrant there is no expansile amorphous a lesion of mixed density measuring 7.4 x 3.2 x 3.3 cm interposed between the posterior wall of the fundal region of the stomach, the spleen which is posterior located, the tail of the pancreas, and the splenic flexure of the colon particularly the proximal descending colon. There is stranding of the adjacent fat planes and there are indication for extraluminal air associated with this lesion. The findings are restricted to the left upper quadrant. There is also fluid accumulation in the left subphrenic space around the spleen. Stranding of the fat planes are seen. The areas of a relative increased density associated with the core of this lesion can indicate a hemorrhagic component. Differential diagnosis would indicated an exophytic perforate lesion of the stomach as seen with a just a type of tumor but it is in close proximity with the vessels of the body/tail of the spleen and atherosclerotic changes are seen in the splenic artery. The a leaking aneurysm of the splenic artery cannot be entirely excluded the. The proper evaluation will required oral and IV contrast study. There is no evidence for free intraperitoneal air although there is extraluminal air associated with the left upper quadrant mass like lesion formation. Free fluid accumulation is seen in the lower pelvic cavity in the cul-de-sac.  The spleen appears to be intrinsically unremarkable though surrounded by stranding of fat planes and the fluid accumulation in the left subphrenic space. Stranding of the pericolonic fat planes seen around the splenic flexure of the colon relate with the amorphous complex most likely hemorrhagic mass lesion with a component of bowel perforation likely from the stomach in the left upper quadrant. The liver has borderline size. The small size gallstones are seen in a well distended gallbladder. There is no dilatation of the biliary tree or pancreatic ductal system. There is some atrophy of the pancreas. The have calcifications are seen in the area of the head of the pancreas. Additional calcifications seen throughout the pancreas in part are vascular related but can be relate with previous chronic calcified pancreatitis. The kidneys are preserved size. There is no hydronephrosis. Vascular calcifications are seen both kidneys. 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 Bladder has unremarkable appearance. The uterus and ovaries have unremarkable appearance. Calcified myoma is seen in the uterus. Appendix seen and has unremarkable appearance. There is no indication for bowel obstruction. There is a component of anasarca with bilateral pleural effusions of mild-to-moderate degree. Delete there are compression atelectasis in both lower lobes by the pleural effusion. Heart is diffusely enlarged. Degenerative changes are seen in the lumbar spine and particularly in the lower thoracic spine segments. 1.  Presence of a 7.4 x 3.2 x 3.3 cm expansile complex  density mass lesion in the left upper quadrant interposed between the posterior wall of the fundal region of the stomach, the spleen and the splenic flexure of the colon and the body/tail of the pancreas, likely with a hemorrhagic component.  2.  There is a component of a well contained perforation with extraluminal air but not conspicuously free intraperitoneal air associated with this mass lesion. 3.  There are free fluid accumulation in the left subphrenic space and stranding of the fat planes in between the stomach with spleen in the splenic flexure of the colon. 4. Differential diagnosis would include an exophytic  GIST tumor of the stomach with the gastric perforation. 5.  Further evaluation with CT abdomen pelvis with oral and IV contrast is recommended. The IV contrast needed to exclude a bleeding aneurysm of the splenic artery due the location of the lesion. Preliminary report given to Dr. Shantel Painting, ER Physician in [facility].     Xr Chest (2 Vw)    Result Date: 4/9/2021  EXAMINATION: TWO XRAY VIEWS OF THE CHEST 4/9/2021 12:10 pm COMPARISON: 04/02/2021 HISTORY: ORDERING SYSTEM PROVIDED HISTORY: Dyspnea TECHNOLOGIST PROVIDED HISTORY: Reason for exam:->Dyspnea FINDINGS: The heart is enlarged. The thoracic aorta is tortuous. There is a moderate size right pleural effusion. A right perihilar infiltrate cannot be excluded. The chest x-ray is limited due to the rotation. There is no appreciable left lung infiltrate. There is a small left pleural effusion. 1. Moderate size right pleural effusion. 2. Small left pleural effusion. 3. Significant rotation of the chest x-ray. A right perihilar infiltrate cannot be excluded. 4. Cardiomegaly. Xr Abdomen (kub) (single Ap View)    Result Date: 4/7/2021  EXAMINATION: ONE SUPINE XRAY VIEW(S) OF THE ABDOMEN 4/7/2021 8:06 am COMPARISON: None. HISTORY: ORDERING SYSTEM PROVIDED HISTORY: Abdominal pain TECHNOLOGIST PROVIDED HISTORY: Reason for exam:->Abdominal pain FINDINGS: There is osteopenia. There is a moderate amount of vascular calcification including renal arterial plaque. Small left urinary tract calculi are not excluded. There is a Jane catheter in the bladder.  Small globular calcification in the pelvis in the midline could reflect a small study demonstrates no segmental or subsegmental perfusion defects. There is large pleural effusions and cardiomegaly present     Tessa Baron Cutting Guided Cva Device Plmt/replace/removal    Result Date: 4/15/2021  EXAMINATION: Non-tunneled right sided internal jugular vein ultrasound guided temporary dialysis catheter placement. 4/15/2021 2:04 pm TECHNIQUE: After obtaining informed consent from the patient's sister (given the patient's altered mental status at the time of the procedure), the right neck was then cleansed and draped in the usual sterile fashion. 1% Lidocaine was used for local anesthesia. Under ultrasound guidance, the right internal jugular vein was visualized and cannulated using a micropuncture needle. An .018 wire was then placed into the vein. A 5-Montenegrin micropuncture dilator was then introduced into the vein under fluoroscopic guidance. An 0.35 guide wire was then advanced into the vein and placed within the superior vena cava. Next, a triple lumen 13.5 Montenegrin 15 cm long hemodialysis catheter was placed into the vein over the wire using a standard Seldinger technique. The catheter was then stabilized to the skin using silk sutures. The patient tolerated the procedure well without any immediate complications. All ports flushed and aspirated blood without any problems. PHYSICIANS: Devaughn Luis MD COMPARISON: None. HISTORY: ORDERING SYSTEM PROVIDED HISTORY: temporary HD LINE TECHNOLOGIST PROVIDED HISTORY: Reason for exam:->temporary HD LINE Renal insufficiency FINDINGS: Patent right internal jugular vein FLUOROSCOPY TIME: Fluoroscopy time 0.3 minutes, Air Kerma 23 mGy. 2 fluoroscopic images were saved. Status post temporary hemodialysis catheter placement as described above.      Xr Chest Portable    Result Date: 4/15/2021  EXAMINATION: ONE XRAY VIEW OF THE CHEST 4/15/2021 3:52 pm COMPARISON: 04/14/2021 HISTORY: ORDERING SYSTEM PROVIDED HISTORY: Right pneumothorax TECHNOLOGIST PROVIDED HISTORY: Reason for exam:->Right pneumothorax FINDINGS: Status post right thoracentesis. There is significant decrease in the right pleural effusion since yesterday. A small effusion persists. No pneumothorax is seen. Compared to yesterday, there is increased left basilar opacification, which may be due to atelectasis, pneumonia, effusion or a combination thereof. The right internal jugular vein central venous catheter is well positioned, with the tip overlying the mid SVC. 1. Status post right thoracentesis. No pneumothorax. 2. Increased left basilar opacity, which may represent atelectasis, pneumonia, effusion or a combination thereof. 3.  The right internal jugular vein central venous catheter is well positioned, with the tip overlying the mid SVC. Xr Chest Portable    Result Date: 4/14/2021  EXAMINATION: ONE XRAY VIEW OF THE CHEST 4/14/2021 6:12 am COMPARISON: 04/12/2021 HISTORY: ORDERING SYSTEM PROVIDED HISTORY: SOB TECHNOLOGIST PROVIDED HISTORY: Reason for exam:->SOB FINDINGS: Compared to 04/12/2021, there is mild increase in the moderate layering right pleural effusion. The heart is enlarged. Mild interstitial prominence is seen. Pulmonary opacity in the right lung may represent atelectasis or pneumonia. 1. Radiographic findings suggestive of CHF exacerbation. 2. Mild increase in the right pleural effusions since 04/12/2021. Xr Chest Portable    Result Date: 4/12/2021  EXAMINATION: ONE XRAY VIEW OF THE CHEST 4/12/2021 6:02 pm COMPARISON: April 9, 2021. HISTORY: ORDERING SYSTEM PROVIDED HISTORY: increased O2 needs TECHNOLOGIST PROVIDED HISTORY: Reason for exam:->increased O2 needs FINDINGS: There is cardiomegaly with prominence of superior mediastinum. There is ectatic aortic arch. There is vascular congestion with the progressive patchy perihilar and bilateral infiltrates and effusions more on the right side.      Cardiomegaly with progressive perihilar and bibasilar infiltrates and effusions right more than left likely progressive CHF and or pneumonia. Xr Chest Portable    Result Date: 4/2/2021  EXAMINATION: ONE XRAY VIEW OF THE CHEST PORTABLE UPRIGHT 4/2/2021 12:20 pm COMPARISON: CT abdomen and pelvis 03/31/2021 and portable chest 10/03/2019 HISTORY: ORDERING SYSTEM PROVIDED HISTORY: SOB TECHNOLOGIST PROVIDED HISTORY: Reason for exam:->SOB FINDINGS: Portable technique with poor visualization of the left lung base and retrocardiac region. Moderate cardiomegaly, unchanged. Mild pulmonary venous congestion without overt CHF. No focal infiltrate or significant pleural effusion identified. Atherosclerotic thoracic aorta. No pneumothorax. Moderate cardiomegaly. Mild pulmonary venous congestion without overt CHF. No pneumonia seen. Xr Chest 1 View    Result Date: 4/9/2021  EXAMINATION: ONE XRAY VIEW OF THE CHEST 4/9/2021 3:04 pm COMPARISON: 04/09/2021 HISTORY: ORDERING SYSTEM PROVIDED HISTORY: post thoracentesis TECHNOLOGIST PROVIDED HISTORY: Reason for exam:->post thoracentesis FINDINGS: The heart is moderately enlarged. Lung aeration is improved, there is persistent consolidation/atelectasis in the right base. Small pleural effusions lie posteriorly     No sign of post thoracentesis pneumothorax. Improved lung aeration, residual small effusions and right basilar consolidation. Us Thoracentesis Which Side Should The Procedure Be Performed? Right    Result Date: 4/9/2021  EXAMINATION: ULTRASOUND right THORACENTESIS 4/9/2021 3:18 pm COMPARISON: None HISTORY: ORDERING SYSTEM PROVIDED HISTORY: rt thoracentesis TECHNOLOGIST PROVIDED HISTORY: Reason for exam:->rt thoracentesis Which side should the procedure be performed?->Right What reading provider will be dictating this exam?->CRC FINDINGS: The thoracentesis procedure was explained to the patient and consent was obtained. A time-out was performed Ultrasound images of the  right chest were obtained.   There is a very small amount of  right pleural fluid. Request was made for sampling of the pleural fluid. The patient's back was prepped and draped in a sterile fashion. Following the uneventful administration of lidocaine introduced a 25 gauge needle into the small amount of fluid. 13 mL of fluid was obtained for testing. The aspirated fluid was clear and yellow. Fluid was sent to the laboratory for testing and pH. A post thoracentesis chest x-ray was obtained. There is no pneumothorax. 1. Very small right pleural effusion. I was able to aspirate 13 mL of fluid and send the fluid off for pH and the requested testing. 2. There is no evidence of a pneumothorax on the post thoracentesis chest x-ray. Us Dup Upper Extremity Right Venous    Result Date: 4/2/2021  EXAMINATION: DUPLEX ULTRASOUND OF THE RIGHT UPPER EXTREMITY FOR DVT, 4/2/2021 1:13 pm TECHNIQUE: Duplex ultrasound using B-mode/gray scaled imaging and Doppler spectral analysis and color flow was obtained of the deep venous structures of the upper extremity. COMPARISON: None used. HISTORY: ORDERING SYSTEM PROVIDED HISTORY: swelling TECHNOLOGIST PROVIDED HISTORY: Reason for exam:->swelling What reading provider will be dictating this exam?->CRC FINDINGS: There is normal flow and compressibility of the visualized venous structures of the right upper extremity. There is no evidence of echogenic thrombus. The veins demonstrate good compressibility with normal color flow study and spectral analysis. No evidence of DVT of the right upper extremity. Vl Lower Extremity Arterial Segmental Pressures W Ppg Bilateral    Result Date: 4/8/2021  EXAMINATION: ARTERIAL DUPLEX ULTRASOUND OF THE BILATERAL LOWER EXTREMITIES WITH SEGMENTAL PRESSURES AND PULSE VOLUME RECORDINGS 4/8/2021 7:54 am TECHNIQUE: Arterial duplex RUBÉN ultrasound. Segmental pressures and PVR performed with Doppler. COMPARISON: None.  HISTORY: ORDERING SYSTEM PROVIDED HISTORY: pain and decreased pulses in BLE, concern for pancreatic tail there is an approximate 5.5 x 3.5 cm collection which is primarily T2 hyperintense, T1 hyperintense and demonstrates apparent restricted diffusion adrenals are unremarkable. Spleen is normal in size. Bilateral simple renal cysts. Vasculature is unremarkable. GI/Bowel: Bowel is non-dilated without wall thickening. Peritoneum/Retroperitoneum:No free fluid, free air, organized fluid collection or lymphadenopathy. Bones: Multilevel degenerative disc disease. Severe body wall edema. Exam is moderately degraded by patient motion. 1. Approximate 5.5 cm probable collection adjacent to the pancreatic tail, incompletely characterized in the absence of contrast material but felt to most likely represent an acute peripancreatic fluid collection or pseudocyst related to prior pancreatitis. 2. Moderate to severe pancreatic ductal dilatation. No obstructing stone is seen accounting for noncontrast technique and motion degraded imaging. Recommend further evaluation with contrast-enhanced pancreas protocol MRI. Attention to the finding in impression #1 is recommended at the time of pancreas protocol MRI. 3. Small bilateral pleural effusions with severe body wall edema. Ir Ultrasound Guidance Vascular Access    Result Date: 4/15/2021  EXAMINATION: Non-tunneled right sided internal jugular vein ultrasound guided temporary dialysis catheter placement. 4/15/2021 2:04 pm TECHNIQUE: After obtaining informed consent from the patient's sister (given the patient's altered mental status at the time of the procedure), the right neck was then cleansed and draped in the usual sterile fashion. 1% Lidocaine was used for local anesthesia. Under ultrasound guidance, the right internal jugular vein was visualized and cannulated using a micropuncture needle. An .018 wire was then placed into the vein. A 5-Latvian micropuncture dilator was then introduced into the vein under fluoroscopic guidance.  An 0.35 guide wire was then advanced into the vein and placed within the superior vena cava. Next, a triple lumen 13.5 Tristanian 15 cm long hemodialysis catheter was placed into the vein over the wire using a standard Seldinger technique. The catheter was then stabilized to the skin using silk sutures. The patient tolerated the procedure well without any immediate complications. All ports flushed and aspirated blood without any problems. PHYSICIANS: Haja Wilks MD COMPARISON: None. HISTORY: ORDERING SYSTEM PROVIDED HISTORY: temporary HD LINE TECHNOLOGIST PROVIDED HISTORY: Reason for exam:->temporary HD LINE Renal insufficiency FINDINGS: Patent right internal jugular vein FLUOROSCOPY TIME: Fluoroscopy time 0.3 minutes, Air Kerma 23 mGy. 2 fluoroscopic images were saved. Status post temporary hemodialysis catheter placement as described above. Ir Guided Thoracentesis Pleural    Result Date: 4/15/2021  PROCEDURE: ULTRASOUNDGUIDED RIGHT THORACENTESIS 4/15/2021 HISTORY: ORDERING SYSTEM PROVIDED HISTORY: Thoracentesis TECHNOLOGIST PROVIDED HISTORY: Reason for exam:->Thoracentesis Which side should the procedure be performed?->Right TECHNIQUE: Informed consent was obtained after a detailed explanation of the procedure including risks, benefits, and alternatives. Universal protocol was performed. The right chest was prepped and draped in sterile fashion and local anesthesia was achieved with lidocaine. A 5 Tristanian needle sheath was advanced under direct ultrasound guidance into pleural effusion and thoracentesis was performed. The patient tolerated the procedure well. FINDINGS: A total of 650 cc of straw-colored pleural fluid was removed. Status post ultrasound guided thoracentesis.      Us Dup Lower Extremities Bilateral Venous    Result Date: 4/7/2021  EXAMINATION: DUPLEX VENOUS ULTRASOUND OF THE BILATERAL LOWER EXTREMITIES, 4/7/2021 3:24 pm TECHNIQUE: Duplex ultrasound using B-mode/gray scaled imaging and Doppler spectral analysis and color flow was obtained of the bilateral lower extremities. COMPARISON: None. HISTORY: ORDERING SYSTEM PROVIDED HISTORY: pain and cramping, concern for DVT TECHNOLOGIST PROVIDED HISTORY: Reason for exam:->pain and cramping, concern for DVT What reading provider will be dictating this exam?->CRC FINDINGS: The visualized veins of the bilateral lower extremities are patent and free of echogenic thrombus. The veins demonstrate good compressibility with normal color flow study and spectral analysis. No evidence of DVT in either lower extremity. HOSPITAL COURSE:   Jani Leyva spent an extended period of time this will be a brief synopsis of the events that occurred during the hospitalization. She was evaluated by multiple subspecialists and please refer to the daily progress notes. She initially presented with pancreatic pseudocyst and eventually underwent ERCP with biopsies. Unfortunately throughout the hospitalization, her respiratory status remained tenuous in the setting of renal disease and volume retention. She was ultimately started on hemodialysis and responded well. She is now resting comfortably and is maintained on nasal cannula oxygen. She requires extensive work with the therapy teams. She is acceptable for discharge to the nursing home facility. BRIEF PHYSICAL EXAMINATION AND LABORATORIES ON DAY OF DISCHARGE:  VITALS:  /67   Pulse 78   Temp 98.2 °F (36.8 °C)   Resp 20   Ht 5' 5\" (1.651 m)   Wt 125 lb 10.6 oz (57 kg)   SpO2 96%   BMI 20.91 kg/m²     HEENT:  PERRLA. EOMI. Sclera clear. Buccal mucosa moist.  Nasal cannula oxygen is in place. Neck:  Supple. Trachea midline. No thyromegaly. No JVD. No bruits. Heart:  Rhythm regular, rate controlled. No murmurs. Lungs:  Symmetrical. Clear to auscultation bilaterally. No wheezes. No rhonchi. No rales. Abdomen: Soft. Non-tender. Non-distended. Bowel sounds positive. No organomegaly or masses.   No pain (ERGOCALCIFEROL) 1.25 MG (63703 UT) CAPS capsule  Take 1 capsule by mouth once a week                 FOLLOW UP/INSTRUCTIONS:  · This patient is instructed to follow-up with her primary care physician. · Patient is instructed to follow-up with the consults listed above as directed by them. · she is instructed to resume home medications and take new medications as indicated in the list above. · If the patient has a recurrence of symptoms, she is instructed to go to the ED. Preparing for this patient's discharge, including paperwork, orders, instructions, and meeting with patient did require > 40 minutes.     Adriano Acosta DO     4/19/2021  11:22 AM

## 2021-04-19 NOTE — FLOWSHEET NOTE
04/19/21 1145   Vital Signs   BP (!) 112/58   Temp 97.8 °F (36.6 °C)   Pulse 80   Resp 20   Weight 121 lb 4.1 oz (55 kg)   Weight Method Bed scale   Percent Weight Change -3.51   Pain Assessment   Pain Assessment 0-10   Pain Level 0   Post-Hemodialysis Assessment   Post-Treatment Procedures Blood returned;Catheter Capped, clamped with Saline x2 ports   Machine Disinfection Process Exterior Machine Disinfection   Rinseback Volume (ml) 300 ml   Total Liters Processed (l/min) 45.6 l/min   Dialyzer Clearance Heavily streaked   Duration of Treatment (minutes) 200 minutes   Heparin amount administered during treatment (units) 0 units   Hemodialysis Intake (ml) 500 ml   Hemodialysis Output (ml) 2500 ml   NET Removed (ml) 2000 ml   Tolerated Treatment Good   Patient Response to Treatment tolerated well   Bilateral Breath Sounds Diminished   Edema None   Physician Notified?  No

## 2021-04-19 NOTE — PROGRESS NOTES
Room #:   9039/2563-48    Date: 2021       Patient Name: Vitaly Grier  : 1940      MRN: [id number]     Patient unavailable for physical therapy treatment due to off floor at dialysis     Zoey Pfeiffer PT

## 2021-04-19 NOTE — PROGRESS NOTES
[facility] MD Vinayak Terrazas MD Juan Postal, MD Asuncion Fiddler, MD   Progress Note    4/19/2021    SUBJECTIVE:     4/3: Seen this am in her room , o2/nc at 4 L which is her home o2 . She did undergo EGD/colonscopy this am . Results noted   Continue to look volume overloaded with 2 + edema and JVD . 4/4 ; stable vitlas , good UO on bumex drip/diuril . Will assess in am if we can wean her off drip     4/5: Patient was doing well this morning, she is awake and alert with no acute distress. We will continue same medications and same plan of care for today. 4/6 \" Seen this am , pleasant , o2/nc . Good UO . Cr stable . Plan for EUS in am     4/7 : good UO with negative balance . In good spirit . For EUS today . 4/8 : in bed , lying flat in NAD . Edema much improved     4/9 : report of pt being more sob . she is for cxr today . EUS with bile duct stone     4/10: Recurrent left lower quadrant pain, ongoing anorexia with poor intake of food and fluid, intermittent mild nausea though no emesis. No dyspnea. No peripheral swelling. Mildly hypotensive. 4/11: Fatigue, generalized weakness, ongoing anorexia and poor intake. Some lower abdominal discomfort though no dez pain. Indigestion and mild nausea intermittently. No swelling. No dyspnea on nasal cannula. No cough or wheeze. Started normal saline at conservative rate yesterday, increase the rate this morning    4/12 : weak . bp stable   High o2 requirement this evening   cxr with HF       4/13 : seen today , feels better . No LE edema . JVD 2+ . We disussed we are likely looking at need for RRT in the future     4/14 : continue to require high amount of o2 . She is alert oriened when seeing her . We discussed dialysis and she is said she is agreeable . She is telling me she d like to try diuretics another day prior to proceeding with dialysis     4/15 : seen this am , UO ok on bumex drip .  She continue to require high amount of o2 when seeing her   D/w her this treatment plan including initiation of dialysis for volume and solute management . I did alse based on her request discuss with her daughter and sister     4/16 : seen on HD tolerating well, o2 requirement down . bp stable . Alert orietned   4/17: Patient was seen and interviewed in her room, after finishing dialysis today she has no new complaints I also discussed with the dialysis nurse she tolerated dialysis very well with no problems. 4/18: Patient to be in the room doing well no further complaints, discharge planning getting done for which she would have to have a tunneled catheter placed an outpatient dialysis chair advocated prior to SNF transfer and follow dialysis. 4/19: HD today no reported issues . For [facility] later on today     PROBLEM LIST:    Principal Problem:    GI bleed  Active Problems:    Preoperative cardiovascular examination    Bowel perforation (HCC)  Resolved Problems:    * No resolved hospital problems. *       DIET:    DIET LOW FAT; Low Sodium (2 GM)       Allergies : Patient has no known allergies. Review of Systems:   Constitutional:weak   Eyes: no eye pain , no itching , no drainage  Ears, nose, mouth, throat, and face: no ear ,nose pain , hearing is ok ,no nasal drainage   Respiratory: no sob ,no cough ,no wheezing . Cardiovascular: no chest pain , no palpitation ,no sob . Gastrointestinal: no nausea, vomiting , constipation , no abdominal pain . Genitourinary:no urinary retention , no burning , dysuria . No polyuria   Hematologic/lymphatic: no bleeding , no cougulation issues . Musculoskeletal:no joint pain , no swelling . Neurological: no headaches ,no weakness , no numbness . Endocrine: no thirst , no weight issues .      MEDS (scheduled):    hydrALAZINE  25 mg Oral 3 times per day    bumetanide  2 mg Oral Daily    isosorbide dinitrate  10 mg Oral TID    carvedilol  12.5 mg Oral BID    docusate sodium  100 mg Oral Daily    pantoprazole  40 mg Oral QAM AC    atorvastatin  40 mg Oral Nightly    vitamin D  50,000 Units Oral Weekly       MEDS (infusions):   sodium chloride      sodium chloride         MEDS (prn):  perflutren lipid microspheres, acetaminophen, sodium chloride, sodium chloride, albuterol, ondansetron, promethazine, morphine    PHYSICAL EXAM:     Patient Vitals for the past 24 hrs:   BP Temp Temp src Pulse Resp SpO2 Weight   04/19/21 1145 (!) 112/58 97.8 °F (36.6 °C)  80 20  121 lb 4.1 oz (55 kg)   04/19/21 1131 120/72   81      04/19/21 1105 136/67   78      04/19/21 1031 134/73   66      04/19/21 1008 (!) 102/56   71      04/19/21 0938 120/64   64      04/19/21 0900 125/71   65      04/19/21 0830 122/74   75      04/19/21 0810 (!) 120/59 98.2 °F (36.8 °C)  75 20  125 lb 10.6 oz (57 kg)   04/19/21 0745 (!) 141/69 98.3 °F (36.8 °C) Oral 83 16 96 %    04/19/21 0605       126 lb (57.2 kg)   04/19/21 0602 (!) 95/53 98.6 °F (37 °C) Oral 76 18 96 %    04/18/21 2148     20     04/18/21 2037 (!) 119/57 98.1 °F (36.7 °C) Oral 70 16 97 %    04/18/21 1528     16     04/18/21 1500      94 %    @      Intake/Output Summary (Last 24 hours) at 4/19/2021 1448  Last data filed at 4/19/2021 1145  Gross per 24 hour   Intake 560 ml   Output 2500 ml   Net -1940 ml         Wt Readings from Last 3 Encounters:   04/19/21 121 lb 4.1 oz (55 kg)   10/08/19 116 lb 14.4 oz (53 kg)   06/03/19 130 lb (59 kg)       Constitutional:  in no acute distress  Oral: mucus membranes moist  Neck: Trachea midline.   No JVD  Cardiovascular: S1, S2 regular rhythm, no murmur,or rub  Respiratory: Poor inspiratory effort, difficult to encourage better, though no crackles, no wheeze  Gastrointestinal:  Soft, nontender, nondistended, NABS  Ext: No edema dependently or distally  Skin: dry, no rash  Neuro: awake, alert, interactive      DATA:    Recent Labs     04/17/21  0641

## 2021-04-19 NOTE — PLAN OF CARE
Problem: Falls - Risk of:  Goal: Will remain free from falls  Description: Will remain free from falls  Outcome: Met This Shift     Problem: Falls - Risk of:  Goal: Absence of physical injury  Description: Absence of physical injury  Outcome: Met This Shift     Problem: Skin Integrity:  Goal: Will show no infection signs and symptoms  Description: Will show no infection signs and symptoms  Outcome: Met This Shift     Problem: Skin Integrity:  Goal: Skin integrity will be maintained  Description: Skin integrity will be maintained  Outcome: Met This Shift     Problem:  Activity:  Goal: Fatigue will decrease  Description: Fatigue will decrease  Outcome: Not Met This Shift

## 2021-04-19 NOTE — PROGRESS NOTES
Attempted tx with pt, however pt unavailable for occupational therapy treatment at this time d/t dialysis. Will attempt tx with pt at later time/date.  Ti Six, [address]

## 2021-04-19 NOTE — CARE COORDINATION
SS NOTE: COVID NEGATIVE 4/14. PT WILL NEED A NEGATIVE COVID TO TRANSFER TO SNF. SW phoned and faxed referral to [facility] Dialysis today and await a chair schedule. SW made contact with Liaison at [facility] and she will begin [address] [facility] again once PT and OT notes are available today. For the SNF pt will need a PRECERT, signed LUBA, current PT/OT notes, a HD chair schedule, and the HENs is completed. Lamona Opitz Buckner. 4/19/2021.10:49 AM.

## 2021-04-19 NOTE — PROGRESS NOTES
Attempted tx with pt, however pt adamantly refused therapy at this time. Pt states she has not eaten yesterday or today (pt had just returned from dialysis); Nsg ordered lunch for pt on this date. Pt states \" this ain't right, I'm gonna write this down\". Pt declined ADL or other activities at this time. Pt states, \"I'm eating and then laying myself down in this bed and am going to sleep\". SW notified; Nsg aware and present. Will attempt tx with pt at later time/date.  King Matias, [address]

## 2021-04-20 LAB
ALBUMIN SERPL-MCNC: 4.1 G/DL (ref 3.5–5.2)
ALP BLD-CCNC: 165 U/L (ref 35–104)
ALT SERPL-CCNC: 7 U/L (ref 0–32)
AST SERPL-CCNC: 13 U/L (ref 0–31)
BASOPHILS ABSOLUTE: 0.04 E9/L (ref 0–0.2)
BASOPHILS RELATIVE PERCENT: 0.4 % (ref 0–2)
BILIRUB SERPL-MCNC: 0.8 MG/DL (ref 0–1.2)
BILIRUBIN DIRECT: 0.2 MG/DL (ref 0–0.3)
BILIRUBIN, INDIRECT: 0.6 MG/DL (ref 0–1)
EOSINOPHILS ABSOLUTE: 0.18 E9/L (ref 0.05–0.5)
EOSINOPHILS RELATIVE PERCENT: 1.7 % (ref 0–6)
HCT VFR BLD CALC: 29.2 % (ref 34–48)
HEMOGLOBIN: 8.6 G/DL (ref 11.5–15.5)
IMMATURE GRANULOCYTES #: 0.1 E9/L
IMMATURE GRANULOCYTES %: 0.9 % (ref 0–5)
LYMPHOCYTES ABSOLUTE: 2.87 E9/L (ref 1.5–4)
LYMPHOCYTES RELATIVE PERCENT: 26.5 % (ref 20–42)
MAGNESIUM: 2 MG/DL (ref 1.6–2.6)
MCH RBC QN AUTO: 30.2 PG (ref 26–35)
MCHC RBC AUTO-ENTMCNC: 29.5 % (ref 32–34.5)
MCV RBC AUTO: 102.5 FL (ref 80–99.9)
MONOCYTES ABSOLUTE: 0.91 E9/L (ref 0.1–0.95)
MONOCYTES RELATIVE PERCENT: 8.4 % (ref 2–12)
NEUTROPHILS ABSOLUTE: 6.71 E9/L (ref 1.8–7.3)
NEUTROPHILS RELATIVE PERCENT: 62.1 % (ref 43–80)
PDW BLD-RTO: 18.3 FL (ref 11.5–15)
PHOSPHORUS: 2.9 MG/DL (ref 2.5–4.5)
PLATELET # BLD: 232 E9/L (ref 130–450)
PMV BLD AUTO: 12.8 FL (ref 7–12)
RBC # BLD: 2.85 E12/L (ref 3.5–5.5)
SARS-COV-2, NAAT: NOT DETECTED
TOTAL PROTEIN: 6.9 G/DL (ref 6.4–8.3)
WBC # BLD: 10.8 E9/L (ref 4.5–11.5)

## 2021-04-20 PROCEDURE — 94660 CPAP INITIATION&MGMT: CPT

## 2021-04-20 PROCEDURE — 87635 SARS-COV-2 COVID-19 AMP PRB: CPT

## 2021-04-20 PROCEDURE — 36415 COLL VENOUS BLD VENIPUNCTURE: CPT

## 2021-04-20 PROCEDURE — 2060000000 HC ICU INTERMEDIATE R&B

## 2021-04-20 PROCEDURE — 6370000000 HC RX 637 (ALT 250 FOR IP): Performed by: INTERNAL MEDICINE

## 2021-04-20 PROCEDURE — 6370000000 HC RX 637 (ALT 250 FOR IP): Performed by: STUDENT IN AN ORGANIZED HEALTH CARE EDUCATION/TRAINING PROGRAM

## 2021-04-20 PROCEDURE — 85025 COMPLETE CBC W/AUTO DIFF WBC: CPT

## 2021-04-20 PROCEDURE — 6370000000 HC RX 637 (ALT 250 FOR IP): Performed by: NURSE PRACTITIONER

## 2021-04-20 PROCEDURE — 2700000000 HC OXYGEN THERAPY PER DAY

## 2021-04-20 PROCEDURE — 84100 ASSAY OF PHOSPHORUS: CPT

## 2021-04-20 PROCEDURE — 97535 SELF CARE MNGMENT TRAINING: CPT

## 2021-04-20 PROCEDURE — 80076 HEPATIC FUNCTION PANEL: CPT

## 2021-04-20 PROCEDURE — 97116 GAIT TRAINING THERAPY: CPT

## 2021-04-20 PROCEDURE — 83735 ASSAY OF MAGNESIUM: CPT

## 2021-04-20 PROCEDURE — 97530 THERAPEUTIC ACTIVITIES: CPT

## 2021-04-20 RX ADMIN — PANTOPRAZOLE SODIUM 40 MG: 40 TABLET, DELAYED RELEASE ORAL at 06:25

## 2021-04-20 RX ADMIN — BUMETANIDE 2 MG: 1 TABLET ORAL at 09:04

## 2021-04-20 RX ADMIN — CARVEDILOL 12.5 MG: 6.25 TABLET, FILM COATED ORAL at 09:04

## 2021-04-20 RX ADMIN — ATORVASTATIN CALCIUM 40 MG: 40 TABLET, FILM COATED ORAL at 22:34

## 2021-04-20 RX ADMIN — ISOSORBIDE DINITRATE 10 MG: 20 TABLET ORAL at 09:25

## 2021-04-20 RX ADMIN — DOCUSATE SODIUM 100 MG: 100 CAPSULE ORAL at 09:04

## 2021-04-20 ASSESSMENT — PAIN SCALES - GENERAL: PAINLEVEL_OUTOF10: 0

## 2021-04-20 NOTE — PLAN OF CARE
Problem: Falls - Risk of:  Goal: Will remain free from falls  Description: Will remain free from falls  4/20/2021 0432 by Aditya Schaefer RN  Outcome: Met This Shift     Problem: Falls - Risk of:  Goal: Absence of physical injury  Description: Absence of physical injury  4/20/2021 0432 by Aditya Schaefer RN  Outcome: Met This Shift     Problem: Skin Integrity:  Goal: Will show no infection signs and symptoms  Description: Will show no infection signs and symptoms  4/20/2021 0432 by Aditya Schaefer RN  Outcome: Met This Shift     Problem: Skin Integrity:  Goal: Absence of new skin breakdown  Description: Absence of new skin breakdown  4/20/2021 0432 by Aditya Schaefer RN  Outcome: Met This Shift     Problem: Skin Integrity:  Goal: Risk for impaired skin integrity will decrease  Description: Risk for impaired skin integrity will decrease  Outcome: Met This Shift     Problem: Skin Integrity:  Goal: Status of oral mucous membranes will improve  Description: Status of oral mucous membranes will improve  Outcome: Met This Shift     Problem: Skin Integrity:  Goal: Skin integrity will be maintained  Description: Skin integrity will be maintained  4/20/2021 0432 by Aditya Schaefer RN  Outcome: Met This Shift     Problem: Pain:  Goal: Pain level will decrease  Description: Pain level will decrease  Outcome: Met This Shift     Problem: Pain:  Goal: Control of acute pain  Description: Control of acute pain  Outcome: Met This Shift     Problem: Pain:  Goal: Control of chronic pain  Description: Control of chronic pain  Outcome: Met This Shift     Problem: Activity:  Goal: Fatigue will decrease  Description: Fatigue will decrease  4/20/2021 0432 by Aditya Schaefer RN  Outcome: Ongoing     Problem:  Activity:  Goal: Risk for activity intolerance will decrease  Description: Risk for activity intolerance will decrease  Outcome: Ongoing     Problem: Respiratory:  Goal: Ability to maintain a clear airway will improve  Description: Ability to maintain a clear airway will improve  Outcome: Met This Shift  Goal: Ability to maintain adequate ventilation will improve  Description: Ability to maintain adequate ventilation will improve  Outcome: Met This Shift  Goal: Complications related to the disease process, condition or treatment will be avoided or minimized  Description: Complications related to the disease process, condition or treatment will be avoided or minimized  Outcome: Met This Shift     Problem: Fluid Volume:  Goal: Will show no signs or symptoms of fluid imbalance  Description: Will show no signs or symptoms of fluid imbalance  Outcome: Met This Shift     Problem: Health Behavior:  Goal: Ability to manage health-related needs will improve  Description: Ability to manage health-related needs will improve  Outcome: Met This Shift  Goal: Identification of resources available to assist in meeting health care needs will improve  Description: Identification of resources available to assist in meeting health care needs will improve  Outcome: Met This Shift     Problem: Nutritional:  Goal: Ability to identify appropriate dietary choices will improve  Description: Ability to identify appropriate dietary choices will improve  Outcome: Met This Shift     Problem: Physical Regulation:  Goal: Complications related to the disease process, condition or treatment will be avoided or minimized  Description: Complications related to the disease process, condition or treatment will be avoided or minimized  Outcome: Met This Shift     Problem: Sensory:  Goal: General experience of comfort will improve  Description: General experience of comfort will improve  Outcome: Met This Shift

## 2021-04-20 NOTE — PROGRESS NOTES
Physical Therapy Treatment Note    Room #:  8983/9709-34  Patient Name: Elizabeth Pulido  YOB: 1940  MRN: [id number]    Referring Provider:   Leyla Guardado DO      Date of Service: 4/20/2021    Evaluating Physical Therapist: Juliette Michaud, PT #[id number]      Diagnosis:   GI bleed [K92.2]       Patient Active Problem List   Diagnosis    Shortness of breath    Chronic combined systolic and diastolic congestive heart failure ([address]    Chronic renal insufficiency, stage IV (severe) (HCC)    Atrial fibrillation (HCC)    Hypertension    Abnormal chest x-ray    Anemia of chronic disease    Opacity of lung on imaging study    Cigarette smoker    Tobacco abuse counseling    Acute on chronic combined systolic and diastolic CHF (congestive heart failure) ([address]    Acute on chronic congestive heart failure (HCC)    Acute systolic CHF (congestive heart failure) ([address]    GI bleed    Preoperative cardiovascular examination    Bowel perforation ([address]       Tentative placement recommendation: Subacute rehab  Equipment recommendation:  To be determined      Prior Level of Function: Patient ambulated independently   Rehab Potential: good  for baseline    Past medical history:   Past Medical History:   Diagnosis Date    Arthritis     Atrial fibrillation ([address]     Blood circulation, collateral     CHF (congestive heart failure) ([address]     Chronic renal insufficiency, stage IV (severe) ([address] 11/19/2016    History of cardiovascular stress test 07/2015    Lexiscan stress test    History of echocardiogram 07/27/2015    EF 29%    Hyperlipidemia     Hypertension      Past Surgical History:   Procedure Laterality Date    COLONOSCOPY N/A 4/3/2021    COLONOSCOPY POLYPECTOMY HOT SNARE performed by Yusef Rubi DO at [facility]  4/3/2021    COLONOSCOPY WITH BIOPSY performed by Yusef Rubi DO at [facility]  4/3/2021    COLONOSCOPY CONTROL HEMORRHAGE performed by Kennedytejinder Campoverde Traci Isaac at [address]  4/3/2021    COLONOSCOPY SUBMUCOSAL SPOT INJECTION performed by Cristian Schaffer DO at [address] [facility] GASTROINTESTINAL ENDOSCOPY N/A 4/3/2021    EGD BIOPSY performed by Cristian Schaffer DO at [address] N/A 4/7/2021    EGD W/EUS FNA performed by Quoc Craig MD at [facility] ENDOSCOPY       Precautions: Activity as tolerated, falls and alarm ,  hiflow  8 L    SUBJECTIVE:    Social history: Patient lives alone  in a ranch home  with No steps  to enter Tub shower grab bars    Equipment owned: [address],      [address] [facility]   How much difficulty turning over in bed?: A Little  How much difficulty sitting down on / standing up from a chair with arms?: A Little  How much difficulty moving from lying on back to sitting on side of bed?: A Little  How much help from another person moving to and from a bed to a chair?: A Little  How much help from another person needed to walk in hospital room?: A Little  How much help from another person for climbing 3-5 steps with a railing?: A Lot  AM-PAC Inpatient Mobility Raw Score : 17  AM-PAC Inpatient T-Scale Score : 42.13  Mobility Inpatient CMS 0-100% Score: 50.57  Mobility Inpatient CMS G-Code Modifier : CK    Nursing cleared patient for PT treatment. family present and supportive     OBJECTIVE;   Initial Evaluation  Date: 4/3/2021 Treatment Date:  4/20/2021     Short Term/ Long Term   Goals   Was pt agreeable to Eval/treatment? Yes  Yes To be met in 4 days   Pain level   10/10  Stomach  No number given    Bed Mobility    Rolling: Supervision    Supine to sit: Minimal assist of 1   Sit to supine: Minimal assist of 1   Scooting: Minimal assist of 1  Rolling: Supervision    Supine to sit: Supervision    Sit to supine: Not assessed    Scooting: Supervision     Rolling: Independent   Supine to sit:  Independent   Sit to supine: Independent   Scooting: Independent    Transfers Sit to stand: Not assessed  patient refusing to stand today to due pain and fatigue. Sit to stand: Minimal assist of 1      Sit to stand: Independent    Ambulation    not assessed  15,35  feet using  wheeled walker with Minimal assist of 1  cues for safety, upright posture     50 feet using  wheeled walker with Modified Independent    ROM Within functional limits       Strength BUE:  4/5  RLE:  4/5  LLE:  4/5  Increase strength in affected mm groups by 1/3 grade   Balance Sitting EOB:  fair +  Dynamic Standing:  not assessed  Sitting EOB: good   Dynamic Standing: fair with wheeled walker  Sitting EOB:  good   Dynamic Standing: good      Patient is Alert & Oriented x person, place, time and situation and follows directions   Sensation:  Patient  denies numbness and tingling     Edema:  no   Endurance: fair  -    Vitals: 8 liters high flow nasal cannula   Blood Pressure at rest  Blood Pressure during session    Heart Rate at rest  Heart Rate during session    SPO2 at rest %  SPO2 during session %     Patient education  Patient educated on role of Physical Therapy, risks of immobility, safety and plan of care, energy conservation,  importance of mobility while in hospital , purse lip breathing and ankle pumps, quad set and glut set for edema control, blood clot prevention  Patient was educated and facilitated on techniques to increase safety and independence with bed mobility, balance, functional transfers, and functional mobility. Patient was explained the the benefits of mobility and risks of immobility.     Patient response to education:   Pt verbalized understanding Pt demonstrated skill Pt requires further education in this area   Yes Partial Yes      Treatment:  Patient practiced and was instructed/facilitated in the following treatment: Patient assisted to edge of bed and Sat edge of bed 20 minutes with Minimal assist of 1 to increase dynamic sitting balance and activity tolerance. progressing to supervision. Pt amb 15 ft to chair,  Rested and amb 35 ft using wheeled walker with min assist, cues for upright posture, Therapeutic Exercises:  ankle pumps [id number]          At end of session, patient in chair with in care of SOU[facility] call light and phone within reach,  all lines and tubes intact, family present. Patient would benefit from continued skilled Physical Therapy to improve functional independence and quality of life. Patient's/ family goals   none stated      ASSESSMENT: Patient exhibits decreased strength, balance, endurance and pain   impairing functional mobility, transfers, gait distance and tolerance to activity. Pt with increased activity this session . Pleasant and motivated to participate and progress . Cues required for safety, obstacle negotiation, safety    Patient required co-treat with occupational therapy due to the level of physical assistance needed and the patients endurance level to tolerate separate sessions. Plan of Care:     -Sitting Balance: Incorporate reaching activities to activate trunk muscles   -Standing Balance: Perform strengthening exercises in standing to promote motor control with or without upper extremity support   -Transfers: Provide instruction on proper hand and foot position for adequate transfer of weight onto lower extremities and use of gait device  -Gait: Gait training and Standing activities to improve: base of support, weight shift, weight bearing      Patient and or family understand(s) diagnosis, prognosis, and plan of care. Frequency of treatments: Patient will be seen  daily.        Time in 840  Time out  904    Total Treatment Time  15 minutes    CPT codes:  Gait Training (71731) 15 minutes 1 unit(s)  Non billable time 9 minutes    Moises Sneed [facility]  LIC# [id number]

## 2021-04-20 NOTE — PROGRESS NOTES
OT BEDSIDE TREATMENT NOTE      Date:2021  Patient Name: Sohail Sultana  MRN: [id number]  : 1940  Room: [address] [place] [address]     Referring Provider:        Erin Beasley DO         Evaluating OT: Antonina Walker OTR/L #[id number]     AM-PAC Daily Activity Raw Score:      Recommended Placement: Subacute rehab  Recommended Adaptive Equipment: TBD      Diagnosis:   1. Bowel perforation ([facility] [address]    2. Pancreatic mass    3. Anemia, unspecified type    4. Rectal bleeding    5. Chronic kidney disease, unspecified CKD stage          Surgery: N/A      Pertinent Medical History:    Past Medical History        Past Medical History:   Diagnosis Date    Arthritis      Atrial fibrillation (HCC)      Blood circulation, collateral      CHF (congestive heart failure) (HCC)      Chronic renal insufficiency, stage IV (severe) ([facility] [address] 2016    History of cardiovascular stress test 2015     Lexiscan stress test    History of echocardiogram 2015     EF 29%    Hyperlipidemia      Hypertension           Precautions:  Falls, alarm, O2     Home Living: Pt lives alone in a 1 story home with 0 LINDA with 0 rail(s).   Bathroom setup: tub/shower with grab bars   Equipment owned: [employer], cane     Prior Level of Function: Independent with ADLs , Independent  with IADLs; ambulated with cane  Driving: No  Occupation: Not reported     Pain Level: -/10  Cognition: A&O: 4/4; Follows 1-2 step directions              Memory:  Fair+              Sequencing:  fair              Problem solving:  fair              Judgement/safety:  fair                Functional Assessment:    Initial Eval Status  Date: 21 Treatment Status  Date: 21 STGs = LTGs  Time frame: 5-7 days   Feeding Independent     n/t      Grooming Minimal Assist    MIN A   Independent    UB Dressing Minimal Assist     MIN A to tie/adjust hospital gown in back  Independent    LB Dressing Maximal Assist     MOD A to raghu pants requiring assist to thread over B LE feet, pull up around waist  Minimal Assist    Bathing Maximal Assist    MAX A simulated seated in chair  Minimal Assist    Toileting Maximal Assist   Incontinent of bowel, assist of 2 to stand for hygiene and clothing management. Pt using bedpan while up in chair, assist with hygiene and clothing management. Cuing on body mechanics, safety, sequencing and PLB. N/t  Minimal Assist    Bed Mobility  Supine to sit: Moderate Assist   Sit to supine: NT  Pt up with PT   Assist with BLE due to swelling and weakness, assist with scooting out to EOB. N/t pt up ambulating with PTA upon arrival  Supine to sit: Minimal Assist   Sit to supine: Minimal Assist    Functional Transfers Moderate Assist of 2   Sit<>stand  Bed, chair  Cuing on hand placement, body mechanics, balance, sequencing, and safety  MIN A from chair to w/w v/c's for hand placement no follow through with pt holding onto w/w  Stand by Assist    Functional Mobility Minimal Assist of 2  Using [employer]  Several steps at bedside  B knees buckling, L more than R. Cuing on being pelvis forward, balance, posture and body mechanics,  MIN A with w/w short house hold distances  Stand by Assist    Balance Sitting:     Static:  fair    Dynamic:fair  Standing: fair- Sitting:   Static: supervision in chair  Dynamic: SBA   Standing: MIN A with w/w  Sitting:     Static:  Fair+    Dynamic:fair+  Standing: fair+   Activity Tolerance Fair-  Pt very weak, SOB throughout session. SpO2 94-97% on 4L Fair no c/o fatigue  Fair+   Visual/  Perceptual Glasses: Not reported   [facility]             Comments: Upon arrival pt supine in bed agreeable to therapy session at bed level. Pt educated with regards to functional transfers, functional mobility, energy conservation techniques,  UE/LE dressing. At end of session pt supine in bed,  all lines and tubes intact, call light within reach. · Pt has made fair  progress towards set goals.    · Continue with current plan of care      Treatment Time In: 7386 Treatment Time Out: 5721                Treatment Charges: Mins Units   Ther Ex  75365     Manual Therapy Oskar Hines [id number]  10 1   ADL/Home Mgt [id number] 15 1   Neuro Re-ed [id number]     Group Therapy      Orthotic manage/training  50861     Non-Billable Time  10    Total Timed Treatment 35  [address] [facility] [address]

## 2021-04-20 NOTE — PROGRESS NOTES
ROM normal.  Observed moving all extremities. Gait not assessed. Integumentary:  No rashes  Skin normal color and texture. Genitalia/Breast:  Voiding with the use of a Jane catheter. Medication:  Scheduled Meds:   hydrALAZINE  25 mg Oral 3 times per day    bumetanide  2 mg Oral Daily    isosorbide dinitrate  10 mg Oral TID    carvedilol  12.5 mg Oral BID    docusate sodium  100 mg Oral Daily    pantoprazole  40 mg Oral QAM AC    atorvastatin  40 mg Oral Nightly    vitamin D  50,000 Units Oral Weekly     Continuous Infusions:   sodium chloride      sodium chloride         Objective Data:  CBC with Differential:    Lab Results   Component Value Date    WBC 10.8 04/20/2021    RBC 2.85 04/20/2021    HGB 8.6 04/20/2021    HCT 29.2 04/20/2021     04/20/2021    .5 04/20/2021    MCH 30.2 04/20/2021    MCHC 29.5 04/20/2021    RDW 18.3 04/20/2021    NRBC 0.9 03/31/2021    LYMPHOPCT 26.5 04/20/2021    MONOPCT 8.4 04/20/2021    MYELOPCT 0.9 04/03/2021    BASOPCT 0.4 04/20/2021    MONOSABS 0.91 04/20/2021    LYMPHSABS 2.87 04/20/2021    EOSABS 0.18 04/20/2021    BASOSABS 0.04 04/20/2021     CMP:    Lab Results   Component Value Date     04/19/2021    K 4.3 04/19/2021    K 3.9 03/31/2021    CL 97 04/19/2021    CO2 29 04/19/2021    BUN 18 04/19/2021    CREATININE 4.1 04/19/2021    GFRAA 13 04/19/2021    LABGLOM 13 04/19/2021    GLUCOSE 120 04/19/2021    GLUCOSE 102 11/17/2010    PROT 6.9 04/20/2021    LABALBU 4.1 04/20/2021    CALCIUM 9.5 04/19/2021    BILITOT 0.8 04/20/2021    ALKPHOS 165 04/20/2021    AST 13 04/20/2021    ALT 7 04/20/2021       Assessment:  1. 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 Acute on chronic respiratory failure with hypoxia due to acute on chronic systolic and diastolic congestive heart failure  3.  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 Endoscopic ultrasound does show a 5 mm stone in the distal common bile duct  5. Acute on chronic kidney disease stage IV   6. Abdominal aortic aneurysm toward the bifurcation measuring up to 3.7 x 4.8 cm a component of chronic dissection appearance  7. Paroxysmal atrial fibrillation on chronic anticoagulation with Eliquis--on hold  8. Essential hypertension  9. Moderate peripheral artery disease    Plan:   Tricia Overall clinically has improved to the point where she may be transition to skilled nursing facility once this is finalized, this will occur today. Tunneled dialysis catheter has been placed and she has tolerated the required consecutive dialysis treatments. She will continue to be dialyzed as an outpatient per the nephrology service. Chronic comorbidities are well managed. She remains weak and deconditioned and would benefit from the skilled level of care at skilled nursing facility. Daughter was present and updated extensively at bedside, questions were answered. She is stable for discharge today from internal medicine standpoint. Should be noted that due to recurrent anemia and concern for hemorrhagic conversion of the pancreatic pseudocyst the patient's anticoagulation for atrial fibrillation was held. Blood count stabilized as this was withheld, when reinstituted blood counts trended downward and this was discontinued as the benefit of the medication outweighed the risk. More than 50% of my  time was spent at the bedside counseling/coordinating care with the patient and/or family with face to face contact. This time was spent reviewing notes and laboratory data as well as instructing and counseling the patient.  Time I spent with the family or surrogate(s) is included only if the patient was incapable of providing the necessary information or participating in medical decisions. I also discussed the differential diagnosis and all of the proposed management plans with the patient and individuals accompanying the patient. Deforest Hashimoto requires this high level of physician care and nursing on the Telemetry unit due the complexity of decision management and chance of rapid decline or death. Continued cardiac monitoring and higher level of nursing are required. I am readily available for any further decision-making and intervention.        Leela Olmstead, APRN-CNP  9:49 AM  4/20/2021

## 2021-04-20 NOTE — CARE COORDINATION
SS NOTE: COVID NEGATIVE 4/14. PT WILL NEED A NEGATIVE COVID TO TRANSFER TO SNF. Pt has a HC chair schedule at [facility]= Monday, Wednesday, Friday at 11:15AM.   [facility] [employer] has recd pt's PT notes for today and they will also need OT's note to completed and obtain a PRECERT with insurance. COVID testing to be completed by nursing today. For the SNF pt will need a PRECERT, signed LUBA, current PT/OT notes, and the HENs is completed. Monica Metzger. 4/20/2021.11:29AM.

## 2021-04-20 NOTE — PROGRESS NOTES
[facility] MD Maem Romero MD Doyle Cambridge, MD Cheri Farber, MD   Progress Note    4/20/2021    SUBJECTIVE:     4/3: Seen this am in her room , o2/nc at 4 L which is her home o2 . She did undergo EGD/colonscopy this am . Results noted   Continue to look volume overloaded with 2 + edema and JVD . 4/4 ; stable vitlas , good UO on bumex drip/diuril . Will assess in am if we can wean her off drip     4/5: Patient was doing well this morning, she is awake and alert with no acute distress. We will continue same medications and same plan of care for today. 4/6 \" Seen this am , pleasant , o2/nc . Good UO . Cr stable . Plan for EUS in am     4/7 : good UO with negative balance . In good spirit . For EUS today . 4/8 : in bed , lying flat in NAD . Edema much improved     4/9 : report of pt being more sob . she is for cxr today . EUS with bile duct stone     4/10: Recurrent left lower quadrant pain, ongoing anorexia with poor intake of food and fluid, intermittent mild nausea though no emesis. No dyspnea. No peripheral swelling. Mildly hypotensive. 4/11: Fatigue, generalized weakness, ongoing anorexia and poor intake. Some lower abdominal discomfort though no dez pain. Indigestion and mild nausea intermittently. No swelling. No dyspnea on nasal cannula. No cough or wheeze. Started normal saline at conservative rate yesterday, increase the rate this morning    4/12 : weak . bp stable   High o2 requirement this evening   cxr with HF       4/13 : seen today , feels better . No LE edema . JVD 2+ . We disussed we are likely looking at need for RRT in the future     4/14 : continue to require high amount of o2 . She is alert oriened when seeing her . We discussed dialysis and she is said she is agreeable . She is telling me she d like to try diuretics another day prior to proceeding with dialysis     4/15 : seen this am , UO ok on bumex drip .  She continue to require high amount of o2 when seeing her   D/w her this treatment plan including initiation of dialysis for volume and solute management . I did alse based on her request discuss with her daughter and sister     4/16 : seen on HD tolerating well, o2 requirement down . bp stable . Alert orietned   4/17: Patient was seen and interviewed in her room, after finishing dialysis today she has no new complaints I also discussed with the dialysis nurse she tolerated dialysis very well with no problems. 4/18: Patient to be in the room doing well no further complaints, discharge planning getting done for which she would have to have a tunneled catheter placed an outpatient dialysis chair advocated prior to SNF transfer and follow dialysis. 4/19: HD today no reported issues . For [facility] later on today     4/20 : seen today , o2/nc . Alert oriented . euvolemic . Alert oriented x3 . Appear in NAD . Awaiting insurance approval for d/c to SNF     PROBLEM LIST:    Principal Problem:    GI bleed  Active Problems:    Preoperative cardiovascular examination    Bowel perforation ([address]  Resolved Problems:    * No resolved hospital problems. *       DIET:    DIET LOW FAT; Low Sodium (2 GM)       Allergies : Patient has no known allergies. Review of Systems:   Constitutional:weak   Eyes: no eye pain , no itching , no drainage  Ears, nose, mouth, throat, and face: no ear ,nose pain , hearing is ok ,no nasal drainage   Respiratory: no sob ,no cough ,no wheezing . Cardiovascular: no chest pain , no palpitation ,no sob . Gastrointestinal: no nausea, vomiting , constipation , no abdominal pain . Genitourinary:no urinary retention , no burning , dysuria . No polyuria   Hematologic/lymphatic: no bleeding , no cougulation issues . Musculoskeletal:no joint pain , no swelling . Neurological: no headaches ,no weakness , no numbness . Endocrine: no thirst , no weight issues .      MEDS (scheduled):    hydrALAZINE  25 mg 04/19/21  0715 04/20/21  0744     --  138 138  --    K 4.3  --  4.2 4.3  --      --  99 97*  --    CO2 29  --  29 29  --    MG 1.5*  --  1.9 2.3 2.0   PHOS 1.9*  --  2.6 2.5 2.9   BUN 9  --  12 18  --    CREATININE 2.0*  --  2.8* 4.1*  --    ALT  --  6  --  5 7   AST  --  15  --  13 13   BILIDIR  --  0.3  --  0.3 0.2   BILITOT  --  1.0  --  0.8 0.8   ALKPHOS  --  173*  --  161* 165*       Lab Results   Component Value Date    LABPROT 0.3 (H) 04/11/2021    LABPROT 0.3 04/11/2021       ASSESSMENT     1) REYES  in the setting of GI bleed ,low EF/CHF     2) Chronic kidney disease stage IV baseline cr 2-2.7     3) 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 She continue to be hypervolemic   I feel pt would benefit from initiation of dialysis for control of her volume status, to prevent recurrent episodes of HF and for her to continue with her life .        Place on HD thrice weekly   Will need referral for vascular surgeon for AVF as outpt (will arrange soon )  Bumex 2 mg po daily           Electronically signed by Lokesh Lanza MD on 4/20/2021

## 2021-04-20 NOTE — CARE COORDINATION
SS NOTE: COVID NEGATIVE 4/14. PT WILL NEED A NEGATIVE COVID TO TRANSFER TO SNF. Pt has a HC chair schedule at [facility]= Tuesday Thursday, Saturday at 5:30AM phoned and faxed referral to [address] [place] [address] has recd pt's PT notes for today and they will also need OT's note to completed and obtain a PRECERT with insurance. COVID testing to be completed by nursing today. For the SNF pt will need a PRECERT, signed LUBA, current PT/OT notes, and the HENs is completed. Lucretia Metzger. 4/20/2021.11:29AM.

## 2021-04-21 LAB
ALBUMIN SERPL-MCNC: 3.9 G/DL (ref 3.5–5.2)
ALP BLD-CCNC: 161 U/L (ref 35–104)
ALT SERPL-CCNC: 7 U/L (ref 0–32)
AST SERPL-CCNC: 16 U/L (ref 0–31)
BASOPHILS ABSOLUTE: 0.05 E9/L (ref 0–0.2)
BASOPHILS RELATIVE PERCENT: 0.5 % (ref 0–2)
BILIRUB SERPL-MCNC: 0.7 MG/DL (ref 0–1.2)
BILIRUBIN DIRECT: 0.2 MG/DL (ref 0–0.3)
BILIRUBIN, INDIRECT: 0.5 MG/DL (ref 0–1)
EOSINOPHILS ABSOLUTE: 0.15 E9/L (ref 0.05–0.5)
EOSINOPHILS RELATIVE PERCENT: 1.4 % (ref 0–6)
HCT VFR BLD CALC: 28.9 % (ref 34–48)
HEMOGLOBIN: 8.6 G/DL (ref 11.5–15.5)
IMMATURE GRANULOCYTES #: 0.08 E9/L
IMMATURE GRANULOCYTES %: 0.7 % (ref 0–5)
LYMPHOCYTES ABSOLUTE: 3.46 E9/L (ref 1.5–4)
LYMPHOCYTES RELATIVE PERCENT: 31.9 % (ref 20–42)
MAGNESIUM: 2 MG/DL (ref 1.6–2.6)
MCH RBC QN AUTO: 30.6 PG (ref 26–35)
MCHC RBC AUTO-ENTMCNC: 29.8 % (ref 32–34.5)
MCV RBC AUTO: 102.8 FL (ref 80–99.9)
MONOCYTES ABSOLUTE: 0.95 E9/L (ref 0.1–0.95)
MONOCYTES RELATIVE PERCENT: 8.8 % (ref 2–12)
NEUTROPHILS ABSOLUTE: 6.14 E9/L (ref 1.8–7.3)
NEUTROPHILS RELATIVE PERCENT: 56.7 % (ref 43–80)
PDW BLD-RTO: 18.5 FL (ref 11.5–15)
PHOSPHORUS: 3.8 MG/DL (ref 2.5–4.5)
PLATELET # BLD: 235 E9/L (ref 130–450)
PMV BLD AUTO: 12.2 FL (ref 7–12)
RBC # BLD: 2.81 E12/L (ref 3.5–5.5)
TOTAL PROTEIN: 7 G/DL (ref 6.4–8.3)
WBC # BLD: 10.8 E9/L (ref 4.5–11.5)

## 2021-04-21 PROCEDURE — 36415 COLL VENOUS BLD VENIPUNCTURE: CPT

## 2021-04-21 PROCEDURE — 84100 ASSAY OF PHOSPHORUS: CPT

## 2021-04-21 PROCEDURE — 83735 ASSAY OF MAGNESIUM: CPT

## 2021-04-21 PROCEDURE — 6370000000 HC RX 637 (ALT 250 FOR IP): Performed by: NURSE PRACTITIONER

## 2021-04-21 PROCEDURE — 2060000000 HC ICU INTERMEDIATE R&B

## 2021-04-21 PROCEDURE — 80053 COMPREHEN METABOLIC PANEL: CPT

## 2021-04-21 PROCEDURE — 80076 HEPATIC FUNCTION PANEL: CPT

## 2021-04-21 PROCEDURE — 6370000000 HC RX 637 (ALT 250 FOR IP): Performed by: INTERNAL MEDICINE

## 2021-04-21 PROCEDURE — 2700000000 HC OXYGEN THERAPY PER DAY

## 2021-04-21 PROCEDURE — 85025 COMPLETE CBC W/AUTO DIFF WBC: CPT

## 2021-04-21 PROCEDURE — 6370000000 HC RX 637 (ALT 250 FOR IP): Performed by: STUDENT IN AN ORGANIZED HEALTH CARE EDUCATION/TRAINING PROGRAM

## 2021-04-21 PROCEDURE — 94660 CPAP INITIATION&MGMT: CPT

## 2021-04-21 PROCEDURE — 90935 HEMODIALYSIS ONE EVALUATION: CPT

## 2021-04-21 RX ORDER — 0.9 % SODIUM CHLORIDE 0.9 %
250 INTRAVENOUS SOLUTION INTRAVENOUS ONCE
Status: DISCONTINUED | OUTPATIENT
Start: 2021-04-22 | End: 2021-04-21

## 2021-04-21 RX ORDER — 0.9 % SODIUM CHLORIDE 0.9 %
500 INTRAVENOUS SOLUTION INTRAVENOUS ONCE
Status: COMPLETED | OUTPATIENT
Start: 2021-04-22 | End: 2021-04-22

## 2021-04-21 RX ADMIN — ERGOCALCIFEROL 50000 UNITS: 1.25 CAPSULE ORAL at 14:28

## 2021-04-21 RX ADMIN — ATORVASTATIN CALCIUM 40 MG: 40 TABLET, FILM COATED ORAL at 22:50

## 2021-04-21 RX ADMIN — ISOSORBIDE DINITRATE 10 MG: 20 TABLET ORAL at 14:27

## 2021-04-21 RX ADMIN — PANTOPRAZOLE SODIUM 40 MG: 40 TABLET, DELAYED RELEASE ORAL at 06:44

## 2021-04-21 RX ADMIN — HYDRALAZINE HYDROCHLORIDE 25 MG: 25 TABLET, FILM COATED ORAL at 06:44

## 2021-04-21 RX ADMIN — BUMETANIDE 2 MG: 1 TABLET ORAL at 14:27

## 2021-04-21 RX ADMIN — DOCUSATE SODIUM 100 MG: 100 CAPSULE ORAL at 14:27

## 2021-04-21 RX ADMIN — HYDRALAZINE HYDROCHLORIDE 25 MG: 25 TABLET, FILM COATED ORAL at 14:27

## 2021-04-21 RX ADMIN — CARVEDILOL 12.5 MG: 6.25 TABLET, FILM COATED ORAL at 14:27

## 2021-04-21 ASSESSMENT — PAIN SCALES - GENERAL
PAINLEVEL_OUTOF10: 0
PAINLEVEL_OUTOF10: 0

## 2021-04-21 ASSESSMENT — PAIN SCALES - WONG BAKER: WONGBAKER_NUMERICALRESPONSE: 0

## 2021-04-21 NOTE — PLAN OF CARE
Problem: Inadequate energy intake (NI-1.4)  Goal: Food and/or Nutrient Delivery  Description: Individualized approach for food/nutrient provision.   Outcome: Completed

## 2021-04-21 NOTE — PROGRESS NOTES
[facility] MD Vinayak Terrazas MD Juan Postal, MD Asuncion Fiddler, MD   Progress Note    4/21/2021    SUBJECTIVE:     4/3: Seen this am in her room , o2/nc at 4 L which is her home o2 . She did undergo EGD/colonscopy this am . Results noted   Continue to look volume overloaded with 2 + edema and JVD . 4/4 ; stable vitlas , good UO on bumex drip/diuril . Will assess in am if we can wean her off drip     4/5: Patient was doing well this morning, she is awake and alert with no acute distress. We will continue same medications and same plan of care for today. 4/6 \" Seen this am , pleasant , o2/nc . Good UO . Cr stable . Plan for EUS in am     4/7 : good UO with negative balance . In good spirit . For EUS today . 4/8 : in bed , lying flat in NAD . Edema much improved     4/9 : report of pt being more sob . she is for cxr today . EUS with bile duct stone     4/10: Recurrent left lower quadrant pain, ongoing anorexia with poor intake of food and fluid, intermittent mild nausea though no emesis. No dyspnea. No peripheral swelling. Mildly hypotensive. 4/11: Fatigue, generalized weakness, ongoing anorexia and poor intake. Some lower abdominal discomfort though no dez pain. Indigestion and mild nausea intermittently. No swelling. No dyspnea on nasal cannula. No cough or wheeze. Started normal saline at conservative rate yesterday, increase the rate this morning    4/12 : weak . bp stable   High o2 requirement this evening   cxr with HF       4/13 : seen today , feels better . No LE edema . JVD 2+ . We disussed we are likely looking at need for RRT in the future     4/14 : continue to require high amount of o2 . She is alert oriened when seeing her . We discussed dialysis and she is said she is agreeable . She is telling me she d like to try diuretics another day prior to proceeding with dialysis     4/15 : seen this am , UO ok on bumex drip .  She continue to require high amount of o2 when seeing her   D/w her this treatment plan including initiation of dialysis for volume and solute management . I did alse based on her request discuss with her daughter and sister     4/16 : seen on HD tolerating well, o2 requirement down . bp stable . Alert orietned   4/17: Patient was seen and interviewed in her room, after finishing dialysis today she has no new complaints I also discussed with the dialysis nurse she tolerated dialysis very well with no problems. 4/18: Patient to be in the room doing well no further complaints, discharge planning getting done for which she would have to have a tunneled catheter placed an outpatient dialysis chair advocated prior to SNF transfer and follow dialysis. 4/19: HD today no reported issues . For [facility] later on today     4/20 : seen today , o2/nc . Alert oriented . euvolemic . Alert oriented x3 . Appear in NAD . Awaiting insurance approval for d/c to SNF     4/21 : in NAD . Alert oriented . HD with no reported issues . euvolemic . Awaiting insurance for d/c . Reports did make some UO this am    PROBLEM LIST:    Principal Problem:    GI bleed  Active Problems:    Preoperative cardiovascular examination    Bowel perforation (HCC)  Resolved Problems:    * No resolved hospital problems. *       DIET:    DIET LOW FAT; Low Sodium (2 GM)  Dietary Nutrition Supplements: Frozen Oral Supplement       Allergies : Patient has no known allergies. Review of Systems:   Constitutional:weak   Eyes: no eye pain , no itching , no drainage  Ears, nose, mouth, throat, and face: no ear ,nose pain , hearing is ok ,no nasal drainage   Respiratory: no sob ,no cough ,no wheezing . Cardiovascular: no chest pain , no palpitation ,no sob . Gastrointestinal: no nausea, vomiting , constipation , no abdominal pain . Genitourinary:no urinary retention , no burning , dysuria . No polyuria   Hematologic/lymphatic: no bleeding , no cougulation issues . Musculoskeletal:no joint pain , no swelling . Neurological: no headaches ,no weakness , no numbness . Endocrine: no thirst , no weight issues .      MEDS (scheduled):    hydrALAZINE  25 mg Oral 3 times per day    bumetanide  2 mg Oral Daily    isosorbide dinitrate  10 mg Oral TID    carvedilol  12.5 mg Oral BID    docusate sodium  100 mg Oral Daily    pantoprazole  40 mg Oral QAM AC    atorvastatin  40 mg Oral Nightly    vitamin D  50,000 Units Oral Weekly       MEDS (infusions):   sodium chloride      sodium chloride         MEDS (prn):  perflutren lipid microspheres, acetaminophen, sodium chloride, sodium chloride, albuterol, ondansetron, promethazine, morphine    PHYSICAL EXAM:     Patient Vitals for the past 24 hrs:   BP Temp Temp src Pulse Resp SpO2 Height Weight   04/21/21 1459       5' 5\" (1.651 m)    04/21/21 1218 121/66 97.6 °F (36.4 °C)  66 16   120 lb 9.5 oz (54.7 kg)   04/21/21 1201 105/64   83       04/21/21 1130 104/73   80       04/21/21 1102 (!) 95/44   (!) 43       04/21/21 1030 (!) 110/58   75       04/21/21 1000 (!) 96/49   78       04/21/21 0930 (!) 97/51   70       04/21/21 0924 (!) 103/48   78       04/21/21 0900 (!) 88/55   77       04/21/21 0843 (!) 95/52   78       04/21/21 0830 (!) 94/53 97.8 °F (36.6 °C)  74 16   125 lb (56.7 kg)   04/21/21 0643 (!) 105/53 97.8 °F (36.6 °C) Oral 80 14 97 %     04/21/21 0541        119 lb 1.6 oz (54 kg)   04/21/21 0101 (!) 116/58   85       04/20/21 2110      96 %     04/20/21 1943 95/61 98 °F (36.7 °C) Oral 86 17 98 %     @      Intake/Output Summary (Last 24 hours) at 4/21/2021 1653  Last data filed at 4/21/2021 1330  Gross per 24 hour   Intake 900 ml   Output 2300 ml   Net -1400 ml         Wt Readings from Last 3 Encounters:   04/21/21 120 lb 9.5 oz (54.7 kg)   10/08/19 116 lb 14.4 oz (53 kg)   06/03/19 130 lb (59 kg)       Constitutional:  in no acute distress  Oral: mucus membranes moist  Neck: Trachea midline. No JVD  Cardiovascular: S1, S2 regular rhythm, no murmur,or rub  Respiratory: Poor inspiratory effort, difficult to encourage better, though no crackles, no wheeze  Gastrointestinal:  Soft, nontender, nondistended, NABS  Ext: No edema dependently or distally  Skin: dry, no rash  Neuro: awake, alert, interactive      DATA:    Recent Labs     04/19/21  0715 04/20/21  0744 04/21/21  0650   WBC 9.2 10.8 10.8   HGB 7.8* 8.6* 8.6*   HCT 25.5* 29.2* 28.9*   .2* 102.5* 102.8*    232 235     Recent Labs     04/19/21  0715 04/20/21  0744 04/21/21  0650     --   --    K 4.3  --   --    CL 97*  --   --    CO2 29  --   --    MG 2.3 2.0 2.0   PHOS 2.5 2.9 3.8   BUN 18  --   --    CREATININE 4.1*  --   --    ALT 5 7 7   AST 13 13 16   BILIDIR 0.3 0.2 0.2   BILITOT 0.8 0.8 0.7   ALKPHOS 161* 165* 161*       Lab Results   Component Value Date    LABPROT 0.3 (H) 04/11/2021    LABPROT 0.3 04/11/2021       ASSESSMENT     1) REYES  in the setting of GI bleed ,low EF/CHF     2) Chronic kidney disease stage IV baseline cr 2-2.7     3) 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 She continue to be hypervolemic   I feel pt would benefit from initiation of dialysis for control of her volume status, to prevent recurrent episodes of HF and for her to continue with her life .        Place on HD thrice weekly   Will need referral for vascular surgeon for AVF as outpt (will arrange soon )  Bumex 2 mg po daily

## 2021-04-21 NOTE — PROGRESS NOTES
Physical Therapy    0517/0517-01    Patient unavailable for physical therapy treatment due to having dialysis and is to tired for therapy this pm.  Nicole Ramirez  [facility]  LIC # [id number]

## 2021-04-21 NOTE — PROGRESS NOTES
Comprehensive Nutrition Assessment    Type and Reason for Visit:  Reassess    Nutrition Recommendations/Plan: Continue with diet and modify ONS BID magic cup    Nutrition Assessment:  Pt admit d/t abd pain & rectal bleeding 2/2 GI bleed. H/o CHF & CKD. receiving hemodialysis. Pt p.o. intakes are improving >50-75%. Will continue with current nutritional regimen    Malnutrition Assessment:  Malnutrition Status: At risk for malnutrition (Comment)    Context:  Acute Illness     Findings of the 6 clinical characteristics of malnutrition:  Energy Intake:  1 - 75% or less of estimated energy requirements for 7 or more days  Weight Loss:  No significant weight loss     Body Fat Loss:  No significant body fat loss     Muscle Mass Loss:  No significant muscle mass loss    Fluid Accumulation:  No significant fluid accumulation     Strength:  Not Performed    Estimated Daily Nutrient Needs:  Energy (kcal):  7782-4408(MSJ x 1.2 SF); Weight Used for Energy Requirements:  Current     Protein (g):  75-90(1.2-1.4g/kg CBW);  Weight Used for Protein Requirements:  Current        Fluid (ml/day):  6570-5551; Method Used for Fluid Requirements:  1 ml/kcal      Nutrition Related Findings:  A/ox4, abd wdl, dentures, +BS, trace edema, -I/O -22.6L, Pt shows sign wt changes likely r/t fluid shifts      Wounds:  None       Current Nutrition Therapies:    DIET LOW FAT; Low Sodium (2 GM)  Dietary Nutrition Supplements: Frozen Oral Supplement    Anthropometric Measures:  · Height: 5' 5\" (165.1 cm)  · Current Body Weight: 120 lb (54.4 kg)(4/21)   · Admission Body Weight: 134 lb (60.8 kg)(3/31, actual)    · Usual Body Weight: (UTO d/t no wt hx in EMR)     · Ideal Body Weight: 125 lbs; % Ideal Body Weight 96 %   · BMI: 20  · Adjusted Body Weight:  ; No Adjustment   · BMI Categories: Underweight (BMI less than 22) age over 72       Nutrition Diagnosis:   · Increased nutrient needs related to increase demand for energy/nutrients as evidenced by GI abnormality, dialysis      Nutrition Interventions:   Food and/or Nutrient Delivery:  Continue Current Diet, Modify Oral Nutrition Supplement  Nutrition Education/Counseling:  Education not indicated   Coordination of Nutrition Care:  Continue to monitor while inpatient    Goals:  Consume >75% most meals/ONS       Nutrition Monitoring and Evaluation:   Behavioral-Environmental Outcomes:  None Identified   Food/Nutrient Intake Outcomes:  Food and Nutrient Intake, Supplement Intake  Physical Signs/Symptoms Outcomes:  Biochemical Data, GI Status, Fluid Status or Edema, Hemodynamic Status, Nutrition Focused Physical Findings, Skin, Weight     Discharge Planning:     Too soon to determine     Electronically signed by Katia Jiang RD, LD on 4/21/21 at 3:07 PM EDT    Contact: [phone]

## 2021-04-21 NOTE — PLAN OF CARE
Problem: Falls - Risk of:  Goal: Will remain free from falls  Description: Will remain free from falls  Outcome: Met This Shift  Goal: Absence of physical injury  Description: Absence of physical injury  Outcome: Met This Shift     Problem: Skin Integrity:  Goal: Will show no infection signs and symptoms  Description: Will show no infection signs and symptoms  Outcome: Met This Shift  Goal: Absence of new skin breakdown  Description: Absence of new skin breakdown  Outcome: Met This Shift  Goal: Risk for impaired skin integrity will decrease  Description: Risk for impaired skin integrity will decrease  Outcome: Met This Shift  Goal: Status of oral mucous membranes will improve  Description: Status of oral mucous membranes will improve  Outcome: Met This Shift  Goal: Skin integrity will be maintained  Description: Skin integrity will be maintained  Outcome: Met This Shift     Problem: Pain:  Goal: Pain level will decrease  Description: Pain level will decrease  Outcome: Met This Shift  Goal: Control of acute pain  Description: Control of acute pain  Outcome: Met This Shift  Goal: Control of chronic pain  Description: Control of chronic pain  Outcome: Met This Shift     Problem:  Activity:  Goal: Fatigue will decrease  Description: Fatigue will decrease  Outcome: Met This Shift  Goal: Risk for activity intolerance will decrease  Description: Risk for activity intolerance will decrease  Outcome: Met This Shift     Problem: Respiratory:  Goal: Ability to maintain a clear airway will improve  Description: Ability to maintain a clear airway will improve  Outcome: Met This Shift  Goal: Ability to maintain adequate ventilation will improve  Description: Ability to maintain adequate ventilation will improve  Outcome: Met This Shift  Goal: Complications related to the disease process, condition or treatment will be avoided or minimized  Description: Complications related to the disease process, condition or treatment will be avoided or minimized  Outcome: Met This Shift     Problem: Coping:  Goal: Ability to cope will improve  Description: Ability to cope will improve  Outcome: Met This Shift     Problem: Fluid Volume:  Goal: Will show no signs or symptoms of fluid imbalance  Description: Will show no signs or symptoms of fluid imbalance  Outcome: Met This Shift     Problem: Nutritional:  Goal: Ability to identify appropriate dietary choices will improve  Description: Ability to identify appropriate dietary choices will improve  Outcome: Met This Shift     Problem: Physical Regulation:  Goal: Complications related to the disease process, condition or treatment will be avoided or minimized  Description: Complications related to the disease process, condition or treatment will be avoided or minimized  Outcome: Met This Shift  Goal: Ability to maintain clinical measurements within normal limits will improve  Description: Ability to maintain clinical measurements within normal limits will improve  Outcome: Met This Shift     Problem: Sensory:  Goal: General experience of comfort will improve  Description: General experience of comfort will improve  Outcome: Met This Shift     Problem: Increased nutrient needs (NI-5.1)  Goal: Food and/or Nutrient Delivery  Description: Individualized approach for food/nutrient provision.   4/21/2021 1507 by Kt Muro RD, KANWAL  Outcome: Met This Shift

## 2021-04-21 NOTE — FLOWSHEET NOTE
04/21/21 1218   Vital Signs   /66   Temp 97.6 °F (36.4 °C)   Pulse 66   Resp 16   Weight 120 lb 9.5 oz (54.7 kg)   Weight Method Bed scale   Percent Weight Change -3.53   Pain Assessment   Pain Assessment 0-10   Pain Level 0   Post-Hemodialysis Assessment   Post-Treatment Procedures Blood returned;Catheter capped, clamped and heparinized x 2 ports   Machine Disinfection Process Acid/Vinegar Clean;Exterior Machine Disinfection; Heat Disinfect   Rinseback Volume (ml) 300 ml   Dialyzer Clearance Lightly streaked   Duration of Treatment (minutes) 210 minutes   Hemodialysis Intake (ml) 300 ml   Hemodialysis Output (ml) 2300 ml   NET Removed (ml) 2000 ml   Tolerated Treatment Good   Patient Response to Treatment tolerated well   Bilateral Breath Sounds Diminished   Edema None

## 2021-04-21 NOTE — PROGRESS NOTES
24 hour   Intake 900 ml   Output 2300 ml   Net -1400 ml   I/O last 3 completed shifts: In: 1000 [P.O.:700]  Out: 2300   Patient Vitals for the past 96 hrs (Last 3 readings):   Weight   04/21/21 1218 120 lb 9.5 oz (54.7 kg)   04/21/21 0830 125 lb (56.7 kg)   04/21/21 0541 119 lb 1.6 oz (54 kg)     Vital Signs:   Blood pressure 121/66, pulse 66, temperature 97.6 °F (36.4 °C), resp. rate 16, height 5' 5\" (1.651 m), weight 120 lb 9.5 oz (54.7 kg), SpO2 97 %. General appearance: In no acute distress. Resting in bed. Alert and awake. Head:  Normocephalic. No masses, lesions or tenderness. Eyes:  PERRLA. EOMI. Sclera clear. Impaired vision. ENT:  Ears normal. Mucosa normal.  Oxygen via nasal cannula. Neck:    Supple. Trachea midline. No thyromegaly. No JVD. No bruits. Right-sided dialysis catheter. See intact. No overt signs of infection. Heart:    S1 and S2, regular rate and rhythm, systolic murmur  Lungs:    Improved air exchange throughout. Pattern of respiration is regular and even without labor. No wheezing rales or rhonchi. Abdomen:   Soft mildly tender to palpation. Voluntary guarding is elicited. Bowel sounds are active. Extremities:    No lower leg edema. Neurologic:    Alert x 3. Cranial nerves grossly intact. Generalized weakness and deconditioning. Psych:   Behavior is normal. Mood appears normal. Speech is not rapid and/or pressured. Musculoskeletal:   Spine ROM normal.  Observed moving all extremities. Gait not assessed. Integumentary:  No rashes  Skin normal color and texture. Genitalia/Breast:  Voiding with the use of a Jane catheter.     Medication:  Scheduled Meds:   hydrALAZINE  25 mg Oral 3 times per day    bumetanide  2 mg Oral Daily    isosorbide dinitrate  10 mg Oral TID    carvedilol  12.5 mg Oral BID    docusate sodium  100 mg Oral Daily    pantoprazole  40 mg Oral QAM AC    atorvastatin  40 mg Oral Nightly    vitamin D  50,000 Units Oral Weekly Continuous Infusions:   sodium chloride      sodium chloride         Objective Data:  CBC with Differential:    Lab Results   Component Value Date    WBC 10.8 04/21/2021    RBC 2.81 04/21/2021    HGB 8.6 04/21/2021    HCT 28.9 04/21/2021     04/21/2021    .8 04/21/2021    MCH 30.6 04/21/2021    MCHC 29.8 04/21/2021    RDW 18.5 04/21/2021    NRBC 0.9 03/31/2021    LYMPHOPCT 31.9 04/21/2021    MONOPCT 8.8 04/21/2021    MYELOPCT 0.9 04/03/2021    BASOPCT 0.5 04/21/2021    MONOSABS 0.95 04/21/2021    LYMPHSABS 3.46 04/21/2021    EOSABS 0.15 04/21/2021    BASOSABS 0.05 04/21/2021     CMP:    Lab Results   Component Value Date     04/19/2021    K 4.3 04/19/2021    K 3.9 03/31/2021    CL 97 04/19/2021    CO2 29 04/19/2021    BUN 18 04/19/2021    CREATININE 4.1 04/19/2021    GFRAA 13 04/19/2021    LABGLOM 13 04/19/2021    GLUCOSE 120 04/19/2021    GLUCOSE 102 11/17/2010    PROT 7.0 04/21/2021    LABALBU 3.9 04/21/2021    CALCIUM 9.5 04/19/2021    BILITOT 0.7 04/21/2021    ALKPHOS 161 04/21/2021    AST 16 04/21/2021    ALT 7 04/21/2021       Assessment:  1. 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 Acute on chronic respiratory failure with hypoxia due to acute on chronic systolic and diastolic congestive heart failure  3. 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 Endoscopic ultrasound does show a 5 mm stone in the distal common bile duct  5. Acute on chronic kidney disease stage IV   6. Abdominal aortic aneurysm toward the bifurcation measuring up to 3.7 x 4.8 cm a component of chronic dissection appearance  7. Paroxysmal atrial fibrillation on chronic anticoagulation with Eliquis--on hold  8. Essential hypertension  9.  Moderate peripheral artery disease    Plan:   Abena Baugh is awaiting discharge to skilled nursing facility once plans are finalized. Tolerated dialysis. Chronic comorbidities are well managed. She remains weak and deconditioned and would benefit from the skilled level of care at skilled nursing facility. Unable to work with therapy due to fatigue s/p  Dialysis. Increase activity as tolerated. Monitor Hgb and treat accordingly. ** Should be noted that due to recurrent anemia and concern for hemorrhagic conversion of the pancreatic pseudocyst the patient's anticoagulation for atrial fibrillation was held. Blood count stabilized as this was withheld, when reinstituted blood counts trended downward and this was discontinued as the benefit of the medication outweighed the risk. More than 50% of my  time was spent at the bedside counseling/coordinating care with the patient and/or family with face to face contact. This time was spent reviewing notes and laboratory data as well as instructing and counseling the patient. Time I spent with the family or surrogate(s) is included only if the patient was incapable of providing the necessary information or participating in medical decisions. I also discussed the differential diagnosis and all of the proposed management plans with the patient and individuals accompanying the patient. Abena Baugh requires this high level of physician care and nursing on the Telemetry unit due the complexity of decision management and chance of rapid decline or death. Continued cardiac monitoring and higher level of nursing are required. I am readily available for any further decision-making and intervention. The patient was seen, examined and then discussed with Dr. Keely Lyn. Leisa Rajput, MADELAINE-CNP  5:40 PM  4/21/2021         I saw and evaluated the patient. I agree with the findings and the plan of care as documented in Leisa Rajput NP-C's  note.     Adonis Garsia D.O., [facility]  5:48 PM  4/21/2021

## 2021-04-22 VITALS
SYSTOLIC BLOOD PRESSURE: 106 MMHG | RESPIRATION RATE: 14 BRPM | HEIGHT: 65 IN | WEIGHT: 122.58 LBS | BODY MASS INDEX: 20.42 KG/M2 | HEART RATE: 83 BPM | DIASTOLIC BLOOD PRESSURE: 57 MMHG | OXYGEN SATURATION: 97 % | TEMPERATURE: 98.3 F

## 2021-04-22 LAB
ALBUMIN SERPL-MCNC: 3.6 G/DL (ref 3.5–5.2)
ALBUMIN SERPL-MCNC: 3.8 G/DL (ref 3.5–5.2)
ALBUMIN SERPL-MCNC: 3.9 G/DL (ref 3.5–5.2)
ALP BLD-CCNC: 151 U/L (ref 35–104)
ALP BLD-CCNC: 155 U/L (ref 35–104)
ALP BLD-CCNC: 162 U/L (ref 35–104)
ALT SERPL-CCNC: 6 U/L (ref 0–32)
ALT SERPL-CCNC: 8 U/L (ref 0–32)
ALT SERPL-CCNC: 8 U/L (ref 0–32)
ANION GAP SERPL CALCULATED.3IONS-SCNC: 11 MMOL/L (ref 7–16)
ANION GAP SERPL CALCULATED.3IONS-SCNC: 8 MMOL/L (ref 7–16)
AST SERPL-CCNC: 14 U/L (ref 0–31)
AST SERPL-CCNC: 16 U/L (ref 0–31)
AST SERPL-CCNC: 20 U/L (ref 0–31)
BASOPHILS ABSOLUTE: 0.05 E9/L (ref 0–0.2)
BASOPHILS RELATIVE PERCENT: 0.4 % (ref 0–2)
BILIRUB SERPL-MCNC: 0.6 MG/DL (ref 0–1.2)
BILIRUB SERPL-MCNC: 0.7 MG/DL (ref 0–1.2)
BILIRUB SERPL-MCNC: 0.7 MG/DL (ref 0–1.2)
BILIRUBIN DIRECT: <0.2 MG/DL (ref 0–0.3)
BILIRUBIN, INDIRECT: ABNORMAL MG/DL (ref 0–1)
BUN BLDV-MCNC: 12 MG/DL (ref 6–23)
BUN BLDV-MCNC: 16 MG/DL (ref 6–23)
CALCIUM SERPL-MCNC: 9 MG/DL (ref 8.6–10.2)
CALCIUM SERPL-MCNC: 9.2 MG/DL (ref 8.6–10.2)
CHLORIDE BLD-SCNC: 98 MMOL/L (ref 98–107)
CHLORIDE BLD-SCNC: 99 MMOL/L (ref 98–107)
CO2: 27 MMOL/L (ref 22–29)
CO2: 30 MMOL/L (ref 22–29)
CREAT SERPL-MCNC: 3.2 MG/DL (ref 0.5–1)
CREAT SERPL-MCNC: 3.4 MG/DL (ref 0.5–1)
EOSINOPHILS ABSOLUTE: 0.13 E9/L (ref 0.05–0.5)
EOSINOPHILS RELATIVE PERCENT: 1.1 % (ref 0–6)
GFR AFRICAN AMERICAN: 16
GFR AFRICAN AMERICAN: 17
GFR NON-AFRICAN AMERICAN: 16 ML/MIN/1.73
GFR NON-AFRICAN AMERICAN: 17 ML/MIN/1.73
GLUCOSE BLD-MCNC: 113 MG/DL (ref 74–99)
GLUCOSE BLD-MCNC: 114 MG/DL (ref 74–99)
HCT VFR BLD CALC: 27.4 % (ref 34–48)
HEMOGLOBIN: 8.2 G/DL (ref 11.5–15.5)
IMMATURE GRANULOCYTES #: 0.09 E9/L
IMMATURE GRANULOCYTES %: 0.7 % (ref 0–5)
LYMPHOCYTES ABSOLUTE: 3.71 E9/L (ref 1.5–4)
LYMPHOCYTES RELATIVE PERCENT: 30 % (ref 20–42)
MAGNESIUM: 1.7 MG/DL (ref 1.6–2.6)
MCH RBC QN AUTO: 31.1 PG (ref 26–35)
MCHC RBC AUTO-ENTMCNC: 29.9 % (ref 32–34.5)
MCV RBC AUTO: 103.8 FL (ref 80–99.9)
MONOCYTES ABSOLUTE: 1.17 E9/L (ref 0.1–0.95)
MONOCYTES RELATIVE PERCENT: 9.5 % (ref 2–12)
NEUTROPHILS ABSOLUTE: 7.21 E9/L (ref 1.8–7.3)
NEUTROPHILS RELATIVE PERCENT: 58.3 % (ref 43–80)
PDW BLD-RTO: 19 FL (ref 11.5–15)
PHOSPHORUS: 3.2 MG/DL (ref 2.5–4.5)
PLATELET # BLD: 214 E9/L (ref 130–450)
PMV BLD AUTO: 12.3 FL (ref 7–12)
POTASSIUM SERPL-SCNC: 4.3 MMOL/L (ref 3.5–5)
POTASSIUM SERPL-SCNC: 4.4 MMOL/L (ref 3.5–5)
RBC # BLD: 2.64 E12/L (ref 3.5–5.5)
SODIUM BLD-SCNC: 136 MMOL/L (ref 132–146)
SODIUM BLD-SCNC: 137 MMOL/L (ref 132–146)
TOTAL PROTEIN: 6.6 G/DL (ref 6.4–8.3)
TOTAL PROTEIN: 6.6 G/DL (ref 6.4–8.3)
TOTAL PROTEIN: 6.7 G/DL (ref 6.4–8.3)
WBC # BLD: 12.4 E9/L (ref 4.5–11.5)

## 2021-04-22 PROCEDURE — 85025 COMPLETE CBC W/AUTO DIFF WBC: CPT

## 2021-04-22 PROCEDURE — 36415 COLL VENOUS BLD VENIPUNCTURE: CPT

## 2021-04-22 PROCEDURE — 94660 CPAP INITIATION&MGMT: CPT

## 2021-04-22 PROCEDURE — 6370000000 HC RX 637 (ALT 250 FOR IP): Performed by: INTERNAL MEDICINE

## 2021-04-22 PROCEDURE — 2700000000 HC OXYGEN THERAPY PER DAY

## 2021-04-22 PROCEDURE — 83735 ASSAY OF MAGNESIUM: CPT

## 2021-04-22 PROCEDURE — 80076 HEPATIC FUNCTION PANEL: CPT

## 2021-04-22 PROCEDURE — 6370000000 HC RX 637 (ALT 250 FOR IP): Performed by: NURSE PRACTITIONER

## 2021-04-22 PROCEDURE — 80053 COMPREHEN METABOLIC PANEL: CPT

## 2021-04-22 PROCEDURE — 6370000000 HC RX 637 (ALT 250 FOR IP): Performed by: STUDENT IN AN ORGANIZED HEALTH CARE EDUCATION/TRAINING PROGRAM

## 2021-04-22 PROCEDURE — 2580000003 HC RX 258: Performed by: INTERNAL MEDICINE

## 2021-04-22 PROCEDURE — 97116 GAIT TRAINING THERAPY: CPT

## 2021-04-22 PROCEDURE — 84100 ASSAY OF PHOSPHORUS: CPT

## 2021-04-22 PROCEDURE — 97110 THERAPEUTIC EXERCISES: CPT

## 2021-04-22 RX ADMIN — BUMETANIDE 2 MG: 1 TABLET ORAL at 09:45

## 2021-04-22 RX ADMIN — SODIUM CHLORIDE 500 ML: 9 INJECTION, SOLUTION INTRAVENOUS at 00:10

## 2021-04-22 RX ADMIN — DOCUSATE SODIUM 100 MG: 100 CAPSULE ORAL at 09:46

## 2021-04-22 RX ADMIN — PANTOPRAZOLE SODIUM 40 MG: 40 TABLET, DELAYED RELEASE ORAL at 05:39

## 2021-04-22 ASSESSMENT — PAIN SCALES - GENERAL: PAINLEVEL_OUTOF10: 0

## 2021-04-22 ASSESSMENT — PAIN SCALES - WONG BAKER
WONGBAKER_NUMERICALRESPONSE: 0
WONGBAKER_NUMERICALRESPONSE: 0

## 2021-04-22 NOTE — PROGRESS NOTES
Physical Therapy Treatment Note    Room #:  3274/2583-63  Patient Name: Enedina Carias  YOB: 1940  MRN: [id number]    Referring Provider:   Jessy Oro DO      Date of Service: 4/22/2021    Evaluating Physical Therapist: Nelli Hartman, PT #[id number]      Diagnosis:   GI bleed [K92.2]       Patient Active Problem List   Diagnosis    Shortness of breath    Chronic combined systolic and diastolic congestive heart failure ([address]    Chronic renal insufficiency, stage IV (severe) (HCC)    Atrial fibrillation (HCC)    Hypertension    Abnormal chest x-ray    Anemia of chronic disease    Opacity of lung on imaging study    Cigarette smoker    Tobacco abuse counseling    Acute on chronic combined systolic and diastolic CHF (congestive heart failure) ([address]    Acute on chronic congestive heart failure (HCC)    Acute systolic CHF (congestive heart failure) ([address]    GI bleed    Preoperative cardiovascular examination    Bowel perforation ([address]       Tentative placement recommendation: Subacute rehab  Equipment recommendation:  To be determined      Prior Level of Function: Patient ambulated independently   Rehab Potential: good  for baseline    Past medical history:   Past Medical History:   Diagnosis Date    Arthritis     Atrial fibrillation ([address]     Blood circulation, collateral     CHF (congestive heart failure) ([address]     Chronic renal insufficiency, stage IV (severe) ([address] 11/19/2016    History of cardiovascular stress test 07/2015    Lexiscan stress test    History of echocardiogram 07/27/2015    EF 29%    Hyperlipidemia     Hypertension      Past Surgical History:   Procedure Laterality Date    COLONOSCOPY N/A 4/3/2021    COLONOSCOPY POLYPECTOMY HOT SNARE performed by Leticia Sainz DO at [facility]  4/3/2021    COLONOSCOPY WITH BIOPSY performed by Leticia Sainz DO at [address]  4/3/2021    COLONOSCOPY CONTROL HEMORRHAGE performed by Maureen Banerjee Wes[place]oon [place]le at [address]  4/3/2021    COLONOSCOPY SUBMUCOSAL SPOT INJECTION performed by Jody Moraes DO at [address] [place] GASTROINTESTINAL ENDOSCOPY N/A 4/3/2021    EGD BIOPSY performed by Jody Moraes DO at [address] [facility] [facility] N/A 4/7/2021    EGD W/EUS FNA performed by Porfirio Li MD at [facility] ENDOSCOPY       Precautions: Activity as tolerated, falls and alarm ,  hiflow  6 L    SUBJECTIVE:    Social history: Patient lives alone  in a ranch home  with No steps  to enter Tub shower grab bars    Equipment owned: [employer] S[employer],      [address]   How much difficulty turning over in bed?: A Little  How much difficulty sitting down on / standing up from a chair with arms?: A Little  How much difficulty moving from lying on back to sitting on side of bed?: A Little  How much help from another person moving to and from a bed to a chair?: A Little  How much help from another person needed to walk in hospital room?: A Little  How much help from another person for climbing 3-5 steps with a railing?: A Lot  AM-PAC Inpatient Mobility Raw Score : 17  AM-PAC Inpatient T-Scale Score : 42.13  Mobility Inpatient CMS 0-100% Score: 50.57  Mobility Inpatient CMS G-Code Modifier : CK    Nursing cleared patient for PT treatment. breakfast tray arrived and pt seen in two sessions    OBJECTIVE;   Initial Evaluation  Date: 4/3/2021 Treatment Date:  4/22/2021     Short Term/ Long Term   Goals   Was pt agreeable to Eval/treatment?  Yes  Yes To be met in 4 days   Pain level   10/10  Stomach  No number given    Bed Mobility    Rolling: Supervision    Supine to sit: Minimal assist of 1   Sit to supine: Minimal assist of 1   Scooting: Minimal assist of 1  Rolling: Supervision    Supine to sit: Supervision    Sit to supine: Supervision    Scooting: Supervision     Rolling: Independent   Supine to sit: Independent   Sit to supine: Independent   Scooting: Independent    Transfers Sit to stand: Not assessed  patient refusing to stand today to due pain and fatigue. Sit to stand: Minimal assist of 1      Sit to stand: Independent    Ambulation    not assessed  2 x 15, 2 x 40 feet using  wheeled walker with Minimal assist of 1  cues for safety, upright posture   Obstacle negotiation , decreased pace   50 feet using  wheeled walker with Modified Independent    ROM Within functional limits       Strength BUE:  4/5  RLE:  4/5  LLE:  4/5  Increase strength in affected mm groups by 1/3 grade   Balance Sitting EOB:  fair +  Dynamic Standing:  not assessed  Sitting EOB: good   Dynamic Standing: fair + with wheeled walker  Sitting EOB:  good   Dynamic Standing: good      Patient is Alert & Oriented x person, place, time and situation and follows directions   Sensation:  Patient  denies numbness and tingling     Edema:  no   Endurance: fair  -    Vitals: 6 liters high flow nasal cannula   Blood Pressure at rest  Blood Pressure during session    Heart Rate at rest  Heart Rate during session    SPO2 at rest %  SPO2 during session %     Patient education  Patient educated on role of Physical Therapy, risks of immobility, safety and plan of care, energy conservation,  importance of mobility while in hospital , purse lip breathing and ankle pumps, quad set and glut set for edema control, blood clot prevention  Patient was educated and facilitated on techniques to increase safety and independence with bed mobility, balance, functional transfers, and functional mobility. Patient was explained the the benefits of mobility and risks of immobility.     Patient response to education:   Pt verbalized understanding Pt demonstrated skill Pt requires further education in this area   Yes Partial Yes      Treatment:  Patient practiced and was instructed/facilitated in the following treatment: Patient assisted to edge of bed and Sat edge of bed 5 minutes with Supervision  to increase dynamic sitting balance and activity tolerance. .   Pt transferred to the chair for breakfast , therapy returned to room and pt amb and performed ex and returned to bed per pt request due to fatigue    Therapeutic Exercises:  ankle pumps and long arc quad  x 20reps          At end of session, patient in bed with alarm call light and phone within reach,  all lines and tubes intact, family present. Patient would benefit from continued skilled Physical Therapy to improve functional independence and quality of life. Patient's/ family goals   none stated      ASSESSMENT: Patient exhibits decreased strength, balance, endurance and pain   impairing functional mobility, transfers, gait distance and tolerance to activity. Pt with increased activity this session . Pleasant and motivated to participate and progress . Cues required for safety, obstacle negotiation, safety      Plan of Care:     -Sitting Balance: Incorporate reaching activities to activate trunk muscles   -Standing Balance: Perform strengthening exercises in standing to promote motor control with or without upper extremity support   -Transfers: Provide instruction on proper hand and foot position for adequate transfer of weight onto lower extremities and use of gait device  -Gait: Gait training and Standing activities to improve: base of support, weight shift, weight bearing      Patient and or family understand(s) diagnosis, prognosis, and plan of care. Frequency of treatments: Patient will be seen  daily.        Time in845  1020  Time out  [id number]    Total Treatment Time 30  minutes    CPT codes:  Therapeutic exercises (89046)   8 minutes  1 unit(s)  Gait Training (68231) 22 minutes 1 unit(s)    John Perera [facility]  LIC# [id number]

## 2021-04-22 NOTE — PROGRESS NOTES
Attempted to call nurse to nurse to [facility]. No answer. Will retry shortly.  Electronically signed by Manny Wylie RN on 4/22/2021 at 1:15 PM

## 2021-04-22 NOTE — PROGRESS NOTES
Called N-N to Tano Pérez at [employer].  Electronically signed by Rob Austin RN on 4/22/2021 at 1:42 PM

## 2021-04-22 NOTE — CARE COORDINATION
SS NOTE: COVID NEGATIVE 4/14. PT RECD A NEGATIVE COVID 4/20. Arrangements completed for pt to be transferred to [facility] today at 1:30 pm via [employer]. Pt's HD chair schedule at [facility] Dialysis= Monday, Wednesday, Friday at 11:15AM. Pt, her dtr and sister are aware of this plan. SW also advised [facility]LE[facility] champion today. For the SNF pt needs the PRECERT, signed LUBA and the HENs is completed. Sal Metzger. 4/22/2021.10:22AM.

## 2021-04-23 NOTE — DISCHARGE SUMMARY
[address]                               DISCHARGE SUMMARY    PATIENT NAME: Vipin Hammer                    :        1940  MED REC NO:   [id number]                            ROOM:       2368  ACCOUNT NO:   [de-identified]                           ADMIT DATE: 2021  PROVIDER:     Jayna Mondragon DO                  DISCHARGE DATE:  2021    ADMITTING DIAGNOSIS:  Large pancreatic pseudocyst with concern for  hemorrhagic transformation. FINAL DISCHARGE DIAGNOSIS:  Large pancreatic pseudocyst with concern for  hemorrhagic transformation. SECONDARY DISCHARGE DIAGNOSES:  1. Acute-on-chronic respiratory failure with hypoxemia due to chronic  systolic and diastolic congestive heart failure. 2.  Blood loss anemia with EGD showing multiple esophageal diverticula,  gastritis, and duodenal bulb lesion. 3.  Acute-on-chronic kidney disease stage 4 with progressive to  end-stage renal disease, on hemodialysis. 4.  Abdominal aortic aneurysm towards the bifurcation, 3.7 x 4.8 cm.  5.  Paroxysmal atrial fibrillation, on chronic anticoagulant therapy  with Eliquis. 6.  Moderate peripheral arterial disease. 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 Endoscopic  ultrasound, 75 mm stone in distal common bile duct; and end-stage renal  disease, currently on dialysis. CONSULTATIONS OBTAINED:  With [facility], Dr. Trudy White, Dr. Rox Clifford;  Gastroenterology, Dr. Minesh Alejo, and Dr. Anabela Rangel. ADMITTING PHYSICIANS:  Dr. Pro Tejada and Dr. Natalio Lawler.     CHIEF COMPLAINT AND HISTORY OF CHIEF COMPLAINT:  This is a 63-year-old  [facility] American female who was admitted to [address] [place] [address]; patient  on clinically improved. The patient  did appear to be markedly debilitated and require rehab. It took  several days for the pre-certification to occur with initial discharge  being planned on the 57/52/3897, certification came through on the  04/22/2021, at which time she will be discharged to the nursing home at  that time. At the time of discharge, on 04/22/2021 revealed the  following: The patient's vital signs were stable. Her blood pressure  is 106/57, pulse 83, respirations 14, weight 122, height 5 feet 5, and  O2 saturation was 97%. The patient will continue on albuterol 2 puffs  q.6 p.r.n., Bumex 10 mg daily, Dulcolax as needed, Isordil 10 mg t.i.d.,  Protonix 40 mg daily, Coreg 12.5 mg b.i.d., and Vitamin D 10861 units  weekly. The patient will continue apixaban 2.5 mg b.i.d., atorvastatin  40 daily, and hydralazine 25 daily. The patient will follow up with her  primary care doctor upon discharge. She will continue to follow with  renal and other consultant. The patient was discharged by ambulance in  stable condition to the nursing home. The patient will follow up with  the appropriate physician upon discharge from the nursing home. More  than 40 minutes were spent on the day of discharge with more than 50% of  time spent face to face with the patient discussing the plan of care and  treatment at this time and documentation.         Trupti Gimenez DO    D: 04/22/2021 16:28:42       T: 04/23/2021 6:09:45     [id number]  Job#: [id number]     Doc#: [id number]    CC:

## 2021-05-14 LAB — HEMOGLOBIN: 7 G/DL (ref 11.5–15.5)

## 2021-05-15 ENCOUNTER — HOSPITAL ENCOUNTER (EMERGENCY)
Age: 81
Discharge: HOME | End: 2021-05-15
Attending: EMERGENCY MEDICINE
Payer: MEDICARE

## 2021-05-15 VITALS
SYSTOLIC BLOOD PRESSURE: 172 MMHG | TEMPERATURE: 98.2 F | DIASTOLIC BLOOD PRESSURE: 99 MMHG | WEIGHT: 130.38 LBS | BODY MASS INDEX: 21.7 KG/M2 | RESPIRATION RATE: 19 BRPM | HEART RATE: 79 BPM | OXYGEN SATURATION: 98 %

## 2021-05-15 DIAGNOSIS — R06.00 DYSPNEA AND RESPIRATORY ABNORMALITIES: ICD-10-CM

## 2021-05-15 DIAGNOSIS — D64.9 ANEMIA, UNSPECIFIED TYPE: Primary | ICD-10-CM

## 2021-05-15 DIAGNOSIS — R06.89 DYSPNEA AND RESPIRATORY ABNORMALITIES: ICD-10-CM

## 2021-05-15 LAB
ABO/RH: NORMAL
ALBUMIN SERPL-MCNC: 3.4 G/DL (ref 3.5–5.2)
ALP BLD-CCNC: 182 U/L (ref 35–104)
ALT SERPL-CCNC: 12 U/L (ref 0–32)
ANION GAP SERPL CALCULATED.3IONS-SCNC: 7 MMOL/L (ref 7–16)
ANTIBODY SCREEN: NORMAL
APTT: 35 SEC (ref 24.5–35.1)
AST SERPL-CCNC: 16 U/L (ref 0–31)
BASOPHILS ABSOLUTE: 0.03 E9/L (ref 0–0.2)
BASOPHILS RELATIVE PERCENT: 0.4 % (ref 0–2)
BILIRUB SERPL-MCNC: 0.4 MG/DL (ref 0–1.2)
BLOOD BANK DISPENSE STATUS: NORMAL
BLOOD BANK PRODUCT CODE: NORMAL
BPU ID: NORMAL
BUN BLDV-MCNC: 19 MG/DL (ref 6–23)
CALCIUM SERPL-MCNC: 9.3 MG/DL (ref 8.6–10.2)
CHLORIDE BLD-SCNC: 101 MMOL/L (ref 98–107)
CO2: 30 MMOL/L (ref 22–29)
CREAT SERPL-MCNC: 2.1 MG/DL (ref 0.5–1)
DESCRIPTION BLOOD BANK: NORMAL
EKG ATRIAL RATE: 72 BPM
EKG Q-T INTERVAL: 478 MS
EKG QRS DURATION: 130 MS
EKG QTC CALCULATION (BAZETT): 537 MS
EKG R AXIS: 113 DEGREES
EKG T AXIS: 111 DEGREES
EKG VENTRICULAR RATE: 76 BPM
EOSINOPHILS ABSOLUTE: 0.16 E9/L (ref 0.05–0.5)
EOSINOPHILS RELATIVE PERCENT: 1.9 % (ref 0–6)
GFR AFRICAN AMERICAN: 27
GFR NON-AFRICAN AMERICAN: 27 ML/MIN/1.73
GLUCOSE BLD-MCNC: 185 MG/DL (ref 74–99)
HCT VFR BLD CALC: 24.9 % (ref 34–48)
HEMOGLOBIN: 7.6 G/DL (ref 11.5–15.5)
IMMATURE GRANULOCYTES #: 0.03 E9/L
IMMATURE GRANULOCYTES %: 0.4 % (ref 0–5)
INR BLD: 1.7
LYMPHOCYTES ABSOLUTE: 3.08 E9/L (ref 1.5–4)
LYMPHOCYTES RELATIVE PERCENT: 37.3 % (ref 20–42)
MCH RBC QN AUTO: 31.4 PG (ref 26–35)
MCHC RBC AUTO-ENTMCNC: 30.5 % (ref 32–34.5)
MCV RBC AUTO: 102.9 FL (ref 80–99.9)
MONOCYTES ABSOLUTE: 0.65 E9/L (ref 0.1–0.95)
MONOCYTES RELATIVE PERCENT: 7.9 % (ref 2–12)
NEUTROPHILS ABSOLUTE: 4.31 E9/L (ref 1.8–7.3)
NEUTROPHILS RELATIVE PERCENT: 52.1 % (ref 43–80)
PDW BLD-RTO: 19 FL (ref 11.5–15)
PLATELET # BLD: 299 E9/L (ref 130–450)
PMV BLD AUTO: 10.5 FL (ref 7–12)
POTASSIUM REFLEX MAGNESIUM: 4.5 MMOL/L (ref 3.5–5)
PROTHROMBIN TIME: 20.2 SEC (ref 9.3–12.4)
RBC # BLD: 2.42 E12/L (ref 3.5–5.5)
SODIUM BLD-SCNC: 138 MMOL/L (ref 132–146)
TOTAL PROTEIN: 6.9 G/DL (ref 6.4–8.3)
TROPONIN: 0.03 NG/ML (ref 0–0.03)
WBC # BLD: 8.3 E9/L (ref 4.5–11.5)

## 2021-05-15 PROCEDURE — 36430 TRANSFUSION BLD/BLD COMPNT: CPT

## 2021-05-15 PROCEDURE — 93005 ELECTROCARDIOGRAM TRACING: CPT | Performed by: STUDENT IN AN ORGANIZED HEALTH CARE EDUCATION/TRAINING PROGRAM

## 2021-05-15 PROCEDURE — 85610 PROTHROMBIN TIME: CPT

## 2021-05-15 PROCEDURE — 80053 COMPREHEN METABOLIC PANEL: CPT

## 2021-05-15 PROCEDURE — 85025 COMPLETE CBC W/AUTO DIFF WBC: CPT

## 2021-05-15 PROCEDURE — 86900 BLOOD TYPING SEROLOGIC ABO: CPT

## 2021-05-15 PROCEDURE — P9016 RBC LEUKOCYTES REDUCED: HCPCS

## 2021-05-15 PROCEDURE — 93010 ELECTROCARDIOGRAM REPORT: CPT | Performed by: INTERNAL MEDICINE

## 2021-05-15 PROCEDURE — 99285 EMERGENCY DEPT VISIT HI MDM: CPT

## 2021-05-15 PROCEDURE — 85730 THROMBOPLASTIN TIME PARTIAL: CPT

## 2021-05-15 PROCEDURE — 2500000003 HC RX 250 WO HCPCS: Performed by: STUDENT IN AN ORGANIZED HEALTH CARE EDUCATION/TRAINING PROGRAM

## 2021-05-15 PROCEDURE — 84484 ASSAY OF TROPONIN QUANT: CPT

## 2021-05-15 PROCEDURE — 96374 THER/PROPH/DIAG INJ IV PUSH: CPT

## 2021-05-15 PROCEDURE — 86850 RBC ANTIBODY SCREEN: CPT

## 2021-05-15 PROCEDURE — 86901 BLOOD TYPING SEROLOGIC RH(D): CPT

## 2021-05-15 PROCEDURE — 86923 COMPATIBILITY TEST ELECTRIC: CPT

## 2021-05-15 RX ORDER — BUMETANIDE 0.25 MG/ML
0.5 INJECTION, SOLUTION INTRAMUSCULAR; INTRAVENOUS ONCE
Status: COMPLETED | OUTPATIENT
Start: 2021-05-15 | End: 2021-05-15

## 2021-05-15 RX ORDER — SODIUM CHLORIDE 9 MG/ML
INJECTION, SOLUTION INTRAVENOUS PRN
Status: DISCONTINUED | OUTPATIENT
Start: 2021-05-15 | End: 2021-05-15 | Stop reason: HOSPADM

## 2021-05-15 RX ADMIN — BUMETANIDE 0.5 MG: 0.25 INJECTION INTRAMUSCULAR; INTRAVENOUS at 16:24

## 2021-05-15 ASSESSMENT — ENCOUNTER SYMPTOMS
CHEST TIGHTNESS: 0
TACHYPNEA: 1
COUGH: 0
SORE THROAT: 0
DIARRHEA: 0
ABDOMINAL DISTENTION: 0
BACK PAIN: 0
NAUSEA: 0
ABDOMINAL PAIN: 0
SHORTNESS OF BREATH: 0
VOMITING: 0

## 2021-05-15 NOTE — ED NOTES
IV access unsuccessful. Able to obtain labs from IV access attempt to [address] [place] [address].      Paco Diaz RN  05/15/21 7860

## 2021-05-15 NOTE — ED PROVIDER NOTES
HPI     Patient is a 80 y.o. femalewith a past medical history of CKD, dialysis dependence, A. fib, hypertension, tobacco abuse, and CHF who presents with a chief complaint of anemia. This has been occurring for few hours. Patient states that it gets better with nothing. Patient states that it gets worse with nothing. Patient states that it is severe in severity. Patient presents with a chief complaint of low hemoglobin. Patient states that she has been intermittently fatigued. Patient is presenting from dialysis after they were told that her hemoglobin has been low. Patient denies any chest pain or shortness of breath. Patient is on 3 L of oxygen at home. Patient denies any chest pain. Patient denies hematemesis. Patient denies any black and tarry stools. Patient denies any bloody stool. Patient [place] any fevers, chills, nausea, vomiting, abdominal pain, change a urination and bowel habits. Review of Systems   Constitutional: Positive for fatigue. Negative for activity change, appetite change, chills and fever. HENT: Negative for congestion, drooling and sore throat. Respiratory: Negative for cough, chest tightness and shortness of breath. Cardiovascular: Negative for chest pain and palpitations. Gastrointestinal: Negative for abdominal distention, abdominal pain, diarrhea, nausea and vomiting. Genitourinary: Negative for decreased urine volume, difficulty urinating, enuresis, flank pain, frequency and hematuria. Musculoskeletal: Negative for arthralgias, back pain and neck stiffness. Skin: Negative for rash and wound. Neurological: Negative for dizziness, facial asymmetry, light-headedness and headaches. Psychiatric/Behavioral: Negative for agitation, confusion and decreased concentration. Physical Exam  Vitals and nursing note reviewed. Constitutional:       Appearance: She is well-developed. HENT:      Head: Normocephalic and atraumatic. Right Ear:  There is no impacted cerumen. Left Ear: There is no impacted cerumen. Nose: Nose normal. No congestion or rhinorrhea. Mouth/Throat:      Pharynx: No oropharyngeal exudate or posterior oropharyngeal erythema. Eyes:      Conjunctiva/sclera: Conjunctivae normal.      Pupils: Pupils are equal, round, and reactive to light. Cardiovascular:      Rate and Rhythm: Normal rate. Rhythm irregular. Heart sounds: Normal heart sounds. No murmur heard. Pulmonary:      Effort: Pulmonary effort is normal. No respiratory distress. Breath sounds: Normal breath sounds. No wheezing or rales. Abdominal:      General: Bowel sounds are normal.      Palpations: Abdomen is soft. Tenderness: There is no abdominal tenderness. There is no guarding or rebound. Musculoskeletal:      Cervical back: Normal range of motion and neck supple. Skin:     General: Skin is warm and dry. Neurological:      Mental Status: She is alert and oriented to person, place, and time. Cranial Nerves: No cranial nerve deficit. Coordination: Coordination normal.          Procedures     MDM     ED Course as of May 15 1500   Sat May 15, 2021   1154 Spoke to nephrologist who stated that patient should receive one unit of blood and then can be discharged home. [JM]   1459 EKG read and interpreted by me. Rate 76 bpm.  Right bundle branch block. Left fascicular block. Right axis deviation. No ST elevations or depressions. Abnormal EKG. [JM]      ED Course User Index  [JM] Hector Moody MD      Patient is a 80 y.o. female presenting with anemia. Patient states that she is otherwise been fatigued. Patient denies any bleeding. Patient denies any chest pain or shortness of breath. Patient had a cardiac work-up done patient's EKG is benign. Patient labs drawn. Patient's labs are notable for a hemoglobin of 7.6. Discussed case with nephrology who stated that patient would benefit from red blood transfusion. Patient should also get Bumex. Patient is agreeable to this plan. Patient was given return precautions. Nephrology also requested that patient receive Bumex after red blood cell transfusion. Patient was given return precautions. Patient will follow up with their primary care provider. Patient is agreeable to this plan. Patient has remained stable throughout their stay in the ED. Patient was seen and evaluated by myself and my attending Abbe Manjarrez DO. Assessment and Plan discussed with attending provider, please see attestation for final plan of care. This note was done using dictation software and there may be some grammatical errors associated with this. Shoshana Max MD       ED Course as of May 15 1505   Sat May 15, 2021   1154 Spoke to nephrologist who stated that patient should receive one unit of blood and then can be discharged home. [MARIAJOSE]   6496 EKG read and interpreted by me. Rate 76 bpm.  Right bundle branch block. Left fascicular block. Right axis deviation. No ST elevations or depressions. Abnormal EKG. [JM]      ED Course User Index  [JM] Shoshana Max MD       --------------------------------------------- PAST HISTORY ---------------------------------------------  Past Medical History:  has a past medical history of Arthritis, Atrial fibrillation ([facility] [address] Blood circulation, collateral, CHF (congestive heart failure) ([facility] [address] Chronic renal insufficiency, stage IV (severe) ([facility] [address] History of cardiovascular stress test, History of echocardiogram, Hyperlipidemia, and Hypertension. Past Surgical History:  has a past surgical history that includes Ectopic pregnancy surgery; Upper gastrointestinal endoscopy (N/A, 4/3/2021); Colonoscopy (N/A, 4/3/2021); Colonoscopy (4/3/2021); Colonoscopy (4/3/2021); Colonoscopy (4/3/2021); and Upper gastrointestinal endoscopy (N/A, 4/7/2021). Social History:  reports that she has been smoking.  She has been smoking about 0.50 packs per day. She has never used smokeless tobacco. She reports previous alcohol use. She reports that she does not use drugs. Family History: family history is not on file. The patients home medications have been reviewed. Allergies: Patient has no known allergies.     -------------------------------------------------- RESULTS -------------------------------------------------  Labs:  Results for orders placed or performed during the hospital encounter of 05/15/21   CBC Auto Differential   Result Value Ref Range    WBC 8.3 4.5 - 11.5 E9/L    RBC 2.42 (L) 3.50 - 5.50 E12/L    Hemoglobin 7.6 (L) 11.5 - 15.5 g/dL    Hematocrit 24.9 (L) 34.0 - 48.0 %    .9 (H) 80.0 - 99.9 fL    MCH 31.4 26.0 - 35.0 pg    MCHC 30.5 (L) 32.0 - 34.5 %    RDW 19.0 (H) 11.5 - 15.0 fL    Platelets 212 295 - 326 E9/L    MPV 10.5 7.0 - 12.0 fL    Neutrophils % 52.1 43.0 - 80.0 %    Immature Granulocytes % 0.4 0.0 - 5.0 %    Lymphocytes % 37.3 20.0 - 42.0 %    Monocytes % 7.9 2.0 - 12.0 %    Eosinophils % 1.9 0.0 - 6.0 %    Basophils % 0.4 0.0 - 2.0 %    Neutrophils Absolute 4.31 1.80 - 7.30 E9/L    Immature Granulocytes # 0.03 E9/L    Lymphocytes Absolute 3.08 1.50 - 4.00 E9/L    Monocytes Absolute 0.65 0.10 - 0.95 E9/L    Eosinophils Absolute 0.16 0.05 - 0.50 E9/L    Basophils Absolute 0.03 0.00 - 0.20 E9/L   Comprehensive Metabolic Panel w/ Reflex to MG   Result Value Ref Range    Sodium 138 132 - 146 mmol/L    Potassium reflex Magnesium 4.5 3.5 - 5.0 mmol/L    Chloride 101 98 - 107 mmol/L    CO2 30 (H) 22 - 29 mmol/L    Anion Gap 7 7 - 16 mmol/L    Glucose 185 (H) 74 - 99 mg/dL    BUN 19 6 - 23 mg/dL    CREATININE 2.1 (H) 0.5 - 1.0 mg/dL    GFR Non-African American 27 >=60 mL/min/1.73    GFR African American 27     Calcium 9.3 8.6 - 10.2 mg/dL    Total Protein 6.9 6.4 - 8.3 g/dL    Albumin 3.4 (L) 3.5 - 5.2 g/dL    Total Bilirubin 0.4 0.0 - 1.2 mg/dL    Alkaline Phosphatase 182 (H) 35 - 104 U/L    ALT 12 0 - 32 U/L    AST 16 0 - 31

## 2021-05-17 LAB
FUNGUS (MYCOLOGY) CULTURE: NORMAL
FUNGUS STAIN: NORMAL

## 2021-05-29 ENCOUNTER — HOSPITAL ENCOUNTER (OUTPATIENT)
Age: 81
Discharge: HOME | End: 2021-05-29
Payer: MEDICARE

## 2021-05-29 LAB
BACTERIA: ABNORMAL /HPF
BILIRUBIN URINE: NEGATIVE
BLOOD, URINE: ABNORMAL
CLARITY: ABNORMAL
COLOR: YELLOW
GLUCOSE URINE: NEGATIVE MG/DL
KETONES, URINE: NEGATIVE MG/DL
LEUKOCYTE ESTERASE, URINE: ABNORMAL
NITRITE, URINE: NEGATIVE
PH UA: 5.5 (ref 5–9)
PROTEIN UA: 30 MG/DL
RBC UA: ABNORMAL /HPF (ref 0–2)
SPECIFIC GRAVITY UA: 1.02 (ref 1–1.03)
UROBILINOGEN, URINE: 0.2 E.U./DL
WBC UA: ABNORMAL /HPF (ref 0–5)

## 2021-05-29 PROCEDURE — 87088 URINE BACTERIA CULTURE: CPT

## 2021-05-29 PROCEDURE — 87077 CULTURE AEROBIC IDENTIFY: CPT

## 2021-05-29 PROCEDURE — 81001 URINALYSIS AUTO W/SCOPE: CPT

## 2021-05-29 PROCEDURE — 87186 SC STD MICRODIL/AGAR DIL: CPT

## 2021-05-31 LAB
ORGANISM: ABNORMAL
URINE CULTURE, ROUTINE: ABNORMAL

## 2021-06-01 LAB
AFB CULTURE (MYCOBACTERIA): NORMAL
AFB CULTURE (MYCOBACTERIA): NORMAL
AFB SMEAR: NORMAL
AFB SMEAR: NORMAL

## 2021-06-14 ENCOUNTER — HOSPITAL ENCOUNTER (OUTPATIENT)
Age: 81
Discharge: HOME | End: 2021-06-14
Payer: MEDICARE

## 2021-06-14 LAB
ANION GAP SERPL CALCULATED.3IONS-SCNC: 11 MMOL/L (ref 7–16)
BUN BLDV-MCNC: 41 MG/DL (ref 6–23)
CALCIUM SERPL-MCNC: 8.9 MG/DL (ref 8.6–10.2)
CHLORIDE BLD-SCNC: 103 MMOL/L (ref 98–107)
CO2: 28 MMOL/L (ref 22–29)
CREAT SERPL-MCNC: 4.5 MG/DL (ref 0.5–1)
GFR AFRICAN AMERICAN: 11
GFR NON-AFRICAN AMERICAN: 11 ML/MIN/1.73
GLUCOSE BLD-MCNC: 121 MG/DL (ref 74–99)
POTASSIUM SERPL-SCNC: 5 MMOL/L (ref 3.5–5)
SODIUM BLD-SCNC: 142 MMOL/L (ref 132–146)

## 2021-06-14 PROCEDURE — 80048 BASIC METABOLIC PNL TOTAL CA: CPT

## 2021-06-14 PROCEDURE — 36415 COLL VENOUS BLD VENIPUNCTURE: CPT

## 2021-06-17 ENCOUNTER — HOSPITAL ENCOUNTER (OUTPATIENT)
Age: 81
Discharge: HOME | End: 2021-06-17
Payer: MEDICARE

## 2021-06-17 LAB
ANION GAP SERPL CALCULATED.3IONS-SCNC: 13 MMOL/L (ref 7–16)
BUN BLDV-MCNC: 43 MG/DL (ref 6–23)
CALCIUM SERPL-MCNC: 9 MG/DL (ref 8.6–10.2)
CHLORIDE BLD-SCNC: 101 MMOL/L (ref 98–107)
CO2: 24 MMOL/L (ref 22–29)
CREAT SERPL-MCNC: 4.3 MG/DL (ref 0.5–1)
GFR AFRICAN AMERICAN: 12
GFR NON-AFRICAN AMERICAN: 12 ML/MIN/1.73
GLUCOSE BLD-MCNC: 119 MG/DL (ref 74–99)
POTASSIUM SERPL-SCNC: 5.3 MMOL/L (ref 3.5–5)
SODIUM BLD-SCNC: 138 MMOL/L (ref 132–146)

## 2021-06-17 PROCEDURE — 36415 COLL VENOUS BLD VENIPUNCTURE: CPT

## 2021-06-17 PROCEDURE — 80048 BASIC METABOLIC PNL TOTAL CA: CPT

## 2021-06-21 ENCOUNTER — HOSPITAL ENCOUNTER (OUTPATIENT)
Age: 81
Discharge: HOME | End: 2021-06-21
Payer: MEDICARE

## 2021-06-21 LAB
ANION GAP SERPL CALCULATED.3IONS-SCNC: 13 MMOL/L (ref 7–16)
BUN BLDV-MCNC: 55 MG/DL (ref 6–23)
CALCIUM SERPL-MCNC: 9.1 MG/DL (ref 8.6–10.2)
CHLORIDE BLD-SCNC: 103 MMOL/L (ref 98–107)
CO2: 23 MMOL/L (ref 22–29)
CREAT SERPL-MCNC: 3.8 MG/DL (ref 0.5–1)
GFR AFRICAN AMERICAN: 14
GFR NON-AFRICAN AMERICAN: 14 ML/MIN/1.73
GLUCOSE BLD-MCNC: 119 MG/DL (ref 74–99)
POTASSIUM SERPL-SCNC: 5.1 MMOL/L (ref 3.5–5)
SODIUM BLD-SCNC: 139 MMOL/L (ref 132–146)

## 2021-06-21 PROCEDURE — 36415 COLL VENOUS BLD VENIPUNCTURE: CPT

## 2021-06-21 PROCEDURE — 80048 BASIC METABOLIC PNL TOTAL CA: CPT

## 2021-06-22 ENCOUNTER — OFFICE VISIT (OUTPATIENT)
Dept: ONCOLOGY | Age: 81
End: 2021-06-22
Payer: MEDICARE

## 2021-06-22 ENCOUNTER — HOSPITAL ENCOUNTER (OUTPATIENT)
Dept: INFUSION THERAPY | Age: 81
Discharge: HOME | End: 2021-06-22
Payer: MEDICARE

## 2021-06-22 VITALS
SYSTOLIC BLOOD PRESSURE: 133 MMHG | DIASTOLIC BLOOD PRESSURE: 74 MMHG | HEIGHT: 65 IN | OXYGEN SATURATION: 90 % | BODY MASS INDEX: 21.43 KG/M2 | WEIGHT: 128.6 LBS | RESPIRATION RATE: 18 BRPM | HEART RATE: 69 BPM | TEMPERATURE: 96.9 F

## 2021-06-22 DIAGNOSIS — D53.9 MACROCYTIC ANEMIA: ICD-10-CM

## 2021-06-22 DIAGNOSIS — D53.9 MACROCYTIC ANEMIA: Primary | ICD-10-CM

## 2021-06-22 LAB
ALBUMIN SERPL-MCNC: 3.8 G/DL (ref 3.5–5.2)
ALP BLD-CCNC: 166 U/L (ref 35–104)
ALT SERPL-CCNC: 15 U/L (ref 0–32)
ANION GAP SERPL CALCULATED.3IONS-SCNC: 14 MMOL/L (ref 7–16)
AST SERPL-CCNC: 18 U/L (ref 0–31)
BASOPHILS ABSOLUTE: 0.02 E9/L (ref 0–0.2)
BASOPHILS RELATIVE PERCENT: 0.3 % (ref 0–2)
BILIRUB SERPL-MCNC: 0.4 MG/DL (ref 0–1.2)
BUN BLDV-MCNC: 57 MG/DL (ref 6–23)
CALCIUM SERPL-MCNC: 9.4 MG/DL (ref 8.6–10.2)
CHLORIDE BLD-SCNC: 102 MMOL/L (ref 98–107)
CO2: 24 MMOL/L (ref 22–29)
CREAT SERPL-MCNC: 3.5 MG/DL (ref 0.5–1)
DAT POLYSPECIFIC: NORMAL
EOSINOPHILS ABSOLUTE: 0.15 E9/L (ref 0.05–0.5)
EOSINOPHILS RELATIVE PERCENT: 2 % (ref 0–6)
FERRITIN: 536 NG/ML
FOLATE: 14.1 NG/ML (ref 4.8–24.2)
GFR AFRICAN AMERICAN: 15
GFR NON-AFRICAN AMERICAN: 15 ML/MIN/1.73
GLUCOSE BLD-MCNC: 105 MG/DL (ref 74–99)
HAPTOGLOBIN: 222 MG/DL (ref 30–200)
HCT VFR BLD CALC: 39.5 % (ref 34–48)
HEMOGLOBIN: 11.9 G/DL (ref 11.5–15.5)
IGA: 6 MG/DL (ref 70–400)
IGG: 2195 MG/DL (ref 700–1600)
IGM: 104 MG/DL (ref 40–230)
IMMATURE GRANULOCYTES #: 0.02 E9/L
IMMATURE GRANULOCYTES %: 0.3 % (ref 0–5)
IMMATURE RETIC FRACT: 17.5 % (ref 3–15.9)
IRON SATURATION: 44 % (ref 15–50)
IRON: 74 MCG/DL (ref 37–145)
LACTATE DEHYDROGENASE: 193 U/L (ref 135–214)
LYMPHOCYTES ABSOLUTE: 3.05 E9/L (ref 1.5–4)
LYMPHOCYTES RELATIVE PERCENT: 39.9 % (ref 20–42)
MCH RBC QN AUTO: 31 PG (ref 26–35)
MCHC RBC AUTO-ENTMCNC: 30.1 % (ref 32–34.5)
MCV RBC AUTO: 102.9 FL (ref 80–99.9)
MONOCYTES ABSOLUTE: 0.66 E9/L (ref 0.1–0.95)
MONOCYTES RELATIVE PERCENT: 8.6 % (ref 2–12)
NEUTROPHILS ABSOLUTE: 3.74 E9/L (ref 1.8–7.3)
NEUTROPHILS RELATIVE PERCENT: 48.9 % (ref 43–80)
PDW BLD-RTO: 16.4 FL (ref 11.5–15)
PLATELET # BLD: 299 E9/L (ref 130–450)
PMV BLD AUTO: 11.1 FL (ref 7–12)
POTASSIUM SERPL-SCNC: 4.4 MMOL/L (ref 3.5–5)
RBC # BLD: 3.84 E12/L (ref 3.5–5.5)
RETIC HGB EQUIVALENT: 30.1 PG (ref 28.2–36.6)
RETICULOCYTE ABSOLUTE COUNT: 0.1 E12/L
RETICULOCYTE COUNT PCT: 2.5 % (ref 0.4–1.9)
SODIUM BLD-SCNC: 140 MMOL/L (ref 132–146)
TOTAL IRON BINDING CAPACITY: 168 MCG/DL (ref 250–450)
TSH SERPL DL<=0.05 MIU/L-ACNC: 2.65 UIU/ML (ref 0.27–4.2)
VITAMIN B-12: 468 PG/ML (ref 211–946)
WBC # BLD: 7.6 E9/L (ref 4.5–11.5)

## 2021-06-22 PROCEDURE — 85045 AUTOMATED RETICULOCYTE COUNT: CPT

## 2021-06-22 PROCEDURE — 88185 FLOWCYTOMETRY/TC ADD-ON: CPT

## 2021-06-22 PROCEDURE — 82668 ASSAY OF ERYTHROPOIETIN: CPT

## 2021-06-22 PROCEDURE — 82232 ASSAY OF BETA-2 PROTEIN: CPT

## 2021-06-22 PROCEDURE — 99214 OFFICE O/P EST MOD 30 MIN: CPT

## 2021-06-22 PROCEDURE — G8420 CALC BMI NORM PARAMETERS: HCPCS | Performed by: INTERNAL MEDICINE

## 2021-06-22 PROCEDURE — 82784 ASSAY IGA/IGD/IGG/IGM EACH: CPT

## 2021-06-22 PROCEDURE — 88271 CYTOGENETICS DNA PROBE: CPT

## 2021-06-22 PROCEDURE — 86880 COOMBS TEST DIRECT: CPT

## 2021-06-22 PROCEDURE — 88184 FLOWCYTOMETRY/ TC 1 MARKER: CPT

## 2021-06-22 PROCEDURE — 82525 ASSAY OF COPPER: CPT

## 2021-06-22 PROCEDURE — 83615 LACTATE (LD) (LDH) ENZYME: CPT

## 2021-06-22 PROCEDURE — 1090F PRES/ABSN URINE INCON ASSESS: CPT | Performed by: INTERNAL MEDICINE

## 2021-06-22 PROCEDURE — 83540 ASSAY OF IRON: CPT

## 2021-06-22 PROCEDURE — 84443 ASSAY THYROID STIM HORMONE: CPT

## 2021-06-22 PROCEDURE — G8427 DOCREV CUR MEDS BY ELIG CLIN: HCPCS | Performed by: INTERNAL MEDICINE

## 2021-06-22 PROCEDURE — 88237 TISSUE CULTURE BONE MARROW: CPT

## 2021-06-22 PROCEDURE — 82746 ASSAY OF FOLIC ACID SERUM: CPT

## 2021-06-22 PROCEDURE — 80053 COMPREHEN METABOLIC PANEL: CPT

## 2021-06-22 PROCEDURE — 83010 ASSAY OF HAPTOGLOBIN QUANT: CPT

## 2021-06-22 PROCEDURE — 88275 CYTOGENETICS 100-300: CPT

## 2021-06-22 PROCEDURE — 82728 ASSAY OF FERRITIN: CPT

## 2021-06-22 PROCEDURE — 81270 JAK2 GENE: CPT

## 2021-06-22 PROCEDURE — 99205 OFFICE O/P NEW HI 60 MIN: CPT | Performed by: INTERNAL MEDICINE

## 2021-06-22 PROCEDURE — 84630 ASSAY OF ZINC: CPT

## 2021-06-22 PROCEDURE — 85025 COMPLETE CBC W/AUTO DIFF WBC: CPT

## 2021-06-22 PROCEDURE — 86334 IMMUNOFIX E-PHORESIS SERUM: CPT

## 2021-06-22 PROCEDURE — 83550 IRON BINDING TEST: CPT

## 2021-06-22 PROCEDURE — 82607 VITAMIN B-12: CPT

## 2021-06-22 PROCEDURE — 83883 ASSAY NEPHELOMETRY NOT SPEC: CPT

## 2021-06-22 PROCEDURE — 84165 PROTEIN E-PHORESIS SERUM: CPT

## 2021-06-22 PROCEDURE — 36415 COLL VENOUS BLD VENIPUNCTURE: CPT

## 2021-06-22 RX ORDER — MULTIVIT-MIN/IRON/FOLIC ACID/K 18-600-40
CAPSULE ORAL
COMMUNITY

## 2021-06-22 NOTE — PROGRESS NOTES
Department of [facility] Med Oncology  Attending Consult Note    Reason for Visit:  Consultation on a patient with Macrocytic anemia    Referring Physician: Jake Hunter MD    PCP:  Malachi Andres DO    History of Present Illness:  61-year-old female with history of hypertension, hyperlipidemia, A.Fib, CHF, CKD stage IV on HD who was noted to have macrocytic anemia on routine CBC    On 05/15/2021: Hb 7.6  Hct 24.9  .9    WBC 8.3  BUN 55 Creat 3.8  Ca 9.1 Albumin 3.4     Recent hospital admission in April 2021 for abdominal pain and rectal bleeding. 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  Duodenum, biopsy: Duodenal mucosa with Brunner's gland hyperplasia and foveolar metaplasia, compatible with chronic/peptic duodenitis. Stella Stinson, proximal transverse colon, biopsy: Fragments of tubular adenoma. Robb Chiu (x2), mid transverse colon, biopsy: Partially cauterized segments of colonic mucosa, no significant pathologic abnormality identified. D.  Gastric antrum, biopsy: Moderate to severe chronic gastritis and intestinal metaplasia. Immunostain negative for Helicobacter pylori organisms. Negative for dysplasia. Comment:   The tissue sample designated mid transverse colon polyps (C) is examined at multiple additional levels of the tissue block.  No dysplasia is identified in the tissue sample (part C). Of note, fragments of tubular adenoma are identified in the tissue sample designated proximal transverse colon polyp (B).      EUS  4/7 -biopsy of pancreatic tail (FNA), negative for malignant cells, 5mm stone in distal common bile duct    She is off hemodialysis at this time for 2 weeks and will have re-evaluation by Dr. Nick Juares this Thursday 06/24/2021. She last received Venofer 100 mg on 05/28/2021. Last received Mircera on 05/26/2021    Review of Systems;  CONSTITUTIONAL: No fever, chills. Fair appetite. Fatigue  ENMT: Eyes: No diplopia; Nose: No epistaxis. Mouth: No sore throat. RESPIRATORY: No hemoptysis, shortness of breath, cough. CARDIOVASCULAR: No chest pain, palpitations. GASTROINTESTINAL: No nausea/vomiting, abdominal pain, diarrhea/constipation. GENITOURINARY: No dysuria, urinary frequency, hematuria. NEURO: No syncope, presyncope, headache. Remainder:  ROS NEGATIVE    Past Medical History:      Diagnosis Date    Arthritis     Atrial fibrillation ([facility] [address]     Blood circulation, collateral     CHF (congestive heart failure) (HCC)     Chronic renal insufficiency, stage IV (severe) ([facility] [address] 11/19/2016    History of cardiovascular stress test 07/2015    Lexiscan stress test    History of echocardiogram 07/27/2015    EF 29%    Hyperlipidemia     Hypertension      Past Surgical History:      Procedure Laterality Date    COLONOSCOPY N/A 4/3/2021    COLONOSCOPY POLYPECTOMY HOT SNARE performed by Pao Hogan DO at [address]  4/3/2021    COLONOSCOPY WITH BIOPSY performed by Pao Hogan DO at [address]  4/3/2021    COLONOSCOPY CONTROL HEMORRHAGE performed by Pao Hogan DO at [address]  4/3/2021    COLONOSCOPY SUBMUCOSAL SPOT INJECTION performed by Pao Hogan DO at [address] [facility] ENDOSCOPY N/A 4/3/2021    EGD BIOPSY performed by Pao Hogan DO at [address] N/A 4/7/2021    EGD W/EUS FNA performed by Dayna Hatfield MD at [address] [facility] History:  No family history on file. Medications:  Reviewed and reconciled.     Social History:  Social History     Socioeconomic History    Marital status:      Spouse name: Not on file    Number of children: Not on file    Years of education: Not on file    Highest education level: Not on file   Occupational History    Not on file   Tobacco Use    Smoking status: Current Every Day Smoker     Packs/day: 0.50    Smokeless tobacco: Never Used   Vaping Use    Vaping Use: Never used   Substance and Sexual Activity    Alcohol use: Not Currently     Comment: socially    Drug use: Never    Sexual activity: Not Currently   Other Topics Concern    Not on file   Social History Narrative    Not on file     Social Determinants of Health     Financial Resource Strain:     Difficulty of Paying Living Expenses:    Food Insecurity:     Worried About Running Out of Food in the Last Year:     [address] in the Last Year:    Transportation Needs:     Lack of Transportation (Medical):  Lack of Transportation (Non-Medical):    Physical Activity:     Days of Exercise per Week:     Minutes of Exercise per Session:    Stress:     Feeling of Stress :    Social Connections:     Frequency of Communication with Friends and Family:     Frequency of Social Gatherings with Friends and Family:     Attends Bahai Services:     Active Member of Clubs or Organizations:     Attends Club or Organization Meetings:     Marital Status:    Intimate Partner Violence:     Fear of Current or Ex-Partner:     Emotionally Abused:     Physically Abused:     Sexually Abused: Allergies:  No Known Allergies    Physical Exam:  /74   Pulse 69   Temp 96.9 °F (36.1 °C)   Resp 18   Ht 5' 4.5\" (1.638 m)   Wt 128 lb 9.6 oz (58.3 kg)   SpO2 90%   BMI 21.73 kg/m²   GENERAL: Alert, oriented x 3, tired  HEENT: PERRLA; EOMI. Oropharynx clear. NECK: Supple. Without lymphadenopathy. LUNGS: Good air entry bilaterally. No wheezing, crackles or ronchi. CARDIOVASCULAR: Regular rate. No murmurs, rubs or gallops. ABDOMEN: Soft. Non-tender, non-distended.  Positive bowel sounds  EXTREMITIES: Without clubbing, cyanosis, or edema. NEUROLOGIC: No focal deficits. Impression/Plan:  79-year-old female with history of hypertension, hyperlipidemia, A.Fib, CHF, CKD stage IV on HD who was noted to have macrocytic anemia on routine CBC    On 05/15/2021: Hb 7.6  Hct 24.9  .9    WBC 8.3  BUN 55 Creat 3.8  Ca 9.1 Albumin 3.4     Recent hospital admission in April 2021 for abdominal pain and rectal bleeding. 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  Duodenum, biopsy: Duodenal mucosa with Brunner's gland hyperplasia and foveolar metaplasia, compatible with chronic/peptic duodenitis. Sima Matthew, proximal transverse colon, biopsy: Fragments of tubular adenoma. Jc Gardner (x2), mid transverse colon, biopsy: Partially cauterized segments of colonic mucosa, no significant pathologic abnormality identified. D.  Gastric antrum, biopsy: Moderate to severe chronic gastritis and intestinal metaplasia. Immunostain negative for Helicobacter pylori organisms. Negative for dysplasia. Comment:   The tissue sample designated mid transverse colon polyps (C) is examined at multiple additional levels of the tissue block.  No dysplasia is identified in the tissue sample (part C). Of note, fragments of tubular adenoma are identified in the tissue sample designated proximal transverse colon polyp (B). EUS  4/7 -biopsy of pancreatic tail (FNA), negative for malignant cells, 5mm stone in distal common bile duct    She is off hemodialysis at this time for 2 weeks and will have re-evaluation by Dr. Hunt Lighter this Thursday 06/24/2021.  She last received Venofer 100 mg on

## 2021-06-22 NOTE — PROGRESS NOTES
[place]  1940 80 y.o. Referring Physician:     PCP: Linn Andres, DO    Vitals:    21 1107   BP: 133/74   Pulse: 69   Resp: 18   Temp: 96.9 °F (36.1 °C)   SpO2: 90%        Wt Readings from Last 3 Encounters:   21 128 lb 9.6 oz (58.3 kg)   05/15/21 130 lb 6 oz (59.1 kg)   21 122 lb 9.2 oz (55.6 kg)        Body mass index is 21.73 kg/m². Chief Complaint:   Chief Complaint   Patient presents with    New Patient     anemia         Cancer Staging  No matching staging information was found for the patient. Prior Radiation Therapy? NO    Concurrent Chemo/radiation? NO    Prior Chemotherapy? NO    Prior Hormonal Therapy? NO    Head and Neck Cancer? No, patient does NOT have HN cancer.       LMP: 20    Age at first Menses: 14    : 3    Para: 2          Current Outpatient Medications:     Cholecalciferol (VITAMIN D) 50 MCG (2000 UT) CAPS capsule, Take by mouth, Disp: , Rfl:     albuterol (PROVENTIL) (2.5 MG/3ML) 0.083% nebulizer solution, Take 3 mLs by nebulization every 6 hours as needed for Wheezing, Disp: 120 each, Rfl: 3    bumetanide (BUMEX) 2 MG tablet, Take 1 tablet by mouth daily, Disp: 30 tablet, Rfl: 3    carvedilol (COREG) 12.5 MG tablet, Take 1 tablet by mouth 2 times daily, Disp: 60 tablet, Rfl: 3    isosorbide dinitrate (ISORDIL) 10 MG tablet, Take 1 tablet by mouth 3 times daily, Disp: 90 tablet, Rfl: 3    pantoprazole (PROTONIX) 40 MG tablet, Take 1 tablet by mouth every morning (before breakfast), Disp: 30 tablet, Rfl: 3    apixaban (ELIQUIS) 2.5 MG TABS tablet, Take 1 tablet by mouth 2 times daily (Patient taking differently: Take 5 mg by mouth 2 times daily ), Disp: 60 tablet, Rfl: 0    hydrALAZINE (APRESOLINE) 25 MG tablet, Take 1 tablet by mouth every 8 hours, Disp: 90 tablet, Rfl: 3    atorvastatin (LIPITOR) 40 MG tablet, Take 1 tablet by mouth nightly, Disp: 30 tablet, Rfl: 3    vitamin D (ERGOCALCIFEROL) 1.25 MG (92294 UT) CAPS capsule, Take 1 capsule by mouth once a week, Disp: 5 capsule, Rfl:        Past Medical History:   Diagnosis Date    Arthritis     Atrial fibrillation ([facility]     Blood circulation, collateral     CHF (congestive heart failure) (HCC)     Chronic renal insufficiency, stage IV (severe) ([facility] [address] 2016    History of cardiovascular stress test 2015    Lexiscan stress test    History of echocardiogram 2015    EF 29%    Hyperlipidemia     Hypertension        Past Surgical History:   Procedure Laterality Date    COLONOSCOPY N/A 4/3/2021    COLONOSCOPY POLYPECTOMY HOT SNARE performed by Steve Granados DO at [address] [facility]  4/3/2021    COLONOSCOPY WITH BIOPSY performed by Steve Granados DO at [address] [facility]  4/3/2021    COLONOSCOPY CONTROL HEMORRHAGE performed by Steve Granados DO at [address] [facility]  4/3/2021    COLONOSCOPY SUBMUCOSAL SPOT INJECTION performed by Steve Granados DO at [place] [id number]      UPPER GASTROINTESTINAL ENDOSCOPY N/A 4/3/2021    EGD BIOPSY performed by Steve Granados DO at [address] [facility] [facility] N/A 2021    EGD W/EUS FNA [facility] [address] [facility] History   Problem Relation Age of Onset    Heart Disease Mother     No Known Problems Father     Other Sister         CHF    Cancer Sister        Social History     Socioeconomic History    Marital status:      Spouse name: Not on file    Number of children: Not on file    Years of education: Not on file    Highest education level: Not on file   Occupational History    Not on file   Tobacco Use    Smoking status: Former Smoker     Packs/day: 0.50     Start date: 0     Quit date: 2021     Years since quittin.3    Smokeless tobacco: Never Used   Vaping Use    Vaping Use: Never used   Substance and Sexual Activity    Alcohol use: Not Currently     Comment: [facility] Drug use: Never    Sexual activity: Not Currently   Other Topics Concern    Not on file   Social History Narrative    Not on file     Social Determinants of Health     Financial Resource Strain:     Difficulty of Paying Living Expenses:    Food Insecurity:     Worried About Running Out of Food in the Last Year:     [address] in the Last Year:    Transportation Needs:     Lack of Transportation (Medical):  Lack of Transportation (Non-Medical):    Physical Activity:     Days of Exercise per Week:     Minutes of Exercise per Session:    Stress:     Feeling of Stress :    Social Connections:     Frequency of Communication with Friends and Family:     Frequency of Social Gatherings with Friends and Family:     Attends Islam Services:     Active Member of Clubs or Organizations:     Attends Club or Organization Meetings:     Marital Status:    Intimate Partner Violence:     Fear of Current or Ex-Partner:     Emotionally Abused:     Physically Abused:     Sexually Abused:            Occupation: retired  Retired:  YES: Patient is retired from [employer]. REVIEW OF SYSTEMS:     Pacemaker/Defibulator/ICD:  No    Mediport: No           FALLS RISK SCREENING ASSESSMENT    Instructions:  Assess the patient and [place] the appropriate indicators that are present for fall risk identification. Total the numbers circled and assign a fall risk score from Table 2.  Reassess patient at a minimum every 12 weeks or with status change. Assessment   Date  6/22/2021     1. Mental Ability: confusion/cognitively impaired No - 0       2. Elimination Issues: incontinence, frequency Yes - 3       3. Ambulatory: use of assistive devices (walker, cane, off-loading devices), attached to equipment (IV pole, oxygen) Yes - 2     4. Sensory Limitations: dizziness, vertigo, impaired vision No - 0       5. Age 72 years or greater - 1       10.   Medication: diuretics, strong analgesics, hypnotics, sedatives, antihypertensive agents   Yes - 3   7. Falls:  recent history of falls within the last 3 months (not to include slipping or tripping)   No - 0   TOTAL 8    If score of 4 or greater was education given? Yes       TABLE 2   Risk Score Risk Level Plan of Care   0-3 Little or  No Risk 1. Provide assistance as indicated for ambulation activities  2. Reorient confused/cognitively impaired patient  3. Call-light/bell within patient's reach  4. Chair/bed in low position, stretcher/bed with siderails up except when performing patient care activities  5. Educate patient/family/caregiver on falls prevention  6.  Reassess in 12 weeks or with any noted change in patient condition which places them at a risk for a fall   4-6 Moderate Risk 1. Provide assistance as indicated for ambulation activities  2. Reorient confused/cognitively impaired patient  3. Call-light/bell within patient's reach  4. Chair/bed in low position, stretcher/bed with siderails up except when performing patient care activities  5. Educate patient/family/caregiver on falls prevention  6. Falls risk precaution (Yellow sticker Level II) placed on patient chart   7 or   Higher High Risk 1. Place patient in easily observable treatment room  2. Patient attended at all times by family member or staff  3. Provide assistance as indicated for ambulation activities  4. Reorient confused/cognitively impaired patient  5. Call-light/bell within patient's reach  6. Chair/bed in low position, stretcher/bed with siderails up except when performing patient care activities  7. Educate patient/family/caregiver on falls prevention  8. Falls risk precaution (Yellow sticker Level III) placed on patient chart           MALNUTRITION RISK SCREENING ASSESSMENT    Instructions:  Assess the patient and enter the appropriate indicators that are present for nutrition risk identification. Total the numbers entered and assign a risk score.  Follow the appropriate action

## 2021-06-23 LAB — PATHOLOGIST REVIEW: NORMAL

## 2021-06-24 ENCOUNTER — HOSPITAL ENCOUNTER (OUTPATIENT)
Age: 81
Discharge: HOME | End: 2021-06-24
Payer: MEDICARE

## 2021-06-24 LAB
ANION GAP SERPL CALCULATED.3IONS-SCNC: 13 MMOL/L (ref 7–16)
BETA-2 MICROGLOBULIN: 12.5 MG/L (ref 0.6–2.4)
BUN BLDV-MCNC: 68 MG/DL (ref 6–23)
CALCIUM SERPL-MCNC: 8.9 MG/DL (ref 8.6–10.2)
CHLORIDE BLD-SCNC: 101 MMOL/L (ref 98–107)
CO2: 24 MMOL/L (ref 22–29)
CREAT SERPL-MCNC: 3.4 MG/DL (ref 0.5–1)
ERYTHROPOIETIN: 69 MU/ML (ref 4–27)
GFR AFRICAN AMERICAN: 16
GFR NON-AFRICAN AMERICAN: 16 ML/MIN/1.73
GLUCOSE BLD-MCNC: 128 MG/DL (ref 74–99)
POTASSIUM SERPL-SCNC: 4.6 MMOL/L (ref 3.5–5)
SODIUM BLD-SCNC: 138 MMOL/L (ref 132–146)

## 2021-06-24 PROCEDURE — 80048 BASIC METABOLIC PNL TOTAL CA: CPT

## 2021-06-24 PROCEDURE — 36415 COLL VENOUS BLD VENIPUNCTURE: CPT

## 2021-06-25 LAB
KAPPA FREE LIGHT CHAINS QNT: 118.3 MG/L (ref 3.3–19.4)
KAPPA/LAMBDA FREE LIGHT CHAIN RATIO: 1.36 (ref 0.26–1.65)
LAMBDA FREE LIGHT CHAINS QNT: 87.04 MG/L (ref 5.71–26.3)

## 2021-06-26 LAB
COPPER: 140.2 UG/DL (ref 80–155)
ZINC: 49.1 UG/DL (ref 60–120)

## 2021-06-28 LAB
ALBUMIN SERPL-MCNC: 3.4 G/DL (ref 3.5–4.7)
ALPHA-1-GLOBULIN: 0.3 G/DL (ref 0.2–0.4)
ALPHA-2-GLOBULIN: 1 G/FL (ref 0.5–1)
BETA GLOBULIN: 0.8 G/DL (ref 0.8–1.3)
ELECTROPHORESIS: ABNORMAL
GAMMA GLOBULIN: 2.1 G/DL (ref 0.7–1.6)
IMMUNOFIXATION RESULT, SERUM: NORMAL
TOTAL PROTEIN: 7.6 G/DL (ref 6.4–8.3)

## 2021-06-29 LAB
Lab: NORMAL
REPORT: NORMAL
THIS TEST SENT TO: NORMAL

## 2021-07-04 LAB — JAK2 GENE MUTATION QUAL: NOT DETECTED

## 2021-07-08 ENCOUNTER — HOSPITAL ENCOUNTER (OUTPATIENT)
Age: 81
Discharge: HOME | End: 2021-07-08
Payer: MEDICARE

## 2021-07-08 LAB
ALBUMIN SERPL-MCNC: 3.8 G/DL (ref 3.5–5.2)
ALP BLD-CCNC: 152 U/L (ref 35–104)
ALT SERPL-CCNC: 16 U/L (ref 0–32)
ANION GAP SERPL CALCULATED.3IONS-SCNC: 17 MMOL/L (ref 7–16)
AST SERPL-CCNC: 19 U/L (ref 0–31)
BILIRUB SERPL-MCNC: 0.4 MG/DL (ref 0–1.2)
BUN BLDV-MCNC: 117 MG/DL (ref 6–23)
CALCIUM SERPL-MCNC: 8.4 MG/DL (ref 8.6–10.2)
CHLORIDE BLD-SCNC: 102 MMOL/L (ref 98–107)
CO2: 21 MMOL/L (ref 22–29)
CREAT SERPL-MCNC: 3.9 MG/DL (ref 0.5–1)
GFR AFRICAN AMERICAN: 13
GFR NON-AFRICAN AMERICAN: 13 ML/MIN/1.73
GLUCOSE BLD-MCNC: 145 MG/DL (ref 74–99)
HCT VFR BLD CALC: 35.1 % (ref 34–48)
HEMOGLOBIN: 11.6 G/DL (ref 11.5–15.5)
Lab: NORMAL
MCH RBC QN AUTO: 31.1 PG (ref 26–35)
MCHC RBC AUTO-ENTMCNC: 33 % (ref 32–34.5)
MCV RBC AUTO: 94.1 FL (ref 80–99.9)
PARATHYROID HORMONE INTACT: 796 PG/ML (ref 15–65)
PDW BLD-RTO: 14.9 FL (ref 11.5–15)
PHOSPHORUS: 6.7 MG/DL (ref 2.5–4.5)
PLATELET # BLD: 231 E9/L (ref 130–450)
PMV BLD AUTO: 10.8 FL (ref 7–12)
POTASSIUM SERPL-SCNC: 3.4 MMOL/L (ref 3.5–5)
RBC # BLD: 3.73 E12/L (ref 3.5–5.5)
REPORT: NORMAL
SODIUM BLD-SCNC: 140 MMOL/L (ref 132–146)
THIS TEST SENT TO: NORMAL
TOTAL PROTEIN: 7.4 G/DL (ref 6.4–8.3)
VITAMIN D 25-HYDROXY: 23 NG/ML (ref 30–100)
WBC # BLD: 6.7 E9/L (ref 4.5–11.5)

## 2021-07-08 PROCEDURE — 84100 ASSAY OF PHOSPHORUS: CPT

## 2021-07-08 PROCEDURE — 36415 COLL VENOUS BLD VENIPUNCTURE: CPT

## 2021-07-08 PROCEDURE — 83970 ASSAY OF PARATHORMONE: CPT

## 2021-07-08 PROCEDURE — 85027 COMPLETE CBC AUTOMATED: CPT

## 2021-07-08 PROCEDURE — 80053 COMPREHEN METABOLIC PANEL: CPT

## 2021-07-08 PROCEDURE — 82306 VITAMIN D 25 HYDROXY: CPT

## 2021-08-03 ENCOUNTER — HOSPITAL ENCOUNTER (OUTPATIENT)
Dept: INFUSION THERAPY | Age: 81
Discharge: HOME | End: 2021-08-03
Payer: MEDICARE

## 2021-08-03 ENCOUNTER — OFFICE VISIT (OUTPATIENT)
Dept: ONCOLOGY | Age: 81
End: 2021-08-03
Payer: MEDICARE

## 2021-08-03 VITALS
BODY MASS INDEX: 22.89 KG/M2 | RESPIRATION RATE: 18 BRPM | SYSTOLIC BLOOD PRESSURE: 100 MMHG | TEMPERATURE: 97.1 F | HEART RATE: 86 BPM | HEIGHT: 65 IN | OXYGEN SATURATION: 91 % | WEIGHT: 137.4 LBS | DIASTOLIC BLOOD PRESSURE: 80 MMHG

## 2021-08-03 DIAGNOSIS — D53.9 MACROCYTIC ANEMIA: Primary | ICD-10-CM

## 2021-08-03 DIAGNOSIS — D53.9 MACROCYTIC ANEMIA: ICD-10-CM

## 2021-08-03 PROCEDURE — G8427 DOCREV CUR MEDS BY ELIG CLIN: HCPCS | Performed by: INTERNAL MEDICINE

## 2021-08-03 PROCEDURE — 1036F TOBACCO NON-USER: CPT | Performed by: INTERNAL MEDICINE

## 2021-08-03 PROCEDURE — 99212 OFFICE O/P EST SF 10 MIN: CPT

## 2021-08-03 PROCEDURE — 1123F ACP DISCUSS/DSCN MKR DOCD: CPT | Performed by: INTERNAL MEDICINE

## 2021-08-03 PROCEDURE — G8400 PT W/DXA NO RESULTS DOC: HCPCS | Performed by: INTERNAL MEDICINE

## 2021-08-03 PROCEDURE — 81342 TRG GENE REARRANGEMENT ANAL: CPT

## 2021-08-03 PROCEDURE — 1090F PRES/ABSN URINE INCON ASSESS: CPT | Performed by: INTERNAL MEDICINE

## 2021-08-03 PROCEDURE — 4040F PNEUMOC VAC/ADMIN/RCVD: CPT | Performed by: INTERNAL MEDICINE

## 2021-08-03 PROCEDURE — 36415 COLL VENOUS BLD VENIPUNCTURE: CPT

## 2021-08-03 PROCEDURE — 99213 OFFICE O/P EST LOW 20 MIN: CPT | Performed by: INTERNAL MEDICINE

## 2021-08-03 PROCEDURE — G8420 CALC BMI NORM PARAMETERS: HCPCS | Performed by: INTERNAL MEDICINE

## 2021-08-03 NOTE — PROGRESS NOTES
Department of [place] [address]  Attending Clinic Note    Reason for Visit: Follow-up on a patient with Macrocytic anemia    PCP:  Clint Rai DO    History of Present Illness:  30-year-old female with history of hypertension, hyperlipidemia, A.Fib, CHF, CKD stage IV on HD who was noted to have macrocytic anemia on routine CBC    On 05/15/2021: Hb 7.6  Hct 24.9  .9    WBC 8.3  BUN 55 Creat 3.8  Ca 9.1 Albumin 3.4     Recent hospital admission in April 2021 for abdominal pain and rectal bleeding. 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  Duodenum, biopsy: Duodenal mucosa with Brunner's gland hyperplasia and foveolar metaplasia, compatible with chronic/peptic duodenitis. Dior Chahal, proximal transverse colon, biopsy: Fragments of tubular adenoma. Sophie Jones (x2), mid transverse colon, biopsy: Partially cauterized segments of colonic mucosa, no significant pathologic abnormality identified. D.  Gastric antrum, biopsy: Moderate to severe chronic gastritis and intestinal metaplasia. Immunostain negative for Helicobacter pylori organisms. Negative for dysplasia. Comment:   The tissue sample designated mid transverse colon polyps (C) is examined at multiple additional levels of the tissue block.  No dysplasia is identified in the tissue sample (part C). Of note, fragments of tubular adenoma are identified in the tissue sample designated proximal transverse colon polyp (B).      EUS  4/7 -biopsy of pancreatic tail (FNA), negative for malignant cells, 5mm stone in distal common bile duct    She is off hemodialysis at this time for 2 weeks and will have re-evaluation by Dr. Veronica Branch diarrhea/constipation. GENITOURINARY: No dysuria, urinary frequency, hematuria. NEURO: No syncope, presyncope, headache. Remainder:  ROS NEGATIVE    Past Medical History:      Diagnosis Date    Arthritis     [facility] [address] [facility]     Blood circulation, collateral     CHF (congestive heart failure) (HCC)     Chronic renal insufficiency, stage IV (severe) ([facility] [address] 11/19/2016    History of cardiovascular stress test 07/2015    Lexiscan stress test    History of echocardiogram 07/27/2015    EF 29%    Hyperlipidemia     Hypertension      Medications:  Reviewed and reconciled. Allergies:  No Known Allergies    Physical Exam:  /80   Pulse 86   Temp 97.1 °F (36.2 °C)   Resp 18   Ht 5' 4.5\" (1.638 m)   Wt 137 lb 6.4 oz (62.3 kg)   SpO2 91%   BMI 23.22 kg/m²   GENERAL: Alert, oriented x 3, tired  HEENT: PERRLA; EOMI. Oropharynx clear. NECK: Supple. Without lymphadenopathy. LUNGS: Good air entry bilaterally. No wheezing, crackles or ronchi. CARDIOVASCULAR: Regular rate. No murmurs, rubs or gallops. ABDOMEN: Soft. Non-tender, non-distended. Positive bowel sounds  EXTREMITIES: Without clubbing, cyanosis, or edema. NEUROLOGIC: No focal deficits. Impression/Plan:  72-year-old female with history of hypertension, hyperlipidemia, A.Fib, CHF, CKD stage IV on HD who was noted to have macrocytic anemia on routine CBC    On 05/15/2021: Hb 7.6  Hct 24.9  .9    WBC 8.3  BUN 55 Creat 3.8  Ca 9.1 Albumin 3.4     Recent hospital admission in April 2021 for abdominal pain and rectal bleeding.   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  Duodenum, biopsy: Duodenal mucosa with Brunner's gland hyperplasia and foveolar metaplasia, compatible with chronic/peptic duodenitis. Noni Ricketts, proximal transverse colon, biopsy: Fragments of tubular adenoma. Austin French (x2), mid transverse colon, biopsy: Partially cauterized segments of colonic mucosa, no significant pathologic abnormality identified. D.  Gastric antrum, biopsy: Moderate to severe chronic gastritis and intestinal metaplasia. Immunostain negative for Helicobacter pylori organisms. Negative for dysplasia. Comment:   The tissue sample designated mid transverse colon polyps (C) is examined at multiple additional levels of the tissue block.  No dysplasia is identified in the tissue sample (part C). Of note, fragments of tubular adenoma are identified in the tissue sample designated proximal transverse colon polyp (B). EUS  4/7 -biopsy of pancreatic tail (FNA), negative for malignant cells, 5mm stone in distal common bile duct    She is off hemodialysis at this time for 2 weeks and will have re-evaluation by Dr. Krissy Michaels this Thursday 06/24/2021. She last received Venofer 100 mg on 05/28/2021. Last received Mircera 75 mcg on 05/26/2021    On 06/22/2021:  Hb 11.9  Hct 33.2  .9    WBC 7.6 Diff normal    The peripheral smear shows a macrocytic normochromic to hypochromic red cell population with mild anisocytosis and mild poikilocytosis. Occasional ovalocytes are noted.  Platelets are present in adequate number and show normal morphology.  The leukocyte count is normal with an unremarkable differential.  Dysplastic features and circulating blasts are not seen. Fe 74  TIBC 168  FeSat 44%  Ferritin 536    Retic 2.5%      Haptoglobin 222  Demetri Negative    Folate,B12, TSH. Copper WNL  Zinc 49.1 (); take MVA containing Zinc    BUN 57 Creat 3.5  Ca 9.4  Albumin 3.8  Beta-2 microglobulin 12.5    SPEP: The albumin is decreased and a polyclonal hypergammaglobulin-emia with beta-gamma fusion is present.  This

## 2021-08-09 ENCOUNTER — TELEPHONE (OUTPATIENT)
Dept: INFUSION THERAPY | Age: 81
End: 2021-08-09

## 2021-08-13 LAB
Lab: NORMAL
REPORT: NORMAL
THIS TEST SENT TO: NORMAL

## 2021-09-13 ENCOUNTER — TELEPHONE (OUTPATIENT)
Dept: AUDIOLOGY | Age: 81
End: 2021-09-13

## 2021-11-11 ENCOUNTER — PROCEDURE VISIT (OUTPATIENT)
Dept: AUDIOLOGY | Age: 81
End: 2021-11-11
Payer: MEDICARE

## 2021-11-11 DIAGNOSIS — H91.8X9 ASYMMETRICAL HEARING LOSS: ICD-10-CM

## 2021-11-11 PROCEDURE — 92567 TYMPANOMETRY: CPT | Performed by: AUDIOLOGIST

## 2021-11-11 PROCEDURE — 92557 COMPREHENSIVE HEARING TEST: CPT | Performed by: AUDIOLOGIST

## 2021-11-16 NOTE — PROGRESS NOTES
This patient was referred for audiometric/tympanometric testing by Dr Staci Oliveros, due to a longstanding bilateral decrease in hearing sensitivity. Patient denied ear pain/pressure and fullness, but does experience intermittent tinnitus, bilaterally. Patient is interested in obtaining hearing aids, if warranted. Audiometry revealed a mild-to-moderately-severe sensorineural hearing loss, through the frequency range,bilaterally(right ear; worse). Reliability was fair. Speech reception thresholds were in good agreement with the pure tone averages, bilaterally. Speech discrimination scores were 84%, bilaterally at 80-90dBHL. Tympanometry revealed normal middle ear peak pressure and compliance, bilaterally. Ipsilateral acoustic reflexes were absent, bilaterally at 1000Hz. The results were reviewed with the patient and her son. The benefits and limitations of amplification were discussed with the patient and their family. It is recommended that the patient be fit with binaural hearing aids. Patient was given a letter with insurance hearing aid coverage information. It is recommended that patient be scheduled for an ENT consult, due asymmetrical hearing loss, right ear.      Hailee Almanzar CCC/KRISTY  Audiologist  [id number]  NPI#:  [id number]

## 2022-02-02 ENCOUNTER — HOSPITAL ENCOUNTER (OUTPATIENT)
Age: 82
Discharge: HOME | End: 2022-02-02
Payer: MEDICARE

## 2022-02-02 LAB
ALBUMIN SERPL-MCNC: 3.4 G/DL (ref 3.5–5.2)
ALP BLD-CCNC: 188 U/L (ref 35–104)
ALT SERPL-CCNC: 11 U/L (ref 0–32)
ANION GAP SERPL CALCULATED.3IONS-SCNC: 12 MMOL/L (ref 7–16)
AST SERPL-CCNC: 14 U/L (ref 0–31)
BASOPHILS ABSOLUTE: 0.04 E9/L (ref 0–0.2)
BASOPHILS RELATIVE PERCENT: 0.5 % (ref 0–2)
BILIRUB SERPL-MCNC: 0.4 MG/DL (ref 0–1.2)
BUN BLDV-MCNC: 31 MG/DL (ref 6–23)
CALCIUM SERPL-MCNC: 8.1 MG/DL (ref 8.6–10.2)
CHLORIDE BLD-SCNC: 100 MMOL/L (ref 98–107)
CO2: 26 MMOL/L (ref 22–29)
CREAT SERPL-MCNC: 3.6 MG/DL (ref 0.5–1)
EOSINOPHILS ABSOLUTE: 0.12 E9/L (ref 0.05–0.5)
EOSINOPHILS RELATIVE PERCENT: 1.4 % (ref 0–6)
FERRITIN: 1188 NG/ML
FOLATE: 16.6 NG/ML (ref 4.8–24.2)
GFR AFRICAN AMERICAN: 15
GFR NON-AFRICAN AMERICAN: 15 ML/MIN/1.73
GLUCOSE BLD-MCNC: 115 MG/DL (ref 74–99)
HCT VFR BLD CALC: 27.6 % (ref 34–48)
HEMOGLOBIN: 8.7 G/DL (ref 11.5–15.5)
IMMATURE GRANULOCYTES #: 0.03 E9/L
IMMATURE GRANULOCYTES %: 0.4 % (ref 0–5)
IRON SATURATION: 46 % (ref 15–50)
IRON: 74 MCG/DL (ref 37–145)
LIPASE: 12 U/L (ref 13–60)
LYMPHOCYTES ABSOLUTE: 2.29 E9/L (ref 1.5–4)
LYMPHOCYTES RELATIVE PERCENT: 27.2 % (ref 20–42)
MCH RBC QN AUTO: 33.2 PG (ref 26–35)
MCHC RBC AUTO-ENTMCNC: 31.5 % (ref 32–34.5)
MCV RBC AUTO: 105.3 FL (ref 80–99.9)
MONOCYTES ABSOLUTE: 0.82 E9/L (ref 0.1–0.95)
MONOCYTES RELATIVE PERCENT: 9.7 % (ref 2–12)
NEUTROPHILS ABSOLUTE: 5.13 E9/L (ref 1.8–7.3)
NEUTROPHILS RELATIVE PERCENT: 60.8 % (ref 43–80)
PDW BLD-RTO: 14.9 FL (ref 11.5–15)
PLATELET # BLD: 330 E9/L (ref 130–450)
PMV BLD AUTO: 10.1 FL (ref 7–12)
POTASSIUM SERPL-SCNC: 3.9 MMOL/L (ref 3.5–5)
RBC # BLD: 2.62 E12/L (ref 3.5–5.5)
SODIUM BLD-SCNC: 138 MMOL/L (ref 132–146)
TOTAL IRON BINDING CAPACITY: 162 MCG/DL (ref 250–450)
TOTAL PROTEIN: 6.9 G/DL (ref 6.4–8.3)
VITAMIN B-12: 559 PG/ML (ref 211–946)
WBC # BLD: 8.4 E9/L (ref 4.5–11.5)

## 2022-02-02 PROCEDURE — 36415 COLL VENOUS BLD VENIPUNCTURE: CPT

## 2022-02-02 PROCEDURE — 82728 ASSAY OF FERRITIN: CPT

## 2022-02-02 PROCEDURE — 82746 ASSAY OF FOLIC ACID SERUM: CPT

## 2022-02-02 PROCEDURE — 80053 COMPREHEN METABOLIC PANEL: CPT

## 2022-02-02 PROCEDURE — 82607 VITAMIN B-12: CPT

## 2022-02-02 PROCEDURE — 85025 COMPLETE CBC W/AUTO DIFF WBC: CPT

## 2022-02-02 PROCEDURE — 83690 ASSAY OF LIPASE: CPT

## 2022-02-02 PROCEDURE — 83540 ASSAY OF IRON: CPT

## 2022-02-02 PROCEDURE — 86301 IMMUNOASSAY TUMOR CA 19-9: CPT

## 2022-02-02 PROCEDURE — 83550 IRON BINDING TEST: CPT

## 2022-02-07 LAB — CA 19-9: 15 U/ML (ref 0–37)

## 2022-10-14 LAB — POTASSIUM SERPL-SCNC: 4.2 MMOL/L (ref 3.5–5)

## 2022-12-30 LAB — HEMOGLOBIN: 7.7 G/DL (ref 11.5–15.5)

## 2023-01-02 ENCOUNTER — HOSPITAL ENCOUNTER (EMERGENCY)
Age: 83
Discharge: HOME | End: 2023-01-02
Attending: EMERGENCY MEDICINE
Payer: MEDICARE

## 2023-01-02 ENCOUNTER — APPOINTMENT (OUTPATIENT)
Dept: GENERAL RADIOLOGY | Age: 83
End: 2023-01-02
Payer: MEDICARE

## 2023-01-02 VITALS
WEIGHT: 120 LBS | RESPIRATION RATE: 20 BRPM | OXYGEN SATURATION: 96 % | HEART RATE: 76 BPM | SYSTOLIC BLOOD PRESSURE: 140 MMHG | HEIGHT: 65 IN | DIASTOLIC BLOOD PRESSURE: 81 MMHG | TEMPERATURE: 97.7 F | BODY MASS INDEX: 19.99 KG/M2

## 2023-01-02 DIAGNOSIS — N18.6 ESRD ON HEMODIALYSIS (HCC): ICD-10-CM

## 2023-01-02 DIAGNOSIS — I50.9 CONGESTIVE HEART FAILURE, UNSPECIFIED HF CHRONICITY, UNSPECIFIED HEART FAILURE TYPE (HCC): Primary | ICD-10-CM

## 2023-01-02 DIAGNOSIS — I50.1 PULMONARY EDEMA WITH CONGESTIVE HEART FAILURE (HCC): ICD-10-CM

## 2023-01-02 DIAGNOSIS — Z99.2 ESRD ON HEMODIALYSIS (HCC): ICD-10-CM

## 2023-01-02 LAB
ALBUMIN SERPL-MCNC: 3.6 G/DL (ref 3.5–5.2)
ALP BLD-CCNC: 109 U/L (ref 35–104)
ALT SERPL-CCNC: 6 U/L (ref 0–32)
ANION GAP SERPL CALCULATED.3IONS-SCNC: 11 MMOL/L (ref 7–16)
AST SERPL-CCNC: 14 U/L (ref 0–31)
BASOPHILS ABSOLUTE: 0 E9/L (ref 0–0.2)
BASOPHILS RELATIVE PERCENT: 0.5 % (ref 0–2)
BILIRUB SERPL-MCNC: 0.5 MG/DL (ref 0–1.2)
BUN BLDV-MCNC: 33 MG/DL (ref 6–23)
BURR CELLS: ABNORMAL
CALCIUM SERPL-MCNC: 9.1 MG/DL (ref 8.6–10.2)
CHLORIDE BLD-SCNC: 101 MMOL/L (ref 98–107)
CO2: 27 MMOL/L (ref 22–29)
CREAT SERPL-MCNC: 5 MG/DL (ref 0.5–1)
EOSINOPHILS ABSOLUTE: 0 E9/L (ref 0.05–0.5)
EOSINOPHILS RELATIVE PERCENT: 0.8 % (ref 0–6)
GFR SERPL CREATININE-BSD FRML MDRD: 8 ML/MIN/1.73
GLUCOSE BLD-MCNC: 118 MG/DL (ref 74–99)
HCT VFR BLD CALC: 28.3 % (ref 34–48)
HEMOGLOBIN: 8.4 G/DL (ref 11.5–15.5)
HYPOCHROMIA: ABNORMAL
INFLUENZA A BY PCR: NOT DETECTED
INFLUENZA B BY PCR: NOT DETECTED
LYMPHOCYTES ABSOLUTE: 1.85 E9/L (ref 1.5–4)
LYMPHOCYTES RELATIVE PERCENT: 27.8 % (ref 20–42)
MAGNESIUM: 1.8 MG/DL (ref 1.6–2.6)
MCH RBC QN AUTO: 31.6 PG (ref 26–35)
MCHC RBC AUTO-ENTMCNC: 29.7 % (ref 32–34.5)
MCV RBC AUTO: 106.4 FL (ref 80–99.9)
MONOCYTES ABSOLUTE: 0.4 E9/L (ref 0.1–0.95)
MONOCYTES RELATIVE PERCENT: 6.1 % (ref 2–12)
NEUTROPHILS ABSOLUTE: 4.36 E9/L (ref 1.8–7.3)
NEUTROPHILS RELATIVE PERCENT: 66.1 % (ref 43–80)
OVALOCYTES: ABNORMAL
PDW BLD-RTO: 15.3 FL (ref 11.5–15)
PLATELET # BLD: 239 E9/L (ref 130–450)
PMV BLD AUTO: 11.6 FL (ref 7–12)
POIKILOCYTES: ABNORMAL
POLYCHROMASIA: ABNORMAL
POTASSIUM SERPL-SCNC: 3.9 MMOL/L (ref 3.5–5)
PRO-BNP: ABNORMAL PG/ML (ref 0–450)
RBC # BLD: 2.66 E12/L (ref 3.5–5.5)
SARS-COV-2, NAAT: NOT DETECTED
SODIUM BLD-SCNC: 139 MMOL/L (ref 132–146)
TOTAL PROTEIN: 6.6 G/DL (ref 6.4–8.3)
TROPONIN, HIGH SENSITIVITY: 51 NG/L (ref 0–9)
TROPONIN, HIGH SENSITIVITY: 57 NG/L (ref 0–9)
WBC # BLD: 6.6 E9/L (ref 4.5–11.5)

## 2023-01-02 PROCEDURE — 99285 EMERGENCY DEPT VISIT HI MDM: CPT

## 2023-01-02 PROCEDURE — 87502 INFLUENZA DNA AMP PROBE: CPT

## 2023-01-02 PROCEDURE — 94664 DEMO&/EVAL PT USE INHALER: CPT

## 2023-01-02 PROCEDURE — 85025 COMPLETE CBC W/AUTO DIFF WBC: CPT

## 2023-01-02 PROCEDURE — 6370000000 HC RX 637 (ALT 250 FOR IP): Performed by: EMERGENCY MEDICINE

## 2023-01-02 PROCEDURE — 2500000003 HC RX 250 WO HCPCS: Performed by: STUDENT IN AN ORGANIZED HEALTH CARE EDUCATION/TRAINING PROGRAM

## 2023-01-02 PROCEDURE — 93005 ELECTROCARDIOGRAM TRACING: CPT | Performed by: STUDENT IN AN ORGANIZED HEALTH CARE EDUCATION/TRAINING PROGRAM

## 2023-01-02 PROCEDURE — 87635 SARS-COV-2 COVID-19 AMP PRB: CPT

## 2023-01-02 PROCEDURE — 71045 X-RAY EXAM CHEST 1 VIEW: CPT

## 2023-01-02 PROCEDURE — 80053 COMPREHEN METABOLIC PANEL: CPT

## 2023-01-02 PROCEDURE — 83735 ASSAY OF MAGNESIUM: CPT

## 2023-01-02 PROCEDURE — 84484 ASSAY OF TROPONIN QUANT: CPT

## 2023-01-02 PROCEDURE — 96374 THER/PROPH/DIAG INJ IV PUSH: CPT

## 2023-01-02 PROCEDURE — 83880 ASSAY OF NATRIURETIC PEPTIDE: CPT

## 2023-01-02 RX ORDER — BUMETANIDE 0.25 MG/ML
2 INJECTION, SOLUTION INTRAMUSCULAR; INTRAVENOUS ONCE
Status: COMPLETED | OUTPATIENT
Start: 2023-01-02 | End: 2023-01-02

## 2023-01-02 RX ORDER — FUROSEMIDE 10 MG/ML
40 INJECTION INTRAMUSCULAR; INTRAVENOUS ONCE
Status: DISCONTINUED | OUTPATIENT
Start: 2023-01-02 | End: 2023-01-02

## 2023-01-02 RX ORDER — IPRATROPIUM BROMIDE AND ALBUTEROL SULFATE 2.5; .5 MG/3ML; MG/3ML
1 SOLUTION RESPIRATORY (INHALATION) ONCE
Status: COMPLETED | OUTPATIENT
Start: 2023-01-02 | End: 2023-01-02

## 2023-01-02 RX ADMIN — IPRATROPIUM BROMIDE AND ALBUTEROL SULFATE 1 AMPULE: .5; 3 SOLUTION RESPIRATORY (INHALATION) at 15:17

## 2023-01-02 RX ADMIN — BUMETANIDE 2 MG: 0.25 INJECTION, SOLUTION INTRAMUSCULAR; INTRAVENOUS at 15:23

## 2023-01-02 ASSESSMENT — PAIN DESCRIPTION - PAIN TYPE: TYPE: ACUTE PAIN

## 2023-01-02 ASSESSMENT — PAIN - FUNCTIONAL ASSESSMENT: PAIN_FUNCTIONAL_ASSESSMENT: 0-10

## 2023-01-02 ASSESSMENT — PAIN DESCRIPTION - ORIENTATION: ORIENTATION: LEFT;RIGHT

## 2023-01-02 ASSESSMENT — PAIN DESCRIPTION - DESCRIPTORS: DESCRIPTORS: ACHING

## 2023-01-02 ASSESSMENT — PAIN SCALES - GENERAL: PAINLEVEL_OUTOF10: 5

## 2023-01-02 ASSESSMENT — PAIN DESCRIPTION - FREQUENCY: FREQUENCY: CONTINUOUS

## 2023-01-02 NOTE — ED NOTES
Pt. Ambulated on oxygen, starting pulse oximetry was 95% and heartrate was 82 bpm, while ambulating pulse oximetry dropped to 89% and heartrate was 89 bpm.  Pt. Tolerated well.      Yossi Chacko RN  01/02/23 0088

## 2023-01-02 NOTE — ED PROVIDER NOTES
[id number] [facility]  ED Provider Note  Department of Emergency Medicine     ED Room:       Written by: Nubia Soni DO  Patient Name: Kamla Chow  Attending Provider: Claudie Goldberg, DO  Admit Date: 2023  9:49 AM  MRN: [id number]    : 1940        Chief Complaint   Patient presents with    Shortness of Breath     Starting this morning, supposed to go to dialysis today    Leg Swelling     Bilateral    - Chief complaint    HPI   Kamla Chow is a [de-identified] y.o. female presenting to the ED for evaluation of Shortness of Breath (Starting this morning, supposed to go to dialysis today) and Leg Swelling (Bilateral)      History obtained from the patient. Patient has a past medical history of ESRD on HD M/W/F, CHF. Patient is presenting today via EMS for evaluation of shortness of breath beginning yesterday, worsened with exertion, and b/l leg edema. She uses 4L NC O2 at baseline. States she came to the ED instead of going to dialysis due to shortness of breath walking to the car, and stating that she feels like she has fluid on her legs and lungs. Denies chest pain, cough, or palpitations. Denies fevers, emesis or abdominal pain. Denies any numbness or extremity weakness, falls, or leg pain. She does still make urine. Other than today, no recently missed HD sessions. Review of Systems   Constitutional:  Negative for chills and fever. HENT:  Negative for congestion and rhinorrhea. Eyes:  Negative for visual disturbance. Respiratory:  Positive for shortness of breath. Negative for cough and wheezing. Cardiovascular:  Positive for leg swelling. Negative for chest pain and palpitations. Gastrointestinal:  Negative for abdominal pain, diarrhea, nausea and vomiting. Genitourinary:  Negative for decreased urine volume and dysuria. Musculoskeletal:  Negative for back pain and myalgias. Skin:  Negative for rash and wound. Neurological:  Negative for weakness and numbness. Psychiatric/Behavioral:  Negative for confusion. All other systems reviewed and are negative. Physical Exam  Vitals and nursing note reviewed. Constitutional:       General: She is not in acute distress. Appearance: She is not toxic-appearing. HENT:      Head: Normocephalic and atraumatic. Right Ear: External ear normal.      Left Ear: External ear normal.      Nose: Nose normal. No rhinorrhea. Mouth/Throat:      Mouth: Mucous membranes are moist.      Pharynx: Oropharynx is clear. Eyes:      Extraocular Movements: Extraocular movements intact. Conjunctiva/sclera: Conjunctivae normal.      Pupils: Pupils are equal, round, and reactive to light. Cardiovascular:      Rate and Rhythm: Normal rate and regular rhythm. Pulses: Normal pulses. Heart sounds: Normal heart sounds. Pulmonary:      Effort: Pulmonary effort is normal. No respiratory distress. Breath sounds: Examination of the right-lower field reveals rales. Examination of the left-lower field reveals rales. Rales present. No wheezing. Abdominal:      General: Bowel sounds are normal.      Palpations: Abdomen is soft. Tenderness: There is no abdominal tenderness. There is no right CVA tenderness, left CVA tenderness, guarding or rebound. Musculoskeletal:         General: No tenderness. Normal range of motion. Cervical back: Normal range of motion and neck supple. Right lower leg: Edema (1+ pitting) present. Left lower leg: Edema (1+ pitting) present. Skin:     General: Skin is warm and dry. Capillary Refill: Capillary refill takes less than 2 seconds. Coloration: Skin is not jaundiced or pale. Findings: No erythema. Neurological:      General: No focal deficit present. Mental Status: She is alert and oriented to person, place, and time.       Gait: Gait normal.   Psychiatric:         Mood and Affect: Mood normal.         Behavior: Behavior normal.        Procedures Medical Decision Making: This is a [de-identified] y.o. female presenting for evaluation of shortness of breath and leg swelling. See HPI for further details and additional history. On my evaluation today, patient is alert, oriented, NAD, non-toxic in appearance. Vitals are stable; spO2 96% on baseline 4L NC O2. Exam findings are as documented above; 1+ pitting edema bilateral legs, no redness or warmth or tenderness. Rales bibasilar. No tachypnea. I will order lab work and imaging, and patient will be re-evaluated. While not exhaustive, the following diagnoses and their severity were considered: CHF, fluid retention, ACS, pneumonia, PE. Independent interpretation of Laboratory tests by Evert Worley DO: CBC, CMP, BNP, troponin at baseline in this ESRD patient on HD. No leukocytosis, covid and flu negative. Independent interpretation of Radiology tests by Evert Worley DO: CXR bilateral lower lobe opacities suggestive of edema/CHF. EKG reviewed and interpreted by me: This EKG is signed by emergency department physician. Ventricular-paced rhythm, rate 75, no acute ischemic changes. Labs & imaging were reviewed and interpreted, see RESULTS. I have personally reviewed all laboratory and imaging results for this patient. Are there any additional factors to consider that affect care (uninsured, homeless, illiterate, history from another source, etc.) (If yes, which ones). No      Name and Route of medications administered in the ED:  Medications - No data to display        Re-Evaluations:  ED Course as of 01/04/23 0445   Mon Jan 02, 2023   1201 EKG:  Interpreted by me  Ventricular paced rhythm, rate of 75, QRS is consistent with left ventricular origin from the pacer, no significant ST or T wave changes otherwise [SO]   1501 Consult placed to patient's nephrologist Dr. Leeroy Garber. [VG]   6526 I spoke with Dr. Vivienne Craft (nephrology) regarding this patient; recommends that if she ends up being appropriate for discharge that she call her dialysis center tomorrow and see if they can get her in for dialysis that same day. He states he will try to call out there and give them a heads up. [VG]   4780 Patient able to ambulate. She has 4 L nasal cannula which she uses all the time. She been instructed to stop taking as much sodium in as she has been doing. She been eating a lot of melton lately. She will call for her nephrologist for a follow-up. Understands return to the ED for new problems or worsening. [SO]      ED Course User Index  [SO] Claudie Goldberg, DO  [VG] Nubia Soni DO           My overall assessment and plan for this patient:    Patient presented for evaluation of GUERRERO, b/l leg edema. Workup consistent with CHF, fluid overload. She does still make urine; did not take any diurectics today. Treated with 2 mg IV bumix and x1 duoneb nebulizer for some wheezing. Patient symptoms improved and she ambulated well on her baseline 4L NC O2. Labs appear at baseline. She is in no distress. I consulted her nephrologist, recommends she call the dialysis center tomorrow to come in for a session hopefully for that day which he will try to set up. This was discussed with the patient, she feels comfortable with this plan. She is aware that she may return to the ED at any point in time for any new or worsening symptoms. Decision made to discharge this patient. Results and plan discussed with the patient and her daughter, they voice understanding and are amenable. Return precautions were discussed. Please see ED course for any additional MDM documentation. I have discussed this patient with my attending, who has seen the patient and agrees with this disposition. Patient was seen and evaluated by myself and my attending Claudie Goldberg, DO. Assessment and Plan discussed with attending provider, please see attestation for final plan of care. --------------------------------------------- PAST HISTORY ---------------------------------------------  Past Medical History:  has a past medical history of Arthritis, Atrial fibrillation ([facility] Blood circulation, collateral, CHF (congestive heart failure) ([facility] Chronic renal insufficiency, stage IV (severe) ([facility] History of cardiovascular stress test, History of echocardiogram, Hyperlipidemia, and Hypertension. Past Surgical History:  has a past surgical history that includes Ectopic pregnancy surgery; Upper gastrointestinal endoscopy (N/A, 4/3/2021); Colonoscopy (N/A, 4/3/2021); Colonoscopy (4/3/2021); Colonoscopy (4/3/2021); Colonoscopy (4/3/2021); and Upper gastrointestinal endoscopy (N/A, 4/7/2021). Social History:  reports that she quit smoking about 22 months ago. She started smoking about 51 years ago. She smoked an average of .5 packs per day. She has never used smokeless tobacco. She reports that she does not currently use alcohol. She reports that she does not use drugs. Family History: family history includes Cancer in her sister; Heart Disease in her mother; No Known Problems in her father; Other in her sister. Unless otherwise noted, family history is non contributory. The patients home medications have been reviewed. Allergies: Patient has no known allergies.     -------------------------------------------------- RESULTS -------------------------------------------------  Labs:  Results for orders placed or performed during the hospital encounter of 01/02/23   COVID-19, Rapid    Specimen: Nasopharyngeal Swab   Result Value Ref Range    SARS-CoV-2, NAAT Not Detected Not Detected   Rapid influenza A/B antigens    Specimen: Nasopharyngeal   Result Value Ref Range    Influenza A by PCR Not Detected Not Detected    Influenza B by PCR Not Detected Not Detected   CMP   Result Value Ref Range    Sodium 139 132 - 146 mmol/L    Potassium 3.9 3.5 - 5.0 mmol/L    Chloride 101 98 - 107 mmol/L    CO2 27 22 - 29 mmol/L    Anion Gap 11 7 - 16 mmol/L    Glucose 118 (H) 74 - 99 mg/dL    BUN 33 (H) 6 - 23 mg/dL    Creatinine 5.0 (H) 0.5 - 1.0 mg/dL    Est, Glom Filt Rate 8 >=60 mL/min/1.73    Calcium 9.1 8.6 - 10.2 mg/dL    Total Protein 6.6 6.4 - 8.3 g/dL    Albumin 3.6 3.5 - 5.2 g/dL    Total Bilirubin 0.5 0.0 - 1.2 mg/dL    Alkaline Phosphatase 109 (H) 35 - 104 U/L    ALT 6 0 - 32 U/L    AST 14 0 - 31 U/L   CBC with Auto Differential   Result Value Ref Range    WBC 6.6 4.5 - 11.5 E9/L    RBC 2.66 (L) 3.50 - 5.50 E12/L    Hemoglobin 8.4 (L) 11.5 - 15.5 g/dL    Hematocrit 28.3 (L) 34.0 - 48.0 %    .4 (H) 80.0 - 99.9 fL    MCH 31.6 26.0 - 35.0 pg    MCHC 29.7 (L) 32.0 - 34.5 %    RDW 15.3 (H) 11.5 - 15.0 fL    Platelets 890 964 - 835 E9/L    MPV 11.6 7.0 - 12.0 fL    Neutrophils % 66.1 43.0 - 80.0 %    Lymphocytes % 27.8 20.0 - 42.0 %    Monocytes % 6.1 2.0 - 12.0 %    Eosinophils % 0.8 0.0 - 6.0 %    Basophils % 0.5 0.0 - 2.0 %    Neutrophils Absolute 4.36 1.80 - 7.30 E9/L    Lymphocytes Absolute 1.85 1.50 - 4.00 E9/L    Monocytes Absolute 0.40 0.10 - 0.95 E9/L    Eosinophils Absolute 0.00 (L) 0.05 - 0.50 E9/L    Basophils Absolute 0.00 0.00 - 0.20 E9/L    Polychromasia 1+     Hypochromia 1+     Poikilocytes 1+     Jennifer Cells 1+     Ovalocytes 1+    Magnesium   Result Value Ref Range    Magnesium 1.8 1.6 - 2.6 mg/dL   Troponin   Result Value Ref Range    Troponin, High Sensitivity 51 (H) 0 - 9 ng/L   Brain Natriuretic Peptide   Result Value Ref Range    Pro-BNP 64,294 (H) 0 - 450 pg/mL   Troponin   Result Value Ref Range    Troponin, High Sensitivity 57 (H) 0 - 9 ng/L   EKG 12 Lead   Result Value Ref Range    Ventricular Rate 75 BPM    Atrial Rate 73 BPM    QRS Duration 198 ms    Q-T Interval 494 ms    QTc Calculation (Bazett) 551 ms    R Axis -52 degrees    T Axis 110 degrees       Radiology:  XR CHEST PORTABLE   Final Result   1.  Cardiomegaly with diffuse bilateral airspace disease favored to represent   CHF/volume overload in a dialysis patient   2. Trace bilateral pleural effusions. Interpreted by the radiologist unless otherwise specified.      ------------------------- NURSING NOTES AND VITALS REVIEWED ---------------------------  Date / Time Roomed:  1/2/2023  9:49 AM  ED Bed Assignment:  21/21    The nursing notes within the ED encounter and vital signs as below have been reviewed by myself. BP (!) 140/81   Pulse 76   Temp 97.7 °F (36.5 °C) (Oral)   Resp 20   Ht 5' 4.5\" (1.638 m)   Wt 120 lb (54.4 kg)   SpO2 96%   BMI 20.28 kg/m²   Oxygen Saturation Interpretation: Normal on baseline 4L NC O2    The patients available past medical records and past encounters were reviewed. ------------------------------------------ PROGRESS NOTES ------------------------------------------  5:24 AM EST  I have spoken with the patient and her daughter  and discussed todays results, in addition to providing specific details for the plan of care and counseling regarding the diagnosis and prognosis. Their questions are answered at this time and they are agreeable with the plan. I discussed at length with them reasons for immediate return here for re evaluation. They will followup with their  nephrologist and primary care physician by calling their office tomorrow. --------------------------------- ADDITIONAL PROVIDER NOTES ---------------------------------  At this time the patient is without objective evidence of an acute process requiring hospitalization or inpatient management. They have remained hemodynamically stable throughout their entire ED visit and are stable for discharge with outpatient follow-up. The plan has been discussed in detail and they are aware of the specific conditions for emergent return, as well as the importance of follow-up. Discharge Medication List as of 1/2/2023  7:16 PM          Diagnosis:  1.  Congestive heart failure, unspecified HF chronicity, unspecified heart failure type ([facility]    2. ESRD on hemodialysis ([facility]    3. Pulmonary edema with congestive heart failure ([facility]        Disposition:  Patient's disposition: Discharge to home  Patient's condition is stable. Evert Worley D.O. PGY-3     Resident Physician     Emergency Medicine      1/2/2023 10:35 AM      NOTE: This report was transcribed using voice recognition software.  Every effort was made to ensure accuracy; however, inadvertent computerized transcription errors may be present             Evert Worley DO  Resident  01/04/23 0530

## 2023-01-03 LAB
EKG ATRIAL RATE: 73 BPM
EKG Q-T INTERVAL: 494 MS
EKG QRS DURATION: 198 MS
EKG QTC CALCULATION (BAZETT): 551 MS
EKG R AXIS: -52 DEGREES
EKG T AXIS: 110 DEGREES
EKG VENTRICULAR RATE: 75 BPM

## 2023-01-03 PROCEDURE — 93010 ELECTROCARDIOGRAM REPORT: CPT | Performed by: INTERNAL MEDICINE

## 2023-01-04 ASSESSMENT — ENCOUNTER SYMPTOMS
VOMITING: 0
NAUSEA: 0
COUGH: 0
SHORTNESS OF BREATH: 1
BACK PAIN: 0
RHINORRHEA: 0
DIARRHEA: 0
ABDOMINAL PAIN: 0
WHEEZING: 0

## 2023-02-26 ENCOUNTER — HOSPITAL ENCOUNTER (OUTPATIENT)
Age: 83
Discharge: HOME | End: 2023-02-26
Payer: MEDICARE

## 2023-02-26 LAB
CHOLESTEROL, FASTING: 117 MG/DL (ref 0–199)
HDLC SERPL-MCNC: 52 MG/DL
LDL CHOLESTEROL CALCULATED: 51 MG/DL (ref 0–99)
TRIGLYCERIDE, FASTING: 68 MG/DL (ref 0–149)
VLDLC SERPL CALC-MCNC: 14 MG/DL

## 2023-02-26 PROCEDURE — 80061 LIPID PANEL: CPT

## 2023-02-26 PROCEDURE — 36415 COLL VENOUS BLD VENIPUNCTURE: CPT

## 2023-07-06 ENCOUNTER — TELEPHONE (OUTPATIENT)
Dept: AUDIOLOGY | Age: 83
End: 2023-07-06

## 2023-07-06 NOTE — TELEPHONE ENCOUNTER
Referral for Hearing Evaluation received. Called patient and left a voicemail for patient's family member to call back for scheduling.     Electronically signed by Zhen Sampson on 7/6/23 at 2:15 PM EDT

## 2023-07-14 PROBLEM — Z99.81 HYPOXEMIA REQUIRING SUPPLEMENTAL OXYGEN: Status: ACTIVE | Noted: 2023-07-14

## 2023-07-14 PROBLEM — J43.9 MIXED RESTRICTIVE AND OBSTRUCTIVE LUNG DISEASE (HCC): Status: ACTIVE | Noted: 2023-07-14

## 2023-07-14 PROBLEM — F17.201 SEVERE TOBACCO DEPENDENCE IN EARLY REMISSION: Status: ACTIVE | Noted: 2023-07-14

## 2023-07-14 PROBLEM — K92.2 GI BLEED: Status: RESOLVED | Noted: 2021-03-31 | Resolved: 2023-07-14

## 2023-07-14 PROBLEM — J98.4 MIXED RESTRICTIVE AND OBSTRUCTIVE LUNG DISEASE (HCC): Status: ACTIVE | Noted: 2023-07-14

## 2023-07-14 PROBLEM — R09.02 HYPOXEMIA REQUIRING SUPPLEMENTAL OXYGEN: Status: ACTIVE | Noted: 2023-07-14

## 2023-07-31 ENCOUNTER — PROCEDURE VISIT (OUTPATIENT)
Dept: AUDIOLOGY | Age: 83
End: 2023-07-31
Payer: MEDICARE

## 2023-07-31 DIAGNOSIS — H90.3 SENSORINEURAL HEARING LOSS, BILATERAL: ICD-10-CM

## 2023-07-31 DIAGNOSIS — H93.13 TINNITUS OF BOTH EARS: Primary | ICD-10-CM

## 2023-07-31 PROCEDURE — 92567 TYMPANOMETRY: CPT | Performed by: AUDIOLOGIST

## 2023-07-31 PROCEDURE — 92557 COMPREHENSIVE HEARING TEST: CPT | Performed by: AUDIOLOGIST

## 2023-07-31 NOTE — PROGRESS NOTES
This patient was referred for audiometric and tympanometric testing by her PCP, due to tinnitus and a bilateral decrease in hearing sensitivity. Patient denied vertigo. Patient expressed an interest in hearing aids, if warranted. 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 Reliability was good. Speech reception thresholds were in good agreement with the pure tone averages, bilaterally. Speech discrimination scores were 76%, right ear and 72%, left ear at 75-85dBHL. Tympanometry revealed normal middle ear peak pressure and compliance, bilaterally. The results were reviewed with the patient and her daughter. The benefits and limitations of amplification were discussed with the patient and their family. Information regarding hearing aid benefit information, per insurance provider, was given to the patient and her daughter. It is recommended that the patient be fit with binaural hearing aids. Recommendations for follow up will be made pending physician consult.     Melissa Pedroza CCC/KRISTY  Audiologist  [id number]  NPI#:  [id number]      Electronically signed by Zhen Lo on 7/31/2023 at 11:39 AM

## 2023-09-18 ENCOUNTER — HOSPITAL ENCOUNTER (INPATIENT)
Age: 83
LOS: 2 days | Discharge: HOME | DRG: 377 | End: 2023-09-20
Attending: EMERGENCY MEDICINE | Admitting: INTERNAL MEDICINE
Payer: MEDICARE

## 2023-09-18 DIAGNOSIS — D64.9 ANEMIA, UNSPECIFIED TYPE: ICD-10-CM

## 2023-09-18 DIAGNOSIS — K62.5 BLOOD PER RECTUM: ICD-10-CM

## 2023-09-18 DIAGNOSIS — K92.2 LOWER GI BLEED: Primary | ICD-10-CM

## 2023-09-18 LAB
ABO + RH BLD: NORMAL
ALBUMIN SERPL-MCNC: 3.9 G/DL (ref 3.5–5.2)
ALP SERPL-CCNC: 145 U/L (ref 35–104)
ALT SERPL-CCNC: 11 U/L (ref 0–32)
ANION GAP SERPL CALCULATED.3IONS-SCNC: 12 MMOL/L (ref 7–16)
ARM BAND NUMBER: NORMAL
AST SERPL-CCNC: 14 U/L (ref 0–31)
BASOPHILS # BLD: 0.03 K/UL (ref 0–0.2)
BASOPHILS NFR BLD: 0 % (ref 0–2)
BILIRUB SERPL-MCNC: 0.7 MG/DL (ref 0–1.2)
BLOOD BANK SAMPLE EXPIRATION: NORMAL
BLOOD GROUP ANTIBODIES SERPL: NEGATIVE
BUN SERPL-MCNC: 56 MG/DL (ref 6–23)
CALCIUM SERPL-MCNC: 9.2 MG/DL (ref 8.6–10.2)
CHLORIDE SERPL-SCNC: 101 MMOL/L (ref 98–107)
CO2 SERPL-SCNC: 27 MMOL/L (ref 22–29)
CREAT SERPL-MCNC: 6.8 MG/DL (ref 0.5–1)
EOSINOPHIL # BLD: 0.13 K/UL (ref 0.05–0.5)
EOSINOPHILS RELATIVE PERCENT: 2 % (ref 0–6)
ERYTHROCYTE [DISTWIDTH] IN BLOOD BY AUTOMATED COUNT: 15.7 % (ref 11.5–15)
FERRITIN SERPL-MCNC: 2917 NG/ML
FOLATE SERPL-MCNC: 12.4 NG/ML (ref 4.8–24.2)
GFR SERPL CREATININE-BSD FRML MDRD: 6 ML/MIN/1.73M2
GLUCOSE SERPL-MCNC: 228 MG/DL (ref 74–99)
HCT VFR BLD AUTO: 23.6 % (ref 34–48)
HCT VFR BLD AUTO: 24.5 % (ref 34–48)
HCT VFR BLD AUTO: 24.9 % (ref 34–48)
HCT VFR BLD AUTO: 26 % (ref 34–48)
HGB BLD-MCNC: 7.5 G/DL (ref 11.5–15.5)
HGB BLD-MCNC: 7.6 G/DL (ref 11.5–15.5)
HGB BLD-MCNC: 7.8 G/DL (ref 11.5–15.5)
HGB BLD-MCNC: 7.9 G/DL (ref 11.5–15.5)
IMM GRANULOCYTES # BLD AUTO: 0.06 K/UL (ref 0–0.58)
IMM GRANULOCYTES NFR BLD: 1 % (ref 0–5)
IMM RETICS NFR: 34.7 % (ref 3–15.9)
INR PPP: 1.4
IRON SATN MFR SERPL: ABNORMAL % (ref 15–50)
IRON SERPL-MCNC: 147 UG/DL (ref 37–145)
LYMPHOCYTES NFR BLD: 1.77 K/UL (ref 1.5–4)
LYMPHOCYTES RELATIVE PERCENT: 20 % (ref 20–42)
MCH RBC QN AUTO: 33.3 PG (ref 26–35)
MCHC RBC AUTO-ENTMCNC: 31.3 G/DL (ref 32–34.5)
MCV RBC AUTO: 106.4 FL (ref 80–99.9)
MONOCYTES NFR BLD: 1 K/UL (ref 0.1–0.95)
MONOCYTES NFR BLD: 11 % (ref 2–12)
NEUTROPHILS NFR BLD: 67 % (ref 43–80)
NEUTS SEG NFR BLD: 5.93 K/UL (ref 1.8–7.3)
PLATELET # BLD AUTO: 158 K/UL (ref 130–450)
PMV BLD AUTO: 12.5 FL (ref 7–12)
POTASSIUM SERPL-SCNC: 4.8 MMOL/L (ref 3.5–5)
PROT SERPL-MCNC: 6.9 G/DL (ref 6.4–8.3)
PROTHROMBIN TIME: 16.3 SEC (ref 9.3–12.4)
RBC # BLD AUTO: 2.34 M/UL (ref 3.5–5.5)
RETIC HEMOGLOBIN: 37.6 PG (ref 28.2–36.6)
RETICS # AUTO: 0.05 M/UL
RETICS/RBC NFR AUTO: 2.2 % (ref 0.4–1.9)
SODIUM SERPL-SCNC: 140 MMOL/L (ref 132–146)
TIBC SERPL-MCNC: 167 UG/DL (ref 250–450)
VIT B12 SERPL-MCNC: 680 PG/ML (ref 211–946)
WBC OTHER # BLD: 8.9 K/UL (ref 4.5–11.5)

## 2023-09-18 PROCEDURE — 86901 BLOOD TYPING SEROLOGIC RH(D): CPT

## 2023-09-18 PROCEDURE — 6360000002 HC RX W HCPCS: Performed by: NURSE PRACTITIONER

## 2023-09-18 PROCEDURE — 85045 AUTOMATED RETICULOCYTE COUNT: CPT

## 2023-09-18 PROCEDURE — 81001 URINALYSIS AUTO W/SCOPE: CPT

## 2023-09-18 PROCEDURE — 86900 BLOOD TYPING SEROLOGIC ABO: CPT

## 2023-09-18 PROCEDURE — 87088 URINE BACTERIA CULTURE: CPT

## 2023-09-18 PROCEDURE — 82607 VITAMIN B-12: CPT

## 2023-09-18 PROCEDURE — 82728 ASSAY OF FERRITIN: CPT

## 2023-09-18 PROCEDURE — 83540 ASSAY OF IRON: CPT

## 2023-09-18 PROCEDURE — 85610 PROTHROMBIN TIME: CPT

## 2023-09-18 PROCEDURE — 80053 COMPREHEN METABOLIC PANEL: CPT

## 2023-09-18 PROCEDURE — 87086 URINE CULTURE/COLONY COUNT: CPT

## 2023-09-18 PROCEDURE — C9113 INJ PANTOPRAZOLE SODIUM, VIA: HCPCS | Performed by: NURSE PRACTITIONER

## 2023-09-18 PROCEDURE — 94640 AIRWAY INHALATION TREATMENT: CPT

## 2023-09-18 PROCEDURE — 82272 OCCULT BLD FECES 1-3 TESTS: CPT

## 2023-09-18 PROCEDURE — 6370000000 HC RX 637 (ALT 250 FOR IP): Performed by: NURSE PRACTITIONER

## 2023-09-18 PROCEDURE — 2580000003 HC RX 258: Performed by: NURSE PRACTITIONER

## 2023-09-18 PROCEDURE — 85018 HEMOGLOBIN: CPT

## 2023-09-18 PROCEDURE — 85025 COMPLETE CBC W/AUTO DIFF WBC: CPT

## 2023-09-18 PROCEDURE — 6370000000 HC RX 637 (ALT 250 FOR IP): Performed by: HOSPITALIST

## 2023-09-18 PROCEDURE — 86850 RBC ANTIBODY SCREEN: CPT

## 2023-09-18 PROCEDURE — 2700000000 HC OXYGEN THERAPY PER DAY

## 2023-09-18 PROCEDURE — 99285 EMERGENCY DEPT VISIT HI MDM: CPT

## 2023-09-18 PROCEDURE — 36415 COLL VENOUS BLD VENIPUNCTURE: CPT

## 2023-09-18 PROCEDURE — 85014 HEMATOCRIT: CPT

## 2023-09-18 PROCEDURE — 83550 IRON BINDING TEST: CPT

## 2023-09-18 PROCEDURE — 1200000000 HC SEMI PRIVATE

## 2023-09-18 PROCEDURE — 82746 ASSAY OF FOLIC ACID SERUM: CPT

## 2023-09-18 RX ORDER — ISOSORBIDE DINITRATE 20 MG/1
40 TABLET ORAL 3 TIMES DAILY
COMMUNITY

## 2023-09-18 RX ORDER — ACETAMINOPHEN 650 MG/1
650 SUPPOSITORY RECTAL EVERY 6 HOURS PRN
Status: DISCONTINUED | OUTPATIENT
Start: 2023-09-18 | End: 2023-09-20 | Stop reason: HOSPADM

## 2023-09-18 RX ORDER — SODIUM CHLORIDE 0.9 % (FLUSH) 0.9 %
5-40 SYRINGE (ML) INJECTION EVERY 12 HOURS SCHEDULED
Status: DISCONTINUED | OUTPATIENT
Start: 2023-09-18 | End: 2023-09-20 | Stop reason: HOSPADM

## 2023-09-18 RX ORDER — IPRATROPIUM BROMIDE AND ALBUTEROL SULFATE 2.5; .5 MG/3ML; MG/3ML
1 SOLUTION RESPIRATORY (INHALATION) EVERY 6 HOURS PRN
Status: DISCONTINUED | OUTPATIENT
Start: 2023-09-18 | End: 2023-09-20 | Stop reason: HOSPADM

## 2023-09-18 RX ORDER — ASPIRIN 81 MG/1
81 TABLET ORAL DAILY
Status: DISCONTINUED | OUTPATIENT
Start: 2023-09-18 | End: 2023-09-20 | Stop reason: HOSPADM

## 2023-09-18 RX ORDER — HYDRALAZINE HYDROCHLORIDE 25 MG/1
25 TABLET, FILM COATED ORAL EVERY 8 HOURS SCHEDULED
Status: DISCONTINUED | OUTPATIENT
Start: 2023-09-18 | End: 2023-09-20 | Stop reason: HOSPADM

## 2023-09-18 RX ORDER — HYDRALAZINE HYDROCHLORIDE 50 MG/1
75 TABLET, FILM COATED ORAL 3 TIMES DAILY
COMMUNITY

## 2023-09-18 RX ORDER — SODIUM CHLORIDE 0.9 % (FLUSH) 0.9 %
5-40 SYRINGE (ML) INJECTION PRN
Status: DISCONTINUED | OUTPATIENT
Start: 2023-09-18 | End: 2023-09-20 | Stop reason: HOSPADM

## 2023-09-18 RX ORDER — SODIUM CHLORIDE 9 MG/ML
INJECTION, SOLUTION INTRAVENOUS PRN
Status: DISCONTINUED | OUTPATIENT
Start: 2023-09-18 | End: 2023-09-20 | Stop reason: HOSPADM

## 2023-09-18 RX ORDER — CARVEDILOL 25 MG/1
25 TABLET ORAL SEE ADMIN INSTRUCTIONS
COMMUNITY

## 2023-09-18 RX ORDER — ACETAMINOPHEN 325 MG/1
650 TABLET ORAL EVERY 6 HOURS PRN
Status: DISCONTINUED | OUTPATIENT
Start: 2023-09-18 | End: 2023-09-20 | Stop reason: HOSPADM

## 2023-09-18 RX ORDER — BUMETANIDE 1 MG/1
2 TABLET ORAL DAILY
Status: DISCONTINUED | OUTPATIENT
Start: 2023-09-18 | End: 2023-09-20 | Stop reason: HOSPADM

## 2023-09-18 RX ORDER — ARFORMOTEROL TARTRATE 15 UG/2ML
15 SOLUTION RESPIRATORY (INHALATION)
Status: DISCONTINUED | OUTPATIENT
Start: 2023-09-18 | End: 2023-09-20 | Stop reason: HOSPADM

## 2023-09-18 RX ORDER — CARVEDILOL 25 MG/1
25 TABLET ORAL
COMMUNITY

## 2023-09-18 RX ORDER — ONDANSETRON 4 MG/1
4 TABLET, ORALLY DISINTEGRATING ORAL EVERY 8 HOURS PRN
Status: DISCONTINUED | OUTPATIENT
Start: 2023-09-18 | End: 2023-09-20 | Stop reason: HOSPADM

## 2023-09-18 RX ORDER — BUDESONIDE 0.5 MG/2ML
0.5 INHALANT ORAL
Status: DISCONTINUED | OUTPATIENT
Start: 2023-09-18 | End: 2023-09-20 | Stop reason: HOSPADM

## 2023-09-18 RX ORDER — CARVEDILOL 6.25 MG/1
12.5 TABLET ORAL 2 TIMES DAILY
Status: DISCONTINUED | OUTPATIENT
Start: 2023-09-18 | End: 2023-09-20 | Stop reason: HOSPADM

## 2023-09-18 RX ORDER — POLYETHYLENE GLYCOL 3350 17 G/17G
17 POWDER, FOR SOLUTION ORAL DAILY PRN
Status: DISCONTINUED | OUTPATIENT
Start: 2023-09-18 | End: 2023-09-20 | Stop reason: HOSPADM

## 2023-09-18 RX ORDER — ZINC OXIDE 13 %
1 CREAM (GRAM) TOPICAL DAILY
COMMUNITY

## 2023-09-18 RX ORDER — ISOSORBIDE DINITRATE 10 MG/1
10 TABLET ORAL 3 TIMES DAILY
Status: DISCONTINUED | OUTPATIENT
Start: 2023-09-18 | End: 2023-09-20 | Stop reason: HOSPADM

## 2023-09-18 RX ORDER — PANTOPRAZOLE SODIUM 40 MG/10ML
40 INJECTION, POWDER, LYOPHILIZED, FOR SOLUTION INTRAVENOUS 2 TIMES DAILY
Status: DISCONTINUED | OUTPATIENT
Start: 2023-09-18 | End: 2023-09-20 | Stop reason: HOSPADM

## 2023-09-18 RX ORDER — ATORVASTATIN CALCIUM 40 MG/1
40 TABLET, FILM COATED ORAL NIGHTLY
Status: DISCONTINUED | OUTPATIENT
Start: 2023-09-18 | End: 2023-09-20 | Stop reason: HOSPADM

## 2023-09-18 RX ORDER — ERGOCALCIFEROL 1.25 MG/1
50000 CAPSULE ORAL WEEKLY
COMMUNITY

## 2023-09-18 RX ORDER — M-VIT,TX,IRON,MINS/CALC/FOLIC 27MG-0.4MG
1 TABLET ORAL DAILY
COMMUNITY

## 2023-09-18 RX ORDER — ONDANSETRON 2 MG/ML
4 INJECTION INTRAMUSCULAR; INTRAVENOUS EVERY 6 HOURS PRN
Status: DISCONTINUED | OUTPATIENT
Start: 2023-09-18 | End: 2023-09-20 | Stop reason: HOSPADM

## 2023-09-18 RX ADMIN — BUDESONIDE INHALATION 500 MCG: 0.5 SUSPENSION RESPIRATORY (INHALATION) at 09:31

## 2023-09-18 RX ADMIN — POLYETHYLENE GLYCOL-3350 AND ELECTROLYTES 4000 ML: 236; 6.74; 5.86; 2.97; 22.74 POWDER, FOR SOLUTION ORAL at 17:20

## 2023-09-18 RX ADMIN — PANTOPRAZOLE SODIUM 40 MG: 40 INJECTION, POWDER, FOR SOLUTION INTRAVENOUS at 09:05

## 2023-09-18 RX ADMIN — CARVEDILOL 12.5 MG: 6.25 TABLET, FILM COATED ORAL at 20:42

## 2023-09-18 RX ADMIN — Medication 10 ML: at 20:41

## 2023-09-18 RX ADMIN — ISOSORBIDE DINITRATE 10 MG: 10 TABLET ORAL at 20:41

## 2023-09-18 RX ADMIN — BUDESONIDE INHALATION 500 MCG: 0.5 SUSPENSION RESPIRATORY (INHALATION) at 20:17

## 2023-09-18 RX ADMIN — HYDRALAZINE HYDROCHLORIDE 25 MG: 25 TABLET, FILM COATED ORAL at 13:47

## 2023-09-18 RX ADMIN — ARFORMOTEROL TARTRATE 15 MCG: 15 SOLUTION RESPIRATORY (INHALATION) at 09:31

## 2023-09-18 RX ADMIN — ATORVASTATIN CALCIUM 40 MG: 40 TABLET, FILM COATED ORAL at 20:42

## 2023-09-18 RX ADMIN — ARFORMOTEROL TARTRATE 15 MCG: 15 SOLUTION RESPIRATORY (INHALATION) at 20:17

## 2023-09-18 RX ADMIN — ISOSORBIDE DINITRATE 10 MG: 10 TABLET ORAL at 13:48

## 2023-09-18 RX ADMIN — PANTOPRAZOLE SODIUM 40 MG: 40 INJECTION, POWDER, FOR SOLUTION INTRAVENOUS at 20:41

## 2023-09-18 RX ADMIN — Medication 5 ML: at 09:08

## 2023-09-18 RX ADMIN — HYDRALAZINE HYDROCHLORIDE 25 MG: 25 TABLET, FILM COATED ORAL at 20:42

## 2023-09-18 ASSESSMENT — ENCOUNTER SYMPTOMS
NAUSEA: 0
PHOTOPHOBIA: 0
RHINORRHEA: 0
WHEEZING: 0
VOICE CHANGE: 0
SHORTNESS OF BREATH: 0
VOMITING: 0
SORE THROAT: 0
ABDOMINAL PAIN: 0
DIARRHEA: 0
TROUBLE SWALLOWING: 0
COUGH: 0

## 2023-09-18 NOTE — ED NOTES
Report to 4th floor RN. All questions answered at this time.       Darshan Gutierrez, ARBEN  09/18/23 6311

## 2023-09-18 NOTE — PROGRESS NOTES
4 Eyes Skin Assessment     NAME:  Marlon Herron  YOB: 1940  MEDICAL RECORD NUMBER:  [id number]    The patient is being assessed for  Admission    I agree that at least one RN has performed a thorough Head to Toe Skin Assessment on the patient. ALL assessment sites listed below have been assessed. Areas assessed by both nurses:    Head, Face, Ears, Shoulders, Back, Chest, Arms, Elbows, Hands, Sacrum. Buttock, Coccyx, Ischium, and Legs. Feet and Heels        Does the Patient have a Wound?  No noted wound(s)       Estuardo Prevention initiated by RN: No  Wound Care Orders initiated by RN: No    Pressure Injury (Stage 3,4, Unstageable, DTI, NWPT, and Complex wounds) if present, place Wound referral order by RN under : No    New Ostomies, if present place, Ostomy referral order under : No     Nurse 1 eSignature: Electronically signed by Christine Singh RN on 9/18/23 at 7:42 PM EDT    **SHARE this note so that the co-signing nurse can place an eSignature**    Nurse 2 eSignature: {Esignature:[id number]}

## 2023-09-18 NOTE — CONSULTS
15 mcg  15 mcg Nebulization BID RT Catie Regalado, APRN - CNP   15 mcg at 09/18/23 0931    budesonide (PULMICORT) nebulizer suspension 500 mcg  0.5 mg Nebulization BID RT Catie Regalado, APRN - CNP   500 mcg at 09/18/23 0931    sodium chloride flush 0.9 % injection 5-40 mL  5-40 mL IntraVENous 2 times per day Catie Harrellails, APRN - CNP   5 mL at 09/18/23 0908    sodium chloride flush 0.9 % injection 5-40 mL  5-40 mL IntraVENous PRN Catie Harrellails, APRN - CNP        0.9 % sodium chloride infusion   IntraVENous PRN Catie Harrellails, APRN - CNP        ondansetron (ZOFRAN-ODT) disintegrating tablet 4 mg  4 mg Oral Q8H PRN Catie Harrellails, APRN - CNP        Or    ondansetron [facility]) injection 4 mg  4 mg IntraVENous Q6H PRN Catie Harrellails, APRN - CNP        polyethylene glycol (GLYCOLAX) packet 17 g  17 g Oral Daily PRN Catie Regalado, APRN - CNP        acetaminophen (TYLENOL) tablet 650 mg  650 mg Oral Q6H PRN Leloa Julio Cesarails, APRN - CNP        Or    acetaminophen (TYLENOL) suppository 650 mg  650 mg Rectal Q6H PRN Leloa Julio Cesarails, APRN - CNP         Current Outpatient Medications   Medication Sig Dispense Refill    ipratropium 0.5 mg-albuterol 2.5 mg (DUONEB) 0.5-2.5 (3) MG/3ML SOLN nebulizer solution Inhale 3 mLs into the lungs every 6 hours as needed for Shortness of Breath 360 mL 5    aspirin 81 MG EC tablet Take 1 tablet by mouth daily      albuterol (PROVENTIL) (2.5 MG/3ML) 0.083% nebulizer solution Take 3 mLs by nebulization every 6 hours as needed for Wheezing 120 each 3    bumetanide (BUMEX) 2 MG tablet Take 1 tablet by mouth daily 30 tablet 3    carvedilol (COREG) 12.5 MG tablet Take 1 tablet by mouth 2 times daily 60 tablet 3    isosorbide dinitrate (ISORDIL) 10 MG tablet Take 1 tablet by mouth 3 times daily 90 tablet 3    pantoprazole (PROTONIX) 40 MG tablet Take 1 tablet by mouth every morning (before breakfast) 30 tablet 3    apixaban (ELIQUIS) 2.5 MG TABS tablet Take 1 tablet by mouth 2 times daily (Patient taking Anemia/GI bleed  -Acute on chronic anemia  -Rectal bleeding most consistent with diverticular etiology however recurrent vascular pathology such as Dieulafoy lesion cannot be excluded  -Monitor H&H; defer transfusion to admitting  -Hold oral anticoagulation if clinically feasible  -Plan for further evaluation with colonoscopy +/- EGD      2. Renal failure/end-stage renal disease  -Patient reported hemodialysis Monday and Friday  -Chronic kidney disease likely contributing to anemia  -Per admitting/nephrology    Thank you for the opportunity to see this patient in consultation  Phoebe Kohli MD  9/18/2023  10:09 AM    NOTE:  This report was transcribed using voice recognition software. Every effort was made to ensure accuracy; however, inadvertent computerized transcription errors may be present.

## 2023-09-18 NOTE — PROGRESS NOTES
Per the nurse, the patient was concern drinking the gallon of GoLytely. Reached out to Dr. Nelli Pelletier via PS message. Per dr. Adi Madsen is too much\". Called Dr. Natasha Snyder and informed him this was a HD patient and she was concerned about drinking this prep. Dr. Natasha Snyder states it is ok to give the GoLytely to this patient.

## 2023-09-18 NOTE — H&P
Department of Internal Medicine  History and Physical Examination     Primary Care Physician: Eddy Wilks DO   Admitting Physician:  Eric Carballo DO  Admission date and time: 9/18/2023  3:00 AM    Room:  14/14  Admitting diagnosis: Acute GI bleeding [K92.2]    Patient Name: Zina Barragan  MRN: [id number]    Date of Service: 9/18/2023     Chief Complaint: Rectal bleeding    HISTORY OF PRESENT ILLNESS:    Zina Barragan is an 82-year-old female patient who presented to [facility] emergency department with rectal bleeding. States that she noted blood on the toilet paper following a bowel movement. No gross rectal bleeding or melena reported. She is on Eliquis. She was evaluated in the emergency department where her hemoglobin was noted to be mildly decreased in comparison to her most recent hemoglobin. She was not hypotensive. She was not tachycardic. She was not clinically unstable. CBC as discussed showed chronic macrocytic anemia not significantly lower than baseline. Metabolic panel consistent with end-stage renal disease on hemodialysis. Per ER physician rectal examination showed no dez or occult blood and no abnormal findings to suggest any significant gastrointestinal blood loss. The patient was apparently wearing a pad over the rectum prior to exam that had no evidence of any superficial bleeding as well. Plan was for admission for monitoring of hemoglobin and GI consultation. The patient was seen and examined at bedside today. She confirms the above given history. She reports a solitary episode where she noted blood in the toilet water but does not disclose any significant bleeding or melena. No abdominal pain or cramping. No nausea, vomiting or hematemesis. She is not lightheaded or dizzy. There is no significant increase in fatigue or malaise. Mild generalized weakness is reported with no additional neurologic symptoms or headache. No chest pain, palpitations or fluttering.   No 03:30 AM    RBC 2.34 09/18/2023 03:30 AM    HGB 7.8 09/18/2023 03:30 AM    HCT 24.9 09/18/2023 03:30 AM     09/18/2023 03:30 AM    .4 09/18/2023 03:30 AM    MCH 33.3 09/18/2023 03:30 AM    MCHC 31.3 09/18/2023 03:30 AM    RDW 15.7 09/18/2023 03:30 AM    NRBC 0.9 03/31/2021 11:18 AM    LYMPHOPCT 20 09/18/2023 03:30 AM    MONOPCT 11 09/18/2023 03:30 AM    MYELOPCT 0.9 04/03/2021 04:44 AM    BASOPCT 0 09/18/2023 03:30 AM    MONOSABS 1.00 09/18/2023 03:30 AM    LYMPHSABS 1.77 09/18/2023 03:30 AM    EOSABS 0.13 09/18/2023 03:30 AM    BASOSABS 0.03 09/18/2023 03:30 AM     BMP:    Lab Results   Component Value Date/Time     09/18/2023 03:30 AM    K 4.8 09/18/2023 03:30 AM    K 4.5 05/15/2021 10:58 AM     09/18/2023 03:30 AM    CO2 27 09/18/2023 03:30 AM    BUN 56 09/18/2023 03:30 AM    LABALBU 3.9 09/18/2023 03:30 AM    CREATININE 6.8 09/18/2023 03:30 AM    CALCIUM 9.2 09/18/2023 03:30 AM    GFRAA 15 02/02/2022 10:26 AM    LABGLOM 6 09/18/2023 03:30 AM    GLUCOSE 228 09/18/2023 03:30 AM    GLUCOSE 102 11/17/2010 06:31 AM       ASSESSMENT:  Intermittent rectal bleeding complicated by Eliquis  Chronic macrocytic anemia, with component of chronic disease anemia  End-stage renal disease on hemodialysis  Chronic respiratory failure with hypoxia   Chronic pulsated systolic and diastolic congestive heart failure  Prior EGD revealing multiple esophageal diverticuli, gastritis, and duodenal bulb 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

## 2023-09-18 NOTE — PROGRESS NOTES
Home medication list updated after admit orders    - Isordil 10 mg TID updated to 40 mg TID  - Hydralazine 25 mg TID updated to 75 mg TID  - Corgeg 12.5 mg BID updated to qHS on MF and BID all other days    Please advise whether these adjustments should be made to current inpatient orders.       Radha Bonilla, Joelle 9/18/2023 5:38 PM   [phone]

## 2023-09-18 NOTE — CONSULTS
[facility] MD Elana Powell MD Derk Punt MD .  Consultation  Patient's Name: Zina Barragan  9:59 AM  9/19/2023    Nephrologist: Yrn Barry MD    Reason for Consult:  ESRD     Requesting Physician:  Eddy Wilks DO    Chief Complaint:  rectal bleeding    History Obtained From:  patient records staff    History of Present Ilness:      83 Y/O F known to our service , she is presenting with cc of rectal bleeding . She has hx of ESRD and is on HD Twice weekly via [facility] line  Pt seen in ED she is on o2/nc she appear comfortable in NAD   Pt has hx of AF and is on eliquis .     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       Past Surgical History:   Procedure Laterality Date    COLONOSCOPY N/A 4/3/2021    COLONOSCOPY POLYPECTOMY HOT SNARE performed by Francis Stephen DO at [id number] [place] [address],[address]  4/3/2021    COLONOSCOPY WITH BIOPSY performed by Francis Stephen DO at [address] [place] [address],[address]  4/3/2021    COLONOSCOPY CONTROL HEMORRHAGE performed by Francis Stephen DO at [address] [facility],[address]  4/3/2021    COLONOSCOPY SUBMUCOSAL SPOT INJECTION performed by Francis Stephen DO at [facility] N/A 4/3/2021    EGD BIOPSY performed by Francis Stephen DO at [address] [place] [address] N/A 4/7/2021    EGD W/EUS FNA performed by Blanche Augustin MD at [facility] History   Problem Relation Age of Onset    Heart Disease Mother     No Known Problems 09/19/2023 03:25 AM    BILITOT 0.7 09/18/2023 03:30 AM    ALKPHOS 145 09/18/2023 03:30 AM    AST 14 09/18/2023 03:30 AM    ALT 11 09/18/2023 03:30 AM     Ionized Calcium:  No results found for: \"IONCA\"  Magnesium:    Lab Results   Component Value Date/Time    MG 1.7 09/19/2023 03:25 AM     Phosphorus:    Lab Results   Component Value Date/Time    PHOS 3.6 09/19/2023 03:25 AM     U/A:    Lab Results   Component Value Date/Time    COLORU Yellow 05/28/2021 06:43 PM    PHUR 5.5 05/28/2021 06:43 PM    LABCAST FEW 11/22/2016 07:10 PM    WBCUA PACKED 05/28/2021 06:43 PM    RBCUA 0-1 05/28/2021 06:43 PM    YEAST Present 04/01/2021 09:40 PM    BACTERIA MANY 05/28/2021 06:43 PM    CLARITYU CLOUDY 05/28/2021 06:43 PM    SPECGRAV 1.025 05/28/2021 06:43 PM    LEUKOCYTESUR LARGE 05/28/2021 06:43 PM    UROBILINOGEN 0.2 05/28/2021 06:43 PM    BILIRUBINUR Negative 05/28/2021 06:43 PM    BLOODU TRACE 05/28/2021 06:43 PM    GLUCOSEU Negative 05/28/2021 06:43 PM     Microalbumen/Creatinine ratio:  No components found for: \"RUCREAT\"  Iron Saturation:  No components found for: \"PERCENTFE\"  TIBC:    Lab Results   Component Value Date/Time    TIBC 167 09/18/2023 09:11 AM     FERRITIN:    Lab Results   Component Value Date/Time    FERRITIN 2,917 09/18/2023 09:11 AM        Imaging:  No orders to display       Assessment    -End stage renal disease on HD twice weekly via [facility] line .     -Heart failure systolic diastolic EF 29     -Hematochezia     -anaemia of chronic disease     -Secondary hyperparathyrodism .     -Hypertension . PLAN:    Hold HD today in light of rectal bleeding to ensure stability   plan for HD session in am   Continue other aspect of renal care .      Thank you       Electronically signed by Marques Lee MD on 9/19/2023 at 9:59 AM

## 2023-09-19 ENCOUNTER — ANESTHESIA (OUTPATIENT)
Dept: ENDOSCOPY | Age: 83
DRG: 377 | End: 2023-09-19
Payer: MEDICARE

## 2023-09-19 ENCOUNTER — ANESTHESIA EVENT (OUTPATIENT)
Dept: ENDOSCOPY | Age: 83
DRG: 377 | End: 2023-09-19
Payer: MEDICARE

## 2023-09-19 LAB
ANION GAP SERPL CALCULATED.3IONS-SCNC: 13 MMOL/L (ref 7–16)
BASOPHILS # BLD: 0.03 K/UL (ref 0–0.2)
BASOPHILS NFR BLD: 0 % (ref 0–2)
BUN SERPL-MCNC: 58 MG/DL (ref 6–23)
CALCIUM SERPL-MCNC: 9.2 MG/DL (ref 8.6–10.2)
CHLORIDE SERPL-SCNC: 101 MMOL/L (ref 98–107)
CHOLEST SERPL-MCNC: 124 MG/DL
CO2 SERPL-SCNC: 25 MMOL/L (ref 22–29)
CREAT SERPL-MCNC: 7.6 MG/DL (ref 0.5–1)
EOSINOPHIL # BLD: 0.1 K/UL (ref 0.05–0.5)
EOSINOPHILS RELATIVE PERCENT: 1 % (ref 0–6)
ERYTHROCYTE [DISTWIDTH] IN BLOOD BY AUTOMATED COUNT: 15.6 % (ref 11.5–15)
GFR SERPL CREATININE-BSD FRML MDRD: 5 ML/MIN/1.73M2
GLUCOSE SERPL-MCNC: 100 MG/DL (ref 74–99)
HCT VFR BLD AUTO: 23.2 % (ref 34–48)
HCT VFR BLD AUTO: 23.3 % (ref 34–48)
HCT VFR BLD AUTO: 24.2 % (ref 34–48)
HCT VFR BLD AUTO: 24.4 % (ref 34–48)
HDLC SERPL-MCNC: 37 MG/DL
HEMOCCULT SP1 STL QL: NEGATIVE
HGB BLD-MCNC: 7.3 G/DL (ref 11.5–15.5)
HGB BLD-MCNC: 7.3 G/DL (ref 11.5–15.5)
HGB BLD-MCNC: 7.4 G/DL (ref 11.5–15.5)
HGB BLD-MCNC: 7.7 G/DL (ref 11.5–15.5)
IMM GRANULOCYTES # BLD AUTO: 0.04 K/UL (ref 0–0.58)
IMM GRANULOCYTES NFR BLD: 1 % (ref 0–5)
LDLC SERPL CALC-MCNC: 61 MG/DL
LYMPHOCYTES NFR BLD: 1.76 K/UL (ref 1.5–4)
LYMPHOCYTES RELATIVE PERCENT: 20 % (ref 20–42)
MAGNESIUM SERPL-MCNC: 1.7 MG/DL (ref 1.6–2.6)
MCH RBC QN AUTO: 32.7 PG (ref 26–35)
MCHC RBC AUTO-ENTMCNC: 31.3 G/DL (ref 32–34.5)
MCV RBC AUTO: 104.5 FL (ref 80–99.9)
MONOCYTES NFR BLD: 1.01 K/UL (ref 0.1–0.95)
MONOCYTES NFR BLD: 12 % (ref 2–12)
NEUTROPHILS NFR BLD: 67 % (ref 43–80)
NEUTS SEG NFR BLD: 5.86 K/UL (ref 1.8–7.3)
PHOSPHATE SERPL-MCNC: 3.6 MG/DL (ref 2.5–4.5)
PLATELET # BLD AUTO: 151 K/UL (ref 130–450)
PMV BLD AUTO: 12.1 FL (ref 7–12)
POTASSIUM SERPL-SCNC: 5.2 MMOL/L (ref 3.5–5)
RBC # BLD AUTO: 2.23 M/UL (ref 3.5–5.5)
SODIUM SERPL-SCNC: 139 MMOL/L (ref 132–146)
T4 FREE SERPL-MCNC: 1.4 NG/DL (ref 0.9–1.7)
TRIGL SERPL-MCNC: 131 MG/DL
TSH SERPL DL<=0.05 MIU/L-ACNC: 1.68 UIU/ML (ref 0.27–4.2)
VLDLC SERPL CALC-MCNC: 26 MG/DL
WBC OTHER # BLD: 8.8 K/UL (ref 4.5–11.5)

## 2023-09-19 PROCEDURE — 3700000000 HC ANESTHESIA ATTENDED CARE: Performed by: INTERNAL MEDICINE

## 2023-09-19 PROCEDURE — 94640 AIRWAY INHALATION TREATMENT: CPT

## 2023-09-19 PROCEDURE — 5A1D70Z PERFORMANCE OF URINARY FILTRATION, INTERMITTENT, LESS THAN 6 HOURS PER DAY: ICD-10-PCS | Performed by: INTERNAL MEDICINE

## 2023-09-19 PROCEDURE — 84439 ASSAY OF FREE THYROXINE: CPT

## 2023-09-19 PROCEDURE — 84443 ASSAY THYROID STIM HORMONE: CPT

## 2023-09-19 PROCEDURE — 36415 COLL VENOUS BLD VENIPUNCTURE: CPT

## 2023-09-19 PROCEDURE — 2709999900 HC NON-CHARGEABLE SUPPLY: Performed by: INTERNAL MEDICINE

## 2023-09-19 PROCEDURE — 2580000003 HC RX 258: Performed by: NURSE PRACTITIONER

## 2023-09-19 PROCEDURE — 6360000002 HC RX W HCPCS: Performed by: NURSE PRACTITIONER

## 2023-09-19 PROCEDURE — 3700000001 HC ADD 15 MINUTES (ANESTHESIA): Performed by: INTERNAL MEDICINE

## 2023-09-19 PROCEDURE — 6370000000 HC RX 637 (ALT 250 FOR IP): Performed by: NURSE PRACTITIONER

## 2023-09-19 PROCEDURE — 1200000000 HC SEMI PRIVATE

## 2023-09-19 PROCEDURE — 80061 LIPID PANEL: CPT

## 2023-09-19 PROCEDURE — 84100 ASSAY OF PHOSPHORUS: CPT

## 2023-09-19 PROCEDURE — C9113 INJ PANTOPRAZOLE SODIUM, VIA: HCPCS | Performed by: NURSE PRACTITIONER

## 2023-09-19 PROCEDURE — 85018 HEMOGLOBIN: CPT

## 2023-09-19 PROCEDURE — 7100000010 HC PHASE II RECOVERY - FIRST 15 MIN: Performed by: INTERNAL MEDICINE

## 2023-09-19 PROCEDURE — 80048 BASIC METABOLIC PNL TOTAL CA: CPT

## 2023-09-19 PROCEDURE — 97161 PT EVAL LOW COMPLEX 20 MIN: CPT | Performed by: PHYSICAL THERAPIST

## 2023-09-19 PROCEDURE — 6360000002 HC RX W HCPCS: Performed by: INTERNAL MEDICINE

## 2023-09-19 PROCEDURE — 85025 COMPLETE CBC W/AUTO DIFF WBC: CPT

## 2023-09-19 PROCEDURE — 0DJD8ZZ INSPECTION OF LOWER INTESTINAL TRACT, VIA NATURAL OR ARTIFICIAL OPENING ENDOSCOPIC: ICD-10-PCS | Performed by: INTERNAL MEDICINE

## 2023-09-19 PROCEDURE — 90935 HEMODIALYSIS ONE EVALUATION: CPT

## 2023-09-19 PROCEDURE — 2700000000 HC OXYGEN THERAPY PER DAY

## 2023-09-19 PROCEDURE — 85014 HEMATOCRIT: CPT

## 2023-09-19 PROCEDURE — 2580000003 HC RX 258: Performed by: NURSE ANESTHETIST, CERTIFIED REGISTERED

## 2023-09-19 PROCEDURE — 3609027000 HC COLONOSCOPY: Performed by: INTERNAL MEDICINE

## 2023-09-19 PROCEDURE — 7100000011 HC PHASE II RECOVERY - ADDTL 15 MIN: Performed by: INTERNAL MEDICINE

## 2023-09-19 PROCEDURE — 83735 ASSAY OF MAGNESIUM: CPT

## 2023-09-19 PROCEDURE — 6360000002 HC RX W HCPCS: Performed by: NURSE ANESTHETIST, CERTIFIED REGISTERED

## 2023-09-19 RX ORDER — SODIUM CHLORIDE 9 MG/ML
INJECTION, SOLUTION INTRAVENOUS CONTINUOUS PRN
Status: DISCONTINUED | OUTPATIENT
Start: 2023-09-19 | End: 2023-09-19 | Stop reason: SDUPTHER

## 2023-09-19 RX ORDER — PROPOFOL 10 MG/ML
INJECTION, EMULSION INTRAVENOUS CONTINUOUS PRN
Status: DISCONTINUED | OUTPATIENT
Start: 2023-09-19 | End: 2023-09-19 | Stop reason: SDUPTHER

## 2023-09-19 RX ADMIN — HYDRALAZINE HYDROCHLORIDE 25 MG: 25 TABLET, FILM COATED ORAL at 06:15

## 2023-09-19 RX ADMIN — Medication 10 ML: at 09:04

## 2023-09-19 RX ADMIN — SODIUM CHLORIDE: 900 INJECTION, SOLUTION INTRAVENOUS at 15:55

## 2023-09-19 RX ADMIN — BUDESONIDE INHALATION 500 MCG: 0.5 SUSPENSION RESPIRATORY (INHALATION) at 19:08

## 2023-09-19 RX ADMIN — BUDESONIDE INHALATION 500 MCG: 0.5 SUSPENSION RESPIRATORY (INHALATION) at 06:00

## 2023-09-19 RX ADMIN — ARFORMOTEROL TARTRATE 15 MCG: 15 SOLUTION RESPIRATORY (INHALATION) at 06:00

## 2023-09-19 RX ADMIN — ARFORMOTEROL TARTRATE 15 MCG: 15 SOLUTION RESPIRATORY (INHALATION) at 19:08

## 2023-09-19 RX ADMIN — ONDANSETRON 4 MG: 4 TABLET, ORALLY DISINTEGRATING ORAL at 13:36

## 2023-09-19 RX ADMIN — PROPOFOL 50 MCG/KG/MIN: 10 INJECTION, EMULSION INTRAVENOUS at 15:59

## 2023-09-19 ASSESSMENT — ENCOUNTER SYMPTOMS: SHORTNESS OF BREATH: 1

## 2023-09-19 ASSESSMENT — LIFESTYLE VARIABLES: SMOKING_STATUS: 1

## 2023-09-19 NOTE — ANESTHESIA PRE PROCEDURE
Date/Time     09/19/2023 03:25 AM    K 5.2 09/19/2023 03:25 AM    K 4.5 05/15/2021 10:58 AM     09/19/2023 03:25 AM    CO2 25 09/19/2023 03:25 AM    BUN 58 09/19/2023 03:25 AM    CREATININE 7.6 09/19/2023 03:25 AM    GFRAA 15 02/02/2022 10:26 AM    LABGLOM 5 09/19/2023 03:25 AM    GLUCOSE 100 09/19/2023 03:25 AM    GLUCOSE 102 11/17/2010 06:31 AM    PROT 6.9 09/18/2023 03:30 AM    CALCIUM 9.2 09/19/2023 03:25 AM    BILITOT 0.7 09/18/2023 03:30 AM    ALKPHOS 145 09/18/2023 03:30 AM    AST 14 09/18/2023 03:30 AM    ALT 11 09/18/2023 03:30 AM       POC Tests: No results for input(s): \"POCGLU\", \"POCNA\", \"POCK\", \"POCCL\", \"POCBUN\", \"POCHEMO\", \"POCHCT\" in the last 72 hours.     Coags:   Lab Results   Component Value Date/Time    PROTIME 16.3 09/18/2023 10:39 AM    INR 1.4 09/18/2023 10:39 AM    APTT 35.0 05/15/2021 10:58 AM       HCG (If Applicable): No results found for: \"PREGTESTUR\", \"PREGSERUM\", \"HCG\", \"HCGQUANT\"     ABGs:   Lab Results   Component Value Date/Time    PO2ART 82.0 10/03/2019 10:21 PM    IZN6OPU 34.1 10/03/2019 10:21 PM    KGK0NQI 17.3 10/03/2019 10:21 PM        Type & Screen (If Applicable):  No results found for: \"LABABO\", \"LABRH\"    Drug/Infectious Status (If Applicable):  No results found for: \"HIV\", \"HEPCAB\"    COVID-19 Screening (If Applicable):   Lab Results   Component Value Date/Time    COVID19 Not Detected 01/02/2023 12:16 PM    COVID19 Not Detected 04/14/2021 07:30 PM           Anesthesia Evaluation  Patient summary reviewed and Nursing notes reviewed no history of anesthetic complications:   Airway: Mallampati: III  TM distance: <3 FB   Neck ROM: limited  Mouth opening: < 3 FB   Dental:    (+) upper dentures, lower dentures and edentulous      Pulmonary: breath sounds clear to auscultation  (+) COPD:  shortness of breath: new,  current smoker                           Cardiovascular:  Exercise tolerance: poor (<4 METS),   (+) hypertension:, dysrhythmias: atrial fibrillation, CHF:

## 2023-09-19 NOTE — PROGRESS NOTES
Date:2023  Patient Name: Yuki Call  MRN: [id number]  : 1940  ROOM #: [id number]    Occupational Therapy order received, chart reviewed and evaluation attempted this date. Patient is unavailable for OT evaluation due to patient off the floor. Will attempt OT evaluation at a later time. Thank you.    Ariane Sloan OTR/L #[id number]

## 2023-09-19 NOTE — CARE COORDINATION
Case Management Assessment  Initial Evaluation    Date/Time of Evaluation: 9/19/2023 2:37 PM  Assessment Completed by: GARY Rodriguez    If patient is discharged prior to next notation, then this note serves as note for discharge by case management. Patient Name: Pop Tse                   YOB: 1940  Diagnosis: Acute GI bleeding [K92.2]  Blood per rectum [K62.5]  Anemia, unspecified type [D64.9]                   Date / Time: 9/18/2023  3:00 AM    Patient Admission Status: Inpatient   Readmission Risk (Low < 19, Mod (19-27), High > 27): Readmission Risk Score: 19.7    Current PCP: Shanna Esposito, DO  PCP verified by CM? Yes    Chart Reviewed: Yes      History Provided by: Child/Family, Patient  Patient Orientation   Alert and Oriented, Person, Place, Situation    Patient Cognition: Alert    Advance Directives:      Code Status: Full Code   Patient's Primary Decision Maker is: Legal Next of Kin    Primary Decision MakerRoma Yadiel  Booker - [phone]    Discharge Planning:    Patient lives with: marsha gilliland is living with pt at this time. Type of Home: House    Primary Care Giver: Family (has private duty caregiver few days week, few hours day, not with any agency)    Patient Support Systems include: Children  marsha gilliland resides with pt, they have a private duty caregiver not from agency. Current services prior to admission: Durable Medical Equipment            Current DME: has 02 via [employer] and portable tank        ADLS  Prior functional level: Assistance with the following:, Mobility, Other (see comment) (uses cane)  Current functional level: Other (see comment) (uses a cane at home. )    PT AM-PAC:   /24  OT AM-PAC:   /24    Family can provide assistance at DC: Yes (marsha gilliland resides with pt.)has private duty caregiver. Would you like Case Management to discuss the discharge plan with any other family members/significant others, and if so, who?  Yes (marsha gilliland

## 2023-09-19 NOTE — ANESTHESIA POSTPROCEDURE EVALUATION
Department of Anesthesiology  Postprocedure Note    Patient: Huma Cloud  MRN: [id number]  YOB: 1940  Date of evaluation: 9/19/2023      Procedure Summary     Date: 09/19/23 Room / Location: [address] [place] [address] 01 / [address] [address]    Anesthesia Start: 8567 Anesthesia Stop: 1621    Procedure: COLONOSCOPY DIAGNOSTIC Diagnosis:       Anemia, unspecified type      (Anemia, unspecified type [D64.9])    Surgeons: Annetta Lacy MD Responsible Provider: Elodia Mars MD    Anesthesia Type: MAC ASA Status: 4          Anesthesia Type: No value filed.     Meghan Phase I:      Meghan Phase II: Meghan Score: 9      Anesthesia Post Evaluation    Patient location during evaluation: PACU  Patient participation: complete - patient participated  Level of consciousness: awake and alert  Airway patency: patent  Nausea & Vomiting: no nausea and no vomiting  Complications: no  Cardiovascular status: hemodynamically stable  Respiratory status: acceptable  Hydration status: euvolemic  Pain management: satisfactory to patient

## 2023-09-19 NOTE — ACP (ADVANCE CARE PLANNING)
Advance Care Planning   Healthcare Decision Maker:    Primary Decision MakerBonifacio Peer - Child - [phone]

## 2023-09-19 NOTE — CARE COORDINATION
Patient off the floor during rounds. Plan for colonoscopy later today.   Please, see orders for plan of care    Virgil Tolbert MD

## 2023-09-19 NOTE — OP NOTE
Operative Note      Patient: Darrius Dixon  YOB: 1940  MRN: [id number]    Date of Procedure: 9/19/2023    Pre-Op Diagnosis Codes:     * Anemia, unspecified type [D64.9]    Post-Op Diagnosis:  Normal colonoscopy; internal hemorrhoids       Procedure(s):  COLONOSCOPY DIAGNOSTIC    Surgeon(s):  Yaritza Franco MD    Assistant:   Surgical Assistant: Vanessa Briones RN    Anesthesia: Monitor Anesthesia Care    Estimated Blood Loss (mL): 0 ml    Complications: None    Specimens:   * No specimens in log *    Implants:  * No implants in log *      Drains: * No LDAs found *    Findings: Normal colonoscopy; internal hemoprrhoids        Detailed Description of Procedure:   Colonoscopy note    Indication rectal bleeding      Sedation  MAC    Endoscope was advanced through anus to cecum identified by IC valve and appendiceal orrifice      Preparation is good. Patient tolerated procedure well. Cecum is normal  Ascending colon is normal  Transverse colon is normal  Descending colon is normal, there is large tattoed area at splenic flexure and descending colon but no new growth in this area  Sigmoid colon have few small widely scattered diverticula  Rectum direct views are normal  Retroflexion not done as patient not holding air and rectum is small. Some internal hemorrhoids on direct views of anal canal, no active bleeding    IMPRESSION AND PLAN: Normal colonoscopy with minimal diverticulosis. No active bleeding, no stigmata of recent bleeding seen.     Electronically signed by Yaritza Franco MD on 9/19/2023 at 4:14 PM

## 2023-09-19 NOTE — PROGRESS NOTES
be met in 4 days   Pain level   0/10        Bed Mobility  Using rails and head of bed elevated:     Rolling: Supervision     Supine to sit: Minimal assist of 1    Sit to supine: Minimal assist of 1    Scooting: Minimal assist of 1    Rolling: Independent    Supine to sit: Independent    Sit to supine: Independent    Scooting: Independent     Transfers Sit to stand: Moderate assist of 1    Sit to stand: Independent     Ambulation     3 side steps using  wheeled walker with Moderate assist of 1   for walker approximation, balance, and upright, Patient with flexed posture, and cues for upright posture, safety, and proper hand placement    50 feet using  wheeled walker with Supervision     ROM Within functional limits        Strength BUE:   3+/5  RLE:  3+/5  LLE:  3+/5  Increase strength in affected mm groups by 1/3 grade   Balance Sitting EOB:  fair    Dynamic Standing:  poor    Sitting EOB:  good    Dynamic Standing: good       Patient is Alert & Oriented x person, place, time, and situation and follows directions    Sensation:  Patient  denies numbness/tingling   Edema:  no   Endurance: poor      Vitals:  4 liters nasal cannula   Blood Pressure at rest  Blood Pressure during session    Heart Rate at rest 76 Heart Rate during session 84   SPO2 at rest 96%  SPO2 during session 97%     Patient education  Patient educated on role of Physical Therapy, risks of immobility, safety and plan of care, importance of positional changes for oxygen exchange, and  importance of mobility while in hospital      Patient response to education:   Pt verbalized understanding Pt demonstrated skill Pt requires further education in this area   Yes Partial Yes      Treatment:  Patient practiced and was instructed/facilitated in the following treatment: Patient   Sat edge of bed 5 minutes with Supervision  to increase dynamic sitting balance and activity tolerance.        Therapeutic Exercises:  not performed        At end of session, patient

## 2023-09-19 NOTE — PROGRESS NOTES
Internal Medicine Progress Note    [facility]    William Soler. José Miguel Hu, LEROY.KRISTY.JOSEOChepeIChepe Alvarado D.O., DALEO.I. Jonette Gitelman, D.O. Jurgen Monahan, MSN, APRN, NP-C  Jarrod Malhotra. Myrtle Shaw, MSN, APRN-CNP     Primary Care Physician: Matthew Ayala DO   Admitting Physician:  Jeremiah Troare DO  Admission date and time: 9/18/2023  3:00 AM    Room:  [address] [place] [address]  Admitting diagnosis: Acute GI bleeding [K92.2]  Blood per rectum [K62.5]  Anemia, unspecified type [D64.9]    Patient Name: Danny Queen  MRN: [id number]    Date of Service: 9/19/2023     Subjective:  Loyd Avelar is a 82 y.o. female who was seen and examined today,9/19/2023, at the bedside. Loyd Avelar was evaluated in the presence of her daughter today. Rectal bleeding has entirely resolved at this point. She is currently undergoing bowel preparation for anticipated EGD and colonoscopy this afternoon. Hemoglobin remains stable. She is anxious for discharge. Review of System:   Constitutional:   Denies fever or chills, weight loss or gain, fatigue or malaise. HEENT:   Denies ear pain, sore throat, sinus or eye problems. Nasal cannula oxygen is in place. Cardiovascular:   Denies any chest pain, irregular heartbeats, or palpitations. Respiratory:   Denies shortness of breath, coughing, sputum production, hemoptysis, or wheezing. Gastrointestinal:   Current diarrhea related to the bowel preparation. Genitourinary:    Denies any urgency, frequency, hematuria. Voiding  without difficulty. Extremities:   Denies lower extremity swelling, edema or cyanosis. Neurology:    Denies any headache or focal neurological deficits, Denies generalized weakness or memory difficulty. Psch:   Denies being anxious or depressed. Musculoskeletal:    Denies  myalgias, joint complaints or back pain. Integumentary:   Denies any rashes, ulcers, or excoriations. Denies bruising.   Hematologic/Lymphatic:  Denies

## 2023-09-19 NOTE — PROGRESS NOTES
Physical Therapy    Physical Therapy    Room #: 0483/4424-53  Date: 2023       Patient Name: Morena Llamas  : 1940      MRN: [id number]     Patient unavailable for physical therapy eval due to off floor at dialysis. Will attempt PT evaluation at a later time. Thank you.        Chad Valles, PT

## 2023-09-19 NOTE — PROGRESS NOTES
[facility] MD Thad Valverde MD ALI Si Belt MD .  Progress note  Patient's Name: Chen Smith  11:27 AM  9/19/2023        Charts reviewed  For colonoscopy today     History of Present Ilness:      81 Y/O F known to our service , she is presenting with cc of rectal bleeding . She has hx of ESRD and is on HD Twice weekly via [facility] line  Pt seen in ED she is on o2/nc she appear comfortable in NAD   Pt has hx of AF and is on eliquis . 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,[address]  4/3/2021    COLONOSCOPY WITH BIOPSY performed by Brit Osorio DO at [address] [facility],[address]  4/3/2021    COLONOSCOPY CONTROL HEMORRHAGE performed by Brit Osorio DO at [address] [facility],[address]  4/3/2021    COLONOSCOPY SUBMUCOSAL SPOT INJECTION performed by Brit Osorio DO at [address] [facility] N/A 4/3/2021    EGD BIOPSY performed by Brit Osorio DO at [address] 4/7/2021    EGD W/EUS FNA performed by Ruba Pozo MD at [facility] History   Problem Relation Age of Onset    Heart Disease Mother     No Known Problems Father     Other Sister         CHF    Cancer Sister     Brain Cancer Sister         reports that she quit smoking about 8 months ago.  Her smoking use

## 2023-09-19 NOTE — CARE COORDINATION
Ss note: 9/19/2023.11:29 AM consult noted for arrange [facility] for eventual discharge. Pt currently oor for HD, no family present in room. SW will attempt to meet with pt when she presents back to room.  GARY Zuniga

## 2023-09-20 VITALS
SYSTOLIC BLOOD PRESSURE: 108 MMHG | HEIGHT: 64 IN | RESPIRATION RATE: 17 BRPM | OXYGEN SATURATION: 93 % | DIASTOLIC BLOOD PRESSURE: 58 MMHG | BODY MASS INDEX: 24.58 KG/M2 | WEIGHT: 143.96 LBS | HEART RATE: 72 BPM | TEMPERATURE: 98.1 F

## 2023-09-20 LAB
ANION GAP SERPL CALCULATED.3IONS-SCNC: 11 MMOL/L (ref 7–16)
BACTERIA URNS QL MICRO: ABNORMAL
BASOPHILS # BLD: 0.03 K/UL (ref 0–0.2)
BASOPHILS NFR BLD: 0 % (ref 0–2)
BILIRUB UR QL STRIP: ABNORMAL
BUN SERPL-MCNC: 29 MG/DL (ref 6–23)
CALCIUM SERPL-MCNC: 8.8 MG/DL (ref 8.6–10.2)
CHLORIDE SERPL-SCNC: 101 MMOL/L (ref 98–107)
CLARITY UR: ABNORMAL
CO2 SERPL-SCNC: 27 MMOL/L (ref 22–29)
COLOR UR: YELLOW
CREAT SERPL-MCNC: 4.6 MG/DL (ref 0.5–1)
EOSINOPHIL # BLD: 0.13 K/UL (ref 0.05–0.5)
EOSINOPHILS RELATIVE PERCENT: 2 % (ref 0–6)
EPI CELLS #/AREA URNS HPF: ABNORMAL /HPF
ERYTHROCYTE [DISTWIDTH] IN BLOOD BY AUTOMATED COUNT: 15.7 % (ref 11.5–15)
GFR SERPL CREATININE-BSD FRML MDRD: 9 ML/MIN/1.73M2
GLUCOSE SERPL-MCNC: 162 MG/DL (ref 74–99)
GLUCOSE UR STRIP-MCNC: NEGATIVE MG/DL
HCT VFR BLD AUTO: 23.5 % (ref 34–48)
HCT VFR BLD AUTO: 24.5 % (ref 34–48)
HGB BLD-MCNC: 7.3 G/DL (ref 11.5–15.5)
HGB BLD-MCNC: 7.7 G/DL (ref 11.5–15.5)
HGB UR QL STRIP.AUTO: NEGATIVE
IMM GRANULOCYTES # BLD AUTO: <0.03 K/UL (ref 0–0.58)
IMM GRANULOCYTES NFR BLD: 0 % (ref 0–5)
KETONES UR STRIP-MCNC: ABNORMAL MG/DL
LEUKOCYTE ESTERASE UR QL STRIP: ABNORMAL
LYMPHOCYTES NFR BLD: 1.49 K/UL (ref 1.5–4)
LYMPHOCYTES RELATIVE PERCENT: 20 % (ref 20–42)
MAGNESIUM SERPL-MCNC: 1.9 MG/DL (ref 1.6–2.6)
MCH RBC QN AUTO: 33.5 PG (ref 26–35)
MCHC RBC AUTO-ENTMCNC: 31.4 G/DL (ref 32–34.5)
MCV RBC AUTO: 106.5 FL (ref 80–99.9)
MONOCYTES NFR BLD: 0.97 K/UL (ref 0.1–0.95)
MONOCYTES NFR BLD: 13 % (ref 2–12)
NEUTROPHILS NFR BLD: 65 % (ref 43–80)
NEUTS SEG NFR BLD: 4.84 K/UL (ref 1.8–7.3)
NITRITE UR QL STRIP: NEGATIVE
PH UR STRIP: 7.5 [PH] (ref 5–9)
PHOSPHATE SERPL-MCNC: 4.5 MG/DL (ref 2.5–4.5)
PLATELET # BLD AUTO: 151 K/UL (ref 130–450)
PMV BLD AUTO: 11.5 FL (ref 7–12)
POTASSIUM SERPL-SCNC: 4.7 MMOL/L (ref 3.5–5)
PROT UR STRIP-MCNC: 100 MG/DL
RBC # BLD AUTO: 2.3 M/UL (ref 3.5–5.5)
RBC #/AREA URNS HPF: ABNORMAL /HPF
SODIUM SERPL-SCNC: 139 MMOL/L (ref 132–146)
SP GR UR STRIP: 1.01 (ref 1–1.03)
UROBILINOGEN UR STRIP-ACNC: 0.2 EU/DL (ref 0–1)
WBC #/AREA URNS HPF: ABNORMAL /HPF
WBC OTHER # BLD: 7.5 K/UL (ref 4.5–11.5)

## 2023-09-20 PROCEDURE — 6370000000 HC RX 637 (ALT 250 FOR IP): Performed by: INTERNAL MEDICINE

## 2023-09-20 PROCEDURE — 83735 ASSAY OF MAGNESIUM: CPT

## 2023-09-20 PROCEDURE — 85014 HEMATOCRIT: CPT

## 2023-09-20 PROCEDURE — C9113 INJ PANTOPRAZOLE SODIUM, VIA: HCPCS | Performed by: INTERNAL MEDICINE

## 2023-09-20 PROCEDURE — 6360000002 HC RX W HCPCS: Performed by: INTERNAL MEDICINE

## 2023-09-20 PROCEDURE — 2700000000 HC OXYGEN THERAPY PER DAY

## 2023-09-20 PROCEDURE — 97165 OT EVAL LOW COMPLEX 30 MIN: CPT

## 2023-09-20 PROCEDURE — 80048 BASIC METABOLIC PNL TOTAL CA: CPT

## 2023-09-20 PROCEDURE — 85018 HEMOGLOBIN: CPT

## 2023-09-20 PROCEDURE — 36415 COLL VENOUS BLD VENIPUNCTURE: CPT

## 2023-09-20 PROCEDURE — 85025 COMPLETE CBC W/AUTO DIFF WBC: CPT

## 2023-09-20 PROCEDURE — 84100 ASSAY OF PHOSPHORUS: CPT

## 2023-09-20 PROCEDURE — 94640 AIRWAY INHALATION TREATMENT: CPT

## 2023-09-20 RX ADMIN — BUDESONIDE INHALATION 500 MCG: 0.5 SUSPENSION RESPIRATORY (INHALATION) at 06:18

## 2023-09-20 RX ADMIN — ISOSORBIDE DINITRATE 10 MG: 10 TABLET ORAL at 09:12

## 2023-09-20 RX ADMIN — PANTOPRAZOLE SODIUM 40 MG: 40 INJECTION, POWDER, FOR SOLUTION INTRAVENOUS at 09:13

## 2023-09-20 RX ADMIN — ARFORMOTEROL TARTRATE 15 MCG: 15 SOLUTION RESPIRATORY (INHALATION) at 06:18

## 2023-09-20 RX ADMIN — ISOSORBIDE DINITRATE 10 MG: 10 TABLET ORAL at 14:41

## 2023-09-20 RX ADMIN — BUMETANIDE 2 MG: 1 TABLET ORAL at 09:02

## 2023-09-20 NOTE — DISCHARGE INSTRUCTIONS
Your information:  Name: Zeus Jacobs  : 1940    Your instructions:    YOU ARE BEING DISCHARGED HOME. PLEASE MAKE AND KEEP YOUR FOLLOW UP APPOINTMENTS. What to do after you leave the hospital:    Recommended diet: regular diet and low sodium 2 g per day    Recommended activity: activity as tolerated    IF YOU EXPERIENCE ANY OF THE FOLLOWING SYMPTOMS, CHEST PAIN, SHORTNESS OF BREATH, COUGHING UP BLOOD OR BLOODY SPUTUM, STOMACH PAIN OR CRAMPING, DARK, TARRY STOOLS, LOSS OF APPETITE, GENERAL NOT FEELING WELL, SIGNS AND SYMPTOMS OF INFECTION LIKE FEVER AND OR CHILLS, PLEASE CALL DR Dontrell Andres DO OR RETURN TO THE EMERGENCY ROOM. The following personal items were collected during your admission and were returned to you:    Belongings  Dental Appliances: Uppers, Lowers  Vision - Corrective Lenses: None  Hearing Aid: At home  Clothing: Pajamas, Footwear, Socks, Undergarments  Jewelry: None  Body Piercings Removed: N/A  Electronic Devices: None  Weapons (Notify Protective Services/Security): None  Other Valuables: At home  Home Medications: None  Valuables Given To: Other (Comment)  Provide Name(s) of Who Valuable(s) Were Given To: none    Information obtained by:  By signing below, I understand that if any problems occur once I leave the hospital I am to contact  Iza Orosco DO or go to emergency room. I understand and acknowledge receipt of the instructions indicated above.

## 2023-09-20 NOTE — PROGRESS NOTES
[facility] MD Wilmer Willams MD ALI Anthony Laming MD .  Progress note  Patient's Name: Marlon Herron  12:04 PM  9/20/2023        Charts reviewed  For colonoscopy today     History of Present Ilness:      83 Y/O F known to our service , she is presenting with cc of rectal bleeding . She has hx of ESRD and is on HD Twice weekly via [facility] line  Pt seen in ED she is on o2/nc she appear comfortable in NAD   Pt has hx of AF and is on eliquis .     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 9/19/2023    COLONOSCOPY DIAGNOSTIC performed by Irena Esquivel MD at [address] [place] [address] N/A 4/3/2021    EGD BIOPSY performed by Alireza Bradshaw DO at [address] [place] [address] N/A 4/7/2021    EGD W/EUS FNA performed by Mathew Owen MD at [facility] History   Problem Relation Age of Onset    Heart Disease Mother     No Known Problems Father     Other Sister         CHF    Cancer 09/18/2023 03:30 AM    ALKPHOS 145 09/18/2023 03:30 AM    AST 14 09/18/2023 03:30 AM    ALT 11 09/18/2023 03:30 AM     Ionized Calcium:  No results found for: \"IONCA\"  Magnesium:    Lab Results   Component Value Date/Time    MG 1.9 09/20/2023 08:20 AM     Phosphorus:    Lab Results   Component Value Date/Time    PHOS 4.5 09/20/2023 08:20 AM     U/A:    Lab Results   Component Value Date/Time    COLORU Yellow 05/28/2021 06:43 PM    PHUR 5.5 05/28/2021 06:43 PM    LABCAST FEW 11/22/2016 07:10 PM    WBCUA PACKED 05/28/2021 06:43 PM    RBCUA 0-1 05/28/2021 06:43 PM    YEAST Present 04/01/2021 09:40 PM    BACTERIA MANY 05/28/2021 06:43 PM    CLARITYU CLOUDY 05/28/2021 06:43 PM    SPECGRAV 1.025 05/28/2021 06:43 PM    LEUKOCYTESUR LARGE 05/28/2021 06:43 PM    UROBILINOGEN 0.2 05/28/2021 06:43 PM    BILIRUBINUR Negative 05/28/2021 06:43 PM    BLOODU TRACE 05/28/2021 06:43 PM    GLUCOSEU Negative 05/28/2021 06:43 PM     Microalbumen/Creatinine ratio:  No components found for: \"RUCREAT\"  Iron Saturation:  No components found for: \"PERCENTFE\"  TIBC:    Lab Results   Component Value Date/Time    TIBC 167 09/18/2023 09:11 AM     FERRITIN:    Lab Results   Component Value Date/Time    FERRITIN 2,917 09/18/2023 09:11 AM        Imaging:  No orders to display       Assessment    -End stage renal disease on HD twice weekly via [facility] line .     -Heart failure systolic diastolic EF 29     -Hematochezia     -anaemia of chronic disease     -Secondary hyperparathyrodism .     -Hypertension . PLAN:    HD today for solute and volume managmeent  Continue other aspect of renal care .    For colonscopy today      Electronically signed by Mariama Thomas MD on 9/20/2023 at 12:04 PM

## 2023-09-20 NOTE — PROGRESS NOTES
CLINICAL PHARMACY NOTE: MEDS TO BEDS    Total # of Prescriptions Filled: 0   The following medications were delivered to the patient:  Eliquis is too soon to refill according to patient's insurance.  It can be filled on 9/22/23 at the earliest.    Additional Documentation:

## 2023-09-20 NOTE — PROGRESS NOTES
cane    Prior Level of Function: Independent with ADLs , Independent with IADLs; ambulated with a cane, sponge bathes     Driving: yes    Pain Level: no pain at time of evaluation; Nursing notified. Cognition: A&O: 4/4; Follows 2 step directions   Memory: good    Sequencing: good    Problem solving: good    Judgement/safety: good     [facility]   AM-PAC Daily Activity - Inpatient   How much help is needed for putting on and taking off regular lower body clothing?: A Little  How much help is needed for bathing (which includes washing, rinsing, drying)?: A Little  How much help is needed for toileting (which includes using toilet, bedpan, or urinal)?: A Little  How much help is needed for putting on and taking off regular upper body clothing?: A Little  How much help is needed for taking care of personal grooming?: A Little  How much help for eating meals?: None  [facility] Raw [facility]: 19  AM-PAC Inpatient ADL T-Scale Score : 40.22  ADL Inpatient CMS 0-100% Score: 42.8  ADL Inpatient CMS G-Code Modifier : CK     Functional Assessment:    Initial Eval Status  Date: 9/20/23 Treatment Status  Date: STGs = LTGs  Time frame: 10-14 days   Feeding Independent   Independent    Grooming Supervision   Independent    UB Dressing Supervision   Independent    LB Dressing Supervision   Independent    Bathing Supervision  Independent    Toileting Supervision   Independent    Bed Mobility  Supine to sit: N/T as pt was seated EOB  Sit to supine: N/T as pt was seated EOB   Rolling:Supervision    Supine to sit: Independent   Sit to supine: Independent   Rolling:Independent     Functional Transfers Supervision from EOB  Supervision for transfer to and from bedside commode. Supervision for transfer from standing to edge of bed. Transfer training with verbal cues for hand placement throughout session to improve safety.    Independent    Functional Mobility Minimal Assist with hand held assist within the room for Eval Low 97165  X  1    OT Eval Medium 94231      OT Eval High [id number]      OT Re-Eval [id number]            ADL/Self Care [id number]     Therapeutic Activities 10757       Therapeutic Ex [id number]       Orthotic Management 19424       Manual 11549     Neuro Re-Ed 73176       Non-Billable Time        Evaluation Time additionally includes thorough review of current medical information, gathering information on past medical history/social history and prior level of function, interpretation of standardized testing/informal observation of tasks, assessment of data and development of plan of care and goals.         Evaluating OT: Talon Topete OTR/L; [id number]

## 2023-09-20 NOTE — CARE COORDINATION
DC order noted. Patient to return home today, have notified [address] at [address] and patient to be at her regular time Friday there. She gets transport via [facility] [place] [facility]. Patient has oxygen through [address] at home. Denies need for [facility] at this time and has a private duty caregiver 2-3 days a week.

## 2023-09-20 NOTE — PLAN OF CARE
Problem: Safety - Adult  Goal: Free from fall injury  9/20/2023 0122 by Carmen James RN  Outcome: Progressing     Problem: ABCDS Injury Assessment  Goal: Absence of physical injury  9/20/2023 1250 by Grace Larkin RN  Outcome: Adequate for Discharge     Problem: ABCDS Injury Assessment  Goal: Absence of physical injury  9/20/2023 0122 by Carmen James RN  Outcome: Progressing     Problem: Chronic Conditions and Co-morbidities  Goal: Patient's chronic conditions and co-morbidity symptoms are monitored and maintained or improved  9/20/2023 1250 by Grace Larkin RN  Outcome: Adequate for Discharge     Problem: Chronic Conditions and Co-morbidities  Goal: Patient's chronic conditions and co-morbidity symptoms are monitored and maintained or improved  9/20/2023 0122 by Carmen James RN  Outcome: Progressing     Problem: Discharge Planning  Goal: Discharge to home or other facility with appropriate resources  Outcome: Adequate for Discharge

## 2023-09-20 NOTE — PROGRESS NOTES
Physical Therapy      Physical Therapy    Room #:   3396/9437-75    Date: 2023       Patient Name: Marc George  : 1940      MRN: [id number]     Patient unavailable for physical therapy treatment due to  patient stating she has no PT needs and is going home today .      Tania Eugene, PTA  #[id number]

## 2023-09-22 LAB
MICROORGANISM SPEC CULT: ABNORMAL
MICROORGANISM SPEC CULT: ABNORMAL
SPECIMEN DESCRIPTION: ABNORMAL

## 2024-04-16 ENCOUNTER — HOSPITAL ENCOUNTER (INPATIENT)
Age: 84
LOS: 10 days | Discharge: HOME HEALTH | End: 2024-04-27
Attending: STUDENT IN AN ORGANIZED HEALTH CARE EDUCATION/TRAINING PROGRAM | Admitting: INTERNAL MEDICINE
Payer: MEDICARE

## 2024-04-16 ENCOUNTER — APPOINTMENT (OUTPATIENT)
Dept: GENERAL RADIOLOGY | Age: 84
End: 2024-04-16
Payer: MEDICARE

## 2024-04-16 ENCOUNTER — APPOINTMENT (OUTPATIENT)
Dept: CT IMAGING | Age: 84
End: 2024-04-16
Payer: MEDICARE

## 2024-04-16 DIAGNOSIS — Z99.2 ESRD ON DIALYSIS (HCC): ICD-10-CM

## 2024-04-16 DIAGNOSIS — R07.9 CHEST PAIN, UNSPECIFIED TYPE: Primary | ICD-10-CM

## 2024-04-16 DIAGNOSIS — N18.6 ESRD ON DIALYSIS (HCC): ICD-10-CM

## 2024-04-16 DIAGNOSIS — B02.0 ZOSTER ENCEPHALITIS: ICD-10-CM

## 2024-04-16 LAB
ALBUMIN SERPL-MCNC: 4 G/DL (ref 3.5–5.2)
ALP SERPL-CCNC: 141 U/L (ref 35–104)
ALT SERPL-CCNC: 10 U/L (ref 0–32)
ANION GAP SERPL CALCULATED.3IONS-SCNC: 13 MMOL/L (ref 7–16)
AST SERPL-CCNC: 16 U/L (ref 0–31)
BASOPHILS # BLD: 0.02 K/UL (ref 0–0.2)
BASOPHILS NFR BLD: 0 % (ref 0–2)
BILIRUB SERPL-MCNC: 0.4 MG/DL (ref 0–1.2)
BNP SERPL-MCNC: ABNORMAL PG/ML (ref 0–450)
BUN SERPL-MCNC: 34 MG/DL (ref 6–23)
CALCIUM SERPL-MCNC: 8.5 MG/DL (ref 8.6–10.2)
CHLORIDE SERPL-SCNC: 98 MMOL/L (ref 98–107)
CO2 SERPL-SCNC: 28 MMOL/L (ref 22–29)
CREAT SERPL-MCNC: 6.2 MG/DL (ref 0.5–1)
EOSINOPHIL # BLD: 0.21 K/UL (ref 0.05–0.5)
EOSINOPHILS RELATIVE PERCENT: 3 % (ref 0–6)
ERYTHROCYTE [DISTWIDTH] IN BLOOD BY AUTOMATED COUNT: 15.2 % (ref 11.5–15)
GFR SERPL CREATININE-BSD FRML MDRD: 6 ML/MIN/1.73M2
GLUCOSE SERPL-MCNC: 105 MG/DL (ref 74–99)
HCT VFR BLD AUTO: 34.7 % (ref 34–48)
HGB BLD-MCNC: 11.4 G/DL (ref 11.5–15.5)
IMM GRANULOCYTES # BLD AUTO: 0.03 K/UL (ref 0–0.58)
IMM GRANULOCYTES NFR BLD: 0 % (ref 0–5)
INFLUENZA A BY PCR: NOT DETECTED
INFLUENZA B BY PCR: NOT DETECTED
LACTATE BLDV-SCNC: 0.8 MMOL/L (ref 0.5–2.2)
LIPASE SERPL-CCNC: 14 U/L (ref 13–60)
LYMPHOCYTES NFR BLD: 1.22 K/UL (ref 1.5–4)
LYMPHOCYTES RELATIVE PERCENT: 17 % (ref 20–42)
MCH RBC QN AUTO: 32.9 PG (ref 26–35)
MCHC RBC AUTO-ENTMCNC: 32.9 G/DL (ref 32–34.5)
MCV RBC AUTO: 100.3 FL (ref 80–99.9)
MONOCYTES NFR BLD: 0.83 K/UL (ref 0.1–0.95)
MONOCYTES NFR BLD: 11 % (ref 2–12)
NEUTROPHILS NFR BLD: 68 % (ref 43–80)
NEUTS SEG NFR BLD: 5.01 K/UL (ref 1.8–7.3)
PLATELET, FLUORESCENCE: 115 K/UL (ref 130–450)
PMV BLD AUTO: 13.2 FL (ref 7–12)
POTASSIUM SERPL-SCNC: 5.2 MMOL/L (ref 3.5–5)
PROT SERPL-MCNC: 7.2 G/DL (ref 6.4–8.3)
RBC # BLD AUTO: 3.46 M/UL (ref 3.5–5.5)
SARS-COV-2 RDRP RESP QL NAA+PROBE: NOT DETECTED
SODIUM SERPL-SCNC: 139 MMOL/L (ref 132–146)
SPECIMEN DESCRIPTION: NORMAL
TROPONIN I SERPL HS-MCNC: 82 NG/L (ref 0–9)
WBC OTHER # BLD: 7.3 K/UL (ref 4.5–11.5)

## 2024-04-16 PROCEDURE — 6360000002 HC RX W HCPCS

## 2024-04-16 PROCEDURE — 93005 ELECTROCARDIOGRAM TRACING: CPT

## 2024-04-16 PROCEDURE — 71045 X-RAY EXAM CHEST 1 VIEW: CPT

## 2024-04-16 PROCEDURE — 99285 EMERGENCY DEPT VISIT HI MDM: CPT

## 2024-04-16 PROCEDURE — 83880 ASSAY OF NATRIURETIC PEPTIDE: CPT

## 2024-04-16 PROCEDURE — 81001 URINALYSIS AUTO W/SCOPE: CPT

## 2024-04-16 PROCEDURE — 96374 THER/PROPH/DIAG INJ IV PUSH: CPT

## 2024-04-16 PROCEDURE — 87502 INFLUENZA DNA AMP PROBE: CPT

## 2024-04-16 PROCEDURE — 85025 COMPLETE CBC W/AUTO DIFF WBC: CPT

## 2024-04-16 PROCEDURE — 83605 ASSAY OF LACTIC ACID: CPT

## 2024-04-16 PROCEDURE — 80053 COMPREHEN METABOLIC PANEL: CPT

## 2024-04-16 PROCEDURE — 84484 ASSAY OF TROPONIN QUANT: CPT

## 2024-04-16 PROCEDURE — 87635 SARS-COV-2 COVID-19 AMP PRB: CPT

## 2024-04-16 PROCEDURE — 83690 ASSAY OF LIPASE: CPT

## 2024-04-16 RX ORDER — FENTANYL CITRATE 0.05 MG/ML
25 INJECTION, SOLUTION INTRAMUSCULAR; INTRAVENOUS ONCE
Status: COMPLETED | OUTPATIENT
Start: 2024-04-16 | End: 2024-04-16

## 2024-04-16 RX ADMIN — FENTANYL CITRATE 25 MCG: 0.05 INJECTION, SOLUTION INTRAMUSCULAR; INTRAVENOUS at 22:41

## 2024-04-16 ASSESSMENT — PAIN SCALES - GENERAL: PAINLEVEL_OUTOF10: 9

## 2024-04-17 ENCOUNTER — APPOINTMENT (OUTPATIENT)
Dept: CT IMAGING | Age: 84
End: 2024-04-17
Payer: MEDICARE

## 2024-04-17 PROBLEM — R94.31 ABNORMAL EKG: Status: ACTIVE | Noted: 2024-04-17

## 2024-04-17 PROBLEM — R07.9 CHEST PAIN: Status: ACTIVE | Noted: 2024-04-17

## 2024-04-17 PROBLEM — I25.10 CORONARY ARTERY DISEASE INVOLVING NATIVE CORONARY ARTERY OF NATIVE HEART WITHOUT ANGINA PECTORIS: Status: ACTIVE | Noted: 2024-04-17

## 2024-04-17 PROBLEM — Z95.810 CARDIAC RESYNCHRONIZATION THERAPY DEFIBRILLATOR (CRT-D) IN PLACE: Status: ACTIVE | Noted: 2024-04-17

## 2024-04-17 PROBLEM — I45.2 RBBB (RIGHT BUNDLE BRANCH BLOCK WITH LEFT POSTERIOR FASCICULAR BLOCK): Status: ACTIVE | Noted: 2024-04-17

## 2024-04-17 PROBLEM — I42.9 CARDIOMYOPATHY (HCC): Status: ACTIVE | Noted: 2024-04-17

## 2024-04-17 LAB
BILIRUB UR QL STRIP: NEGATIVE
CLARITY UR: ABNORMAL
COLOR UR: YELLOW
GLUCOSE UR STRIP-MCNC: NEGATIVE MG/DL
HGB UR QL STRIP.AUTO: NEGATIVE
KETONES UR STRIP-MCNC: NEGATIVE MG/DL
LEUKOCYTE ESTERASE UR QL STRIP: ABNORMAL
NITRITE UR QL STRIP: POSITIVE
PH UR STRIP: 8.5 [PH] (ref 5–9)
PROT UR STRIP-MCNC: 100 MG/DL
RBC #/AREA URNS HPF: ABNORMAL /HPF
SP GR UR STRIP: 1.02 (ref 1–1.03)
TROPONIN I SERPL HS-MCNC: 80 NG/L (ref 0–9)
UROBILINOGEN UR STRIP-ACNC: 0.2 EU/DL (ref 0–1)
WBC #/AREA URNS HPF: ABNORMAL /HPF

## 2024-04-17 PROCEDURE — 74176 CT ABD & PELVIS W/O CONTRAST: CPT

## 2024-04-17 PROCEDURE — 6360000002 HC RX W HCPCS: Performed by: INTERNAL MEDICINE

## 2024-04-17 PROCEDURE — 84484 ASSAY OF TROPONIN QUANT: CPT

## 2024-04-17 PROCEDURE — 6370000000 HC RX 637 (ALT 250 FOR IP): Performed by: INTERNAL MEDICINE

## 2024-04-17 PROCEDURE — APPSS180 APP SPLIT SHARED TIME > 60 MINUTES: Performed by: NURSE PRACTITIONER

## 2024-04-17 PROCEDURE — 6370000000 HC RX 637 (ALT 250 FOR IP)

## 2024-04-17 PROCEDURE — 2580000003 HC RX 258

## 2024-04-17 PROCEDURE — 99222 1ST HOSP IP/OBS MODERATE 55: CPT | Performed by: INTERNAL MEDICINE

## 2024-04-17 PROCEDURE — 96376 TX/PRO/DX INJ SAME DRUG ADON: CPT

## 2024-04-17 PROCEDURE — 6360000002 HC RX W HCPCS

## 2024-04-17 PROCEDURE — 6360000002 HC RX W HCPCS: Performed by: STUDENT IN AN ORGANIZED HEALTH CARE EDUCATION/TRAINING PROGRAM

## 2024-04-17 PROCEDURE — 2060000000 HC ICU INTERMEDIATE R&B

## 2024-04-17 PROCEDURE — 93005 ELECTROCARDIOGRAM TRACING: CPT

## 2024-04-17 PROCEDURE — 71250 CT THORAX DX C-: CPT

## 2024-04-17 RX ORDER — LACTOBACILLUS RHAMNOSUS GG 10B CELL
1 CAPSULE ORAL DAILY
Status: DISCONTINUED | OUTPATIENT
Start: 2024-04-17 | End: 2024-04-27 | Stop reason: HOSPADM

## 2024-04-17 RX ORDER — ONDANSETRON 2 MG/ML
4 INJECTION INTRAMUSCULAR; INTRAVENOUS ONCE
Status: COMPLETED | OUTPATIENT
Start: 2024-04-17 | End: 2024-04-17

## 2024-04-17 RX ORDER — ACETAMINOPHEN 325 MG/1
650 TABLET ORAL EVERY 4 HOURS PRN
Status: DISCONTINUED | OUTPATIENT
Start: 2024-04-17 | End: 2024-04-27 | Stop reason: HOSPADM

## 2024-04-17 RX ORDER — MORPHINE SULFATE 2 MG/ML
2 INJECTION, SOLUTION INTRAMUSCULAR; INTRAVENOUS ONCE
Status: COMPLETED | OUTPATIENT
Start: 2024-04-17 | End: 2024-04-17

## 2024-04-17 RX ORDER — ASPIRIN 81 MG/1
81 TABLET ORAL DAILY
Status: DISCONTINUED | OUTPATIENT
Start: 2024-04-17 | End: 2024-04-27 | Stop reason: HOSPADM

## 2024-04-17 RX ORDER — BUMETANIDE 1 MG/1
2 TABLET ORAL DAILY
Status: DISCONTINUED | OUTPATIENT
Start: 2024-04-17 | End: 2024-04-27 | Stop reason: HOSPADM

## 2024-04-17 RX ORDER — IPRATROPIUM BROMIDE AND ALBUTEROL SULFATE 2.5; .5 MG/3ML; MG/3ML
1 SOLUTION RESPIRATORY (INHALATION) EVERY 4 HOURS PRN
Status: DISCONTINUED | OUTPATIENT
Start: 2024-04-17 | End: 2024-04-27 | Stop reason: HOSPADM

## 2024-04-17 RX ORDER — ONDANSETRON 2 MG/ML
4 INJECTION INTRAMUSCULAR; INTRAVENOUS EVERY 6 HOURS PRN
Status: DISCONTINUED | OUTPATIENT
Start: 2024-04-17 | End: 2024-04-27 | Stop reason: HOSPADM

## 2024-04-17 RX ORDER — ERGOCALCIFEROL 1.25 MG/1
50000 CAPSULE ORAL WEEKLY
Status: DISCONTINUED | OUTPATIENT
Start: 2024-04-22 | End: 2024-04-27 | Stop reason: HOSPADM

## 2024-04-17 RX ORDER — ISOSORBIDE DINITRATE 20 MG/1
40 TABLET ORAL 3 TIMES DAILY
Status: DISCONTINUED | OUTPATIENT
Start: 2024-04-17 | End: 2024-04-27 | Stop reason: HOSPADM

## 2024-04-17 RX ORDER — FENTANYL CITRATE 0.05 MG/ML
25 INJECTION, SOLUTION INTRAMUSCULAR; INTRAVENOUS ONCE
Status: COMPLETED | OUTPATIENT
Start: 2024-04-17 | End: 2024-04-17

## 2024-04-17 RX ORDER — CARVEDILOL 25 MG/1
25 TABLET ORAL
Status: DISCONTINUED | OUTPATIENT
Start: 2024-04-18 | End: 2024-04-27 | Stop reason: HOSPADM

## 2024-04-17 RX ORDER — CARVEDILOL 25 MG/1
25 TABLET ORAL
Status: DISCONTINUED | OUTPATIENT
Start: 2024-04-17 | End: 2024-04-27 | Stop reason: HOSPADM

## 2024-04-17 RX ORDER — ATORVASTATIN CALCIUM 40 MG/1
40 TABLET, FILM COATED ORAL NIGHTLY
Status: DISCONTINUED | OUTPATIENT
Start: 2024-04-17 | End: 2024-04-27 | Stop reason: HOSPADM

## 2024-04-17 RX ORDER — PANTOPRAZOLE SODIUM 40 MG/1
40 TABLET, DELAYED RELEASE ORAL
Status: DISCONTINUED | OUTPATIENT
Start: 2024-04-17 | End: 2024-04-27 | Stop reason: HOSPADM

## 2024-04-17 RX ADMIN — ASPIRIN 81 MG: 81 TABLET, COATED ORAL at 08:35

## 2024-04-17 RX ADMIN — FENTANYL CITRATE 25 MCG: 0.05 INJECTION, SOLUTION INTRAMUSCULAR; INTRAVENOUS at 01:56

## 2024-04-17 RX ADMIN — MORPHINE SULFATE 2 MG: 2 INJECTION, SOLUTION INTRAMUSCULAR; INTRAVENOUS at 06:19

## 2024-04-17 RX ADMIN — HYDRALAZINE HYDROCHLORIDE 75 MG: 50 TABLET ORAL at 20:32

## 2024-04-17 RX ADMIN — ATORVASTATIN CALCIUM 40 MG: 40 TABLET, FILM COATED ORAL at 20:32

## 2024-04-17 RX ADMIN — APIXABAN 2.5 MG: 2.5 TABLET, FILM COATED ORAL at 08:36

## 2024-04-17 RX ADMIN — CARVEDILOL 25 MG: 25 TABLET, FILM COATED ORAL at 18:18

## 2024-04-17 RX ADMIN — ONDANSETRON 4 MG: 2 INJECTION INTRAMUSCULAR; INTRAVENOUS at 14:16

## 2024-04-17 RX ADMIN — ISOSORBIDE DINITRATE 40 MG: 20 TABLET ORAL at 08:35

## 2024-04-17 RX ADMIN — Medication 1 CAPSULE: at 08:36

## 2024-04-17 RX ADMIN — APIXABAN 2.5 MG: 2.5 TABLET, FILM COATED ORAL at 20:33

## 2024-04-17 RX ADMIN — BUMETANIDE 2 MG: 1 TABLET ORAL at 08:36

## 2024-04-17 RX ADMIN — CEFTRIAXONE SODIUM 2000 MG: 2 INJECTION, POWDER, FOR SOLUTION INTRAMUSCULAR; INTRAVENOUS at 06:18

## 2024-04-17 RX ADMIN — HYDRALAZINE HYDROCHLORIDE 75 MG: 50 TABLET ORAL at 14:17

## 2024-04-17 RX ADMIN — CARVEDILOL 25 MG: 25 TABLET, FILM COATED ORAL at 08:36

## 2024-04-17 RX ADMIN — ISOSORBIDE DINITRATE 40 MG: 20 TABLET ORAL at 20:32

## 2024-04-17 RX ADMIN — PANTOPRAZOLE SODIUM 40 MG: 40 TABLET, DELAYED RELEASE ORAL at 08:36

## 2024-04-17 RX ADMIN — HYDRALAZINE HYDROCHLORIDE 75 MG: 50 TABLET ORAL at 08:35

## 2024-04-17 RX ADMIN — ONDANSETRON 4 MG: 2 INJECTION INTRAMUSCULAR; INTRAVENOUS at 06:18

## 2024-04-17 RX ADMIN — ISOSORBIDE DINITRATE 40 MG: 20 TABLET ORAL at 14:16

## 2024-04-17 RX ADMIN — ACETAMINOPHEN 650 MG: 325 TABLET ORAL at 20:57

## 2024-04-17 ASSESSMENT — PAIN SCALES - GENERAL
PAINLEVEL_OUTOF10: 10
PAINLEVEL_OUTOF10: 0
PAINLEVEL_OUTOF10: 9
PAINLEVEL_OUTOF10: 0
PAINLEVEL_OUTOF10: 0

## 2024-04-17 ASSESSMENT — PAIN DESCRIPTION - LOCATION
LOCATION: BACK
LOCATION: CHEST

## 2024-04-17 NOTE — CARE COORDINATION
4/17/2024 1625 CM attempted to meet with pt for transition of care needs at d/c.  Pt unavailable, CM will do full assessment when pt is available.  Electronically signed by Krista Penn RN on 4/17/2024 at 4:30 PM

## 2024-04-17 NOTE — ED NOTES
Eddy from [facility] called for follow up on pacemaker. He states pacemaker \"looks perfect\" and is working properly.

## 2024-04-17 NOTE — CONSULTS
Inpatient Cardiology Consultation      Reason for Consult:  Chest pain     Consulting Physician: Dr. Brunner    Requesting Physician:  Dr. Muñoz    Date of Consultation: 4/17/2024    HISTORY OF PRESENT ILLNESS:   Marge Callejas  is a 83 y.o.  female established with Dr Mclaughlin    PMH: Acute on chronic CHF, permanent atrial fibrillation, end-stage renal disease on HD, HTN, anemia of chronic disease, next restrictive and obstructive lung disease, tobacco abuse, GI bleed, arthritis, and history of bowel perforation.    Patient presented to the emergency department with complaints of sharp chest pain, left flank pain for the past 2 days.  Patient reports discomfort worsens with breathing.  Medically on oxygen therapy.  ED > 4/16/2024, via ambulance complaint of chest pain, flank pain.  Arrival vitals: T98, RR 16, P81, /109, SpO2 94% on 3 L nasal cannula.  Significant Labs: , K5.2, BUN 34, CR 6.2, GFR 6, glucose 105 calcium 8.5, alk phos 141, Hgb 11.4, HCT 34.7, .  proBNP 61,643  Troponin 82/80  RAD:   CXR: No acute process  CT chest without contrast :  mediastinum: Marked cardiomegaly.  Transvenous biventricular pacemaker  defibrillator leads.  Marked coronary artery calcification.  No pericardial  effusion.  Ectatic thoracic aorta.  Maximum transverse diameter of the  proximal aortic arch 3.5 cm.  No mediastinal adenopathy or fluid.  IMPRESSION:  1. Marked cardiomegaly with marked coronary artery calcification.  2. Trace bilateral pleural effusions.  3. Cholelithiasis.  4. Chronic pancreatitis.  5. 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  6. 19 mm ectasia of the proximal left common iliac artery.  7. Degenerating leiomyomas of the uterus.    ED treatment: Rocephin 2000 mg IV, fentanyl 25 mcg IV x 2, morphine 2 mg IV, Zofran 4 mg IV.  ED diagnosis: Chest pain, end-stage renal disease on dialysis.    Patient seen and examined.  Recent vitals: T98.3, RR 25, P99, /113, SpO2 91%  30%/CRT-D  Permanent AF  ESRD    Recommendations:  Chest pain is not anginal.  EKG is abnormal.    Requested records from Dr. Mclaughlin  Will review device interrogation  Continue cardiac medications  Repeat EKG in the morning  Needs follow-up regarding 5.4 cm AAA    Thank you for the consultation.  Please do not hesitate to call with questions.    Edis Brunner MD, [facility] Cardiology

## 2024-04-17 NOTE — PROGRESS NOTES
4 Eyes Skin Assessment     NAME:  Marge Callejas  YOB: 1940  MEDICAL RECORD NUMBER:  [id number]    The patient is being assessed for  Admission    I agree that at least one RN has performed a thorough Head to Toe Skin Assessment on the patient. ALL assessment sites listed below have been assessed.      Areas assessed by both nurses:    Head, Face, Ears, Shoulders, Back, Chest, Arms, Elbows, Hands, Sacrum. Buttock, Coccyx, Ischium, Legs. Feet and Heels, Under Medical Devices , and Other          Does the Patient have a Wound? No noted wound(s)       Estuardo Prevention initiated by RN: Yes  Wound Care Orders initiated by RN: Yes    Pressure Injury (Stage 3,4, Unstageable, DTI, NWPT, and Complex wounds) if present, place Wound referral order by RN under : No    New Ostomies, if present place, Ostomy referral order under : No     Nurse 1 eSignature: Electronically signed by Lyly Edwards RN on 4/17/24 at 12:53 PM EDT    **SHARE this note so that the co-signing nurse can place an eSignature**    Nurse 2 eSignature: {Esignature:[id number]}

## 2024-04-17 NOTE — ED PROVIDER NOTES
infection delta troponin 2 elevated from patient's previous but downtrending.  Sodium within normal limits mild hypokalemia kidney function elevated from previous.  Elevated BNP. History and distress on initial exam.  Influenza negative COVID-negative lactic negative no elevation white count with stable hemoglobin.  Anemia noted.  CT demonstrating cardiomegaly with marked coronary artery calcification on CT bilateral pleural effusions trace.  Chronic pancreatitis and other findings as noted.  Patient on reevaluation with some improvement following multiple pain medications.  Spoke to Dr. Morgan, discussed patient patient accepted for admission.  Patient in no acute distress on last reexamination.    See below for coding documentation required for billing per CMS per attending request  History obtained from:Patient  Comorbidities: Hypertension, CHF, A-fib, hyperlipidemia  Chart review: and as mentioned above  DDX: ACS, dissection, pneumothorax, hemothorax, catheter associated infection, equipment malfunction, UTI, pyelonephritis, kidney stone,  Medications given:Medications - No data to display  Lab work as interpreted by medical resident:see above  Imaging as interpreted by medical resident:see above  Consulted with:  Tests/medications/imaging considered: CTA  Social Determinants of health:Elderly  Through shared decision making,  we will plan to admit  ED Course as of 04/17/24 0956 Tue Apr 16, 2024 2119 EKG:  This EKG is signed and interpreted by me.    Rate: 77  Rhythm: Ventricular paced rhythm with occasional PVCs, left axis deviation, biventricular pacemaker detected, prolonged qt, qtc is 536  Comparison: no previous EKG available    [KG]   Wed Apr 17, 2024   0456 Spoke with Dr. Morgan, discussed patient will plan to admit patient at this time. [YVETTE]      ED Course User Index  [YVETTE] Lucio Preciado DO  [KG] Jesi Ho DO        ED Course as of 04/17/24 0956   Tue Apr 16, 2024 2119 EKG:  This EKG  Range    Lactic Acid 0.8 0.5 - 2.2 mmol/L   Lipase   Result Value Ref Range    Lipase 14 13 - 60 U/L   Urinalysis with Microscopic   Result Value Ref Range    Color, UA Yellow Yellow    Turbidity UA SLIGHTLY CLOUDY (A) Clear    Glucose, Ur NEGATIVE NEGATIVE mg/dL    Bilirubin Urine NEGATIVE NEGATIVE    Ketones, Urine NEGATIVE NEGATIVE mg/dL    Specific Gravity, UA 1.020 1.005 - 1.030    Urine Hgb NEGATIVE NEGATIVE    pH, UA 8.5 5.0 - 9.0    Protein,  (A) NEGATIVE mg/dL    Urobilinogen, Urine 0.2 0.0 - 1.0 EU/dL    Nitrite, Urine POSITIVE (A) NEGATIVE    Leukocyte Esterase, Urine SMALL (A) NEGATIVE    WBC, UA 10 TO 20 (A) 0 TO 5 /HPF    RBC, UA 0 TO 2 0 TO 2 /HPF   Troponin   Result Value Ref Range    Troponin, High Sensitivity 82 (H) 0 - 9 ng/L   Brain Natriuretic Peptide   Result Value Ref Range    Pro-BNP 61,643 (H) 0 - 450 pg/mL   Troponin   Result Value Ref Range    Troponin, High Sensitivity 80 (H) 0 - 9 ng/L   EKG 12 Lead   Result Value Ref Range    Ventricular Rate 77 BPM    Atrial Rate 77 BPM    QRS Duration 188 ms    Q-T Interval 474 ms    QTc Calculation (Bazett) 536 ms    R Axis -57 degrees    T Axis 107 degrees   EKG 12 Lead   Result Value Ref Range    Ventricular Rate 93 BPM    Atrial Rate 64 BPM    QRS Duration 148 ms    Q-T Interval 436 ms    QTc Calculation (Bazett) 542 ms    R Axis 110 degrees    T Axis -67 degrees       RADIOLOGY:  CT ABDOMEN PELVIS WO CONTRAST Additional Contrast? None   Final Result   1. Marked cardiomegaly with marked coronary artery calcification.   2. Trace bilateral pleural effusions.   3. Cholelithiasis.   4. Chronic pancreatitis.   5. 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   6. 19 mm ectasia of the proximal left common iliac artery.   7. Degenerating leiomyomas of the uterus.         CT CHEST WO CONTRAST   Final Result   1. Marked cardiomegaly with marked coronary artery calcification.   2. Trace bilateral pleural effusions.   3. Cholelithiasis.   4.

## 2024-04-17 NOTE — CONSULTS
[facility]  MD Murtaza White MD ALI HASSAN MD .  Consultation  Patient's Name: Marge Callejas  10:27 AM  4/17/2024    Nephrologist: Alban Ross MD    Reason for Consult:  End stage renal disease    Requesting Physician:  Marek Andres DO    Chief Complaint:  chest discomfort     History Obtained From:  patient records staff    History of Present Ilness:      84 y/o F presenting to hospital with cc of chest discomfort   She has hx of ESRD and she is on HD twice weekly via [facility] line on Monday and Friday   Last HD was on Monday   She did decline having AVF/AVG in the past .   She is seen in her room she is on o2/nc she appear euvolemic   She describe reproducible chest pain on exam   Cardiology at bedside evaluating her .       Past Medical History:   Diagnosis Date    Arthritis     Atrial fibrillation (HCC)     Blood circulation, collateral     CHF (congestive heart failure) (HCC)     Chronic renal insufficiency, stage IV (severe) (HCC) 11/19/2016    History of cardiovascular stress test 07/2015    Lexiscan stress test    History of echocardiogram 07/27/2015    EF 29%    Hyperlipidemia     Hypertension     Mixed restrictive and obstructive lung disease (HCC) 7/14/2023       Past Surgical History:   Procedure Laterality Date    COLONOSCOPY N/A 4/3/2021    COLONOSCOPY POLYPECTOMY HOT SNARE performed by Lucio Nelson DO at [facility] ENDOSCOPY    COLONOSCOPY  4/3/2021    COLONOSCOPY WITH BIOPSY performed by Lucio Nelson DO at [facility] ENDOSCOPY    COLONOSCOPY  4/3/2021    COLONOSCOPY CONTROL HEMORRHAGE performed by Lucio Nelson DO at [facility] ENDOSCOPY    COLONOSCOPY  4/3/2021    COLONOSCOPY SUBMUCOSAL SPOT INJECTION performed by Lucio Nelson DO at [facility] ENDOSCOPY    COLONOSCOPY N/A 9/19/2023    COLONOSCOPY DIAGNOSTIC performed by Scooby Cormier MD at [facility] ENDOSCOPY    ECTOPIC PREGNANCY SURGERY      UPPER GASTROINTESTINAL ENDOSCOPY N/A 4/3/2021    EGD BIOPSY performed by  04/16/2024 09:39 PM     Ionized Calcium:  No results found for: \"IONCA\"  Magnesium:    Lab Results   Component Value Date/Time    MG 1.9 09/20/2023 08:20 AM     Phosphorus:    Lab Results   Component Value Date/Time    PHOS 4.5 09/20/2023 08:20 AM     U/A:    Lab Results   Component Value Date/Time    COLORU Yellow 04/16/2024 09:39 PM    PHUR 8.5 04/16/2024 09:39 PM    LABCAST FEW 11/22/2016 07:10 PM    WBCUA 10 TO 20 04/16/2024 09:39 PM    RBCUA 0 TO 2 04/16/2024 09:39 PM    YEAST Present 04/01/2021 09:40 PM    BACTERIA 3+ 09/18/2023 05:45 PM    CLARITYU CLOUDY 05/28/2021 06:43 PM    SPECGRAV 1.020 04/16/2024 09:39 PM    LEUKOCYTESUR SMALL 04/16/2024 09:39 PM    UROBILINOGEN 0.2 04/16/2024 09:39 PM    BILIRUBINUR NEGATIVE 04/16/2024 09:39 PM    BLOODU TRACE 05/28/2021 06:43 PM    GLUCOSEU NEGATIVE 04/16/2024 09:39 PM     Microalbumen/Creatinine ratio:  No components found for: \"RUCREAT\"  Iron Saturation:  No components found for: \"PERCENTFE\"  TIBC:    Lab Results   Component Value Date/Time    TIBC 167 09/18/2023 09:11 AM     FERRITIN:    Lab Results   Component Value Date/Time    FERRITIN 2,917 09/18/2023 09:11 AM        Imaging:  CT ABDOMEN PELVIS WO CONTRAST Additional Contrast? None   Final Result   1. Marked cardiomegaly with marked coronary artery calcification.   2. Trace bilateral pleural effusions.   3. Cholelithiasis.   4. Chronic pancreatitis.   5. 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   6. 19 mm ectasia of the proximal left common iliac artery.   7. Degenerating leiomyomas of the uterus.         CT CHEST WO CONTRAST   Final Result   1. Marked cardiomegaly with marked coronary artery calcification.   2. Trace bilateral pleural effusions.   3. Cholelithiasis.   4. Chronic pancreatitis.   5. 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   6. 19 mm ectasia of the proximal left common iliac artery.   7. Degenerating leiomyomas of the uterus.         XR CHEST PORTABLE   Final

## 2024-04-17 NOTE — PLAN OF CARE
Problem: Discharge Planning  Goal: Discharge to home or other facility with appropriate resources  Outcome: Progressing  Flowsheets (Taken 4/17/2024 0811)  Discharge to home or other facility with appropriate resources:   Arrange for needed discharge resources and transportation as appropriate   Identify barriers to discharge with patient and caregiver     Problem: ABCDS Injury Assessment  Goal: Absence of physical injury  Outcome: Progressing     Problem: Safety - Adult  Goal: Free from fall injury  Outcome: Progressing

## 2024-04-17 NOTE — H&P
Dictate [id number]  
chronic pancreatitis.  Also was noted a saccular aneurysm of the distal aorta.  Genitourinary wise, the patient is on end-stage renal disease with hemodialysis and does have a tunneled catheter and she is being admitted to the hospital to the immediate care unit at this time with Nephrology consult and Cardiology.    ALLERGIES:  NO KNOWN DRUG ALLERGIES.      CURRENT MEDICATIONS:  Prior to admission include the following:  The patient has been on Eliquis 5 b.i.d., aspirin 81 daily, Lipitor 40 daily, Bumex 2 mg daily, Coreg 25 mg b.i.d., Apresoline 50 q.8, DuoNeb q.6, isordil 40 mg 3 times a day, oxygen at 3-4 L, Protonix 40 daily, probiotics, and vitamin D 50,000 units weekly.    PAST MEDICAL HISTORY:  Positive for usual childhood diseases.  History of arthritis, chronic persistent atrial fibrillation, history of pacemaker, congestive heart failure, end-stage renal disease on hemodialysis, history of stress test, echocardiogram, left ventricular systolic dysfunction, essential hypertension, and chronic obstructive pulmonary disease on chronic oxygen therapy.    PAST SURGICAL HISTORY:  Includes multiple colonoscopic exams, pregnancy, upper GI panendoscopy.    FAMILY HISTORY:  Parents are .    SOCIAL HISTORY:  The patient is single.  Does live with her daughter.  Does not smoke or drink.    PHYSICAL EXAMINATION:  VITAL SIGNS:  Height 5 feet 3 inches, weight 127 pounds, BMI 22.12.  Blood pressure 168/113, pulse 80, respirations 18.  GENERAL APPEARANCE:  Reveals a pleasant 83-year-old black female, who is resting comfortably.  Alert, responsive, oriented to person, place, and time.  HEENT:  Normal.  Supplemental O2 was on.  NECK:  Revealed flat JVD.  Trachea midline.  Thyroid not palpable.  LUNGS:  Coarse.  Dialysis catheter present on the right side.  HEART:  Regular rate and rhythm compatible with atrial fibrillation.  Grade 1/6 murmur.  ABDOMEN:  Soft.  No guarding, rebound, or rigidity.  EXTREMITIES:  No

## 2024-04-18 LAB
25(OH)D3 SERPL-MCNC: 48.3 NG/ML (ref 30–100)
ALBUMIN SERPL-MCNC: 4 G/DL (ref 3.5–5.2)
ALP SERPL-CCNC: 119 U/L (ref 35–104)
ALT SERPL-CCNC: 8 U/L (ref 0–32)
ANION GAP SERPL CALCULATED.3IONS-SCNC: 17 MMOL/L (ref 7–16)
AST SERPL-CCNC: 15 U/L (ref 0–31)
BASOPHILS # BLD: 0.02 K/UL (ref 0–0.2)
BASOPHILS NFR BLD: 0 % (ref 0–2)
BILIRUB SERPL-MCNC: 0.4 MG/DL (ref 0–1.2)
BUN SERPL-MCNC: 48 MG/DL (ref 6–23)
CALCIUM SERPL-MCNC: 8.2 MG/DL (ref 8.6–10.2)
CHLORIDE SERPL-SCNC: 95 MMOL/L (ref 98–107)
CHOLEST SERPL-MCNC: 145 MG/DL
CO2 SERPL-SCNC: 23 MMOL/L (ref 22–29)
CREAT SERPL-MCNC: 8 MG/DL (ref 0.5–1)
EKG ATRIAL RATE: 35 BPM
EKG ATRIAL RATE: 64 BPM
EKG ATRIAL RATE: 77 BPM
EKG Q-T INTERVAL: 436 MS
EKG Q-T INTERVAL: 474 MS
EKG Q-T INTERVAL: 512 MS
EKG QRS DURATION: 148 MS
EKG QRS DURATION: 188 MS
EKG QRS DURATION: 196 MS
EKG QTC CALCULATION (BAZETT): 536 MS
EKG QTC CALCULATION (BAZETT): 542 MS
EKG QTC CALCULATION (BAZETT): 571 MS
EKG R AXIS: -57 DEGREES
EKG R AXIS: -64 DEGREES
EKG R AXIS: 110 DEGREES
EKG T AXIS: -67 DEGREES
EKG T AXIS: 107 DEGREES
EKG T AXIS: 113 DEGREES
EKG VENTRICULAR RATE: 75 BPM
EKG VENTRICULAR RATE: 77 BPM
EKG VENTRICULAR RATE: 93 BPM
EOSINOPHIL # BLD: 0.11 K/UL (ref 0.05–0.5)
EOSINOPHILS RELATIVE PERCENT: 2 % (ref 0–6)
ERYTHROCYTE [DISTWIDTH] IN BLOOD BY AUTOMATED COUNT: 15.3 % (ref 11.5–15)
FOLATE SERPL-MCNC: 17.3 NG/ML (ref 4.8–24.2)
GFR SERPL CREATININE-BSD FRML MDRD: 5 ML/MIN/1.73M2
GLUCOSE SERPL-MCNC: 111 MG/DL (ref 74–99)
HCT VFR BLD AUTO: 33.4 % (ref 34–48)
HDLC SERPL-MCNC: 45 MG/DL
HGB BLD-MCNC: 10.7 G/DL (ref 11.5–15.5)
IMM GRANULOCYTES # BLD AUTO: <0.03 K/UL (ref 0–0.58)
IMM GRANULOCYTES NFR BLD: 0 % (ref 0–5)
IRON SATN MFR SERPL: 68 % (ref 15–50)
IRON SERPL-MCNC: 95 UG/DL (ref 37–145)
LDLC SERPL CALC-MCNC: 83 MG/DL
LYMPHOCYTES NFR BLD: 0.8 K/UL (ref 1.5–4)
LYMPHOCYTES RELATIVE PERCENT: 13 % (ref 20–42)
MCH RBC QN AUTO: 32.3 PG (ref 26–35)
MCHC RBC AUTO-ENTMCNC: 32 G/DL (ref 32–34.5)
MCV RBC AUTO: 100.9 FL (ref 80–99.9)
MONOCYTES NFR BLD: 0.79 K/UL (ref 0.1–0.95)
MONOCYTES NFR BLD: 13 % (ref 2–12)
NEUTROPHILS NFR BLD: 72 % (ref 43–80)
NEUTS SEG NFR BLD: 4.55 K/UL (ref 1.8–7.3)
PHOSPHATE SERPL-MCNC: 6.4 MG/DL (ref 2.5–4.5)
PLATELET, FLUORESCENCE: 111 K/UL (ref 130–450)
PMV BLD AUTO: 13.2 FL (ref 7–12)
POTASSIUM SERPL-SCNC: 5.1 MMOL/L (ref 3.5–5)
PROT SERPL-MCNC: 7.3 G/DL (ref 6.4–8.3)
PTH-INTACT SERPL-MCNC: 330.6 PG/ML (ref 15–65)
RBC # BLD AUTO: 3.31 M/UL (ref 3.5–5.5)
SODIUM SERPL-SCNC: 135 MMOL/L (ref 132–146)
T4 FREE SERPL-MCNC: 1.3 NG/DL (ref 0.9–1.7)
TIBC SERPL-MCNC: 139 UG/DL (ref 250–450)
TRIGL SERPL-MCNC: 84 MG/DL
TROPONIN I SERPL HS-MCNC: 95 NG/L (ref 0–9)
TSH SERPL DL<=0.05 MIU/L-ACNC: 1.94 UIU/ML (ref 0.27–4.2)
VIT B12 SERPL-MCNC: 806 PG/ML (ref 211–946)
VLDLC SERPL CALC-MCNC: 17 MG/DL
WBC OTHER # BLD: 6.3 K/UL (ref 4.5–11.5)

## 2024-04-18 PROCEDURE — 84439 ASSAY OF FREE THYROXINE: CPT

## 2024-04-18 PROCEDURE — 83970 ASSAY OF PARATHORMONE: CPT

## 2024-04-18 PROCEDURE — 2700000000 HC OXYGEN THERAPY PER DAY

## 2024-04-18 PROCEDURE — 84443 ASSAY THYROID STIM HORMONE: CPT

## 2024-04-18 PROCEDURE — 84484 ASSAY OF TROPONIN QUANT: CPT

## 2024-04-18 PROCEDURE — 6360000002 HC RX W HCPCS

## 2024-04-18 PROCEDURE — 36415 COLL VENOUS BLD VENIPUNCTURE: CPT

## 2024-04-18 PROCEDURE — 93010 ELECTROCARDIOGRAM REPORT: CPT | Performed by: INTERNAL MEDICINE

## 2024-04-18 PROCEDURE — 82607 VITAMIN B-12: CPT

## 2024-04-18 PROCEDURE — 82306 VITAMIN D 25 HYDROXY: CPT

## 2024-04-18 PROCEDURE — 2060000000 HC ICU INTERMEDIATE R&B

## 2024-04-18 PROCEDURE — 80053 COMPREHEN METABOLIC PANEL: CPT

## 2024-04-18 PROCEDURE — 99222 1ST HOSP IP/OBS MODERATE 55: CPT | Performed by: SURGERY

## 2024-04-18 PROCEDURE — 6370000000 HC RX 637 (ALT 250 FOR IP)

## 2024-04-18 PROCEDURE — 83550 IRON BINDING TEST: CPT

## 2024-04-18 PROCEDURE — 6370000000 HC RX 637 (ALT 250 FOR IP): Performed by: INTERNAL MEDICINE

## 2024-04-18 PROCEDURE — 93005 ELECTROCARDIOGRAM TRACING: CPT | Performed by: INTERNAL MEDICINE

## 2024-04-18 PROCEDURE — 2500000003 HC RX 250 WO HCPCS

## 2024-04-18 PROCEDURE — 80061 LIPID PANEL: CPT

## 2024-04-18 PROCEDURE — 82746 ASSAY OF FOLIC ACID SERUM: CPT

## 2024-04-18 PROCEDURE — 2580000003 HC RX 258

## 2024-04-18 PROCEDURE — 84100 ASSAY OF PHOSPHORUS: CPT

## 2024-04-18 PROCEDURE — 99232 SBSQ HOSP IP/OBS MODERATE 35: CPT | Performed by: INTERNAL MEDICINE

## 2024-04-18 PROCEDURE — 83540 ASSAY OF IRON: CPT

## 2024-04-18 PROCEDURE — 85025 COMPLETE CBC W/AUTO DIFF WBC: CPT

## 2024-04-18 RX ORDER — OXYCODONE HYDROCHLORIDE AND ACETAMINOPHEN 5; 325 MG/1; MG/1
1 TABLET ORAL EVERY 6 HOURS PRN
Status: DISCONTINUED | OUTPATIENT
Start: 2024-04-18 | End: 2024-04-27 | Stop reason: HOSPADM

## 2024-04-18 RX ORDER — CALCIUM ACETATE 667 MG/1
1 CAPSULE ORAL
Status: DISCONTINUED | OUTPATIENT
Start: 2024-04-18 | End: 2024-04-27 | Stop reason: HOSPADM

## 2024-04-18 RX ORDER — METOPROLOL SUCCINATE 25 MG/1
25 TABLET, EXTENDED RELEASE ORAL DAILY
Status: DISCONTINUED | OUTPATIENT
Start: 2024-04-18 | End: 2024-04-19

## 2024-04-18 RX ADMIN — WATER 1000 MG: 1 INJECTION INTRAMUSCULAR; INTRAVENOUS; SUBCUTANEOUS at 05:09

## 2024-04-18 RX ADMIN — HYDRALAZINE HYDROCHLORIDE 75 MG: 50 TABLET ORAL at 15:26

## 2024-04-18 RX ADMIN — BUMETANIDE 2 MG: 1 TABLET ORAL at 08:34

## 2024-04-18 RX ADMIN — NITROGLYCERIN 1 INCH: 20 OINTMENT TOPICAL at 18:08

## 2024-04-18 RX ADMIN — ATORVASTATIN CALCIUM 40 MG: 40 TABLET, FILM COATED ORAL at 20:10

## 2024-04-18 RX ADMIN — ACETAMINOPHEN 650 MG: 325 TABLET ORAL at 20:16

## 2024-04-18 RX ADMIN — APIXABAN 2.5 MG: 2.5 TABLET, FILM COATED ORAL at 20:11

## 2024-04-18 RX ADMIN — HYDRALAZINE HYDROCHLORIDE 75 MG: 50 TABLET ORAL at 20:11

## 2024-04-18 RX ADMIN — ISOSORBIDE DINITRATE 40 MG: 20 TABLET ORAL at 15:24

## 2024-04-18 RX ADMIN — CARVEDILOL 25 MG: 25 TABLET, FILM COATED ORAL at 08:34

## 2024-04-18 RX ADMIN — HYDRALAZINE HYDROCHLORIDE 75 MG: 50 TABLET ORAL at 08:33

## 2024-04-18 RX ADMIN — METOPROLOL SUCCINATE 25 MG: 25 TABLET, FILM COATED, EXTENDED RELEASE ORAL at 18:09

## 2024-04-18 RX ADMIN — ISOSORBIDE DINITRATE 40 MG: 20 TABLET ORAL at 08:33

## 2024-04-18 RX ADMIN — APIXABAN 2.5 MG: 2.5 TABLET, FILM COATED ORAL at 08:34

## 2024-04-18 RX ADMIN — OXYCODONE AND ACETAMINOPHEN 1 TABLET: 5; 325 TABLET ORAL at 18:09

## 2024-04-18 RX ADMIN — PANTOPRAZOLE SODIUM 40 MG: 40 TABLET, DELAYED RELEASE ORAL at 05:10

## 2024-04-18 RX ADMIN — ACETAMINOPHEN 650 MG: 325 TABLET ORAL at 10:19

## 2024-04-18 RX ADMIN — ASPIRIN 81 MG: 81 TABLET, COATED ORAL at 08:33

## 2024-04-18 RX ADMIN — ISOSORBIDE DINITRATE 40 MG: 20 TABLET ORAL at 20:10

## 2024-04-18 RX ADMIN — CALCIUM ACETATE 667 MG: 667 CAPSULE ORAL at 18:09

## 2024-04-18 RX ADMIN — Medication 1 CAPSULE: at 08:34

## 2024-04-18 RX ADMIN — CARVEDILOL 25 MG: 25 TABLET, FILM COATED ORAL at 18:09

## 2024-04-18 RX ADMIN — CARVEDILOL 25 MG: 25 TABLET, FILM COATED ORAL at 08:33

## 2024-04-18 RX ADMIN — CALCIUM ACETATE 667 MG: 667 CAPSULE ORAL at 12:16

## 2024-04-18 ASSESSMENT — PAIN SCALES - GENERAL
PAINLEVEL_OUTOF10: 0
PAINLEVEL_OUTOF10: 10
PAINLEVEL_OUTOF10: 4
PAINLEVEL_OUTOF10: 0
PAINLEVEL_OUTOF10: 10

## 2024-04-18 ASSESSMENT — PAIN DESCRIPTION - LOCATION
LOCATION: CHEST;BACK
LOCATION: CHEST;BACK
LOCATION: BACK

## 2024-04-18 NOTE — PROGRESS NOTES
Internal Medicine Progress Note    [facility]    Jose Rodrigez D.O., SONIAI.                    Eddi Almonte D.O., ANA ROSA Abad D.O.       Ifrah Dave, MSN, APRN, NP-C  Edward Edwards, MSN, APRN-CNP  Aiden Muñoz, MSN, APRN-CNP     Primary Care Physician: Marek Andres DO   Admitting Physician:  Jose Rodrigez DO  Admission date and time: 4/16/2024  9:11 PM    Room:  [address] [place], [place] [address]  Admitting diagnosis: Chest pain [R07.9]  ESRD on dialysis (HCC) [N18.6, Z99.2]  Chest pain, unspecified type [R07.9]    Patient Name: Marge Callejas  MRN: [id number]    Date of Service: 4/18/2024     Subjective:  Marge is a 83 y.o. female who was seen and examined today,4/18/2024, at the bedside.  Patient appears more alert today.  She is resting in bed comfortably on 4 L oxygen nasal cannula with her family at the bedside.  She continues to complain of sharp intermittent lower sternal pain that is nonradiating.  Will consult general surgery regarding cholelithiasis.    family present during my examination.    Review of System:   Constitutional:   Denies fever or chills, weight loss or gain, reports fatigue or malaise.  HEENT:   Denies ear pain, sore throat, sinus or eye problems.  Hard of hearing  Cardiovascular:   Denies any , irregular heartbeats, or palpitations.  Reports intermittent sharp nonradiating chest pain  Respiratory:   Denies , coughing, sputum production, hemoptysis, or wheezing.  Positive dyspneic with exertion  Gastrointestinal:   Denies nausea, vomiting, diarrhea, or constipation.  Denies any abdominal pain.  Genitourinary:    Denies any urgency, frequency, hematuria. Voiding  without difficulty.  Extremities:   Denies lower extremity swelling, edema or cyanosis.   Neurology:    Denies any headache or focal neurological deficits, Denies generalized weakness or memory difficulty.   Psch:   Denies being anxious or  Eliquis    Continue current therapy.  See orders for further plan of care.    More than 50% of my  time was spent at the bedside counseling/coordinating care with the patient and/or family with face to face contact.  This time was spent reviewing notes and laboratory data as well as instructing and counseling the patient. Time I spent with the family or surrogate(s) is included only if the patient was incapable of providing the necessary information or participating in medical decisions. I also discussed the differential diagnosis and all of the proposed management plans with the patient and individuals accompanying the patient.    The patient was seen, examined and then discussed with Dr. Rodrigez.     MADELAINE Gorman - CNP  4/18/2024  2:35 PM        I saw and evaluated the patient. I agree with the findings and the plan of care as documented in Aiden BURKETT-CNP note.    Jose Rodrigez DO, FACOI  3:22 PM  4/18/2024

## 2024-04-18 NOTE — PROGRESS NOTES
Patient experiencing 10/10 Left sided chest pain. EKG obtained. Vitals obtained and stable. Call made to Dr. Rodrigez. Dr. Rodrigez ordered 5-325mg percocet to be given Q6 prn. Also ordered for Cardiology to be contacted. Spoke to Dr. Sidhu Cardiology who ordered 25 metop XL to be given 1x daily starting now, and NITRO-BID 1-inch anterior chest wall. Q6.

## 2024-04-18 NOTE — PROGRESS NOTES
[facility]  MD Murtaza White, MD Alban Ross, MD Krista Osorio, CNP   Lucille Beth, JOSE Fernandez, CNP  Progress Note    4/18/2024    SUBJECTIVE:   4/18: Laying in bed. No acute distress. Does have some dyspnea, though nothing out of the ordinary for her. Denies any chest pain today. Vital signs are stable.    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 *         DIET:    ADULT DIET; Regular; Low Fat/Low Chol/High Fiber/REGI     MEDS (scheduled):    apixaban  2.5 mg Oral BID    aspirin  81 mg Oral Daily    atorvastatin  40 mg Oral Nightly    bumetanide  2 mg Oral Daily    carvedilol  25 mg Oral 2 times per day on Sun Tue Wed Thu Sat    carvedilol  25 mg Oral Once per day on Mon Thu    hydrALAZINE  75 mg Oral TID    isosorbide dinitrate  40 mg Oral TID    pantoprazole  40 mg Oral QAM AC    lactobacillus  1 capsule Oral Daily    [START ON 4/22/2024] vitamin D  50,000 Units Oral Weekly    cefTRIAXone (ROCEPHIN) IV  1,000 mg IntraVENous Q24H       MEDS (infusions):      MEDS (prn):  ipratropium 0.5 mg-albuterol 2.5 mg, ondansetron, acetaminophen    PHYSICAL EXAM:     Patient Vitals for the past 24 hrs:   BP Temp Temp src Pulse Resp SpO2   04/18/24 0815 126/71 97.6 °F (36.4 °C) Oral 76 18 93 %   04/18/24 0500 113/73 98.4 °F (36.9 °C) Oral -- -- 95 %   04/18/24 0000 (!) 113/99 98.7 °F (37.1 °C) Oral 76 19 93 %   04/17/24 2030 (!) 115/57 100.2 °F (37.9 °C) Oral 75 17 92 %   04/17/24 1500 (!) 133/58 98.3 °F (36.8 °C) Oral 87 13 93 %   @    No intake or output data in the 24 hours ending 04/18/24 1008      Wt Readings

## 2024-04-18 NOTE — ACP (ADVANCE CARE PLANNING)
Advance Care Planning   Healthcare Decision Maker:    Primary Decision Maker: Jonathan Feldman - Child - [phone]

## 2024-04-18 NOTE — CONSULTS
reviewed.    General ROS: negative obtundation  ENT ROS: negative rhinorrhea, epistaxis  Allergy and Immunology ROS: negative itchy/watery eyes or nasal congestion  Hematological and Lymphatic ROS: negative spontaneous bleeding or bruising  Endocrine ROS: negative  lethargy, mood swings, palpitations or polydipsia/polyuria  Respiratory ROS: negative sputum changes, stridor, tachypnea or wheezing  Cardiovascular ROS: negative for -positive for dyspnea, rib/sternal pain  Gastrointestinal ROS: negative for - hematochezia or hematemesis  Genito-Urinary ROS: negative for -  genital discharge or hematuria  Musculoskeletal ROS: negative for - focal weakness, gangrene, reproducible left-sided chest pain  Psych/Neuro ROS: negative for - visual or auditory hallucinations, suicidal ideation      Physical exam:   /71   Pulse 76   Temp 98.4 °F (36.9 °C) (Oral)   Resp 15   Ht 1.615 m (5' 3.58\")   Wt 57.7 kg (127 lb 3.3 oz)   SpO2 92%   BMI 22.12 kg/m²   General appearance:  lying in bed, NAD  Head: NCAT, PERRL, EOMI, red conjunctiva  Neck: supple, no masses, trachea midline  Lungs: symmetric chest rise, no audible wheezes  Heart: warm throughout  Abdomen: soft, non-distended, non-tender to palpation throughout  Skin: warm and dry, no cyanosis  MSK: Tenderness along left rib wall towards her flank/back  Extremities: atraumatic, no focal motor deficits, no open wounds  Psych: no tremor, visual hallucinations      Radiology: I reviewed relevant abdominal imaging from this admission and that available in the EMR including CT abd/pel from 4/16/2024.  Patient has evidence of cardiomegaly, diffuse calcifications of the pancreas, saccular aneurysm cholelithiasis.    Assessment:  Marge Callejas is a 83 y.o. female with cholelithiasis     Patient Active Problem List   Diagnosis    Shortness of breath    Chronic combined systolic and diastolic congestive heart failure (HCC)    Chronic renal insufficiency, stage IV (severe)

## 2024-04-18 NOTE — PROGRESS NOTES
INPATIENT CARDIOLOGY FOLLOW-UP    Name: Marge Callejas    Age: 83 y.o.    Date of Admission: 4/16/2024  9:11 PM    Date of Service: 4/18/2024    Primary Cardiologist: Dr Mclaughlin    Chief Complaint: Follow-up for chest pain    Interim History:  Voiced some displeasure with nursing care overnight.  Denies chest pain or shortness of breath.    Review of Systems:   Negative except as described above    Problem List:  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 ([facility])    Coronary artery disease involving native coronary artery of native heart without angina pectoris    Abnormal EKG    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)       Current Medications:    Current Facility-Administered Medications:     calcium acetate (PHOSLO) capsule 667 mg, 1 capsule, Oral, TID FERNY, Palmira Fernandez, MADELAINE - CNP    apixaban (ELIQUIS) tablet 2.5 mg, 2.5 mg, Oral, BID, Aiden Muñoz APRN - CNP, 2.5 mg at 04/18/24 0834    aspirin EC tablet 81 mg, 81 mg, Oral, Daily, Aiden Muñoz APRN - CNP, 81 mg at 04/18/24 0833    atorvastatin (LIPITOR) tablet 40 mg, 40 mg, Oral, Nightly, Aiden Muñoz APRN - CNP, 40 mg at 04/17/24 2032    bumetanide (BUMEX) tablet 2 mg, 2 mg, Oral, Daily, Aiden Muñoz APRN - CNP, 2 mg at 04/18/24 0834    carvedilol  rales, or rhonchi.  Cardiac: PMI nondisplaced, Regular rhythm with a normal rate, S1 & S2 normal, no murmurs.  Left chest CRT-D.  Right chest tunneled HD catheter  Abdomen: Soft, nontender, +bowel sounds  Extremities: Moves all extremities x 4, no lower extremity edema  Neurologic: No focal motor deficits apparent, normal mood and affect  Peripheral Pulses: Intact posterior tibial pulses bilaterally    Intake/Output:  No intake or output data in the 24 hours ending 04/18/24 1051  No intake/output data recorded.    Laboratory Tests:  Recent Labs     04/16/24 2139 04/18/24  0702    135   K 5.2* 5.1*   CL 98 95*   CO2 28 23   BUN 34* 48*   CREATININE 6.2* 8.0*   GLUCOSE 105* 111*   CALCIUM 8.5* 8.2*     Lab Results   Component Value Date/Time    MG 1.9 09/20/2023 08:20 AM     Recent Labs     04/16/24 2139 04/18/24  0702   ALKPHOS 141* 119*   ALT 10 8   AST 16 15   PROT 7.2 7.3   BILITOT 0.4 0.4     Recent Labs     04/16/24 2139 04/18/24  0702   WBC 7.3 6.3   RBC 3.46* 3.31*   HGB 11.4* 10.7*   HCT 34.7 33.4*   .3* 100.9*   MCH 32.9 32.3   MCHC 32.9 32.0   RDW 15.2* 15.3*   MPV 13.2* 13.2*     Lab Results   Component Value Date    CKTOTAL 58 07/25/2015    CKMB 3.8 07/25/2015    TROPONINI 0.03 05/15/2021    TROPONINI 0.06 (H) 04/14/2021    TROPONINI 0.09 (H) 04/14/2021     Lab Results   Component Value Date    INR 1.4 09/18/2023    INR 1.7 05/15/2021    INR 1.4 04/15/2021    PROTIME 16.3 (H) 09/18/2023    PROTIME 20.2 (H) 05/15/2021    PROTIME 16.8 (H) 04/15/2021     Lab Results   Component Value Date    TSH 1.94 04/18/2024     Lab Results   Component Value Date    LABA1C 5.6 10/04/2019     No results found for: \"EAG\"  Lab Results   Component Value Date    CHOL 145 04/18/2024    CHOL 124 09/19/2023    CHOL 76 04/01/2021     Lab Results   Component Value Date    TRIG 84 04/18/2024    TRIG 131 09/19/2023    TRIG 95 04/01/2021     Lab Results   Component Value Date    HDL 45 04/18/2024    HDL 37 (L)

## 2024-04-18 NOTE — CONSULTS
7.2 7.3   CALCIUM 8.5* 8.2*   PHOS  --  6.4*   BILITOT 0.4 0.4   ALKPHOS 141* 119*   AST 16 15   ALT 10 8       Physical exam:   VITALS:  Blood pressure 126/71, pulse 76, temperature 97.6 °F (36.4 °C), temperature source Oral, resp. rate 18, height 1.615 m (5' 3.58\"), weight 57.7 kg (127 lb 3.3 oz), SpO2 93 %.  General appearance: alert, appears stated age and cooperative  Head: Normocephalic, without obvious abnormality, atraumatic  Eyes: PERRL, EOMI  Neck: no adenopathy, no JVD, trachea midline   Lungs: clear to auscultation bilaterally  Heart: regular rate and rhythm,   Abdomen: soft, no RUQ pain, left ribs are painful on palpation, vague sternal pain,  bowel sounds normal; no hernias palpable  Extremities: extremities normal, atraumatic, no cyanosis or edema    ASSESSMENT:   Chest pain  Left rib pain on palpation.  Gallstones in the gallbladder have been present for many years, she does not want her gallbladder removed unless absolutely necessary.  No RUQ abdominal pain, no right rib pain.    PLAN:  If RUQ pain develops, HIDA can be done to check for obstruction.  She does not want her gallbladder removed at this time.    Signed: Dr. Jose Quesada Jr, M.D.    Send copy of H&P to PCP, Marek Andres,

## 2024-04-18 NOTE — PLAN OF CARE
Problem: Discharge Planning  Goal: Discharge to home or other facility with appropriate resources  4/18/2024 1031 by Lyly Edwards RN  Outcome: Progressing  4/18/2024 0635 by Nury Russell RN  Outcome: Progressing     Problem: ABCDS Injury Assessment  Goal: Absence of physical injury  4/18/2024 1031 by Lyly Edwards RN  Outcome: Progressing  4/18/2024 0635 by Nury Russell RN  Outcome: Progressing     Problem: Safety - Adult  Goal: Free from fall injury  4/18/2024 1031 by Lyly Edwards RN  Outcome: Progressing  4/18/2024 0635 by Nury Russell RN  Outcome: Progressing     Problem: Chronic Conditions and Co-morbidities  Goal: Patient's chronic conditions and co-morbidity symptoms are monitored and maintained or improved  Outcome: Progressing     Problem: Pain  Goal: Verbalizes/displays adequate comfort level or baseline comfort level  Outcome: Progressing

## 2024-04-18 NOTE — CARE COORDINATION
4/18/2024 1700 CM met with pt and her daughter Jonathan at the bedside for transition of care needs. Pt resides in a ranch home with no LINDA and her dtr Remington lives with her buit her dtr does own a home in [place] and has to back back every month.pt's daughter transports her to [facility].  Pt has O2 through [facility] at 4 liters and a nebulizer.  Pt has a walker, cane and a wheelchair.  She goes to dialysis at [facility] and [facility] transports her.  Pt has had HHC in the past and is receptive to it again.  PCP is Dr Andres and uses locally [facility] on [facility] [facility] and for 90 day supply [facility] by [address].  CM will follow. Electronically signed by Krista Penn RN on 4/18/2024 at 5:39 PM

## 2024-04-19 ENCOUNTER — APPOINTMENT (OUTPATIENT)
Dept: NUCLEAR MEDICINE | Age: 84
End: 2024-04-19
Payer: MEDICARE

## 2024-04-19 ENCOUNTER — APPOINTMENT (OUTPATIENT)
Age: 84
End: 2024-04-19
Payer: MEDICARE

## 2024-04-19 LAB
ALBUMIN SERPL-MCNC: 3.4 G/DL (ref 3.5–5.2)
ALP SERPL-CCNC: 108 U/L (ref 35–104)
ALT SERPL-CCNC: 8 U/L (ref 0–32)
ANION GAP SERPL CALCULATED.3IONS-SCNC: 15 MMOL/L (ref 7–16)
AST SERPL-CCNC: 13 U/L (ref 0–31)
BASOPHILS # BLD: 0 K/UL (ref 0–0.2)
BASOPHILS NFR BLD: 0 % (ref 0–2)
BILIRUB SERPL-MCNC: 0.3 MG/DL (ref 0–1.2)
BUN SERPL-MCNC: 60 MG/DL (ref 6–23)
CALCIUM SERPL-MCNC: 7.7 MG/DL (ref 8.6–10.2)
CHLORIDE SERPL-SCNC: 97 MMOL/L (ref 98–107)
CO2 SERPL-SCNC: 23 MMOL/L (ref 22–29)
CREAT SERPL-MCNC: 9.2 MG/DL (ref 0.5–1)
ECHO BSA: 1.61 M2
EKG ATRIAL RATE: 63 BPM
EKG Q-T INTERVAL: 500 MS
EKG QRS DURATION: 186 MS
EKG QTC CALCULATION (BAZETT): 565 MS
EKG R AXIS: -68 DEGREES
EKG T AXIS: 108 DEGREES
EKG VENTRICULAR RATE: 77 BPM
EOSINOPHIL # BLD: 0.33 K/UL (ref 0.05–0.5)
EOSINOPHILS RELATIVE PERCENT: 5 % (ref 0–6)
ERYTHROCYTE [DISTWIDTH] IN BLOOD BY AUTOMATED COUNT: 15.6 % (ref 11.5–15)
GFR SERPL CREATININE-BSD FRML MDRD: 4 ML/MIN/1.73M2
GLUCOSE SERPL-MCNC: 95 MG/DL (ref 74–99)
HCT VFR BLD AUTO: 31 % (ref 34–48)
HGB BLD-MCNC: 9.8 G/DL (ref 11.5–15.5)
LYMPHOCYTES NFR BLD: 0.67 K/UL (ref 1.5–4)
LYMPHOCYTES RELATIVE PERCENT: 11 % (ref 20–42)
MCH RBC QN AUTO: 32.2 PG (ref 26–35)
MCHC RBC AUTO-ENTMCNC: 31.6 G/DL (ref 32–34.5)
MCV RBC AUTO: 102 FL (ref 80–99.9)
MONOCYTES NFR BLD: 0.89 K/UL (ref 0.1–0.95)
MONOCYTES NFR BLD: 14 % (ref 2–12)
NEUTROPHILS NFR BLD: 70 % (ref 43–80)
NEUTS SEG NFR BLD: 4.51 K/UL (ref 1.8–7.3)
NUC STRESS EJECTION FRACTION: 42 %
PLATELET # BLD AUTO: 97 K/UL (ref 130–450)
PLATELET CONFIRMATION: NORMAL
PMV BLD AUTO: 12.8 FL (ref 7–12)
POTASSIUM SERPL-SCNC: 5.1 MMOL/L (ref 3.5–5)
PROT SERPL-MCNC: 6.3 G/DL (ref 6.4–8.3)
RBC # BLD AUTO: 3.04 M/UL (ref 3.5–5.5)
RBC # BLD: ABNORMAL 10*6/UL
RBC # BLD: ABNORMAL 10*6/UL
SODIUM SERPL-SCNC: 135 MMOL/L (ref 132–146)
STRESS BASELINE DIAS BP: 91 MMHG
STRESS BASELINE HR: 83 BPM
STRESS BASELINE SYS BP: 119 MMHG
STRESS O2 SAT REST: 90 %
STRESS STAGE 1 BP: NORMAL MMHG
STRESS STAGE 1 DURATION: 1 MIN:SEC
STRESS STAGE 1 HR: 90 BPM
STRESS STAGE 2 COMMENTS: NORMAL
STRESS STAGE RECOVERY 1 BP: NORMAL MMHG
STRESS STAGE RECOVERY 1 DURATION: 1 MIN:SEC
STRESS STAGE RECOVERY 1 HR: 91 BPM
STRESS STAGE RECOVERY 2 BP: NORMAL MMHG
STRESS STAGE RECOVERY 2 DURATION: 2 MIN:SEC
STRESS STAGE RECOVERY 2 HR: 82 BPM
STRESS STAGE RECOVERY 3 BP: NORMAL MMHG
STRESS STAGE RECOVERY 3 DURATION: 3 MIN:SEC
STRESS STAGE RECOVERY 3 HR: 89 BPM
STRESS STAGE RECOVERY 4 BP: NORMAL MMHG
STRESS STAGE RECOVERY 4 COMMENTS: NORMAL
STRESS STAGE RECOVERY 4 DURATION: 4 MIN:SEC
STRESS STAGE RECOVERY 4 HR: 83 BPM
STRESS TARGET HR: 139 BPM
TID: 1.02
TROPONIN I SERPL HS-MCNC: 90 NG/L (ref 0–9)
WBC OTHER # BLD: 6.4 K/UL (ref 4.5–11.5)

## 2024-04-19 PROCEDURE — 5A1D70Z PERFORMANCE OF URINARY FILTRATION, INTERMITTENT, LESS THAN 6 HOURS PER DAY: ICD-10-PCS

## 2024-04-19 PROCEDURE — 93018 CV STRESS TEST I&R ONLY: CPT | Performed by: INTERNAL MEDICINE

## 2024-04-19 PROCEDURE — 93010 ELECTROCARDIOGRAM REPORT: CPT | Performed by: INTERNAL MEDICINE

## 2024-04-19 PROCEDURE — 99233 SBSQ HOSP IP/OBS HIGH 50: CPT | Performed by: INTERNAL MEDICINE

## 2024-04-19 PROCEDURE — 2700000000 HC OXYGEN THERAPY PER DAY

## 2024-04-19 PROCEDURE — 6370000000 HC RX 637 (ALT 250 FOR IP)

## 2024-04-19 PROCEDURE — 93017 CV STRESS TEST TRACING ONLY: CPT

## 2024-04-19 PROCEDURE — 6360000002 HC RX W HCPCS

## 2024-04-19 PROCEDURE — 99232 SBSQ HOSP IP/OBS MODERATE 35: CPT | Performed by: SURGERY

## 2024-04-19 PROCEDURE — 90935 HEMODIALYSIS ONE EVALUATION: CPT

## 2024-04-19 PROCEDURE — 36415 COLL VENOUS BLD VENIPUNCTURE: CPT

## 2024-04-19 PROCEDURE — 80053 COMPREHEN METABOLIC PANEL: CPT

## 2024-04-19 PROCEDURE — 6360000002 HC RX W HCPCS: Performed by: INTERNAL MEDICINE

## 2024-04-19 PROCEDURE — 93016 CV STRESS TEST SUPVJ ONLY: CPT | Performed by: INTERNAL MEDICINE

## 2024-04-19 PROCEDURE — 2580000003 HC RX 258

## 2024-04-19 PROCEDURE — 2060000000 HC ICU INTERMEDIATE R&B

## 2024-04-19 PROCEDURE — A9500 TC99M SESTAMIBI: HCPCS | Performed by: RADIOLOGY

## 2024-04-19 PROCEDURE — 78452 HT MUSCLE IMAGE SPECT MULT: CPT

## 2024-04-19 PROCEDURE — 78452 HT MUSCLE IMAGE SPECT MULT: CPT | Performed by: INTERNAL MEDICINE

## 2024-04-19 PROCEDURE — 6370000000 HC RX 637 (ALT 250 FOR IP): Performed by: INTERNAL MEDICINE

## 2024-04-19 PROCEDURE — 3430000000 HC RX DIAGNOSTIC RADIOPHARMACEUTICAL: Performed by: RADIOLOGY

## 2024-04-19 PROCEDURE — 85025 COMPLETE CBC W/AUTO DIFF WBC: CPT

## 2024-04-19 PROCEDURE — 84484 ASSAY OF TROPONIN QUANT: CPT

## 2024-04-19 RX ORDER — REGADENOSON 0.08 MG/ML
0.4 INJECTION, SOLUTION INTRAVENOUS
Status: COMPLETED | OUTPATIENT
Start: 2024-04-19 | End: 2024-04-19

## 2024-04-19 RX ORDER — TETRAKIS(2-METHOXYISOBUTYLISOCYANIDE)COPPER(I) TETRAFLUOROBORATE 1 MG/ML
10 INJECTION, POWDER, LYOPHILIZED, FOR SOLUTION INTRAVENOUS
Status: COMPLETED | OUTPATIENT
Start: 2024-04-19 | End: 2024-04-19

## 2024-04-19 RX ORDER — TETRAKIS(2-METHOXYISOBUTYLISOCYANIDE)COPPER(I) TETRAFLUOROBORATE 1 MG/ML
30 INJECTION, POWDER, LYOPHILIZED, FOR SOLUTION INTRAVENOUS
Status: COMPLETED | OUTPATIENT
Start: 2024-04-19 | End: 2024-04-19

## 2024-04-19 RX ADMIN — APIXABAN 2.5 MG: 2.5 TABLET, FILM COATED ORAL at 08:36

## 2024-04-19 RX ADMIN — NITROGLYCERIN 1 INCH: 20 OINTMENT TOPICAL at 18:12

## 2024-04-19 RX ADMIN — ATORVASTATIN CALCIUM 40 MG: 40 TABLET, FILM COATED ORAL at 20:12

## 2024-04-19 RX ADMIN — ONDANSETRON 4 MG: 2 INJECTION INTRAMUSCULAR; INTRAVENOUS at 08:01

## 2024-04-19 RX ADMIN — APIXABAN 2.5 MG: 2.5 TABLET, FILM COATED ORAL at 20:12

## 2024-04-19 RX ADMIN — NITROGLYCERIN 1 INCH: 20 OINTMENT TOPICAL at 05:29

## 2024-04-19 RX ADMIN — Medication 1 CAPSULE: at 18:12

## 2024-04-19 RX ADMIN — REGADENOSON 0.4 MG: 0.08 INJECTION, SOLUTION INTRAVENOUS at 13:55

## 2024-04-19 RX ADMIN — BUMETANIDE 2 MG: 1 TABLET ORAL at 08:36

## 2024-04-19 RX ADMIN — NITROGLYCERIN 1 INCH: 20 OINTMENT TOPICAL at 01:13

## 2024-04-19 RX ADMIN — OXYCODONE AND ACETAMINOPHEN 1 TABLET: 5; 325 TABLET ORAL at 05:28

## 2024-04-19 RX ADMIN — Medication 30 MILLICURIE: at 14:00

## 2024-04-19 RX ADMIN — ISOSORBIDE DINITRATE 40 MG: 20 TABLET ORAL at 20:12

## 2024-04-19 RX ADMIN — ASPIRIN 81 MG: 81 TABLET, COATED ORAL at 08:36

## 2024-04-19 RX ADMIN — Medication 10 MILLICURIE: at 12:43

## 2024-04-19 RX ADMIN — PANTOPRAZOLE SODIUM 40 MG: 40 TABLET, DELAYED RELEASE ORAL at 05:29

## 2024-04-19 RX ADMIN — WATER 1000 MG: 1 INJECTION INTRAMUSCULAR; INTRAVENOUS; SUBCUTANEOUS at 05:29

## 2024-04-19 RX ADMIN — HYDRALAZINE HYDROCHLORIDE 75 MG: 50 TABLET ORAL at 20:12

## 2024-04-19 ASSESSMENT — PAIN SCALES - GENERAL
PAINLEVEL_OUTOF10: 10
PAINLEVEL_OUTOF10: 6
PAINLEVEL_OUTOF10: 0

## 2024-04-19 ASSESSMENT — PAIN DESCRIPTION - ORIENTATION: ORIENTATION: LEFT

## 2024-04-19 ASSESSMENT — PAIN DESCRIPTION - LOCATION: LOCATION: CHEST;BACK

## 2024-04-19 ASSESSMENT — PAIN DESCRIPTION - DESCRIPTORS: DESCRIPTORS: ACHING;DISCOMFORT

## 2024-04-19 NOTE — CARE COORDINATION
4/19/2024 220p (SF) SS NOTE: SW attempted to meet with pt to discuss dc planning. Pt currently off the floor for dialysis. Will attempt back to discuss dc planning. Therapy consulted for recommendations. Pt has O2 through [facility] at 4 liters and a nebulizer. Go[facility]. Pt has had HHC in the past and is receptive to it again. SW will follow for dc planning.

## 2024-04-19 NOTE — PROGRESS NOTES
OT SESSION ATTEMPT     Date:2024  Patient Name: Marge Callejas  MRN: [id number]  : 1940  Room: [address] [place] [address]     Attempted OT session this date:    [] unavailable due to other medical staff currently with pt   [] unavailable per nursing staff recommendation    [] Unavailable per nursing staff secondary to lab / radiology results    [] pt declined due to ____.  Benefits of participation in therapy reviewed with pt.    [x] off unit-HD in PM   [] Other:     Will reattempt OT evaluation and/or treatment at a later time.    Raisa Casas OTR/L; [id number]

## 2024-04-19 NOTE — PROGRESS NOTES
INPATIENT CARDIOLOGY FOLLOW-UP    Name: Marge Callejas    Age: 83 y.o.    Date of Admission: 4/16/2024  9:11 PM    Date of Service: 4/19/2024    Primary Cardiologist: Dr Mclaughlin    Chief Complaint: Follow-up for chest pain    Interim History:  Had another episode of chest pain last night that was different than her presenting sharp chest pain.  States this felt a tightness around her chest lasted a while and she was given transdermal nitroglycerin.  Denies chest pain this morning.    Review of Systems:   Negative except as described above    Problem List:  Patient Active Problem List   Diagnosis    Shortness of breath    Chronic combined systolic and diastolic congestive heart failure (HCC)    Chronic renal insufficiency, stage IV (severe) ([facility])    Atrial fibrillation (HCC)    Hypertension    Abnormal chest x-ray    Anemia of chronic disease    Tobacco abuse counseling    Acute on chronic combined systolic and diastolic CHF (congestive heart failure) (HCC)    Acute systolic CHF (congestive heart failure) ([facility])    Mixed restrictive and obstructive lung disease (HCC)    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 ([facility])    Coronary artery disease involving native coronary artery of native heart without angina pectoris    Abnormal EKG    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)       Current Medications:    Current Facility-Administered Medications:     regadenoson (LEXISCAN) injection 0.4 mg, 0.4 mg, IntraVENous, ONCE PRN, Jimbo Brunner MD    technetium sestamibi (CARDIOLITE) injection 10 millicurie, 10 millicurie, IntraVENous, ONCE PRN, Gerard Rand MD    calcium acetate (PHOSLO) capsule 667 mg, 1 capsule, Oral, TID FERNY, Palmira Fernandez, APRN - CNP, 667 mg at 04/18/24 1809    oxyCODONE-acetaminophen (PERCOCET) 5-325 MG  3.46* 3.31* 3.04*   HGB 11.4* 10.7* 9.8*   HCT 34.7 33.4* 31.0*   .3* 100.9* 102.0*   MCH 32.9 32.3 32.2   MCHC 32.9 32.0 31.6*   RDW 15.2* 15.3* 15.6*   PLT  --   --  97*   MPV 13.2* 13.2* 12.8*     Lab Results   Component Value Date    CKTOTAL 58 2015    CKMB 3.8 2015    TROPONINI 0.03 05/15/2021    TROPONINI 0.06 (H) 2021    TROPONINI 0.09 (H) 2021     Lab Results   Component Value Date    INR 1.4 2023    INR 1.7 05/15/2021    INR 1.4 04/15/2021    PROTIME 16.3 (H) 2023    PROTIME 20.2 (H) 05/15/2021    PROTIME 16.8 (H) 04/15/2021     Lab Results   Component Value Date    TSH 1.94 2024     Lab Results   Component Value Date    LABA1C 5.6 10/04/2019     No results found for: \"EAG\"  Lab Results   Component Value Date    CHOL 145 2024    CHOL 124 2023    CHOL 76 2021     Lab Results   Component Value Date    TRIG 84 2024    TRIG 131 2023    TRIG 95 2021     Lab Results   Component Value Date    HDL 45 2024    HDL 37 (L) 2023    HDL 52 2023     Lab Results   Component Value Date    LDLCALC 51 2023    LDLCALC 35 2021    LDLCALC 58 10/04/2019    LDLCHOLESTEROL 83 2024    LDLCHOLESTEROL 61 2023     Lab Results   Component Value Date    VLDL 17 2024    VLDL 26 2023     No results found for: \"CHOLHDLRATIO\"  Recent Labs     249   PROBNP 61,643*       Cardiac Tests:    EK2024: Atrial fibrillation with ventricular paced complexes 75 bpm    2024: Atrial fibrillation with intermittent V paced complexes.  Native complexes are showing inferior and anterolateral T wave inversions quite prominent    Telemetry: Atrial fibrillation ventricular pacing    CT chest without:     Impression:        1. Marked cardiomegaly with marked coronary artery calcification.  2. Trace bilateral pleural effusions.  3. Cholelithiasis.  4. Chronic pancreatitis.  5. 5.4 cm saccular aneurysm

## 2024-04-19 NOTE — CARE COORDINATION
4/19/2024 431P (SF) SS NOTE: Therapy pending recommendations. Pt is receptive to hhc if recommended. States she has had hhc in past but cannot recall name. Denies having preference if hhc needed. Orders have been obtained. Pt states plan it to return home on dc.SW will follow for dc needs.

## 2024-04-19 NOTE — PROGRESS NOTES
Received phone call from Dr. Gabriela Maldonado the on call Cardiologist.  Per MD stress test will have to be read by Cardiologist that did the nuclear stress test who are unreachable at this time.

## 2024-04-19 NOTE — PLAN OF CARE
Stress test reviewed predominantly fixed inferior with minimal reversibility suggestive of infarct with pepper-infarct ischemia and correlates with area of known severe stenosis treated medically by prior heart cath.  At this point recommend continued aggressive medical therapy.  If continues to have chest pain, recommend transfer to [place] for consideration of heart catheterization with her primary cardiologist Dr. Vu Mclaughlin.    Will discontinue metoprolol as no indication for to beta-blockers, and will discontinue transdermal nitroglycerin as she is already on oral nitroglycerin.  Ideally should not add ranolazine due to ESRD but could be considered.    Edis Brunner MD

## 2024-04-19 NOTE — PROGRESS NOTES
Physical Therapy    Room #: 0622/0622-02  Date: 2024       Patient Name: Marge Callejas  : 1940      MRN: [id number]     Patient unavailable for physical therapy eval due to off floor at dialysis. Will attempt PT evaluation tomorrow. Thank you.       Lucio Dee, PT

## 2024-04-19 NOTE — PROGRESS NOTES
aspirin  81 mg Oral Daily    atorvastatin  40 mg Oral Nightly    bumetanide  2 mg Oral Daily    carvedilol  25 mg Oral 2 times per day on Sun Tue Wed Thu Sat    carvedilol  25 mg Oral Once per day on Mon Thu    hydrALAZINE  75 mg Oral TID    isosorbide dinitrate  40 mg Oral TID    pantoprazole  40 mg Oral QAM AC    lactobacillus  1 capsule Oral Daily    [START ON 4/22/2024] vitamin D  50,000 Units Oral Weekly    cefTRIAXone (ROCEPHIN) IV  1,000 mg IntraVENous Q24H     Continuous Infusions:    Objective Data:  Recent Labs     04/16/24 2139 04/18/24  0702 04/19/24  0652   WBC 7.3 6.3 6.4   RBC 3.46* 3.31* 3.04*   HGB 11.4* 10.7* 9.8*   HCT 34.7 33.4* 31.0*   .3* 100.9* 102.0*   MCH 32.9 32.3 32.2   MCHC 32.9 32.0 31.6*   RDW 15.2* 15.3* 15.6*   PLT  --   --  97*   MPV 13.2* 13.2* 12.8*       Recent Labs     04/16/24 2139 04/18/24  0702 04/19/24  0652    135 135   K 5.2* 5.1* 5.1*   CL 98 95* 97*   CO2 28 23 23   BUN 34* 48* 60*   CREATININE 6.2* 8.0* 9.2*   GLUCOSE 105* 111* 95   CALCIUM 8.5* 8.2* 7.7*   PROT 7.2 7.3 6.3*   BILITOT 0.4 0.4 0.3   ALKPHOS 141* 119* 108*   AST 16 15 13   ALT 10 8 8   ALBUMIN 4.0 4.0 3.4*       Lab Results   Component Value Date    TROPONINI 0.03 05/15/2021    TROPONINI 0.06 (H) 04/14/2021    TROPONINI 0.09 (H) 04/14/2021      Assessment:  1. Chest pain, possibly secondary to demand ischemia versus non-ST-elevation myocardial infarction.  2. Coronary artery disease historically with underlying persistent atrial fibrillation, status post pacemaker for right bundle branch block pattern.  3. End-stage renal disease, on hemodialysis.  4. Probable urinary tract infection.  5. Cholelithiasis with history of chronic pancreatitis.  6. 5.4 saccular aneurysm of the distal aorta with 19 mm ectasia of the proximal left iliac artery.  7. Elevated proBNP suggesting chronic systolic congestive heart failure.  8. Essential hypertension.  9. Chronic obstructive pulmonary disease with  chronic demand, on chronic oxygen use.  10. Vitamin d deficiency.  11. Hyperlipidemia, on Lipitor.    Plan:   Supplemental oxygen  Stress test today  Continue Bumex  Antibiotic therapy with Rocephin  Hemodialysis per nephrology  Cardiology following  Consult general surgery  PT and OT  Continue Eliquis    Continue current therapy.  See orders for further plan of care.    More than 50% of my  time was spent at the bedside counseling/coordinating care with the patient and/or family with face to face contact.  This time was spent reviewing notes and laboratory data as well as instructing and counseling the patient. Time I spent with the family or surrogate(s) is included only if the patient was incapable of providing the necessary information or participating in medical decisions. I also discussed the differential diagnosis and all of the proposed management plans with the patient and individuals accompanying the patient.    The patient was seen, examined and then discussed with Dr. Rodrigez.     MADELAINE Gorman - CNP  4/19/2024  12:20 PM        I saw and evaluated the patient. I agree with the findings and the plan of care as documented in Aiden BURKTET-CNP note.    Jose Rodrigez DO, FACOI  12:33 PM  4/19/2024

## 2024-04-19 NOTE — PROGRESS NOTES
Physician Progress Note      PATIENT:               HIRAM CHAVEZ  [facility] #:                  [id number]  :                       1940  ADMIT DATE:       2024 9:11 PM  DISCH DATE:  RESPONDING  PROVIDER #:        Jose Rodrigez DO          QUERY TEXT:    Dear ,    Pt admitted with chest pain. If possible after study, please document in   progress notes and discharge summary if you are evaluating and/or treating any   of the following:    The medical record reflects the following:  Risk Factors: ESRD on HD, CAD, HTN, HLD, anemia, A fib, Chronic CHF  Clinical Indicators: troponin 82, 80, 95, 90; per Cardiology : \"chest   pain..musculoskeletal chest pain...Elevated hs-cTnT: 82-80-95 ng/L.  No   evidence of ACS, this is chronic myocardial injury in the setting of   ESRD....Abnormal EKG RBBB/LPFB....Now with a different episode of chest   pain/tightness and on 1 EKG from 2024 where I could actually see her   native QRS complexes because she was not pacing, did have some ischemic   changes.  Remainder of her EKGs are all V-paced so I cannot assess for further   ischemic changes.  Her pain initially was very atypical and reproduci  Treatment: IMCU monitoring, Cardiology consult, troponins, EKG, stress test   pending     Thank You,  Kristen Alfaro RN, BSN, CDS  Clinical Documentation Improvement Specialist  [phone]  Options provided:  -- Chest pain due to CAD with unstable angina  -- Chest pain due to NSTEMI  -- Chest pain due to costochondritis  -- Other - I will add my own diagnosis  -- Disagree - Not applicable / Not valid  -- Disagree - Clinically unable to determine / Unknown  -- Refer to Clinical Documentation Reviewer    PROVIDER RESPONSE TEXT:    This patient has CAD with unstable angina.    Query created by: Kristen Alfaro on 2024 1:27 PM      Electronically signed by:  Jose Rodrigez DO 2024 4:06 PM

## 2024-04-19 NOTE — PROGRESS NOTES
[facility]  MD Murtaza White, MD Alban Ross, MD Krista Osorio, CNP   Lucille Beth, ANP  Palmira Fernandez, JUDE  Progress Note    4/19/2024    SUBJECTIVE:   4/18: Laying in bed. No acute distress. Does have some dyspnea, though nothing out of the ordinary for her. Denies any chest pain today. Vital signs are stable.    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 *         DIET:    Diet NPO Exceptions are: Sips of Water with Meds     MEDS (scheduled):    calcium acetate  1 capsule Oral TID WC    nitroglycerin  1 inch Topical 4 times per day    metoprolol succinate  25 mg Oral Daily    apixaban  2.5 mg Oral BID    aspirin  81 mg Oral Daily    atorvastatin  40 mg Oral Nightly    bumetanide  2 mg Oral Daily    carvedilol  25 mg Oral 2 times per day on Sun Tue Wed Thu Sat    carvedilol  25 mg Oral Once per day on Mon Thu    hydrALAZINE  75 mg Oral TID    isosorbide dinitrate  40 mg Oral TID    pantoprazole  40 mg Oral QAM AC    lactobacillus  1 capsule Oral Daily    [START ON 4/22/2024] vitamin D  50,000 Units Oral Weekly    cefTRIAXone (ROCEPHIN) IV  1,000 mg IntraVENous Q24H       MEDS (infusions):      MEDS (prn):  regadenoson, technetium sestamibi, oxyCODONE-acetaminophen, ipratropium 0.5 mg-albuterol 2.5 mg, ondansetron, acetaminophen    PHYSICAL EXAM:     Patient Vitals for the past 24 hrs:   BP Temp Temp src Pulse Resp SpO2   04/19/24 1200 110/60 97.9 °F (36.6 °C) -- -- 16 --   04/19/24 0630 (!) 113/58 98.1 °F (36.7 °C) Oral 76 18 99 %   04/19/24 0558

## 2024-04-19 NOTE — FLOWSHEET NOTE
04/19/24 1200   Vital Signs   /60   Temp 97.9 °F (36.6 °C)   Respirations 16   Post-Hemodialysis Assessment   Post-Treatment Procedures Blood returned;Catheter capped, clamped with Citrate x 2 ports   Machine Disinfection Process Acid/Vinegar Clean;Heat Disinfect;Exterior Machine Disinfection   Rinseback Volume (ml) 300 ml   Blood Volume Processed (Liters) 55.9 L   Dialyzer Clearance Lightly streaked   Duration of Treatment (minutes) 180 minutes   Hemodialysis Intake (ml) 300 ml   Hemodialysis Output (ml) 1300 ml   NET Removed (ml) 1000   Tolerated Treatment Good   Patient Response to Treatment HD tolerated as ordered. Lines flushed and capped x2 per protocol. New dressing applied to TDC.   Patient Disposition Return to room   Observations & Evaluations   Level of Consciousness 0

## 2024-04-19 NOTE — PROGRESS NOTES
Patient back to unit from dialysis and getting nuclear stress test completed.  Waiting on nuclear test results to see if pt able to get a diet order.  Left message for Cardiology through answering service.

## 2024-04-19 NOTE — PROGRESS NOTES
Marge Callejas  [facility]       Surgery Progress Note     CHIEF COMPLAINT:  abdominal pain, cholelithiasis     HISTORY OF PRESENT ILLNESS:  Marge Callejas is a 83 y.o. female admitted with chest pain, elevated troponin and Afib, 25% ejection fraction. She has chronic pancreatitis, has had gallstones for many years. Has end-stage renal disease, on dialysis. On exam has no RUQ pain, does have left rib pain.    Physical Exam  VITALS:  Blood pressure 110/60, pulse 76, temperature 97.9 °F (36.6 °C), resp. rate 16, height 1.615 m (5' 3.58\"), weight 57.7 kg (127 lb 3.3 oz), SpO2 99 %.  General appearance: alert, appears stated age and cooperative  Head: Normocephalic, without obvious abnormality, atraumatic  Neck: no adenopathy, no JVD, trachea midline   Lungs: clear to auscultation bilaterally  Heart: regular rate and rhythm,   Abdomen: soft, no RUQ pain, left ribs are painful on palpation, vague sternal pain,  bowel sounds normal; no hernias palpable  Extremities: extremities normal, atraumatic, no cyanosis or edema     ASSESSMENT:   Chest pain  Left rib pain on palpation.  Gallstones in the gallbladder have been present for many years, she does not want her gallbladder removed unless absolutely necessary.  No RUQ abdominal pain, no right rib pain.     PLAN:  If RUQ pain develops, HIDA can be done to check for obstruction.  She does not want her gallbladder removed at this time.       Physician Signature: Electronically signed by Dr. Dipak M.D.   4/19/2024

## 2024-04-20 LAB
ALBUMIN SERPL-MCNC: 3.8 G/DL (ref 3.5–5.2)
ALP SERPL-CCNC: 102 U/L (ref 35–104)
ALT SERPL-CCNC: 9 U/L (ref 0–32)
ANION GAP SERPL CALCULATED.3IONS-SCNC: 14 MMOL/L (ref 7–16)
AST SERPL-CCNC: 16 U/L (ref 0–31)
BASOPHILS # BLD: 0.03 K/UL (ref 0–0.2)
BASOPHILS NFR BLD: 1 % (ref 0–2)
BILIRUB SERPL-MCNC: 0.4 MG/DL (ref 0–1.2)
BUN SERPL-MCNC: 30 MG/DL (ref 6–23)
CALCIUM SERPL-MCNC: 7.8 MG/DL (ref 8.6–10.2)
CHLORIDE SERPL-SCNC: 97 MMOL/L (ref 98–107)
CO2 SERPL-SCNC: 26 MMOL/L (ref 22–29)
CREAT SERPL-MCNC: 5.8 MG/DL (ref 0.5–1)
EOSINOPHIL # BLD: 0.21 K/UL (ref 0.05–0.5)
EOSINOPHILS RELATIVE PERCENT: 3 % (ref 0–6)
ERYTHROCYTE [DISTWIDTH] IN BLOOD BY AUTOMATED COUNT: 15.5 % (ref 11.5–15)
GFR SERPL CREATININE-BSD FRML MDRD: 7 ML/MIN/1.73M2
GLUCOSE SERPL-MCNC: 80 MG/DL (ref 74–99)
HCT VFR BLD AUTO: 31.9 % (ref 34–48)
HGB BLD-MCNC: 10.1 G/DL (ref 11.5–15.5)
IMM GRANULOCYTES # BLD AUTO: <0.03 K/UL (ref 0–0.58)
IMM GRANULOCYTES NFR BLD: 0 % (ref 0–5)
LYMPHOCYTES NFR BLD: 0.98 K/UL (ref 1.5–4)
LYMPHOCYTES RELATIVE PERCENT: 16 % (ref 20–42)
MCH RBC QN AUTO: 31.9 PG (ref 26–35)
MCHC RBC AUTO-ENTMCNC: 31.7 G/DL (ref 32–34.5)
MCV RBC AUTO: 100.6 FL (ref 80–99.9)
MONOCYTES NFR BLD: 0.93 K/UL (ref 0.1–0.95)
MONOCYTES NFR BLD: 15 % (ref 2–12)
NEUTROPHILS NFR BLD: 65 % (ref 43–80)
NEUTS SEG NFR BLD: 4.08 K/UL (ref 1.8–7.3)
PLATELET # BLD AUTO: 109 K/UL (ref 130–450)
PMV BLD AUTO: 12.9 FL (ref 7–12)
POTASSIUM SERPL-SCNC: 4.5 MMOL/L (ref 3.5–5)
PROT SERPL-MCNC: 6.5 G/DL (ref 6.4–8.3)
RBC # BLD AUTO: 3.17 M/UL (ref 3.5–5.5)
SODIUM SERPL-SCNC: 137 MMOL/L (ref 132–146)
WBC OTHER # BLD: 6.3 K/UL (ref 4.5–11.5)

## 2024-04-20 PROCEDURE — 97165 OT EVAL LOW COMPLEX 30 MIN: CPT | Performed by: OCCUPATIONAL THERAPIST

## 2024-04-20 PROCEDURE — 2500000003 HC RX 250 WO HCPCS

## 2024-04-20 PROCEDURE — 6360000002 HC RX W HCPCS

## 2024-04-20 PROCEDURE — 99233 SBSQ HOSP IP/OBS HIGH 50: CPT | Performed by: INTERNAL MEDICINE

## 2024-04-20 PROCEDURE — 2580000003 HC RX 258

## 2024-04-20 PROCEDURE — 36415 COLL VENOUS BLD VENIPUNCTURE: CPT

## 2024-04-20 PROCEDURE — 85025 COMPLETE CBC W/AUTO DIFF WBC: CPT

## 2024-04-20 PROCEDURE — 6370000000 HC RX 637 (ALT 250 FOR IP): Performed by: INTERNAL MEDICINE

## 2024-04-20 PROCEDURE — 97110 THERAPEUTIC EXERCISES: CPT | Performed by: PHYSICAL THERAPIST

## 2024-04-20 PROCEDURE — 97530 THERAPEUTIC ACTIVITIES: CPT | Performed by: PHYSICAL THERAPIST

## 2024-04-20 PROCEDURE — 6370000000 HC RX 637 (ALT 250 FOR IP)

## 2024-04-20 PROCEDURE — 2700000000 HC OXYGEN THERAPY PER DAY

## 2024-04-20 PROCEDURE — 97161 PT EVAL LOW COMPLEX 20 MIN: CPT | Performed by: PHYSICAL THERAPIST

## 2024-04-20 PROCEDURE — 80053 COMPREHEN METABOLIC PANEL: CPT

## 2024-04-20 PROCEDURE — 2060000000 HC ICU INTERMEDIATE R&B

## 2024-04-20 PROCEDURE — 97530 THERAPEUTIC ACTIVITIES: CPT | Performed by: OCCUPATIONAL THERAPIST

## 2024-04-20 RX ADMIN — HYDRALAZINE HYDROCHLORIDE 75 MG: 50 TABLET ORAL at 21:41

## 2024-04-20 RX ADMIN — APIXABAN 2.5 MG: 2.5 TABLET, FILM COATED ORAL at 21:40

## 2024-04-20 RX ADMIN — OXYCODONE AND ACETAMINOPHEN 1 TABLET: 5; 325 TABLET ORAL at 21:40

## 2024-04-20 RX ADMIN — CALCIUM ACETATE 667 MG: 667 CAPSULE ORAL at 15:02

## 2024-04-20 RX ADMIN — APIXABAN 2.5 MG: 2.5 TABLET, FILM COATED ORAL at 10:21

## 2024-04-20 RX ADMIN — Medication 1 CAPSULE: at 10:21

## 2024-04-20 RX ADMIN — PANTOPRAZOLE SODIUM 40 MG: 40 TABLET, DELAYED RELEASE ORAL at 05:07

## 2024-04-20 RX ADMIN — HYDRALAZINE HYDROCHLORIDE 75 MG: 50 TABLET ORAL at 10:21

## 2024-04-20 RX ADMIN — ISOSORBIDE DINITRATE 40 MG: 20 TABLET ORAL at 21:40

## 2024-04-20 RX ADMIN — CARVEDILOL 25 MG: 25 TABLET, FILM COATED ORAL at 10:21

## 2024-04-20 RX ADMIN — OXYCODONE AND ACETAMINOPHEN 1 TABLET: 5; 325 TABLET ORAL at 15:02

## 2024-04-20 RX ADMIN — ISOSORBIDE DINITRATE 40 MG: 20 TABLET ORAL at 15:02

## 2024-04-20 RX ADMIN — CALCIUM ACETATE 667 MG: 667 CAPSULE ORAL at 18:14

## 2024-04-20 RX ADMIN — CALCIUM ACETATE 667 MG: 667 CAPSULE ORAL at 10:21

## 2024-04-20 RX ADMIN — OXYCODONE AND ACETAMINOPHEN 1 TABLET: 5; 325 TABLET ORAL at 03:48

## 2024-04-20 RX ADMIN — ATORVASTATIN CALCIUM 40 MG: 40 TABLET, FILM COATED ORAL at 21:40

## 2024-04-20 RX ADMIN — HYDRALAZINE HYDROCHLORIDE 75 MG: 50 TABLET ORAL at 15:02

## 2024-04-20 RX ADMIN — WATER 1000 MG: 1 INJECTION INTRAMUSCULAR; INTRAVENOUS; SUBCUTANEOUS at 05:07

## 2024-04-20 RX ADMIN — ASPIRIN 81 MG: 81 TABLET, COATED ORAL at 10:21

## 2024-04-20 RX ADMIN — CARVEDILOL 25 MG: 25 TABLET, FILM COATED ORAL at 18:14

## 2024-04-20 RX ADMIN — ISOSORBIDE DINITRATE 40 MG: 20 TABLET ORAL at 10:21

## 2024-04-20 RX ADMIN — BUMETANIDE 2 MG: 1 TABLET ORAL at 10:21

## 2024-04-20 ASSESSMENT — PAIN SCALES - WONG BAKER
WONGBAKER_NUMERICALRESPONSE: NO HURT

## 2024-04-20 ASSESSMENT — PAIN SCALES - GENERAL
PAINLEVEL_OUTOF10: 3
PAINLEVEL_OUTOF10: 0
PAINLEVEL_OUTOF10: 10
PAINLEVEL_OUTOF10: 8
PAINLEVEL_OUTOF10: 10

## 2024-04-20 ASSESSMENT — PAIN DESCRIPTION - LOCATION
LOCATION: BACK
LOCATION: BACK
LOCATION: ABDOMEN

## 2024-04-20 ASSESSMENT — PAIN DESCRIPTION - ORIENTATION
ORIENTATION: RIGHT;LEFT;MID;LOWER
ORIENTATION: LEFT

## 2024-04-20 ASSESSMENT — PAIN DESCRIPTION - DESCRIPTORS
DESCRIPTORS: ACHING
DESCRIPTORS: STABBING;THROBBING

## 2024-04-20 ASSESSMENT — PAIN - FUNCTIONAL ASSESSMENT: PAIN_FUNCTIONAL_ASSESSMENT: PREVENTS OR INTERFERES SOME ACTIVE ACTIVITIES AND ADLS

## 2024-04-20 NOTE — PROGRESS NOTES
[facility] Cardiology Inpatient Progress Note    Patient is a 83 y.o. female of Marek Andres DO seen in hospital follow up.     Chief complaint: Chest pain    HPI: No CP or SOB.    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    Coronary artery disease involving native coronary artery of native heart without angina pectoris    Abnormal EKG    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)       No Known Allergies    Current Facility-Administered Medications   Medication Dose Route Frequency Provider Last Rate Last Admin    calcium acetate (PHOSLO) capsule 667 mg  1 capsule Oral TID  Palmira Fernandez APRN - CNP   667 mg at 04/20/24 1021    oxyCODONE-acetaminophen (PERCOCET) 5-325 MG per tablet 1 tablet  1 tablet Oral Q6H PRN Jose Rodrigez DO   1 tablet at 04/20/24 0348    apixaban (ELIQUIS) tablet 2.5 mg  2.5 mg Oral BID Aiden Muñoz APRN - CNP   2.5 mg at 04/20/24 1021    aspirin EC tablet 81 mg  81 mg Oral Daily Aiden Muñoz APRN - CNP   81 mg at 04/20/24 1021    atorvastatin (LIPITOR) tablet 40 mg  40 mg Oral Nightly Aiden Muñoz APRN - CNP   40 mg at 04/19/24 2012    bumetanide (BUMEX) tablet 2 mg  2 mg Oral Daily Aiden Muñoz APRN - CNP   2 mg at 04/20/24 1021    carvedilol (COREG) tablet 25 mg

## 2024-04-20 NOTE — PLAN OF CARE
Problem: Discharge Planning  Goal: Discharge to home or other facility with appropriate resources  Outcome: Progressing  Flowsheets (Taken 4/19/2024 0800 by Jayne Alfonso, RN)  Discharge to home or other facility with appropriate resources:   Identify barriers to discharge with patient and caregiver   Arrange for needed discharge resources and transportation as appropriate   Identify discharge learning needs (meds, wound care, etc)     Problem: ABCDS Injury Assessment  Goal: Absence of physical injury  Outcome: Progressing     Problem: Safety - Adult  Goal: Free from fall injury  Outcome: Progressing     Problem: Chronic Conditions and Co-morbidities  Goal: Patient's chronic conditions and co-morbidity symptoms are monitored and maintained or improved  Outcome: Progressing  Flowsheets (Taken 4/19/2024 0800 by Jayne Alfonso, RN)  Care Plan - Patient's Chronic Conditions and Co-Morbidity Symptoms are Monitored and Maintained or Improved: Monitor and assess patient's chronic conditions and comorbid symptoms for stability, deterioration, or improvement     Problem: Pain  Goal: Verbalizes/displays adequate comfort level or baseline comfort level  Outcome: Progressing

## 2024-04-20 NOTE — PROGRESS NOTES
Physical Therapy  Physical Therapy Initial Evaluation/Plan of Care    Room #:  0622/0622-02  Patient Name: Marge Callejas  YOB: 1940  MRN: [id number]    Date of Service: 4/20/2024     Tentative placement recommendation: Subacute unless patient meets goals then Home Health Physical Therapy  Equipment recommendation: Wheeled Walker if d/jacob home    Evaluating Physical Therapist: Stuart Gaston, PT, DPT #[id number]      Specific Provider Orders/Date/Referring Provider :     04/19/24 0930    PT eval and treat  Start:  04/19/24 0930,   End:  04/19/24 0930,   ONE TIME,   Standing Count:  1 Occurrences,   R       Rain, Jose LANDERS DO Acknowledge New      Admitting Diagnosis:   Chest pain [R07.9]  ESRD on dialysis (HCC) [N18.6, Z99.2]  Chest pain, unspecified type [R07.9]      Surgery: none  Visit Diagnoses         Codes    ESRD on dialysis (HCC)     N18.6, Z99.2            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    Coronary artery disease involving native coronary artery of native heart without angina pectoris    Abnormal EKG    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  Sponge bathes      Equipment owned: Wheelchair, Cane, Rollator, and O2    AM-PAC Basic Mobility       AM-PAC Basic Mobility - Inpatient   How much help is needed turning from your back to your side while in a flat bed without using bedrails?: A Little  How much help is needed moving from lying on your back to sitting on the side of a flat bed without using bedrails?: A Little  How much help is needed moving to and from a bed to a chair?: A Lot  How much help is needed standing up from a chair using your arms?: A Little  How much help is needed walking in hospital room?: A Lot  How much help is needed climbing 3-5 steps with a railing?: A Lot  AM-PAC Inpatient Mobility Raw Score : 15  AM-PAC Inpatient T-Scale Score : 39.45  Mobility Inpatient CMS 0-100% Score: 57.7  Mobility Inpatient CMS G-Code Modifier : CK    Nursing cleared patient for PT evaluation. The admitting diagnosis and active problem list as listed above have been reviewed prior to the initiation of this evaluation.    OBJECTIVE:   Initial Evaluation  Date: 4/20/2024 Treatment Date:     Short Term/ Long Term   Goals   Was pt agreeable to Eval/treatment? Yes  To be met in 2 days   Pain level   9/10  Mid back and L leg     Bed Mobility  Using rails and head of bed elevated:     Rolling: Supervision     Supine to sit: Supervision     Sit to supine: Supervision     Scooting: Supervision     Rolling: Independent    Supine to sit: Independent    Sit to supine: Independent    Scooting: Independent     Transfers Sit to stand: Minimal assist of 1   Sit to stand: Independent    Ambulation     2 x 20 feet using  wheeled walker with Minimal assist of 1   for walker control, walker approximation, balance, upright, weight shift, and safety    > 100 feet

## 2024-04-20 NOTE — PROGRESS NOTES
[facility]  MD Murtaza White, MD Alban Ross, MD Krista Osorio, CNP   Lucille Beth, JOSE Fernandez, CNP  Progress Note    4/20/2024    SUBJECTIVE:   4/18: Laying in bed. No acute distress. Does have some dyspnea, though nothing out of the ordinary for her. Denies any chest pain today. Vital signs are stable.    4/19 HD today no reported issues  Will have stress test today    4/20 seen in her room on o2/nc lying flat in bed reports no issues to me appear more or less euvolemic   Cardiology note reviewed .      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 *         DIET:    ADULT DIET; Regular; Low Fat/Low Chol/High Fiber/2 gm Na     MEDS (scheduled):    calcium acetate  1 capsule Oral TID WC    apixaban  2.5 mg Oral BID    aspirin  81 mg Oral Daily    atorvastatin  40 mg Oral Nightly    bumetanide  2 mg Oral Daily    carvedilol  25 mg Oral 2 times per day on Sun Tue Wed Thu Sat    carvedilol  25 mg Oral Once per day on Mon Thu    hydrALAZINE  75 mg Oral TID    isosorbide dinitrate  40 mg Oral TID    pantoprazole  40 mg Oral QAM AC    lactobacillus  1 capsule Oral Daily    [START ON 4/22/2024] vitamin D  50,000 Units Oral Weekly       MEDS (infusions):      MEDS (prn):  oxyCODONE-acetaminophen, ipratropium 0.5 mg-albuterol 2.5 mg, ondansetron, acetaminophen    PHYSICAL EXAM:     Patient Vitals for the past 24 hrs:   BP Temp Temp src Pulse Resp SpO2   04/20/24 1021 136/69 98.6 °F (37 °C) Oral 77 24 --   04/20/24 0418 -- -- -- -- 20 --   04/20/24 0348 (!) 143/62 98.7 °F (37.1 °C) Oral 81 20 98 %   04/19/24 2347 (!)

## 2024-04-20 NOTE — PROGRESS NOTES
Internal Medicine Progress Note    [facility]    Jose Rodrigez D.O., ANA ROSA Almonte D.O., ANA ROSA Abad D.O.       Ifrah Dave, MSN, APRN, NP-C  Edward Edwards, MSN, APRN-CNP  Aiden Muñoz, MSN, APRN-CNP     Primary Care Physician: Marek Andres DO   Admitting Physician:  Jose Rodrigez DO  Admission date and time: 4/16/2024  9:11 PM    Room:  [address] [place], [place] [address]  Admitting diagnosis: Chest pain [R07.9]  ESRD on dialysis (HCC) [N18.6, Z99.2]  Chest pain, unspecified type [R07.9]    Patient Name: Marge Callejas  MRN: [id number]    Date of Service: 4/20/2024     Subjective:  Marge is a 83 y.o. female who was seen and examined today,4/20/2024, at the bedside.  Marge is resting comfortably during my examination.  She feels ill overall but feeling better than on initial presentation.  She sounds to have musculoskeletal complaints that include back and rib pain.  She states the pain is improved while sitting at the bedside chair.  She has been updated regarding stress test results yesterday.  We discussed discharge planning.  No family members were present.    Review of System:   Constitutional:   Denies fever or chills, weight loss or gain, reports fatigue or malaise.  HEENT:   Denies ear pain, sore throat, sinus or eye problems.  Hard of hearing  Cardiovascular:   Denies any , irregular heartbeats, or palpitations.  Denies any chest pain   Respiratory:   Denies , coughing, sputum production, hemoptysis, or wheezing.  Positive dyspneic with exertion  Gastrointestinal:   Denies nausea, vomiting, diarrhea, or constipation.  Denies any abdominal pain.  Genitourinary:    Denies any urgency, frequency, hematuria. Voiding  without difficulty.  Extremities:   Denies lower extremity swelling, edema or cyanosis.   Neurology:    Denies any headache or focal neurological deficits, Denies generalized weakness or memory  TID WC    apixaban  2.5 mg Oral BID    aspirin  81 mg Oral Daily    atorvastatin  40 mg Oral Nightly    bumetanide  2 mg Oral Daily    carvedilol  25 mg Oral 2 times per day on Sun Tue Wed Thu Sat    carvedilol  25 mg Oral Once per day on Mon Thu    hydrALAZINE  75 mg Oral TID    isosorbide dinitrate  40 mg Oral TID    pantoprazole  40 mg Oral QAM AC    lactobacillus  1 capsule Oral Daily    [START ON 4/22/2024] vitamin D  50,000 Units Oral Weekly    cefTRIAXone (ROCEPHIN) IV  1,000 mg IntraVENous Q24H     Continuous Infusions:    Objective Data:  Recent Labs     04/18/24  0702 04/19/24  0652 04/20/24  0502   WBC 6.3 6.4 6.3   RBC 3.31* 3.04* 3.17*   HGB 10.7* 9.8* 10.1*   HCT 33.4* 31.0* 31.9*   .9* 102.0* 100.6*   MCH 32.3 32.2 31.9   MCHC 32.0 31.6* 31.7*   RDW 15.3* 15.6* 15.5*   PLT  --  97* 109*   MPV 13.2* 12.8* 12.9*     Recent Labs     04/18/24  0702 04/19/24  0652 04/20/24  0502    135 137   K 5.1* 5.1* 4.5   CL 95* 97* 97*   CO2 23 23 26   BUN 48* 60* 30*   CREATININE 8.0* 9.2* 5.8*   GLUCOSE 111* 95 80   CALCIUM 8.2* 7.7* 7.8*   PROT 7.3 6.3* 6.5   BILITOT 0.4 0.3 0.4   ALKPHOS 119* 108* 102   AST 15 13 16   ALT 8 8 9   ALBUMIN 4.0 3.4* 3.8     Lab Results   Component Value Date    TROPONINI 0.03 05/15/2021    TROPONINI 0.06 (H) 04/14/2021    TROPONINI 0.09 (H) 04/14/2021      Assessment:  Multifactorial chest pain with stress test revealing fixed deficits with minimal reversibility with recommendations for aggressive medical therapy in the setting of known coronary artery disease  Chronic atrial fibrillation  End-stage renal disease on hemodialysis  Intermittent biliary colic with findings of cholelithiasis and history of chronic pancreatitis  Oxygen dependent COPD with chronic respiratory failure  Hyperlipidemia  Vitamin D deficiency    Plan:   Stress test has been reviewed and cardiovascular recommendations have been reviewed as well.  I have encouraged increased activity today.

## 2024-04-20 NOTE — PROGRESS NOTES
OCCUPATIONAL THERAPY INITIAL EVALUATION    [facility]  [address] [facility]         Date:2024                                                   Patient Name: Marge Callejas     MRN: [id number]     : 1940     Room: [address] [place] [address]       Evaluating OT: Raisa Casas, OTR/L; [id number]       Referring Provider and Orders/Date:    OT eval and treat  Start:  24,   End:  24,   ONE TIME,   Standing Count:  1 Occurrences,   R       Rain, Jose LANDERS DO    Recommended placement: home with HH and family assist-pt declining subacute rehab       Diagnosis:   1. Chest pain, unspecified type    2. ESRD on dialysis ([facility])         Surgery: none      Pertinent Medical History:        Past Medical History:   Diagnosis Date    Arthritis     Atrial fibrillation ([facility])     Blood circulation, collateral     CHF (congestive heart failure) ([facility])     Chronic renal insufficiency, stage IV (severe) ([facility]) 2016    Coronary artery disease involving native coronary artery of native heart without angina pectoris 2024    History of cardiovascular stress test 2015    Lexiscan stress test    History of echocardiogram 2015    EF 29%    Hyperlipidemia     Hypertension     Mixed restrictive and obstructive lung disease ([facility]) 2023          Past Surgical History:   Procedure Laterality Date    COLONOSCOPY N/A 4/3/2021    COLONOSCOPY POLYPECTOMY HOT SNARE performed by Lucio Nelson DO at [facility] ENDOSCOPY    COLONOSCOPY  4/3/2021    COLONOSCOPY WITH BIOPSY performed by Lucio Nelson DO at [facility] ENDOSCOPY    COLONOSCOPY  4/3/2021    COLONOSCOPY CONTROL HEMORRHAGE performed by Lucio Nelson DO at [facility] ENDOSCOPY    COLONOSCOPY  4/3/2021    COLONOSCOPY SUBMUCOSAL SPOT INJECTION performed by Lucio Nelson DO at [facility] ENDOSCOPY    COLONOSCOPY N/A 2023    COLONOSCOPY DIAGNOSTIC performed by Scooby Cormier MD at [facility] ENDOSCOPY    ECTOPIC PREGNANCY    OT Eval Low 97165  x  1   OT Eval Medium 79651      OT Eval High 72756      OT Re-Eval 51193       Therapeutic Ex 77510       Therapeutic Activities 33030  8 1    ADL/Self Care 39405       Orthotic Management 99467       Manual [id number]     Neuro Re-Ed 19872       Non-Billable Time          Evaluation Time additionally includes thorough review of current medical information, gathering information on past medical history/social history and prior level of function, interpretation of standardized testing/informal observation of tasks, assessment of data and development of plan of care and goals.        Raisa Casas OTR/L; [id number]

## 2024-04-21 LAB
ALBUMIN SERPL-MCNC: 3.4 G/DL (ref 3.5–5.2)
ALP SERPL-CCNC: 100 U/L (ref 35–104)
ALT SERPL-CCNC: 8 U/L (ref 0–32)
ANION GAP SERPL CALCULATED.3IONS-SCNC: 15 MMOL/L (ref 7–16)
AST SERPL-CCNC: 13 U/L (ref 0–31)
BASOPHILS # BLD: 0.03 K/UL (ref 0–0.2)
BASOPHILS NFR BLD: 1 % (ref 0–2)
BILIRUB SERPL-MCNC: 0.4 MG/DL (ref 0–1.2)
BUN SERPL-MCNC: 40 MG/DL (ref 6–23)
CALCIUM SERPL-MCNC: 8.1 MG/DL (ref 8.6–10.2)
CHLORIDE SERPL-SCNC: 98 MMOL/L (ref 98–107)
CO2 SERPL-SCNC: 24 MMOL/L (ref 22–29)
CREAT SERPL-MCNC: 7.4 MG/DL (ref 0.5–1)
EOSINOPHIL # BLD: 0.22 K/UL (ref 0.05–0.5)
EOSINOPHILS RELATIVE PERCENT: 4 % (ref 0–6)
ERYTHROCYTE [DISTWIDTH] IN BLOOD BY AUTOMATED COUNT: 15.5 % (ref 11.5–15)
GFR SERPL CREATININE-BSD FRML MDRD: 5 ML/MIN/1.73M2
GLUCOSE SERPL-MCNC: 89 MG/DL (ref 74–99)
HCT VFR BLD AUTO: 31.5 % (ref 34–48)
HGB BLD-MCNC: 9.9 G/DL (ref 11.5–15.5)
IMM GRANULOCYTES # BLD AUTO: <0.03 K/UL (ref 0–0.58)
IMM GRANULOCYTES NFR BLD: 0 % (ref 0–5)
LYMPHOCYTES NFR BLD: 1.08 K/UL (ref 1.5–4)
LYMPHOCYTES RELATIVE PERCENT: 17 % (ref 20–42)
MAGNESIUM SERPL-MCNC: 2.2 MG/DL (ref 1.6–2.6)
MCH RBC QN AUTO: 31.8 PG (ref 26–35)
MCHC RBC AUTO-ENTMCNC: 31.4 G/DL (ref 32–34.5)
MCV RBC AUTO: 101.3 FL (ref 80–99.9)
MONOCYTES NFR BLD: 1.03 K/UL (ref 0.1–0.95)
MONOCYTES NFR BLD: 17 % (ref 2–12)
NEUTROPHILS NFR BLD: 62 % (ref 43–80)
NEUTS SEG NFR BLD: 3.86 K/UL (ref 1.8–7.3)
PHOSPHATE SERPL-MCNC: 5.3 MG/DL (ref 2.5–4.5)
PLATELET # BLD AUTO: 116 K/UL (ref 130–450)
PMV BLD AUTO: 12.9 FL (ref 7–12)
POTASSIUM SERPL-SCNC: 4.6 MMOL/L (ref 3.5–5)
PROT SERPL-MCNC: 6.3 G/DL (ref 6.4–8.3)
RBC # BLD AUTO: 3.11 M/UL (ref 3.5–5.5)
SODIUM SERPL-SCNC: 137 MMOL/L (ref 132–146)
WBC OTHER # BLD: 6.2 K/UL (ref 4.5–11.5)

## 2024-04-21 PROCEDURE — 2700000000 HC OXYGEN THERAPY PER DAY

## 2024-04-21 PROCEDURE — 99233 SBSQ HOSP IP/OBS HIGH 50: CPT | Performed by: INTERNAL MEDICINE

## 2024-04-21 PROCEDURE — 36415 COLL VENOUS BLD VENIPUNCTURE: CPT

## 2024-04-21 PROCEDURE — 83735 ASSAY OF MAGNESIUM: CPT

## 2024-04-21 PROCEDURE — 2500000003 HC RX 250 WO HCPCS

## 2024-04-21 PROCEDURE — 80053 COMPREHEN METABOLIC PANEL: CPT

## 2024-04-21 PROCEDURE — 84100 ASSAY OF PHOSPHORUS: CPT

## 2024-04-21 PROCEDURE — 2060000000 HC ICU INTERMEDIATE R&B

## 2024-04-21 PROCEDURE — 85025 COMPLETE CBC W/AUTO DIFF WBC: CPT

## 2024-04-21 PROCEDURE — 6360000002 HC RX W HCPCS: Performed by: INTERNAL MEDICINE

## 2024-04-21 PROCEDURE — 6370000000 HC RX 637 (ALT 250 FOR IP)

## 2024-04-21 PROCEDURE — 6370000000 HC RX 637 (ALT 250 FOR IP): Performed by: NURSE PRACTITIONER

## 2024-04-21 RX ORDER — VALACYCLOVIR HYDROCHLORIDE 1 G/1
500 TABLET, FILM COATED ORAL DAILY
Status: DISCONTINUED | OUTPATIENT
Start: 2024-04-21 | End: 2024-04-22

## 2024-04-21 RX ORDER — VALACYCLOVIR HYDROCHLORIDE 500 MG/1
500 TABLET, FILM COATED ORAL DAILY
Status: DISCONTINUED | OUTPATIENT
Start: 2024-04-21 | End: 2024-04-21 | Stop reason: CLARIF

## 2024-04-21 RX ADMIN — CALCIUM ACETATE 667 MG: 667 CAPSULE ORAL at 17:48

## 2024-04-21 RX ADMIN — PANTOPRAZOLE SODIUM 40 MG: 40 TABLET, DELAYED RELEASE ORAL at 05:25

## 2024-04-21 RX ADMIN — HYDRALAZINE HYDROCHLORIDE 75 MG: 50 TABLET ORAL at 12:54

## 2024-04-21 RX ADMIN — ASPIRIN 81 MG: 81 TABLET, COATED ORAL at 09:28

## 2024-04-21 RX ADMIN — Medication 1 CAPSULE: at 09:28

## 2024-04-21 RX ADMIN — CALCIUM ACETATE 667 MG: 667 CAPSULE ORAL at 09:28

## 2024-04-21 RX ADMIN — APIXABAN 2.5 MG: 2.5 TABLET, FILM COATED ORAL at 22:07

## 2024-04-21 RX ADMIN — ONDANSETRON 4 MG: 2 INJECTION INTRAMUSCULAR; INTRAVENOUS at 12:54

## 2024-04-21 RX ADMIN — APIXABAN 2.5 MG: 2.5 TABLET, FILM COATED ORAL at 09:28

## 2024-04-21 RX ADMIN — BUMETANIDE 2 MG: 1 TABLET ORAL at 09:28

## 2024-04-21 RX ADMIN — HYDRALAZINE HYDROCHLORIDE 75 MG: 50 TABLET ORAL at 09:27

## 2024-04-21 RX ADMIN — ATORVASTATIN CALCIUM 40 MG: 40 TABLET, FILM COATED ORAL at 22:07

## 2024-04-21 RX ADMIN — VALACYCLOVIR HYDROCHLORIDE 500 MG: 1 TABLET, FILM COATED ORAL at 11:50

## 2024-04-21 RX ADMIN — CARVEDILOL 25 MG: 25 TABLET, FILM COATED ORAL at 09:28

## 2024-04-21 RX ADMIN — ISOSORBIDE DINITRATE 40 MG: 20 TABLET ORAL at 12:55

## 2024-04-21 RX ADMIN — CALCIUM ACETATE 667 MG: 667 CAPSULE ORAL at 11:50

## 2024-04-21 RX ADMIN — HYDRALAZINE HYDROCHLORIDE 75 MG: 50 TABLET ORAL at 22:06

## 2024-04-21 RX ADMIN — ISOSORBIDE DINITRATE 40 MG: 20 TABLET ORAL at 22:06

## 2024-04-21 RX ADMIN — CARVEDILOL 25 MG: 25 TABLET, FILM COATED ORAL at 17:48

## 2024-04-21 RX ADMIN — ISOSORBIDE DINITRATE 40 MG: 20 TABLET ORAL at 09:28

## 2024-04-21 ASSESSMENT — PAIN DESCRIPTION - ORIENTATION
ORIENTATION: MID
ORIENTATION: MID

## 2024-04-21 ASSESSMENT — PAIN DESCRIPTION - LOCATION
LOCATION: BACK
LOCATION: BACK

## 2024-04-21 ASSESSMENT — PAIN SCALES - GENERAL
PAINLEVEL_OUTOF10: 5
PAINLEVEL_OUTOF10: 4
PAINLEVEL_OUTOF10: 0
PAINLEVEL_OUTOF10: 0

## 2024-04-21 ASSESSMENT — PAIN SCALES - WONG BAKER
WONGBAKER_NUMERICALRESPONSE: NO HURT

## 2024-04-21 ASSESSMENT — PAIN DESCRIPTION - DESCRIPTORS: DESCRIPTORS: STABBING

## 2024-04-21 NOTE — PROGRESS NOTES
[facility] Cardiology Inpatient Progress Note    Patient is a 83 y.o. female of Marek Andres DO seen in hospital follow up.     Chief complaint: Chest pain    HPI: No CP or SOB.    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    Coronary artery disease involving native coronary artery of native heart without angina pectoris    Abnormal EKG    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)       No Known Allergies    Current Facility-Administered Medications   Medication Dose Route Frequency Provider Last Rate Last Admin    calcium acetate (PHOSLO) capsule 667 mg  1 capsule Oral TID  Palmira Fernandez APRN - CNP   667 mg at 04/20/24 1814    oxyCODONE-acetaminophen (PERCOCET) 5-325 MG per tablet 1 tablet  1 tablet Oral Q6H PRN Jose Rodrigez DO   1 tablet at 04/20/24 2140    apixaban (ELIQUIS) tablet 2.5 mg  2.5 mg Oral BID Aiden Muñoz APRN - CNP   2.5 mg at 04/20/24 2140    aspirin EC tablet 81 mg  81 mg Oral Daily Aiden Muñoz APRN - CNP   81 mg at 04/20/24 1021    atorvastatin (LIPITOR) tablet 40 mg  40 mg Oral Nightly Aiden Muñoz APRN - CNP   40 mg at 04/20/24 2140    bumetanide (BUMEX) tablet 2 mg  2 mg Oral Daily Aiden Muñoz APRN - CNP   2 mg at 04/20/24 1021    carvedilol (COREG) tablet 25 mg

## 2024-04-21 NOTE — PROGRESS NOTES
Internal Medicine Progress Note    [facility]    Jose Rodrigez D.O., CONNIE.                    Eddi Almonte D.O., ANA ROSA Abad D.O.       Ifrah Dave, MSN, APRN, NP-C  Edward Edwards, MSN, APRN-CNP  Aiden Muñoz, MSN, APRN-CNP     Primary Care Physician: Marek Andres DO   Admitting Physician:  Jose Rodrigez DO  Admission date and time: 4/16/2024  9:11 PM    Room:  [facility]/0622-  Admitting diagnosis: Chest pain [R07.9]  ESRD on dialysis (HCC) [N18.6, Z99.2]  Chest pain, unspecified type [R07.9]    Patient Name: Marge Callejas  MRN: [id number]    Date of Service: 4/21/2024     Subjective:  Marge is a 83 y.o. female who was seen and examined today,4/21/2024, at the bedside.  Marge was being prepared for discharge however she developed a new rash on her back that appears to be consistent with shingles.  She has concerns for her ability to go to her outpatient dialysis center for dialysis tomorrow and is requesting to stay in the hospital so that she can receive dialysis here.  She will then contact the Center to ensure that there are appropriate precautions in place before she returns for her Friday session.  Otherwise she voices no new symptoms or concerns and her family is updated extensively at bedside.     Review of System:   Constitutional:   Denies fever or chills, weight loss or gain, reports fatigue or malaise.  HEENT:   Denies ear pain, sore throat, sinus or eye problems.  Hard of hearing but refuses to wear hearing aids  Cardiovascular:   Denies any chest pains, irregular heartbeats, or palpitations.    Respiratory:   Denies coughing, sputum production, hemoptysis, or wheezing.  No further dyspnea at rest or on exertion  Gastrointestinal:   Denies nausea, vomiting, diarrhea, or constipation.  Denies any abdominal pain.  Genitourinary:    Denies any urgency, frequency, hematuria. Voiding  without

## 2024-04-21 NOTE — PLAN OF CARE
Problem: Discharge Planning  Goal: Discharge to home or other facility with appropriate resources  Outcome: Progressing  Flowsheets (Taken 4/20/2024 2000)  Discharge to home or other facility with appropriate resources:   Identify barriers to discharge with patient and caregiver   Arrange for needed discharge resources and transportation as appropriate   Identify discharge learning needs (meds, wound care, etc)     Problem: ABCDS Injury Assessment  Goal: Absence of physical injury  Outcome: Progressing     Problem: Safety - Adult  Goal: Free from fall injury  Outcome: Progressing     Problem: Chronic Conditions and Co-morbidities  Goal: Patient's chronic conditions and co-morbidity symptoms are monitored and maintained or improved  Outcome: Progressing  Flowsheets (Taken 4/20/2024 2000)  Care Plan - Patient's Chronic Conditions and Co-Morbidity Symptoms are Monitored and Maintained or Improved: Monitor and assess patient's chronic conditions and comorbid symptoms for stability, deterioration, or improvement     Problem: Pain  Goal: Verbalizes/displays adequate comfort level or baseline comfort level  Outcome: Progressing

## 2024-04-21 NOTE — PROGRESS NOTES
[facility]  MD Murtaza White, MD Krista Mendoza, CNP   Lucille Beth, JOSE Fernandez, CNP  Progress Note    4/21/2024    SUBJECTIVE:   4/18: Laying in bed. No acute distress. Does have some dyspnea, though nothing out of the ordinary for her. Denies any chest pain today. Vital signs are stable.    4/19 HD today no reported issues  Will have stress test today    4/20 seen in her room on o2/nc lying flat in bed reports no issues to me appear more or less euvolemic   Cardiology note reviewed .      4/30: \"I have shingles.\"  Asymptomatic and was noted by her daughter who is visiting.  Denies all other complaint.  Breathing \"same as always.\"  On 3 L nasal cannula.  Denies dyspnea.  No cough or wheeze.  No chest pain or palpitations.  Appetite good.    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 *         DIET:    ADULT DIET; Regular; Low Fat/Low Chol/High Fiber/2 gm Na     MEDS (scheduled):    valACYclovir  500 mg Oral Daily    calcium acetate  1 capsule Oral TID WC    apixaban  2.5 mg Oral BID    aspirin  81 mg Oral Daily    atorvastatin  40 mg Oral Nightly    bumetanide  2 mg Oral Daily    carvedilol  25 mg Oral 2 times per day on Sun Tue Wed Thu Sat    carvedilol  25 mg Oral Once per day on Mon Thu    hydrALAZINE  75 mg Oral TID    isosorbide dinitrate  40 mg Oral TID    pantoprazole  40 mg Oral QAM AC    lactobacillus  1 capsule Oral Daily    [START ON 4/22/2024] vitamin D  50,000 Units Oral Weekly       MEDS (infusions):      MEDS (prn):  oxyCODONE-acetaminophen, ipratropium 0.5 mg-albuterol 2.5

## 2024-04-22 ENCOUNTER — APPOINTMENT (OUTPATIENT)
Dept: CT IMAGING | Age: 84
End: 2024-04-22
Payer: MEDICARE

## 2024-04-22 LAB
ALBUMIN SERPL-MCNC: 3.7 G/DL (ref 3.5–5.2)
ALP SERPL-CCNC: 101 U/L (ref 35–104)
ALT SERPL-CCNC: 9 U/L (ref 0–32)
AMMONIA PLAS-SCNC: 12 UMOL/L (ref 11–51)
ANION GAP SERPL CALCULATED.3IONS-SCNC: 16 MMOL/L (ref 7–16)
AST SERPL-CCNC: 16 U/L (ref 0–31)
BASOPHILS # BLD: 0.04 K/UL (ref 0–0.2)
BASOPHILS NFR BLD: 1 % (ref 0–2)
BILIRUB SERPL-MCNC: 0.4 MG/DL (ref 0–1.2)
BUN SERPL-MCNC: 48 MG/DL (ref 6–23)
CALCIUM SERPL-MCNC: 8.6 MG/DL (ref 8.6–10.2)
CHLORIDE SERPL-SCNC: 95 MMOL/L (ref 98–107)
CO2 SERPL-SCNC: 24 MMOL/L (ref 22–29)
CREAT SERPL-MCNC: 8.4 MG/DL (ref 0.5–1)
EOSINOPHIL # BLD: 0.14 K/UL (ref 0.05–0.5)
EOSINOPHILS RELATIVE PERCENT: 2 % (ref 0–6)
ERYTHROCYTE [DISTWIDTH] IN BLOOD BY AUTOMATED COUNT: 15.6 % (ref 11.5–15)
GFR SERPL CREATININE-BSD FRML MDRD: 4 ML/MIN/1.73M2
GLUCOSE SERPL-MCNC: 106 MG/DL (ref 74–99)
HCT VFR BLD AUTO: 30.3 % (ref 34–48)
HGB BLD-MCNC: 9.9 G/DL (ref 11.5–15.5)
IMM GRANULOCYTES # BLD AUTO: 0.03 K/UL (ref 0–0.58)
IMM GRANULOCYTES NFR BLD: 0 % (ref 0–5)
LYMPHOCYTES NFR BLD: 1.29 K/UL (ref 1.5–4)
LYMPHOCYTES RELATIVE PERCENT: 19 % (ref 20–42)
MAGNESIUM SERPL-MCNC: 2 MG/DL (ref 1.6–2.6)
MCH RBC QN AUTO: 33.3 PG (ref 26–35)
MCHC RBC AUTO-ENTMCNC: 32.7 G/DL (ref 32–34.5)
MCV RBC AUTO: 102 FL (ref 80–99.9)
MONOCYTES NFR BLD: 0.84 K/UL (ref 0.1–0.95)
MONOCYTES NFR BLD: 12 % (ref 2–12)
NEUTROPHILS NFR BLD: 65 % (ref 43–80)
NEUTS SEG NFR BLD: 4.41 K/UL (ref 1.8–7.3)
PHOSPHATE SERPL-MCNC: 5.4 MG/DL (ref 2.5–4.5)
PLATELET # BLD AUTO: 131 K/UL (ref 130–450)
PMV BLD AUTO: 12.7 FL (ref 7–12)
POTASSIUM SERPL-SCNC: 4.6 MMOL/L (ref 3.5–5)
PROT SERPL-MCNC: 6.8 G/DL (ref 6.4–8.3)
RBC # BLD AUTO: 2.97 M/UL (ref 3.5–5.5)
SODIUM SERPL-SCNC: 135 MMOL/L (ref 132–146)
WBC OTHER # BLD: 6.8 K/UL (ref 4.5–11.5)

## 2024-04-22 PROCEDURE — 2500000003 HC RX 250 WO HCPCS

## 2024-04-22 PROCEDURE — 51798 US URINE CAPACITY MEASURE: CPT

## 2024-04-22 PROCEDURE — 82140 ASSAY OF AMMONIA: CPT

## 2024-04-22 PROCEDURE — 6370000000 HC RX 637 (ALT 250 FOR IP)

## 2024-04-22 PROCEDURE — 90935 HEMODIALYSIS ONE EVALUATION: CPT

## 2024-04-22 PROCEDURE — 2580000003 HC RX 258: Performed by: SPECIALIST

## 2024-04-22 PROCEDURE — 83735 ASSAY OF MAGNESIUM: CPT

## 2024-04-22 PROCEDURE — 6360000002 HC RX W HCPCS: Performed by: SPECIALIST

## 2024-04-22 PROCEDURE — 2700000000 HC OXYGEN THERAPY PER DAY

## 2024-04-22 PROCEDURE — 99233 SBSQ HOSP IP/OBS HIGH 50: CPT | Performed by: INTERNAL MEDICINE

## 2024-04-22 PROCEDURE — 85025 COMPLETE CBC W/AUTO DIFF WBC: CPT

## 2024-04-22 PROCEDURE — 36415 COLL VENOUS BLD VENIPUNCTURE: CPT

## 2024-04-22 PROCEDURE — 6370000000 HC RX 637 (ALT 250 FOR IP): Performed by: INTERNAL MEDICINE

## 2024-04-22 PROCEDURE — 80053 COMPREHEN METABOLIC PANEL: CPT

## 2024-04-22 PROCEDURE — 84100 ASSAY OF PHOSPHORUS: CPT

## 2024-04-22 PROCEDURE — 6370000000 HC RX 637 (ALT 250 FOR IP): Performed by: NURSE PRACTITIONER

## 2024-04-22 PROCEDURE — 2060000000 HC ICU INTERMEDIATE R&B

## 2024-04-22 PROCEDURE — 70450 CT HEAD/BRAIN W/O DYE: CPT

## 2024-04-22 RX ADMIN — BUMETANIDE 2 MG: 1 TABLET ORAL at 13:36

## 2024-04-22 RX ADMIN — ISOSORBIDE DINITRATE 40 MG: 20 TABLET ORAL at 13:37

## 2024-04-22 RX ADMIN — CALCIUM ACETATE 667 MG: 667 CAPSULE ORAL at 13:36

## 2024-04-22 RX ADMIN — HYDRALAZINE HYDROCHLORIDE 75 MG: 50 TABLET ORAL at 21:14

## 2024-04-22 RX ADMIN — ISOSORBIDE DINITRATE 40 MG: 20 TABLET ORAL at 21:14

## 2024-04-22 RX ADMIN — APIXABAN 2.5 MG: 2.5 TABLET, FILM COATED ORAL at 13:37

## 2024-04-22 RX ADMIN — Medication 1 CAPSULE: at 13:37

## 2024-04-22 RX ADMIN — OXYCODONE AND ACETAMINOPHEN 1 TABLET: 5; 325 TABLET ORAL at 13:38

## 2024-04-22 RX ADMIN — CALCIUM ACETATE 667 MG: 667 CAPSULE ORAL at 17:05

## 2024-04-22 RX ADMIN — APIXABAN 2.5 MG: 2.5 TABLET, FILM COATED ORAL at 21:14

## 2024-04-22 RX ADMIN — ASPIRIN 81 MG: 81 TABLET, COATED ORAL at 13:37

## 2024-04-22 RX ADMIN — VALACYCLOVIR HYDROCHLORIDE 500 MG: 1 TABLET, FILM COATED ORAL at 13:49

## 2024-04-22 RX ADMIN — CARVEDILOL 25 MG: 25 TABLET, FILM COATED ORAL at 13:50

## 2024-04-22 RX ADMIN — HYDRALAZINE HYDROCHLORIDE 75 MG: 50 TABLET ORAL at 13:36

## 2024-04-22 RX ADMIN — ERGOCALCIFEROL 50000 UNITS: 1.25 CAPSULE ORAL at 13:37

## 2024-04-22 RX ADMIN — ACYCLOVIR SODIUM 270 MG: 500 INJECTION, SOLUTION INTRAVENOUS at 17:04

## 2024-04-22 RX ADMIN — PANTOPRAZOLE SODIUM 40 MG: 40 TABLET, DELAYED RELEASE ORAL at 05:36

## 2024-04-22 ASSESSMENT — PAIN SCALES - GENERAL
PAINLEVEL_OUTOF10: 0

## 2024-04-22 ASSESSMENT — PAIN SCALES - WONG BAKER
WONGBAKER_NUMERICALRESPONSE: NO HURT

## 2024-04-22 ASSESSMENT — PAIN DESCRIPTION - DESCRIPTORS: DESCRIPTORS: THROBBING;STABBING

## 2024-04-22 ASSESSMENT — PAIN DESCRIPTION - LOCATION: LOCATION: ABDOMEN

## 2024-04-22 NOTE — FLOWSHEET NOTE
04/22/24 1237   Vital Signs   BP (!) 181/78   Temp 98.1 °F (36.7 °C)   Pulse 74   Respirations 18   Weight - Scale 54.3 kg (119 lb 11.4 oz)   Weight Method Bed scale   Percent Weight Change -2.16   Post-Hemodialysis Assessment   Post-Treatment Procedures Blood returned;Catheter capped, clamped and heparinized x 2 ports   Machine Disinfection Process Exterior Machine Disinfection   Rinseback Volume (ml) 300 ml   Blood Volume Processed (Liters) 74.8 L   Dialyzer Clearance Lightly streaked   Duration of Treatment (minutes) 210 minutes   Hemodialysis Intake (ml) 300 ml   Hemodialysis Output (ml) 1500 ml   NET Removed (ml) 1200   Tolerated Treatment Good   Patient Response to Treatment tolerated tx well. removed 1.3L   Patient Disposition Return to room

## 2024-04-22 NOTE — PROGRESS NOTES
[facility] Cardiology Inpatient Progress Note    Patient is a 83 y.o. female of Marek Andres DO seen in hospital follow up.     Chief complaint: Chest pain    HPI: No CP or SOB.    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    Coronary artery disease involving native coronary artery of native heart without angina pectoris    Abnormal EKG    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)       No Known Allergies    Current Facility-Administered Medications   Medication Dose Route Frequency Provider Last Rate Last Admin    valACYclovir (VALTREX) tablet 500 mg  500 mg Oral Daily Edward Edwards APRN - CNP   500 mg at 04/21/24 1150    calcium acetate (PHOSLO) capsule 667 mg  1 capsule Oral TID  Palmira Fernandez APRN - CNP   667 mg at 04/21/24 1748    oxyCODONE-acetaminophen (PERCOCET) 5-325 MG per tablet 1 tablet  1 tablet Oral Q6H PRN Jose Rodrigez DO   1 tablet at 04/20/24 2140    apixaban (ELIQUIS) tablet 2.5 mg  2.5 mg Oral BID Aiden Muñoz APRN - CNP   2.5 mg at 04/21/24 2207    aspirin EC tablet 81 mg  81 mg Oral Daily Aiden Muñoz APRN - CNP   81 mg at 04/21/24 0928    atorvastatin (LIPITOR) tablet 40 mg  40 mg Oral Nightly Aiden Muñoz APRN - CNP   40 mg at 04/21/24 2207    bumetanide (BUMEX)

## 2024-04-22 NOTE — PLAN OF CARE
Problem: Discharge Planning  Goal: Discharge to home or other facility with appropriate resources  4/22/2024 1231 by Jamia Thornton RN  Outcome: Progressing  4/21/2024 2346 by Lyly Edwards RN  Outcome: Progressing  Flowsheets (Taken 4/21/2024 2000)  Discharge to home or other facility with appropriate resources: Identify barriers to discharge with patient and caregiver     Problem: ABCDS Injury Assessment  Goal: Absence of physical injury  4/22/2024 1231 by Jamia Thornton RN  Outcome: Progressing  4/21/2024 2346 by Lyly Edwards RN  Outcome: Progressing     Problem: Safety - Adult  Goal: Free from fall injury  4/22/2024 1231 by Jamia Thornton RN  Outcome: Progressing  4/21/2024 2346 by Lyly Edwards RN  Outcome: Progressing     Problem: Chronic Conditions and Co-morbidities  Goal: Patient's chronic conditions and co-morbidity symptoms are monitored and maintained or improved  4/22/2024 1231 by Jamia Thornton RN  Outcome: Progressing  4/21/2024 2346 by Lyly Edwards RN  Outcome: Progressing  Flowsheets (Taken 4/21/2024 2000)  Care Plan - Patient's Chronic Conditions and Co-Morbidity Symptoms are Monitored and Maintained or Improved: Monitor and assess patient's chronic conditions and comorbid symptoms for stability, deterioration, or improvement     Problem: Pain  Goal: Verbalizes/displays adequate comfort level or baseline comfort level  4/22/2024 1231 by Jamia Thornton RN  Outcome: Progressing  4/21/2024 2346 by Lyly Edwards RN  Outcome: Progressing  Flowsheets (Taken 4/21/2024 1538 by Jamia Thornton RN)  Verbalizes/displays adequate comfort level or baseline comfort level: Encourage patient to monitor pain and request assistance

## 2024-04-22 NOTE — CARE COORDINATION
4/22/2024 1645 CM Note:  Pt plan is to return home with her dtr Jonathan. She goes to dialysis at [facility] and [employer] transports her. Pt and dtr wanted [facility] but had no preference in the company.  Referral was made to [employer] liaison she will review for acceptance.  Pt has needed DME. PT is recommending a wheeled walker for home and pt already has one. Pt's daughter will provide transportation home.  CM will follow. Electronically signed by Krista Penn RN on 4/22/2024 at 5:02 PM

## 2024-04-22 NOTE — PROGRESS NOTES
[facility]  MD Murtaza White, MD Krista Mendoza, CNP   Lucille Beth, JOSE Fernandez, CNP  Progress Note    4/22/2024    SUBJECTIVE:   4/18: Laying in bed. No acute distress. Does have some dyspnea, though nothing out of the ordinary for her. Denies any chest pain today. Vital signs are stable.    4/19 HD today no reported issues  Will have stress test today    4/20 seen in her room on o2/nc lying flat in bed reports no issues to me appear more or less euvolemic   Cardiology note reviewed .      4/30: \"I have shingles.\"  Asymptomatic and was noted by her daughter who is visiting.  Denies all other complaint.  Breathing \"same as always.\"  On 3 L nasal cannula.  Denies dyspnea.  No cough or wheeze.  No chest pain or palpitations.  Appetite good.    4/22: Laying in bed. No acute distress. She is confused, though recognizes me. Tolerated HD today, though did get disoriented after treatment. Discharge held and CT of the head ordered. Vital signs are stable.     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 *         DIET:    ADULT DIET; Regular; Low Fat/Low Chol/High Fiber/2 gm Na     MEDS (scheduled):    acyclovir  5 mg/kg (Adjusted) IntraVENous Q24H    calcium acetate  1 capsule Oral TID     apixaban  2.5 mg Oral BID    aspirin  81 mg Oral Daily    atorvastatin  40 mg Oral Nightly    bumetanide  2 mg Oral Daily    carvedilol  25 mg Oral 2 times per day on Sun Tue Wed Thu Sat    carvedilol  25 mg Oral Once per day on Mon Thu    hydrALAZINE  75 mg Oral TID    isosorbide dinitrate  40 mg Oral TID

## 2024-04-22 NOTE — PLAN OF CARE
Problem: Discharge Planning  Goal: Discharge to home or other facility with appropriate resources  4/21/2024 2346 by Lyly Edwards, RN  Outcome: Progressing  Flowsheets (Taken 4/21/2024 2000)  Discharge to home or other facility with appropriate resources: Identify barriers to discharge with patient and caregiver  4/21/2024 2346 by Lyly Edwards, RN  Outcome: Progressing  Flowsheets (Taken 4/21/2024 2000)  Discharge to home or other facility with appropriate resources: Identify barriers to discharge with patient and caregiver     Problem: ABCDS Injury Assessment  Goal: Absence of physical injury  Outcome: Progressing     Problem: Safety - Adult  Goal: Free from fall injury  Outcome: Progressing     Problem: Chronic Conditions and Co-morbidities  Goal: Patient's chronic conditions and co-morbidity symptoms are monitored and maintained or improved  Outcome: Progressing  Flowsheets (Taken 4/21/2024 2000)  Care Plan - Patient's Chronic Conditions and Co-Morbidity Symptoms are Monitored and Maintained or Improved: Monitor and assess patient's chronic conditions and comorbid symptoms for stability, deterioration, or improvement     Problem: Pain  Goal: Verbalizes/displays adequate comfort level or baseline comfort level  Outcome: Progressing  Flowsheets (Taken 4/21/2024 1538 by Jamia Thornton, RN)  Verbalizes/displays adequate comfort level or baseline comfort level: Encourage patient to monitor pain and request assistance

## 2024-04-22 NOTE — CONSULTS
Consult Note            Date:4/22/2024        Patient Name:Marge Callejas     YOB: 1940     Age:83 y.o.    Inpatient consult to Infectious Diseases  Consult performed by: Nanette Mendoza MD  Consult ordered by: Eddi Almonte DO          Chief Complaint     Chief Complaint   Patient presents with    Chest Pain     Chest pain and left flank pain.          History Obtained From   patient, electronic medical record    History of Present Illness   Marge Callejas is a 83 y.o. female who  has a past medical history of Arthritis, Atrial fibrillation (HCC), Blood circulation, collateral, CHF (congestive heart failure) (HCC), Chronic renal insufficiency, stage IV (severe) (HCC), Coronary artery disease involving native coronary artery of native heart without angina pectoris, History of cardiovascular stress test, History of echocardiogram, Hyperlipidemia, Hypertension, and Mixed restrictive and obstructive lung disease (HCC).   Pt is known to [facility] last seen 2021  Last seen for DX  Pancreatic tail mass with solid component, choledocholithiasis,    Pt presents on 4/16/2024 with   Chief Complaint   Patient presents with    Chest Pain     Chest pain and left flank pain.   Pt was seen with Daughter present   Pt in bed has some confusion and is easily irritated  Per daughter from home with change of MS and pain all over  Per chart pt had chest pain  seen by cardiology and surgery  Daughter notice rash on her back   ID was asked to see  Pt has been on valtrex  Pt has been afebrile on 4L bp stable   Wbc6.8  plt 97->131 cr8.4   Covid/flu neg     CT HEAD WO CONTRAST    Result Date: 4/22/2024  No acute intracranial abnormality. Remote lacunar infarct right caudate nucleus. Periventricular white matter changes consistent chronic microvascular disease.     CT ABDOMEN PELVIS WO CONTRAST Additional Contrast? None    Result Date: 4/17/2024  1. Marked cardiomegaly with marked coronary artery calcification. 2. Trace  \"APTT\" in the last 72 hours.     Recent Labs     04/20/24  0502 04/21/24  0540 04/22/24  0542   AST 16 13 16   ALT 9 8 9   BILITOT 0.4 0.4 0.4   ALKPHOS 102 100 101     Results       Procedure Component Value Units Date/Time    Culture, Urine [[id number]]     Order Status: No result Specimen: Urine, clean catch     COVID-19, Rapid [[id number]] Collected: 04/16/24 2139    Order Status: Completed Specimen: Nasopharyngeal Swab Updated: 04/16/24 2253     Specimen Description .NASOPHARYNGEAL SWAB     SARS-CoV-2, Rapid Not Detected     Comment:       Rapid NATT:  Negative results should be treated as presumptive and, if inconsistent with clinical signs   and symptoms or necessary for patient management,  should be tested with an alternative molecular assay. Negative results do not preclude   SARS-CoV-2 infection and   should not be used as the sole basis for patient management decisions.   This test has been authorized by the FDA under an Emergency Use Authorization (EUA) for use   by authorized laboratories.        Fact sheet for Healthcare Providers: https://www.fda.gov/media/139143/download  Fact sheet for Patients: https://www.fda.gov/media/063585/download          Methodology: Isothermal Nucleic Acid Amplification         Influenza A+B, PCR [[id number]] Collected: 04/16/24 2139    Order Status: Completed Specimen: Nasopharyngeal Updated: 04/16/24 2253     Influenza A by PCR Not Detected     Comment: Methodology: Isothermal Nucleic Acid Amplification        Influenza B by PCR Not Detected     Comment: Methodology: Isothermal Nucleic Acid Amplification               Imaging/Diagnostics   CT ABDOMEN PELVIS WO CONTRAST Additional Contrast? None    Result Date: 4/17/2024  1. Marked cardiomegaly with marked coronary artery calcification. 2. Trace bilateral pleural effusions. 3. Cholelithiasis. 4. Chronic pancreatitis. 5. 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 6. 19 mm ectasia of the proximal left common

## 2024-04-22 NOTE — PROGRESS NOTES
not assessed.  Integumentary:  Vesicular eruptions along the left mid thoracic dermatome posteriorly consistent with varicella-zoster.  Otherwise, skin normal color and texture.  Genitalia/Breast:  Deferred    Medication:  Scheduled Meds:   valACYclovir  500 mg Oral Daily    calcium acetate  1 capsule Oral TID WC    apixaban  2.5 mg Oral BID    aspirin  81 mg Oral Daily    atorvastatin  40 mg Oral Nightly    bumetanide  2 mg Oral Daily    carvedilol  25 mg Oral 2 times per day on Sun Tue Wed Thu Sat    carvedilol  25 mg Oral Once per day on Mon Thu    hydrALAZINE  75 mg Oral TID    isosorbide dinitrate  40 mg Oral TID    pantoprazole  40 mg Oral QAM AC    lactobacillus  1 capsule Oral Daily    vitamin D  50,000 Units Oral Weekly     Continuous Infusions:    Objective Data:  Recent Labs     04/20/24  0502 04/21/24  0540 04/22/24  0542   WBC 6.3 6.2 6.8   RBC 3.17* 3.11* 2.97*   HGB 10.1* 9.9* 9.9*   HCT 31.9* 31.5* 30.3*   .6* 101.3* 102.0*   MCH 31.9 31.8 33.3   MCHC 31.7* 31.4* 32.7   RDW 15.5* 15.5* 15.6*   * 116* 131   MPV 12.9* 12.9* 12.7*     Recent Labs     04/20/24  0502 04/21/24  0540 04/22/24  0542    137 135   K 4.5 4.6 4.6   CL 97* 98 95*   CO2 26 24 24   BUN 30* 40* 48*   CREATININE 5.8* 7.4* 8.4*   GLUCOSE 80 89 106*   CALCIUM 7.8* 8.1* 8.6   PROT 6.5 6.3* 6.8   BILITOT 0.4 0.4 0.4   ALKPHOS 102 100 101   AST 16 13 16   ALT 9 8 9   ALBUMIN 3.8 3.4* 3.7     Lab Results   Component Value Date    TROPONINI 0.03 05/15/2021    TROPONINI 0.06 (H) 04/14/2021    TROPONINI 0.09 (H) 04/14/2021      Assessment:  Multifactorial chest pain with stress test revealing fixed deficits with minimal reversibility with recommendations for aggressive medical therapy in the setting of known coronary artery disease  Varicella-zoster left mid thoracic dermatome   Chronic atrial fibrillation  End-stage renal disease on hemodialysis  Intermittent biliary colic with findings of cholelithiasis and history of  chronic pancreatitis  Oxygen dependent COPD with chronic respiratory failure  Hyperlipidemia  Vitamin D deficiency    Plan:   Marge was evaluated while undergoing hemodialysis.  She has developed acute delirium, possibly induced by extended hospitalization.  She is somewhat inappropriate and laughing with each question.  CT scan of the head will be obtained today.  We will also assess the possibility of a urinary tract infection.  There is no evidence of infection and she is afebrile with stable vital signs.  Her confusion also seems to be surrounding the recent diagnosis of shingles and institution of Valtrex.  This may be a reaction to the Valtrex and we will ask infectious disease for alternatives.  I doubt neurologic involvement of HSV but this may need to be considered.  No family members were present today but her daughter was updated at the bedside yesterday.  We will hold on any discharge today pending above-mentioned workup and improvement in her neurologic status.    Marge requires this high level of physician care and nursing on the IMC/Telemetry unit due the complexity of decision management and chance of rapid decline or death.  Continued cardiac monitoring and higher level of nursing are required. I am readily available for any further decision-making and intervention.     More than 50% of my  time was spent at the bedside counseling/coordinating care with the patient and/or family with face to face contact.  This time was spent reviewing notes and laboratory data as well as instructing and counseling the patient. Time I spent with the family or surrogate(s) is included only if the patient was incapable of providing the necessary information or participating in medical decisions. I also discussed the differential diagnosis and all of the proposed management plans with the patient and individuals accompanying the patient.      Eddi Almonte DO,   12:03 PM  4/22/2024

## 2024-04-23 LAB
ALBUMIN SERPL-MCNC: 3.7 G/DL (ref 3.5–5.2)
ALP SERPL-CCNC: 97 U/L (ref 35–104)
ALT SERPL-CCNC: 12 U/L (ref 0–32)
ANION GAP SERPL CALCULATED.3IONS-SCNC: 17 MMOL/L (ref 7–16)
AST SERPL-CCNC: 20 U/L (ref 0–31)
BASOPHILS # BLD: 0.02 K/UL (ref 0–0.2)
BASOPHILS NFR BLD: 0 % (ref 0–2)
BILIRUB SERPL-MCNC: 0.5 MG/DL (ref 0–1.2)
BUN SERPL-MCNC: 19 MG/DL (ref 6–23)
CALCIUM SERPL-MCNC: 8.3 MG/DL (ref 8.6–10.2)
CHLORIDE SERPL-SCNC: 97 MMOL/L (ref 98–107)
CO2 SERPL-SCNC: 22 MMOL/L (ref 22–29)
CREAT SERPL-MCNC: 4.7 MG/DL (ref 0.5–1)
EOSINOPHIL # BLD: 0.11 K/UL (ref 0.05–0.5)
EOSINOPHILS RELATIVE PERCENT: 2 % (ref 0–6)
ERYTHROCYTE [DISTWIDTH] IN BLOOD BY AUTOMATED COUNT: 15.5 % (ref 11.5–15)
GFR SERPL CREATININE-BSD FRML MDRD: 9 ML/MIN/1.73M2
GLUCOSE SERPL-MCNC: 68 MG/DL (ref 74–99)
HCT VFR BLD AUTO: 30.1 % (ref 34–48)
HGB BLD-MCNC: 9.7 G/DL (ref 11.5–15.5)
IMM GRANULOCYTES # BLD AUTO: 0.03 K/UL (ref 0–0.58)
IMM GRANULOCYTES NFR BLD: 0 % (ref 0–5)
LYMPHOCYTES NFR BLD: 1.54 K/UL (ref 1.5–4)
LYMPHOCYTES RELATIVE PERCENT: 22 % (ref 20–42)
MAGNESIUM SERPL-MCNC: 2 MG/DL (ref 1.6–2.6)
MCH RBC QN AUTO: 32.1 PG (ref 26–35)
MCHC RBC AUTO-ENTMCNC: 32.2 G/DL (ref 32–34.5)
MCV RBC AUTO: 99.7 FL (ref 80–99.9)
MONOCYTES NFR BLD: 0.74 K/UL (ref 0.1–0.95)
MONOCYTES NFR BLD: 11 % (ref 2–12)
NEUTROPHILS NFR BLD: 65 % (ref 43–80)
NEUTS SEG NFR BLD: 4.43 K/UL (ref 1.8–7.3)
PHOSPHATE SERPL-MCNC: 4 MG/DL (ref 2.5–4.5)
PLATELET, FLUORESCENCE: 133 K/UL (ref 130–450)
PMV BLD AUTO: 11.4 FL (ref 7–12)
POTASSIUM SERPL-SCNC: 4.5 MMOL/L (ref 3.5–5)
PROT SERPL-MCNC: 6.6 G/DL (ref 6.4–8.3)
RBC # BLD AUTO: 3.02 M/UL (ref 3.5–5.5)
SODIUM SERPL-SCNC: 136 MMOL/L (ref 132–146)
WBC OTHER # BLD: 6.9 K/UL (ref 4.5–11.5)

## 2024-04-23 PROCEDURE — 85025 COMPLETE CBC W/AUTO DIFF WBC: CPT

## 2024-04-23 PROCEDURE — 99233 SBSQ HOSP IP/OBS HIGH 50: CPT | Performed by: INTERNAL MEDICINE

## 2024-04-23 PROCEDURE — 2500000003 HC RX 250 WO HCPCS

## 2024-04-23 PROCEDURE — 36415 COLL VENOUS BLD VENIPUNCTURE: CPT

## 2024-04-23 PROCEDURE — 6360000002 HC RX W HCPCS: Performed by: SPECIALIST

## 2024-04-23 PROCEDURE — 6370000000 HC RX 637 (ALT 250 FOR IP): Performed by: INTERNAL MEDICINE

## 2024-04-23 PROCEDURE — 2580000003 HC RX 258: Performed by: SPECIALIST

## 2024-04-23 PROCEDURE — 86787 VARICELLA-ZOSTER ANTIBODY: CPT

## 2024-04-23 PROCEDURE — 84100 ASSAY OF PHOSPHORUS: CPT

## 2024-04-23 PROCEDURE — 83735 ASSAY OF MAGNESIUM: CPT

## 2024-04-23 PROCEDURE — 80053 COMPREHEN METABOLIC PANEL: CPT

## 2024-04-23 PROCEDURE — 2060000000 HC ICU INTERMEDIATE R&B

## 2024-04-23 PROCEDURE — 2700000000 HC OXYGEN THERAPY PER DAY

## 2024-04-23 PROCEDURE — 97530 THERAPEUTIC ACTIVITIES: CPT

## 2024-04-23 PROCEDURE — 6370000000 HC RX 637 (ALT 250 FOR IP)

## 2024-04-23 PROCEDURE — 97535 SELF CARE MNGMENT TRAINING: CPT

## 2024-04-23 RX ORDER — CARVEDILOL 6.25 MG/1
12.5 TABLET ORAL ONCE
Status: COMPLETED | OUTPATIENT
Start: 2024-04-23 | End: 2024-04-23

## 2024-04-23 RX ADMIN — ISOSORBIDE DINITRATE 40 MG: 20 TABLET ORAL at 14:34

## 2024-04-23 RX ADMIN — PANTOPRAZOLE SODIUM 40 MG: 40 TABLET, DELAYED RELEASE ORAL at 06:29

## 2024-04-23 RX ADMIN — BUMETANIDE 2 MG: 1 TABLET ORAL at 09:13

## 2024-04-23 RX ADMIN — HYDRALAZINE HYDROCHLORIDE 75 MG: 50 TABLET ORAL at 20:06

## 2024-04-23 RX ADMIN — CARVEDILOL 12.5 MG: 25 TABLET, FILM COATED ORAL at 16:36

## 2024-04-23 RX ADMIN — HYDRALAZINE HYDROCHLORIDE 75 MG: 50 TABLET ORAL at 09:12

## 2024-04-23 RX ADMIN — ASPIRIN 81 MG: 81 TABLET, COATED ORAL at 09:14

## 2024-04-23 RX ADMIN — CALCIUM ACETATE 667 MG: 667 CAPSULE ORAL at 13:02

## 2024-04-23 RX ADMIN — APIXABAN 2.5 MG: 2.5 TABLET, FILM COATED ORAL at 09:12

## 2024-04-23 RX ADMIN — CARVEDILOL 25 MG: 25 TABLET, FILM COATED ORAL at 09:13

## 2024-04-23 RX ADMIN — Medication 1 CAPSULE: at 09:12

## 2024-04-23 RX ADMIN — APIXABAN 2.5 MG: 2.5 TABLET, FILM COATED ORAL at 20:05

## 2024-04-23 RX ADMIN — ATORVASTATIN CALCIUM 40 MG: 40 TABLET, FILM COATED ORAL at 20:05

## 2024-04-23 RX ADMIN — ACYCLOVIR SODIUM 270 MG: 500 INJECTION, SOLUTION INTRAVENOUS at 16:01

## 2024-04-23 RX ADMIN — CALCIUM ACETATE 667 MG: 667 CAPSULE ORAL at 16:36

## 2024-04-23 RX ADMIN — ISOSORBIDE DINITRATE 40 MG: 20 TABLET ORAL at 09:12

## 2024-04-23 RX ADMIN — ISOSORBIDE DINITRATE 40 MG: 20 TABLET ORAL at 20:05

## 2024-04-23 ASSESSMENT — PAIN SCALES - WONG BAKER
WONGBAKER_NUMERICALRESPONSE: NO HURT
WONGBAKER_NUMERICALRESPONSE: NO HURT

## 2024-04-23 ASSESSMENT — PAIN SCALES - GENERAL
PAINLEVEL_OUTOF10: 0

## 2024-04-23 NOTE — CARE COORDINATION
4/23/2024 1110 CM Note:  Pt plan is to return home with her daughter Jonathan.  She goes to dialysis at [facility] and [employer] transports her. Pt and dtr wanted [facility] but had no preference in the company.  Pt has been accepted by [facility] and orders are in Epic  Pt has needed DME. PT is recommending a wheeled walker for home and pt already has one. Pt's daughter will provide transportation home.  CM will follow. Electronically signed by Krista Penn RN on 4/23/2024 at 11:23 AM

## 2024-04-23 NOTE — PROGRESS NOTES
OCCUPATIONAL THERAPY TREATMENT NOTE    [facility]   [address] [facility]        Date:2024  Patient Name: Marge Callejas  MRN: [id number]  : 1940  Room: [address] [place] [address]       Evaluating OT: Raisa Csaas, OTR/L; [id number]        Referring Provider and Orders/Date:        OT eval and treat  Start:  24,   End:  24,   ONE TIME,   Standing Count:  1 Occurrences,   R       Rain, Jose LANDERS DO     Recommended placement: home with HH and family assist-pt declining subacute rehab        Diagnosis:   1. Chest pain, unspecified type    2. ESRD on dialysis ([facility])          Surgery: none      Pertinent Medical History:        Past Medical History        Past Medical History:   Diagnosis Date    Arthritis      Atrial fibrillation ([facility])      Blood circulation, collateral      CHF (congestive heart failure) ([facility])      Chronic renal insufficiency, stage IV (severe) ([facility]) 2016    Coronary artery disease involving native coronary artery of native heart without angina pectoris 2024    History of cardiovascular stress test 2015     Lexiscan stress test    History of echocardiogram 2015     EF 29%    Hyperlipidemia      Hypertension      Mixed restrictive and obstructive lung disease ([facility]) 2023             Past Surgical History         Past Surgical History:   Procedure Laterality Date    COLONOSCOPY N/A 4/3/2021     COLONOSCOPY POLYPECTOMY HOT SNARE performed by Lucio Nelson DO at [facility] ENDOSCOPY    COLONOSCOPY   4/3/2021     COLONOSCOPY WITH BIOPSY performed by Lucio Nelson DO at [facility] ENDOSCOPY    COLONOSCOPY   4/3/2021     COLONOSCOPY CONTROL HEMORRHAGE performed by Lucio Nelson DO at [facility] ENDOSCOPY    COLONOSCOPY   4/3/2021     COLONOSCOPY SUBMUCOSAL SPOT INJECTION performed by Lucio Nelson DO at [facility] ENDOSCOPY    COLONOSCOPY N/A 2023     COLONOSCOPY DIAGNOSTIC performed by Scooby Cormier MD at [facility] ENDOSCOPY

## 2024-04-23 NOTE — PROGRESS NOTES
[facility]  [facility]  Progress Note      Chief Complaint   Patient presents with    Chest Pain     Chest pain and left flank pain.       SUBJECTIVE:  Patient appears to be tolerating medications. No reported adverse drug reactions.  She is unsure if she has pain.  No nausea, vomiting, diarrhea.    Review of systems:  As stated above in the chief complaint, otherwise negative.    Medications:  Scheduled Meds:   acyclovir  5 mg/kg (Adjusted) IntraVENous Q24H    calcium acetate  1 capsule Oral TID WC    apixaban  2.5 mg Oral BID    aspirin  81 mg Oral Daily    atorvastatin  40 mg Oral Nightly    bumetanide  2 mg Oral Daily    carvedilol  25 mg Oral 2 times per day on Sun  Sat    carvedilol  25 mg Oral Once per day on     hydrALAZINE  75 mg Oral TID    isosorbide dinitrate  40 mg Oral TID    pantoprazole  40 mg Oral QAM AC    lactobacillus  1 capsule Oral Daily    vitamin D  50,000 Units Oral Weekly     Continuous Infusions:  PRN Meds:oxyCODONE-acetaminophen, ipratropium 0.5 mg-albuterol 2.5 mg, ondansetron, acetaminophen    OBJECTIVE:  BP (!) 131/97   Pulse 79   Temp 97.8 °F (36.6 °C) (Oral)   Resp 18   Ht 1.615 m (5' 3.58\")   Wt 54.3 kg (119 lb 11.4 oz)   SpO2 91%   BMI 20.82 kg/m²   Temp  Av.7 °F (36.5 °C)  Min: 97 °F (36.1 °C)  Max: 98.1 °F (36.7 °C)  Constitutional: The patient is awake, alert, and confused. Tele sitter.   Skin: Warm and dry. Left flank dry lesions.   HEENT: Round and reactive pupils.  Moist mucous membranes.  No ulcerations or thrush.  Neck: Supple to movements.   Chest: No use of accessory muscles to breathe. Symmetrical expansion.  No wheezing, crackles or rhonchi.  Cardiovascular: S1 and S2 are rhythmic and regular. No murmurs appreciated.   Abdomen: Positive bowel sounds to auscultation. Benign to palpation. No masses felt. No hepatosplenomegaly.  Extremities: No clubbing, no cyanosis, no edema.  Lines: peripheral    Laboratory and

## 2024-04-23 NOTE — PROGRESS NOTES
Physical Therapy    Room #:   0622/0622-02    Date: 2024       Patient Name: Marge Callejas  : 1940      MRN: [id number]     Patient unavailable for physical therapy treatment due to adamant refusal . Patient wanting to rest at this time family present     Jyoti Hernandez, PTA

## 2024-04-23 NOTE — PROGRESS NOTES
Internal Medicine Progress Note    [facility]    Jose Rodrigez D.O., ANA ROSA Almonte D.O., ANA ROSA Abad D.O.       Ifrah Dave, MSN, APRN, NP-C  Edward Edwards, MSN, APRN-CNP  Aiden Muñoz, MSN, APRN-CNP     Primary Care Physician: Marek Andres DO   Admitting Physician:  Jose Rodrigez DO  Admission date and time: 4/16/2024  9:11 PM    Room:  [address] [place], [place] [address]  Admitting diagnosis: Chest pain [R07.9]  ESRD on dialysis (HCC) [N18.6, Z99.2]  Chest pain, unspecified type [R07.9]    Patient Name: Marge Callejas  MRN: [id number]    Date of Service: 4/23/2024     Subjective:  Marge is a 83 y.o. female who was seen and examined today,4/23/2024, at the bedside.  Marge was working with the therapy teams during my examination today.  Her confusion has improved to some extent but has not yet returned to baseline.  Antiviral therapy has been adjusted by the infectious disease team.  No family members are present during my examination.    Review of System:   Constitutional:   Less confusion today.  HEENT:   Denies ear pain, sore throat, sinus or eye problems.  Hard of hearing but refuses to wear hearing aids  Cardiovascular:   Denies any chest pains, irregular heartbeats, or palpitations.    Respiratory:   Denies coughing, sputum production, hemoptysis, or wheezing.  No further dyspnea at rest or on exertion  Gastrointestinal:   Denies nausea, vomiting, diarrhea, or constipation.  Denies any abdominal pain.  Genitourinary:    Denies any urgency, frequency, hematuria. Voiding  without difficulty.  Extremities:   Denies lower extremity swelling, edema or cyanosis.   Neurology:    Denies any headache or focal neurological deficits, Denies generalized weakness or memory difficulty.   Psch:   Denies being anxious or depressed.  Musculoskeletal:    Describes back and rib pain in the area of the rash only  Integumentary:

## 2024-04-23 NOTE — PROGRESS NOTES
[facility]  MD Murtaza White, MD Krista Mendoza, CNP   Lucille Beth, JOSE Fernandez, JUDE  Progress Note    4/23/2024    SUBJECTIVE:   4/18: Laying in bed. No acute distress. Does have some dyspnea, though nothing out of the ordinary for her. Denies any chest pain today. Vital signs are stable.    4/19 HD today no reported issues  Will have stress test today    4/20 seen in her room on o2/nc lying flat in bed reports no issues to me appear more or less euvolemic   Cardiology note reviewed .      4/30: \"I have shingles.\"  Asymptomatic and was noted by her daughter who is visiting.  Denies all other complaint.  Breathing \"same as always.\"  On 3 L nasal cannula.  Denies dyspnea.  No cough or wheeze.  No chest pain or palpitations.  Appetite good.    4/22: Laying in bed. No acute distress. She is confused, though recognizes me. Tolerated HD today, though did get disoriented after treatment. Discharge held and CT of the head ordered. Vital signs are stable.     4/23: Laying in bed, family members at the bedside. She is still confused, but seems better than yesterday. Denies any dyspnea. She is on her normal requirements of oxygen. She is now on IV acyclovir.     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 *         DIET:    ADULT DIET; Regular; Low Fat/Low Chol/High Fiber/2 gm Na     MEDS (scheduled):    acyclovir  5 mg/kg (Adjusted) IntraVENous Q24H    calcium acetate  1 capsule Oral TID WC    apixaban  2.5 mg Oral BID    aspirin  81 mg Oral Daily    atorvastatin  40 mg Oral Nightly    bumetanide  2

## 2024-04-23 NOTE — PROGRESS NOTES
[facility] Cardiology Inpatient Progress Note    Patient is a 83 y.o. female of Marek Andres DO seen in hospital follow up.     Chief complaint: Chest pain    HPI: No CP or SOB.    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    Coronary artery disease involving native coronary artery of native heart without angina pectoris    Abnormal EKG    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)       No Known Allergies    Current Facility-Administered Medications   Medication Dose Route Frequency Provider Last Rate Last Admin    acyclovir (ZOVIRAX) 270 mg in sodium chloride 0.9 % 50 mL IVPB  5 mg/kg (Adjusted) IntraVENous Q24H Nanette Mendoza MD   Stopped at 04/22/24 1817    calcium acetate (PHOSLO) capsule 667 mg  1 capsule Oral TID WC Palmira Fernandez APRN - CNP   667 mg at 04/22/24 1705    oxyCODONE-acetaminophen (PERCOCET) 5-325 MG per tablet 1 tablet  1 tablet Oral Q6H PRN Jose Rodrigez DO   1 tablet at 04/22/24 1338    apixaban (ELIQUIS) tablet 2.5 mg  2.5 mg Oral BID Aiden Muñoz APRN - CNP   2.5 mg at 04/22/24 2114    aspirin EC tablet 81 mg  81 mg Oral Daily Aiden Muñoz APRN - CNP   81 mg at 04/22/24 1337    atorvastatin (LIPITOR) tablet 40 mg  40 mg Oral Nightly Aiden Muñoz APRN - CNP   40 mg at

## 2024-04-24 LAB
ALBUMIN SERPL-MCNC: 3.5 G/DL (ref 3.5–5.2)
ALP SERPL-CCNC: 95 U/L (ref 35–104)
ALT SERPL-CCNC: 12 U/L (ref 0–32)
ANION GAP SERPL CALCULATED.3IONS-SCNC: 14 MMOL/L (ref 7–16)
AST SERPL-CCNC: 21 U/L (ref 0–31)
BASOPHILS # BLD: 0.05 K/UL (ref 0–0.2)
BASOPHILS NFR BLD: 1 % (ref 0–2)
BILIRUB SERPL-MCNC: 0.5 MG/DL (ref 0–1.2)
BUN SERPL-MCNC: 31 MG/DL (ref 6–23)
CALCIUM SERPL-MCNC: 8 MG/DL (ref 8.6–10.2)
CHLORIDE SERPL-SCNC: 98 MMOL/L (ref 98–107)
CO2 SERPL-SCNC: 25 MMOL/L (ref 22–29)
CREAT SERPL-MCNC: 6.3 MG/DL (ref 0.5–1)
EOSINOPHIL # BLD: 0.15 K/UL (ref 0.05–0.5)
EOSINOPHILS RELATIVE PERCENT: 2 % (ref 0–6)
ERYTHROCYTE [DISTWIDTH] IN BLOOD BY AUTOMATED COUNT: 15.3 % (ref 11.5–15)
GFR SERPL CREATININE-BSD FRML MDRD: 6 ML/MIN/1.73M2
GLUCOSE SERPL-MCNC: 129 MG/DL (ref 74–99)
HCT VFR BLD AUTO: 29.6 % (ref 34–48)
HGB BLD-MCNC: 9.3 G/DL (ref 11.5–15.5)
IMM GRANULOCYTES # BLD AUTO: <0.03 K/UL (ref 0–0.58)
IMM GRANULOCYTES NFR BLD: 0 % (ref 0–5)
LYMPHOCYTES NFR BLD: 1.58 K/UL (ref 1.5–4)
LYMPHOCYTES RELATIVE PERCENT: 23 % (ref 20–42)
MAGNESIUM SERPL-MCNC: 2.1 MG/DL (ref 1.6–2.6)
MCH RBC QN AUTO: 31.3 PG (ref 26–35)
MCHC RBC AUTO-ENTMCNC: 31.4 G/DL (ref 32–34.5)
MCV RBC AUTO: 99.7 FL (ref 80–99.9)
MONOCYTES NFR BLD: 0.83 K/UL (ref 0.1–0.95)
MONOCYTES NFR BLD: 12 % (ref 2–12)
NEUTROPHILS NFR BLD: 63 % (ref 43–80)
NEUTS SEG NFR BLD: 4.39 K/UL (ref 1.8–7.3)
PHOSPHATE SERPL-MCNC: 3.6 MG/DL (ref 2.5–4.5)
PLATELET # BLD AUTO: 142 K/UL (ref 130–450)
PMV BLD AUTO: 12.8 FL (ref 7–12)
POTASSIUM SERPL-SCNC: 4.3 MMOL/L (ref 3.5–5)
PROT SERPL-MCNC: 6.6 G/DL (ref 6.4–8.3)
RBC # BLD AUTO: 2.97 M/UL (ref 3.5–5.5)
SODIUM SERPL-SCNC: 137 MMOL/L (ref 132–146)
WBC OTHER # BLD: 7 K/UL (ref 4.5–11.5)

## 2024-04-24 PROCEDURE — 83735 ASSAY OF MAGNESIUM: CPT

## 2024-04-24 PROCEDURE — 80053 COMPREHEN METABOLIC PANEL: CPT

## 2024-04-24 PROCEDURE — 2700000000 HC OXYGEN THERAPY PER DAY

## 2024-04-24 PROCEDURE — 02HV33Z INSERTION OF INFUSION DEVICE INTO SUPERIOR VENA CAVA, PERCUTANEOUS APPROACH: ICD-10-PCS | Performed by: INTERNAL MEDICINE

## 2024-04-24 PROCEDURE — 2580000003 HC RX 258: Performed by: SPECIALIST

## 2024-04-24 PROCEDURE — 36415 COLL VENOUS BLD VENIPUNCTURE: CPT

## 2024-04-24 PROCEDURE — 6370000000 HC RX 637 (ALT 250 FOR IP)

## 2024-04-24 PROCEDURE — 2580000003 HC RX 258: Performed by: NURSE PRACTITIONER

## 2024-04-24 PROCEDURE — 90935 HEMODIALYSIS ONE EVALUATION: CPT

## 2024-04-24 PROCEDURE — 84100 ASSAY OF PHOSPHORUS: CPT

## 2024-04-24 PROCEDURE — 97530 THERAPEUTIC ACTIVITIES: CPT | Performed by: PHYSICAL THERAPIST

## 2024-04-24 PROCEDURE — 76937 US GUIDE VASCULAR ACCESS: CPT

## 2024-04-24 PROCEDURE — C1751 CATH, INF, PER/CENT/MIDLINE: HCPCS

## 2024-04-24 PROCEDURE — 6360000002 HC RX W HCPCS: Performed by: INTERNAL MEDICINE

## 2024-04-24 PROCEDURE — 99233 SBSQ HOSP IP/OBS HIGH 50: CPT | Performed by: INTERNAL MEDICINE

## 2024-04-24 PROCEDURE — 2060000000 HC ICU INTERMEDIATE R&B

## 2024-04-24 PROCEDURE — 6360000002 HC RX W HCPCS: Performed by: SPECIALIST

## 2024-04-24 PROCEDURE — 36410 VNPNXR 3YR/> PHY/QHP DX/THER: CPT

## 2024-04-24 PROCEDURE — 2500000003 HC RX 250 WO HCPCS

## 2024-04-24 PROCEDURE — 85025 COMPLETE CBC W/AUTO DIFF WBC: CPT

## 2024-04-24 PROCEDURE — 2500000003 HC RX 250 WO HCPCS: Performed by: NURSE PRACTITIONER

## 2024-04-24 RX ORDER — SODIUM CHLORIDE 9 MG/ML
INJECTION, SOLUTION INTRAVENOUS PRN
Status: DISCONTINUED | OUTPATIENT
Start: 2024-04-24 | End: 2024-04-27 | Stop reason: HOSPADM

## 2024-04-24 RX ORDER — HEPARIN 100 UNIT/ML
1 SYRINGE INTRAVENOUS PRN
Status: DISCONTINUED | OUTPATIENT
Start: 2024-04-24 | End: 2024-04-27 | Stop reason: HOSPADM

## 2024-04-24 RX ORDER — SODIUM CHLORIDE 0.9 % (FLUSH) 0.9 %
5-40 SYRINGE (ML) INJECTION EVERY 12 HOURS SCHEDULED
Status: DISCONTINUED | OUTPATIENT
Start: 2024-04-24 | End: 2024-04-27 | Stop reason: HOSPADM

## 2024-04-24 RX ORDER — SODIUM CHLORIDE 0.9 % (FLUSH) 0.9 %
5-40 SYRINGE (ML) INJECTION PRN
Status: DISCONTINUED | OUTPATIENT
Start: 2024-04-24 | End: 2024-04-27 | Stop reason: HOSPADM

## 2024-04-24 RX ADMIN — CARVEDILOL 25 MG: 25 TABLET, FILM COATED ORAL at 14:20

## 2024-04-24 RX ADMIN — LIDOCAINE HYDROCHLORIDE 5 ML: 10 INJECTION, SOLUTION INFILTRATION; PERINEURAL at 14:17

## 2024-04-24 RX ADMIN — Medication 1 CAPSULE: at 14:20

## 2024-04-24 RX ADMIN — CALCIUM ACETATE 667 MG: 667 CAPSULE ORAL at 17:29

## 2024-04-24 RX ADMIN — APIXABAN 2.5 MG: 2.5 TABLET, FILM COATED ORAL at 19:40

## 2024-04-24 RX ADMIN — APIXABAN 2.5 MG: 2.5 TABLET, FILM COATED ORAL at 14:21

## 2024-04-24 RX ADMIN — CALCIUM ACETATE 667 MG: 667 CAPSULE ORAL at 14:22

## 2024-04-24 RX ADMIN — ISOSORBIDE DINITRATE 40 MG: 20 TABLET ORAL at 19:39

## 2024-04-24 RX ADMIN — ONDANSETRON 4 MG: 2 INJECTION INTRAMUSCULAR; INTRAVENOUS at 19:53

## 2024-04-24 RX ADMIN — ATORVASTATIN CALCIUM 40 MG: 40 TABLET, FILM COATED ORAL at 19:40

## 2024-04-24 RX ADMIN — HYDRALAZINE HYDROCHLORIDE 75 MG: 50 TABLET ORAL at 14:20

## 2024-04-24 RX ADMIN — ISOSORBIDE DINITRATE 40 MG: 20 TABLET ORAL at 14:21

## 2024-04-24 RX ADMIN — CARVEDILOL 25 MG: 25 TABLET, FILM COATED ORAL at 17:29

## 2024-04-24 RX ADMIN — ASPIRIN 81 MG: 81 TABLET, COATED ORAL at 14:20

## 2024-04-24 RX ADMIN — HYDRALAZINE HYDROCHLORIDE 75 MG: 50 TABLET ORAL at 19:40

## 2024-04-24 RX ADMIN — ACYCLOVIR SODIUM 270 MG: 500 INJECTION, SOLUTION INTRAVENOUS at 17:55

## 2024-04-24 RX ADMIN — SODIUM CHLORIDE, PRESERVATIVE FREE 10 ML: 5 INJECTION INTRAVENOUS at 14:18

## 2024-04-24 RX ADMIN — BUMETANIDE 2 MG: 1 TABLET ORAL at 14:21

## 2024-04-24 RX ADMIN — PANTOPRAZOLE SODIUM 40 MG: 40 TABLET, DELAYED RELEASE ORAL at 05:30

## 2024-04-24 RX ADMIN — SODIUM CHLORIDE, PRESERVATIVE FREE 10 ML: 5 INJECTION INTRAVENOUS at 19:40

## 2024-04-24 ASSESSMENT — PAIN SCALES - GENERAL: PAINLEVEL_OUTOF10: 0

## 2024-04-24 NOTE — PROGRESS NOTES
Ana Gandhi MD at [facility] ENDOSCOPY       SUBJECTIVE:    Precautions: Up with assistance, falls, Dialysis, and Chest pain, Afib, UTI, O2      Social history: Patient lives with daughter most of the time in a ranch home  with no steps  to enter home.  Sponge bathes      Equipment owned: Wheelchair, Cane, Rollator, and O2    AM-PAC Basic Mobility       AM-PAC Basic Mobility - Inpatient   How much help is needed turning from your back to your side while in a flat bed without using bedrails?: A Little  How much help is needed moving from lying on your back to sitting on the side of a flat bed without using bedrails?: A Little  How much help is needed moving to and from a bed to a chair?: A Lot  How much help is needed standing up from a chair using your arms?: A Little  How much help is needed walking in hospital room?: A Lot  How much help is needed climbing 3-5 steps with a railing?: A Lot  AM-PAC Inpatient Mobility Raw Score : 15  AM-PAC Inpatient T-Scale Score : 39.45  Mobility Inpatient CMS 0-100% Score: 57.7  Mobility Inpatient CMS G-Code Modifier : CK    Nursing cleared patient for PT treatment.      OBJECTIVE:   Initial Evaluation  Date: 4/20/2024 Treatment Date:    4/24/2024   Short Term/ Long Term   Goals   Was pt agreeable to Eval/treatment? Yes Y To be met in 2 days   Pain level   9/10  Mid back and L leg No c/o pain    Bed Mobility  Using rails and head of bed elevated:     Rolling: Supervision     Supine to sit: Supervision     Sit to supine: Supervision     Scooting: Supervision    Using rails and head of bed elevated:     Rolling: Supervision    Supine to sit: Supervision    Sit to supine: Supervision    Scooting: Supervision     Rolling: Independent    Supine to sit: Independent    Sit to supine: Independent    Scooting: Independent     Transfers Sit to stand: Minimal assist of 1  Sit to stand: Minimal assist of 1    Sit to stand: Independent    Ambulation     2 x 20 feet using  wheeled walker with  75 y/o F with pmhx of COPD on home O2(3L) with severe pHTN, OHS/MIGUEL on home biPAP, HTN, HLD who presents with 5 days of lightheadedness and fatigue. Found to be hyperglycemic to >600 with normal AG and no ketones, concern for HHS in setting of new onset diabetes.

## 2024-04-24 NOTE — PROGRESS NOTES
[facility]  [facility]  Progress Note      Chief Complaint   Patient presents with    Chest Pain     Chest pain and left flank pain.       SUBJECTIVE:  Patient appears to be tolerating medications. No reported adverse drug reactions.  Pain to left flank  Less confused today  No nausea, vomiting, diarrhea.    Review of systems:  As stated above in the chief complaint, otherwise negative.    Medications:  Scheduled Meds:   acyclovir  5 mg/kg (Adjusted) IntraVENous Q24H    calcium acetate  1 capsule Oral TID WC    apixaban  2.5 mg Oral BID    aspirin  81 mg Oral Daily    atorvastatin  40 mg Oral Nightly    bumetanide  2 mg Oral Daily    carvedilol  25 mg Oral 2 times per day on Sun  Sat    carvedilol  25 mg Oral Once per day on     hydrALAZINE  75 mg Oral TID    isosorbide dinitrate  40 mg Oral TID    pantoprazole  40 mg Oral QAM AC    lactobacillus  1 capsule Oral Daily    vitamin D  50,000 Units Oral Weekly     Continuous Infusions:  PRN Meds:oxyCODONE-acetaminophen, ipratropium 0.5 mg-albuterol 2.5 mg, ondansetron, acetaminophen    OBJECTIVE:  /76   Pulse 75   Temp 99.1 °F (37.3 °C) (Oral)   Resp 20   Ht 1.615 m (5' 3.58\")   Wt 54.3 kg (119 lb 11.4 oz)   SpO2 94%   BMI 20.82 kg/m²   Temp  Av.5 °F (36.9 °C)  Min: 97.7 °F (36.5 °C)  Max: 99.1 °F (37.3 °C)  Constitutional: The patient is awake, alert, and less confused. Tele sitter.   Skin: Warm and dry. Left flank dry lesions.   HEENT: Round and reactive pupils.  Moist mucous membranes.  No ulcerations or thrush.  Neck: Supple to movements.   Chest: No use of accessory muscles to breathe. Symmetrical expansion.  No wheezing, crackles or rhonchi.  Cardiovascular: S1 and S2 are rhythmic and regular. No murmurs appreciated.   Abdomen: Positive bowel sounds to auscultation. Benign to palpation. No masses felt. No hepatosplenomegaly.  Extremities: No clubbing, no cyanosis, no edema.  Lines:

## 2024-04-24 NOTE — PROGRESS NOTES
[facility] Cardiology Inpatient Progress Note    Patient is a 83 y.o. female of Marek Andres DO seen in hospital follow up.     Chief complaint: Chest pain    HPI: No CP or SOB.    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    Coronary artery disease involving native coronary artery of native heart without angina pectoris    Abnormal EKG    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)       No Known Allergies    Current Facility-Administered Medications   Medication Dose Route Frequency Provider Last Rate Last Admin    acyclovir (ZOVIRAX) 270 mg in sodium chloride 0.9 % 50 mL IVPB  5 mg/kg (Adjusted) IntraVENous Q24H Nanette Mendoza MD   Stopped at 04/23/24 1725    calcium acetate (PHOSLO) capsule 667 mg  1 capsule Oral TID  Palmira Fernandez APRN - CNP   667 mg at 04/23/24 1636    oxyCODONE-acetaminophen (PERCOCET) 5-325 MG per tablet 1 tablet  1 tablet Oral Q6H PRN Jose Rodrigez DO   1 tablet at 04/22/24 1338    apixaban (ELIQUIS) tablet 2.5 mg  2.5 mg Oral BID Aiden Muñoz APRN - CNP   2.5 mg at 04/23/24 2005    aspirin EC tablet 81 mg  81 mg Oral Daily Aiden Muñoz APRN - CNP   81 mg at 04/23/24 0914    atorvastatin (LIPITOR) tablet 40 mg  40 mg Oral Nightly Aiden Muñoz APRN - CNP   40 mg at  therapies  1 NSVT    Stress Summary 4/19/2024:    Stress Function: Normal left ventricle size. Ejection fraction is 42% with global hypokinesis.    Perfusion Comments: Large-sized fixed perfusion defect in the inferior wall. Moderate-sized fixed perfusion defect in the apex. Moderate-sized partially reversible perfusion defect in the inferolateral wall.    ECG: The ECG was not diagnostic due to baseline ECG abnormality.    ASSESSMENT & PLAN:    Patient Active Problem List   Diagnosis    Shortness of breath    Chronic combined systolic and diastolic congestive heart failure (HCC)    Chronic renal insufficiency, stage IV (severe) (HCC)    Atrial fibrillation (HCC)    Hypertension    Abnormal chest x-ray    Anemia of chronic disease    Tobacco abuse counseling    Acute on chronic combined systolic and diastolic CHF (congestive heart failure) (HCC)    Acute systolic CHF (congestive heart failure) ([facility])    Mixed restrictive and obstructive lung disease ([facility])    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 Chest pain/Elevated troponin:     Chart/labs/EKG/monitor reviewed.     Cath 2021 at [facility] with \"Single-vessel native artery coronary artery disease: 90% mid RCA stenosis.  The distal vessel and branches fill both antegradely as well as via left-to-right collaterals.\" Treated medically.    Stress 4/19/2024 with a large-sized fixed perfusion defect in the inferior wall, moderate-sized fixed perfusion defect in the apex and a moderate-sized partially reversible perfusion defect in the inferolateral wall. LVEF 42% with global hypokinesis.    Medically manage for

## 2024-04-24 NOTE — PROGRESS NOTES
Physical Therapy  Physical Therapy    Room #:   0622/0622-02    Date: 2024       Patient Name: Marge Callejas  : 1940      MRN: [id number]     Patient unavailable for physical therapy treatment due to off floor at dialysis in AM. Will attempt PT treatment at a later time. Thank you.      Stuart Gaston, PT

## 2024-04-24 NOTE — PLAN OF CARE
Problem: Discharge Planning  Goal: Discharge to home or other facility with appropriate resources  Outcome: Progressing     Problem: ABCDS Injury Assessment  Goal: Absence of physical injury  Outcome: Progressing     Problem: Safety - Adult  Goal: Free from fall injury  Outcome: Progressing     Problem: Chronic Conditions and Co-morbidities  Goal: Patient's chronic conditions and co-morbidity symptoms are monitored and maintained or improved  Outcome: Progressing     Problem: Pain  Goal: Verbalizes/displays adequate comfort level or baseline comfort level  Outcome: Progressing     Problem: Skin/Tissue Integrity  Goal: Absence of new skin breakdown  Description: 1.  Monitor for areas of redness and/or skin breakdown  2.  Assess vascular access sites hourly  3.  Every 4-6 hours minimum:  Change oxygen saturation probe site  4.  Every 4-6 hours:  If on nasal continuous positive airway pressure, respiratory therapy assess nares and determine need for appliance change or resting period.  Outcome: Progressing

## 2024-04-24 NOTE — PROCEDURES
SL power midline Placement 4/24/2024        Product number: [facility] [address] [id number]   Lot Number: [id number]   Consult: home abx   Ultrasound: yes   Left Basilic vein:                Upper Arm Circumference: (CM) 28    Size:(FR)/GUAGE 4.5    Exposed Length: (CM) 0    Internal Length: (CM) 15   Cut: (CM) 0   Vein Measurement: 0.6cm              Lidocaine Given: yes      Tolerated well  Fidgety  Brisk blood return  Flushed well and capped  External pressure dressing d/t bleeding  RN notified      Jose Angel Lindsey RN  4/24/2024  2:17 PM

## 2024-04-24 NOTE — PROGRESS NOTES
This RN went to patient's room for AM rounds and vitals and patient's oxygen was out of nose. Patient was at 75% on RA. Turned O2 up to 7L and patient recovered to 92% and was  placed back on baseline of 4L. No notification via Telesitter monitor of patient's oxygen off. Patient stable.

## 2024-04-24 NOTE — PROGRESS NOTES
Internal Medicine Progress Note    [facility]    Jose Rodrigez D.O., ANA ROSA Almonte D.O., ANA ROSA Abad D.O.       Ifrah Dave, MSN, APRN, NP-C  Edward Edwards, MSN, APRN-CNP  Aiden Muñoz, MSN, APRN-CNP     Primary Care Physician: Marek Andres DO   Admitting Physician:  Jose Rodrigez DO  Admission date and time: 4/16/2024  9:11 PM    Room:  [facility]0622-  Admitting diagnosis: Chest pain [R07.9]  ESRD on dialysis (HCC) [N18.6, Z99.2]  Chest pain, unspecified type [R07.9]    Patient Name: Marge Callejas  MRN: [id number]    Date of Service: 4/24/2024     Subjective:  Marge is a 83 y.o. female who was seen and examined today,4/24/2024, at the bedside.  Marge was evaluated while undergoing hemodialysis.  Initial plans were for MRI evaluation with contrast today to be followed by dialysis thereafter.  This sequence did not occur.  I have updated her daughter accordingly.  The patient remains intermittently confused but does seem to be improving, albeit at a slow rate.    Review of System:   Constitutional:   Slowly improving confusion.  HEENT:   Denies ear pain, sore throat, sinus or eye problems.  Hard of hearing but refuses to wear hearing aids  Cardiovascular:   Denies any chest pains, irregular heartbeats, or palpitations.    Respiratory:   Denies coughing, sputum production, hemoptysis, or wheezing.  No further dyspnea at rest or on exertion  Gastrointestinal:   Admits to a decreased appetite and therefore decreased oral intake.  Genitourinary:    Denies any urgency, frequency, hematuria. Voiding  without difficulty.  Extremities:   Denies lower extremity swelling, edema or cyanosis.   Neurology:    Denies any headache or focal neurological deficits, ongoing weakness and deconditioning.  Psch:   Denies being anxious or depressed.  Musculoskeletal:    Describes back and rib pain in the area of the  thoracic dermatome posteriorly consistent with varicella-zoster.  Otherwise, skin normal color and texture.  Genitalia/Breast:  Deferred    Medication:  Scheduled Meds:   lidocaine  5 mL IntraDERmal Once    sodium chloride flush  5-40 mL IntraVENous 2 times per day    acyclovir  5 mg/kg (Adjusted) IntraVENous Q24H    calcium acetate  1 capsule Oral TID WC    apixaban  2.5 mg Oral BID    aspirin  81 mg Oral Daily    atorvastatin  40 mg Oral Nightly    bumetanide  2 mg Oral Daily    carvedilol  25 mg Oral 2 times per day on Sun Tue Wed Thu Sat    carvedilol  25 mg Oral Once per day on Mon Thu    hydrALAZINE  75 mg Oral TID    isosorbide dinitrate  40 mg Oral TID    pantoprazole  40 mg Oral QAM AC    lactobacillus  1 capsule Oral Daily    vitamin D  50,000 Units Oral Weekly     Continuous Infusions:   sodium chloride         Objective Data:  Recent Labs     04/22/24  0542 04/23/24  0639 04/24/24  0522   WBC 6.8 6.9 7.0   RBC 2.97* 3.02* 2.97*   HGB 9.9* 9.7* 9.3*   HCT 30.3* 30.1* 29.6*   .0* 99.7 99.7   MCH 33.3 32.1 31.3   MCHC 32.7 32.2 31.4*   RDW 15.6* 15.5* 15.3*     --  142   MPV 12.7* 11.4 12.8*     Recent Labs     04/22/24  0542 04/23/24  0639 04/24/24  0522    136 137   K 4.6 4.5 4.3   CL 95* 97* 98   CO2 24 22 25   BUN 48* 19 31*   CREATININE 8.4* 4.7* 6.3*   GLUCOSE 106* 68* 129*   CALCIUM 8.6 8.3* 8.0*   PROT 6.8 6.6 6.6   BILITOT 0.4 0.5 0.5   ALKPHOS 101 97 95   AST 16 20 21   ALT 9 12 12   ALBUMIN 3.7 3.7 3.5     Lab Results   Component Value Date    TROPONINI 0.03 05/15/2021    TROPONINI 0.06 (H) 04/14/2021    TROPONINI 0.09 (H) 04/14/2021      Assessment:  Multifactorial chest pain with stress test revealing fixed deficits with minimal reversibility with recommendations for aggressive medical therapy in the setting of known coronary artery disease  Varicella-zoster left mid thoracic dermatome with consideration for encephalitis  Chronic atrial fibrillation  End-stage renal disease

## 2024-04-25 LAB
ALBUMIN SERPL-MCNC: 3.9 G/DL (ref 3.5–5.2)
ALP SERPL-CCNC: 98 U/L (ref 35–104)
ALT SERPL-CCNC: 16 U/L (ref 0–32)
ANION GAP SERPL CALCULATED.3IONS-SCNC: 10 MMOL/L (ref 7–16)
AST SERPL-CCNC: 24 U/L (ref 0–31)
BASOPHILS # BLD: 0.02 K/UL (ref 0–0.2)
BASOPHILS NFR BLD: 0 % (ref 0–2)
BILIRUB SERPL-MCNC: 0.5 MG/DL (ref 0–1.2)
BUN SERPL-MCNC: 12 MG/DL (ref 6–23)
CALCIUM SERPL-MCNC: 8.7 MG/DL (ref 8.6–10.2)
CHLORIDE SERPL-SCNC: 100 MMOL/L (ref 98–107)
CO2 SERPL-SCNC: 28 MMOL/L (ref 22–29)
CREAT SERPL-MCNC: 3.7 MG/DL (ref 0.5–1)
EOSINOPHIL # BLD: 0.15 K/UL (ref 0.05–0.5)
EOSINOPHILS RELATIVE PERCENT: 2 % (ref 0–6)
ERYTHROCYTE [DISTWIDTH] IN BLOOD BY AUTOMATED COUNT: 15.2 % (ref 11.5–15)
GFR SERPL CREATININE-BSD FRML MDRD: 12 ML/MIN/1.73M2
GLUCOSE SERPL-MCNC: 115 MG/DL (ref 74–99)
HCT VFR BLD AUTO: 30.2 % (ref 34–48)
HGB BLD-MCNC: 9.7 G/DL (ref 11.5–15.5)
IMM GRANULOCYTES # BLD AUTO: <0.03 K/UL (ref 0–0.58)
IMM GRANULOCYTES NFR BLD: 0 % (ref 0–5)
LYMPHOCYTES NFR BLD: 1.51 K/UL (ref 1.5–4)
LYMPHOCYTES RELATIVE PERCENT: 23 % (ref 20–42)
MAGNESIUM SERPL-MCNC: 1.9 MG/DL (ref 1.6–2.6)
MCH RBC QN AUTO: 31.9 PG (ref 26–35)
MCHC RBC AUTO-ENTMCNC: 32.1 G/DL (ref 32–34.5)
MCV RBC AUTO: 99.3 FL (ref 80–99.9)
MONOCYTES NFR BLD: 0.81 K/UL (ref 0.1–0.95)
MONOCYTES NFR BLD: 12 % (ref 2–12)
NEUTROPHILS NFR BLD: 62 % (ref 43–80)
NEUTS SEG NFR BLD: 4.08 K/UL (ref 1.8–7.3)
PHOSPHATE SERPL-MCNC: 2.4 MG/DL (ref 2.5–4.5)
PLATELET # BLD AUTO: 141 K/UL (ref 130–450)
PMV BLD AUTO: 12.9 FL (ref 7–12)
POTASSIUM SERPL-SCNC: 3.7 MMOL/L (ref 3.5–5)
PROT SERPL-MCNC: 7.1 G/DL (ref 6.4–8.3)
RBC # BLD AUTO: 3.04 M/UL (ref 3.5–5.5)
SODIUM SERPL-SCNC: 138 MMOL/L (ref 132–146)
VZV IGG SER QL IA: NORMAL
WBC OTHER # BLD: 6.6 K/UL (ref 4.5–11.5)

## 2024-04-25 PROCEDURE — 83735 ASSAY OF MAGNESIUM: CPT

## 2024-04-25 PROCEDURE — 80053 COMPREHEN METABOLIC PANEL: CPT

## 2024-04-25 PROCEDURE — 84100 ASSAY OF PHOSPHORUS: CPT

## 2024-04-25 PROCEDURE — 2700000000 HC OXYGEN THERAPY PER DAY

## 2024-04-25 PROCEDURE — 97110 THERAPEUTIC EXERCISES: CPT | Performed by: PHYSICAL THERAPIST

## 2024-04-25 PROCEDURE — 85025 COMPLETE CBC W/AUTO DIFF WBC: CPT

## 2024-04-25 PROCEDURE — 6360000002 HC RX W HCPCS: Performed by: SPECIALIST

## 2024-04-25 PROCEDURE — 36592 COLLECT BLOOD FROM PICC: CPT

## 2024-04-25 PROCEDURE — 2580000003 HC RX 258: Performed by: SPECIALIST

## 2024-04-25 PROCEDURE — 97530 THERAPEUTIC ACTIVITIES: CPT | Performed by: PHYSICAL THERAPIST

## 2024-04-25 PROCEDURE — 2580000003 HC RX 258: Performed by: NURSE PRACTITIONER

## 2024-04-25 PROCEDURE — 2060000000 HC ICU INTERMEDIATE R&B

## 2024-04-25 PROCEDURE — 6370000000 HC RX 637 (ALT 250 FOR IP)

## 2024-04-25 RX ORDER — ACYCLOVIR SODIUM 500 MG/10ML
270 INJECTION, SOLUTION INTRAVENOUS DAILY
Qty: 59.4 ML | Refills: 0 | Status: SHIPPED | OUTPATIENT
Start: 2024-04-25 | End: 2024-04-25

## 2024-04-25 RX ORDER — ACYCLOVIR SODIUM 500 MG/10ML
5 INJECTION, SOLUTION INTRAVENOUS DAILY
Qty: 62.7 ML | Refills: 0 | Status: SHIPPED | OUTPATIENT
Start: 2024-04-25 | End: 2024-05-06

## 2024-04-25 RX ADMIN — ISOSORBIDE DINITRATE 40 MG: 20 TABLET ORAL at 10:31

## 2024-04-25 RX ADMIN — SODIUM CHLORIDE, PRESERVATIVE FREE 10 ML: 5 INJECTION INTRAVENOUS at 19:40

## 2024-04-25 RX ADMIN — ATORVASTATIN CALCIUM 40 MG: 40 TABLET, FILM COATED ORAL at 19:39

## 2024-04-25 RX ADMIN — APIXABAN 2.5 MG: 2.5 TABLET, FILM COATED ORAL at 10:26

## 2024-04-25 RX ADMIN — APIXABAN 2.5 MG: 2.5 TABLET, FILM COATED ORAL at 19:41

## 2024-04-25 RX ADMIN — HYDRALAZINE HYDROCHLORIDE 75 MG: 50 TABLET ORAL at 10:26

## 2024-04-25 RX ADMIN — CARVEDILOL 25 MG: 25 TABLET, FILM COATED ORAL at 10:31

## 2024-04-25 RX ADMIN — CARVEDILOL 25 MG: 25 TABLET, FILM COATED ORAL at 10:35

## 2024-04-25 RX ADMIN — SODIUM CHLORIDE, PRESERVATIVE FREE 10 ML: 5 INJECTION INTRAVENOUS at 10:34

## 2024-04-25 RX ADMIN — BUMETANIDE 2 MG: 1 TABLET ORAL at 10:30

## 2024-04-25 RX ADMIN — ISOSORBIDE DINITRATE 40 MG: 20 TABLET ORAL at 19:39

## 2024-04-25 RX ADMIN — Medication 1 CAPSULE: at 10:31

## 2024-04-25 RX ADMIN — ACYCLOVIR SODIUM 270 MG: 500 INJECTION, SOLUTION INTRAVENOUS at 17:16

## 2024-04-25 RX ADMIN — ASPIRIN 81 MG: 81 TABLET, COATED ORAL at 10:34

## 2024-04-25 RX ADMIN — PANTOPRAZOLE SODIUM 40 MG: 40 TABLET, DELAYED RELEASE ORAL at 05:03

## 2024-04-25 ASSESSMENT — PAIN SCALES - GENERAL
PAINLEVEL_OUTOF10: 0
PAINLEVEL_OUTOF10: 0

## 2024-04-25 NOTE — PROGRESS NOTES
[facility]  MD Murtaza White, MD Alban Ross, MD Krista Osorio, CNP   Lucille Beth, JOSE Fernandez, JUDE  Progress Note    4/24/2024    SUBJECTIVE:   4/18: Laying in bed. No acute distress. Does have some dyspnea, though nothing out of the ordinary for her. Denies any chest pain today. Vital signs are stable.    4/19 HD today no reported issues  Will have stress test today    4/20 seen in her room on o2/nc lying flat in bed reports no issues to me appear more or less euvolemic   Cardiology note reviewed .      4/30: \"I have shingles.\"  Asymptomatic and was noted by her daughter who is visiting.  Denies all other complaint.  Breathing \"same as always.\"  On 3 L nasal cannula.  Denies dyspnea.  No cough or wheeze.  No chest pain or palpitations.  Appetite good.    4/22: Laying in bed. No acute distress. She is confused, though recognizes me. Tolerated HD today, though did get disoriented after treatment. Discharge held and CT of the head ordered. Vital signs are stable.     4/23: Laying in bed, family members at the bedside. She is still confused, but seems better than yesterday. Denies any dyspnea. She is on her normal requirements of oxygen. She is now on IV acyclovir.     4/24: Status quo.  Took oxygen off, immediately desaturated.  Satting 92% on 4 L nasal cannula, which is her baseline.      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 *         DIET:    ADULT DIET; Regular; Low Fat/Low Chol/High Fiber/2 gm Na  ADULT ORAL NUTRITION SUPPLEMENT; Lunch, Breakfast; Renal Oral Supplement     MEDS (scheduled):     sodium chloride flush  5-40 mL IntraVENous 2 times per day    acyclovir  5 mg/kg (Adjusted) IntraVENous Q24H    calcium acetate  1 capsule Oral TID WC    apixaban  2.5 mg Oral BID    aspirin  81 mg Oral Daily    atorvastatin  40 mg Oral Nightly    bumetanide  2 mg Oral Daily    carvedilol  25 mg Oral 2 times per day on Sun Tue Wed Thu Sat    carvedilol  25 mg Oral Once per day on Mon Thu    hydrALAZINE  75 mg Oral TID    isosorbide dinitrate  40 mg Oral TID    pantoprazole  40 mg Oral QAM AC    lactobacillus  1 capsule Oral Daily    vitamin D  50,000 Units Oral Weekly       MEDS (infusions):   sodium chloride         MEDS (prn):  sodium chloride flush, sodium chloride, heparin flush, oxyCODONE-acetaminophen, ipratropium 0.5 mg-albuterol 2.5 mg, ondansetron, acetaminophen    PHYSICAL EXAM:     Patient Vitals for the past 24 hrs:   BP Temp Temp src Pulse Resp SpO2 Height Weight   04/24/24 1949 106/87 97.4 °F (36.3 °C) Oral 76 22 90 % -- --   04/24/24 1515 104/61 98.2 °F (36.8 °C) Oral 75 20 91 % -- --   04/24/24 1344 -- -- -- -- -- -- 1.615 m (5' 3.58\") --   04/24/24 1255 (!) 147/80 98.3 °F (36.8 °C) -- 76 18 -- -- 62.3 kg (137 lb 5.6 oz)   04/24/24 1225 (!) 147/80 98.3 °F (36.8 °C) -- 76 18 -- -- 62.3 kg (137 lb 5.6 oz)   04/24/24 0800 (!) 153/72 97.1 °F (36.2 °C) Infrared 80 20 92 % -- --   04/24/24 0421 131/76 99.1 °F (37.3 °C) Oral 75 20 94 % -- --   @      Intake/Output Summary (Last 24 hours) at 4/24/2024 9931  Last data filed at 4/24/2024 2013  Gross per 24 hour   Intake 286.74 ml   Output --   Net 286.74 ml         Wt Readings from Last 3 Encounters:   04/24/24 62.3 kg (137 lb 5.6 oz)   09/19/23 65.3 kg (143 lb 15.4 oz)   07/13/23 61.7 kg (136 lb)       Constitutional:  in no acute distress  HEENT: NC/AT, EOMI, sclera and conjunctiva are clear and anicteric, mucus membranes moist  Neck: Trachea midline, no JVD  Cardiovascular: S1, S2 regular rhythm, no murmur,or rub  Respiratory:  CTAB. No crackles, no

## 2024-04-25 NOTE — PROGRESS NOTES
[facility]  MD Murtaza White, MD Krista Mendoza, CNP   Lucille Beth, JOSE Fernandez, CNP  Progress Note    4/25/2024    SUBJECTIVE:   4/18: Laying in bed. No acute distress. Does have some dyspnea, though nothing out of the ordinary for her. Denies any chest pain today. Vital signs are stable.    4/19 HD today no reported issues  Will have stress test today    4/20 seen in her room on o2/nc lying flat in bed reports no issues to me appear more or less euvolemic   Cardiology note reviewed .      4/30: \"I have shingles.\"  Asymptomatic and was noted by her daughter who is visiting.  Denies all other complaint.  Breathing \"same as always.\"  On 3 L nasal cannula.  Denies dyspnea.  No cough or wheeze.  No chest pain or palpitations.  Appetite good.    4/22: Laying in bed. No acute distress. She is confused, though recognizes me. Tolerated HD today, though did get disoriented after treatment. Discharge held and CT of the head ordered. Vital signs are stable.     4/23: Laying in bed, family members at the bedside. She is still confused, but seems better than yesterday. Denies any dyspnea. She is on her normal requirements of oxygen. She is now on IV acyclovir.     4/24: Status quo.  Took oxygen off, immediately desaturated.  Satting 92% on 4 L nasal cannula, which is her baseline.      4/25: Laying in bed, no acute distress. Family are present at the bedside. Celebrating her birthday. She recognizes me, though still has some confusion. Seems to be improving. Denies dyspnea, chest pain, or palpitations.     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 *         DIET:    ADULT DIET; Regular; Low Fat/Low Chol/High Fiber/2 gm Na  ADULT ORAL NUTRITION SUPPLEMENT; Lunch, Breakfast; Renal Oral Supplement     MEDS (scheduled):    sodium chloride flush  5-40 mL IntraVENous 2 times per day    acyclovir  5 mg/kg (Adjusted) IntraVENous Q24H    [Held by provider] calcium acetate  1 capsule Oral TID WC    apixaban  2.5 mg Oral BID    aspirin  81 mg Oral Daily    atorvastatin  40 mg Oral Nightly    bumetanide  2 mg Oral Daily    carvedilol  25 mg Oral 2 times per day on Sun Tue Wed Thu Sat    carvedilol  25 mg Oral Once per day on Mon Thu    hydrALAZINE  75 mg Oral TID    isosorbide dinitrate  40 mg Oral TID    pantoprazole  40 mg Oral QAM AC    lactobacillus  1 capsule Oral Daily    vitamin D  50,000 Units Oral Weekly       MEDS (infusions):   sodium chloride         MEDS (prn):  sodium chloride flush, sodium chloride, heparin flush, oxyCODONE-acetaminophen, ipratropium 0.5 mg-albuterol 2.5 mg, ondansetron, acetaminophen    PHYSICAL EXAM:     Patient Vitals for the past 24 hrs:   BP Temp Temp src Pulse Resp SpO2   04/25/24 1541 (!) 90/55 98.6 °F (37 °C) Oral 76 19 98 %   04/25/24 1415 (!) 83/52 97.2 °F (36.2 °C) Infrared 75 18 94 %   04/25/24 1026 (!) 143/85 97 °F (36.1 °C) Infrared 75 20 98 %   04/25/24 0414 95/76 98.2 °F (36.8 °C) -- 75 22 91 %   04/24/24 2356 111/80 97.9 °F (36.6 °C) Oral 75 22 91 %   04/24/24 2234 -- -- -- 75 -- --   04/24/24 1949 106/87 97.4 °F (36.3 °C) Oral 76 22 90 %     @      Intake/Output Summary (Last 24 hours) at 4/25/2024 1615  Last data filed at 4/25/2024 1314  Gross per 24 hour   Intake 216.93 ml   Output --   Net 216.93 ml           Wt Readings from Last 3 Encounters:   04/24/24 62.3 kg (137 lb 5.6 oz)   09/19/23 65.3 kg (143 lb 15.4 oz)   07/13/23 61.7 kg (136 lb)       Constitutional:  in no acute distress  HEENT: NC/AT, EOMI, sclera and conjunctiva are clear and anicteric, mucus membranes

## 2024-04-25 NOTE — CARE COORDINATION
4/25/2024 1335 CM Note:  Pt plan is to return home with her daughter Jonathan.  She goes to dialysis at [facility] and [employer] transports her. Pt and dtr wanted [facility] but had no preference in the company.  Pt has been accepted by [facility] and orders are in Epic  Pt has needed DME. PT is recommending a wheeled walker for home and pt already has one. Pt's daughter will provide transportation home.  CM will follow. Electronically signed by Krista Penn RN on 4/25/2024 at 1:44 PM   Addendum 1645 CM discussed options with pt's daughter for pt to be discharged on Acyclovir daily until May 6.  Pt's [facility], referral was made and script was faxed to them.  Per Serina at the [facility] once we know the d/c date then she will be put on the schedule.  An afternoon appt was requested d/t pt has dialysis on Mon and Friday in the am.  The latest appt is 1330. Pt's daughter Eliza updated and aware.  Electronically signed by Krista Penn RN on 4/25/2024 at 4:53 PM

## 2024-04-25 NOTE — PROGRESS NOTES
Physical Therapy  Physical Therapy Treatment Note/Plan of Care    Room #:  0622/0622-02  Patient Name: Marge Callejas  YOB: 1940  MRN: [id number]    Date of Service: 4/25/2024     Tentative placement recommendation: Subacute unless patient meets goals then Home Health Physical Therapy  Equipment recommendation: Wheeled Walker if d/jacob home    Evaluating Physical Therapist: Stuart Gaston, PT, DPT #[id number]      Specific Provider Orders/Date/Referring Provider :     04/19/24 0930    PT eval and treat  Start:  04/19/24 0930,   End:  04/19/24 0930,   ONE TIME,   Standing Count:  1 Occurrences,   R       Rain, Jose LANDERS DO Acknowledge New      Admitting Diagnosis:   Chest pain [R07.9]  ESRD on dialysis (HCC) [N18.6, Z99.2]  Chest pain, unspecified type [R07.9]      Surgery: none  Visit Diagnoses         Codes    ESRD on dialysis (HCC)     N18.6, Z99.2            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  control, walker approximation, balance, upright, weight shift, and safety 2 x 40 feet using  wheeled walker with Minimal progressing to supervision   for walker control, walker approximation, balance, upright, weight shift, and safety     > 100 feet using  wheeled walker versus cane with Supervision     ROM Within functional limits    Increase range of motion 10% of affected joints    Strength BUE:  refer to OT eval  RLE:  4/5  LLE:  4/5  Increase strength in affected mm groups by 1/3 grade   Balance Sitting EOB:  fair +  Dynamic Standing:  fair with WW Sitting EOB: fair+   Dynamic Standing: fair with WW   Sitting EOB:  good   Dynamic Standing: fair+ with WW     Patient is Alert & Oriented x person, place, time, and situation and follows directions    Sensation:  Patient  denies numbness/tingling   Edema:  no   Endurance: fair     Vitals:  4.5 liters nasal cannula   Blood Pressure at rest  Blood Pressure during session    Heart Rate at rest  Heart Rate during session    SPO2 at rest %  SPO2 during session 91-95%     Patient education  Patient educated on role of Physical Therapy, risks of immobility, safety and plan of care, energy conservation,  importance of mobility while in hospital , purse lip breathing, ankle pumps, quad set and glut set for edema control, blood clot prevention, importance and purpose of adaptive device and adjusted to proper height for the patient., safety , O2 line management and safety , positioning for skin integrity and comfort, and proper use/technique of incentive spirometer     Patient response to education:   Pt verbalized understanding Pt demonstrated skill Pt requires further education in this area   Yes Partial Yes      Treatment:  Patient practiced and was instructed/facilitated in the following treatment: Patient Sat edge of bed 10 minutes with Supervision  to increase dynamic sitting balance and activity tolerance. Pt performed bed mobility, transfers, ambulation in room, seated

## 2024-04-25 NOTE — PROGRESS NOTES
Internal Medicine Progress Note    [facility]    Jose Rodrigez D.O., ANA ROSA Almonte D.O., ANA ROSA Abad D.O.       Ifrah Dave, MSN, APRN, NP-C  Edward Edwards, MSN, APRN-CNP  Aiden Muñoz, MSN, APRN-CNP     Primary Care Physician: Marek Andres DO   Admitting Physician:  Jose Rodrigez DO  Admission date and time: 4/16/2024  9:11 PM    Room:  [address] [place] [address]  Admitting diagnosis: Chest pain [R07.9]  ESRD on dialysis (HCC) [N18.6, Z99.2]  Chest pain, unspecified type [R07.9]    Patient Name: Marge Callejas  MRN: [id number]    Date of Service: 4/25/2024     Subjective:  Marge is a 84 y.o. female who was seen and examined today,4/25/2024, at the bedside.  Marge is sitting up in the chair resting comfortably with her 2 daughters at the bedside.  She remains on 4 L oxygen by nasal cannula.  Discharge planning has been initiated by  the patient will require IV antivirals until May 6.    Review of System:   Constitutional:   Slowly improving confusion.  HEENT:   Denies ear pain, sore throat, sinus or eye problems.  Hard of hearing but refuses to wear hearing aids  Cardiovascular:   Denies any chest pains, irregular heartbeats, or palpitations.    Respiratory:   Denies coughing, sputum production, hemoptysis, or wheezing.  No further dyspnea at rest or on exertion  Gastrointestinal:   Admits to a decreased appetite and therefore decreased oral intake.  Genitourinary:    Denies any urgency, frequency, hematuria. Voiding  without difficulty.  Extremities:   Denies lower extremity swelling, edema or cyanosis.   Neurology:    Denies any headache or focal neurological deficits, ongoing weakness and deconditioning.  Psch:   Denies being anxious or depressed.  Musculoskeletal:    Describes back and rib pain in the area of the rash only  Integumentary:   Blistering rash on 1 side of her back,  thoracic dermatome posteriorly consistent with varicella-zoster.  Otherwise, skin normal color and texture.  Genitalia/Breast:  Deferred    Medication:  Scheduled Meds:   sodium chloride flush  5-40 mL IntraVENous 2 times per day    acyclovir  5 mg/kg (Adjusted) IntraVENous Q24H    [Held by provider] calcium acetate  1 capsule Oral TID WC    apixaban  2.5 mg Oral BID    aspirin  81 mg Oral Daily    atorvastatin  40 mg Oral Nightly    bumetanide  2 mg Oral Daily    carvedilol  25 mg Oral 2 times per day on Sun Tue Wed Thu Sat    carvedilol  25 mg Oral Once per day on Mon Thu    hydrALAZINE  75 mg Oral TID    isosorbide dinitrate  40 mg Oral TID    pantoprazole  40 mg Oral QAM AC    lactobacillus  1 capsule Oral Daily    vitamin D  50,000 Units Oral Weekly     Continuous Infusions:   sodium chloride         Objective Data:  Recent Labs     04/23/24  0639 04/24/24  0522 04/25/24  0530   WBC 6.9 7.0 6.6   RBC 3.02* 2.97* 3.04*   HGB 9.7* 9.3* 9.7*   HCT 30.1* 29.6* 30.2*   MCV 99.7 99.7 99.3   MCH 32.1 31.3 31.9   MCHC 32.2 31.4* 32.1   RDW 15.5* 15.3* 15.2*   PLT  --  142 141   MPV 11.4 12.8* 12.9*       Recent Labs     04/23/24  0639 04/24/24  0522 04/25/24  0530    137 138   K 4.5 4.3 3.7   CL 97* 98 100   CO2 22 25 28   BUN 19 31* 12   CREATININE 4.7* 6.3* 3.7*   GLUCOSE 68* 129* 115*   CALCIUM 8.3* 8.0* 8.7   PROT 6.6 6.6 7.1   BILITOT 0.5 0.5 0.5   ALKPHOS 97 95 98   AST 20 21 24   ALT 12 12 16   ALBUMIN 3.7 3.5 3.9       Lab Results   Component Value Date    TROPONINI 0.03 05/15/2021    TROPONINI 0.06 (H) 04/14/2021    TROPONINI 0.09 (H) 04/14/2021      Assessment:  Multifactorial chest pain with stress test revealing fixed deficits with minimal reversibility with recommendations for aggressive medical therapy in the setting of known coronary artery disease  Varicella-zoster left mid thoracic dermatome with consideration for encephalitis  Chronic atrial fibrillation  End-stage renal disease on

## 2024-04-25 NOTE — PROGRESS NOTES
This  attempted to meet with the patient for a length of stay visit and fond the patient sleeping. This  left literature of the attempted visit. Spiritual Health will follow up as requested.    kenna Schmid [place], [place] [address]

## 2024-04-25 NOTE — PROGRESS NOTES
[facility]  [facility]  Progress Note      Chief Complaint   Patient presents with    Chest Pain     Chest pain and left flank pain.       SUBJECTIVE:  Patient appears to be tolerating medications. No reported adverse drug reactions.  Sleepy this am  No nausea, vomiting, diarrhea.    Review of systems:  As stated above in the chief complaint, otherwise negative.    Medications:  Scheduled Meds:   sodium chloride flush  5-40 mL IntraVENous 2 times per day    acyclovir  5 mg/kg (Adjusted) IntraVENous Q24H    calcium acetate  1 capsule Oral TID WC    apixaban  2.5 mg Oral BID    aspirin  81 mg Oral Daily    atorvastatin  40 mg Oral Nightly    bumetanide  2 mg Oral Daily    carvedilol  25 mg Oral 2 times per day on Sun Tue Wed Thu Sat    carvedilol  25 mg Oral Once per day on u    hydrALAZINE  75 mg Oral TID    isosorbide dinitrate  40 mg Oral TID    pantoprazole  40 mg Oral QAM AC    lactobacillus  1 capsule Oral Daily    vitamin D  50,000 Units Oral Weekly     Continuous Infusions:   sodium chloride       PRN Meds:sodium chloride flush, sodium chloride, heparin flush, oxyCODONE-acetaminophen, ipratropium 0.5 mg-albuterol 2.5 mg, ondansetron, acetaminophen    OBJECTIVE:  BP 95/76   Pulse 75   Temp 98.2 °F (36.8 °C)   Resp 22   Ht 1.615 m (5' 3.58\")   Wt 62.3 kg (137 lb 5.6 oz)   SpO2 91%   BMI 23.89 kg/m²   Temp  Av.1 °F (36.7 °C)  Min: 97.4 °F (36.3 °C)  Max: 98.3 °F (36.8 °C)  Constitutional: The patient is awake, alert, and less confused. Tele sitter.   Skin: Warm and dry. Left flank dry lesions.   HEENT: Round and reactive pupils.  Moist mucous membranes.  No ulcerations or thrush.  Neck: Supple to movements.   Chest: No use of accessory muscles to breathe. Symmetrical expansion.  No wheezing, crackles or rhonchi.  Cardiovascular: S1 and S2 are rhythmic and regular. No murmurs appreciated.   Abdomen: Positive bowel sounds to auscultation. Benign to palpation. No

## 2024-04-25 NOTE — PLAN OF CARE
Problem: Discharge Planning  Goal: Discharge to home or other facility with appropriate resources  Outcome: Progressing     Problem: ABCDS Injury Assessment  Goal: Absence of physical injury  Outcome: Progressing     Problem: Safety - Adult  Goal: Free from fall injury  Outcome: Progressing     Problem: Chronic Conditions and Co-morbidities  Goal: Patient's chronic conditions and co-morbidity symptoms are monitored and maintained or improved  Outcome: Progressing     Problem: Pain  Goal: Verbalizes/displays adequate comfort level or baseline comfort level  Outcome: Progressing     Problem: Skin/Tissue Integrity  Goal: Absence of new skin breakdown  Description: 1.  Monitor for areas of redness and/or skin breakdown  2.  Assess vascular access sites hourly  3.  Every 4-6 hours minimum:  Change oxygen saturation probe site  4.  Every 4-6 hours:  If on nasal continuous positive airway pressure, respiratory therapy assess nares and determine need for appliance change or resting period.  Outcome: Progressing     Problem: Nutrition Deficit:  Goal: Optimize nutritional status  4/24/2024 2010 by Santy Rebolledo RN  Outcome: Progressing  4/24/2024 1425 by Alexandra Bey RD, LD  Outcome: Progressing  Flowsheets (Taken 4/24/2024 1404)  Nutrient intake appropriate for improving, restoring, or maintaining nutritional needs:   Assess nutritional status and recommend course of action   Recommend appropriate diets, oral nutritional supplements, and vitamin/mineral supplements   Monitor oral intake, labs, and treatment plans  4/24/2024 1404 by Alexandra Bey RD, KANWAL  Outcome: Progressing  Flowsheets (Taken 4/24/2024 1404)  Nutrient intake appropriate for improving, restoring, or maintaining nutritional needs:   Assess nutritional status and recommend course of action   Recommend appropriate diets, oral nutritional supplements, and vitamin/mineral supplements   Monitor oral intake, labs, and treatment plans

## 2024-04-26 LAB
ALBUMIN SERPL-MCNC: 3.6 G/DL (ref 3.5–5.2)
ALP SERPL-CCNC: 94 U/L (ref 35–104)
ALT SERPL-CCNC: 19 U/L (ref 0–32)
ANION GAP SERPL CALCULATED.3IONS-SCNC: 9 MMOL/L (ref 7–16)
AST SERPL-CCNC: 25 U/L (ref 0–31)
BASOPHILS # BLD: 0.03 K/UL (ref 0–0.2)
BASOPHILS NFR BLD: 1 % (ref 0–2)
BILIRUB SERPL-MCNC: 0.4 MG/DL (ref 0–1.2)
BUN SERPL-MCNC: 21 MG/DL (ref 6–23)
CALCIUM SERPL-MCNC: 8.4 MG/DL (ref 8.6–10.2)
CHLORIDE SERPL-SCNC: 104 MMOL/L (ref 98–107)
CO2 SERPL-SCNC: 28 MMOL/L (ref 22–29)
CREAT SERPL-MCNC: 5.5 MG/DL (ref 0.5–1)
EOSINOPHIL # BLD: 0.19 K/UL (ref 0.05–0.5)
EOSINOPHILS RELATIVE PERCENT: 3 % (ref 0–6)
ERYTHROCYTE [DISTWIDTH] IN BLOOD BY AUTOMATED COUNT: 15.3 % (ref 11.5–15)
GFR SERPL CREATININE-BSD FRML MDRD: 7 ML/MIN/1.73M2
GLUCOSE SERPL-MCNC: 91 MG/DL (ref 74–99)
HCT VFR BLD AUTO: 28.2 % (ref 34–48)
HGB BLD-MCNC: 9.3 G/DL (ref 11.5–15.5)
IMM GRANULOCYTES # BLD AUTO: 0.03 K/UL (ref 0–0.58)
IMM GRANULOCYTES NFR BLD: 1 % (ref 0–5)
LYMPHOCYTES NFR BLD: 1.8 K/UL (ref 1.5–4)
LYMPHOCYTES RELATIVE PERCENT: 28 % (ref 20–42)
MAGNESIUM SERPL-MCNC: 2 MG/DL (ref 1.6–2.6)
MCH RBC QN AUTO: 32.4 PG (ref 26–35)
MCHC RBC AUTO-ENTMCNC: 33 G/DL (ref 32–34.5)
MCV RBC AUTO: 98.3 FL (ref 80–99.9)
MONOCYTES NFR BLD: 0.78 K/UL (ref 0.1–0.95)
MONOCYTES NFR BLD: 12 % (ref 2–12)
NEUTROPHILS NFR BLD: 57 % (ref 43–80)
NEUTS SEG NFR BLD: 3.68 K/UL (ref 1.8–7.3)
PHOSPHATE SERPL-MCNC: 3.7 MG/DL (ref 2.5–4.5)
PLATELET, FLUORESCENCE: 133 K/UL (ref 130–450)
PMV BLD AUTO: 12.3 FL (ref 7–12)
POTASSIUM SERPL-SCNC: 4.1 MMOL/L (ref 3.5–5)
PROT SERPL-MCNC: 6.5 G/DL (ref 6.4–8.3)
RBC # BLD AUTO: 2.87 M/UL (ref 3.5–5.5)
SODIUM SERPL-SCNC: 141 MMOL/L (ref 132–146)
WBC OTHER # BLD: 6.5 K/UL (ref 4.5–11.5)

## 2024-04-26 PROCEDURE — 97110 THERAPEUTIC EXERCISES: CPT | Performed by: PHYSICAL THERAPIST

## 2024-04-26 PROCEDURE — 2700000000 HC OXYGEN THERAPY PER DAY

## 2024-04-26 PROCEDURE — 80053 COMPREHEN METABOLIC PANEL: CPT

## 2024-04-26 PROCEDURE — 36592 COLLECT BLOOD FROM PICC: CPT

## 2024-04-26 PROCEDURE — 90935 HEMODIALYSIS ONE EVALUATION: CPT

## 2024-04-26 PROCEDURE — 97530 THERAPEUTIC ACTIVITIES: CPT | Performed by: PHYSICAL THERAPIST

## 2024-04-26 PROCEDURE — 84100 ASSAY OF PHOSPHORUS: CPT

## 2024-04-26 PROCEDURE — 2580000003 HC RX 258: Performed by: SPECIALIST

## 2024-04-26 PROCEDURE — 2580000003 HC RX 258: Performed by: NURSE PRACTITIONER

## 2024-04-26 PROCEDURE — 6370000000 HC RX 637 (ALT 250 FOR IP)

## 2024-04-26 PROCEDURE — 6360000002 HC RX W HCPCS: Performed by: SPECIALIST

## 2024-04-26 PROCEDURE — 83735 ASSAY OF MAGNESIUM: CPT

## 2024-04-26 PROCEDURE — 2060000000 HC ICU INTERMEDIATE R&B

## 2024-04-26 PROCEDURE — 85025 COMPLETE CBC W/AUTO DIFF WBC: CPT

## 2024-04-26 RX ADMIN — APIXABAN 2.5 MG: 2.5 TABLET, FILM COATED ORAL at 21:09

## 2024-04-26 RX ADMIN — SODIUM CHLORIDE, PRESERVATIVE FREE 10 ML: 5 INJECTION INTRAVENOUS at 21:10

## 2024-04-26 RX ADMIN — PANTOPRAZOLE SODIUM 40 MG: 40 TABLET, DELAYED RELEASE ORAL at 05:08

## 2024-04-26 RX ADMIN — APIXABAN 2.5 MG: 2.5 TABLET, FILM COATED ORAL at 12:57

## 2024-04-26 RX ADMIN — ASPIRIN 81 MG: 81 TABLET, COATED ORAL at 12:58

## 2024-04-26 RX ADMIN — HYDRALAZINE HYDROCHLORIDE 75 MG: 50 TABLET ORAL at 12:59

## 2024-04-26 RX ADMIN — ISOSORBIDE DINITRATE 40 MG: 20 TABLET ORAL at 21:09

## 2024-04-26 RX ADMIN — SODIUM CHLORIDE, PRESERVATIVE FREE 10 ML: 5 INJECTION INTRAVENOUS at 13:00

## 2024-04-26 RX ADMIN — BUMETANIDE 2 MG: 1 TABLET ORAL at 12:56

## 2024-04-26 RX ADMIN — HYDRALAZINE HYDROCHLORIDE 75 MG: 50 TABLET ORAL at 21:08

## 2024-04-26 RX ADMIN — Medication 1 CAPSULE: at 12:56

## 2024-04-26 RX ADMIN — ACYCLOVIR SODIUM 270 MG: 500 INJECTION, SOLUTION INTRAVENOUS at 17:45

## 2024-04-26 RX ADMIN — ISOSORBIDE DINITRATE 40 MG: 20 TABLET ORAL at 12:57

## 2024-04-26 RX ADMIN — ATORVASTATIN CALCIUM 40 MG: 40 TABLET, FILM COATED ORAL at 21:09

## 2024-04-26 ASSESSMENT — PAIN SCALES - GENERAL
PAINLEVEL_OUTOF10: 0
PAINLEVEL_OUTOF10: 0

## 2024-04-26 NOTE — PROGRESS NOTES
NAME: Marge Callejas  MR:  [id number]  :   1940  Admit Date:  2024    Elements of this note, were copied and pasted from Previous. Updates have been made where noted and reflect current exam and medical decision making from the DOS of this encounter.  CHIEF COMPLAINT       Chief Complaint   Patient presents with    Chest Pain     Chest pain and left flank pain.     HISTORY OF PRESENT ILLNESS     Marge Callejas is a 84 y.o. female admitted on 2024 and has  has a past medical history of Arthritis, Atrial fibrillation (HCC), Blood circulation, collateral, CHF (congestive heart failure) (HCC), Chronic renal insufficiency, stage IV (severe) ([facility]), Coronary artery disease involving native coronary artery of native heart without angina pectoris, History of cardiovascular stress test, History of echocardiogram, Hyperlipidemia, Hypertension, and Mixed restrictive and obstructive lung disease (HCC).     This is a face to face encounter   24 in bed awake andalert lesions crusted     Patient is tolerating medications. No reported adverse drug reactions.  Available labs, imaging studies, microbiologic studies have been reviewed with pt and family if present.  Assessment & Plan     Admitted for   Chest pain [R07.9]  ESRD on dialysis (HCC) [N18.6, Z99.2]  Chest pain, unspecified type [R07.9]  ID following for   Left flank vzv   Poss encephalitis day 5    Cont acyclovir (ZOVIRAX) 270 mg in sodium chloride 0.9 % 50 mL IVPB, Q24H  lactobacillus (CULTURELLE) capsule 1 capsule, Daily    Med rec for another week    DISPOSITION:  REVIEW OF SYSTEMS     As stated above      PHYSICAL EXAMINATION    BP (!) 102/58   Pulse 76   Temp 97.7 °F (36.5 °C) (Temporal)   Resp 18   Ht 1.615 m (5' 3.58\")   Wt 62.3 kg (137 lb 5.6 oz)   SpO2 97%   BMI 23.89 kg/m²   Temp  Av.9 °F (36.6 °C)  Min: 97.3 °F (36.3 °C)  Max: 98.3 °F (36.8 °C)  CONSTITUTIONAL:  IN BED no apparent distress   ENT:  Normocephalic, atraumatic,

## 2024-04-26 NOTE — CARE COORDINATION
4/26/2024 1405 BRISA Note:  Pt plan is to return home with her daughter Eliza with [facility], orders are in Epic.  Pt is also going home on IV Acyclovair daily until May 6 ,2024. Pt has a midline left basilic.  Per pt's daughters request a referral was made to the Infusion Center at [facility] and they will notified when pt is being discharged so the can put the pt on the schedule for the next day([phone]). Script was faxed to Infusion Center([phone]).  Infusion Center is aware that pt has dialysis on M&F at [facility] and [facility] transports her.  An afternoon time is requested at the Infusion Center and the latest they can do is 1330 per Serina.  Pt's daughter aware of all info. BRISA spoke with Sebas mcguire at [facility] to verify if pt can have HHC and go to the Infusion Center.  Per Sebas [facility] will coordinate with the family and the insurance company.  Pt's daughter will provide a ride home.  BRISA will follow. Electronically signed by Krista Penn RN on 4/26/2024 at 3:35 PM

## 2024-04-26 NOTE — PROGRESS NOTES
[facility]  MD Murtaza White, MD Krista Mendoza, CNP   Lucille Beth, JOSE Fernandez, CNP  Progress Note    4/26/2024    SUBJECTIVE:   4/18: Laying in bed. No acute distress. Does have some dyspnea, though nothing out of the ordinary for her. Denies any chest pain today. Vital signs are stable.    4/19 HD today no reported issues  Will have stress test today    4/20 seen in her room on o2/nc lying flat in bed reports no issues to me appear more or less euvolemic   Cardiology note reviewed .      4/30: \"I have shingles.\"  Asymptomatic and was noted by her daughter who is visiting.  Denies all other complaint.  Breathing \"same as always.\"  On 3 L nasal cannula.  Denies dyspnea.  No cough or wheeze.  No chest pain or palpitations.  Appetite good.    4/22: Laying in bed. No acute distress. She is confused, though recognizes me. Tolerated HD today, though did get disoriented after treatment. Discharge held and CT of the head ordered. Vital signs are stable.     4/23: Laying in bed, family members at the bedside. She is still confused, but seems better than yesterday. Denies any dyspnea. She is on her normal requirements of oxygen. She is now on IV acyclovir.     4/24: Status quo.  Took oxygen off, immediately desaturated.  Satting 92% on 4 L nasal cannula, which is her baseline.      4/25: Laying in bed, no acute distress. Family are present at the bedside. Celebrating her birthday. She recognizes me, though still has some confusion. Seems to be improving. Denies dyspnea, chest pain, or palpitations.     4/26: Seen while laying in bed. No acute distress. Remains somewhat confused. Tolerated HD without issues. 1.5 liters removed. She denies any dyspnea, chest pain, or palpitations.     PROBLEM LIST:    Principal Problem:    Chest pain  Active Problems:    Cardiac resynchronization therapy defibrillator (CRT-D) in place    Cardiomyopathy (HCC)    Coronary

## 2024-04-26 NOTE — PLAN OF CARE
Problem: Discharge Planning  Goal: Discharge to home or other facility with appropriate resources  Outcome: Progressing  Flowsheets (Taken 4/26/2024 0800)  Discharge to home or other facility with appropriate resources: Identify barriers to discharge with patient and caregiver     Problem: ABCDS Injury Assessment  Goal: Absence of physical injury  Outcome: Progressing  Flowsheets (Taken 4/26/2024 1321)  Absence of Physical Injury: Implement safety measures based on patient assessment     Problem: Safety - Adult  Goal: Free from fall injury  Outcome: Progressing  Flowsheets (Taken 4/26/2024 1321)  Free From Fall Injury: Instruct family/caregiver on patient safety     Problem: Pain  Goal: Verbalizes/displays adequate comfort level or baseline comfort level  Outcome: Progressing  Flowsheets (Taken 4/25/2024 1415 by Merary Silveira RN)  Verbalizes/displays adequate comfort level or baseline comfort level: Encourage patient to monitor pain and request assistance     Problem: Skin/Tissue Integrity  Goal: Absence of new skin breakdown  Description: 1.  Monitor for areas of redness and/or skin breakdown  2.  Assess vascular access sites hourly  3.  Every 4-6 hours minimum:  Change oxygen saturation probe site  4.  Every 4-6 hours:  If on nasal continuous positive airway pressure, respiratory therapy assess nares and determine need for appliance change or resting period.  Outcome: Progressing     Problem: Nutrition Deficit:  Goal: Optimize nutritional status  Outcome: Progressing  Flowsheets (Taken 4/26/2024 1321)  Nutrient intake appropriate for improving, restoring, or maintaining nutritional needs: Assess nutritional status and recommend course of action   Care plan reviewed with patient.  Patient verbalizes understanding of the care plan and contributed to goal setting.

## 2024-04-26 NOTE — PROGRESS NOTES
Physical Therapy  Physical Therapy Treatment Note/Plan of Care    Room #:  0622/0622-02  Patient Name: Marge Callejas  YOB: 1940  MRN: [id number]    Date of Service: 4/26/2024     Tentative placement recommendation: Subacute unless patient meets goals then Home Health Physical Therapy  Equipment recommendation: Wheeled Walker if d/jacob home    Evaluating Physical Therapist: Stuart Gaston, PT, DPT #[id number]      Specific Provider Orders/Date/Referring Provider :     04/19/24 0930    PT eval and treat  Start:  04/19/24 0930,   End:  04/19/24 0930,   ONE TIME,   Standing Count:  1 Occurrences,   R       Rain, Jose LANDERS DO Acknowledge New      Admitting Diagnosis:   Chest pain [R07.9]  ESRD on dialysis (HCC) [N18.6, Z99.2]  Chest pain, unspecified type [R07.9]      Surgery: none  Visit Diagnoses         Codes    ESRD on dialysis (HCC)     N18.6, Z99.2            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

## 2024-04-26 NOTE — PROGRESS NOTES
Internal Medicine Progress Note    [facility]    Jose Rodrigez D.O., ANA ROSA Almonte D.O., ANA ROSA Abad D.O.       Ifrah Dave, MSN, APRN, NP-C  Edward Edwards, MSN, APRN-CNP  Aiden Muñoz, MSN, APRN-CNP     Primary Care Physician: Marek Andres DO   Admitting Physician:  Jose Rodrigez DO  Admission date and time: 4/16/2024  9:11 PM    Room:  [address] [place], [place] [id number]  Admitting diagnosis: Chest pain [R07.9]  ESRD on dialysis (HCC) [N18.6, Z99.2]  Chest pain, unspecified type [R07.9]    Patient Name: Marge Callejas  MRN: [id number]    Date of Service: 4/26/2024     Subjective:  Marge is a 84 y.o. female who was seen and examined today,4/26/2024, at the bedside.  Marge is currently receiving hemodialysis, tolerating without any complications.  She denies any pain or discomfort and is resting comfortably.  We continue to await for decision for antibiotic administration with discharge.    Review of System:   Constitutional:   Alert and oriented, no confusion today  HEENT:   Denies ear pain, sore throat, sinus or eye problems.  Hard of hearing but refuses to wear hearing aids  Cardiovascular:   Denies any chest pains, irregular heartbeats, or palpitations.    Respiratory:   Denies coughing, sputum production, hemoptysis, or wheezing.  No further dyspnea at rest or on exertion  Gastrointestinal:   Admits to a decreased appetite and therefore decreased oral intake.  Genitourinary:    Denies any urgency, frequency, hematuria. Voiding  without difficulty.  Extremities:   Denies lower extremity swelling, edema or cyanosis.   Neurology:    Denies any headache or focal neurological deficits, ongoing weakness and deconditioning.  Psch:   Denies being anxious or depressed.  Musculoskeletal:    Describes back and rib pain in the area of the rash only  Integumentary:    rash on left side of her back, vesicles drying,  otherwise denies any rashes, ulcers, or excoriations.  Denies bruising.  Hematologic/Lymphatic:  Denies bruising or bleeding.    Physical Exam:  I/O this shift:  In: 300   Out: 1800     Intake/Output Summary (Last 24 hours) at 4/26/2024 1344  Last data filed at 4/26/2024 1221  Gross per 24 hour   Intake 540 ml   Output 1800 ml   Net -1260 ml     I/O last 3 completed shifts:  In: 410.2 [P.O.:360; IV Piggyback:50.2]  Out: -   Patient Vitals for the past 96 hrs (Last 3 readings):   Weight   04/24/24 1255 62.3 kg (137 lb 5.6 oz)   04/24/24 1225 62.3 kg (137 lb 5.6 oz)       Vital Signs:   Blood pressure 108/70, pulse 75, temperature 97.3 °F (36.3 °C), temperature source Temporal, resp. rate 18, height 1.615 m (5' 3.58\"), weight 62.3 kg (137 lb 5.6 oz), SpO2 97 %.    General appearance:  Awake, oriented x 3, no acute distress  Head:  Normocephalic. No masses, lesions or tenderness.  Eyes:  PERRLA.  EOMI.  Sclera clear.  Buccal mucosa moist.  ENT:  Ears normal. Mucosa normal.  Nasal cannula oxygen is in place.  Neck:    Supple. Trachea midline. No thyromegaly. No JVD. No bruits.  Heart:    Rhythm regular. Rate controlled.  S1 and S2.  Systolic murmur.    Lungs:    Symmetrical.  Normal air exchange.  Clear to auscultation bilaterally.  No wheezes. No rhonchi. No rales.  Abdomen:   Soft. Non-tender. Non-distended. Bowel sounds positive. No organomegaly or masses.  No pain on palpation.  Extremities:    Peripheral pulses present.  Significant improvement in lower extremity peripheral edema.  No ulcers. No cyanosis. No clubbing.  Neurologic:    Slowly improving confusion.  She is oriented to person and place.  Intermittent confusion to time   psych:   Behavior is normal. Mood appears normal. Speech is not rapid and/or pressured.  Musculoskeletal:   No unilateral joint edema, erythema or warmth.  Gait not assessed.  Integumentary:  Vesicular eruptions along the left mid thoracic dermatome posteriorly consistent with

## 2024-04-26 NOTE — PLAN OF CARE
Problem: Discharge Planning  Goal: Discharge to home or other facility with appropriate resources  Outcome: Progressing  Flowsheets (Taken 4/25/2024 0753 by Merary Silveira, RN)  Discharge to home or other facility with appropriate resources:   Identify barriers to discharge with patient and caregiver   Arrange for needed discharge resources and transportation as appropriate   Identify discharge learning needs (meds, wound care, etc)   Refer to discharge planning if patient needs post-hospital services based on physician order or complex needs related to functional status, cognitive ability or social support system     Problem: ABCDS Injury Assessment  Goal: Absence of physical injury  Outcome: Progressing     Problem: Safety - Adult  Goal: Free from fall injury  Outcome: Progressing     Problem: Chronic Conditions and Co-morbidities  Goal: Patient's chronic conditions and co-morbidity symptoms are monitored and maintained or improved  Outcome: Progressing     Problem: Pain  Goal: Verbalizes/displays adequate comfort level or baseline comfort level  Outcome: Progressing  Flowsheets (Taken 4/25/2024 1415 by Merary Silveira, RN)  Verbalizes/displays adequate comfort level or baseline comfort level: Encourage patient to monitor pain and request assistance     Problem: Skin/Tissue Integrity  Goal: Absence of new skin breakdown  Description: 1.  Monitor for areas of redness and/or skin breakdown  2.  Assess vascular access sites hourly  3.  Every 4-6 hours minimum:  Change oxygen saturation probe site  4.  Every 4-6 hours:  If on nasal continuous positive airway pressure, respiratory therapy assess nares and determine need for appliance change or resting period.  Outcome: Progressing     Problem: Nutrition Deficit:  Goal: Optimize nutritional status  Outcome: Progressing

## 2024-04-27 VITALS
OXYGEN SATURATION: 98 % | DIASTOLIC BLOOD PRESSURE: 64 MMHG | TEMPERATURE: 96 F | HEART RATE: 76 BPM | RESPIRATION RATE: 16 BRPM | BODY MASS INDEX: 23.45 KG/M2 | WEIGHT: 137.35 LBS | SYSTOLIC BLOOD PRESSURE: 98 MMHG | HEIGHT: 64 IN

## 2024-04-27 PROBLEM — B02.29 HERPES ZOSTER WITH NERVOUS SYSTEM COMPLICATION: Status: ACTIVE | Noted: 2024-04-27

## 2024-04-27 LAB
ALBUMIN SERPL-MCNC: 3.4 G/DL (ref 3.5–5.2)
ALP SERPL-CCNC: 92 U/L (ref 35–104)
ALT SERPL-CCNC: 20 U/L (ref 0–32)
ANION GAP SERPL CALCULATED.3IONS-SCNC: 11 MMOL/L (ref 7–16)
AST SERPL-CCNC: 29 U/L (ref 0–31)
BASOPHILS # BLD: 0.03 K/UL (ref 0–0.2)
BASOPHILS NFR BLD: 0 % (ref 0–2)
BILIRUB SERPL-MCNC: 0.5 MG/DL (ref 0–1.2)
BUN SERPL-MCNC: 12 MG/DL (ref 6–23)
CALCIUM SERPL-MCNC: 8.6 MG/DL (ref 8.6–10.2)
CHLORIDE SERPL-SCNC: 101 MMOL/L (ref 98–107)
CO2 SERPL-SCNC: 26 MMOL/L (ref 22–29)
CREAT SERPL-MCNC: 3.7 MG/DL (ref 0.5–1)
EOSINOPHIL # BLD: 0.16 K/UL (ref 0.05–0.5)
EOSINOPHILS RELATIVE PERCENT: 2 % (ref 0–6)
ERYTHROCYTE [DISTWIDTH] IN BLOOD BY AUTOMATED COUNT: 15.4 % (ref 11.5–15)
GFR SERPL CREATININE-BSD FRML MDRD: 12 ML/MIN/1.73M2
GLUCOSE SERPL-MCNC: 90 MG/DL (ref 74–99)
HCT VFR BLD AUTO: 29.7 % (ref 34–48)
HGB BLD-MCNC: 9.8 G/DL (ref 11.5–15.5)
IMM GRANULOCYTES # BLD AUTO: <0.03 K/UL (ref 0–0.58)
IMM GRANULOCYTES NFR BLD: 0 % (ref 0–5)
LYMPHOCYTES NFR BLD: 2.25 K/UL (ref 1.5–4)
LYMPHOCYTES RELATIVE PERCENT: 30 % (ref 20–42)
MAGNESIUM SERPL-MCNC: 1.7 MG/DL (ref 1.6–2.6)
MCH RBC QN AUTO: 32.5 PG (ref 26–35)
MCHC RBC AUTO-ENTMCNC: 33 G/DL (ref 32–34.5)
MCV RBC AUTO: 98.3 FL (ref 80–99.9)
MONOCYTES NFR BLD: 0.93 K/UL (ref 0.1–0.95)
MONOCYTES NFR BLD: 12 % (ref 2–12)
NEUTROPHILS NFR BLD: 55 % (ref 43–80)
NEUTS SEG NFR BLD: 4.12 K/UL (ref 1.8–7.3)
PHOSPHATE SERPL-MCNC: 3.4 MG/DL (ref 2.5–4.5)
PLATELET # BLD AUTO: 145 K/UL (ref 130–450)
PLATELET, FLUORESCENCE: 155 K/UL (ref 130–450)
PLATELETS.RETICULATED NFR BLD AUTO: 11.9 % (ref 1.1–10.3)
PMV BLD AUTO: 12.7 FL (ref 7–12)
POTASSIUM SERPL-SCNC: 4.2 MMOL/L (ref 3.5–5)
PROT SERPL-MCNC: 6.7 G/DL (ref 6.4–8.3)
RBC # BLD AUTO: 3.02 M/UL (ref 3.5–5.5)
SODIUM SERPL-SCNC: 138 MMOL/L (ref 132–146)
WBC OTHER # BLD: 7.5 K/UL (ref 4.5–11.5)

## 2024-04-27 PROCEDURE — 83735 ASSAY OF MAGNESIUM: CPT

## 2024-04-27 PROCEDURE — 2580000003 HC RX 258: Performed by: NURSE PRACTITIONER

## 2024-04-27 PROCEDURE — 2580000003 HC RX 258: Performed by: SPECIALIST

## 2024-04-27 PROCEDURE — 84100 ASSAY OF PHOSPHORUS: CPT

## 2024-04-27 PROCEDURE — 2700000000 HC OXYGEN THERAPY PER DAY

## 2024-04-27 PROCEDURE — 6360000002 HC RX W HCPCS: Performed by: SPECIALIST

## 2024-04-27 PROCEDURE — 80053 COMPREHEN METABOLIC PANEL: CPT

## 2024-04-27 PROCEDURE — 85025 COMPLETE CBC W/AUTO DIFF WBC: CPT

## 2024-04-27 PROCEDURE — 6370000000 HC RX 637 (ALT 250 FOR IP)

## 2024-04-27 RX ORDER — EPINEPHRINE 1 MG/ML
0.3 INJECTION, SOLUTION, CONCENTRATE INTRAVENOUS PRN
Status: CANCELLED | OUTPATIENT
Start: 2024-04-28

## 2024-04-27 RX ORDER — ACETAMINOPHEN 325 MG/1
650 TABLET ORAL
Status: CANCELLED | OUTPATIENT
Start: 2024-04-28

## 2024-04-27 RX ORDER — SODIUM CHLORIDE 9 MG/ML
INJECTION, SOLUTION INTRAVENOUS CONTINUOUS
Status: CANCELLED | OUTPATIENT
Start: 2024-04-28

## 2024-04-27 RX ORDER — SODIUM CHLORIDE 9 MG/ML
5-250 INJECTION, SOLUTION INTRAVENOUS PRN
Status: CANCELLED | OUTPATIENT
Start: 2024-04-28

## 2024-04-27 RX ORDER — ALBUTEROL SULFATE 90 UG/1
4 AEROSOL, METERED RESPIRATORY (INHALATION) PRN
Status: CANCELLED | OUTPATIENT
Start: 2024-04-28

## 2024-04-27 RX ORDER — HEPARIN 100 UNIT/ML
500 SYRINGE INTRAVENOUS PRN
Status: CANCELLED | OUTPATIENT
Start: 2024-04-28

## 2024-04-27 RX ORDER — ONDANSETRON 2 MG/ML
8 INJECTION INTRAMUSCULAR; INTRAVENOUS
Status: CANCELLED | OUTPATIENT
Start: 2024-04-28

## 2024-04-27 RX ORDER — SODIUM CHLORIDE 0.9 % (FLUSH) 0.9 %
5-40 SYRINGE (ML) INJECTION PRN
Status: CANCELLED | OUTPATIENT
Start: 2024-04-28

## 2024-04-27 RX ORDER — DIPHENHYDRAMINE HYDROCHLORIDE 50 MG/ML
50 INJECTION INTRAMUSCULAR; INTRAVENOUS
Status: CANCELLED | OUTPATIENT
Start: 2024-04-28

## 2024-04-27 RX ADMIN — CARVEDILOL 25 MG: 25 TABLET, FILM COATED ORAL at 10:21

## 2024-04-27 RX ADMIN — ACYCLOVIR SODIUM 270 MG: 500 INJECTION, SOLUTION INTRAVENOUS at 15:59

## 2024-04-27 RX ADMIN — PANTOPRAZOLE SODIUM 40 MG: 40 TABLET, DELAYED RELEASE ORAL at 05:43

## 2024-04-27 RX ADMIN — SODIUM CHLORIDE, PRESERVATIVE FREE 10 ML: 5 INJECTION INTRAVENOUS at 10:31

## 2024-04-27 RX ADMIN — BUMETANIDE 2 MG: 1 TABLET ORAL at 10:11

## 2024-04-27 RX ADMIN — ASPIRIN 81 MG: 81 TABLET, COATED ORAL at 10:14

## 2024-04-27 RX ADMIN — Medication 1 CAPSULE: at 10:13

## 2024-04-27 RX ADMIN — APIXABAN 2.5 MG: 2.5 TABLET, FILM COATED ORAL at 10:13

## 2024-04-27 RX ADMIN — ISOSORBIDE DINITRATE 40 MG: 20 TABLET ORAL at 10:20

## 2024-04-27 NOTE — PLAN OF CARE
Problem: Discharge Planning  Goal: Discharge to home or other facility with appropriate resources  4/26/2024 2357 by Ignacio Gorman, RN  Outcome: Progressing    Flowsheets (Taken 4/26/2024 0800)  Discharge to home or other facility with appropriate resources: Identify barriers to discharge with patient and caregiver     Problem: ABCDS Injury Assessment  Goal: Absence of physical injury  4/26/2024 2357 by Ignacio Gorman, RN  Outcome: Progressing    Flowsheets (Taken 4/26/2024 1321)  Absence of Physical Injury: Implement safety measures based on patient assessment     Problem: Safety - Adult  Goal: Free from fall injury  4/26/2024 2357 by Ignacio Gorman, RN  Outcome: Progressing    Flowsheets (Taken 4/26/2024 1321)  Free From Fall Injury: Instruct family/caregiver on patient safety     Problem: Chronic Conditions and Co-morbidities  Goal: Patient's chronic conditions and co-morbidity symptoms are monitored and maintained or improved  4/26/2024 2357 by Ignacio Gorman, RN  Outcome: Progressing    Flowsheets (Taken 4/26/2024 0800)  Care Plan - Patient's Chronic Conditions and Co-Morbidity Symptoms are Monitored and Maintained or Improved: Monitor and assess patient's chronic conditions and comorbid symptoms for stability, deterioration, or improvement     Problem: Pain  Goal: Verbalizes/displays adequate comfort level or baseline comfort level  4/26/2024 2357 by Ignacio Gorman RN  Outcome: Progressing    Flowsheets (Taken 4/25/2024 1415 by Merary Silveira RN)  Verbalizes/displays adequate comfort level or baseline comfort level: Encourage patient to monitor pain and request assistance     Problem: Skin/Tissue Integrity  Goal: Absence of new skin breakdown  Description: 1.  Monitor for areas of redness and/or skin breakdown  2.  Assess vascular access sites hourly  3.  Every 4-6 hours minimum:  Change oxygen saturation probe site  4.  Every 4-6 hours:  If on nasal continuous positive airway pressure, respiratory

## 2024-04-27 NOTE — CARE COORDINATION
SOCIAL WORK / DISCHARGE PLANNING:  Pt can discharge home today if Infusion Center is arranged for IV Acyclovir. Winnie THEODORE left message on Infusion Center voice message. Daniel called Jelena Petty and she states to have pt come in next date 4/28 to [facility]fusion [facility] 9am. Daniel spoke with dtr, Jonathan insured she knew where Infusion Center located. [facility] , voice message left for rep Palmira notifying of dc and will fax dc orders / HHC order to office. Dtr will provide home going transport. Winnie THEODORE aware of info and placed on dc instructions.           Electronically signed by GARY Gonzalez on 4/27/2024 at 2:16 PM

## 2024-04-27 NOTE — PLAN OF CARE
Problem: Discharge Planning  Goal: Discharge to home or other facility with appropriate resources  4/27/2024 1152 by Lela Styles RN  Outcome: Progressing  Flowsheets (Taken 4/26/2024 0800 by Liyah Farmer RN)  Discharge to home or other facility with appropriate resources: Identify barriers to discharge with patient and caregiver  4/26/2024 2357 by Ignacio Gorman RN  Outcome: Progressing     Problem: ABCDS Injury Assessment  Goal: Absence of physical injury  4/27/2024 1152 by Lela Styles RN  Outcome: Progressing  Flowsheets (Taken 4/26/2024 1321 by Liyah Farmer RN)  Absence of Physical Injury: Implement safety measures based on patient assessment  4/26/2024 2357 by Ignacio Gorman RN  Outcome: Progressing     Problem: Safety - Adult  Goal: Free from fall injury  4/27/2024 1152 by Lela Styles RN  Outcome: Progressing  Flowsheets (Taken 4/26/2024 1321 by Liyah Farmer RN)  Free From Fall Injury: Instruct family/caregiver on patient safety  4/26/2024 2357 by Ignacio Gorman RN  Outcome: Progressing     Problem: Chronic Conditions and Co-morbidities  Goal: Patient's chronic conditions and co-morbidity symptoms are monitored and maintained or improved  4/27/2024 1152 by Lela Styles RN  Outcome: Progressing  Flowsheets (Taken 4/26/2024 0800 by Liyah Farmer RN)  Care Plan - Patient's Chronic Conditions and Co-Morbidity Symptoms are Monitored and Maintained or Improved: Monitor and assess patient's chronic conditions and comorbid symptoms for stability, deterioration, or improvement  4/26/2024 2357 by Ignacio Gorman RN  Outcome: Progressing     Problem: Pain  Goal: Verbalizes/displays adequate comfort level or baseline comfort level  4/27/2024 1152 by Lela Styles RN  Outcome: Progressing  Flowsheets (Taken 4/25/2024 1415 by Merary Silveira RN)  Verbalizes/displays adequate comfort level or baseline comfort level: Encourage patient to monitor pain and request

## 2024-04-27 NOTE — PLAN OF CARE
Problem: Discharge Planning  Goal: Discharge to home or other facility with appropriate resources  4/27/2024 1733 by Lela Styles RN  Outcome: Adequate for Discharge  4/27/2024 1152 by Lela Styles RN  Outcome: Progressing  Flowsheets (Taken 4/26/2024 0800 by Liyah Farmer RN)  Discharge to home or other facility with appropriate resources: Identify barriers to discharge with patient and caregiver     Problem: ABCDS Injury Assessment  Goal: Absence of physical injury  4/27/2024 1733 by Lela Styles RN  Outcome: Adequate for Discharge  4/27/2024 1152 by Lela Styles RN  Outcome: Progressing  Flowsheets  Taken 4/27/2024 1000 by Lela Styles RN  Absence of Physical Injury: Implement safety measures based on patient assessment  Taken 4/26/2024 1321 by Liyah Farmer RN  Absence of Physical Injury: Implement safety measures based on patient assessment     Problem: Safety - Adult  Goal: Free from fall injury  4/27/2024 1733 by Lela Styles RN  Outcome: Adequate for Discharge  4/27/2024 1152 by Lela Styles RN  Outcome: Progressing  Flowsheets (Taken 4/26/2024 1321 by Liyah Farmer RN)  Free From Fall Injury: Instruct family/caregiver on patient safety     Problem: Chronic Conditions and Co-morbidities  Goal: Patient's chronic conditions and co-morbidity symptoms are monitored and maintained or improved  4/27/2024 1733 by Lela Styles RN  Outcome: Adequate for Discharge  4/27/2024 1152 by Lela Styles RN  Outcome: Progressing  Flowsheets (Taken 4/26/2024 0800 by Liyah Farmer RN)  Care Plan - Patient's Chronic Conditions and Co-Morbidity Symptoms are Monitored and Maintained or Improved: Monitor and assess patient's chronic conditions and comorbid symptoms for stability, deterioration, or improvement     Problem: Pain  Goal: Verbalizes/displays adequate comfort level or baseline comfort level  4/27/2024 1733 by Lela Styles RN  Outcome: Adequate for

## 2024-04-27 NOTE — PROGRESS NOTES
NAME: Marge Callejas  MR:  [id number]  :   1940  Admit Date:  2024    Elements of this note, were copied and pasted from Previous. Updates have been made where noted and reflect current exam and medical decision making from the DOS of this encounter.  CHIEF COMPLAINT       Chief Complaint   Patient presents with    Chest Pain     Chest pain and left flank pain.     HISTORY OF PRESENT ILLNESS     Marge Callejas is a 84 y.o. female admitted on 2024 and has  has a past medical history of Arthritis, Atrial fibrillation ([facility]), Blood circulation, collateral, CHF (congestive heart failure) (HCC), Chronic renal insufficiency, stage IV (severe) ([facility]), Coronary artery disease involving native coronary artery of native heart without angina pectoris, History of cardiovascular stress test, History of echocardiogram, Hyperlipidemia, Hypertension, and Mixed restrictive and obstructive lung disease (HCC).     This is a face to face encounter   24 in bed nurse present has no c/o    in bed awake andalert lesions crusted     Patient is tolerating medications. No reported adverse drug reactions.  Available labs, imaging studies, microbiologic studies have been reviewed with pt and family if present.  Assessment & Plan     Admitted for   Chest pain [R07.9]  ESRD on dialysis (HCC) [N18.6, Z99.2]  Chest pain, unspecified type [R07.9]  ID following for   Left flank vzv better dried   Poss encephalitis day 6    Cont acyclovir (ZOVIRAX) 270 mg in sodium chloride 0.9 % 50 mL IVPB, Q24H  lactobacillus (CULTURELLE) capsule 1 capsule, Daily    Med rec for another week  Med rec for infusion center   Can received shingles vaccine whenever pt is able to     DISPOSITION:  REVIEW OF SYSTEMS     As stated above      PHYSICAL EXAMINATION    BP (!) 86/50   Pulse 76   Temp 96.9 °F (36.1 °C) (Oral)   Resp 20   Ht 1.615 m (5' 3.58\")   Wt 62.3 kg (137 lb 5.6 oz)   SpO2 98%   BMI 23.89 kg/m²   Temp  Av.7 °F (36.5  or concerns.    Electronically signed by Nanette Mendoza MD on 4/27/2024 at 3:08 PM

## 2024-04-28 ENCOUNTER — HOSPITAL ENCOUNTER (OUTPATIENT)
Age: 84
Setting detail: INFUSION SERIES
End: 2024-04-28
Attending: INTERNAL MEDICINE | Admitting: INTERNAL MEDICINE

## 2024-04-28 LAB — VZV IGM SER IA-ACNC: 0.33 ISR

## 2024-04-28 RX ORDER — SODIUM CHLORIDE 0.9 % (FLUSH) 0.9 %
5-40 SYRINGE (ML) INJECTION PRN
Status: CANCELLED | OUTPATIENT
Start: 2024-04-29

## 2024-04-28 RX ORDER — ALBUTEROL SULFATE 90 UG/1
4 AEROSOL, METERED RESPIRATORY (INHALATION) PRN
Status: CANCELLED | OUTPATIENT
Start: 2024-04-29

## 2024-04-28 RX ORDER — SODIUM CHLORIDE 9 MG/ML
INJECTION, SOLUTION INTRAVENOUS CONTINUOUS
Status: CANCELLED | OUTPATIENT
Start: 2024-04-29

## 2024-04-28 RX ORDER — DIPHENHYDRAMINE HYDROCHLORIDE 50 MG/ML
50 INJECTION INTRAMUSCULAR; INTRAVENOUS
Status: CANCELLED | OUTPATIENT
Start: 2024-04-29

## 2024-04-28 RX ORDER — SODIUM CHLORIDE 9 MG/ML
5-250 INJECTION, SOLUTION INTRAVENOUS PRN
Status: CANCELLED | OUTPATIENT
Start: 2024-04-29

## 2024-04-28 RX ORDER — EPINEPHRINE 1 MG/ML
0.3 INJECTION, SOLUTION, CONCENTRATE INTRAVENOUS PRN
Status: CANCELLED | OUTPATIENT
Start: 2024-04-29

## 2024-04-28 RX ORDER — HEPARIN 100 UNIT/ML
500 SYRINGE INTRAVENOUS PRN
Status: CANCELLED | OUTPATIENT
Start: 2024-04-29

## 2024-04-28 RX ORDER — ACETAMINOPHEN 325 MG/1
650 TABLET ORAL
Status: CANCELLED | OUTPATIENT
Start: 2024-04-29

## 2024-04-28 RX ORDER — ONDANSETRON 2 MG/ML
8 INJECTION INTRAMUSCULAR; INTRAVENOUS
Status: CANCELLED | OUTPATIENT
Start: 2024-04-29

## 2024-04-28 NOTE — DISCHARGE SUMMARY
[address] [address]                            DISCHARGE SUMMARY      PATIENT NAME: HIRAM CHAVEZ              : 1940  MED REC NO: [id number]                        ROOM: 0622  ACCOUNT NO: [id number]                       ADMIT DATE: 2024  PROVIDER: Jose Rodrigez DO      PRIMARY CARE PHYSICIAN:  Marek Andres DO    ADMITTING DIAGNOSIS:  Multifactorial chest pain with stress test revealing fixed defect with minimal reversibility.  Recommendation is for aggressive medical therapy in the setting of known coronary artery disease.    SECONDARY DISCHARGE DIAGNOSES:  Chronic atrial fibrillation, on anticoagulant therapy; end-stage renal disease, on hemodialysis; intermittent biliary colic with finding of cholelithiasis and history of chronic pancreatitis; oxygen-dependent chronic obstructive pulmonary disease with chronic respiratory failure; hyperlipidemia; vitamin D deficiency.    COMPLICATIONS:  Varicella zoster left mid thoracic dermatome with improvement noted with encephalitis ruled out.    OPERATION:  None.    CONSULTATIONS:  Obtained with Dr. Mendoza, General Surgery, Dr. Ross, Cardiology, Dr. Eubanks.  No OMT was given.    ADDITIONAL ADMITTING PHYSICIAN:  Dr. Almonte    CHIEF COMPLAINT AND HISTORY OF CHIEF COMPLAINT:  Pleasant 83-year-old white female who was admitted to [facility]. The patient presented to the hospital on 2024.  The patient complained some chest pain.  The patient says this has been going on for the past 2 days, somewhat poor historian.  The patient does have end-stage renal disease, on hemodialysis; chronic oxygen therapy.  The patient was admitted to the hospital under the service Dr. Rodrigez and Dr. Patel.  The patient does have a diminished ejection fraction of 25%.  She was admitted to the hospital at this time.    PAST MEDICAL HISTORY:  Positive for usual childhood

## 2024-04-29 ENCOUNTER — HOSPITAL ENCOUNTER (OUTPATIENT)
Dept: INFUSION THERAPY | Age: 84
Setting detail: INFUSION SERIES
Discharge: HOME | End: 2024-04-29
Payer: MEDICARE

## 2024-04-29 VITALS
DIASTOLIC BLOOD PRESSURE: 59 MMHG | HEART RATE: 75 BPM | TEMPERATURE: 97.5 F | RESPIRATION RATE: 16 BRPM | OXYGEN SATURATION: 98 % | SYSTOLIC BLOOD PRESSURE: 107 MMHG

## 2024-04-29 DIAGNOSIS — B02.0 ZOSTER ENCEPHALITIS: Primary | ICD-10-CM

## 2024-04-29 LAB
ALBUMIN SERPL-MCNC: 3.7 G/DL (ref 3.5–5.2)
ALP SERPL-CCNC: 100 U/L (ref 35–104)
ALT SERPL-CCNC: 20 U/L (ref 0–32)
ANION GAP SERPL CALCULATED.3IONS-SCNC: 8 MMOL/L (ref 7–16)
AST SERPL-CCNC: 27 U/L (ref 0–31)
BASOPHILS # BLD: 0.02 K/UL (ref 0–0.2)
BASOPHILS NFR BLD: 0 % (ref 0–2)
BILIRUB SERPL-MCNC: 0.5 MG/DL (ref 0–1.2)
BUN SERPL-MCNC: 11 MG/DL (ref 6–23)
CALCIUM SERPL-MCNC: 7.9 MG/DL (ref 8.6–10.2)
CHLORIDE SERPL-SCNC: 101 MMOL/L (ref 98–107)
CO2 SERPL-SCNC: 29 MMOL/L (ref 22–29)
CREAT SERPL-MCNC: 3.1 MG/DL (ref 0.5–1)
CRP SERPL HS-MCNC: 8 MG/L (ref 0–5)
EOSINOPHIL # BLD: 0.15 K/UL (ref 0.05–0.5)
EOSINOPHILS RELATIVE PERCENT: 2 % (ref 0–6)
ERYTHROCYTE [DISTWIDTH] IN BLOOD BY AUTOMATED COUNT: 15.5 % (ref 11.5–15)
ERYTHROCYTE [SEDIMENTATION RATE] IN BLOOD BY WESTERGREN METHOD: 9 MM/HR (ref 0–20)
GFR SERPL CREATININE-BSD FRML MDRD: 14 ML/MIN/1.73M2
GLUCOSE SERPL-MCNC: 118 MG/DL (ref 74–99)
HCT VFR BLD AUTO: 30.5 % (ref 34–48)
HGB BLD-MCNC: 10.1 G/DL (ref 11.5–15.5)
IMM GRANULOCYTES # BLD AUTO: <0.03 K/UL (ref 0–0.58)
IMM GRANULOCYTES NFR BLD: 0 % (ref 0–5)
LYMPHOCYTES NFR BLD: 1.82 K/UL (ref 1.5–4)
LYMPHOCYTES RELATIVE PERCENT: 27 % (ref 20–42)
MCH RBC QN AUTO: 32.1 PG (ref 26–35)
MCHC RBC AUTO-ENTMCNC: 33.1 G/DL (ref 32–34.5)
MCV RBC AUTO: 96.8 FL (ref 80–99.9)
MONOCYTES NFR BLD: 0.79 K/UL (ref 0.1–0.95)
MONOCYTES NFR BLD: 12 % (ref 2–12)
NEUTROPHILS NFR BLD: 59 % (ref 43–80)
NEUTS SEG NFR BLD: 3.94 K/UL (ref 1.8–7.3)
PLATELET # BLD AUTO: 146 K/UL (ref 130–450)
PMV BLD AUTO: 12.5 FL (ref 7–12)
POTASSIUM SERPL-SCNC: 3.7 MMOL/L (ref 3.5–5)
PROT SERPL-MCNC: 6.9 G/DL (ref 6.4–8.3)
RBC # BLD AUTO: 3.15 M/UL (ref 3.5–5.5)
SODIUM SERPL-SCNC: 138 MMOL/L (ref 132–146)
WBC OTHER # BLD: 6.7 K/UL (ref 4.5–11.5)

## 2024-04-29 PROCEDURE — 2580000003 HC RX 258: Performed by: SPECIALIST

## 2024-04-29 PROCEDURE — 86140 C-REACTIVE PROTEIN: CPT

## 2024-04-29 PROCEDURE — 2580000003 HC RX 258: Performed by: NURSE PRACTITIONER

## 2024-04-29 PROCEDURE — 80053 COMPREHEN METABOLIC PANEL: CPT

## 2024-04-29 PROCEDURE — 36592 COLLECT BLOOD FROM PICC: CPT

## 2024-04-29 PROCEDURE — 6360000002 HC RX W HCPCS: Performed by: SPECIALIST

## 2024-04-29 PROCEDURE — 85025 COMPLETE CBC W/AUTO DIFF WBC: CPT

## 2024-04-29 PROCEDURE — 6360000002 HC RX W HCPCS: Performed by: NURSE PRACTITIONER

## 2024-04-29 PROCEDURE — 96365 THER/PROPH/DIAG IV INF INIT: CPT

## 2024-04-29 PROCEDURE — 85652 RBC SED RATE AUTOMATED: CPT

## 2024-04-29 RX ORDER — ONDANSETRON 2 MG/ML
8 INJECTION INTRAMUSCULAR; INTRAVENOUS
Status: CANCELLED | OUTPATIENT
Start: 2024-04-30

## 2024-04-29 RX ORDER — EPINEPHRINE 1 MG/ML
0.3 INJECTION, SOLUTION, CONCENTRATE INTRAVENOUS PRN
Status: CANCELLED | OUTPATIENT
Start: 2024-04-30

## 2024-04-29 RX ORDER — SODIUM CHLORIDE 0.9 % (FLUSH) 0.9 %
5-40 SYRINGE (ML) INJECTION PRN
Status: CANCELLED | OUTPATIENT
Start: 2024-04-30

## 2024-04-29 RX ORDER — SODIUM CHLORIDE 9 MG/ML
5-250 INJECTION, SOLUTION INTRAVENOUS PRN
Status: CANCELLED | OUTPATIENT
Start: 2024-04-30

## 2024-04-29 RX ORDER — SODIUM CHLORIDE 0.9 % (FLUSH) 0.9 %
5-40 SYRINGE (ML) INJECTION PRN
Status: DISCONTINUED | OUTPATIENT
Start: 2024-04-29 | End: 2024-04-30 | Stop reason: HOSPADM

## 2024-04-29 RX ORDER — HEPARIN 100 UNIT/ML
500 SYRINGE INTRAVENOUS PRN
Status: CANCELLED | OUTPATIENT
Start: 2024-04-30

## 2024-04-29 RX ORDER — DIPHENHYDRAMINE HYDROCHLORIDE 50 MG/ML
50 INJECTION INTRAMUSCULAR; INTRAVENOUS
Status: CANCELLED | OUTPATIENT
Start: 2024-04-30

## 2024-04-29 RX ORDER — ACETAMINOPHEN 325 MG/1
650 TABLET ORAL
Status: CANCELLED | OUTPATIENT
Start: 2024-04-30

## 2024-04-29 RX ORDER — ALBUTEROL SULFATE 90 UG/1
4 AEROSOL, METERED RESPIRATORY (INHALATION) PRN
Status: CANCELLED | OUTPATIENT
Start: 2024-04-30

## 2024-04-29 RX ORDER — SODIUM CHLORIDE 9 MG/ML
INJECTION, SOLUTION INTRAVENOUS CONTINUOUS
Status: CANCELLED | OUTPATIENT
Start: 2024-04-30

## 2024-04-29 RX ORDER — HEPARIN 100 UNIT/ML
500 SYRINGE INTRAVENOUS PRN
Status: DISCONTINUED | OUTPATIENT
Start: 2024-04-29 | End: 2024-04-30 | Stop reason: HOSPADM

## 2024-04-29 RX ADMIN — SODIUM CHLORIDE, PRESERVATIVE FREE 10 ML: 5 INJECTION INTRAVENOUS at 14:31

## 2024-04-29 RX ADMIN — SODIUM CHLORIDE, PRESERVATIVE FREE 10 ML: 5 INJECTION INTRAVENOUS at 13:27

## 2024-04-29 RX ADMIN — HEPARIN 500 UNITS: 100 SYRINGE at 14:31

## 2024-04-29 RX ADMIN — ACYCLOVIR SODIUM 285 MG: 500 INJECTION, SOLUTION INTRAVENOUS at 13:30

## 2024-04-29 NOTE — PROGRESS NOTES
Pt not on infusion schedule... unable to use epic... paper chart used... infusion given tolerated  well... VSS...

## 2024-04-30 ENCOUNTER — HOSPITAL ENCOUNTER (OUTPATIENT)
Dept: INFUSION THERAPY | Age: 84
Setting detail: INFUSION SERIES
Discharge: HOME | End: 2024-04-30
Payer: MEDICARE

## 2024-04-30 VITALS
DIASTOLIC BLOOD PRESSURE: 58 MMHG | OXYGEN SATURATION: 96 % | RESPIRATION RATE: 24 BRPM | HEART RATE: 74 BPM | TEMPERATURE: 97 F | SYSTOLIC BLOOD PRESSURE: 105 MMHG

## 2024-04-30 DIAGNOSIS — B02.0 ZOSTER ENCEPHALITIS: Primary | ICD-10-CM

## 2024-04-30 PROCEDURE — 2580000003 HC RX 258: Performed by: NURSE PRACTITIONER

## 2024-04-30 PROCEDURE — 2580000003 HC RX 258: Performed by: SPECIALIST

## 2024-04-30 PROCEDURE — 96365 THER/PROPH/DIAG IV INF INIT: CPT

## 2024-04-30 PROCEDURE — 6360000002 HC RX W HCPCS: Performed by: SPECIALIST

## 2024-04-30 PROCEDURE — 6360000002 HC RX W HCPCS: Performed by: NURSE PRACTITIONER

## 2024-04-30 RX ORDER — SODIUM CHLORIDE 9 MG/ML
5-250 INJECTION, SOLUTION INTRAVENOUS PRN
Status: CANCELLED | OUTPATIENT
Start: 2024-05-01

## 2024-04-30 RX ORDER — ACETAMINOPHEN 325 MG/1
650 TABLET ORAL
Status: CANCELLED | OUTPATIENT
Start: 2024-05-01

## 2024-04-30 RX ORDER — HEPARIN 100 UNIT/ML
500 SYRINGE INTRAVENOUS PRN
Status: DISCONTINUED | OUTPATIENT
Start: 2024-04-30 | End: 2024-05-01 | Stop reason: HOSPADM

## 2024-04-30 RX ORDER — ONDANSETRON 2 MG/ML
8 INJECTION INTRAMUSCULAR; INTRAVENOUS
Status: CANCELLED | OUTPATIENT
Start: 2024-05-01

## 2024-04-30 RX ORDER — SODIUM CHLORIDE 9 MG/ML
INJECTION, SOLUTION INTRAVENOUS CONTINUOUS
Status: CANCELLED | OUTPATIENT
Start: 2024-05-01

## 2024-04-30 RX ORDER — SODIUM CHLORIDE 0.9 % (FLUSH) 0.9 %
5-40 SYRINGE (ML) INJECTION PRN
Status: DISCONTINUED | OUTPATIENT
Start: 2024-04-30 | End: 2024-05-01 | Stop reason: HOSPADM

## 2024-04-30 RX ORDER — ALBUTEROL SULFATE 90 UG/1
4 AEROSOL, METERED RESPIRATORY (INHALATION) PRN
Status: CANCELLED | OUTPATIENT
Start: 2024-05-01

## 2024-04-30 RX ORDER — DIPHENHYDRAMINE HYDROCHLORIDE 50 MG/ML
50 INJECTION INTRAMUSCULAR; INTRAVENOUS
Status: CANCELLED | OUTPATIENT
Start: 2024-05-01

## 2024-04-30 RX ORDER — SODIUM CHLORIDE 0.9 % (FLUSH) 0.9 %
5-40 SYRINGE (ML) INJECTION PRN
Status: CANCELLED | OUTPATIENT
Start: 2024-05-01

## 2024-04-30 RX ORDER — EPINEPHRINE 1 MG/ML
0.3 INJECTION, SOLUTION, CONCENTRATE INTRAVENOUS PRN
Status: CANCELLED | OUTPATIENT
Start: 2024-05-01

## 2024-04-30 RX ORDER — HEPARIN 100 UNIT/ML
500 SYRINGE INTRAVENOUS PRN
Status: CANCELLED | OUTPATIENT
Start: 2024-05-01

## 2024-04-30 RX ADMIN — HEPARIN 500 UNITS: 100 SYRINGE at 10:05

## 2024-04-30 RX ADMIN — Medication 10 ML: at 10:05

## 2024-04-30 RX ADMIN — ACYCLOVIR SODIUM 285 MG: 50 INJECTION, SOLUTION INTRAVENOUS at 09:06

## 2024-04-30 RX ADMIN — Medication 10 ML: at 09:10

## 2024-05-01 ENCOUNTER — HOSPITAL ENCOUNTER (OUTPATIENT)
Dept: INFUSION THERAPY | Age: 84
Setting detail: INFUSION SERIES
Discharge: HOME | End: 2024-05-01
Payer: MEDICARE

## 2024-05-01 DIAGNOSIS — B02.0 ZOSTER ENCEPHALITIS: Primary | ICD-10-CM

## 2024-05-01 PROCEDURE — 2580000003 HC RX 258: Performed by: NURSE PRACTITIONER

## 2024-05-01 PROCEDURE — 96365 THER/PROPH/DIAG IV INF INIT: CPT

## 2024-05-01 PROCEDURE — 6360000002 HC RX W HCPCS: Performed by: NURSE PRACTITIONER

## 2024-05-01 RX ORDER — EPINEPHRINE 1 MG/ML
0.3 INJECTION, SOLUTION, CONCENTRATE INTRAVENOUS PRN
Status: CANCELLED | OUTPATIENT
Start: 2024-05-02

## 2024-05-01 RX ORDER — SODIUM CHLORIDE 9 MG/ML
5-250 INJECTION, SOLUTION INTRAVENOUS PRN
Status: CANCELLED | OUTPATIENT
Start: 2024-05-02

## 2024-05-01 RX ORDER — ACETAMINOPHEN 325 MG/1
650 TABLET ORAL
Status: CANCELLED | OUTPATIENT
Start: 2024-05-02

## 2024-05-01 RX ORDER — HEPARIN 100 UNIT/ML
500 SYRINGE INTRAVENOUS PRN
Status: DISCONTINUED | OUTPATIENT
Start: 2024-05-01 | End: 2024-05-02 | Stop reason: HOSPADM

## 2024-05-01 RX ORDER — ALBUTEROL SULFATE 90 UG/1
4 AEROSOL, METERED RESPIRATORY (INHALATION) PRN
Status: CANCELLED | OUTPATIENT
Start: 2024-05-02

## 2024-05-01 RX ORDER — HEPARIN 100 UNIT/ML
500 SYRINGE INTRAVENOUS PRN
Status: CANCELLED | OUTPATIENT
Start: 2024-05-02

## 2024-05-01 RX ORDER — SODIUM CHLORIDE 9 MG/ML
INJECTION, SOLUTION INTRAVENOUS CONTINUOUS
Status: CANCELLED | OUTPATIENT
Start: 2024-05-02

## 2024-05-01 RX ORDER — SODIUM CHLORIDE 0.9 % (FLUSH) 0.9 %
5-40 SYRINGE (ML) INJECTION PRN
Status: DISCONTINUED | OUTPATIENT
Start: 2024-05-01 | End: 2024-05-02 | Stop reason: HOSPADM

## 2024-05-01 RX ORDER — DIPHENHYDRAMINE HYDROCHLORIDE 50 MG/ML
50 INJECTION INTRAMUSCULAR; INTRAVENOUS
Status: CANCELLED | OUTPATIENT
Start: 2024-05-02

## 2024-05-01 RX ORDER — SODIUM CHLORIDE 0.9 % (FLUSH) 0.9 %
5-40 SYRINGE (ML) INJECTION PRN
Status: CANCELLED | OUTPATIENT
Start: 2024-05-02

## 2024-05-01 RX ORDER — ONDANSETRON 2 MG/ML
8 INJECTION INTRAMUSCULAR; INTRAVENOUS
Status: CANCELLED | OUTPATIENT
Start: 2024-05-02

## 2024-05-01 RX ADMIN — ACYCLOVIR SODIUM 285 MG: 50 INJECTION, SOLUTION INTRAVENOUS at 11:33

## 2024-05-02 ENCOUNTER — HOSPITAL ENCOUNTER (OUTPATIENT)
Dept: INFUSION THERAPY | Age: 84
Setting detail: INFUSION SERIES
Discharge: HOME | End: 2024-05-02
Payer: MEDICARE

## 2024-05-02 VITALS
SYSTOLIC BLOOD PRESSURE: 130 MMHG | TEMPERATURE: 98 F | RESPIRATION RATE: 22 BRPM | OXYGEN SATURATION: 97 % | HEART RATE: 75 BPM | DIASTOLIC BLOOD PRESSURE: 72 MMHG

## 2024-05-02 DIAGNOSIS — B02.0 ZOSTER ENCEPHALITIS: Primary | ICD-10-CM

## 2024-05-02 LAB
ALBUMIN SERPL-MCNC: 3.7 G/DL (ref 3.5–5.2)
ALP SERPL-CCNC: 106 U/L (ref 35–104)
ALT SERPL-CCNC: 17 U/L (ref 0–32)
ANION GAP SERPL CALCULATED.3IONS-SCNC: 15 MMOL/L (ref 7–16)
AST SERPL-CCNC: 23 U/L (ref 0–31)
BASOPHILS # BLD: 0.05 K/UL (ref 0–0.2)
BASOPHILS NFR BLD: 1 % (ref 0–2)
BILIRUB SERPL-MCNC: 0.5 MG/DL (ref 0–1.2)
BUN SERPL-MCNC: 41 MG/DL (ref 6–23)
CALCIUM SERPL-MCNC: 8.6 MG/DL (ref 8.6–10.2)
CHLORIDE SERPL-SCNC: 101 MMOL/L (ref 98–107)
CO2 SERPL-SCNC: 25 MMOL/L (ref 22–29)
CREAT SERPL-MCNC: 7.6 MG/DL (ref 0.5–1)
EOSINOPHIL # BLD: 0.11 K/UL (ref 0.05–0.5)
EOSINOPHILS RELATIVE PERCENT: 2 % (ref 0–6)
ERYTHROCYTE [DISTWIDTH] IN BLOOD BY AUTOMATED COUNT: 16.1 % (ref 11.5–15)
GFR, ESTIMATED: 5 ML/MIN/1.73M2
GLUCOSE SERPL-MCNC: 110 MG/DL (ref 74–99)
HCT VFR BLD AUTO: 32.8 % (ref 34–48)
HGB BLD-MCNC: 10.6 G/DL (ref 11.5–15.5)
IMM GRANULOCYTES # BLD AUTO: <0.03 K/UL (ref 0–0.58)
IMM GRANULOCYTES NFR BLD: 0 % (ref 0–5)
LYMPHOCYTES NFR BLD: 2 K/UL (ref 1.5–4)
LYMPHOCYTES RELATIVE PERCENT: 27 % (ref 20–42)
MCH RBC QN AUTO: 31.8 PG (ref 26–35)
MCHC RBC AUTO-ENTMCNC: 32.3 G/DL (ref 32–34.5)
MCV RBC AUTO: 98.5 FL (ref 80–99.9)
MONOCYTES NFR BLD: 0.85 K/UL (ref 0.1–0.95)
MONOCYTES NFR BLD: 12 % (ref 2–12)
NEUTROPHILS NFR BLD: 59 % (ref 43–80)
NEUTS SEG NFR BLD: 4.3 K/UL (ref 1.8–7.3)
PLATELET # BLD AUTO: 136 K/UL (ref 130–450)
PMV BLD AUTO: 12.7 FL (ref 7–12)
POTASSIUM SERPL-SCNC: 4 MMOL/L (ref 3.5–5)
PROT SERPL-MCNC: 6.9 G/DL (ref 6.4–8.3)
RBC # BLD AUTO: 3.33 M/UL (ref 3.5–5.5)
SODIUM SERPL-SCNC: 141 MMOL/L (ref 132–146)
WBC OTHER # BLD: 7.3 K/UL (ref 4.5–11.5)

## 2024-05-02 PROCEDURE — 85025 COMPLETE CBC W/AUTO DIFF WBC: CPT

## 2024-05-02 PROCEDURE — 99211 OFF/OP EST MAY X REQ PHY/QHP: CPT

## 2024-05-02 PROCEDURE — 6360000002 HC RX W HCPCS: Performed by: SPECIALIST

## 2024-05-02 PROCEDURE — 2580000003 HC RX 258: Performed by: SPECIALIST

## 2024-05-02 PROCEDURE — 2580000003 HC RX 258: Performed by: NURSE PRACTITIONER

## 2024-05-02 PROCEDURE — 6360000002 HC RX W HCPCS: Performed by: NURSE PRACTITIONER

## 2024-05-02 PROCEDURE — 96365 THER/PROPH/DIAG IV INF INIT: CPT

## 2024-05-02 PROCEDURE — 80053 COMPREHEN METABOLIC PANEL: CPT

## 2024-05-02 PROCEDURE — 36592 COLLECT BLOOD FROM PICC: CPT

## 2024-05-02 RX ORDER — DIPHENHYDRAMINE HYDROCHLORIDE 50 MG/ML
50 INJECTION INTRAMUSCULAR; INTRAVENOUS
Status: CANCELLED | OUTPATIENT
Start: 2024-05-03

## 2024-05-02 RX ORDER — ALBUTEROL SULFATE 90 UG/1
4 AEROSOL, METERED RESPIRATORY (INHALATION) PRN
Status: CANCELLED | OUTPATIENT
Start: 2024-05-03

## 2024-05-02 RX ORDER — HEPARIN 100 UNIT/ML
500 SYRINGE INTRAVENOUS PRN
Status: DISCONTINUED | OUTPATIENT
Start: 2024-05-02 | End: 2024-05-03 | Stop reason: HOSPADM

## 2024-05-02 RX ORDER — SODIUM CHLORIDE 9 MG/ML
INJECTION, SOLUTION INTRAVENOUS CONTINUOUS
Status: CANCELLED | OUTPATIENT
Start: 2024-05-03

## 2024-05-02 RX ORDER — SODIUM CHLORIDE 9 MG/ML
5-250 INJECTION, SOLUTION INTRAVENOUS PRN
Status: CANCELLED | OUTPATIENT
Start: 2024-05-03

## 2024-05-02 RX ORDER — EPINEPHRINE 1 MG/ML
0.3 INJECTION, SOLUTION, CONCENTRATE INTRAVENOUS PRN
Status: CANCELLED | OUTPATIENT
Start: 2024-05-03

## 2024-05-02 RX ORDER — ONDANSETRON 2 MG/ML
8 INJECTION INTRAMUSCULAR; INTRAVENOUS
Status: CANCELLED | OUTPATIENT
Start: 2024-05-03

## 2024-05-02 RX ORDER — SODIUM CHLORIDE 0.9 % (FLUSH) 0.9 %
5-40 SYRINGE (ML) INJECTION PRN
Status: CANCELLED | OUTPATIENT
Start: 2024-05-03

## 2024-05-02 RX ORDER — SODIUM CHLORIDE 0.9 % (FLUSH) 0.9 %
5-40 SYRINGE (ML) INJECTION PRN
Status: DISCONTINUED | OUTPATIENT
Start: 2024-05-02 | End: 2024-05-03 | Stop reason: HOSPADM

## 2024-05-02 RX ORDER — HEPARIN 100 UNIT/ML
500 SYRINGE INTRAVENOUS PRN
Status: CANCELLED | OUTPATIENT
Start: 2024-05-03

## 2024-05-02 RX ORDER — ACETAMINOPHEN 325 MG/1
650 TABLET ORAL
Status: CANCELLED | OUTPATIENT
Start: 2024-05-03

## 2024-05-02 RX ADMIN — SODIUM CHLORIDE, PRESERVATIVE FREE 10 ML: 5 INJECTION INTRAVENOUS at 12:49

## 2024-05-02 RX ADMIN — HEPARIN 500 UNITS: 100 SYRINGE at 12:49

## 2024-05-02 RX ADMIN — SODIUM CHLORIDE, PRESERVATIVE FREE 10 ML: 5 INJECTION INTRAVENOUS at 11:30

## 2024-05-02 RX ADMIN — ACYCLOVIR SODIUM 285 MG: 50 INJECTION, SOLUTION INTRAVENOUS at 11:30

## 2024-05-03 ENCOUNTER — HOSPITAL ENCOUNTER (OUTPATIENT)
Dept: INFUSION THERAPY | Age: 84
Setting detail: INFUSION SERIES
Discharge: HOME | End: 2024-05-03
Payer: MEDICARE

## 2024-05-03 VITALS
HEART RATE: 75 BPM | DIASTOLIC BLOOD PRESSURE: 75 MMHG | RESPIRATION RATE: 18 BRPM | SYSTOLIC BLOOD PRESSURE: 126 MMHG | TEMPERATURE: 97.9 F | OXYGEN SATURATION: 97 %

## 2024-05-03 DIAGNOSIS — B02.0 ZOSTER ENCEPHALITIS: Primary | ICD-10-CM

## 2024-05-03 PROCEDURE — 6360000002 HC RX W HCPCS: Performed by: SPECIALIST

## 2024-05-03 PROCEDURE — 2580000003 HC RX 258: Performed by: NURSE PRACTITIONER

## 2024-05-03 PROCEDURE — 2580000003 HC RX 258: Performed by: SPECIALIST

## 2024-05-03 PROCEDURE — 96365 THER/PROPH/DIAG IV INF INIT: CPT

## 2024-05-03 PROCEDURE — 6360000002 HC RX W HCPCS: Performed by: NURSE PRACTITIONER

## 2024-05-03 RX ORDER — DIPHENHYDRAMINE HYDROCHLORIDE 50 MG/ML
50 INJECTION INTRAMUSCULAR; INTRAVENOUS
Status: CANCELLED | OUTPATIENT
Start: 2024-05-04

## 2024-05-03 RX ORDER — EPINEPHRINE 1 MG/ML
0.3 INJECTION, SOLUTION, CONCENTRATE INTRAVENOUS PRN
Status: CANCELLED | OUTPATIENT
Start: 2024-05-04

## 2024-05-03 RX ORDER — HEPARIN 100 UNIT/ML
500 SYRINGE INTRAVENOUS PRN
Status: DISCONTINUED | OUTPATIENT
Start: 2024-05-03 | End: 2024-05-04 | Stop reason: HOSPADM

## 2024-05-03 RX ORDER — ACETAMINOPHEN 325 MG/1
650 TABLET ORAL
Status: CANCELLED | OUTPATIENT
Start: 2024-05-04

## 2024-05-03 RX ORDER — SODIUM CHLORIDE 9 MG/ML
INJECTION, SOLUTION INTRAVENOUS CONTINUOUS
Status: CANCELLED | OUTPATIENT
Start: 2024-05-04

## 2024-05-03 RX ORDER — ALBUTEROL SULFATE 90 UG/1
4 AEROSOL, METERED RESPIRATORY (INHALATION) PRN
Status: CANCELLED | OUTPATIENT
Start: 2024-05-04

## 2024-05-03 RX ORDER — HEPARIN 100 UNIT/ML
500 SYRINGE INTRAVENOUS PRN
Status: CANCELLED | OUTPATIENT
Start: 2024-05-04

## 2024-05-03 RX ORDER — SODIUM CHLORIDE 0.9 % (FLUSH) 0.9 %
5-40 SYRINGE (ML) INJECTION PRN
Status: DISCONTINUED | OUTPATIENT
Start: 2024-05-03 | End: 2024-05-04 | Stop reason: HOSPADM

## 2024-05-03 RX ORDER — SODIUM CHLORIDE 9 MG/ML
5-250 INJECTION, SOLUTION INTRAVENOUS PRN
Status: CANCELLED | OUTPATIENT
Start: 2024-05-04

## 2024-05-03 RX ORDER — ONDANSETRON 2 MG/ML
8 INJECTION INTRAMUSCULAR; INTRAVENOUS
Status: CANCELLED | OUTPATIENT
Start: 2024-05-04

## 2024-05-03 RX ORDER — SODIUM CHLORIDE 0.9 % (FLUSH) 0.9 %
5-40 SYRINGE (ML) INJECTION PRN
Status: CANCELLED | OUTPATIENT
Start: 2024-05-04

## 2024-05-03 RX ADMIN — Medication 10 ML: at 14:10

## 2024-05-03 RX ADMIN — Medication 10 ML: at 13:15

## 2024-05-03 RX ADMIN — HEPARIN 500 UNITS: 100 SYRINGE at 14:10

## 2024-05-03 RX ADMIN — ACYCLOVIR SODIUM 285 MG: 50 INJECTION, SOLUTION INTRAVENOUS at 13:15

## 2024-05-04 ENCOUNTER — HOSPITAL ENCOUNTER (OUTPATIENT)
Dept: INFUSION THERAPY | Age: 84
Setting detail: INFUSION SERIES
Discharge: HOME | End: 2024-05-04
Payer: MEDICARE

## 2024-05-04 VITALS
OXYGEN SATURATION: 98 % | TEMPERATURE: 97 F | HEART RATE: 74 BPM | SYSTOLIC BLOOD PRESSURE: 130 MMHG | RESPIRATION RATE: 18 BRPM | DIASTOLIC BLOOD PRESSURE: 72 MMHG

## 2024-05-04 DIAGNOSIS — B02.0 ZOSTER ENCEPHALITIS: Primary | ICD-10-CM

## 2024-05-04 PROCEDURE — 96365 THER/PROPH/DIAG IV INF INIT: CPT

## 2024-05-04 PROCEDURE — 6360000002 HC RX W HCPCS: Performed by: SPECIALIST

## 2024-05-04 PROCEDURE — 2580000003 HC RX 258: Performed by: SPECIALIST

## 2024-05-04 PROCEDURE — 2580000003 HC RX 258: Performed by: NURSE PRACTITIONER

## 2024-05-04 PROCEDURE — 6360000002 HC RX W HCPCS: Performed by: NURSE PRACTITIONER

## 2024-05-04 RX ORDER — SODIUM CHLORIDE 0.9 % (FLUSH) 0.9 %
5-40 SYRINGE (ML) INJECTION PRN
Status: CANCELLED | OUTPATIENT
Start: 2024-05-05

## 2024-05-04 RX ORDER — HEPARIN 100 UNIT/ML
500 SYRINGE INTRAVENOUS PRN
Status: CANCELLED | OUTPATIENT
Start: 2024-05-05

## 2024-05-04 RX ORDER — SODIUM CHLORIDE 9 MG/ML
5-250 INJECTION, SOLUTION INTRAVENOUS PRN
Status: CANCELLED | OUTPATIENT
Start: 2024-05-05

## 2024-05-04 RX ORDER — SODIUM CHLORIDE 9 MG/ML
INJECTION, SOLUTION INTRAVENOUS CONTINUOUS
Status: CANCELLED | OUTPATIENT
Start: 2024-05-05

## 2024-05-04 RX ORDER — DIPHENHYDRAMINE HYDROCHLORIDE 50 MG/ML
50 INJECTION INTRAMUSCULAR; INTRAVENOUS
Status: CANCELLED | OUTPATIENT
Start: 2024-05-05

## 2024-05-04 RX ORDER — ONDANSETRON 2 MG/ML
8 INJECTION INTRAMUSCULAR; INTRAVENOUS
Status: CANCELLED | OUTPATIENT
Start: 2024-05-05

## 2024-05-04 RX ORDER — HEPARIN 100 UNIT/ML
500 SYRINGE INTRAVENOUS PRN
Status: DISCONTINUED | OUTPATIENT
Start: 2024-05-04 | End: 2024-05-05 | Stop reason: HOSPADM

## 2024-05-04 RX ORDER — ALBUTEROL SULFATE 90 UG/1
4 AEROSOL, METERED RESPIRATORY (INHALATION) PRN
Status: CANCELLED | OUTPATIENT
Start: 2024-05-05

## 2024-05-04 RX ORDER — EPINEPHRINE 1 MG/ML
0.3 INJECTION, SOLUTION, CONCENTRATE INTRAVENOUS PRN
Status: CANCELLED | OUTPATIENT
Start: 2024-05-05

## 2024-05-04 RX ORDER — ACETAMINOPHEN 325 MG/1
650 TABLET ORAL
Status: CANCELLED | OUTPATIENT
Start: 2024-05-05

## 2024-05-04 RX ORDER — SODIUM CHLORIDE 0.9 % (FLUSH) 0.9 %
5-40 SYRINGE (ML) INJECTION PRN
Status: DISCONTINUED | OUTPATIENT
Start: 2024-05-04 | End: 2024-05-05 | Stop reason: HOSPADM

## 2024-05-04 RX ADMIN — SODIUM CHLORIDE, PRESERVATIVE FREE 10 ML: 5 INJECTION INTRAVENOUS at 10:26

## 2024-05-04 RX ADMIN — ACYCLOVIR SODIUM 285 MG: 50 INJECTION, SOLUTION INTRAVENOUS at 09:30

## 2024-05-04 RX ADMIN — SODIUM CHLORIDE, PRESERVATIVE FREE 10 ML: 5 INJECTION INTRAVENOUS at 09:30

## 2024-05-04 RX ADMIN — HEPARIN 500 UNITS: 100 SYRINGE at 10:26

## 2024-05-05 ENCOUNTER — HOSPITAL ENCOUNTER (OUTPATIENT)
Dept: INFUSION THERAPY | Age: 84
Setting detail: INFUSION SERIES
Discharge: HOME | End: 2024-05-05
Payer: MEDICARE

## 2024-05-05 VITALS
DIASTOLIC BLOOD PRESSURE: 60 MMHG | TEMPERATURE: 98 F | SYSTOLIC BLOOD PRESSURE: 105 MMHG | RESPIRATION RATE: 18 BRPM | OXYGEN SATURATION: 95 % | HEART RATE: 75 BPM

## 2024-05-05 DIAGNOSIS — B02.0 ZOSTER ENCEPHALITIS: Primary | ICD-10-CM

## 2024-05-05 PROCEDURE — 6360000002 HC RX W HCPCS: Performed by: SPECIALIST

## 2024-05-05 PROCEDURE — 2580000003 HC RX 258: Performed by: SPECIALIST

## 2024-05-05 PROCEDURE — 96365 THER/PROPH/DIAG IV INF INIT: CPT

## 2024-05-05 PROCEDURE — 6360000002 HC RX W HCPCS: Performed by: NURSE PRACTITIONER

## 2024-05-05 PROCEDURE — 2580000003 HC RX 258: Performed by: NURSE PRACTITIONER

## 2024-05-05 RX ORDER — EPINEPHRINE 1 MG/ML
0.3 INJECTION, SOLUTION, CONCENTRATE INTRAVENOUS PRN
Status: CANCELLED | OUTPATIENT
Start: 2024-05-06

## 2024-05-05 RX ORDER — HEPARIN 100 UNIT/ML
500 SYRINGE INTRAVENOUS PRN
Status: CANCELLED | OUTPATIENT
Start: 2024-05-06

## 2024-05-05 RX ORDER — HEPARIN 100 UNIT/ML
500 SYRINGE INTRAVENOUS PRN
Status: DISCONTINUED | OUTPATIENT
Start: 2024-05-05 | End: 2024-05-06 | Stop reason: HOSPADM

## 2024-05-05 RX ORDER — ONDANSETRON 2 MG/ML
8 INJECTION INTRAMUSCULAR; INTRAVENOUS
Status: CANCELLED | OUTPATIENT
Start: 2024-05-06

## 2024-05-05 RX ORDER — ACETAMINOPHEN 325 MG/1
650 TABLET ORAL
Status: CANCELLED | OUTPATIENT
Start: 2024-05-06

## 2024-05-05 RX ORDER — SODIUM CHLORIDE 9 MG/ML
5-250 INJECTION, SOLUTION INTRAVENOUS PRN
Status: CANCELLED | OUTPATIENT
Start: 2024-05-06

## 2024-05-05 RX ORDER — SODIUM CHLORIDE 0.9 % (FLUSH) 0.9 %
5-40 SYRINGE (ML) INJECTION PRN
Status: DISCONTINUED | OUTPATIENT
Start: 2024-05-05 | End: 2024-05-06 | Stop reason: HOSPADM

## 2024-05-05 RX ORDER — SODIUM CHLORIDE 9 MG/ML
INJECTION, SOLUTION INTRAVENOUS CONTINUOUS
Status: CANCELLED | OUTPATIENT
Start: 2024-05-06

## 2024-05-05 RX ORDER — SODIUM CHLORIDE 0.9 % (FLUSH) 0.9 %
5-40 SYRINGE (ML) INJECTION PRN
Status: CANCELLED | OUTPATIENT
Start: 2024-05-06

## 2024-05-05 RX ORDER — ALBUTEROL SULFATE 90 UG/1
4 AEROSOL, METERED RESPIRATORY (INHALATION) PRN
Status: CANCELLED | OUTPATIENT
Start: 2024-05-06

## 2024-05-05 RX ORDER — DIPHENHYDRAMINE HYDROCHLORIDE 50 MG/ML
50 INJECTION INTRAMUSCULAR; INTRAVENOUS
Status: CANCELLED | OUTPATIENT
Start: 2024-05-06

## 2024-05-05 RX ADMIN — SODIUM CHLORIDE, PRESERVATIVE FREE 10 ML: 5 INJECTION INTRAVENOUS at 09:23

## 2024-05-05 RX ADMIN — HEPARIN 500 UNITS: 100 SYRINGE at 10:25

## 2024-05-05 RX ADMIN — ACYCLOVIR SODIUM 285 MG: 50 INJECTION, SOLUTION INTRAVENOUS at 09:28

## 2024-05-05 RX ADMIN — SODIUM CHLORIDE, PRESERVATIVE FREE 10 ML: 5 INJECTION INTRAVENOUS at 10:25

## 2024-05-06 ENCOUNTER — HOSPITAL ENCOUNTER (OUTPATIENT)
Dept: INFUSION THERAPY | Age: 84
Setting detail: INFUSION SERIES
Discharge: HOME | DRG: 312 | End: 2024-05-06
Payer: MEDICARE

## 2024-05-06 VITALS
HEART RATE: 70 BPM | TEMPERATURE: 98 F | DIASTOLIC BLOOD PRESSURE: 69 MMHG | SYSTOLIC BLOOD PRESSURE: 173 MMHG | RESPIRATION RATE: 24 BRPM | OXYGEN SATURATION: 96 %

## 2024-05-06 DIAGNOSIS — B02.0 ZOSTER ENCEPHALITIS: Primary | ICD-10-CM

## 2024-05-06 LAB
ALBUMIN SERPL-MCNC: 3.5 G/DL (ref 3.5–5.2)
ALP SERPL-CCNC: 105 U/L (ref 35–104)
ALT SERPL-CCNC: 12 U/L (ref 0–32)
ANION GAP SERPL CALCULATED.3IONS-SCNC: 11 MMOL/L (ref 7–16)
AST SERPL-CCNC: 24 U/L (ref 0–31)
ATYPICAL LYMPHOCYTE ABSOLUTE COUNT: 0.05 K/UL (ref 0–0.46)
ATYPICAL LYMPHOCYTES: 1 % (ref 0–4)
BASOPHILS # BLD: 0.05 K/UL (ref 0–0.2)
BASOPHILS NFR BLD: 1 % (ref 0–2)
BILIRUB SERPL-MCNC: 0.7 MG/DL (ref 0–1.2)
BUN SERPL-MCNC: 13 MG/DL (ref 6–23)
CALCIUM SERPL-MCNC: 7.8 MG/DL (ref 8.6–10.2)
CHLORIDE SERPL-SCNC: 98 MMOL/L (ref 98–107)
CO2 SERPL-SCNC: 28 MMOL/L (ref 22–29)
CREAT SERPL-MCNC: 3 MG/DL (ref 0.5–1)
CRP SERPL HS-MCNC: 9 MG/L (ref 0–5)
EOSINOPHIL # BLD: 0.05 K/UL (ref 0.05–0.5)
EOSINOPHILS RELATIVE PERCENT: 1 % (ref 0–6)
ERYTHROCYTE [DISTWIDTH] IN BLOOD BY AUTOMATED COUNT: 16.4 % (ref 11.5–15)
ERYTHROCYTE [SEDIMENTATION RATE] IN BLOOD BY WESTERGREN METHOD: 5 MM/HR (ref 0–20)
GFR, ESTIMATED: 15 ML/MIN/1.73M2
GLUCOSE SERPL-MCNC: 129 MG/DL (ref 74–99)
HCT VFR BLD AUTO: 28.7 % (ref 34–48)
HGB BLD-MCNC: 9.7 G/DL (ref 11.5–15.5)
LYMPHOCYTES NFR BLD: 1.15 K/UL (ref 1.5–4)
LYMPHOCYTES RELATIVE PERCENT: 18 % (ref 20–42)
MCH RBC QN AUTO: 32.8 PG (ref 26–35)
MCHC RBC AUTO-ENTMCNC: 33.8 G/DL (ref 32–34.5)
MCV RBC AUTO: 97 FL (ref 80–99.9)
MONOCYTES NFR BLD: 0.38 K/UL (ref 0.1–0.95)
MONOCYTES NFR BLD: 6 % (ref 2–12)
MYELOCYTES ABSOLUTE COUNT: 0.05 K/UL
MYELOCYTES: 1 %
NEUTROPHILS NFR BLD: 72 % (ref 43–80)
NEUTS SEG NFR BLD: 4.55 K/UL (ref 1.8–7.3)
PLATELET, FLUORESCENCE: 111 K/UL (ref 130–450)
PMV BLD AUTO: 13.3 FL (ref 7–12)
POTASSIUM SERPL-SCNC: 3 MMOL/L (ref 3.5–5)
PROT SERPL-MCNC: 6.7 G/DL (ref 6.4–8.3)
RBC # BLD AUTO: 2.96 M/UL (ref 3.5–5.5)
RBC # BLD: ABNORMAL 10*6/UL
SODIUM SERPL-SCNC: 137 MMOL/L (ref 132–146)
WBC # BLD: ABNORMAL 10*3/UL
WBC OTHER # BLD: 6.3 K/UL (ref 4.5–11.5)

## 2024-05-06 PROCEDURE — 36592 COLLECT BLOOD FROM PICC: CPT

## 2024-05-06 PROCEDURE — 2580000003 HC RX 258: Performed by: NURSE PRACTITIONER

## 2024-05-06 PROCEDURE — 85652 RBC SED RATE AUTOMATED: CPT

## 2024-05-06 PROCEDURE — 85025 COMPLETE CBC W/AUTO DIFF WBC: CPT

## 2024-05-06 PROCEDURE — 96365 THER/PROPH/DIAG IV INF INIT: CPT

## 2024-05-06 PROCEDURE — 99211 OFF/OP EST MAY X REQ PHY/QHP: CPT

## 2024-05-06 PROCEDURE — 80053 COMPREHEN METABOLIC PANEL: CPT

## 2024-05-06 PROCEDURE — 2580000003 HC RX 258: Performed by: SPECIALIST

## 2024-05-06 PROCEDURE — 6360000002 HC RX W HCPCS: Performed by: NURSE PRACTITIONER

## 2024-05-06 PROCEDURE — 86140 C-REACTIVE PROTEIN: CPT

## 2024-05-06 RX ORDER — SODIUM CHLORIDE 9 MG/ML
INJECTION, SOLUTION INTRAVENOUS CONTINUOUS
OUTPATIENT
Start: 2024-05-12

## 2024-05-06 RX ORDER — DIPHENHYDRAMINE HYDROCHLORIDE 50 MG/ML
50 INJECTION INTRAMUSCULAR; INTRAVENOUS
OUTPATIENT
Start: 2024-05-12

## 2024-05-06 RX ORDER — SODIUM CHLORIDE 0.9 % (FLUSH) 0.9 %
5-40 SYRINGE (ML) INJECTION PRN
OUTPATIENT
Start: 2024-05-12

## 2024-05-06 RX ORDER — ONDANSETRON 2 MG/ML
8 INJECTION INTRAMUSCULAR; INTRAVENOUS
OUTPATIENT
Start: 2024-05-12

## 2024-05-06 RX ORDER — ALBUTEROL SULFATE 90 UG/1
4 AEROSOL, METERED RESPIRATORY (INHALATION) PRN
OUTPATIENT
Start: 2024-05-12

## 2024-05-06 RX ORDER — SODIUM CHLORIDE 9 MG/ML
5-250 INJECTION, SOLUTION INTRAVENOUS PRN
OUTPATIENT
Start: 2024-05-12

## 2024-05-06 RX ORDER — SODIUM CHLORIDE 0.9 % (FLUSH) 0.9 %
5-40 SYRINGE (ML) INJECTION PRN
Status: DISCONTINUED | OUTPATIENT
Start: 2024-05-06 | End: 2024-05-07 | Stop reason: HOSPADM

## 2024-05-06 RX ORDER — HEPARIN 100 UNIT/ML
500 SYRINGE INTRAVENOUS PRN
Status: DISCONTINUED | OUTPATIENT
Start: 2024-05-06 | End: 2024-05-07 | Stop reason: HOSPADM

## 2024-05-06 RX ORDER — HEPARIN 100 UNIT/ML
500 SYRINGE INTRAVENOUS PRN
OUTPATIENT
Start: 2024-05-12

## 2024-05-06 RX ORDER — ACETAMINOPHEN 325 MG/1
650 TABLET ORAL
OUTPATIENT
Start: 2024-05-12

## 2024-05-06 RX ORDER — EPINEPHRINE 1 MG/ML
0.3 INJECTION, SOLUTION, CONCENTRATE INTRAVENOUS PRN
OUTPATIENT
Start: 2024-05-12

## 2024-05-06 RX ADMIN — ACYCLOVIR SODIUM 285 MG: 50 INJECTION, SOLUTION INTRAVENOUS at 13:33

## 2024-05-06 RX ADMIN — Medication 10 ML: at 13:32

## 2024-05-06 RX ADMIN — Medication 10 ML: at 14:23

## 2024-05-06 NOTE — DISCHARGE INSTRUCTIONS
PICC line removed from left arm per protocol.  PICC cath tip visualized and intact.  Pressure dressing applied.    No redness, ecchymosis, edema, swelling, or drainage noted at site.   Instructions provided on post PICC discharge care, including follow-up notification instructions.

## 2024-05-07 ENCOUNTER — HOSPITAL ENCOUNTER (OUTPATIENT)
Dept: INFUSION THERAPY | Age: 84
Setting detail: INFUSION SERIES
End: 2024-05-07

## 2024-05-08 ENCOUNTER — APPOINTMENT (OUTPATIENT)
Dept: GENERAL RADIOLOGY | Age: 84
DRG: 312 | End: 2024-05-08
Payer: MEDICARE

## 2024-05-08 ENCOUNTER — HOSPITAL ENCOUNTER (INPATIENT)
Age: 84
LOS: 3 days | Discharge: NURSING, REHAB OR LONG-TERM CARE | DRG: 312 | End: 2024-05-11
Attending: EMERGENCY MEDICINE | Admitting: INTERNAL MEDICINE
Payer: MEDICARE

## 2024-05-08 DIAGNOSIS — R53.83 FATIGUE, UNSPECIFIED TYPE: ICD-10-CM

## 2024-05-08 DIAGNOSIS — I95.1 ORTHOSTATIC HYPOTENSION: Primary | ICD-10-CM

## 2024-05-08 DIAGNOSIS — N18.9 CHRONIC KIDNEY DISEASE, UNSPECIFIED CKD STAGE: ICD-10-CM

## 2024-05-08 PROBLEM — I95.9 HYPOTENSION: Status: ACTIVE | Noted: 2024-05-08

## 2024-05-08 LAB
ALBUMIN SERPL-MCNC: 3.1 G/DL (ref 3.5–5.2)
ALP SERPL-CCNC: 96 U/L (ref 35–104)
ALT SERPL-CCNC: 14 U/L (ref 0–32)
ANION GAP SERPL CALCULATED.3IONS-SCNC: 11 MMOL/L (ref 7–16)
AST SERPL-CCNC: 21 U/L (ref 0–31)
BACTERIA URNS QL MICRO: ABNORMAL
BASOPHILS # BLD: 0.02 K/UL (ref 0–0.2)
BASOPHILS NFR BLD: 0 % (ref 0–2)
BILIRUB SERPL-MCNC: 0.5 MG/DL (ref 0–1.2)
BILIRUB UR QL STRIP: NEGATIVE
BNP SERPL-MCNC: ABNORMAL PG/ML (ref 0–450)
BUN SERPL-MCNC: 32 MG/DL (ref 6–23)
CALCIUM SERPL-MCNC: 7.7 MG/DL (ref 8.6–10.2)
CHLORIDE SERPL-SCNC: 98 MMOL/L (ref 98–107)
CLARITY UR: ABNORMAL
CO2 SERPL-SCNC: 26 MMOL/L (ref 22–29)
COLOR UR: YELLOW
CREAT SERPL-MCNC: 6.4 MG/DL (ref 0.5–1)
EOSINOPHIL # BLD: 0.09 K/UL (ref 0.05–0.5)
EOSINOPHILS RELATIVE PERCENT: 1 % (ref 0–6)
EPI CELLS #/AREA URNS HPF: ABNORMAL /HPF
ERYTHROCYTE [DISTWIDTH] IN BLOOD BY AUTOMATED COUNT: 16.5 % (ref 11.5–15)
GFR, ESTIMATED: 6 ML/MIN/1.73M2
GLUCOSE SERPL-MCNC: 160 MG/DL (ref 74–99)
GLUCOSE UR STRIP-MCNC: NEGATIVE MG/DL
HCT VFR BLD AUTO: 26.5 % (ref 34–48)
HGB BLD-MCNC: 8.6 G/DL (ref 11.5–15.5)
HGB UR QL STRIP.AUTO: NEGATIVE
IMM GRANULOCYTES # BLD AUTO: <0.03 K/UL (ref 0–0.58)
IMM GRANULOCYTES NFR BLD: 0 % (ref 0–5)
KETONES UR STRIP-MCNC: NEGATIVE MG/DL
LEUKOCYTE ESTERASE UR QL STRIP: ABNORMAL
LIPASE SERPL-CCNC: 11 U/L (ref 13–60)
LYMPHOCYTES NFR BLD: 1.4 K/UL (ref 1.5–4)
LYMPHOCYTES RELATIVE PERCENT: 20 % (ref 20–42)
MAGNESIUM SERPL-MCNC: 1.6 MG/DL (ref 1.6–2.6)
MCH RBC QN AUTO: 31.7 PG (ref 26–35)
MCHC RBC AUTO-ENTMCNC: 32.5 G/DL (ref 32–34.5)
MCV RBC AUTO: 97.8 FL (ref 80–99.9)
MONOCYTES NFR BLD: 0.62 K/UL (ref 0.1–0.95)
MONOCYTES NFR BLD: 9 % (ref 2–12)
NEUTROPHILS NFR BLD: 69 % (ref 43–80)
NEUTS SEG NFR BLD: 4.81 K/UL (ref 1.8–7.3)
NITRITE UR QL STRIP: POSITIVE
PH UR STRIP: 7.5 [PH] (ref 5–9)
PLATELET # BLD AUTO: 103 K/UL (ref 130–450)
PMV BLD AUTO: 12.1 FL (ref 7–12)
POTASSIUM SERPL-SCNC: 3.9 MMOL/L (ref 3.5–5)
PROT SERPL-MCNC: 6 G/DL (ref 6.4–8.3)
PROT UR STRIP-MCNC: 30 MG/DL
RBC # BLD AUTO: 2.71 M/UL (ref 3.5–5.5)
RBC #/AREA URNS HPF: ABNORMAL /HPF
SODIUM SERPL-SCNC: 135 MMOL/L (ref 132–146)
SP GR UR STRIP: 1.01 (ref 1–1.03)
TROPONIN I SERPL HS-MCNC: 78 NG/L (ref 0–9)
UROBILINOGEN UR STRIP-ACNC: 0.2 EU/DL (ref 0–1)
WBC #/AREA URNS HPF: ABNORMAL /HPF
WBC OTHER # BLD: 7 K/UL (ref 4.5–11.5)

## 2024-05-08 PROCEDURE — 36415 COLL VENOUS BLD VENIPUNCTURE: CPT

## 2024-05-08 PROCEDURE — 84484 ASSAY OF TROPONIN QUANT: CPT

## 2024-05-08 PROCEDURE — 99285 EMERGENCY DEPT VISIT HI MDM: CPT

## 2024-05-08 PROCEDURE — 93005 ELECTROCARDIOGRAM TRACING: CPT

## 2024-05-08 PROCEDURE — 83690 ASSAY OF LIPASE: CPT

## 2024-05-08 PROCEDURE — 6370000000 HC RX 637 (ALT 250 FOR IP): Performed by: INTERNAL MEDICINE

## 2024-05-08 PROCEDURE — 81001 URINALYSIS AUTO W/SCOPE: CPT

## 2024-05-08 PROCEDURE — 80053 COMPREHEN METABOLIC PANEL: CPT

## 2024-05-08 PROCEDURE — 83735 ASSAY OF MAGNESIUM: CPT

## 2024-05-08 PROCEDURE — 83880 ASSAY OF NATRIURETIC PEPTIDE: CPT

## 2024-05-08 PROCEDURE — 71045 X-RAY EXAM CHEST 1 VIEW: CPT

## 2024-05-08 PROCEDURE — 85025 COMPLETE CBC W/AUTO DIFF WBC: CPT

## 2024-05-08 PROCEDURE — 1200000000 HC SEMI PRIVATE

## 2024-05-08 RX ORDER — ASPIRIN 81 MG/1
81 TABLET ORAL DAILY
Status: DISCONTINUED | OUTPATIENT
Start: 2024-05-08 | End: 2024-05-11 | Stop reason: HOSPADM

## 2024-05-08 RX ORDER — POTASSIUM CHLORIDE 20 MEQ/1
40 TABLET, EXTENDED RELEASE ORAL PRN
Status: DISCONTINUED | OUTPATIENT
Start: 2024-05-08 | End: 2024-05-08

## 2024-05-08 RX ORDER — POTASSIUM CHLORIDE 7.45 MG/ML
10 INJECTION INTRAVENOUS PRN
Status: DISCONTINUED | OUTPATIENT
Start: 2024-05-08 | End: 2024-05-08

## 2024-05-08 RX ORDER — ATORVASTATIN CALCIUM 40 MG/1
40 TABLET, FILM COATED ORAL NIGHTLY
Status: DISCONTINUED | OUTPATIENT
Start: 2024-05-08 | End: 2024-05-11 | Stop reason: HOSPADM

## 2024-05-08 RX ORDER — IPRATROPIUM BROMIDE AND ALBUTEROL SULFATE 2.5; .5 MG/3ML; MG/3ML
1 SOLUTION RESPIRATORY (INHALATION) EVERY 4 HOURS PRN
Status: DISCONTINUED | OUTPATIENT
Start: 2024-05-08 | End: 2024-05-11 | Stop reason: HOSPADM

## 2024-05-08 RX ORDER — PROCHLORPERAZINE EDISYLATE 5 MG/ML
10 INJECTION INTRAMUSCULAR; INTRAVENOUS EVERY 6 HOURS PRN
Status: DISCONTINUED | OUTPATIENT
Start: 2024-05-08 | End: 2024-05-11 | Stop reason: HOSPADM

## 2024-05-08 RX ORDER — SODIUM CHLORIDE 9 MG/ML
INJECTION, SOLUTION INTRAVENOUS CONTINUOUS
Status: DISCONTINUED | OUTPATIENT
Start: 2024-05-08 | End: 2024-05-10

## 2024-05-08 RX ORDER — BUMETANIDE 1 MG/1
2 TABLET ORAL DAILY
Status: DISCONTINUED | OUTPATIENT
Start: 2024-05-08 | End: 2024-05-11 | Stop reason: HOSPADM

## 2024-05-08 RX ORDER — ISOSORBIDE DINITRATE 10 MG/1
40 TABLET ORAL 3 TIMES DAILY
Status: DISCONTINUED | OUTPATIENT
Start: 2024-05-08 | End: 2024-05-10

## 2024-05-08 RX ORDER — ONDANSETRON 2 MG/ML
4 INJECTION INTRAMUSCULAR; INTRAVENOUS EVERY 6 HOURS PRN
Status: DISCONTINUED | OUTPATIENT
Start: 2024-05-08 | End: 2024-05-08

## 2024-05-08 RX ORDER — MAGNESIUM SULFATE IN WATER 40 MG/ML
2000 INJECTION, SOLUTION INTRAVENOUS PRN
Status: DISCONTINUED | OUTPATIENT
Start: 2024-05-08 | End: 2024-05-08

## 2024-05-08 RX ORDER — GABAPENTIN 100 MG/1
100 CAPSULE ORAL 2 TIMES DAILY
Status: ON HOLD | COMMUNITY
End: 2024-05-11 | Stop reason: HOSPADM

## 2024-05-08 RX ORDER — PANTOPRAZOLE SODIUM 40 MG/1
40 TABLET, DELAYED RELEASE ORAL
Status: DISCONTINUED | OUTPATIENT
Start: 2024-05-09 | End: 2024-05-11 | Stop reason: HOSPADM

## 2024-05-08 RX ORDER — ERGOCALCIFEROL 1.25 MG/1
50000 CAPSULE ORAL WEEKLY
Status: DISCONTINUED | OUTPATIENT
Start: 2024-05-13 | End: 2024-05-11 | Stop reason: HOSPADM

## 2024-05-08 RX ORDER — CARVEDILOL 25 MG/1
25 TABLET ORAL SEE ADMIN INSTRUCTIONS
Status: DISCONTINUED | OUTPATIENT
Start: 2024-05-08 | End: 2024-05-10

## 2024-05-08 RX ORDER — CARVEDILOL 25 MG/1
25 TABLET ORAL
Status: DISCONTINUED | OUTPATIENT
Start: 2024-05-09 | End: 2024-05-10

## 2024-05-08 RX ADMIN — APIXABAN 2.5 MG: 2.5 TABLET, FILM COATED ORAL at 21:16

## 2024-05-08 RX ADMIN — ATORVASTATIN CALCIUM 40 MG: 40 TABLET, FILM COATED ORAL at 21:16

## 2024-05-08 ASSESSMENT — LIFESTYLE VARIABLES
HOW OFTEN DO YOU HAVE A DRINK CONTAINING ALCOHOL: NEVER
HOW MANY STANDARD DRINKS CONTAINING ALCOHOL DO YOU HAVE ON A TYPICAL DAY: PATIENT DOES NOT DRINK

## 2024-05-08 ASSESSMENT — PAIN - FUNCTIONAL ASSESSMENT: PAIN_FUNCTIONAL_ASSESSMENT: NONE - DENIES PAIN

## 2024-05-08 NOTE — ACP (ADVANCE CARE PLANNING)
Advance Care Planning   Healthcare Decision Maker:    Primary Decision Maker: Jonathan Feldman - Child - [phone]    Click here to complete Healthcare Decision Makers including selection of the Healthcare Decision Maker Relationship (ie \"Primary\").  Today we documented Decision Maker(s) consistent with Legal Next of Kin hierarchy.   Electronically signed by GARY Zaman on 5/8/2024 at 1:03 PM

## 2024-05-08 NOTE — H&P
Department of Internal Medicine  History and Physical Examination     Primary Care Physician: Marek Andres DO   Admitting Physician: Eddi Almonte DO  Admission date and time: 5/8/2024 10:31 AM    Room:  06/06  Admitting diagnosis: Severe hypotension    Patient Name: Marge Callejas  MRN: [id number]    Date of Service: 5/8/2024     Chief Complaint: Near syncope    HISTORY OF PRESENT ILLNESS:    Marge Callejas is an 84-year-old female patient who presented to [facility] emergency department for evaluation of a near syncopal event.  She was up and working with therapy while at home, became lightheaded and dizzy almost immediately after changing positions, and had to be lowered to the ground.  She did not suffer an injury.  She did have a bowel movement however during the process.  She was noted to be hypotensive on ER arrival with otherwise stable vital signs.  Chest x-ray was unremarkable.  CBC showed a normocytic and normochromic anemia with thrombocytopenia.  Metabolic panel consistent with end-stage renal disease.  Troponin was not assessed however proBNP was elevated at 43,449.  Case was discussed with ER physician.   has already been involved in the patient's case as placement is being planned.  Patient has been accepted for admission under the service of Dr. Almonte      PAST MEDICAL Hx:  Past Medical History:   Diagnosis Date    Arthritis     Atrial fibrillation ([facility])     Blood circulation, collateral     CHF (congestive heart failure) ([facility])     Chronic renal insufficiency, stage IV (severe) ([facility]) 11/19/2016    Coronary artery disease involving native coronary artery of native heart without angina pectoris 4/17/2024    History of cardiovascular stress test 07/2015    Lexiscan stress test    History of echocardiogram 07/27/2015    EF 29%    Hyperlipidemia     Hypertension     Mixed restrictive and obstructive lung disease ([facility]) 7/14/2023       PAST SURGICAL Hx:   Past Surgical History:  05/08/2024 11:00 AM    EOSABS 0.09 05/08/2024 11:00 AM    BASOSABS 0.02 05/08/2024 11:00 AM     BMP:    Lab Results   Component Value Date/Time     05/08/2024 11:00 AM    K 3.9 05/08/2024 11:00 AM    K 4.5 05/15/2021 10:58 AM    CL 98 05/08/2024 11:00 AM    CO2 26 05/08/2024 11:00 AM    BUN 32 05/08/2024 11:00 AM    CREATININE 6.4 05/08/2024 11:00 AM    CALCIUM 7.7 05/08/2024 11:00 AM    GFRAA 15 02/02/2022 10:26 AM    LABGLOM 6 05/08/2024 11:00 AM    LABGLOM 14 04/29/2024 02:31 PM    GLUCOSE 160 05/08/2024 11:00 AM    GLUCOSE 102 11/17/2010 06:31 AM       ASSESSMENT:  Near syncope with orthostatic hypotension, multifactorial  Varicella-zoster in the left mid thoracic dermatome recently completed course of acyclovir  Chronic atrial fibrillation  End-stage renal disease on hemodialysis  Oxygen dependent COPD with chronic respiratory failure  Hyperlipidemia    PLAN:  Marge Callejas presented after a near syncopal event with hypotension.  Sounds as though orthostasis was also present based on description.  ER workup was relatively benign.  Gentle fluids will be implemented with very close volume status monitoring as she is end-stage renal disease on hemodialysis.  Nephrology will provide consultation.  Blood pressure medications will be adjusted.  She did recently start gabapentin for postherpetic neuralgia and this will be held for now.  PT, OT and social work will follow for discharge planning purposes as we anticipate skilled nursing facility placement. Underlying co-morbidites will be addressed during hospitalization as well. Labs and vital signs will be monitored closely and addressed accordingly. See additional orders for details.     MADELAINE Milton CNP  1:33 PM  5/8/2024    Electronically signed by MADELAINE Milton CNP on 5/8/24 at 1:33 PM EDT

## 2024-05-08 NOTE — ED PROVIDER NOTES
Glom Filt Rate 5 (*)     Calcium 8.2 (*)     All other components within normal limits   CBC WITH AUTO DIFFERENTIAL - Abnormal; Notable for the following components:    RBC 2.89 (*)     Hemoglobin 9.4 (*)     Hematocrit 28.6 (*)     RDW 16.7 (*)     MPV 13.0 (*)     Platelet, Fluorescence 110 (*)     Monocytes % 13 (*)     All other components within normal limits   PHOSPHORUS - Abnormal; Notable for the following components:    Phosphorus 5.9 (*)     All other components within normal limits   TROPONIN - Abnormal; Notable for the following components:    Troponin, High Sensitivity 82 (*)     All other components within normal limits   MAGNESIUM   MAGNESIUM   TROPONIN       As interpreted by me, the above displayed labs are abnormal. All other labs obtained during this visit were within normal range or not returned as of this dictation.    EKG Interpretation:  Interpreted by emergency department physician, Milad Marquez MD  Rate:78  Rhythm: Paced rhythm  Interpretation: no acute changes  Comparison: changes compared to previous EKG    RADIOLOGY:   Non-plain film images such as CT, Ultrasound and MRI are read by the radiologist. Plain radiographic images are visualized and preliminarily interpreted by the ED Provider with the below findings:    Chest x-ray revealed No hemothorax, No pneumothorax, and No effusion    Interpretation per the Radiologist below, if available at the time of this note:    XR CHEST PORTABLE   Final Result   No change from previous exam.      Cardiomegaly with increased interstitial markings.  No acute infiltrates   identified.           XR CHEST PORTABLE    Result Date: 5/8/2024  EXAMINATION: ONE XRAY VIEW OF THE CHEST 5/8/2024 11:04 am COMPARISON: 17 April 2024 HISTORY: ORDERING SYSTEM PROVIDED HISTORY: Weakness TECHNOLOGIST PROVIDED HISTORY: Reason for exam:->Weakness FINDINGS: Single AP upright portable chest demonstrates hyperexpansion of the lungs with mild blunting of the  costophrenic angles.  There is tortuosity of the aorta with marked cardiomegaly with a dual-chamber cardiac pacemaker in place with the fibrillator.  There is a renal dialysis catheter remaining in position.  There are no focal alveolar infiltrates identified.     No change from previous exam. Cardiomegaly with increased interstitial markings.  No acute infiltrates identified.       No results found.    PROCEDURES   Unless otherwise noted below, none     CRITICAL CARE TIME (.cct)   Per attending attestation    EMERGENCY DEPARTMENT COURSE    Vitals:    Vitals:    05/08/24 1701 05/08/24 1730 05/09/24 0448 05/09/24 0530   BP: 122/71 129/73  113/70   Pulse: 75 73  75   Resp: 14 16  16   Temp: 97.7 °F (36.5 °C) 97.9 °F (36.6 °C)  98.8 °F (37.1 °C)   TempSrc: Oral Oral  Oral   SpO2: 100% 99%  97%   Weight:   65.8 kg (145 lb)    Height:           Patient was given the following medications:  Medications   apixaban (ELIQUIS) tablet 2.5 mg (2.5 mg Oral Given 5/9/24 0833)   aspirin EC tablet 81 mg (81 mg Oral Given 5/9/24 0833)   atorvastatin (LIPITOR) tablet 40 mg (40 mg Oral Given 5/8/24 2116)   bumetanide (BUMEX) tablet 2 mg ( Oral Automatically Held 5/14/24 0900)   carvedilol (COREG) tablet 25 mg ( Oral Held by provider 5/8/24 1338)   carvedilol (COREG) tablet 25 mg ( Oral Automatically Held 5/16/24 0900)   hydrALAZINE (APRESOLINE) tablet 75 mg ( Oral Automatically Held 5/14/24 2100)   ipratropium 0.5 mg-albuterol 2.5 mg (DUONEB) nebulizer solution 1 Dose (has no administration in time range)   isosorbide dinitrate (ISORDIL) tablet 40 mg ( Oral Automatically Held 5/14/24 2100)   pantoprazole (PROTONIX) tablet 40 mg (40 mg Oral Given 5/9/24 0532)   vitamin D (ERGOCALCIFEROL) capsule 50,000 Units (has no administration in time range)   0.9 % sodium chloride infusion ( IntraVENous New Bag 5/9/24 0534)   prochlorperazine (COMPAZINE) injection 10 mg (has no administration in time range)   calcium acetate (PHOSLO) capsule 361

## 2024-05-08 NOTE — CARE COORDINATION
SS Note: SW received call from [facility]. She notes pt is active w/them for PT & Nursing since 4/30. Pt had weakness & low BP & was sent to ED.  Electronically signed by GARY Zaman on 5/8/2024 at 11:43 AM

## 2024-05-08 NOTE — CARE COORDINATION
Case Management Assessment  Initial Evaluation    Date/Time of Evaluation: 5/8/2024 2:13 PM  Assessment Completed by: GARY Zaman    If patient is discharged prior to next notation, then this note serves as note for discharge by case management.    Patient Name: Marge Callejas                   YOB: 1940  Diagnosis: Hypotension [I95.9]                   Date / Time: 5/8/2024 10:31 AM    Patient Admission Status: Inpatient   Readmission Risk (Low < 19, Mod (19-27), High > 27): Readmission Risk Score: 18.8    Current PCP: Marek Andres, DO  PCP verified by CM?      Chart Reviewed: Yes      History Provided by: Patient, Child/Family, Medical Record  Patient Orientation: Alert and Oriented, Person, Place, Situation, Self    Patient Cognition: Alert    Hospitalization in the last 30 days (Readmission):  Yes    Readmission Assessment  Number of Days since last admission?: 8-30 days  Previous Disposition: Home with Home Health  Who is being Interviewed: Patient, Caregiver  What was the patient's/caregiver's perception as to why they think they needed to return back to the hospital?: Other (Comment) (Pt could not get off toilet- low BP.)  Did you visit your Primary Care Physician after you left the hospital, before you returned this time?: No  Why weren't you able to visit your PCP?: Did not have an appointment  Did you see a specialist, such as Cardiac, Pulmonary, Orthopedic Physician, etc. after you left the hospital?: No  Who advised the patient to return to the hospital?: Home Health Staff  Does the patient report anything that got in the way of taking their medications?: No  In our efforts to provide the best possible care to you and others like you, can you think of anything that we could have done to help you after you left the hospital the first time, so that you might not have needed to return so soon?: Other (Comment) (nothing)      Advance Directives:      Code Status: Full Code    Patient's Primary Decision Maker is: Legal Next of Kin    Primary Decision Maker: Jonathan Feldman - Child - [phone]    Discharge Planning:    Patient lives with:   Type of Home:    Primary Care Giver: Family  Patient Support Systems include: Children, Home Care Staff   Current Financial resources:    Current community resources:    Current services prior to admission:              Current DME:              Type of Home Care services:       ADLS  Prior functional level: Assistance with the following:, Mobility, Shopping, Housework, Cooking, Bathing  Current functional level: Assistance with the following:, Bathing, Dressing, Cooking, Housework, Shopping, Mobility    PT AM-PAC:   /24  OT AM-PAC:   /24    Family can provide assistance at DC: Yes  Would you like Case Management to discuss the discharge plan with any other family members/significant others, and if so, who? Yes (Dtr-Jonathan Feldman)  Plans to Return to Present Housing: No  Other Identified Issues/Barriers to RETURNING to current housing: none  Potential Assistance needed at discharge:              Potential DME:    Patient expects to discharge to:    Plan for transportation at discharge:      Financial    Payor: [employer] MEDICARE / Plan: [employer] CHOICE-PPO MEDICARE / Product Type: *No Product type* /     Does insurance require precert for SNF: Yes    Potential assistance Purchasing Medications:    Meds-to-Beds request:        [id number] - [place], [place] - [address] [phone] - F [phone]  [address] [place] [address]  Phone: [phone] Fax: [phone]    [facility] [address] [phone] - F [phone]  [address] [place] [address]  Phone: [phone] Fax: [phone]      Notes:    Factors facilitating achievement of predicted outcomes: Family support and Has needed Durable Medical Equipment at home    Barriers to discharge: none    Additional

## 2024-05-08 NOTE — PROGRESS NOTES
4 Eyes Skin Assessment     NAME:  Marge Callejas  YOB: 1940  MEDICAL RECORD NUMBER:  [id number]    The patient is being assessed for  Admission    I agree that at least one RN has performed a thorough Head to Toe Skin Assessment on the patient. ALL assessment sites listed below have been assessed.      Areas assessed by both nurses:    Head, Face, Ears, Shoulders, Back, Chest, Arms, Elbows, Hands, Sacrum. Buttock, Coccyx, Ischium, and Legs. Feet and Heels        Does the Patient have a Wound? No noted wound(s)       Estuardo Prevention initiated by RN: Yes  Wound Care Orders initiated by RN: No    Pressure Injury (Stage 3,4, Unstageable, DTI, NWPT, and Complex wounds) if present, place Wound referral order by RN under : No    New Ostomies, if present place, Ostomy referral order under : No     Nurse 1 eSignature: Electronically signed by Chiquis Gonzalez RN on 5/8/24 at 6:23 PM EDT    **SHARE this note so that the co-signing nurse can place an eSignature**    Nurse 2 eSignature: Electronically signed by Joyce Hull RN on 5/8/24 at 6:27 PM EDT

## 2024-05-09 LAB
ANION GAP SERPL CALCULATED.3IONS-SCNC: 13 MMOL/L (ref 7–16)
BASOPHILS # BLD: 0.04 K/UL (ref 0–0.2)
BASOPHILS NFR BLD: 1 % (ref 0–2)
BUN SERPL-MCNC: 40 MG/DL (ref 6–23)
CALCIUM SERPL-MCNC: 8.2 MG/DL (ref 8.6–10.2)
CHLORIDE SERPL-SCNC: 102 MMOL/L (ref 98–107)
CO2 SERPL-SCNC: 25 MMOL/L (ref 22–29)
CREAT SERPL-MCNC: 7.2 MG/DL (ref 0.5–1)
EKG ATRIAL RATE: 78 BPM
EKG Q-T INTERVAL: 546 MS
EKG QRS DURATION: 200 MS
EKG QTC CALCULATION (BAZETT): 609 MS
EKG R AXIS: -65 DEGREES
EKG T AXIS: 107 DEGREES
EKG VENTRICULAR RATE: 75 BPM
EOSINOPHIL # BLD: 0.13 K/UL (ref 0.05–0.5)
EOSINOPHILS RELATIVE PERCENT: 2 % (ref 0–6)
ERYTHROCYTE [DISTWIDTH] IN BLOOD BY AUTOMATED COUNT: 16.7 % (ref 11.5–15)
GFR, ESTIMATED: 5 ML/MIN/1.73M2
GLUCOSE SERPL-MCNC: 92 MG/DL (ref 74–99)
HCT VFR BLD AUTO: 28.6 % (ref 34–48)
HGB BLD-MCNC: 9.4 G/DL (ref 11.5–15.5)
IMM GRANULOCYTES # BLD AUTO: <0.03 K/UL (ref 0–0.58)
IMM GRANULOCYTES NFR BLD: 0 % (ref 0–5)
LYMPHOCYTES NFR BLD: 1.7 K/UL (ref 1.5–4)
LYMPHOCYTES RELATIVE PERCENT: 26 % (ref 20–42)
MAGNESIUM SERPL-MCNC: 1.8 MG/DL (ref 1.6–2.6)
MCH RBC QN AUTO: 32.5 PG (ref 26–35)
MCHC RBC AUTO-ENTMCNC: 32.9 G/DL (ref 32–34.5)
MCV RBC AUTO: 99 FL (ref 80–99.9)
MONOCYTES NFR BLD: 0.81 K/UL (ref 0.1–0.95)
MONOCYTES NFR BLD: 13 % (ref 2–12)
NEUTROPHILS NFR BLD: 58 % (ref 43–80)
NEUTS SEG NFR BLD: 3.8 K/UL (ref 1.8–7.3)
PHOSPHATE SERPL-MCNC: 5.9 MG/DL (ref 2.5–4.5)
PLATELET, FLUORESCENCE: 110 K/UL (ref 130–450)
PMV BLD AUTO: 13 FL (ref 7–12)
POTASSIUM SERPL-SCNC: 4 MMOL/L (ref 3.5–5)
RBC # BLD AUTO: 2.89 M/UL (ref 3.5–5.5)
SODIUM SERPL-SCNC: 140 MMOL/L (ref 132–146)
TROPONIN I SERPL HS-MCNC: 82 NG/L (ref 0–9)
WBC OTHER # BLD: 6.5 K/UL (ref 4.5–11.5)

## 2024-05-09 PROCEDURE — 2580000003 HC RX 258: Performed by: INTERNAL MEDICINE

## 2024-05-09 PROCEDURE — 97110 THERAPEUTIC EXERCISES: CPT | Performed by: PHYSICAL THERAPIST

## 2024-05-09 PROCEDURE — 84100 ASSAY OF PHOSPHORUS: CPT

## 2024-05-09 PROCEDURE — 83735 ASSAY OF MAGNESIUM: CPT

## 2024-05-09 PROCEDURE — 93010 ELECTROCARDIOGRAM REPORT: CPT | Performed by: INTERNAL MEDICINE

## 2024-05-09 PROCEDURE — 97530 THERAPEUTIC ACTIVITIES: CPT | Performed by: PHYSICAL THERAPIST

## 2024-05-09 PROCEDURE — 97161 PT EVAL LOW COMPLEX 20 MIN: CPT | Performed by: PHYSICAL THERAPIST

## 2024-05-09 PROCEDURE — 84484 ASSAY OF TROPONIN QUANT: CPT

## 2024-05-09 PROCEDURE — 36415 COLL VENOUS BLD VENIPUNCTURE: CPT

## 2024-05-09 PROCEDURE — 80048 BASIC METABOLIC PNL TOTAL CA: CPT

## 2024-05-09 PROCEDURE — 1200000000 HC SEMI PRIVATE

## 2024-05-09 PROCEDURE — 6370000000 HC RX 637 (ALT 250 FOR IP): Performed by: INTERNAL MEDICINE

## 2024-05-09 PROCEDURE — 85025 COMPLETE CBC W/AUTO DIFF WBC: CPT

## 2024-05-09 PROCEDURE — 2500000003 HC RX 250 WO HCPCS

## 2024-05-09 RX ORDER — CALCIUM ACETATE 667 MG/1
1 CAPSULE ORAL
Status: DISCONTINUED | OUTPATIENT
Start: 2024-05-09 | End: 2024-05-11 | Stop reason: HOSPADM

## 2024-05-09 RX ADMIN — APIXABAN 2.5 MG: 2.5 TABLET, FILM COATED ORAL at 20:01

## 2024-05-09 RX ADMIN — SODIUM CHLORIDE: 9 INJECTION, SOLUTION INTRAVENOUS at 05:34

## 2024-05-09 RX ADMIN — APIXABAN 2.5 MG: 2.5 TABLET, FILM COATED ORAL at 08:33

## 2024-05-09 RX ADMIN — CALCIUM ACETATE 667 MG: 667 CAPSULE ORAL at 16:21

## 2024-05-09 RX ADMIN — CALCIUM ACETATE 667 MG: 667 CAPSULE ORAL at 11:22

## 2024-05-09 RX ADMIN — PANTOPRAZOLE SODIUM 40 MG: 40 TABLET, DELAYED RELEASE ORAL at 05:32

## 2024-05-09 RX ADMIN — ATORVASTATIN CALCIUM 40 MG: 40 TABLET, FILM COATED ORAL at 20:01

## 2024-05-09 RX ADMIN — ASPIRIN 81 MG: 81 TABLET, COATED ORAL at 08:33

## 2024-05-09 NOTE — CONSULTS
normal. No drainage or sinus tenderness.  Mouth/Throat:  Mucosa moist.  No lesions.    Neck:  neck- supple, no mass, non-tender  Chest: Right IJ tunneled dialysis catheter   Lungs:  Normal expansion.  Clear to auscultation, diminished in the bases.  No rales, rhonchi, or wheezing.  Heart:   Regular rate and rhythm without murmur, gallop or rub.  Abdomen:  Soft, non-tender, normal bowel sounds.  No bruits, organomegaly or masses.  Extremities: Extremities warm to touch, pink, with no edema.  Musculoskeletal:  No joint swelling, deformity, or tenderness.  Peripheral Pulses:  Normal  Neurologic: Sensation grossly intact.        Data:   Labs:  CBC with Differential:    Lab Results   Component Value Date/Time    WBC 6.5 05/09/2024 05:15 AM    RBC 2.89 05/09/2024 05:15 AM    HGB 9.4 05/09/2024 05:15 AM    HCT 28.6 05/09/2024 05:15 AM     05/08/2024 11:00 AM    MCV 99.0 05/09/2024 05:15 AM    MCH 32.5 05/09/2024 05:15 AM    MCHC 32.9 05/09/2024 05:15 AM    RDW 16.7 05/09/2024 05:15 AM    NRBC 0.9 03/31/2021 11:18 AM    LYMPHOPCT 26 05/09/2024 05:15 AM    MONOPCT 13 05/09/2024 05:15 AM    MYELOPCT 1 05/06/2024 01:35 PM    MYELOPCT 0.9 04/03/2021 04:44 AM    EOSPCT 2 05/09/2024 05:15 AM    BASOPCT 1 05/09/2024 05:15 AM    MONOSABS 0.81 05/09/2024 05:15 AM    EOSABS 0.13 05/09/2024 05:15 AM    BASOSABS 0.04 05/09/2024 05:15 AM     CMP:    Lab Results   Component Value Date/Time     05/09/2024 05:15 AM    K 4.0 05/09/2024 05:15 AM    K 4.5 05/15/2021 10:58 AM     05/09/2024 05:15 AM    CO2 25 05/09/2024 05:15 AM    BUN 40 05/09/2024 05:15 AM    CREATININE 7.2 05/09/2024 05:15 AM    GFRAA 15 02/02/2022 10:26 AM    LABGLOM 5 05/09/2024 05:15 AM    LABGLOM 14 04/29/2024 02:31 PM    GLUCOSE 92 05/09/2024 05:15 AM    GLUCOSE 102 11/17/2010 06:31 AM    CALCIUM 8.2 05/09/2024 05:15 AM    BILITOT 0.5 05/08/2024 11:00 AM    ALKPHOS 96 05/08/2024 11:00 AM    AST 21 05/08/2024 11:00 AM    ALT 14 05/08/2024 11:00 AM      Ionized Calcium:  No results found for: \"IONCA\"  Magnesium:    Lab Results   Component Value Date/Time    MG 1.8 05/09/2024 05:15 AM     Phosphorus:    Lab Results   Component Value Date/Time    PHOS 5.9 05/09/2024 05:15 AM     U/A:    Lab Results   Component Value Date/Time    COLORU Yellow 05/08/2024 11:06 AM    PHUR 7.5 05/08/2024 11:06 AM    PHUR 8.5 04/16/2024 09:39 PM    LABCAST FEW 11/22/2016 07:10 PM    WBCUA 6 TO 9 05/08/2024 11:06 AM    RBCUA 0 TO 2 05/08/2024 11:06 AM    YEAST Present 04/01/2021 09:40 PM    BACTERIA 1+ 05/08/2024 11:06 AM    CLARITYU CLOUDY 05/28/2021 06:43 PM    LEUKOCYTESUR SMALL 05/08/2024 11:06 AM    UROBILINOGEN 0.2 05/08/2024 11:06 AM    BILIRUBINUR NEGATIVE 05/08/2024 11:06 AM    BLOODU TRACE 05/28/2021 06:43 PM    GLUCOSEU NEGATIVE 05/08/2024 11:06 AM     Microalbumen/Creatinine ratio:  No components found for: \"RUCREAT\"  Iron Saturation:  No components found for: \"PERCENTFE\"  TIBC:    Lab Results   Component Value Date/Time    TIBC 139 04/18/2024 07:02 AM     FERRITIN:    Lab Results   Component Value Date/Time    FERRITIN 2,917 09/18/2023 09:11 AM        Imaging:  XR CHEST PORTABLE   Final Result   No change from previous exam.      Cardiomegaly with increased interstitial markings.  No acute infiltrates   identified.             Assessment  ESRD on hemodialysis on Mondays and Fridays via a right IJ TDC  Secondary hyperparathyroidism/mineral bone disease   Anemia of chronic disease   COPD, oxygen dependent   History of hypertension, though presented with hypotension  Syncope     Plan  Continue IHD support for solute and volume clearance on Mondays and Fridays- HD tomorrow   Start ARLIN, Retacrit while in the hospital  Calcium acetate 667 mg TID with meals   Continue to hold antihypertensive medications for now, resume once BP is stable. If blood pressure remains stable throughout the day, discontinue IVF.   Will adjust antihypertensive medications as warranted over the next

## 2024-05-09 NOTE — CARE COORDINATION
Ss note:5/9/20241:28 PM  Per chart review, pt presents from home with her dtr Jonathan, attends [facility] on M & F at 10 am transported via [facility] a van. Pt has 02 via [facility] at 4 liters, nebulizer, walker, cane, w/c. Referral made to maynor [facility] reviewing awaiting response, requires PRECERT Hens and LUBA. Additional snf choices [place] [facility], [facility] or [facility]ore[facility] Mur[facility](no additional referrals made yet.)  PTA pt active with [facility]. SW/CM to follow. GARY Palmer

## 2024-05-09 NOTE — PROGRESS NOTES
Internal Medicine Progress Note     [facility]     Jose Rodrigez D.O., DALEOChepeI.                         Eddi Almonte D.O., SONIAIChepe Abad D.O.     Ifrah Dave, MSN, APRN, NP-C  Edward Edwards, MSN, APRN-CNP  Aiden Muñoz, MSN, APRN, NP-C  Yasemin Bueno, MSN, APRN-CNP  Palmira Gabriel, MSN, APRN, NP-C     Primary Care Physician: Marek Andres DO   Admitting Physician:  Eddi Almonte DO  Admission date and time: 5/8/2024 10:31 AM    Room:  [address] [place], [place] [address]  Admitting diagnosis: Hypotension [I95.9]    Patient Name: Marge Callejas  MRN: [id number]    Date of Service: 5/9/2024     Subjective:  Marge is a 84 y.o. female who was seen and examined today,5/9/2024, at the bedside. Marge is resting very comfortably during my examination.  Following adjustments in her blood pressure medications, her presenting hypotension has improved.  She is feeling dramatically better and is anxious to work with the therapy teams.  Shingles rash continues to improve as well.    Review of System:   Constitutional:   Persistent malaise and fatigue.  On  HEENT:   Denies ear pain, sore throat, sinus or eye problems.  Cardiovascular:   Denies any chest pain, irregular heartbeats, or palpitations.   Respiratory:   Denies shortness of breath, coughing, sputum production, hemoptysis, or wheezing.  Gastrointestinal:   Denies nausea, vomiting, diarrhea, or constipation.  Denies any abdominal pain.  Genitourinary:    Denies any urgency, frequency, hematuria. Voiding  without difficulty.  Extremities:   Denies lower extremity swelling, edema or cyanosis.   Neurology:    Denies any headache or focal neurological deficits, Denies generalized weakness or memory difficulty.   Psch:   Denies being anxious or depressed.  Musculoskeletal:    Denies  myalgias, joint complaints or back pain.   Integumentary:   Going pain associated with the shingles rash.  Hematologic/Lymphatic:  Denies bruising or

## 2024-05-09 NOTE — PROGRESS NOTES
Physical Therapy Initial Evaluation/Plan of Care    Room #:  0314/0314-01  Patient Name: Marge Callejas  YOB: 1940  MRN: [id number]    Date of Service: 5/9/2024     Tentative placement recommendation: Subacute unless patient meets goals then Home Health Physical Therapy  Equipment recommendation: To be determined      Evaluating Physical Therapist: Jayshree Hickey, PT #[id number]      Specific Provider Orders/Date/Referring Provider :  05/08/24 1345    PT eval and treat  Start:  05/08/24 1345,   End:  05/08/24 1345,   ONE TIME,   Standing Count:  1 Occurrences,   R       Eddi Almonte DO    Admitting Diagnosis:   Hypotension [I95.9]       near syncopal event.  She was up and working with therapy while at home, became lightheaded and dizzy almost immediately after changing positions, and had to be lowered to the ground.    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[facility])    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

## 2024-05-10 LAB
ANION GAP SERPL CALCULATED.3IONS-SCNC: 15 MMOL/L (ref 7–16)
BASOPHILS # BLD: 0.03 K/UL (ref 0–0.2)
BASOPHILS NFR BLD: 0 % (ref 0–2)
BUN SERPL-MCNC: 48 MG/DL (ref 6–23)
CALCIUM SERPL-MCNC: 8.1 MG/DL (ref 8.6–10.2)
CHLORIDE SERPL-SCNC: 104 MMOL/L (ref 98–107)
CO2 SERPL-SCNC: 23 MMOL/L (ref 22–29)
CREAT SERPL-MCNC: 8.1 MG/DL (ref 0.5–1)
EOSINOPHIL # BLD: 0.16 K/UL (ref 0.05–0.5)
EOSINOPHILS RELATIVE PERCENT: 2 % (ref 0–6)
ERYTHROCYTE [DISTWIDTH] IN BLOOD BY AUTOMATED COUNT: 17.4 % (ref 11.5–15)
GFR, ESTIMATED: 5 ML/MIN/1.73M2
GLUCOSE SERPL-MCNC: 98 MG/DL (ref 74–99)
HCT VFR BLD AUTO: 28.9 % (ref 34–48)
HGB BLD-MCNC: 9 G/DL (ref 11.5–15.5)
IMM GRANULOCYTES # BLD AUTO: 0.03 K/UL (ref 0–0.58)
IMM GRANULOCYTES NFR BLD: 0 % (ref 0–5)
LYMPHOCYTES NFR BLD: 2 K/UL (ref 1.5–4)
LYMPHOCYTES RELATIVE PERCENT: 29 % (ref 20–42)
MAGNESIUM SERPL-MCNC: 1.7 MG/DL (ref 1.6–2.6)
MCH RBC QN AUTO: 32 PG (ref 26–35)
MCHC RBC AUTO-ENTMCNC: 31.1 G/DL (ref 32–34.5)
MCV RBC AUTO: 102.8 FL (ref 80–99.9)
MONOCYTES NFR BLD: 0.72 K/UL (ref 0.1–0.95)
MONOCYTES NFR BLD: 11 % (ref 2–12)
NEUTROPHILS NFR BLD: 57 % (ref 43–80)
NEUTS SEG NFR BLD: 3.89 K/UL (ref 1.8–7.3)
PHOSPHATE SERPL-MCNC: 5.6 MG/DL (ref 2.5–4.5)
PLATELET # BLD AUTO: 120 K/UL (ref 130–450)
PMV BLD AUTO: 12.8 FL (ref 7–12)
POTASSIUM SERPL-SCNC: 4.6 MMOL/L (ref 3.5–5)
RBC # BLD AUTO: 2.81 M/UL (ref 3.5–5.5)
SODIUM SERPL-SCNC: 142 MMOL/L (ref 132–146)
WBC OTHER # BLD: 6.8 K/UL (ref 4.5–11.5)

## 2024-05-10 PROCEDURE — 85025 COMPLETE CBC W/AUTO DIFF WBC: CPT

## 2024-05-10 PROCEDURE — 90935 HEMODIALYSIS ONE EVALUATION: CPT

## 2024-05-10 PROCEDURE — 36415 COLL VENOUS BLD VENIPUNCTURE: CPT

## 2024-05-10 PROCEDURE — 1200000000 HC SEMI PRIVATE

## 2024-05-10 PROCEDURE — 2580000003 HC RX 258: Performed by: INTERNAL MEDICINE

## 2024-05-10 PROCEDURE — 84100 ASSAY OF PHOSPHORUS: CPT

## 2024-05-10 PROCEDURE — 5A1D70Z PERFORMANCE OF URINARY FILTRATION, INTERMITTENT, LESS THAN 6 HOURS PER DAY: ICD-10-PCS | Performed by: INTERNAL MEDICINE

## 2024-05-10 PROCEDURE — 6370000000 HC RX 637 (ALT 250 FOR IP): Performed by: INTERNAL MEDICINE

## 2024-05-10 PROCEDURE — 2500000003 HC RX 250 WO HCPCS

## 2024-05-10 PROCEDURE — 80048 BASIC METABOLIC PNL TOTAL CA: CPT

## 2024-05-10 PROCEDURE — 83735 ASSAY OF MAGNESIUM: CPT

## 2024-05-10 PROCEDURE — 6360000002 HC RX W HCPCS

## 2024-05-10 RX ADMIN — SODIUM CHLORIDE: 9 INJECTION, SOLUTION INTRAVENOUS at 05:22

## 2024-05-10 RX ADMIN — BUMETANIDE 2 MG: 1 TABLET ORAL at 08:01

## 2024-05-10 RX ADMIN — CALCIUM ACETATE 667 MG: 667 CAPSULE ORAL at 16:26

## 2024-05-10 RX ADMIN — ATORVASTATIN CALCIUM 40 MG: 40 TABLET, FILM COATED ORAL at 20:21

## 2024-05-10 RX ADMIN — APIXABAN 2.5 MG: 2.5 TABLET, FILM COATED ORAL at 08:01

## 2024-05-10 RX ADMIN — PANTOPRAZOLE SODIUM 40 MG: 40 TABLET, DELAYED RELEASE ORAL at 05:22

## 2024-05-10 RX ADMIN — ASPIRIN 81 MG: 81 TABLET, COATED ORAL at 08:01

## 2024-05-10 RX ADMIN — APIXABAN 2.5 MG: 2.5 TABLET, FILM COATED ORAL at 20:21

## 2024-05-10 RX ADMIN — EPOETIN ALFA-EPBX 2000 UNITS: 2000 INJECTION, SOLUTION INTRAVENOUS; SUBCUTANEOUS at 16:26

## 2024-05-10 ASSESSMENT — PAIN SCALES - GENERAL
PAINLEVEL_OUTOF10: 0
PAINLEVEL_OUTOF10: 0

## 2024-05-10 NOTE — PROGRESS NOTES
Physical Therapy      Physical Therapy    Room #:   0314/0314-01    Date: 5/10/2024       Patient Name: Marge Callejas  : 1940      MRN: [id number]     Patient unavailable for physical therapy treatment due to off floor at dialysis. Will attempt PT treatment at a later time. Thank you.      Sherita Dang, PTA  #[id number]

## 2024-05-10 NOTE — FLOWSHEET NOTE
05/10/24 1300   Vital Signs   /64   Temp 98.2 °F (36.8 °C)   Pulse 78   Respirations 18   Weight - Scale 64.3 kg (141 lb 12.1 oz)   Percent Weight Change -2.24   Post-Hemodialysis Assessment   Post-Treatment Procedures Blood returned;Catheter capped, clamped and heparinized x 2 ports   Machine Disinfection Process Acid/Vinegar Clean;Heat Disinfect   Rinseback Volume (ml) 300 ml   Blood Volume Processed (Liters) 59.8 L   Dialyzer Clearance Lightly streaked   Duration of Treatment (minutes) 180 minutes   Heparin Amount Administered During Treatment (mL) 0 mL   Hemodialysis Intake (ml) 300 ml   Hemodialysis Output (ml) 1800 ml   NET Removed (ml) 1500   Patient Response to Treatment tolerated tx well   Bilateral Breath Sounds Diminished   Patient Disposition Return to room   Observations & Evaluations   Level of Consciousness 0   Oriented X 3

## 2024-05-10 NOTE — CARE COORDINATION
CM note: Notified by [facility] that they have accepted patient.  Per attending notes patient is ready for discharge when pre-cert obtained, liaison will submit for pre-cert today once OT note is available, notified therapy dept.  Pt currently in dialysis  For transfer to [facility] patient will NEED PRE-CERT, HENS and LUBA.  CM will complete HENS in case pre-cert is obtained over the weekend.          ADDENDUM:10:31 AM  Notified by [place] liaison that they were able to obtain pre-cert without OT note.  Pt currently in dialysis.

## 2024-05-10 NOTE — PROGRESS NOTES
less pain associated with the shingles rash.  Hematologic/Lymphatic:  Denies bruising or bleeding.    Physical Exam:  No intake/output data recorded.    Intake/Output Summary (Last 24 hours) at 5/10/2024 0633  Last data filed at 5/9/2024 1317  Gross per 24 hour   Intake 960 ml   Output --   Net 960 ml   I/O last 3 completed shifts:  In: 960 [P.O.:960]  Out: -   Patient Vitals for the past 96 hrs (Last 3 readings):   Weight   05/09/24 0448 65.8 kg (145 lb)   05/08/24 1033 62.1 kg (137 lb)     Vital Signs:   Blood pressure (!) 114/55, pulse 74, temperature 97.7 °F (36.5 °C), temperature source Oral, resp. rate 17, height 1.626 m (5' 4\"), weight 65.8 kg (145 lb), SpO2 96 %.    General appearance:  Alert, responsive, oriented to person, place, and time. Well preserved, alert, no distress.  Head:  Normocephalic. No masses, lesions or tenderness.  Eyes:  PERRLA.  EOMI.  Sclera clear.  Buccal mucosa moist.  ENT:  Ears normal. Mucosa normal.  Nasal cannula oxygen is in place.  Neck:    Supple. Trachea midline. No thyromegaly. No JVD. No bruits.  Heart:    Rhythm regular. Rate controlled.  No murmurs.  Lungs:    Symmetrical. Clear to auscultation bilaterally.  No wheezes. No rhonchi. No rales.  Abdomen:   Soft. Non-tender. Non-distended. Bowel sounds positive. No organomegaly or masses.  No pain on palpation.  Extremities:    Peripheral pulses present.  No peripheral edema.  No ulcers. No cyanosis. No clubbing.  Neurologic:    Alert x 3.  No focal deficit.  Cranial nerves grossly intact. No focal weakness.  Psych:   Behavior is normal. Mood appears normal. Speech is not rapid and/or pressured.  Musculoskeletal:   Spine ROM normal. Muscular strength intact. Gait not assessed.  Integumentary:  Shingles rash has improved dramatically.  No active lesions are identified.  Genitalia/Breast:  Deferred    Medication:  Scheduled Meds:   calcium acetate  1 capsule Oral TID WC    epoetin carmelina-epbx  2,000 Units SubCUTAneous Once per day  regimen.  She will undergo her regularly scheduled dialysis today.  She is no longer syncopal with activity.  Her shingles pain is well-controlled as well.  She is acceptable for discharge once acceptance and precertification have been obtained with the ultimate goal of discharging to a skilled nursing facility.  I updated her daughter, Jonathan yesterday.    More than 50% of my time was spent at the bedside counseling/coordinating care with the patient and/or family with face to face contact.  This time was spent reviewing notes and laboratory data as well as instructing and counseling the patient. Time I spent with the family or surrogate(s) is included only if the patient was incapable of providing the necessary information or participating in medical decisions. I also discussed the differential diagnosis and all of the proposed management plans with the patient and individuals accompanying the patient. I am readily available for any further decision-making and intervention.       Eddi Almonte DO, F.A.C.O.I.  5/10/2024  6:33 AM

## 2024-05-10 NOTE — PROGRESS NOTES
Date:5/10/2024  Patient Name: Marge Callejas  MRN: [id number]  : 1940  ROOM #: 0314/0314-01    Occupational Therapy order received, chart reviewed and evaluation attempted this date. Patient is unavailable for OT evaluation due to patient refusal, upon second attempt pt was being transported to dialysis.     Will attempt OT evaluation at a later time. Thank you.   Sabrina Bruno OTR/L #[id number]

## 2024-05-10 NOTE — DISCHARGE INSTR - COC
Continuity of Care Form    Patient Name: Marge Callejas   :  1940  MRN:  [id number]    Admit date:  2024  Discharge date:  2024    Code Status Order: Full Code   Advance Directives:     Admitting Physician:  Eddi Almonte DO  PCP: Marek Andres DO    Discharging [facility]14-[facility]  Discharging Unit Phone Number: [phone]    Emergency Contact:   Extended Emergency Contact Information  Primary Emergency Contact: Jonathan Feldman  Home Phone: [phone]  Mobile Phone: [phone]  Relation: Child   needed? No  Secondary Emergency Contact: Sabrina Ireland  Home Phone: [phone]  Mobile Phone: [phone]  Relation: Brother/Sister   needed? No    Past Surgical History:  Past Surgical History:   Procedure Laterality Date    COLONOSCOPY N/A 4/3/2021    COLONOSCOPY POLYPECTOMY HOT SNARE performed by Lucio Nelson DO at [facility] ENDOSCOPY    COLONOSCOPY  4/3/2021    COLONOSCOPY WITH BIOPSY performed by Lucio Nelson DO at [facility] ENDOSCOPY    COLONOSCOPY  4/3/2021    COLONOSCOPY CONTROL HEMORRHAGE performed by Lucio Nelson DO at [facility] ENDOSCOPY    COLONOSCOPY  4/3/2021    COLONOSCOPY SUBMUCOSAL SPOT INJECTION performed by Lucio Nelson DO at [facility] ENDOSCOPY    COLONOSCOPY N/A 2023    COLONOSCOPY DIAGNOSTIC performed by Scooby Cormier MD at [facility] ENDOSCOPY    ECTOPIC PREGNANCY SURGERY      UPPER GASTROINTESTINAL ENDOSCOPY N/A 4/3/2021    EGD BIOPSY performed by Lucio Nelson DO at [facility] ENDOSCOPY    UPPER GASTROINTESTINAL ENDOSCOPY N/A 2021    EGD W/EUS FNA performed by Ana Gandhi MD at [facility] ENDOSCOPY       Immunization History:   Immunization History   Administered Date(s) Administered    COVID-19, MODERNA Bivalent, (age 12y+), IM, 50 mcg/0.5 mL 2023    COVID-19, MODERNA, (- formula), (age 12y+), IM, 50mcg/0.5mL 2023    Influenza, High Dose (Fluzone 65 yrs and older) 10/30/2018    Influenza, Triv,  Assisted  Toileting  Assisted  Feeding  Assisted  Med Admin  Assisted  Med Delivery   whole    Wound Care Documentation and Therapy:        Elimination:  Continence:   Bowel: Yes  Bladder: Yes  Urinary Catheter: None   Colostomy/Ileostomy/Ileal Conduit: No       Date of Last BM: 5/11/2024    Intake/Output Summary (Last 24 hours) at 5/10/2024 1002  Last data filed at 5/9/2024 1317  Gross per 24 hour   Intake 480 ml   Output --   Net 480 ml     I/O last 3 completed shifts:  In: 960 [P.O.:960]  Out: -     Safety Concerns:     At Risk for Falls    Impairments/Disabilities:      None    Nutrition Therapy:  Current Nutrition Therapy:   - Oral Diet:  General    Routes of Feeding: Oral  Liquids: Thin Liquids  Daily Fluid Restriction: no  Last Modified Barium Swallow with Video (Video Swallowing Test): not done    Rehab Therapies: Physical Therapy and Occupational Therapy  Weight Bearing Status/Restrictions: No weight bearing restrictions  Other Medical Equipment (for information only, NOT a DME order):  walker  Other Treatments:     Patient's personal belongings (please select all that are sent with patient):  None    RN SIGNATURE:  Electronically signed by Lyly Richter RN on 5/11/24 at 9:22 AM EDT    CASE MANAGEMENT/SOCIAL WORK SECTION    Inpatient Status Date: 5/10/2024    Readmission Risk Assessment Score:  Readmission Risk              Risk of Unplanned Readmission:  30           Discharging to Facility/ Agency   Name: [facility]  Address:  Phone:  [phone] fax      Dialysis Facility (if applicable)   Name:  Address:  Dialysis Schedule:  Phone:  Fax:    / signature: Electronically signed by Beryl Quintana RN on 5/10/24 at 10:02 AM EDT    PHYSICIAN SECTION    Prognosis: Good    Condition at Discharge: Stable    Rehab Potential (if transferring to Rehab): Good    Recommended Labs or Other Treatments After Discharge: ***    Physician Certification: I certify the

## 2024-05-10 NOTE — PROGRESS NOTES
[facility]  MD Murtaza White, MD Alban Ross, MD Krista Osorio, CNP   Lucille Beth, JOSE Fernandez, JUDE  Progress Note    5/10/2024    SUBJECTIVE:   5/10: Dialysis today without event.  BP stable.  BFR as prescribed.  1.5 L UF.  Denies complaint currently.  All antihypertensive medications have been discontinued, blood pressure still lowish but stable.    (-) sob/messina/cp/palp Appetite ok ROS otherwise unremarkable     PROBLEM LIST:    Principal Problem:    Hypotension  Resolved Problems:    * No resolved hospital problems. *         DIET:    ADULT DIET; Regular     MEDS (scheduled):    calcium acetate  1 capsule Oral TID WC    epoetin carmelina-epbx  2,000 Units SubCUTAneous Once per day on Mon Wed Fri    apixaban  2.5 mg Oral BID    aspirin  81 mg Oral Daily    atorvastatin  40 mg Oral Nightly    bumetanide  2 mg Oral Daily    pantoprazole  40 mg Oral QAM AC    [START ON 5/13/2024] vitamin D  50,000 Units Oral Weekly       MEDS (infusions):      MEDS (prn):  ipratropium 0.5 mg-albuterol 2.5 mg, prochlorperazine    PHYSICAL EXAM:     Patient Vitals for the past 24 hrs:   BP Temp Temp src Pulse Resp SpO2 Weight   05/10/24 1633 94/60 98.1 °F (36.7 °C) Axillary 78 18 97 % --   05/10/24 1300 107/64 98.2 °F (36.8 °C) -- 78 18 -- 64.3 kg (141 lb 12.1 oz)   05/10/24 0900 -- -- -- -- -- 97 % --   05/10/24 0549 (!) 114/55 97.7 °F (36.5 °C) Oral 74 17 96 % --   05/09/24 2153 121/67 97.2 °F (36.2 °C) Infrared 80 18 95 % --   05/09/24 2000 111/67 -- -- 76 -- -- --   @      Intake/Output Summary (Last 24 hours) at 5/10/2024 1804  Last data filed at 5/10/2024 1638  Gross per 24 hour   Intake 1020 ml   Output 1800 ml   Net -780 ml         Wt Readings from Last 3 Encounters:   05/10/24 64.3 kg (141 lb 12.1 oz)   04/24/24 62.3 kg (137 lb 5.6 oz)   09/19/23 65.3 kg (143 lb 15.4 oz)       Constitutional:  in no acute distress  HEENT: NC/AT, EOMI, sclera and conjunctiva are clear and

## 2024-05-10 NOTE — PLAN OF CARE
Problem: Discharge Planning  Goal: Discharge to home or other facility with appropriate resources  5/10/2024 0938 by Josefina Farooq RN  Outcome: Progressing  5/9/2024 2151 by Ester Payne RN  Outcome: Progressing     Problem: Safety - Adult  Goal: Free from fall injury  5/10/2024 0938 by Josefina Farooq RN  Outcome: Progressing  5/9/2024 2151 by Ester Payne RN  Outcome: Progressing     Problem: ABCDS Injury Assessment  Goal: Absence of physical injury  5/10/2024 0938 by Josefina Farooq RN  Outcome: Progressing  5/9/2024 2151 by Ester Payne RN  Outcome: Progressing     Problem: Skin/Tissue Integrity  Goal: Absence of new skin breakdown  Description: 1.  Monitor for areas of redness and/or skin breakdown  2.  Assess vascular access sites hourly  3.  Every 4-6 hours minimum:  Change oxygen saturation probe site  4.  Every 4-6 hours:  If on nasal continuous positive airway pressure, respiratory therapy assess nares and determine need for appliance change or resting period.  5/10/2024 0938 by Josefina Farooq RN  Outcome: Progressing  5/9/2024 2151 by Ester Payne RN  Outcome: Progressing     Problem: Chronic Conditions and Co-morbidities  Goal: Patient's chronic conditions and co-morbidity symptoms are monitored and maintained or improved  5/10/2024 0938 by Josefina Farooq RN  Outcome: Progressing  5/9/2024 2151 by Ester Payne RN  Outcome: Progressing

## 2024-05-11 VITALS
SYSTOLIC BLOOD PRESSURE: 143 MMHG | HEIGHT: 64 IN | RESPIRATION RATE: 18 BRPM | TEMPERATURE: 98.2 F | BODY MASS INDEX: 24.27 KG/M2 | WEIGHT: 142.13 LBS | HEART RATE: 77 BPM | DIASTOLIC BLOOD PRESSURE: 69 MMHG | OXYGEN SATURATION: 96 %

## 2024-05-11 LAB
ANION GAP SERPL CALCULATED.3IONS-SCNC: 9 MMOL/L (ref 7–16)
BASOPHILS # BLD: 0.03 K/UL (ref 0–0.2)
BASOPHILS NFR BLD: 0 % (ref 0–2)
BUN SERPL-MCNC: 24 MG/DL (ref 6–23)
CALCIUM SERPL-MCNC: 8.3 MG/DL (ref 8.6–10.2)
CHLORIDE SERPL-SCNC: 101 MMOL/L (ref 98–107)
CO2 SERPL-SCNC: 26 MMOL/L (ref 22–29)
CREAT SERPL-MCNC: 4.4 MG/DL (ref 0.5–1)
EOSINOPHIL # BLD: 0.16 K/UL (ref 0.05–0.5)
EOSINOPHILS RELATIVE PERCENT: 2 % (ref 0–6)
ERYTHROCYTE [DISTWIDTH] IN BLOOD BY AUTOMATED COUNT: 17.3 % (ref 11.5–15)
GFR, ESTIMATED: 9 ML/MIN/1.73M2
GLUCOSE SERPL-MCNC: 90 MG/DL (ref 74–99)
HCT VFR BLD AUTO: 27 % (ref 34–48)
HGB BLD-MCNC: 8.8 G/DL (ref 11.5–15.5)
IMM GRANULOCYTES # BLD AUTO: <0.03 K/UL (ref 0–0.58)
IMM GRANULOCYTES NFR BLD: 0 % (ref 0–5)
LYMPHOCYTES NFR BLD: 2.28 K/UL (ref 1.5–4)
LYMPHOCYTES RELATIVE PERCENT: 33 % (ref 20–42)
MAGNESIUM SERPL-MCNC: 1.6 MG/DL (ref 1.6–2.6)
MCH RBC QN AUTO: 32.8 PG (ref 26–35)
MCHC RBC AUTO-ENTMCNC: 32.6 G/DL (ref 32–34.5)
MCV RBC AUTO: 100.7 FL (ref 80–99.9)
MONOCYTES NFR BLD: 0.78 K/UL (ref 0.1–0.95)
MONOCYTES NFR BLD: 11 % (ref 2–12)
NEUTROPHILS NFR BLD: 53 % (ref 43–80)
NEUTS SEG NFR BLD: 3.61 K/UL (ref 1.8–7.3)
PHOSPHATE SERPL-MCNC: 3.5 MG/DL (ref 2.5–4.5)
PLATELET # BLD AUTO: 119 K/UL (ref 130–450)
PMV BLD AUTO: 13 FL (ref 7–12)
POTASSIUM SERPL-SCNC: 4 MMOL/L (ref 3.5–5)
RBC # BLD AUTO: 2.68 M/UL (ref 3.5–5.5)
SODIUM SERPL-SCNC: 136 MMOL/L (ref 132–146)
WBC OTHER # BLD: 6.9 K/UL (ref 4.5–11.5)

## 2024-05-11 PROCEDURE — 85025 COMPLETE CBC W/AUTO DIFF WBC: CPT

## 2024-05-11 PROCEDURE — 2500000003 HC RX 250 WO HCPCS

## 2024-05-11 PROCEDURE — 36415 COLL VENOUS BLD VENIPUNCTURE: CPT

## 2024-05-11 PROCEDURE — 84100 ASSAY OF PHOSPHORUS: CPT

## 2024-05-11 PROCEDURE — 2700000000 HC OXYGEN THERAPY PER DAY

## 2024-05-11 PROCEDURE — 83735 ASSAY OF MAGNESIUM: CPT

## 2024-05-11 PROCEDURE — 80048 BASIC METABOLIC PNL TOTAL CA: CPT

## 2024-05-11 PROCEDURE — 6370000000 HC RX 637 (ALT 250 FOR IP): Performed by: INTERNAL MEDICINE

## 2024-05-11 RX ORDER — ISOSORBIDE DINITRATE 5 MG/1
5 TABLET ORAL 2 TIMES DAILY
DISCHARGE
Start: 2024-05-11

## 2024-05-11 RX ORDER — CALCIUM ACETATE 667 MG/1
1 CAPSULE ORAL
Qty: 180 CAPSULE | DISCHARGE
Start: 2024-05-11

## 2024-05-11 RX ADMIN — APIXABAN 2.5 MG: 2.5 TABLET, FILM COATED ORAL at 08:45

## 2024-05-11 RX ADMIN — CALCIUM ACETATE 667 MG: 667 CAPSULE ORAL at 08:45

## 2024-05-11 RX ADMIN — PANTOPRAZOLE SODIUM 40 MG: 40 TABLET, DELAYED RELEASE ORAL at 05:09

## 2024-05-11 RX ADMIN — BUMETANIDE 2 MG: 1 TABLET ORAL at 08:45

## 2024-05-11 RX ADMIN — ASPIRIN 81 MG: 81 TABLET, COATED ORAL at 08:45

## 2024-05-11 NOTE — CARE COORDINATION
Ss note:5/11/2024 8:53 AM Discharge order noted. PRECERT obtained for [facility] skilled rehab. Hens completed. Pt att[facility] and F at 10:00 am. Spoke with marsha Castillo, she will transport pt today to snf around 10 am. Facility liaison and nursing aware. GARY Palmer

## 2024-05-11 NOTE — PLAN OF CARE
Problem: Discharge Planning  Goal: Discharge to home or other facility with appropriate resources  5/10/2024 2120 by Jose C Morelos RN  Outcome: Progressing     Problem: Safety - Adult  Goal: Free from fall injury  5/10/2024 2120 by Jose C Morelos RN  Outcome: Progressing     Problem: ABCDS Injury Assessment  Goal: Absence of physical injury  5/10/2024 2120 by Jose C Morelos RN  Outcome: Progressing     Problem: Skin/Tissue Integrity  Goal: Absence of new skin breakdown  Description: 1.  Monitor for areas of redness and/or skin breakdown  2.  Assess vascular access sites hourly  3.  Every 4-6 hours minimum:  Change oxygen saturation probe site  4.  Every 4-6 hours:  If on nasal continuous positive airway pressure, respiratory therapy assess nares and determine need for appliance change or resting period.  5/10/2024 2120 by Jose C Morelos RN  Outcome: Progressing     Problem: Chronic Conditions and Co-morbidities  Goal: Patient's chronic conditions and co-morbidity symptoms are monitored and maintained or improved  5/10/2024 2120 by Jose C Morelos RN  Outcome: Progressing

## 2024-05-11 NOTE — DISCHARGE INSTRUCTIONS
Your information:  Name: Marge Callejas  : 1940    Your instructions:  Discharge to [facility]  Continue IHD support for solute and volume clearance on  and -next      What to do after you leave the hospital:    Recommended diet: General Diet    Recommended activity: PT/OT; activity as tolerated    The following personal items were collected during your admission and were returned to you:    Belongings  Dental Appliances: Uppers, Lowers  Vision - Corrective Lenses: None  Hearing Aid: None  Clothing: Shirt  Jewelry: None  Body Piercings Removed: N/A  Electronic Devices: None  Weapons (Notify Protective Services/Security): None  Other Valuables: At home  Home Medications: None  Valuables Given To: Patient  Provide Name(s) of Who Valuable(s) Were Given To: na    Information obtained by:  By signing below, I understand that if any problems occur once I leave the hospital I am to contact PCP.  I understand and acknowledge receipt of the instructions indicated above.   Low Blood Pressure: Care Instructions  Overview  Blood pressure is a measure of how hard blood pushes against the walls of your arteries as it moves through your body. Low blood pressure is also called hypotension. It means that your blood pressure is much lower than normal. Some people may have slightly low blood pressure without symptoms. But in many people, low blood pressure can make you feel dizzy or lightheaded. When your blood pressure is too low, your heart, brain, and other organs do not get enough blood.  Low blood pressure can be caused by many things, including heart problems and some medicines. Diabetes that is not under control can cause your blood pressure to drop. And so can a severe allergic reaction or infection. Another cause is dehydration, which is when your body loses too much fluid.  Treatment for low blood pressure depends on the cause.  Follow-up care is a key part of your treatment and safety. Be sure

## 2024-05-11 NOTE — PROGRESS NOTES
[facility]  MD Murtaza White, MD Alban Ross, MD Krista Osorio, CNP   Lucille Beth, JOSE Fernandez, CNP  Progress Note    5/11/2024    SUBJECTIVE:   5/10: Dialysis today without event.  BP stable.  BFR as prescribed.  1.5 L UF.  Denies complaint currently.  All antihypertensive medications have been discontinued, blood pressure still lowish but stable.    (-) sob/messina/cp/palp Appetite ok ROS otherwise unremarkable     5/11 charts reviewed .     PROBLEM LIST:    Principal Problem:    Hypotension  Resolved Problems:    * No resolved hospital problems. *         DIET:    ADULT DIET; Regular     MEDS (scheduled):    calcium acetate  1 capsule Oral TID WC    epoetin carmelina-epbx  2,000 Units SubCUTAneous Once per day on Mon Wed Fri    apixaban  2.5 mg Oral BID    aspirin  81 mg Oral Daily    atorvastatin  40 mg Oral Nightly    bumetanide  2 mg Oral Daily    pantoprazole  40 mg Oral QAM AC    [START ON 5/13/2024] vitamin D  50,000 Units Oral Weekly       MEDS (infusions):      MEDS (prn):  ipratropium 0.5 mg-albuterol 2.5 mg, prochlorperazine    PHYSICAL EXAM:     Patient Vitals for the past 24 hrs:   BP Temp Temp src Pulse Resp SpO2 Weight   05/11/24 0507 (!) 143/69 98.2 °F (36.8 °C) Oral 77 18 96 % 64.5 kg (142 lb 2 oz)   05/10/24 2015 122/66 -- -- 76 17 -- --   05/10/24 1633 94/60 98.1 °F (36.7 °C) Axillary 78 18 97 % --   05/10/24 1300 107/64 98.2 °F (36.8 °C) -- 78 18 -- 64.3 kg (141 lb 12.1 oz)   05/10/24 0900 -- -- -- -- -- 97 % --     @      Intake/Output Summary (Last 24 hours) at 5/11/2024 0848  Last data filed at 5/10/2024 1638  Gross per 24 hour   Intake 780 ml   Output 1800 ml   Net -1020 ml           Wt Readings from Last 3 Encounters:   05/11/24 64.5 kg (142 lb 2 oz)   04/24/24 62.3 kg (137 lb 5.6 oz)   09/19/23 65.3 kg (143 lb 15.4 oz)       Constitutional:  in no acute distress  HEENT: NC/AT, EOMI, sclera and conjunctiva are clear and anicteric, mucus

## 2024-05-12 NOTE — DISCHARGE SUMMARY
[address] [address]                            DISCHARGE SUMMARY      PATIENT NAME: HIRAM CHAVEZ              : 1940  MED REC NO: [id number]                        ROOM: 0314  ACCOUNT NO: [id number]                       ADMIT DATE: 2024  PROVIDER: Jose Rodrigez DO      ADMITTING DIAGNOSIS:  Near syncope with orthostatic hypotension, multifactorial.    SECONDARY DISCHARGE DIAGNOSES:  Varicella zoster in the left mid thoracic dermatome resolved; chronic atrial fibrillation persistent; end-stage renal disease, on hemodialysis; oxygen-dependent chronic obstructive pulmonary disease with chronic respiratory failure; hyperlipidemia.    COMPLICATIONS:  No complications.    OPERATIONS:  No operations.    CONSULTATIONS OBTAINED:  With Dr. Dawson.  No OMT was given.    ADDITIONAL ADMITTING PHYSICIAN:  Dr. Rodrigez.    CHIEF COMPLAINT AND HISTORY OF CHIEF COMPLAINT:  A pleasant 84-year-old  female admitted to [facility].  The patient presented to the hospital here on May 8, 2024.  The patient presented to the emergency room for evaluation of near syncopal episode.  The patient states was up working with therapy at home at which time she became lightheaded and dizzy.  The patient almost immediately after changing position had to be lowered to the ground.  The patient presented to the hospital, was seen and evaluated.  The patient admitted to the hospital here with a diagnosis of orthostatic hypotension.    PAST MEDICAL HISTORY:  Positive for usual childhood diseases; history of arthritis; atrial fibrillation; history of congestive heart failure; chronic kidney disease; coronary artery disease; history of hyperlipidemia; hypertension; and obstructive lung disease.    PHYSICAL EXAMINATION:  VITAL SIGNS:  Showed blood pressure 124/67, pulse 75, respirations 16, O2 saturation 94%, temperature 97.7.  GENERAL

## 2024-06-07 ENCOUNTER — APPOINTMENT (OUTPATIENT)
Dept: CT IMAGING | Age: 84
End: 2024-06-07
Payer: MEDICARE

## 2024-06-07 ENCOUNTER — APPOINTMENT (OUTPATIENT)
Dept: GENERAL RADIOLOGY | Age: 84
End: 2024-06-07
Payer: MEDICARE

## 2024-06-07 ENCOUNTER — HOSPITAL ENCOUNTER (EMERGENCY)
Age: 84
Discharge: HOME | End: 2024-06-07
Attending: STUDENT IN AN ORGANIZED HEALTH CARE EDUCATION/TRAINING PROGRAM
Payer: MEDICARE

## 2024-06-07 VITALS
TEMPERATURE: 98.2 F | DIASTOLIC BLOOD PRESSURE: 72 MMHG | SYSTOLIC BLOOD PRESSURE: 140 MMHG | HEART RATE: 99 BPM | BODY MASS INDEX: 24.37 KG/M2 | WEIGHT: 142 LBS | RESPIRATION RATE: 20 BRPM | OXYGEN SATURATION: 94 %

## 2024-06-07 DIAGNOSIS — E87.8 DISEQUILIBRIUM SYNDROME: Primary | ICD-10-CM

## 2024-06-07 LAB
ALBUMIN SERPL-MCNC: 3.4 G/DL (ref 3.5–5.2)
ALP SERPL-CCNC: 150 U/L (ref 35–104)
ALT SERPL-CCNC: 7 U/L (ref 0–32)
ANION GAP SERPL CALCULATED.3IONS-SCNC: 11 MMOL/L (ref 7–16)
AST SERPL-CCNC: 18 U/L (ref 0–31)
BASOPHILS # BLD: 0 K/UL (ref 0–0.2)
BASOPHILS NFR BLD: 0 % (ref 0–2)
BILIRUB SERPL-MCNC: 0.6 MG/DL (ref 0–1.2)
BUN SERPL-MCNC: 19 MG/DL (ref 6–23)
CALCIUM SERPL-MCNC: 7.9 MG/DL (ref 8.6–10.2)
CHLORIDE SERPL-SCNC: 94 MMOL/L (ref 98–107)
CO2 SERPL-SCNC: 30 MMOL/L (ref 22–29)
CREAT SERPL-MCNC: 4.9 MG/DL (ref 0.5–1)
EKG ATRIAL RATE: 86 BPM
EKG Q-T INTERVAL: 406 MS
EKG QRS DURATION: 154 MS
EKG QTC CALCULATION (BAZETT): 544 MS
EKG R AXIS: 104 DEGREES
EKG T AXIS: -79 DEGREES
EKG VENTRICULAR RATE: 108 BPM
EOSINOPHIL # BLD: 0.17 K/UL (ref 0.05–0.5)
EOSINOPHILS RELATIVE PERCENT: 2 % (ref 0–6)
ERYTHROCYTE [DISTWIDTH] IN BLOOD BY AUTOMATED COUNT: 20.1 % (ref 11.5–15)
GFR, ESTIMATED: 8 ML/MIN/1.73M2
GLUCOSE BLD-MCNC: 116 MG/DL (ref 74–99)
GLUCOSE SERPL-MCNC: 110 MG/DL (ref 74–99)
HCT VFR BLD AUTO: 25.5 % (ref 34–48)
HGB BLD-MCNC: 8.5 G/DL (ref 11.5–15.5)
INFLUENZA A BY PCR: NOT DETECTED
INFLUENZA B BY PCR: NOT DETECTED
LYMPHOCYTES NFR BLD: 0.95 K/UL (ref 1.5–4)
LYMPHOCYTES RELATIVE PERCENT: 11 % (ref 20–42)
MAGNESIUM SERPL-MCNC: 1.5 MG/DL (ref 1.6–2.6)
MCH RBC QN AUTO: 34.1 PG (ref 26–35)
MCHC RBC AUTO-ENTMCNC: 33.3 G/DL (ref 32–34.5)
MCV RBC AUTO: 102.4 FL (ref 80–99.9)
METAMYELOCYTES ABSOLUTE COUNT: 0.09 K/UL (ref 0–0.12)
METAMYELOCYTES: 1 % (ref 0–1)
MONOCYTES NFR BLD: 0.77 K/UL (ref 0.1–0.95)
MONOCYTES NFR BLD: 9 % (ref 2–12)
NEUTROPHILS NFR BLD: 77 % (ref 43–80)
NEUTS SEG NFR BLD: 6.62 K/UL (ref 1.8–7.3)
PLATELET # BLD AUTO: 253 K/UL (ref 130–450)
PMV BLD AUTO: 11.9 FL (ref 7–12)
POTASSIUM SERPL-SCNC: 3.4 MMOL/L (ref 3.5–5)
PROT SERPL-MCNC: 7 G/DL (ref 6.4–8.3)
RBC # BLD AUTO: 2.49 M/UL (ref 3.5–5.5)
RBC # BLD: ABNORMAL 10*6/UL
SARS-COV-2 RDRP RESP QL NAA+PROBE: NOT DETECTED
SODIUM SERPL-SCNC: 135 MMOL/L (ref 132–146)
SPECIMEN DESCRIPTION: NORMAL
TROPONIN I SERPL HS-MCNC: 76 NG/L (ref 0–9)
TROPONIN I SERPL HS-MCNC: 93 NG/L (ref 0–9)
WBC OTHER # BLD: 8.6 K/UL (ref 4.5–11.5)

## 2024-06-07 PROCEDURE — 87635 SARS-COV-2 COVID-19 AMP PRB: CPT

## 2024-06-07 PROCEDURE — 82962 GLUCOSE BLOOD TEST: CPT

## 2024-06-07 PROCEDURE — 99285 EMERGENCY DEPT VISIT HI MDM: CPT

## 2024-06-07 PROCEDURE — 93010 ELECTROCARDIOGRAM REPORT: CPT | Performed by: INTERNAL MEDICINE

## 2024-06-07 PROCEDURE — 6360000002 HC RX W HCPCS: Performed by: STUDENT IN AN ORGANIZED HEALTH CARE EDUCATION/TRAINING PROGRAM

## 2024-06-07 PROCEDURE — 87502 INFLUENZA DNA AMP PROBE: CPT

## 2024-06-07 PROCEDURE — 84484 ASSAY OF TROPONIN QUANT: CPT

## 2024-06-07 PROCEDURE — 71046 X-RAY EXAM CHEST 2 VIEWS: CPT

## 2024-06-07 PROCEDURE — 96374 THER/PROPH/DIAG INJ IV PUSH: CPT

## 2024-06-07 PROCEDURE — 6370000000 HC RX 637 (ALT 250 FOR IP): Performed by: STUDENT IN AN ORGANIZED HEALTH CARE EDUCATION/TRAINING PROGRAM

## 2024-06-07 PROCEDURE — 70450 CT HEAD/BRAIN W/O DYE: CPT

## 2024-06-07 PROCEDURE — 93005 ELECTROCARDIOGRAM TRACING: CPT | Performed by: STUDENT IN AN ORGANIZED HEALTH CARE EDUCATION/TRAINING PROGRAM

## 2024-06-07 PROCEDURE — 85025 COMPLETE CBC W/AUTO DIFF WBC: CPT

## 2024-06-07 PROCEDURE — 83735 ASSAY OF MAGNESIUM: CPT

## 2024-06-07 PROCEDURE — 80053 COMPREHEN METABOLIC PANEL: CPT

## 2024-06-07 RX ORDER — MECLIZINE HCL 12.5 MG/1
12.5 TABLET ORAL ONCE
Status: COMPLETED | OUTPATIENT
Start: 2024-06-07 | End: 2024-06-07

## 2024-06-07 RX ORDER — PROCHLORPERAZINE EDISYLATE 5 MG/ML
10 INJECTION INTRAMUSCULAR; INTRAVENOUS ONCE
Status: COMPLETED | OUTPATIENT
Start: 2024-06-07 | End: 2024-06-07

## 2024-06-07 RX ORDER — MECLIZINE HCL 12.5 MG/1
12.5 TABLET ORAL 3 TIMES DAILY PRN
Qty: 15 TABLET | Refills: 0 | Status: SHIPPED | OUTPATIENT
Start: 2024-06-07 | End: 2024-06-17

## 2024-06-07 RX ADMIN — PROCHLORPERAZINE EDISYLATE 10 MG: 5 INJECTION INTRAMUSCULAR; INTRAVENOUS at 20:19

## 2024-06-07 RX ADMIN — MECLIZINE HYDROCHLORIDE 12.5 MG: 12.5 TABLET ORAL at 20:40

## 2024-06-09 NOTE — ED PROVIDER NOTES
[facility] EMERGENCY DEPARTMENT  EMERGENCY DEPARTMENT ENCOUNTER      Pt Name: Marge Callejas  MRN: [id number]  Birthdate 1940  Date of evaluation: 6/7/2024  Provider: Kyree Pérez DO  PCP: Marek Andres DO    CHIEF COMPLAINT       Chief Complaint   Patient presents with    Seizures     Possible seizure with 20 minutes left in her dialysis treatment-staff unsure-no hx per ems pt is alert and oriented does not remember what happened, only complaint is fatigue which she states is a chronic problem       HISTORY OF PRESENT ILLNESS: 1 or more Elements   History From: Patient  Limitations to history : None    Marge Callejas is a 84 y.o. female past medical history of ESRD on HD, A-fib, hypertension, and CHF.  Patient presents with chief complaint of episode of altered mental status.  Patient was at dialysis when she had a period of unresponsiveness and shaking.  Symptoms lasted for a few moments patient had sudden return to baseline.  There is no significant post ictal period patient is now awake alert oriented x 4.  She does complain of some mild lightheadedness and dizziness.  Patient denies any fevers, chills, chest pain, cough, Harsh pain, constipation or diarrhea..   Nursing Notes were all reviewed and agreed with or any disagreements were addressed in the HPI.    REVIEW OF SYSTEMS :    Positives and Pertinent negatives as per HPI.     PAST MEDICAL HISTORY/Chronic Conditions Affecting Care    has a past medical history of Arthritis, Atrial fibrillation (HCC), Blood circulation, collateral, CHF (congestive heart failure) ([facility]), Chronic renal insufficiency, stage IV (severe) ([facility]) (11/19/2016), Coronary artery disease involving native coronary artery of native heart without angina pectoris (4/17/2024), History of cardiovascular stress test (07/2015), History of echocardiogram (07/27/2015), Hyperlipidemia, Hypertension, and Mixed restrictive and obstructive lung disease

## 2024-07-01 ENCOUNTER — APPOINTMENT (OUTPATIENT)
Dept: GENERAL RADIOLOGY | Age: 84
DRG: 308 | End: 2024-07-01
Payer: MEDICARE

## 2024-07-01 ENCOUNTER — HOSPITAL ENCOUNTER (INPATIENT)
Age: 84
LOS: 6 days | Discharge: NURSING, REHAB OR LONG-TERM CARE | DRG: 308 | End: 2024-07-07
Attending: EMERGENCY MEDICINE | Admitting: INTERNAL MEDICINE
Payer: MEDICARE

## 2024-07-01 DIAGNOSIS — G89.4 CHRONIC PAIN SYNDROME: ICD-10-CM

## 2024-07-01 DIAGNOSIS — I47.29 PAROXYSMAL VENTRICULAR TACHYCARDIA (HCC): Primary | ICD-10-CM

## 2024-07-01 DIAGNOSIS — I49.9 VENTRICULAR ARRHYTHMIA: ICD-10-CM

## 2024-07-01 DIAGNOSIS — I50.9 ACUTE ON CHRONIC CONGESTIVE HEART FAILURE, UNSPECIFIED HEART FAILURE TYPE (HCC): ICD-10-CM

## 2024-07-01 DIAGNOSIS — R07.9 CHEST PAIN, UNSPECIFIED TYPE: ICD-10-CM

## 2024-07-01 DIAGNOSIS — I50.9 CONGESTIVE HEART FAILURE, UNSPECIFIED HF CHRONICITY, UNSPECIFIED HEART FAILURE TYPE (HCC): ICD-10-CM

## 2024-07-01 LAB
ALBUMIN SERPL-MCNC: 3.7 G/DL (ref 3.5–5.2)
ALP SERPL-CCNC: 173 U/L (ref 35–104)
ALT SERPL-CCNC: 11 U/L (ref 0–32)
ANION GAP SERPL CALCULATED.3IONS-SCNC: 15 MMOL/L (ref 7–16)
AST SERPL-CCNC: 20 U/L (ref 0–31)
B PARAP IS1001 DNA NPH QL NAA+NON-PROBE: NOT DETECTED
B PERT DNA SPEC QL NAA+PROBE: NOT DETECTED
BASOPHILS # BLD: 0.17 K/UL (ref 0–0.2)
BASOPHILS NFR BLD: 2 % (ref 0–2)
BILIRUB SERPL-MCNC: 0.4 MG/DL (ref 0–1.2)
BNP SERPL-MCNC: ABNORMAL PG/ML (ref 0–450)
BUN SERPL-MCNC: 41 MG/DL (ref 6–23)
C PNEUM DNA NPH QL NAA+NON-PROBE: NOT DETECTED
CALCIUM SERPL-MCNC: 9.2 MG/DL (ref 8.6–10.2)
CHLORIDE SERPL-SCNC: 93 MMOL/L (ref 98–107)
CO2 SERPL-SCNC: 26 MMOL/L (ref 22–29)
CREAT SERPL-MCNC: 6.3 MG/DL (ref 0.5–1)
EOSINOPHIL # BLD: 0.33 K/UL (ref 0.05–0.5)
EOSINOPHILS RELATIVE PERCENT: 4 % (ref 0–6)
ERYTHROCYTE [DISTWIDTH] IN BLOOD BY AUTOMATED COUNT: 20.4 % (ref 11.5–15)
FLUAV RNA NPH QL NAA+NON-PROBE: NOT DETECTED
FLUBV RNA NPH QL NAA+NON-PROBE: NOT DETECTED
GFR, ESTIMATED: 6 ML/MIN/1.73M2
GLUCOSE SERPL-MCNC: 144 MG/DL (ref 74–99)
HADV DNA NPH QL NAA+NON-PROBE: NOT DETECTED
HCOV 229E RNA NPH QL NAA+NON-PROBE: NOT DETECTED
HCOV HKU1 RNA NPH QL NAA+NON-PROBE: NOT DETECTED
HCOV NL63 RNA NPH QL NAA+NON-PROBE: NOT DETECTED
HCOV OC43 RNA NPH QL NAA+NON-PROBE: NOT DETECTED
HCT VFR BLD AUTO: 28.9 % (ref 34–48)
HGB BLD-MCNC: 9.3 G/DL (ref 11.5–15.5)
HMPV RNA NPH QL NAA+NON-PROBE: NOT DETECTED
HPIV1 RNA NPH QL NAA+NON-PROBE: NOT DETECTED
HPIV2 RNA NPH QL NAA+NON-PROBE: NOT DETECTED
HPIV3 RNA NPH QL NAA+NON-PROBE: NOT DETECTED
HPIV4 RNA NPH QL NAA+NON-PROBE: NOT DETECTED
LYMPHOCYTES NFR BLD: 0.75 K/UL (ref 1.5–4)
LYMPHOCYTES RELATIVE PERCENT: 8 % (ref 20–42)
M PNEUMO DNA NPH QL NAA+NON-PROBE: NOT DETECTED
MAGNESIUM SERPL-MCNC: 2.2 MG/DL (ref 1.6–2.6)
MAGNESIUM SERPL-MCNC: 2.8 MG/DL (ref 1.6–2.6)
MCH RBC QN AUTO: 33.6 PG (ref 26–35)
MCHC RBC AUTO-ENTMCNC: 32.2 G/DL (ref 32–34.5)
MCV RBC AUTO: 104.3 FL (ref 80–99.9)
MONOCYTES NFR BLD: 0.5 K/UL (ref 0.1–0.95)
MONOCYTES NFR BLD: 5 % (ref 2–12)
NEUTROPHILS NFR BLD: 82 % (ref 43–80)
NEUTS SEG NFR BLD: 7.85 K/UL (ref 1.8–7.3)
PLATELET # BLD AUTO: 345 K/UL (ref 130–450)
PMV BLD AUTO: 11.8 FL (ref 7–12)
POTASSIUM SERPL-SCNC: 3.9 MMOL/L (ref 3.5–5)
POTASSIUM SERPL-SCNC: 4 MMOL/L (ref 3.5–5)
PROCALCITONIN SERPL-MCNC: 0.07 NG/ML (ref 0–0.08)
PROT SERPL-MCNC: 8 G/DL (ref 6.4–8.3)
RBC # BLD AUTO: 2.77 M/UL (ref 3.5–5.5)
RBC # BLD: ABNORMAL 10*6/UL
RSV RNA NPH QL NAA+NON-PROBE: NOT DETECTED
RV+EV RNA NPH QL NAA+NON-PROBE: NOT DETECTED
SARS-COV-2 RNA NPH QL NAA+NON-PROBE: NOT DETECTED
SODIUM SERPL-SCNC: 134 MMOL/L (ref 132–146)
SPECIMEN DESCRIPTION: NORMAL
TROPONIN I SERPL HS-MCNC: 79 NG/L (ref 0–9)
TROPONIN I SERPL HS-MCNC: 81 NG/L (ref 0–9)
TROPONIN I SERPL HS-MCNC: 87 NG/L (ref 0–9)
TROPONIN I SERPL HS-MCNC: 95 NG/L (ref 0–9)
WBC OTHER # BLD: 9.6 K/UL (ref 4.5–11.5)

## 2024-07-01 PROCEDURE — 2580000003 HC RX 258: Performed by: STUDENT IN AN ORGANIZED HEALTH CARE EDUCATION/TRAINING PROGRAM

## 2024-07-01 PROCEDURE — 6370000000 HC RX 637 (ALT 250 FOR IP)

## 2024-07-01 PROCEDURE — 6370000000 HC RX 637 (ALT 250 FOR IP): Performed by: NURSE PRACTITIONER

## 2024-07-01 PROCEDURE — 83735 ASSAY OF MAGNESIUM: CPT

## 2024-07-01 PROCEDURE — 99223 1ST HOSP IP/OBS HIGH 75: CPT | Performed by: INTERNAL MEDICINE

## 2024-07-01 PROCEDURE — 84484 ASSAY OF TROPONIN QUANT: CPT

## 2024-07-01 PROCEDURE — 96376 TX/PRO/DX INJ SAME DRUG ADON: CPT

## 2024-07-01 PROCEDURE — 71045 X-RAY EXAM CHEST 1 VIEW: CPT

## 2024-07-01 PROCEDURE — 36415 COLL VENOUS BLD VENIPUNCTURE: CPT

## 2024-07-01 PROCEDURE — 2000000000 HC ICU R&B

## 2024-07-01 PROCEDURE — 6370000000 HC RX 637 (ALT 250 FOR IP): Performed by: INTERNAL MEDICINE

## 2024-07-01 PROCEDURE — 6360000002 HC RX W HCPCS: Performed by: STUDENT IN AN ORGANIZED HEALTH CARE EDUCATION/TRAINING PROGRAM

## 2024-07-01 PROCEDURE — 84132 ASSAY OF SERUM POTASSIUM: CPT

## 2024-07-01 PROCEDURE — 93005 ELECTROCARDIOGRAM TRACING: CPT | Performed by: STUDENT IN AN ORGANIZED HEALTH CARE EDUCATION/TRAINING PROGRAM

## 2024-07-01 PROCEDURE — 83880 ASSAY OF NATRIURETIC PEPTIDE: CPT

## 2024-07-01 PROCEDURE — 84145 PROCALCITONIN (PCT): CPT

## 2024-07-01 PROCEDURE — 5A1D70Z PERFORMANCE OF URINARY FILTRATION, INTERMITTENT, LESS THAN 6 HOURS PER DAY: ICD-10-PCS | Performed by: INTERNAL MEDICINE

## 2024-07-01 PROCEDURE — 99291 CRITICAL CARE FIRST HOUR: CPT | Performed by: INTERNAL MEDICINE

## 2024-07-01 PROCEDURE — 96366 THER/PROPH/DIAG IV INF ADDON: CPT

## 2024-07-01 PROCEDURE — 2580000003 HC RX 258: Performed by: NURSE PRACTITIONER

## 2024-07-01 PROCEDURE — 85025 COMPLETE CBC W/AUTO DIFF WBC: CPT

## 2024-07-01 PROCEDURE — 99285 EMERGENCY DEPT VISIT HI MDM: CPT

## 2024-07-01 PROCEDURE — 87040 BLOOD CULTURE FOR BACTERIA: CPT

## 2024-07-01 PROCEDURE — 96365 THER/PROPH/DIAG IV INF INIT: CPT

## 2024-07-01 PROCEDURE — 97161 PT EVAL LOW COMPLEX 20 MIN: CPT | Performed by: PHYSICAL THERAPIST

## 2024-07-01 PROCEDURE — 2500000003 HC RX 250 WO HCPCS: Performed by: NURSE PRACTITIONER

## 2024-07-01 PROCEDURE — 90935 HEMODIALYSIS ONE EVALUATION: CPT

## 2024-07-01 PROCEDURE — 0202U NFCT DS 22 TRGT SARS-COV-2: CPT

## 2024-07-01 PROCEDURE — 80053 COMPREHEN METABOLIC PANEL: CPT

## 2024-07-01 PROCEDURE — APPSS60 APP SPLIT SHARED TIME 46-60 MINUTES

## 2024-07-01 PROCEDURE — 87081 CULTURE SCREEN ONLY: CPT

## 2024-07-01 RX ORDER — SODIUM CHLORIDE 9 MG/ML
INJECTION, SOLUTION INTRAVENOUS PRN
Status: DISCONTINUED | OUTPATIENT
Start: 2024-07-01 | End: 2024-07-07 | Stop reason: HOSPADM

## 2024-07-01 RX ORDER — SODIUM CHLORIDE 0.9 % (FLUSH) 0.9 %
5-40 SYRINGE (ML) INJECTION PRN
Status: DISCONTINUED | OUTPATIENT
Start: 2024-07-01 | End: 2024-07-07 | Stop reason: HOSPADM

## 2024-07-01 RX ORDER — ONDANSETRON 2 MG/ML
4 INJECTION INTRAMUSCULAR; INTRAVENOUS EVERY 6 HOURS PRN
Status: DISCONTINUED | OUTPATIENT
Start: 2024-07-01 | End: 2024-07-01

## 2024-07-01 RX ORDER — ONDANSETRON 4 MG/1
4 TABLET, ORALLY DISINTEGRATING ORAL EVERY 8 HOURS PRN
Status: DISCONTINUED | OUTPATIENT
Start: 2024-07-01 | End: 2024-07-01

## 2024-07-01 RX ORDER — ACETAMINOPHEN 325 MG/1
650 TABLET ORAL EVERY 6 HOURS PRN
Status: DISCONTINUED | OUTPATIENT
Start: 2024-07-01 | End: 2024-07-07 | Stop reason: HOSPADM

## 2024-07-01 RX ORDER — METOPROLOL SUCCINATE 25 MG/1
12.5 TABLET, EXTENDED RELEASE ORAL DAILY
Status: DISCONTINUED | OUTPATIENT
Start: 2024-07-01 | End: 2024-07-07 | Stop reason: HOSPADM

## 2024-07-01 RX ORDER — GUAIFENESIN 600 MG/1
600 TABLET, EXTENDED RELEASE ORAL 2 TIMES DAILY
Status: ON HOLD | COMMUNITY
End: 2024-07-06 | Stop reason: HOSPADM

## 2024-07-01 RX ORDER — BUMETANIDE 1 MG/1
2 TABLET ORAL DAILY
Status: DISCONTINUED | OUTPATIENT
Start: 2024-07-01 | End: 2024-07-07 | Stop reason: HOSPADM

## 2024-07-01 RX ORDER — MECLIZINE HCL 12.5 MG/1
12.5 TABLET ORAL 3 TIMES DAILY PRN
COMMUNITY

## 2024-07-01 RX ORDER — POTASSIUM CHLORIDE 20 MEQ/1
40 TABLET, EXTENDED RELEASE ORAL ONCE
Status: COMPLETED | OUTPATIENT
Start: 2024-07-01 | End: 2024-07-01

## 2024-07-01 RX ORDER — SENNOSIDES A AND B 8.6 MG/1
1 TABLET, FILM COATED ORAL DAILY
Status: ON HOLD | COMMUNITY
End: 2024-07-06 | Stop reason: HOSPADM

## 2024-07-01 RX ORDER — POLYETHYLENE GLYCOL 3350 17 G/17G
17 POWDER, FOR SOLUTION ORAL DAILY PRN
Status: DISCONTINUED | OUTPATIENT
Start: 2024-07-01 | End: 2024-07-07 | Stop reason: HOSPADM

## 2024-07-01 RX ORDER — LANOLIN ALCOHOL/MO/W.PET/CERES
400 CREAM (GRAM) TOPICAL DAILY
Status: ON HOLD | COMMUNITY
End: 2024-07-06 | Stop reason: HOSPADM

## 2024-07-01 RX ORDER — ACETAMINOPHEN 650 MG/1
650 SUPPOSITORY RECTAL EVERY 6 HOURS PRN
Status: DISCONTINUED | OUTPATIENT
Start: 2024-07-01 | End: 2024-07-07 | Stop reason: HOSPADM

## 2024-07-01 RX ORDER — IPRATROPIUM BROMIDE AND ALBUTEROL SULFATE 2.5; .5 MG/3ML; MG/3ML
1 SOLUTION RESPIRATORY (INHALATION) EVERY 4 HOURS PRN
Status: DISCONTINUED | OUTPATIENT
Start: 2024-07-01 | End: 2024-07-07 | Stop reason: HOSPADM

## 2024-07-01 RX ORDER — AMIODARONE HYDROCHLORIDE 150 MG/3ML
150 INJECTION, SOLUTION INTRAVENOUS ONCE
Status: DISCONTINUED | OUTPATIENT
Start: 2024-07-01 | End: 2024-07-01

## 2024-07-01 RX ORDER — ASPIRIN 81 MG/1
81 TABLET ORAL DAILY
Status: DISCONTINUED | OUTPATIENT
Start: 2024-07-01 | End: 2024-07-07 | Stop reason: HOSPADM

## 2024-07-01 RX ORDER — ISOSORBIDE DINITRATE 20 MG/1
20 TABLET ORAL 3 TIMES DAILY
Status: ON HOLD | COMMUNITY
End: 2024-07-06 | Stop reason: HOSPADM

## 2024-07-01 RX ORDER — ATORVASTATIN CALCIUM 40 MG/1
40 TABLET, FILM COATED ORAL NIGHTLY
Status: DISCONTINUED | OUTPATIENT
Start: 2024-07-01 | End: 2024-07-07 | Stop reason: HOSPADM

## 2024-07-01 RX ORDER — ISOSORBIDE DINITRATE 10 MG/1
5 TABLET ORAL 2 TIMES DAILY
Status: DISCONTINUED | OUTPATIENT
Start: 2024-07-01 | End: 2024-07-07 | Stop reason: HOSPADM

## 2024-07-01 RX ORDER — HYDRALAZINE HYDROCHLORIDE 20 MG/ML
5 INJECTION INTRAMUSCULAR; INTRAVENOUS EVERY 4 HOURS PRN
Status: DISCONTINUED | OUTPATIENT
Start: 2024-07-01 | End: 2024-07-03

## 2024-07-01 RX ORDER — MAGNESIUM SULFATE IN WATER 40 MG/ML
2000 INJECTION, SOLUTION INTRAVENOUS ONCE
Status: COMPLETED | OUTPATIENT
Start: 2024-07-01 | End: 2024-07-01

## 2024-07-01 RX ORDER — CINACALCET 30 MG/1
60 TABLET, FILM COATED ORAL
Status: ON HOLD | COMMUNITY
End: 2024-07-06 | Stop reason: HOSPADM

## 2024-07-01 RX ORDER — SODIUM CHLORIDE 0.9 % (FLUSH) 0.9 %
5-40 SYRINGE (ML) INJECTION EVERY 12 HOURS SCHEDULED
Status: DISCONTINUED | OUTPATIENT
Start: 2024-07-01 | End: 2024-07-07 | Stop reason: HOSPADM

## 2024-07-01 RX ORDER — CARVEDILOL 3.12 MG/1
3.12 TABLET ORAL 2 TIMES DAILY WITH MEALS
Status: ON HOLD | COMMUNITY
End: 2024-07-06 | Stop reason: HOSPADM

## 2024-07-01 RX ORDER — CALCIUM ACETATE 667 MG/1
1 CAPSULE ORAL
Status: DISCONTINUED | OUTPATIENT
Start: 2024-07-01 | End: 2024-07-07 | Stop reason: HOSPADM

## 2024-07-01 RX ORDER — AMIODARONE HYDROCHLORIDE 200 MG/1
400 TABLET ORAL DAILY
Status: ON HOLD | COMMUNITY
End: 2024-07-06 | Stop reason: HOSPADM

## 2024-07-01 RX ORDER — POLYETHYLENE GLYCOL 3350 17 G/17G
17 POWDER, FOR SOLUTION ORAL DAILY
COMMUNITY

## 2024-07-01 RX ORDER — HYDRALAZINE HYDROCHLORIDE 25 MG/1
37.5 TABLET, FILM COATED ORAL 3 TIMES DAILY
Status: ON HOLD | COMMUNITY
End: 2024-07-06 | Stop reason: HOSPADM

## 2024-07-01 RX ORDER — HYDROCODONE BITARTRATE AND ACETAMINOPHEN 5; 325 MG/1; MG/1
0.5 TABLET ORAL EVERY 6 HOURS PRN
Status: DISCONTINUED | OUTPATIENT
Start: 2024-07-01 | End: 2024-07-07 | Stop reason: HOSPADM

## 2024-07-01 RX ORDER — PANTOPRAZOLE SODIUM 40 MG/1
40 TABLET, DELAYED RELEASE ORAL
Status: DISCONTINUED | OUTPATIENT
Start: 2024-07-01 | End: 2024-07-07 | Stop reason: HOSPADM

## 2024-07-01 RX ORDER — M-VIT,TX,IRON,MINS/CALC/FOLIC 27MG-0.4MG
1 TABLET ORAL DAILY
Status: DISCONTINUED | OUTPATIENT
Start: 2024-07-01 | End: 2024-07-07 | Stop reason: HOSPADM

## 2024-07-01 RX ADMIN — AMIODARONE HYDROCHLORIDE 1 MG/MIN: 50 INJECTION, SOLUTION INTRAVENOUS at 06:20

## 2024-07-01 RX ADMIN — Medication 10 ML: at 20:28

## 2024-07-01 RX ADMIN — ISOSORBIDE DINITRATE 5 MG: 10 TABLET ORAL at 20:29

## 2024-07-01 RX ADMIN — MAGNESIUM SULFATE HEPTAHYDRATE 2000 MG: 40 INJECTION, SOLUTION INTRAVENOUS at 04:28

## 2024-07-01 RX ADMIN — METOPROLOL SUCCINATE 12.5 MG: 25 TABLET, EXTENDED RELEASE ORAL at 12:52

## 2024-07-01 RX ADMIN — ATORVASTATIN CALCIUM 40 MG: 40 TABLET, FILM COATED ORAL at 20:29

## 2024-07-01 RX ADMIN — CALCIUM ACETATE 667 MG: 667 CAPSULE ORAL at 17:42

## 2024-07-01 RX ADMIN — POTASSIUM CHLORIDE 40 MEQ: 1500 TABLET, EXTENDED RELEASE ORAL at 22:26

## 2024-07-01 RX ADMIN — AMIODARONE HYDROCHLORIDE 0.5 MG/MIN: 50 INJECTION, SOLUTION INTRAVENOUS at 12:53

## 2024-07-01 RX ADMIN — PANTOPRAZOLE SODIUM 40 MG: 40 TABLET, DELAYED RELEASE ORAL at 10:01

## 2024-07-01 RX ADMIN — ASPIRIN 81 MG: 81 TABLET, COATED ORAL at 10:01

## 2024-07-01 RX ADMIN — AMIODARONE HYDROCHLORIDE 150 MG: 50 INJECTION, SOLUTION INTRAVENOUS at 06:04

## 2024-07-01 RX ADMIN — APIXABAN 2.5 MG: 2.5 TABLET, FILM COATED ORAL at 10:01

## 2024-07-01 RX ADMIN — CALCIUM ACETATE 667 MG: 667 CAPSULE ORAL at 10:01

## 2024-07-01 RX ADMIN — Medication 1 TABLET: at 10:01

## 2024-07-01 RX ADMIN — ISOSORBIDE DINITRATE 5 MG: 10 TABLET ORAL at 10:04

## 2024-07-01 RX ADMIN — CALCIUM ACETATE 667 MG: 667 CAPSULE ORAL at 12:52

## 2024-07-01 RX ADMIN — Medication 10 ML: at 10:05

## 2024-07-01 RX ADMIN — APIXABAN 2.5 MG: 2.5 TABLET, FILM COATED ORAL at 20:28

## 2024-07-01 RX ADMIN — BUMETANIDE 2 MG: 1 TABLET ORAL at 10:01

## 2024-07-01 ASSESSMENT — LIFESTYLE VARIABLES
HOW MANY STANDARD DRINKS CONTAINING ALCOHOL DO YOU HAVE ON A TYPICAL DAY: PATIENT DOES NOT DRINK
HOW OFTEN DO YOU HAVE A DRINK CONTAINING ALCOHOL: NEVER

## 2024-07-01 ASSESSMENT — PAIN SCALES - GENERAL
PAINLEVEL_OUTOF10: 0
PAINLEVEL_OUTOF10: 9
PAINLEVEL_OUTOF10: 7
PAINLEVEL_OUTOF10: 7
PAINLEVEL_OUTOF10: 0
PAINLEVEL_OUTOF10: 0

## 2024-07-01 ASSESSMENT — PAIN DESCRIPTION - DESCRIPTORS: DESCRIPTORS: ACHING

## 2024-07-01 ASSESSMENT — PAIN DESCRIPTION - PAIN TYPE: TYPE: ACUTE PAIN

## 2024-07-01 ASSESSMENT — PAIN - FUNCTIONAL ASSESSMENT: PAIN_FUNCTIONAL_ASSESSMENT: 0-10

## 2024-07-01 ASSESSMENT — PAIN DESCRIPTION - LOCATION: LOCATION: CHEST

## 2024-07-01 NOTE — PROGRESS NOTES
Physical Therapy    Physical Therapy Initial Evaluation/Plan of Care    Room #:  IC02/IC02-01  Patient Name: Marge Callejas  YOB: 1940  MRN: [id number]    Date of Service: 7/1/2024     Tentative placement recommendation: Subacute unless patient meets goals then Home Health Physical Therapy with 24/7 assist/supervision  Equipment recommendation: Patient has needed equipment       Evaluating Physical Therapist: Jarad Caba, PT  #[id number]      Specific Provider Orders/Date/Referring Provider :  PT eval and treat  Start:  07/01/24 0645,   End:  07/01/24 0645,   ONE TIME,   Standing Count:  1 Occurrences,   R       Edward Edwards, APRN - CNP    Admitting Diagnosis:   Paroxysmal ventricular tachycardia (HCC) [I47.29]  Ventricular arrhythmia [I49.9]  Chest pain, unspecified type [R07.9]  Acute on chronic congestive heart failure, unspecified heart failure type (HCC) [I50.9]    Admitted with    above and shortness of breath   Hx dialysis  Surgery: none  Visit Diagnoses         Codes    Acute on chronic congestive heart failure, unspecified heart failure type (HCC)     I50.9    Congestive heart failure, unspecified HF chronicity, unspecified heart failure type (HCC)     I50.9            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

## 2024-07-01 NOTE — CONSULTS
PULMONARY CRITICAL CARE CONSULTATION NOTE.    7/1/2024    CONSULTING  PHYSICIAN: Eddi Almonte DO     REASON FOR REFERRAL:  A fib with RvR     History of Present Illness    Ms. Marge Callejas  is a 84 y.o. female who was brought into the emergency room initially with complaints of shortness of breath and chest pain.  The onset was acute and the pain was radiating to her left arm.  In the emergency room she was found to be tachycardic to 150s and 160s with atrial fibrillation.  Her BNP  was found to be over 70,000.   Upon my evaluation in the ICU, patient was on amiodarone drip and heart rate was in 70s in atrial fibrillation.  The rhythm was paced.  Patient's symptoms had started to resolve.  She is hemodynamically stable and asymptomatic at rest      Baseline Exercise tolerance/MMRC score( Bold )     MMRC Dyspnea Scale  Grade Description of Breathlessness   0 I only get breathless with strenuous exercise.   1 I get short of breath when hurrying on level ground or walking up a slight hill.   2 On level ground, I walk slower than people of the same age because of breathlessness, or have to stop for breath when walking at my own pace.   3 I stop for breath after walking about 100 yards or after a few minutes on level ground.   4 I am too breathless to leave the house or I am breathless when dressing.     Smoking history-former smoker with about 25-30-pack-year history of smoking    Occupational exposure/ Pet/bird -  No history of exposure to any occupational Pneumotoxins.     Age appropriate Cancer screening-   Patient is up to date on age appropriate cancer screening and immunizations.         Active Ambulatory Problems     Diagnosis Date Noted    Shortness of breath 11/19/2016    Chronic combined systolic and diastolic congestive heart failure (HCC) 11/19/2016    Chronic renal insufficiency, stage IV (severe) (HCC) 11/19/2016    Atrial fibrillation (HCC)     Hypertension     Abnormal chest x-ray  Oral Daily    calcium acetate  1 capsule Oral TID WC    isosorbide dinitrate  5 mg Oral BID    therapeutic multivitamin-minerals  1 tablet Oral Daily    pantoprazole  40 mg Oral QAM AC    sodium chloride flush  5-40 mL IntraVENous 2 times per day     Continuous Infusions:   amiodarone 1 mg/min (07/01/24 0620)    Followed by    amiodarone      sodium chloride            Review of Systems     General- No headaches , dizzinesss, syncope   Pulmonary - No chest pain , hemoptysis   Cardiovascular- ++ chest pain,   GI- No abd pain ,No difficulty swallowing, diarrhea , no vomiting   Musculoskeletal- No extremity weakness,++ edema , no cyanosis   Genitourinary- No burning urination, no dysuria, no incontinence  Neuro- NO syncope , AMS  Or weakness , No tingling , no numbness.   Skin- No rashes , no cyanosis.   Psychiatric/Behavioral: Negative for confusion, hallucinations and sleep disturbance. The patient is  nervous/anxious.     Vitals:  /80   Pulse 77   Temp 98.2 °F (36.8 °C)   Resp 13   Ht 1.626 m (5' 4\")   Wt 64.4 kg (142 lb)   SpO2 98%   BMI 24.37 kg/m²   Tmax:  CVP:        Intake/Output Summary (Last 24 hours) at 7/1/2024 0902  Last data filed at 7/1/2024 0621  Gross per 24 hour   Intake 150 ml   Output --   Net 150 ml         Physical Exam    General appearance: Elderly female. Not ill appearing  Head: Normocephalic, without obvious abnormality, atraumatic   Eyes:Pupils bilateral equal and reactive  Neck: R neck tunneled Vasc cath for iHD +,  Supple, symmetrical, trachea midline, no lymphadenopathy, no JVD, no carotid bruits, no thyromegaly   Lungs: Bilateral  Air movement, decreased in bases,but adequate , normal femitus, resonant to percussion  Heart: Irregularly irregular rhythm, rate in 70s, S1, S2 normal, no murmur, click, rub or gallop   Abdomen: soft, non-tender, nondistended. Bowel sounds normal, no hepatomeagly  Extremities: extremities normal, atraumatic, no cyanosis, edema  Skin: Skin color,

## 2024-07-01 NOTE — DISCHARGE INSTR - COC
Continuity of Care Form    Patient Name: Marge Callejas   :  1940  MRN:  [id number]    Admit date:  2024  Discharge date:  2024     Code Status Order: Full Code   Advance Directives:     Admitting Physician:  Eddi Almonte DO  PCP: Marek Andres DO    Discharging Nurse: [phone]  Discharging Hospital Unit/Room#: 0633/0633-01  Discharging Unit Phone Number: [place]    Emergency Contact:   Extended Emergency Contact Information  Primary Emergency Contact: Jonathan Feldman  Home Phone: [phone]  Mobile Phone: [phone]  Relation: Child   needed? No  Secondary Emergency Contact: Sabrina Ireland  Home Phone: [phone]  Mobile Phone: [phone]  Relation: Brother/Sister   needed? No    Past Surgical History:  Past Surgical History:   Procedure Laterality Date    COLONOSCOPY N/A 4/3/2021    COLONOSCOPY POLYPECTOMY HOT SNARE performed by Lucio Nelson DO at [facility] ENDOSCOPY    COLONOSCOPY  4/3/2021    COLONOSCOPY WITH BIOPSY performed by Lucio Nelson DO at [facility] ENDOSCOPY    COLONOSCOPY  4/3/2021    COLONOSCOPY CONTROL HEMORRHAGE performed by Lucio Nelson DO at [facility] ENDOSCOPY    COLONOSCOPY  4/3/2021    COLONOSCOPY SUBMUCOSAL SPOT INJECTION performed by Lucio Nelson DO at [facility] ENDOSCOPY    COLONOSCOPY N/A 2023    COLONOSCOPY DIAGNOSTIC performed by Scooby Cormier MD at [facility] ENDOSCOPY    ECTOPIC PREGNANCY SURGERY      UPPER GASTROINTESTINAL ENDOSCOPY N/A 4/3/2021    EGD BIOPSY performed by Lucio Nelson DO at [facility] ENDOSCOPY    UPPER GASTROINTESTINAL ENDOSCOPY N/A 2021    EGD W/EUS FNA performed by Ana Gandhi MD at [facility] ENDOSCOPY       Immunization History:   Immunization History   Administered Date(s) Administered    COVID-19, MODERNA Bivalent, (age 12y+), IM, 50 mcg/0.5 mL 2023    COVID-19, MODERNA, ( formula), (age 12y+), IM, 50mcg/0.5mL 2023    Influenza, High Dose (Fluzone 65 yrs and older) 10/30/2018    Influenza, Triv,  chair time Yaneli SALVADOR  Address:  Dialysis Schedule:Monday and Friday (only) 10:00 am chair time  Phone:  Fax:    / signature: Electronically signed by Eduarda Rhodes RN-BC on 7/1/2024 at 3:25 PM      PHYSICIAN SECTION    Prognosis: Good    Condition at Discharge: Stable    Rehab Potential (if transferring to Rehab): Good    Recommended Labs or Other Treatments After Discharge:     Physician Certification: I certify the above information and transfer of Marge Callejas  is necessary for the continuing treatment of the diagnosis listed and that she requires Skilled Nursing Facility for less 30 days.     Update Admission H&P: {CHP DME Changes in HandP:[id number]}    PHYSICIAN SIGNATURE:  Electronically signed by Eddi Almonte DO on 7/7/24 at 9:50 AM EDT

## 2024-07-01 NOTE — CARE COORDINATION
Case Management Assessment  Initial Evaluation    Date/Time of Evaluation: 7/1/2024 3:18 PM  Assessment Completed by: Eduarda Rhodes RN    If patient is discharged prior to next notation, then this note serves as note for discharge by case management.    Patient Name: Marge Callejas                   YOB: 1940  Diagnosis: Paroxysmal ventricular tachycardia (HCC) [I47.29]  Ventricular arrhythmia [I49.9]  Chest pain, unspecified type [R07.9]  Acute on chronic congestive heart failure, unspecified heart failure type (HCC) [I50.9]                   Date / Time: 7/1/2024  2:24 AM    Patient Admission Status: Inpatient   Readmission Risk (Low < 19, Mod (19-27), High > 27): Readmission Risk Score: 27.6    Current PCP: Marek Andres, DO  PCP verified by CM? Yes    Chart Reviewed: Yes      History Provided by: Medical Record  Patient Orientation: Alert and Oriented, Person, Place, Other (see comment) (AMS to situation)    Patient Cognition: Alert    Hospitalization in the last 30 days (Readmission):  No        Advance Directives:      Code Status: Full Code   Patient's Primary Decision Maker is: Legal Next of Kin    Primary Decision Maker: Jonathan Feldman - Child - [phone]    Discharge Planning:    Patient lives with: Other (Comment) (other residents) Type of Home: Skilled Nursing Facility  Primary Care Giver: Other (Comment) (SNF versus family)  Patient Support Systems include: Children, Family Members     Current services prior to admission: Skilled Nursing Facility            Current DME:  cane, walker, wc, oxygen-   SNF currently             Type of Home Care services:  Nursing Services    ADLS  Prior functional level: Assistance with the following:, Other (see comment) (SNF)  Current functional level: Assistance with the following:, Other (see comment) (SNF)    PT AM-PAC: 10 /24      Family can provide assistance at DC: Yes  Would you like Case Management to discuss the discharge plan with any  other family members/significant others, and if so, who? Yes (family if needed)  Plans to Return to Present Housing: Yes  Other Identified Issues/Barriers to RETURNING to current housing: none  Potential Assistance needed at discharge: Skilled Nursing Facility            Potential DME:  SNF  Patient expects to discharge to: Unknown  Plan for transportation at discharge:  SNF    Financial    Payor: HU[employer]NA MEDICARE / Plan: HU[employer]NA CHOICE-PPO MEDICARE / Product Type: *No Product type* /       [id number] - [place], [place] - [address] - P [phone] - F [phone]  [address] [address]  Phone: [phone] Fax: [phone]    [facility] [facility] [email or web address] [address] [phone] - F [phone]  [facility] [address]  [facility] [address]  Phone: [phone] Fax: [phone]      Notes:    Factors facilitating achievement of predicted outcomes: Caregiver support    Barriers to discharge: SNF, AMS at times    Additional Case Management Notes: 7/1/2024 ICU, Amiodarone gtt. [place] prior to Dialysis at [facility] M&F @ 10 am ([facility]) and [facility] transports her.  CM placed call to outpt HD to confirm.  PRECERT needed at discharge, LUBA needs signed. Call to Joanne at [facility]- to follow. They can accept if a bed available at discharge. llk      The Plan for Transition of Care is related to the following treatment goals: SNF    The Patient and/or patient representative family when they arrive was provided with a choice of provider and agrees with the discharge plan. [x] Yes [] No    Freedom of choice list was provided with basic dialogue that supports the patient's individualized plan of care/goals, treatment preferences and shares the quality data associated with the providers. [x] Yes [] No      Eduarda Rhodes RN-BC  Case Management Department  Ph: [phone]

## 2024-07-01 NOTE — ACP (ADVANCE CARE PLANNING)
Advance Care Planning   Healthcare Decision Maker:    Primary Decision Maker: Jonathan Feldman - Child - [phone]      Today we documented Decision Maker(s) consistent with Legal Next of Kin hierarchy.       Electronically signed by Eduarda Rhodes RN-BC on 7/1/2024 at 3:05 PM

## 2024-07-01 NOTE — PLAN OF CARE
Problem: Discharge Planning  Goal: Discharge to home or other facility with appropriate resources  Outcome: Progressing  Flowsheets  Taken 7/1/2024 0940  Discharge to home or other facility with appropriate resources: Identify barriers to discharge with patient and caregiver  Taken 7/1/2024 0845  Discharge to home or other facility with appropriate resources: Identify barriers to discharge with patient and caregiver     Problem: Pain  Goal: Verbalizes/displays adequate comfort level or baseline comfort level  Outcome: Progressing  Flowsheets (Taken 7/1/2024 0900)  Verbalizes/displays adequate comfort level or baseline comfort level: Assess pain using appropriate pain scale     Problem: Neurosensory - Adult  Goal: Achieves stable or improved neurological status  Outcome: Progressing  Flowsheets (Taken 7/1/2024 0845)  Achieves stable or improved neurological status: Assess for and report changes in neurological status     Problem: Respiratory - Adult  Goal: Achieves optimal ventilation and oxygenation  Outcome: Progressing  Flowsheets (Taken 7/1/2024 0845)  Achieves optimal ventilation and oxygenation: Assess for changes in respiratory status     Problem: Cardiovascular - Adult  Goal: Maintains optimal cardiac output and hemodynamic stability  Outcome: Progressing  Flowsheets (Taken 7/1/2024 0845)  Maintains optimal cardiac output and hemodynamic stability: Monitor blood pressure and heart rate  Goal: Absence of cardiac dysrhythmias or at baseline  Outcome: Progressing  Flowsheets (Taken 7/1/2024 0845)  Absence of cardiac dysrhythmias or at baseline: Monitor cardiac rate and rhythm     Problem: Skin/Tissue Integrity - Adult  Goal: Skin integrity remains intact  Outcome: Progressing  Flowsheets  Taken 7/1/2024 0934  Skin Integrity Remains Intact: Monitor for areas of redness and/or skin breakdown  Taken 7/1/2024 0845  Skin Integrity Remains Intact: Monitor for areas of redness and/or skin breakdown     Problem:  Musculoskeletal - Adult  Goal: Return mobility to safest level of function  Outcome: Progressing  Flowsheets (Taken 7/1/2024 0845)  Return Mobility to Safest Level of Function: Assess patient stability and activity tolerance for standing, transferring and ambulating with or without assistive devices     Problem: Gastrointestinal - Adult  Goal: Maintains adequate nutritional intake  Outcome: Progressing  Flowsheets (Taken 7/1/2024 0845)  Maintains adequate nutritional intake: Monitor percentage of each meal consumed     Problem: Genitourinary - Adult  Goal: Absence of urinary retention  Outcome: Progressing  Flowsheets (Taken 7/1/2024 0845)  Absence of urinary retention: Assess patient’s ability to void and empty bladder     Problem: Infection - Adult  Goal: Absence of infection at discharge  Outcome: Progressing  Flowsheets  Taken 7/1/2024 0934  Absence of infection at discharge: Assess and monitor for signs and symptoms of infection  Taken 7/1/2024 0845  Absence of infection at discharge: Assess and monitor for signs and symptoms of infection     Problem: Metabolic/Fluid and Electrolytes - Adult  Goal: Electrolytes maintained within normal limits  Outcome: Progressing  Flowsheets (Taken 7/1/2024 0845)  Electrolytes maintained within normal limits: Monitor labs and assess patient for signs and symptoms of electrolyte imbalances  Goal: Hemodynamic stability and optimal renal function maintained  Outcome: Progressing  Flowsheets (Taken 7/1/2024 0845)  Hemodynamic stability and optimal renal function maintained: Monitor labs and assess for signs and symptoms of volume excess or deficit  Goal: Glucose maintained within prescribed range  Outcome: Progressing  Flowsheets (Taken 7/1/2024 0845)  Glucose maintained within prescribed range: Monitor blood glucose as ordered     Problem: Hematologic - Adult  Goal: Maintains hematologic stability  Outcome: Progressing  Flowsheets (Taken 7/1/2024 0845)  Maintains hematologic

## 2024-07-01 NOTE — CONSULTS
Inpatient Cardiology Consultation      Reason for Consult: Sustained tachyarrhythmia    Consulting Physician: Dr. Valdez     Requesting Physician: Dr. Almonte    Date of Consultation: 7/1/2024    History of Present Illness:   Marge Callejas is an 83-year-old female known to Dr. Mclauhglin.  She was most seen through inpatient consultation on 4/17/2024 with Dr. Brunner for chest pain.  Patient was found to have an abnormal stress test reading large sized fixed perfusion defect in the inferior wall, moderate-sized perfusion defects in the apex and moderate size partially reversible perfusion defect in the inferolateral wall.      Patient presented to the emergency department on 7/1/2024 via EMS from [facility]  with complaints of chest pain.  Patient received 324 ASA and 1 nitro prior to arrival.  On arrival blood pressure 130/78, heart rate 82, 91% on 4 L and afebrile.  Labs include sodium 134, potassium 0.0, BUN/creatinine 41/6.3, proBNP >70,000, troponin 95> 87> 81, WBC 9.6, Hgb 9.3.  While in the ED patient was found to have several runs of VT which then converted to sinus rhythm.  ED physician spoke with the daughter who stated the patient wishes to [facility] for irregular heart rate with tachycardia and they did change some of her medications.  Patient received IV magnesium and started on amiodarone.  She was admitted to ICU for further treatment/evaluation.    At the time examination patient is lying in bed and appears to be comfortable and in no acute distress.  She reports that she was experiencing lightheadedness, dizziness and chest pain prior to arrival.  The symptoms lasted approximately 5 minutes and then resolved spontaneously without intervention.  She denies any aggravating or alleviating factors.  She does believe it may have been related to a new medication she started since being discharged from [facility].  She is unsure of what the medication is.  She is alert and oriented x 3  Lucio Nelson DO at [facility] ENDOSCOPY    COLONOSCOPY N/A 9/19/2023    COLONOSCOPY DIAGNOSTIC performed by Scooby Cormier MD at [facility] ENDOSCOPY    ECTOPIC PREGNANCY SURGERY      UPPER GASTROINTESTINAL ENDOSCOPY N/A 4/3/2021    EGD BIOPSY performed by Lucio Nelson DO at [facility] ENDOSCOPY    UPPER GASTROINTESTINAL ENDOSCOPY N/A 4/7/2021    EGD W/EUS FNA performed by Ana Gandhi MD at [facility] ENDOSCOPY       Prior Cardiac Testing:    [facility] 2021 ([facility])  \"Single-vessel native artery coronary artery disease: 90% mid RCA stenosis. The distal vessel and branches fill both antegradely as well as via left-to-right collaterals.\" Treated medically.    Stress 4/19/2024   large-sized fixed perfusion defect in the inferior wall, moderate-sized fixed perfusion defect in the apex and a moderate-sized partially reversible perfusion defect in the inferolateral wall. LVEF 42% with global hypokinesis.    TTE 6/12/24 [place]  EF Calculated: 27 % The left ventricle is severely dilated. There is mild concentric left ventricular hypertrophy. There is evidence of a dilated cardiomyopathy. Left ventricle ejection fraction is severely reduced. Right ventricle size is at the upper limits of normal. Right ventricle systolic function is mildly reduced.   Findings MITRAL VALVE: Structure is normal. There is no stenosis. There is mild-to-moderate regurgitation: the effective regurgitant orifice (ERO) is 0.29 cm 2; the regurgitant volume (RV) is 38 mL. AORTIC VALVE: The valve is trileaflet. There is no stenosis or regurgitation. TRICUSPID VALVE: Structure is normal. There is mild regurgitation. There is no echocardiographic evidence of pulmonary hypertension. PULMONIC VALVE: Structure is normal. There is no stenosis or regurgitation. ATRIA: The left atrium is [id number]-[id number] dilated. The right atrium is mildly dilated. A defibrillator lead is seen in the right atrium. LEFT VENTRICLE: The left ventricle is severely dilated. There is mild

## 2024-07-01 NOTE — FLOWSHEET NOTE
07/01/24 1905   Vital Signs   BP (!) 174/90   Pulse 75   Respirations 16   SpO2 99 %   Post-Hemodialysis Assessment   Post-Treatment Procedures Blood returned;Catheter capped, clamped and heparinized x 2 ports   Machine Disinfection Process Acid/Vinegar Clean;Heat Disinfect;Exterior Machine Disinfection   Rinseback Volume (ml) 300 ml   Blood Volume Processed (Liters) 54.9 L   Dialyzer Clearance Lightly streaked   Duration of Treatment (minutes) 180 minutes   Hemodialysis Intake (ml) 300 ml   Hemodialysis Output (ml) 1800 ml   NET Removed (ml) 1500   Tolerated Treatment Good   Patient Response to Treatment HD tolerated as ordered   Bilateral Breath Sounds Diminished;Crackles   Edema Generalized +2;Non-pitting   RUE Edema Trace   LUE Edema Trace   RLE Edema +2;Pitting   LLE Edema +2;Pitting   Time Off 1850   Patient Disposition Remain in ICU/ED   Observations & Evaluations   Level of Consciousness 0   Respiratory Quality/Effort Unlabored

## 2024-07-01 NOTE — PROGRESS NOTES
Request made by Dr. Valdez to receive the report from the cardiac catheterization from 06/13/2024 at [facility].     A request form was sent to medical records via fax; when a call was made back to the records department to verify that the request was received, they stated that it had been received.     Still awaiting fax back with the report.     Electronically signed by Almaz Manrique RN on 7/1/2024 at 4:33 PM

## 2024-07-01 NOTE — ED PROVIDER NOTES
stress test (2015), History of echocardiogram (2015), Hyperlipidemia, Hypertension, and Mixed restrictive and obstructive lung disease (HCC) (2023).     Surgical History:  Past Surgical History:   Procedure Laterality Date    COLONOSCOPY N/A 4/3/2021    COLONOSCOPY POLYPECTOMY HOT SNARE performed by Lucio Nelson DO at [facility] ENDOSCOPY    COLONOSCOPY  4/3/2021    COLONOSCOPY WITH BIOPSY performed by Lucio Nelson DO at [facility] ENDOSCOPY    COLONOSCOPY  4/3/2021    COLONOSCOPY CONTROL HEMORRHAGE performed by Lucio Nelson DO at [facility] ENDOSCOPY    COLONOSCOPY  4/3/2021    COLONOSCOPY SUBMUCOSAL SPOT INJECTION performed by Lucio Nelson DO at [facility] ENDOSCOPY    COLONOSCOPY N/A 2023    COLONOSCOPY DIAGNOSTIC performed by Scooby Cormier MD at [facility] ENDOSCOPY    ECTOPIC PREGNANCY SURGERY      UPPER GASTROINTESTINAL ENDOSCOPY N/A 4/3/2021    EGD BIOPSY performed by Lucio Nelson DO at [facility] ENDOSCOPY    UPPER GASTROINTESTINAL ENDOSCOPY N/A 2021    EGD W/EUS FNA performed by Ana Gandhi MD at [facility] ENDOSCOPY       Social History:  Social History     Tobacco Use    Smoking status: Former     Current packs/day: 0.00     Average packs/day: 0.5 packs/day for 51.0 years (25.5 ttl pk-yrs)     Types: Cigarettes     Start date:      Quit date: 2023     Years since quittin.5    Smokeless tobacco: Never   Vaping Use    Vaping Use: Never used   Substance Use Topics    Alcohol use: Never    Drug use: Defer       Family History:  Family History   Problem Relation Age of Onset    Heart Disease Mother     No Known Problems Father     Other Sister         CHF    Cancer Sister     Brain Cancer Sister        Allergies:  Patient has no known allergies.    The patient’s past medical history has been reviewed.    -------------------- Current Meds --------------------  Current Discharge Medication List        CONTINUE these medications which have NOT CHANGED    Details   amiodarone (CORDARONE) 200 MG tablet Take  0620)     Followed by   amiodarone (CORDARONE) 450 mg in dextrose 5 % 250 mL infusion (0.5 mg/min IntraVENous Rate/Dose Verify 7/2/24 1216)   apixaban (ELIQUIS) tablet 2.5 mg (2.5 mg Oral Given 7/2/24 0812)   aspirin EC tablet 81 mg (81 mg Oral Given 7/2/24 0812)   atorvastatin (LIPITOR) tablet 40 mg (40 mg Oral Given 7/1/24 2029)   bumetanide (BUMEX) tablet 2 mg (2 mg Oral Given 7/2/24 0812)   calcium acetate (PHOSLO) capsule 667 mg (667 mg Oral Not Given 7/2/24 1338)   ipratropium 0.5 mg-albuterol 2.5 mg (DUONEB) nebulizer solution 1 Dose (has no administration in time range)   isosorbide dinitrate (ISORDIL) tablet 5 mg (5 mg Oral Given 7/2/24 0812)   therapeutic multivitamin-minerals 1 tablet (1 tablet Oral Given 7/2/24 0812)   pantoprazole (PROTONIX) tablet 40 mg (40 mg Oral Given 7/2/24 0606)   hydrALAZINE (APRESOLINE) injection 5 mg (has no administration in time range)   sodium chloride flush 0.9 % injection 5-40 mL (10 mLs IntraVENous Given 7/2/24 0813)   sodium chloride flush 0.9 % injection 5-40 mL (has no administration in time range)   0.9 % sodium chloride infusion (has no administration in time range)   polyethylene glycol (GLYCOLAX) packet 17 g (has no administration in time range)   acetaminophen (TYLENOL) tablet 650 mg (650 mg Oral Given 7/2/24 1046)     Or   acetaminophen (TYLENOL) suppository 650 mg ( Rectal See Alternative 7/2/24 1046)   perflutren lipid microspheres (DEFINITY) injection 1.5 mL (has no administration in time range)   metoprolol succinate (TOPROL XL) extended release tablet 12.5 mg (12.5 mg Oral Given 7/2/24 0812)   HYDROcodone-acetaminophen (NORCO) 5-325 MG per tablet 0.5 tablet (has no administration in time range)   magnesium sulfate 2000 mg in 50 mL IVPB premix (0 mg IntraVENous Stopped 7/1/24 0621)   potassium chloride (KLOR-CON M) extended release tablet 40 mEq (40 mEq Oral Given 7/1/24 2226)   calcium gluconate 1,000 mg in sodium chloride 50 mL (0 mg IntraVENous Stopped  Rate 76 BPM    Atrial Rate 58 BPM    QRS Duration 232 ms    Q-T Interval 540 ms    QTc Calculation (Bazett) 607 ms    R Axis -57 degrees    T Axis 110 degrees         Radiology:   Non-plain film images such as CT, Ultrasound and MRI are read by the radiologist. Plain radiographic images are visualized and preliminarily interpreted by the ED Provider in the MDM section.    Interpretation per the Radiologist below, if available at the time of this note:    CT HEAD WO CONTRAST   Final Result   Right maxillary retention cyst.      Left maxillary sinusitis.      Chronic bilateral mastoiditis.      Mild cerebral atrophy.      Chronic ischemic white matter disease.         XR CHEST PORTABLE   Final Result   Slight worsening of the pulmonary vascular congestion.  Heart remains   enlarged with development of small bilateral pleural effusions.           No results found.    No results found.    ------------------- NURSING NOTES & VITALS -------------------    The nursing notes within the ED encounter and vital signs were reviewed.     Vitals:    07/02/24 1200   BP: 109/65   Pulse: 75   Resp: 18   Temp:    SpO2: 90%        Patient Vitals for the past 8 hrs:   BP Temp Temp src Pulse Resp SpO2   07/02/24 1200 109/65 -- Axillary 75 18 90 %   07/02/24 1100 120/64 -- -- 75 20 (!) 88 %   07/02/24 1000 129/78 -- -- 75 30 93 %   07/02/24 0812 131/73 -- -- 75 -- --   07/02/24 0800 131/73 97.5 °F (36.4 °C) Axillary 75 21 97 %   07/02/24 0700 132/83 -- -- 75 22 97 %         ------------- Final Impression, Disposition & Plan ---------------    Final Impression:   1. Paroxysmal ventricular tachycardia (HCC)    2. Chest pain, unspecified type    3. Acute on chronic congestive heart failure, unspecified heart failure type (HCC)    4. Congestive heart failure, unspecified HF chronicity, unspecified heart failure type (HCC)        Disposition:  Admitted 07/01/2024 06:45:15 AM    PATIENT REFERRED TO:  No follow-up provider

## 2024-07-01 NOTE — PROGRESS NOTES
4 Eyes Skin Assessment     NAME:  Marge Callejas  YOB: 1940  MEDICAL RECORD NUMBER:  [id number]    The patient is being assessed for  Admission    I agree that at least one RN has performed a thorough Head to Toe Skin Assessment on the patient. ALL assessment sites listed below have been assessed.      Areas assessed by both nurses:    Head, Face, Ears, Shoulders, Back, Chest, Arms, Elbows, Hands, Sacrum. Buttock, Coccyx, Ischium, Legs. Feet and Heels, and Under Medical Devices         Skin dry intact-   Hyperpigmentation on coccyx   Elbows dry   Heels dry- cracked    Does the Patient have a Wound? No noted wound(s)       Estuardo Prevention initiated by RN: Yes  Wound Care Orders initiated by RN: No    Pressure Injury (Stage 3,4, Unstageable, DTI, NWPT, and Complex wounds) if present, place Wound referral order by RN under : No    New Ostomies, if present place, Ostomy referral order under : No     Nurse 1 eSignature: Electronically signed by Almaz Manrique RN on 7/1/24 at 9:43 AM EDT    **SHARE this note so that the co-signing nurse can place an eSignature**    Nurse 2 eSignature: Electronically signed by Ozzy Castillo RN on 7/1/24 at 12:26 PM EDT

## 2024-07-01 NOTE — ED NOTES
Blood Cultures obtained from the right antecubital, per policy. 1st set drawn at this time and sent to lab.

## 2024-07-01 NOTE — ED NOTES
6455-7421 period of wide complex tachycardia. Rhythm strip printed and given to dr green. Copy of strip placed in chart. Dr green at bedside. Another EKG obtained. Crash cart at bedside

## 2024-07-01 NOTE — PROGRESS NOTES
Device interrogation report received from [facility]. Via fax, Dr. Valdez notified.     Electronically signed by Almaz Manrique RN on 7/1/2024 at 4:34 PM

## 2024-07-01 NOTE — H&P
Department of Internal Medicine  History and Physical Examination     Primary Care Physician: Marek Andres DO   Admitting Physician:  Eddi Almonte DO  Admission date and time: 7/1/2024  2:24 AM    Room:  01/01  Admitting diagnosis: Ventricular arrhythmia [I49.9]    Patient Name: Marge Callejas  MRN: [id number]    Date of Service: 7/1/2024     Chief Complaint: Shortness of breath    HISTORY OF PRESENT ILLNESS:    Marge Callejas is an 84-year-old female patient who presents to [facility] for evaluation of shortness of breath.  This was of an abrupt onset.  Upon ER arrival she was noted to be in a wide-complex tachyarrhythmia and was rather symptomatic.  Heart rates were sustained in the 140s to 150s but then had return to baseline rhythm.  EKG revealed tacky arrhythmia as described.  CBC shows a macrocytic anemia.  Metabolic panel consistent with end-stage renal disease on hemodialysis.  Troponin x 2 were elevated at 95 and 87 and proBNP was higher than the threshold for detection.  Chest x-ray revealed worsening pulmonary vascular congestion with cardiomegaly and small bilateral pleural effusions.  Patient had return of sustained tachyarrhythmia as above.  Cardiology was consulted and amiodarone infusion was initiated.  Case was discussed with the ER team, recommendations for ICU admission with consult to the intensivist.  Subsequent admission to the service of Dr. Almonte.      PAST MEDICAL Hx:  Past Medical History:   Diagnosis Date    Arthritis     Atrial fibrillation ([facility])     Blood circulation, collateral     CHF (congestive heart failure) ([facility])     Chronic renal insufficiency, stage IV (severe) ([facility]) 11/19/2016    Coronary artery disease involving native coronary artery of native heart without angina pectoris 4/17/2024    History of cardiovascular stress test 07/2015    Lexiscan stress test    History of echocardiogram 07/27/2015    EF 29%    Hyperlipidemia     Hypertension     Mixed restrictive and  skin normal, no bruits, no JVD    HEMATOLOGIC/LYMPHATICS:    No cervical lymphadenopathy and no supraclavicular lymphadenopathy    LUNGS:    Symmetric. No increased work of breathing, diminished air exchange, clear to auscultation bilaterally, no wheezes, rhonchi, or rales,     CARDIOVASCULAR:    Normal apical impulse, irregular rhythm with tachycardic rate, S1 and S2.  Systolic murmur.    ABDOMEN:    Normal contour, normal bowel sounds, soft, non-distended, non-tender, no rebound or guarding elicited on palpation     MUSCULOSKELETAL:    There is no redness, warmth, or swelling of the joints.  Tone is normal.    NEUROLOGIC:    Awake, alert, oriented to name, place and time.  Generally weak without focal finding.    SKIN:    No bruising or bleeding.  No redness, warmth, or swelling    EXTREMITIES:    Peripheral pulses present.  Mild chronic appearing lower extremity swelling.    LABORATORY DATA:  CBC with Differential:    Lab Results   Component Value Date/Time    WBC 9.6 07/01/2024 02:56 AM    RBC 2.77 07/01/2024 02:56 AM    HGB 9.3 07/01/2024 02:56 AM    HCT 28.9 07/01/2024 02:56 AM     07/01/2024 02:56 AM    .3 07/01/2024 02:56 AM    MCH 33.6 07/01/2024 02:56 AM    MCHC 32.2 07/01/2024 02:56 AM    RDW 20.4 07/01/2024 02:56 AM    NRBC 0.9 03/31/2021 11:18 AM    METASPCT 1 06/07/2024 03:53 PM    LYMPHOPCT 8 07/01/2024 02:56 AM    MONOPCT 5 07/01/2024 02:56 AM    MYELOPCT 1 05/06/2024 01:35 PM    MYELOPCT 0.9 04/03/2021 04:44 AM    EOSPCT 4 07/01/2024 02:56 AM    BASOPCT 2 07/01/2024 02:56 AM    MONOSABS 0.50 07/01/2024 02:56 AM    EOSABS 0.33 07/01/2024 02:56 AM    BASOSABS 0.17 07/01/2024 02:56 AM     BMP:    Lab Results   Component Value Date/Time     07/01/2024 02:56 AM    K 4.0 07/01/2024 02:56 AM    K 4.5 05/15/2021 10:58 AM    CL 93 07/01/2024 02:56 AM    CO2 26 07/01/2024 02:56 AM    BUN 41 07/01/2024 02:56 AM    CREATININE 6.3 07/01/2024 02:56 AM    CALCIUM 9.2 07/01/2024 02:56 AM     GFRAA 15 02/02/2022 10:26 AM    LABGLOM 6 07/01/2024 02:56 AM    LABGLOM 14 04/29/2024 02:31 PM    GLUCOSE 144 07/01/2024 02:56 AM    GLUCOSE 102 11/17/2010 06:31 AM     Magnesium:    Lab Results   Component Value Date/Time    MG 1.5 06/07/2024 03:53 PM     Phosphorus:    Lab Results   Component Value Date/Time    PHOS 3.5 05/11/2024 03:38 AM       ASSESSMENT:  Wide complex tachyarrhythmia, V. tach versus atrial fib/flutter with aberrancy on chronic Eliquis anticoagulation  Non-STEMI, type I versus type II  Acute on end-stage renal disease on hemodialysis  Acute on chronic systolic and diastolic ingestive heart failure, 27% LVEF with ICD in situ  Chronic macrocytic anemia  Chronic hypoxic respiratory failure secondary to COPD  Hyperlipidemia    PLAN:  Marge Callejas presented to [facility] with symptomatic tachydysrhythmia as above.  Has right bundle and appears to be atrial flutter with ventricular aberrancy and rapid response.  Cardiology was consulted from the emergency department and the patient was placed on amiodarone infusion.  Cardiac enzymes are elevated and will be cycled.  Medical optimization is being undertaken further medication adjustments as per the cardiology team.  To note, patient had recent cardiac workup at [facility] including echocardiogram which revealed 27% LVEF and was treated for the same.  Volume and electrolyte management as per the nephrology team via hemodialysis, patient Monday, Wednesday, Friday schedule.  Infectious workup will be undertaken as well although the patient is afebrile and this is considered to be less likely considering the patient's significant cardiomyopathy history. Given her sustained arrhythmia status, requirement of amiodarone infusion and known underlying cardiomyopathy, patient will require ICU admission.  PT, OT and social work for discharge planning purposes.  Underlying co-morbidites will be addressed during hospitalization as well. Labs

## 2024-07-01 NOTE — PROGRESS NOTES
Device interrogated- Called [facility] awaiting on fax report.     Electronically signed by Almaz Manrique RN on 7/1/2024 at 12:28 PM

## 2024-07-01 NOTE — CONSULTS
[facility]  MD Murtaza White MD Ali Hassan, MD Lisa Kniska, JUDE Beth, JOSE Fernandez, CNP  Consultation    Patient's Name: Marge Callejas  7:41 PM  7/1/2024    Nephrologist: Murtaza Dawson MD    Reason for Consult: ESRD management  Requesting Physician:  Marek Andres DO    Chief Complaint: Dyspnea    History Obtained From: Patient, chart    History of Present Ilness:    Ms. Callejas is a pleasant 83-year-old gentleman known to service, followed longitudinally from a nephrology standpoint by Dr. Ross.  She has ESRD and undergoes hemodialysis via right IJ tunneled dialysis catheter on just Mondays and Fridays.  She presents with progressive dyspnea.  She is recently discharged from [facility] for prolonged stay during which she underwent cardiac evaluation for atrial fibrillation, other dysrhythmia, coronary artery disease.  On presentation to the ER today she was noted to have wide-complex tachycardia with heart rates in the 140s to 150s.  Initiation of the ICU, started on amiodarone drip, given metoprolol.  Remains tachycardic, though improved.  Symptoms have improved, as well.    At the time of my evaluation she was resting comfortably, few specific complaints, somewhat vague on the details of her presentation able to offer little in the way of history.    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       Past  mg, Q4H PRN  sodium chloride flush 0.9 % injection 5-40 mL, 2 times per day  sodium chloride flush 0.9 % injection 5-40 mL, PRN  0.9 % sodium chloride infusion, PRN  polyethylene glycol (GLYCOLAX) packet 17 g, Daily PRN  acetaminophen (TYLENOL) tablet 650 mg, Q6H PRN   Or  acetaminophen (TYLENOL) suppository 650 mg, Q6H PRN  perflutren lipid microspheres (DEFINITY) injection 1.5 mL, ONCE PRN  metoprolol succinate (TOPROL XL) extended release tablet 12.5 mg, Daily  HYDROcodone-acetaminophen (NORCO) 5-325 MG per tablet 0.5 tablet, Q6H PRN    acyclovir (ZOVIRAX) 285 mg in sodium chloride 0.9 % 50 mL IVPB, Once        Review of Systems:   Review of systems not obtained due to patient factors.  Delirium superimposed on dementia    Physical exam:   BP (!) 174/90   Pulse 75   Temp 98.4 °F (36.9 °C) (Axillary)   Resp 16   Ht 1.626 m (5' 4\")   Wt 62.7 kg (138 lb 3.2 oz)   SpO2 99%   BMI 23.72 kg/m²   Age-appropriate elderly -American woman in no apparent distress  NC/AT EOMI sclera conjunctiva clear and anicteric mucous membranes moist  Neck soft supple trachea midline no carotid bruit no JVD  CTA bilaterally  Regular normal S1 and S2, no murmur no rub   Right IJ tunneled dialysis catheter exit site without signs of infection  abdomen soft nontender nondistended NABS no mass no hepatosplenomegaly  No edema distally  Pulses 1-2+ x4  No rash or lesion on visible portions of integument  Moves all 4 extremities  Cranial nerves II through XII grossly intact  Awake, alert, interactive.  Short-term memory quite poor.  Insight quite poor.        Data:   Labs:  CBC with Differential:    Lab Results   Component Value Date/Time    WBC 9.6 07/01/2024 02:56 AM    RBC 2.77 07/01/2024 02:56 AM    HGB 9.3 07/01/2024 02:56 AM    HCT 28.9 07/01/2024 02:56 AM     07/01/2024 02:56 AM    .3 07/01/2024 02:56 AM    MCH 33.6 07/01/2024 02:56 AM    MCHC 32.2 07/01/2024 02:56 AM    RDW 20.4 07/01/2024 02:56 AM    NRBC 0.9

## 2024-07-02 ENCOUNTER — APPOINTMENT (OUTPATIENT)
Dept: CT IMAGING | Age: 84
DRG: 308 | End: 2024-07-02
Payer: MEDICARE

## 2024-07-02 LAB
AMMONIA PLAS-SCNC: 12 UMOL/L (ref 11–51)
ANION GAP SERPL CALCULATED.3IONS-SCNC: 11 MMOL/L (ref 7–16)
ANION GAP SERPL CALCULATED.3IONS-SCNC: 12 MMOL/L (ref 7–16)
ANION GAP SERPL CALCULATED.3IONS-SCNC: 14 MMOL/L (ref 7–16)
B.E.: -3.8 MMOL/L (ref -3–3)
BASOPHILS # BLD: 0.03 K/UL (ref 0–0.2)
BASOPHILS NFR BLD: 0 % (ref 0–2)
BUN SERPL-MCNC: 24 MG/DL (ref 6–23)
BUN SERPL-MCNC: 25 MG/DL (ref 6–23)
BUN SERPL-MCNC: 28 MG/DL (ref 6–23)
CALCIUM SERPL-MCNC: 8.6 MG/DL (ref 8.6–10.2)
CALCIUM SERPL-MCNC: 8.7 MG/DL (ref 8.6–10.2)
CALCIUM SERPL-MCNC: 9.1 MG/DL (ref 8.6–10.2)
CHLORIDE SERPL-SCNC: 94 MMOL/L (ref 98–107)
CHLORIDE SERPL-SCNC: 97 MMOL/L (ref 98–107)
CHLORIDE SERPL-SCNC: 97 MMOL/L (ref 98–107)
CHOLEST SERPL-MCNC: 121 MG/DL
CO2 SERPL-SCNC: 21 MMOL/L (ref 22–29)
CO2 SERPL-SCNC: 25 MMOL/L (ref 22–29)
CO2 SERPL-SCNC: 27 MMOL/L (ref 22–29)
COHB: 1.7 % (ref 0–1.5)
CREAT SERPL-MCNC: 4.5 MG/DL (ref 0.5–1)
CREAT SERPL-MCNC: 4.6 MG/DL (ref 0.5–1)
CREAT SERPL-MCNC: 4.9 MG/DL (ref 0.5–1)
CRITICAL: ABNORMAL
DATE ANALYZED: ABNORMAL
DATE OF COLLECTION: ABNORMAL
EOSINOPHIL # BLD: 0.16 K/UL (ref 0.05–0.5)
EOSINOPHILS RELATIVE PERCENT: 2 % (ref 0–6)
ERYTHROCYTE [DISTWIDTH] IN BLOOD BY AUTOMATED COUNT: 20 % (ref 11.5–15)
GFR, ESTIMATED: 8 ML/MIN/1.73M2
GFR, ESTIMATED: 9 ML/MIN/1.73M2
GFR, ESTIMATED: 9 ML/MIN/1.73M2
GLUCOSE SERPL-MCNC: 125 MG/DL (ref 74–99)
GLUCOSE SERPL-MCNC: 129 MG/DL (ref 74–99)
GLUCOSE SERPL-MCNC: 137 MG/DL (ref 74–99)
HBA1C MFR BLD: 5 % (ref 4–5.6)
HCO3: 19.7 MMOL/L (ref 22–26)
HCT VFR BLD AUTO: 25.3 % (ref 34–48)
HDLC SERPL-MCNC: 43 MG/DL
HGB BLD-MCNC: 8.5 G/DL (ref 11.5–15.5)
HHB: 10.5 % (ref 0–5)
IMM GRANULOCYTES # BLD AUTO: 0.05 K/UL (ref 0–0.58)
IMM GRANULOCYTES NFR BLD: 1 % (ref 0–5)
LAB: ABNORMAL
LACTATE BLDV-SCNC: 2.6 MMOL/L (ref 0.5–2.2)
LACTATE BLDV-SCNC: 6.2 MMOL/L (ref 0.5–2.2)
LDLC SERPL CALC-MCNC: 53 MG/DL
LYMPHOCYTES NFR BLD: 1.39 K/UL (ref 1.5–4)
LYMPHOCYTES RELATIVE PERCENT: 13 % (ref 20–42)
Lab: 1515
MAGNESIUM SERPL-MCNC: 2.2 MG/DL (ref 1.6–2.6)
MCH RBC QN AUTO: 34.6 PG (ref 26–35)
MCHC RBC AUTO-ENTMCNC: 33.6 G/DL (ref 32–34.5)
MCV RBC AUTO: 102.8 FL (ref 80–99.9)
METHB: 0.3 % (ref 0–1.5)
MODE: ABNORMAL
MONOCYTES NFR BLD: 1.09 K/UL (ref 0.1–0.95)
MONOCYTES NFR BLD: 10 % (ref 2–12)
NEUTROPHILS NFR BLD: 74 % (ref 43–80)
NEUTS SEG NFR BLD: 7.79 K/UL (ref 1.8–7.3)
O2 CONTENT: 11.4 ML/DL
O2 SATURATION: 89.3 % (ref 92–98.5)
O2HB: 87.5 % (ref 94–97)
OPERATOR ID: 274
PATIENT TEMP: 37 C
PCO2: 30 MMHG (ref 35–45)
PH BLOOD GAS: 7.43 (ref 7.35–7.45)
PHOSPHATE SERPL-MCNC: 2.3 MG/DL (ref 2.5–4.5)
PLATELET # BLD AUTO: 287 K/UL (ref 130–450)
PMV BLD AUTO: 11.7 FL (ref 7–12)
PO2: 56.9 MMHG (ref 75–100)
POTASSIUM SERPL-SCNC: 5.2 MMOL/L (ref 3.5–5)
POTASSIUM SERPL-SCNC: 5.5 MMOL/L (ref 3.5–5)
POTASSIUM SERPL-SCNC: 6.4 MMOL/L (ref 3.5–5)
RBC # BLD AUTO: 2.46 M/UL (ref 3.5–5.5)
SODIUM SERPL-SCNC: 129 MMOL/L (ref 132–146)
SODIUM SERPL-SCNC: 134 MMOL/L (ref 132–146)
SODIUM SERPL-SCNC: 135 MMOL/L (ref 132–146)
SOURCE, BLOOD GAS: ABNORMAL
T4 FREE SERPL-MCNC: 1.5 NG/DL (ref 0.9–1.7)
THB: 9.2 G/DL (ref 11.5–16.5)
TIME ANALYZED: 1515
TRIGL SERPL-MCNC: 125 MG/DL
TSH SERPL DL<=0.05 MIU/L-ACNC: 11.45 UIU/ML (ref 0.27–4.2)
VLDLC SERPL CALC-MCNC: 25 MG/DL
WBC OTHER # BLD: 10.5 K/UL (ref 4.5–11.5)

## 2024-07-02 PROCEDURE — 36415 COLL VENOUS BLD VENIPUNCTURE: CPT

## 2024-07-02 PROCEDURE — 82140 ASSAY OF AMMONIA: CPT

## 2024-07-02 PROCEDURE — 83036 HEMOGLOBIN GLYCOSYLATED A1C: CPT

## 2024-07-02 PROCEDURE — 83735 ASSAY OF MAGNESIUM: CPT

## 2024-07-02 PROCEDURE — 99291 CRITICAL CARE FIRST HOUR: CPT | Performed by: INTERNAL MEDICINE

## 2024-07-02 PROCEDURE — 82805 BLOOD GASES W/O2 SATURATION: CPT

## 2024-07-02 PROCEDURE — 84100 ASSAY OF PHOSPHORUS: CPT

## 2024-07-02 PROCEDURE — 2000000000 HC ICU R&B

## 2024-07-02 PROCEDURE — 6370000000 HC RX 637 (ALT 250 FOR IP)

## 2024-07-02 PROCEDURE — 99233 SBSQ HOSP IP/OBS HIGH 50: CPT | Performed by: INTERNAL MEDICINE

## 2024-07-02 PROCEDURE — 2500000003 HC RX 250 WO HCPCS: Performed by: INTERNAL MEDICINE

## 2024-07-02 PROCEDURE — 85025 COMPLETE CBC W/AUTO DIFF WBC: CPT

## 2024-07-02 PROCEDURE — 70450 CT HEAD/BRAIN W/O DYE: CPT

## 2024-07-02 PROCEDURE — 84439 ASSAY OF FREE THYROXINE: CPT

## 2024-07-02 PROCEDURE — 84443 ASSAY THYROID STIM HORMONE: CPT

## 2024-07-02 PROCEDURE — 6360000002 HC RX W HCPCS: Performed by: STUDENT IN AN ORGANIZED HEALTH CARE EDUCATION/TRAINING PROGRAM

## 2024-07-02 PROCEDURE — 2580000003 HC RX 258: Performed by: STUDENT IN AN ORGANIZED HEALTH CARE EDUCATION/TRAINING PROGRAM

## 2024-07-02 PROCEDURE — 90935 HEMODIALYSIS ONE EVALUATION: CPT

## 2024-07-02 PROCEDURE — 2700000000 HC OXYGEN THERAPY PER DAY

## 2024-07-02 PROCEDURE — 6370000000 HC RX 637 (ALT 250 FOR IP): Performed by: NURSE PRACTITIONER

## 2024-07-02 PROCEDURE — 2580000003 HC RX 258: Performed by: NURSE PRACTITIONER

## 2024-07-02 PROCEDURE — 2500000003 HC RX 250 WO HCPCS: Performed by: NURSE PRACTITIONER

## 2024-07-02 PROCEDURE — 80048 BASIC METABOLIC PNL TOTAL CA: CPT

## 2024-07-02 PROCEDURE — 80061 LIPID PANEL: CPT

## 2024-07-02 PROCEDURE — 83605 ASSAY OF LACTIC ACID: CPT

## 2024-07-02 RX ORDER — CALCIUM GLUCONATE 94 MG/ML
1000 INJECTION, SOLUTION INTRAVENOUS ONCE
Status: DISCONTINUED | OUTPATIENT
Start: 2024-07-02 | End: 2024-07-02 | Stop reason: CLARIF

## 2024-07-02 RX ORDER — CALCIUM GLUCONATE 20 MG/ML
1000 INJECTION, SOLUTION INTRAVENOUS ONCE
Status: COMPLETED | OUTPATIENT
Start: 2024-07-02 | End: 2024-07-02

## 2024-07-02 RX ADMIN — APIXABAN 2.5 MG: 2.5 TABLET, FILM COATED ORAL at 08:12

## 2024-07-02 RX ADMIN — ACETAMINOPHEN 650 MG: 325 TABLET ORAL at 10:46

## 2024-07-02 RX ADMIN — Medication 10 ML: at 08:13

## 2024-07-02 RX ADMIN — ISOSORBIDE DINITRATE 5 MG: 10 TABLET ORAL at 21:27

## 2024-07-02 RX ADMIN — BUMETANIDE 2 MG: 1 TABLET ORAL at 08:12

## 2024-07-02 RX ADMIN — CALCIUM GLUCONATE 1000 MG: 20 INJECTION, SOLUTION INTRAVENOUS at 09:57

## 2024-07-02 RX ADMIN — CALCIUM ACETATE 667 MG: 667 CAPSULE ORAL at 08:12

## 2024-07-02 RX ADMIN — ISOSORBIDE DINITRATE 5 MG: 10 TABLET ORAL at 08:12

## 2024-07-02 RX ADMIN — AMIODARONE HYDROCHLORIDE 0.5 MG/MIN: 50 INJECTION, SOLUTION INTRAVENOUS at 18:36

## 2024-07-02 RX ADMIN — APIXABAN 2.5 MG: 2.5 TABLET, FILM COATED ORAL at 21:27

## 2024-07-02 RX ADMIN — PANTOPRAZOLE SODIUM 40 MG: 40 TABLET, DELAYED RELEASE ORAL at 06:06

## 2024-07-02 RX ADMIN — AMIODARONE HYDROCHLORIDE 0.5 MG/MIN: 50 INJECTION, SOLUTION INTRAVENOUS at 03:24

## 2024-07-02 RX ADMIN — Medication 1 TABLET: at 08:12

## 2024-07-02 RX ADMIN — ASPIRIN 81 MG: 81 TABLET, COATED ORAL at 08:12

## 2024-07-02 RX ADMIN — CALCIUM ACETATE 667 MG: 667 CAPSULE ORAL at 17:11

## 2024-07-02 RX ADMIN — Medication 10 ML: at 21:35

## 2024-07-02 RX ADMIN — METOPROLOL SUCCINATE 12.5 MG: 25 TABLET, EXTENDED RELEASE ORAL at 08:12

## 2024-07-02 RX ADMIN — ATORVASTATIN CALCIUM 40 MG: 40 TABLET, FILM COATED ORAL at 21:27

## 2024-07-02 ASSESSMENT — PAIN DESCRIPTION - ORIENTATION: ORIENTATION: LEFT

## 2024-07-02 ASSESSMENT — PAIN DESCRIPTION - DESCRIPTORS: DESCRIPTORS: ACHING

## 2024-07-02 ASSESSMENT — PAIN DESCRIPTION - LOCATION: LOCATION: RIB CAGE

## 2024-07-02 ASSESSMENT — PAIN SCALES - GENERAL
PAINLEVEL_OUTOF10: 0
PAINLEVEL_OUTOF10: 3
PAINLEVEL_OUTOF10: 0

## 2024-07-02 NOTE — PROGRESS NOTES
PULMONARY  CRITICAL CARE   SERVICE DAILY PROGRESS  NOTE     7/2/2024   Hospital  LOS: 1 day      Admit date- 7/1/2024       Initially admitted for -   Chest Pain (Pt comes to the ED from [facility] in [place] with c/o chest pain. Given 324 ASA and 1 nitro PTA by EMS- pt has cardiac hx with pacemaker. )     Subjective:      K replaced overnight for Initial K of 5.2 , went up to 5.5   No fevers overnight   Poor appetite   Down to baseline Fio2 @ 4 lpm   Remains of IV amio gtt , HR controlled @ 70s     Review of Systems        General- No headaches , dizzinesss, syncope   Pulmonary - No chest pain , hemoptysis   Cardiovascular- + chest pain- resolving   GI- No abd pain ,No difficulty swallowing, diarrhea , no vomiting   Musculoskeletal- No extremity weakness, no edema , no cyanosis   Genitourinary- No burning urination, no dysuria, no incontinence  Neuro- NO syncope , AMS  Or weakness , No tingling , no numbness.   Skin- No rashes , no cyanosis.   Psychiatric/Behavioral: Negative for confusion, hallucinations and sleep disturbance. The patient is nervous/anxious.                      Allergies:No Known Allergies  Home Meds:  Prior to Admission medications    Medication Sig Start Date End Date Taking? Authorizing Provider   amiodarone (CORDARONE) 200 MG tablet Take 2 tablets by mouth daily   Yes Alonso Fonseca MD   brompheniramine-pseudoephedrine 1-15 MG/5ML ELIX elixir Take 5 mLs by mouth every 6 hours as needed   Yes Alonso Fonseca MD   apixaban (ELIQUIS) 5 MG TABS tablet Take 1 tablet by mouth 2 times daily   Yes Alonso Fonseca MD   carvedilol (COREG) 3.125 MG tablet Take 1 tablet by mouth 2 times daily (with meals)   Yes Alonso Fonseca MD   hydrALAZINE (APRESOLINE) 25 MG tablet Take 1.5 tablets by mouth 3 times daily   Yes Alonso Fonseca MD   isosorbide dinitrate (ISORDIL) 20 MG tablet Take 1 tablet by mouth 3 times daily   Yes Alonso Fonseca MD   magnesium oxide (MAG-OX)  Labs     07/01/24  0256   AST 20   ALT 11   BILITOT 0.4   ALKPHOS 173*       PROCALCITONIN:   Recent Labs     07/01/24  0714   PROCAL 0.07       LIPIDS:   Recent Labs     07/02/24  0412   CHOL 121   TRIG 125   HDL 43     TSH:   Recent Labs     07/02/24  0412   TSH 11.45*        Radiology and available imaging -   Personally reviewed    ASSESSMENT AND PLAN:     A fib with RvR in the settings of HFrEF- 20- 30%( S/p ICD ) with associated pulmonary venous HTN   - on Amiodarone gtt .   - Now rate controlled @ 70s  on IV amio gtt, cardiology  - Continue eliquis , lowered dose - 2.5 BID   NSTEMI ( type II- demand)   - Cardiology/ EP following      Acute on chronic respiratory insufficieny    - Currently requiring 4 lpm NC , this is her home dose .         ESRD on iHD   - Nephrology following , plans for iHD again today   - Follow K    - PT/OT , out of bed to  chair     Prophylaxis:  Stress ulcer: [x] PPI Agent [] H2RA [] Sucralfate [] Other:   VTE: [] Enoxaparin  [] SC Heparin  [] SCD  Eliquis     Critical care time spent excluding separately billable procedures is 33 minutes.    Electronically signed by Jc Nelson MD on 7/2/2024 at 9:32 AM

## 2024-07-02 NOTE — CARE COORDINATION
7/2/2024 ICU, Amiodarone gtt, 5lnc, Increased confusion. Nstemi, [place] prior to Dialysis at [facility] M&F @ 10 am ([facility]) and [facility] transports her.  PRECERT needed at discharge, LUBA needs signed. Call to Joanne at [facility]- to follow. They can accept back if they have a bed available. Pastoral care and Cm following.  Electronically signed by Eduarda Rhodes RN-BC on 7/2/2024 at 2:02 PM    used

## 2024-07-02 NOTE — PLAN OF CARE
Problem: Discharge Planning  Goal: Discharge to home or other facility with appropriate resources  Outcome: Progressing     Problem: Pain  Goal: Verbalizes/displays adequate comfort level or baseline comfort level  7/2/2024 1002 by Guerita Khalil RN  Outcome: Progressing  7/2/2024 0047 by Dana Park RN  Outcome: Progressing     Problem: Neurosensory - Adult  Goal: Achieves stable or improved neurological status  Outcome: Progressing     Problem: Respiratory - Adult  Goal: Achieves optimal ventilation and oxygenation  7/2/2024 1002 by Guerita Khalil RN  Outcome: Progressing  7/2/2024 0047 by Dana Park RN  Outcome: Progressing     Problem: Cardiovascular - Adult  Goal: Maintains optimal cardiac output and hemodynamic stability  Outcome: Progressing  Goal: Absence of cardiac dysrhythmias or at baseline  Outcome: Progressing     Problem: Skin/Tissue Integrity - Adult  Goal: Skin integrity remains intact  7/2/2024 1002 by Guerita Khalil RN  Outcome: Progressing  7/2/2024 0047 by Dana Park RN  Outcome: Progressing     Problem: Musculoskeletal - Adult  Goal: Return mobility to safest level of function  Outcome: Progressing     Problem: Gastrointestinal - Adult  Goal: Maintains adequate nutritional intake  Outcome: Progressing     Problem: Genitourinary - Adult  Goal: Absence of urinary retention  Outcome: Progressing     Problem: Infection - Adult  Goal: Absence of infection at discharge  Outcome: Progressing     Problem: Metabolic/Fluid and Electrolytes - Adult  Goal: Electrolytes maintained within normal limits  Outcome: Progressing  Goal: Hemodynamic stability and optimal renal function maintained  Outcome: Progressing  Goal: Glucose maintained within prescribed range  Outcome: Progressing     Problem: Hematologic - Adult  Goal: Maintains hematologic stability  Outcome: Progressing     Problem: Skin/Tissue Integrity  Goal: Absence of new skin breakdown  Description: 1.  Monitor for

## 2024-07-02 NOTE — PROGRESS NOTES
Inpatient Cardiology Consultation      Reason for Consult: Sustained tachyarrhythmia    Consulting Physician: Dr. Valdez     Requesting Physician: Dr. Almonte    Date of Consultation: 7/2/2024    History of Present Illness:   Marge Callejas is an 83-year-old female known to Dr. Mclaughlin.  She was most seen through inpatient consultation on 4/17/2024 with Dr. Brunner for chest pain.  Patient was found to have an abnormal stress test reading large sized fixed perfusion defect in the inferior wall, moderate-sized perfusion defects in the apex and moderate size partially reversible perfusion defect in the inferolateral wall.      In room:  Had acute neurological changes and undergoing stroke evaluation.    Past Medical History:    HFrEF/mixed cardiomyopathy  S/p [employer] CRT-D (DOI: 4/29/22)  GDMT limited due to renal failure  Permanent atrial fibrillation  OAC with Eliquis (dose adjusted for age and renal function)  Chronic RBBB/LPFB  COPD  Chronic hypoxic respiratory failure-4 L O2  Hypertension  Hyperlipidemia  ESRD on HD  Mondays and Fridays via a right IJ TDC   Anemia  Pancreatic pseudocyst with hemorrhagic component  AAA 5.4 cm saccular aneurysm of the distal abdominal aorta  PUD  Hypertension  Hyperlipidemia  Tobacco abuse  History of CVA  Thrombocytopenia    Past Surgical History:    Past Surgical History:   Procedure Laterality Date    COLONOSCOPY N/A 4/3/2021    COLONOSCOPY POLYPECTOMY HOT SNARE performed by Lucio Nelson DO at [facility] ENDOSCOPY    COLONOSCOPY  4/3/2021    COLONOSCOPY WITH BIOPSY performed by Lucio Nelson DO at [facility] ENDOSCOPY    COLONOSCOPY  4/3/2021    COLONOSCOPY CONTROL HEMORRHAGE performed by Lucio Nelson DO at [facility] ENDOSCOPY    COLONOSCOPY  4/3/2021    COLONOSCOPY SUBMUCOSAL SPOT INJECTION performed by Lucio Nelson DO at [facility] ENDOSCOPY    COLONOSCOPY N/A 9/19/2023    COLONOSCOPY DIAGNOSTIC performed by Scooby Cormier MD at [facility] ENDOSCOPY    ECTOPIC PREGNANCY SURGERY      UPPER  CALCULATED: 27 % RIGHT VENTRICLE: Right ventricle size is at the upper limits of normal. Right ventricle systolic function is mildly reduced. A defibrillator lead is seen. PERICARDIAL AND PLEURA: The pericardium is normal. There is no pericardial effusion. MISCELLANEOUS: The aortic root diameter and arch appearance are normal.      Medications Prior to admit:  Prior to Admission medications    Medication Sig Start Date End Date Taking? Authorizing Provider   amiodarone (CORDARONE) 200 MG tablet Take 2 tablets by mouth daily   Yes ProviderAlonso MD   brompheniramine-pseudoephedrine 1-15 MG/5ML ELIX elixir Take 5 mLs by mouth every 6 hours as needed   Yes Provider, MD Alonso   apixaban (ELIQUIS) 5 MG TABS tablet Take 1 tablet by mouth 2 times daily   Yes Provider, MD Alonso   carvedilol (COREG) 3.125 MG tablet Take 1 tablet by mouth 2 times daily (with meals)   Yes Provider, MD Alonso   hydrALAZINE (APRESOLINE) 25 MG tablet Take 1.5 tablets by mouth 3 times daily   Yes Provider, MD Alonso   isosorbide dinitrate (ISORDIL) 20 MG tablet Take 1 tablet by mouth 3 times daily   Yes Provider, MD Alonso   magnesium oxide (MAG-OX) 400 (240 Mg) MG tablet Take 1 tablet by mouth daily   Yes Provider, MD Alonso   meclizine (ANTIVERT) 12.5 MG tablet Take 1 tablet by mouth 3 times daily as needed for Dizziness   Yes Provider, MD Alonso   polyethylene glycol (MIRALAX) 17 g PACK packet Take 17 g by mouth daily   Yes Provider, MD Alonso   guaiFENesin (MUCINEX) 600 MG extended release tablet Take 1 tablet by mouth 2 times daily   Yes Provider, MD Alonso   senna (SENOKOT) 8.6 MG tablet Take 1 tablet by mouth daily   Yes Provider, MD Alonso   cinacalcet (SENSIPAR) 30 MG tablet Take 2 tablets by mouth Twice a Week Monday and Friday   Yes ProviderAlonso MD   calcium acetate (PHOSLO) 667 MG CAPS capsule Take 1 capsule by mouth 3 times daily (with meals) 5/11/24   Aiden Muñoz  ECG:         Telemetry: Ventricular paced with a rate in the 70s    Imaging:  CHEST XR  Slight worsening of the pulmonary vascular congestion.  Heart remains  enlarged with development of small bilateral pleural effusions.      Intake/Output Summary (Last 24 hours) at 7/2/2024 1532  Last data filed at 7/2/2024 1216  Gross per 24 hour   Intake 1042.81 ml   Output 1850 ml   Net -807.19 ml       Labs:   CBC:   Recent Labs     07/01/24 0256 07/02/24 0412   WBC 9.6 10.5   HGB 9.3* 8.5*   HCT 28.9* 25.3*    287     BMP:   Recent Labs     07/02/24  0412 07/02/24  0811    134   K 5.2* 5.5*   CO2 27 25   BUN 24* 25*   CREATININE 4.5* 4.6*   LABGLOM 9* 9*   CALCIUM 8.6 8.7     Mag:   Recent Labs     07/01/24 2005 07/02/24 0412   MG 2.2 2.2     Phos:   Recent Labs     07/02/24 0412   PHOS 2.3*     TSH:   Recent Labs     07/02/24 0412   TSH 11.45*     HgA1c:   Lab Results   Component Value Date    LABA1C 5.0 07/02/2024     No results found for: \"EAG\"  proBNP:   Recent Labs     07/01/24 0256   PROBNP >70,000*     PT/INR: No results for input(s): \"PROTIME\", \"INR\" in the last 72 hours.  APTT:No results for input(s): \"APTT\" in the last 72 hours.  CARDIAC ENZYMES:  Recent Labs     07/01/24 0419 07/01/24  0714 07/01/24  1039   TROPHS 87* 81* 79*      FASTING LIPID PANEL:  Lab Results   Component Value Date/Time    CHOL 121 07/02/2024 04:12 AM    HDL 43 07/02/2024 04:12 AM    TRIG 125 07/02/2024 04:12 AM     LIVER PROFILE:  Recent Labs     07/01/24 0256   AST 20   ALT 11             EKG July 1, 2024 at 5:45 AM shows VT 23 bpm at the minimum with a rate of 147 bpm    Assessment  Chest pain  Elevated Cardiac enzymes  Light headed  Dizziness  Elevated BNP  NSVT (at least 23 beats at least 23 beats) , now on Amiodarone  HFrEF/next cardiomyopathy  S/p [employer] CRT-D (DOI: 4/29/22)  GDMT limited due to renal failure  Permanent atrial fibrillation  OAC with Eliquis (dose adjusted for age and renal function)  Chronic

## 2024-07-02 NOTE — PLAN OF CARE
Problem: Pain  Goal: Verbalizes/displays adequate comfort level or baseline comfort level  Outcome: Progressing     Problem: Respiratory - Adult  Goal: Achieves optimal ventilation and oxygenation  Outcome: Progressing     Problem: Skin/Tissue Integrity - Adult  Goal: Skin integrity remains intact  Outcome: Progressing     Problem: Skin/Tissue Integrity  Goal: Absence of new skin breakdown  Description: 1.  Monitor for areas of redness and/or skin breakdown  2.  Assess vascular access sites hourly  3.  Every 4-6 hours minimum:  Change oxygen saturation probe site  4.  Every 4-6 hours:  If on nasal continuous positive airway pressure, respiratory therapy assess nares and determine need for appliance change or resting period.  Outcome: Progressing     Problem: Safety - Adult  Goal: Free from fall injury  Outcome: Progressing

## 2024-07-02 NOTE — PROGRESS NOTES
[facility]  MD Murtaza White, MD Alban Ross, MD Krista Osorio, CNP   Lucille Beth, JOSE Fernandez, JUDE  Progress Note    7/2/2024    SUBJECTIVE:   7/2: Seen this morning around 10 AM.  Resting comfortably watching television.  Denied complaints.  BP stable overnight.  No arrhythmias.  Administered KCl 40 meq p.o. last evening, presumptively for a potassium of 3.9 at 8:30 PM.  Potassium this morning is 5.5 mmol/L.      PROBLEM LIST:    Principal Problem:    Ventricular arrhythmia  Active Problems:    Paroxysmal ventricular tachycardia (HCC)  Resolved Problems:    * No resolved hospital problems. *         DIET:    ADULT DIET; Dysphagia - Soft and Bite Sized; Low Sodium (2 gm); Low Potassium (Less than 3000 mg/day); 2000 ml     MEDS (scheduled):    apixaban  2.5 mg Oral BID    aspirin  81 mg Oral Daily    atorvastatin  40 mg Oral Nightly    bumetanide  2 mg Oral Daily    calcium acetate  1 capsule Oral TID WC    isosorbide dinitrate  5 mg Oral BID    therapeutic multivitamin-minerals  1 tablet Oral Daily    pantoprazole  40 mg Oral QAM AC    sodium chloride flush  5-40 mL IntraVENous 2 times per day    metoprolol succinate  12.5 mg Oral Daily       MEDS (infusions):   amiodarone 0.5 mg/min (07/02/24 1216)    sodium chloride         MEDS (prn):  ipratropium 0.5 mg-albuterol 2.5 mg, hydrALAZINE, sodium chloride flush, sodium chloride, polyethylene glycol, acetaminophen **OR** acetaminophen, perflutren lipid microspheres, HYDROcodone 5 mg - acetaminophen    PHYSICAL EXAM:     Patient Vitals for the past 24 hrs:   BP Temp Temp src Pulse Resp SpO2 Weight   07/02/24 1600 117/88 -- -- 75 17 93 % --   07/02/24 1500 (!) 126/110 -- -- 76 24 95 % --   07/02/24 1400 109/76 -- -- 75 15 (!) 88 % --   07/02/24 1200 109/65 -- Axillary 75 18 90 % --   07/02/24 1100 120/64 -- -- 75 20 (!) 88 % --   07/02/24 1000 129/78 -- -- 75 30 93 % --   07/02/24 0812 131/73 -- -- 75 -- -- --    07/02/24 0800 131/73 97.5 °F (36.4 °C) Axillary 75 21 97 % --   07/02/24 0700 132/83 -- -- 75 22 97 % --   07/02/24 0626 -- -- -- 79 17 99 % --   07/02/24 0600 132/75 -- -- 78 22 98 % --   07/02/24 0500 133/75 -- -- 75 (!) 32 98 % --   07/02/24 0400 (!) 142/81 98.6 °F (37 °C) Oral 75 24 99 % 61.5 kg (135 lb 9.6 oz)   07/02/24 0300 139/78 -- -- 75 16 99 % --   07/02/24 0200 (!) 142/74 -- -- 75 11 100 % --   07/02/24 0100 132/76 -- -- 75 10 98 % --   07/02/24 0000 (!) 143/81 98.5 °F (36.9 °C) Oral 75 15 97 % --   07/01/24 2300 (!) 142/85 -- -- 75 15 100 % --   07/01/24 2200 139/82 -- -- 75 15 98 % --   07/01/24 2100 (!) 145/91 -- -- 77 17 98 % --   07/01/24 2000 (!) 159/86 97.7 °F (36.5 °C) Oral 79 14 98 % --   07/01/24 1905 (!) 174/90 -- -- 75 16 99 % --   07/01/24 1800 (!) 168/97 -- -- 77 16 96 % --   07/01/24 1700 (!) 163/120 -- -- 76 14 97 % --   @      Intake/Output Summary (Last 24 hours) at 7/2/2024 1647  Last data filed at 7/2/2024 1216  Gross per 24 hour   Intake 676.46 ml   Output 1850 ml   Net -1173.54 ml         Wt Readings from Last 3 Encounters:   07/02/24 61.5 kg (135 lb 9.6 oz)   06/07/24 64.4 kg (142 lb)   05/11/24 64.5 kg (142 lb 2 oz)       Constitutional:  in no acute distress  HEENT: NC/AT, EOMI, sclera and conjunctiva are clear and anicteric, mucus membranes moist  Neck: Trachea midline, no JVD  Cardiovascular: S1, S2 regular rhythm, no murmur,or rub  Respiratory:  No crackles, no wheeze  Gastrointestinal:  Soft, nontender, nondistended, NABS  Ext: no edema, feet warm  Skin: dry, no rash  Neuro: awake, alert, interactive      DATA:    Recent Labs     07/01/24 0256 07/02/24 0412   WBC 9.6 10.5   HGB 9.3* 8.5*   HCT 28.9* 25.3*   .3* 102.8*    287     Recent Labs     07/01/24  0256 07/01/24  1039 07/01/24 2005 07/02/24  0412 07/02/24  0811 07/02/24  1520     --   --  135 134 129*   K 4.0  --  3.9 5.2* 5.5* 6.4*   CL 93*  --   --  97* 97* 94*   CO2 26  --   --  27 25 21*

## 2024-07-02 NOTE — PROGRESS NOTES
Internal Medicine Progress Note     [facility]     Jose Rodrigez D.O., DALEOChepeI.                         Eddi Almonte D.O., DALEOChepeI.  Paulino Abad D.O.     Ifrah Dave, MSN, APRN, NP-C  Edward Edwards, MSN, APRN-CNP  Aiden Muñoz, MSN, APRN, NP-C  Yasemin Bueno, MSN, APRN-CNP  Palmira Gabriel, MSN, APRN, NP-C     Primary Care Physician: Marek Andres DO   Admitting Physician:  Eddi Almonte DO  Admission date and time: 7/1/2024  2:24 AM    Room:  [place] [address]  Admitting diagnosis: Paroxysmal ventricular tachycardia (HCC) [I47.29]  Ventricular arrhythmia [I49.9]  Chest pain, unspecified type [R07.9]  Acute on chronic congestive heart failure, unspecified heart failure type (HCC) [I50.9]    Patient Name: Marge Callejas  MRN: [id number]    Date of Service: 7/2/2024     Subjective:  Marge is a 84 y.o. female who was seen and examined today,7/2/2024, at the bedside.  Marge was evaluated in the presence of multiple family members.  Apparently, she had been doing well this morning and was being considered for transfer from the intensive care unit.  She had been conversing with family and her mentation had improved.  During my examination, she is entirely altered.  She is unable to follow commands.  She is sweaty and uncomfortable.  Family has been updated and we will move forward with stat CT scan and stat laboratory values.  As per nursing, she is being considered for additional ultrafiltration today.    Review of System:   Extraordinarily limited as the patient is altered during my examination.  Constitutional:   Profound malaise and fatigue.  HEENT:   Denies ear pain, sore throat, sinus or eye problems.  Cardiovascular:   Denies any chest pain, irregular heartbeats, or palpitations.   Respiratory:   Some mild shortness of breath.  Gastrointestinal:   Decreased appetite and therefore decreased oral intake.  She denies current abdominal pain.  Genitourinary:    Denies  any urgency, frequency, hematuria.  Voids very little in the setting of hemodialysis.  Extremities:   Denies lower extremity swelling, edema or cyanosis.   Neurology:    Denies any headache or focal neurological deficits, profound weakness.  Psch:   Completely altered.  Musculoskeletal:    Denies  myalgias, joint complaints or back pain.   Integumentary:   Denies any rashes, ulcers, or excoriations.  Denies bruising.  Hematologic/Lymphatic:  Denies bruising or bleeding.    Physical Exam:  I/O this shift:  In: 376.5 [I.V.:343.2; IV Piggyback:33.2]  Out: 50 [Urine:50]    Intake/Output Summary (Last 24 hours) at 7/2/2024 1308  Last data filed at 7/2/2024 1216  Gross per 24 hour   Intake 1042.81 ml   Output 1850 ml   Net -807.19 ml   I/O last 3 completed shifts:  In: 1056.4 [P.O.:340; I.V.:366.4; IV Piggyback:50]  Out: 1800   Patient Vitals for the past 96 hrs (Last 3 readings):   Weight   07/02/24 0400 61.5 kg (135 lb 9.6 oz)   07/01/24 0900 62.7 kg (138 lb 3.2 oz)   07/01/24 0229 64.4 kg (142 lb)     Vital Signs:   Blood pressure 109/65, pulse 75, temperature 97.5 °F (36.4 °C), temperature source Axillary, resp. rate 18, height 1.626 m (5' 4\"), weight 61.5 kg (135 lb 9.6 oz), SpO2 90 %.    General appearance:  Entirely altered during my examination.  She is not following commands nor is she answering questions.  Family voices concern as this is new over the past 30 minutes.  She had previously been conversing without complication.  Head:  Normocephalic. No masses, lesions or tenderness.  Eyes:  PERRLA.  EOMI.  Sclera clear.  Buccal mucosa moist.  ENT:  Ears normal. Mucosa normal.  Neck:    Supple. Trachea midline. No thyromegaly. No JVD. No bruits.  Heart:    Paced rhythm.  Underlying systolic murmur.  Lungs:    Symmetrical. Clear to auscultation bilaterally.  No wheezes. No rhonchi. No rales.  Abdomen:   Soft. Non-tender. Non-distended. Bowel sounds positive. No organomegaly or masses.  No pain on

## 2024-07-02 NOTE — PROGRESS NOTES
Date:2024  Patient Name: Marge Callejas  MRN: [id number]  : 1940  ROOM #: IC02/IC02-01    Occupational Therapy order received, chart reviewed and evaluation attempted this date. Patient is unavailable for OT evaluation due to not appropriate per nursing.     Will attempt OT evaluation at a later time. Thank you.   Gavin Morelos OTR/L #[id number]

## 2024-07-03 LAB
ANION GAP SERPL CALCULATED.3IONS-SCNC: 13 MMOL/L (ref 7–16)
BASOPHILS # BLD: 0 K/UL (ref 0–0.2)
BASOPHILS NFR BLD: 0 % (ref 0–2)
BUN SERPL-MCNC: 13 MG/DL (ref 6–23)
CALCIUM SERPL-MCNC: 8.6 MG/DL (ref 8.6–10.2)
CHLORIDE SERPL-SCNC: 99 MMOL/L (ref 98–107)
CO2 SERPL-SCNC: 27 MMOL/L (ref 22–29)
CREAT SERPL-MCNC: 2.9 MG/DL (ref 0.5–1)
EKG ATRIAL RATE: 19 BPM
EKG ATRIAL RATE: 58 BPM
EKG ATRIAL RATE: 77 BPM
EKG Q-T INTERVAL: 448 MS
EKG Q-T INTERVAL: 486 MS
EKG Q-T INTERVAL: 540 MS
EKG QRS DURATION: 170 MS
EKG QRS DURATION: 232 MS
EKG QRS DURATION: 306 MS
EKG QTC CALCULATION (BAZETT): 549 MS
EKG QTC CALCULATION (BAZETT): 607 MS
EKG QTC CALCULATION (BAZETT): 701 MS
EKG R AXIS: -57 DEGREES
EKG R AXIS: 121 DEGREES
EKG R AXIS: 140 DEGREES
EKG T AXIS: -68 DEGREES
EKG T AXIS: 110 DEGREES
EKG T AXIS: 143 DEGREES
EKG VENTRICULAR RATE: 147 BPM
EKG VENTRICULAR RATE: 76 BPM
EKG VENTRICULAR RATE: 77 BPM
EOSINOPHIL # BLD: 0.26 K/UL (ref 0.05–0.5)
EOSINOPHILS RELATIVE PERCENT: 3 % (ref 0–6)
ERYTHROCYTE [DISTWIDTH] IN BLOOD BY AUTOMATED COUNT: 20.5 % (ref 11.5–15)
GFR, ESTIMATED: 15 ML/MIN/1.73M2
GLUCOSE SERPL-MCNC: 107 MG/DL (ref 74–99)
HCT VFR BLD AUTO: 24.2 % (ref 34–48)
HGB BLD-MCNC: 8 G/DL (ref 11.5–15.5)
LYMPHOCYTES NFR BLD: 1.72 K/UL (ref 1.5–4)
LYMPHOCYTES RELATIVE PERCENT: 18 % (ref 20–42)
MAGNESIUM SERPL-MCNC: 2.1 MG/DL (ref 1.6–2.6)
MCH RBC QN AUTO: 34.3 PG (ref 26–35)
MCHC RBC AUTO-ENTMCNC: 33.1 G/DL (ref 32–34.5)
MCV RBC AUTO: 103.9 FL (ref 80–99.9)
MICROORGANISM SPEC CULT: NORMAL
MONOCYTES NFR BLD: 0.43 K/UL (ref 0.1–0.95)
MONOCYTES NFR BLD: 4 % (ref 2–12)
NEUTROPHILS NFR BLD: 75 % (ref 43–80)
NEUTS SEG NFR BLD: 7.39 K/UL (ref 1.8–7.3)
NUCLEATED RED BLOOD CELLS: 1 PER 100 WBC
PHOSPHATE SERPL-MCNC: 2.1 MG/DL (ref 2.5–4.5)
PLATELET # BLD AUTO: 261 K/UL (ref 130–450)
PMV BLD AUTO: 12.1 FL (ref 7–12)
POTASSIUM SERPL-SCNC: 4.3 MMOL/L (ref 3.5–5)
PROCALCITONIN SERPL-MCNC: 1.41 NG/ML (ref 0–0.08)
RBC # BLD AUTO: 2.33 M/UL (ref 3.5–5.5)
RBC # BLD: ABNORMAL 10*6/UL
SODIUM SERPL-SCNC: 139 MMOL/L (ref 132–146)
SPECIMEN DESCRIPTION: NORMAL
WBC OTHER # BLD: 9.8 K/UL (ref 4.5–11.5)

## 2024-07-03 PROCEDURE — 97165 OT EVAL LOW COMPLEX 30 MIN: CPT

## 2024-07-03 PROCEDURE — 94640 AIRWAY INHALATION TREATMENT: CPT

## 2024-07-03 PROCEDURE — 2580000003 HC RX 258: Performed by: STUDENT IN AN ORGANIZED HEALTH CARE EDUCATION/TRAINING PROGRAM

## 2024-07-03 PROCEDURE — 97530 THERAPEUTIC ACTIVITIES: CPT

## 2024-07-03 PROCEDURE — 6370000000 HC RX 637 (ALT 250 FOR IP): Performed by: NURSE PRACTITIONER

## 2024-07-03 PROCEDURE — 99291 CRITICAL CARE FIRST HOUR: CPT | Performed by: INTERNAL MEDICINE

## 2024-07-03 PROCEDURE — 93010 ELECTROCARDIOGRAM REPORT: CPT | Performed by: INTERNAL MEDICINE

## 2024-07-03 PROCEDURE — 85025 COMPLETE CBC W/AUTO DIFF WBC: CPT

## 2024-07-03 PROCEDURE — 2580000003 HC RX 258: Performed by: NURSE PRACTITIONER

## 2024-07-03 PROCEDURE — 99233 SBSQ HOSP IP/OBS HIGH 50: CPT | Performed by: INTERNAL MEDICINE

## 2024-07-03 PROCEDURE — 80048 BASIC METABOLIC PNL TOTAL CA: CPT

## 2024-07-03 PROCEDURE — 97110 THERAPEUTIC EXERCISES: CPT

## 2024-07-03 PROCEDURE — 6370000000 HC RX 637 (ALT 250 FOR IP): Performed by: INTERNAL MEDICINE

## 2024-07-03 PROCEDURE — 2500000003 HC RX 250 WO HCPCS: Performed by: NURSE PRACTITIONER

## 2024-07-03 PROCEDURE — 84145 PROCALCITONIN (PCT): CPT

## 2024-07-03 PROCEDURE — 2060000000 HC ICU INTERMEDIATE R&B

## 2024-07-03 PROCEDURE — 83735 ASSAY OF MAGNESIUM: CPT

## 2024-07-03 PROCEDURE — 2700000000 HC OXYGEN THERAPY PER DAY

## 2024-07-03 PROCEDURE — 6360000002 HC RX W HCPCS: Performed by: STUDENT IN AN ORGANIZED HEALTH CARE EDUCATION/TRAINING PROGRAM

## 2024-07-03 PROCEDURE — 84100 ASSAY OF PHOSPHORUS: CPT

## 2024-07-03 PROCEDURE — 6370000000 HC RX 637 (ALT 250 FOR IP)

## 2024-07-03 RX ORDER — AMIODARONE HYDROCHLORIDE 200 MG/1
200 TABLET ORAL DAILY
Status: DISCONTINUED | OUTPATIENT
Start: 2024-07-18 | End: 2024-07-07 | Stop reason: HOSPADM

## 2024-07-03 RX ORDER — HYDRALAZINE HYDROCHLORIDE 10 MG/1
10 TABLET, FILM COATED ORAL EVERY 12 HOURS SCHEDULED
Status: DISCONTINUED | OUTPATIENT
Start: 2024-07-03 | End: 2024-07-07 | Stop reason: HOSPADM

## 2024-07-03 RX ORDER — AMIODARONE HYDROCHLORIDE 200 MG/1
400 TABLET ORAL DAILY
Status: DISCONTINUED | OUTPATIENT
Start: 2024-07-11 | End: 2024-07-07 | Stop reason: HOSPADM

## 2024-07-03 RX ORDER — AMIODARONE HYDROCHLORIDE 200 MG/1
400 TABLET ORAL 2 TIMES DAILY
Status: DISCONTINUED | OUTPATIENT
Start: 2024-07-03 | End: 2024-07-07 | Stop reason: HOSPADM

## 2024-07-03 RX ORDER — AMIODARONE HYDROCHLORIDE 200 MG/1
400 TABLET ORAL 2 TIMES DAILY
Status: DISCONTINUED | OUTPATIENT
Start: 2024-07-03 | End: 2024-07-03

## 2024-07-03 RX ADMIN — CALCIUM ACETATE 667 MG: 667 CAPSULE ORAL at 16:32

## 2024-07-03 RX ADMIN — ISOSORBIDE DINITRATE 5 MG: 10 TABLET ORAL at 08:16

## 2024-07-03 RX ADMIN — AMIODARONE HYDROCHLORIDE 400 MG: 200 TABLET ORAL at 20:28

## 2024-07-03 RX ADMIN — IPRATROPIUM BROMIDE AND ALBUTEROL SULFATE 1 DOSE: .5; 2.5 SOLUTION RESPIRATORY (INHALATION) at 23:55

## 2024-07-03 RX ADMIN — Medication 1 TABLET: at 08:17

## 2024-07-03 RX ADMIN — METOPROLOL SUCCINATE 12.5 MG: 25 TABLET, EXTENDED RELEASE ORAL at 08:16

## 2024-07-03 RX ADMIN — APIXABAN 2.5 MG: 2.5 TABLET, FILM COATED ORAL at 08:17

## 2024-07-03 RX ADMIN — Medication 10 ML: at 20:36

## 2024-07-03 RX ADMIN — Medication 10 ML: at 08:18

## 2024-07-03 RX ADMIN — BUMETANIDE 2 MG: 1 TABLET ORAL at 08:16

## 2024-07-03 RX ADMIN — CALCIUM ACETATE 667 MG: 667 CAPSULE ORAL at 08:17

## 2024-07-03 RX ADMIN — APIXABAN 2.5 MG: 2.5 TABLET, FILM COATED ORAL at 20:33

## 2024-07-03 RX ADMIN — ATORVASTATIN CALCIUM 40 MG: 40 TABLET, FILM COATED ORAL at 20:28

## 2024-07-03 RX ADMIN — ISOSORBIDE DINITRATE 5 MG: 10 TABLET ORAL at 20:28

## 2024-07-03 RX ADMIN — CALCIUM ACETATE 667 MG: 667 CAPSULE ORAL at 11:28

## 2024-07-03 RX ADMIN — AMIODARONE HYDROCHLORIDE 0.5 MG/MIN: 50 INJECTION, SOLUTION INTRAVENOUS at 10:17

## 2024-07-03 RX ADMIN — PANTOPRAZOLE SODIUM 40 MG: 40 TABLET, DELAYED RELEASE ORAL at 05:47

## 2024-07-03 RX ADMIN — HYDRALAZINE HYDROCHLORIDE 10 MG: 10 TABLET ORAL at 20:28

## 2024-07-03 RX ADMIN — ASPIRIN 81 MG: 81 TABLET, COATED ORAL at 08:17

## 2024-07-03 NOTE — PROGRESS NOTES
Patients daughter Eliza Feldman wants Cardiology to call her whenever they can so she can be caught up on Patients cardiac status. She asked if there was an electrophysiologist on the team. Cardiology team will be notified of daughters request to talk to them over the phone.

## 2024-07-03 NOTE — PROGRESS NOTES
Physical Therapy    Physical Therapy Treatment Note/Plan of Care    Room #:  IC02/IC02-01  Patient Name: Marge Callejas  YOB: 1940  MRN: [id number]    Date of Service: 7/3/2024     Tentative placement recommendation: Subacute unless patient meets goals then Home Health Physical Therapy with 24/7 assist/supervision  Equipment recommendation: Patient has needed equipment       Evaluating Physical Therapist: Jarad Caba, PT  #[id number]      Specific Provider Orders/Date/Referring Provider :  PT eval and treat  Start:  07/01/24 0645,   End:  07/01/24 0645,   ONE TIME,   Standing Count:  1 Occurrences,   R       Edward Edwards, APRN - CNP    Admitting Diagnosis:   Paroxysmal ventricular tachycardia (HCC) [I47.29]  Ventricular arrhythmia [I49.9]  Chest pain, unspecified type [R07.9]  Acute on chronic congestive heart failure, unspecified heart failure type (HCC) [I50.9]    Admitted with    above and shortness of breath   Hx dialysis  Surgery: none  Visit Diagnoses         Codes    Acute on chronic congestive heart failure, unspecified heart failure type (HCC)     I50.9    Congestive heart failure, unspecified HF chronicity, unspecified heart failure type (HCC)     I50.9            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

## 2024-07-03 NOTE — PLAN OF CARE
Problem: Discharge Planning  Goal: Discharge to home or other facility with appropriate resources  Outcome: Progressing     Problem: Pain  Goal: Verbalizes/displays adequate comfort level or baseline comfort level  Outcome: Progressing     Problem: Neurosensory - Adult  Goal: Achieves stable or improved neurological status  Outcome: Progressing  Flowsheets (Taken 7/3/2024 0800)  Achieves stable or improved neurological status: Assess for and report changes in neurological status     Problem: Respiratory - Adult  Goal: Achieves optimal ventilation and oxygenation  Outcome: Progressing     Problem: Cardiovascular - Adult  Goal: Maintains optimal cardiac output and hemodynamic stability  Outcome: Progressing     Problem: Cardiovascular - Adult  Goal: Absence of cardiac dysrhythmias or at baseline  Outcome: Progressing     Problem: Skin/Tissue Integrity - Adult  Goal: Skin integrity remains intact  Outcome: Progressing     Problem: Musculoskeletal - Adult  Goal: Return mobility to safest level of function  Outcome: Progressing  Flowsheets (Taken 7/3/2024 0800)  Return Mobility to Safest Level of Function: Assess patient stability and activity tolerance for standing, transferring and ambulating with or without assistive devices     Problem: Gastrointestinal - Adult  Goal: Maintains adequate nutritional intake  Outcome: Progressing     Problem: Genitourinary - Adult  Goal: Absence of urinary retention  Outcome: Progressing     Problem: Infection - Adult  Goal: Absence of infection at discharge  Outcome: Progressing  Flowsheets (Taken 7/3/2024 0800)  Absence of infection at discharge: Assess and monitor for signs and symptoms of infection     Problem: Metabolic/Fluid and Electrolytes - Adult  Goal: Electrolytes maintained within normal limits  Outcome: Progressing  Flowsheets (Taken 7/3/2024 0800)  Electrolytes maintained within normal limits: Monitor labs and assess patient for signs and symptoms of electrolyte

## 2024-07-03 NOTE — PROGRESS NOTES
Inpatient Cardiology Consultation      Reason for Consult: Sustained tachyarrhythmia    Consulting Physician: Dr. Valdez     Requesting Physician: Dr. Almonte    Date of Consultation: 7/3/2024    History of Present Illness:   Marge Callejas is an 83-year-old female known to Dr. Mclaughlin.  She was most seen through inpatient consultation on 4/17/2024 with Dr. Brunner for chest pain.  Patient was found to have an abnormal stress test reading large sized fixed perfusion defect in the inferior wall, moderate-sized perfusion defects in the apex and moderate size partially reversible perfusion defect in the inferolateral wall.      In room:  Patient denies chest pain or shortness of breath  Spoke with patient at length who requested I call her daughter to update her about her cardiac condition.  I also talked to electrophysiology and [employer] device rep.  Patient had episode of ventricular tachycardia which were aborted by ATP and there were no shocks.  Apparently her device is programmed for VT detection zone of 150 bpm and ATP after 90 beats of VT.  Patient is on amiodarone IV and will transition to amiodarone p.o. 400 mg p.o. twice a day for 1 week then 400 mg p.o. daily for 1 week then 200 mg p.o. daily  The patient daughter reported patient has been having recurrent VT's and recently was at [facility] for VT's requiring lidocaine and amiodarone.    Past Medical History:    HFrEF/mixed cardiomyopathy  S/p [employer] CRT-D (DOI: 4/29/22)  GDMT limited due to renal failure  Permanent atrial fibrillation  OAC with Eliquis (dose adjusted for age and renal function)  Chronic RBBB/LPFB  COPD  Chronic hypoxic respiratory failure-4 L O2  Hypertension  Hyperlipidemia  ESRD on HD  Mondays and Fridays via a right IJ TDC   Anemia  Pancreatic pseudocyst with hemorrhagic component  AAA 5.4 cm saccular aneurysm of the distal abdominal aorta  PUD  Hypertension  Hyperlipidemia  Tobacco abuse  History of  07/01/24  1039   TROPHS 87* 81* 79*      FASTING LIPID PANEL:  Lab Results   Component Value Date/Time    CHOL 121 07/02/2024 04:12 AM    HDL 43 07/02/2024 04:12 AM    TRIG 125 07/02/2024 04:12 AM     LIVER PROFILE:  Recent Labs     07/01/24  0256   AST 20   ALT 11             EKG July 1, 2024 at 5:45 AM shows VT 23 bpm at the minimum with a rate of 147 bpm    Assessment  Chest pain  Elevated Cardiac enzymes  Light headed  Dizziness  Elevated BNP  NSVT (at least 23 beats at least 23 beats) , now on Amiodarone  HFrEF/next cardiomyopathy  S/p [employer] CRT-D (DOI: 4/29/22)  GDMT limited due to renal failure  Permanent atrial fibrillation  OAC with Eliquis (dose adjusted for age and renal function)  Chronic RBBB/LPFB  COPD  Chronic hypoxic respiratory failure-4 L O2  Hypertension  Hyperlipidemia  ESRD on HD M& F  Mondays and Fridays via a right IJ TDC   Anemia  Pancreatic pseudocyst with hemorrhagic component  AAA 5.4 cm saccular aneurysm of the distal abdominal aorta  PUD  Hypertension  Hyperlipidemia  Tobacco abuse  History of CVA  Thrombocytopenia    Plan:   Spoke with patient at length who requested I call her daughter to update her about her cardiac condition.  I also talked to electrophysiology and [employer] device rep.  Patient had episode of ventricular tachycardia which were aborted by ATP and there were no shocks.  Apparently her device is programmed for VT detection zone of 150 bpm and ATP after 90 beats of VT.  Patient is on amiodarone IV and will transition to amiodarone p.o. 400 mg p.o. twice a day for 1 week then 400 mg p.o. daily for 1 week then 200 mg p.o. daily  The patient daughter reported patient has been having recurrent VT's and recently was at [facility] for VT's requiring lidocaine and amiodarone.  Volume management per nephrology  Patient daughter report patient had a heart catheterization at [facility] last month and there was no PCI performed           Patient has

## 2024-07-03 NOTE — PROGRESS NOTES
Internal Medicine Progress Note     [facility]     Jose Rodrigez D.O., DALEOChepeI.                         Eddi Almonte D.O., DALEOChepeI.  Paulino Abad D.O.     Ifrah Dave, MSN, APRN, NP-C  Edward Edwards, MSN, APRN-CNP  Aiden Muñoz, MSN, APRN, NP-C  Yasemin Bueno, MSN, APRN-CNP  Palmira Gabriel, MSN, APRN, NP-C     Primary Care Physician: Marek Andres DO   Admitting Physician:  Eddi Almonte DO  Admission date and time: 7/1/2024  2:24 AM    Room:  [place] [address]  Admitting diagnosis: Paroxysmal ventricular tachycardia (HCC) [I47.29]  Ventricular arrhythmia [I49.9]  Chest pain, unspecified type [R07.9]  Acute on chronic congestive heart failure, unspecified heart failure type (HCC) [I50.9]    Patient Name: Marge Callejas  MRN: [id number]    Date of Service: 7/3/2024     Subjective:  Marge is a 84 y.o. female who was seen and examined today,7/3/2024, at the bedside.  Marge was evaluated in the presence of her sisters today.  She is significantly better than on my examination yesterday.  Her mentation has returned to baseline.  She does describe diffuse musculoskeletal pain particularly on her left side.  She has no neurologic deficits.  We are awaiting device interrogation and further recommendations from the cardiovascular team as she is maintained on amiodarone infusion with paced rhythm.    Review of System:   Constitutional:   Much more awake and alert today.  HEENT:   Denies ear pain, sore throat, sinus or eye problems.  Cardiovascular:   Denies any chest pain, irregular heartbeats, or palpitations.   Respiratory:   No current shortness of breath.  Gastrointestinal:   Poor appetite overall and therefore decreased oral intake.  Genitourinary:    Denies any urgency, frequency, hematuria.  Voids very little in the setting of hemodialysis.  Extremities:   Denies lower extremity swelling, edema or cyanosis.   Neurology:    Denies any headache or focal neurological  the ICU due to the complexity of decision management and chance of rapid decline or death.  Continued cardiac monitoring and higher level of nursing are required. I am readily available for any further decision-making and intervention.     Eddi Almonte DO, F.A.C.O.I.  7/3/2024  12:46 PM

## 2024-07-03 NOTE — FLOWSHEET NOTE
07/02/24 2000   Vital Signs   /75   Pulse 75   Respirations 14   SpO2 95 %   Post-Hemodialysis Assessment   Post-Treatment Procedures Blood returned;Catheter capped, clamped and heparinized x 2 ports   Machine Disinfection Process Acid/Vinegar Clean;Heat Disinfect;Exterior Machine Disinfection   Rinseback Volume (ml) 300 ml   Blood Volume Processed (Liters) 58.2 L   Dialyzer Clearance Lightly streaked   Duration of Treatment (minutes) 210 minutes   Hemodialysis Intake (ml) 300 ml   Hemodialysis Output (ml) 300 ml   NET Removed (ml) 0   Tolerated Treatment Good   Patient Response to Treatment HD tolerated as ordered. Lines flushed and capped x2 per protocol. Report given.

## 2024-07-03 NOTE — CARE COORDINATION
7/3/2024 ICU- pt improvement in mental status per nursing. Pt is currently sleeping. Amiodarone gtt LA 2.6- improving, CT head wnl, 4lnc. [place] prior to Dialysis at [facility] @ 10 am ([place]) and [facility] transports her.  PRECERT needed at discharge, LUBA needs signed. Joanne with [facility]- to follow. They can accept back if they have a bed available.  I have met with pts sister Sabrina- the daughter Jonathan lives in [place]- 3 hours behind us. CM will reach to daughter Jonathan later today. Pastoral care and CM following.  Electronically signed by Eduarda Rhodes RN-BC on 7/3/2024 at 9:12 AM

## 2024-07-03 NOTE — PROGRESS NOTES
Patient transferred via transport team to the floor 633 on 4L NC. Patient belongings accounted for no belongings left in room. Patient daughter has been notified

## 2024-07-03 NOTE — PROGRESS NOTES
OCCUPATIONAL THERAPY INITIAL EVALUATION    [facility]  [address] [place]. [place]        Date:7/3/2024                                                  Patient Name: Marge Callejas    MRN: [id number]    : 1940    Room: [place] [address]      Evaluating OT: Gavin Morelos OTR/L; #[id number]     Referring Provider and Specific Provider Orders/Date:      24  OT eval and treat  Start:  24,   End:  24,   ONE TIME,   Standing Count:  1 Occurrences,   R         Edward Edwards, APRN - CNP      Placement Recommendation: Subacute Rehab       Diagnosis:   1. Paroxysmal ventricular tachycardia (HCC)    2. Chest pain, unspecified type    3. Acute on chronic congestive heart failure, unspecified heart failure type (HCC)    4. Congestive heart failure, unspecified HF chronicity, unspecified heart failure type (HCC)         Surgery: None      Pertinent Medical History:       Past Medical History:   Diagnosis Date    Arthritis     Atrial fibrillation (HCC)     Blood circulation, collateral     CHF (congestive heart failure) (HCC)     Chronic renal insufficiency, stage IV (severe) (HCC) 2016    Coronary artery disease involving native coronary artery of native heart without angina pectoris 2024    History of cardiovascular stress test 2015    Lexiscan stress test    History of echocardiogram 2015    EF 29%    Hyperlipidemia     Hypertension     Mixed restrictive and obstructive lung disease (HCC) 2023         Past Surgical History:   Procedure Laterality Date    COLONOSCOPY N/A 4/3/2021    COLONOSCOPY POLYPECTOMY HOT SNARE performed by Lucio Nelson DO at [facility] ENDOSCOPY    COLONOSCOPY  4/3/2021    COLONOSCOPY WITH BIOPSY performed by Lucio Nelson DO at [facility] ENDOSCOPY    COLONOSCOPY  4/3/2021    COLONOSCOPY CONTROL HEMORRHAGE performed by Lucio Nelson DO at [facility] ENDOSCOPY    COLONOSCOPY  4/3/2021    COLONOSCOPY

## 2024-07-03 NOTE — PROGRESS NOTES
PULMONARY  CRITICAL CARE   SERVICE DAILY PROGRESS  NOTE     7/3/2024   Hospital  LOS: 2 days      Admit date- 7/1/2024       Initially admitted for -   Chest Pain (Pt comes to the ED from [facility] in [place] with c/o chest pain. Given 324 ASA and 1 nitro PTA by EMS- pt has cardiac hx with pacemaker. )     Subjective:      Transfer to floor was held secondary to AMS and elevated lactate( new) - now resolved   Overnight uneventful   Tolerating PO diet well  No fevers overnight   Appetite improving   Fio2 @ 4 lpm   Remains of IV amio gtt , HR controlled @ 70s     Review of Systems        General- No headaches , dizzinesss, syncope   Pulmonary - No chest pain , hemoptysis   Cardiovascular- + chest pain- resolving   GI- No abd pain ,No difficulty swallowing, diarrhea , no vomiting   Musculoskeletal- No extremity weakness, no edema , no cyanosis   Genitourinary- No burning urination, no dysuria, no incontinence  Neuro- NO syncope , AMS  Or weakness , No tingling , no numbness.   Skin- No rashes , no cyanosis.   Psychiatric/Behavioral: Negative for confusion, hallucinations and sleep disturbance. The patient is nervous/anxious.                      Allergies:No Known Allergies  Home Meds:  Prior to Admission medications    Medication Sig Start Date End Date Taking? Authorizing Provider   amiodarone (CORDARONE) 200 MG tablet Take 2 tablets by mouth daily   Yes Alonso Fonseca MD   brompheniramine-pseudoephedrine 1-15 MG/5ML ELIX elixir Take 5 mLs by mouth every 6 hours as needed   Yes Alonso Fonseca MD   apixaban (ELIQUIS) 5 MG TABS tablet Take 1 tablet by mouth 2 times daily   Yes Alonso Fonseca MD   carvedilol (COREG) 3.125 MG tablet Take 1 tablet by mouth 2 times daily (with meals)   Yes Alonso Fonseca MD   hydrALAZINE (APRESOLINE) 25 MG tablet Take 1.5 tablets by mouth 3 times daily   Yes Alonso Fonseca MD   isosorbide dinitrate (ISORDIL) 20 MG tablet Take 1 tablet by mouth 3 times

## 2024-07-04 ENCOUNTER — APPOINTMENT (OUTPATIENT)
Dept: GENERAL RADIOLOGY | Age: 84
DRG: 308 | End: 2024-07-04
Payer: MEDICARE

## 2024-07-04 LAB
ANION GAP SERPL CALCULATED.3IONS-SCNC: 12 MMOL/L (ref 7–16)
B PARAP IS1001 DNA NPH QL NAA+NON-PROBE: NOT DETECTED
B PERT DNA SPEC QL NAA+PROBE: NOT DETECTED
BASOPHILS # BLD: 0 K/UL (ref 0–0.2)
BASOPHILS NFR BLD: 0 % (ref 0–2)
BUN SERPL-MCNC: 26 MG/DL (ref 6–23)
C PNEUM DNA NPH QL NAA+NON-PROBE: NOT DETECTED
CALCIUM SERPL-MCNC: 8.8 MG/DL (ref 8.6–10.2)
CHLORIDE SERPL-SCNC: 95 MMOL/L (ref 98–107)
CO2 SERPL-SCNC: 26 MMOL/L (ref 22–29)
CREAT SERPL-MCNC: 4.3 MG/DL (ref 0.5–1)
EOSINOPHIL # BLD: 0.09 K/UL (ref 0.05–0.5)
EOSINOPHILS RELATIVE PERCENT: 1 % (ref 0–6)
ERYTHROCYTE [DISTWIDTH] IN BLOOD BY AUTOMATED COUNT: 21 % (ref 11.5–15)
FLUAV RNA NPH QL NAA+NON-PROBE: NOT DETECTED
FLUBV RNA NPH QL NAA+NON-PROBE: NOT DETECTED
GFR, ESTIMATED: 10 ML/MIN/1.73M2
GLUCOSE BLD-MCNC: 147 MG/DL (ref 74–99)
GLUCOSE BLD-MCNC: 164 MG/DL (ref 74–99)
GLUCOSE SERPL-MCNC: 115 MG/DL (ref 74–99)
HADV DNA NPH QL NAA+NON-PROBE: NOT DETECTED
HCOV 229E RNA NPH QL NAA+NON-PROBE: NOT DETECTED
HCOV HKU1 RNA NPH QL NAA+NON-PROBE: NOT DETECTED
HCOV NL63 RNA NPH QL NAA+NON-PROBE: NOT DETECTED
HCOV OC43 RNA NPH QL NAA+NON-PROBE: NOT DETECTED
HCT VFR BLD AUTO: 26.8 % (ref 34–48)
HGB BLD-MCNC: 8.4 G/DL (ref 11.5–15.5)
HMPV RNA NPH QL NAA+NON-PROBE: NOT DETECTED
HPIV1 RNA NPH QL NAA+NON-PROBE: NOT DETECTED
HPIV2 RNA NPH QL NAA+NON-PROBE: NOT DETECTED
HPIV3 RNA NPH QL NAA+NON-PROBE: NOT DETECTED
HPIV4 RNA NPH QL NAA+NON-PROBE: NOT DETECTED
LYMPHOCYTES NFR BLD: 1.03 K/UL (ref 1.5–4)
LYMPHOCYTES RELATIVE PERCENT: 10 % (ref 20–42)
M PNEUMO DNA NPH QL NAA+NON-PROBE: NOT DETECTED
MAGNESIUM SERPL-MCNC: 2.2 MG/DL (ref 1.6–2.6)
MCH RBC QN AUTO: 32.9 PG (ref 26–35)
MCHC RBC AUTO-ENTMCNC: 31.3 G/DL (ref 32–34.5)
MCV RBC AUTO: 105.1 FL (ref 80–99.9)
MONOCYTES NFR BLD: 0.66 K/UL (ref 0.1–0.95)
MONOCYTES NFR BLD: 6 % (ref 2–12)
NEUTROPHILS NFR BLD: 84 % (ref 43–80)
NEUTS SEG NFR BLD: 9.02 K/UL (ref 1.8–7.3)
NUCLEATED RED BLOOD CELLS: 3 PER 100 WBC
PHOSPHATE SERPL-MCNC: 1.7 MG/DL (ref 2.5–4.5)
PLATELET # BLD AUTO: 283 K/UL (ref 130–450)
PMV BLD AUTO: 11.6 FL (ref 7–12)
POTASSIUM SERPL-SCNC: 4 MMOL/L (ref 3.5–5)
RBC # BLD AUTO: 2.55 M/UL (ref 3.5–5.5)
RBC # BLD: ABNORMAL 10*6/UL
RSV RNA NPH QL NAA+NON-PROBE: NOT DETECTED
RV+EV RNA NPH QL NAA+NON-PROBE: NOT DETECTED
SARS-COV-2 RNA NPH QL NAA+NON-PROBE: NOT DETECTED
SODIUM SERPL-SCNC: 133 MMOL/L (ref 132–146)
SPECIMEN DESCRIPTION: NORMAL
WBC OTHER # BLD: 10.8 K/UL (ref 4.5–11.5)

## 2024-07-04 PROCEDURE — 82962 GLUCOSE BLOOD TEST: CPT

## 2024-07-04 PROCEDURE — 2500000003 HC RX 250 WO HCPCS: Performed by: NURSE PRACTITIONER

## 2024-07-04 PROCEDURE — 36415 COLL VENOUS BLD VENIPUNCTURE: CPT

## 2024-07-04 PROCEDURE — 6370000000 HC RX 637 (ALT 250 FOR IP): Performed by: NURSE PRACTITIONER

## 2024-07-04 PROCEDURE — 2580000003 HC RX 258: Performed by: NURSE PRACTITIONER

## 2024-07-04 PROCEDURE — 84100 ASSAY OF PHOSPHORUS: CPT

## 2024-07-04 PROCEDURE — 0202U NFCT DS 22 TRGT SARS-COV-2: CPT

## 2024-07-04 PROCEDURE — 71046 X-RAY EXAM CHEST 2 VIEWS: CPT

## 2024-07-04 PROCEDURE — 6370000000 HC RX 637 (ALT 250 FOR IP)

## 2024-07-04 PROCEDURE — 83735 ASSAY OF MAGNESIUM: CPT

## 2024-07-04 PROCEDURE — 99233 SBSQ HOSP IP/OBS HIGH 50: CPT | Performed by: INTERNAL MEDICINE

## 2024-07-04 PROCEDURE — 80048 BASIC METABOLIC PNL TOTAL CA: CPT

## 2024-07-04 PROCEDURE — 6370000000 HC RX 637 (ALT 250 FOR IP): Performed by: INTERNAL MEDICINE

## 2024-07-04 PROCEDURE — 2700000000 HC OXYGEN THERAPY PER DAY

## 2024-07-04 PROCEDURE — 85025 COMPLETE CBC W/AUTO DIFF WBC: CPT

## 2024-07-04 PROCEDURE — 2060000000 HC ICU INTERMEDIATE R&B

## 2024-07-04 RX ORDER — MECOBALAMIN 5000 MCG
5 TABLET,DISINTEGRATING ORAL NIGHTLY
Status: DISCONTINUED | OUTPATIENT
Start: 2024-07-04 | End: 2024-07-07 | Stop reason: HOSPADM

## 2024-07-04 RX ADMIN — AMIODARONE HYDROCHLORIDE 400 MG: 200 TABLET ORAL at 10:47

## 2024-07-04 RX ADMIN — CALCIUM ACETATE 667 MG: 667 CAPSULE ORAL at 18:34

## 2024-07-04 RX ADMIN — METOPROLOL SUCCINATE 12.5 MG: 25 TABLET, EXTENDED RELEASE ORAL at 10:47

## 2024-07-04 RX ADMIN — PANTOPRAZOLE SODIUM 40 MG: 40 TABLET, DELAYED RELEASE ORAL at 06:16

## 2024-07-04 RX ADMIN — Medication 5 MG: at 21:12

## 2024-07-04 RX ADMIN — CALCIUM ACETATE 667 MG: 667 CAPSULE ORAL at 11:30

## 2024-07-04 RX ADMIN — Medication 10 ML: at 21:13

## 2024-07-04 RX ADMIN — ISOSORBIDE DINITRATE 5 MG: 10 TABLET ORAL at 21:12

## 2024-07-04 RX ADMIN — HYDRALAZINE HYDROCHLORIDE 10 MG: 10 TABLET ORAL at 21:12

## 2024-07-04 RX ADMIN — HYDRALAZINE HYDROCHLORIDE 10 MG: 10 TABLET ORAL at 10:50

## 2024-07-04 RX ADMIN — ISOSORBIDE DINITRATE 5 MG: 10 TABLET ORAL at 10:47

## 2024-07-04 RX ADMIN — CALCIUM ACETATE 667 MG: 667 CAPSULE ORAL at 08:00

## 2024-07-04 RX ADMIN — ASPIRIN 81 MG: 81 TABLET, COATED ORAL at 10:47

## 2024-07-04 RX ADMIN — Medication 1 TABLET: at 10:48

## 2024-07-04 RX ADMIN — Medication 10 ML: at 10:48

## 2024-07-04 RX ADMIN — AMIODARONE HYDROCHLORIDE 400 MG: 200 TABLET ORAL at 21:12

## 2024-07-04 RX ADMIN — APIXABAN 2.5 MG: 2.5 TABLET, FILM COATED ORAL at 10:47

## 2024-07-04 RX ADMIN — ATORVASTATIN CALCIUM 40 MG: 40 TABLET, FILM COATED ORAL at 21:13

## 2024-07-04 RX ADMIN — HYDROCODONE BITARTRATE AND ACETAMINOPHEN 0.5 TABLET: 5; 325 TABLET ORAL at 10:45

## 2024-07-04 RX ADMIN — APIXABAN 2.5 MG: 2.5 TABLET, FILM COATED ORAL at 21:12

## 2024-07-04 RX ADMIN — BUMETANIDE 2 MG: 1 TABLET ORAL at 10:47

## 2024-07-04 ASSESSMENT — PAIN DESCRIPTION - LOCATION
LOCATION: RIB CAGE
LOCATION: OTHER (COMMENT);FLANK

## 2024-07-04 ASSESSMENT — PAIN DESCRIPTION - PAIN TYPE
TYPE: ACUTE PAIN
TYPE: ACUTE PAIN

## 2024-07-04 ASSESSMENT — PAIN - FUNCTIONAL ASSESSMENT
PAIN_FUNCTIONAL_ASSESSMENT: PREVENTS OR INTERFERES SOME ACTIVE ACTIVITIES AND ADLS
PAIN_FUNCTIONAL_ASSESSMENT: PREVENTS OR INTERFERES SOME ACTIVE ACTIVITIES AND ADLS

## 2024-07-04 ASSESSMENT — PAIN SCALES - GENERAL
PAINLEVEL_OUTOF10: 0
PAINLEVEL_OUTOF10: 5
PAINLEVEL_OUTOF10: 6
PAINLEVEL_OUTOF10: 5

## 2024-07-04 ASSESSMENT — PAIN DESCRIPTION - ORIENTATION
ORIENTATION: LEFT;UPPER
ORIENTATION: LEFT;UPPER

## 2024-07-04 ASSESSMENT — PAIN DESCRIPTION - FREQUENCY
FREQUENCY: INTERMITTENT
FREQUENCY: INTERMITTENT

## 2024-07-04 ASSESSMENT — PAIN DESCRIPTION - DESCRIPTORS
DESCRIPTORS: ACHING;SORE
DESCRIPTORS: ACHING;SORE

## 2024-07-04 NOTE — PROGRESS NOTES
Inpatient Cardiology Consultation      Reason for Consult: Sustained tachyarrhythmia    Consulting Physician: Dr. Valdez     Requesting Physician: Dr. Almonte    Date of Consultation: 7/4/2024    History of Present Illness:   Marge Callejas is an 83-year-old female known to Dr. Mclaughlin.  She was most seen through inpatient consultation on 4/17/2024 with Dr. Brunner for chest pain.  Patient was found to have an abnormal stress test reading large sized fixed perfusion defect in the inferior wall, moderate-sized perfusion defects in the apex and moderate size partially reversible perfusion defect in the inferolateral wall.      In room:  Denies chest pain or shortness of breath  I spoke with electrophysiology and [employer] device rep on July 3, 2024.  I also spoke with patient daughter at length on July third 2024  Patient had episode of ventricular tachycardia which were aborted by ATP and there were no shocks.  Apparently her device is programmed for VT detection zone of 150 bpm and ATP after 90 beats of VT.  Patient is on amiodarone IV and will transition to amiodarone p.o. 400 mg p.o. twice a day for 1 week then 400 mg p.o. daily for 1 week then 200 mg p.o. daily  The patient daughter reported patient has been having recurrent VT's and recently was at [facility] for VT's requiring lidocaine and amiodarone.  Currently no recurrent VT with amiodarone therapy      Past Medical History:    HFrEF/mixed cardiomyopathy  S/p [employer] CRT-D (DOI: 4/29/22)  GDMT limited due to renal failure  Permanent atrial fibrillation  OAC with Eliquis (dose adjusted for age and renal function)  Chronic RBBB/LPFB  COPD  Chronic hypoxic respiratory failure-4 L O2  Hypertension  Hyperlipidemia  ESRD on HD  Mondays and Fridays via a right IJ TDC   Anemia  Pancreatic pseudocyst with hemorrhagic component  AAA 5.4 cm saccular aneurysm of the distal abdominal aorta  PUD  Hypertension  Hyperlipidemia  Tobacco abuse  History of  CVA  Thrombocytopenia    Past Surgical History:    Past Surgical History:   Procedure Laterality Date    COLONOSCOPY N/A 4/3/2021    COLONOSCOPY POLYPECTOMY HOT SNARE performed by Lucio Nelson DO at [facility] ENDOSCOPY    COLONOSCOPY  4/3/2021    COLONOSCOPY WITH BIOPSY performed by Lucio Nelson DO at [facility] ENDOSCOPY    COLONOSCOPY  4/3/2021    COLONOSCOPY CONTROL HEMORRHAGE performed by Lucio Nelson DO at [facility] ENDOSCOPY    COLONOSCOPY  4/3/2021    COLONOSCOPY SUBMUCOSAL SPOT INJECTION performed by Lucio Nelson DO at [facility] ENDOSCOPY    COLONOSCOPY N/A 9/19/2023    COLONOSCOPY DIAGNOSTIC performed by Scooby Cormier MD at [facility] ENDOSCOPY    ECTOPIC PREGNANCY SURGERY      UPPER GASTROINTESTINAL ENDOSCOPY N/A 4/3/2021    EGD BIOPSY performed by Lucio Nelson DO at [facility] ENDOSCOPY    UPPER GASTROINTESTINAL ENDOSCOPY N/A 4/7/2021    EGD W/EUS FNA performed by Ana Gandhi MD at [facility] ENDOSCOPY       Prior Cardiac Testing:    [facility] 2021 ([facility])  \"Single-vessel native artery coronary artery disease: 90% mid RCA stenosis. The distal vessel and branches fill both antegradely as well as via left-to-right collaterals.\" Treated medically.    Stress 4/19/2024   large-sized fixed perfusion defect in the inferior wall, moderate-sized fixed perfusion defect in the apex and a moderate-sized partially reversible perfusion defect in the inferolateral wall. LVEF 42% with global hypokinesis.    TTE 6/12/24 [place]  EF Calculated: 27 % The left ventricle is severely dilated. There is mild concentric left ventricular hypertrophy. There is evidence of a dilated cardiomyopathy. Left ventricle ejection fraction is severely reduced. Right ventricle size is at the upper limits of normal. Right ventricle systolic function is mildly reduced.   Findings MITRAL VALVE: Structure is normal. There is no stenosis. There is mild-to-moderate regurgitation: the effective regurgitant orifice (ERO) is 0.29 cm 2; the regurgitant volume (RV) is  Hospital last month and there was no PCI performed           Patient has known history of heart failure with reduced EF   Continue on Isordil, low-dose hydralazine and metoprolol succinate  Currently not on ACE, ARB, Entresto or spironolactone due to kidney dysfunction requiring hemodialysis  Eventually in the outpatient patient will need to discuss with her cardiologist to see if Entresto and spironolactone could be initiated given ongoing treatment for end-stage renal disease  Not on Jardiance currently due to electrolyte dysfunction   Monitor closely on telemetry  Monitor electrolyte and keep potassium at 4 magnesium at 2  Daily weight, strict renal monitoring, and low-salt  Rest of management per primary and other specialist involved  Follow-up with your electrophysiologist and cardiologist once discharged      Greater than 50 minutes was spent counseling the patient, reviewing the rationale for the above recommendations and reviewing the patient's current medication list, problem list and results of all previously ordered testing.         Shon Valdez MD  [facility]

## 2024-07-04 NOTE — PLAN OF CARE
Problem: Discharge Planning  Goal: Discharge to home or other facility with appropriate resources  Outcome: Progressing  Flowsheets (Taken 7/3/2024 1930)  Discharge to home or other facility with appropriate resources: Identify barriers to discharge with patient and caregiver     Problem: Pain  Goal: Verbalizes/displays adequate comfort level or baseline comfort level  Outcome: Progressing     Problem: Neurosensory - Adult  Goal: Achieves stable or improved neurological status  Outcome: Progressing  Flowsheets (Taken 7/3/2024 1930)  Achieves stable or improved neurological status: Assess for and report changes in neurological status     Problem: Respiratory - Adult  Goal: Achieves optimal ventilation and oxygenation  Outcome: Progressing  Flowsheets (Taken 7/3/2024 1930)  Achieves optimal ventilation and oxygenation: Assess for changes in respiratory status     Problem: Cardiovascular - Adult  Goal: Maintains optimal cardiac output and hemodynamic stability  Outcome: Progressing  Flowsheets (Taken 7/3/2024 1930)  Maintains optimal cardiac output and hemodynamic stability: Monitor blood pressure and heart rate

## 2024-07-04 NOTE — PROGRESS NOTES
[facility]  MD Murtaza White, MD Krista Mendoza, CNP   Lucille Beth, JOSE Fernandez, JUDE  Progress Note    7/4/2024    SUBJECTIVE:   7/2: Seen this morning around 10 AM.  Resting comfortably watching television.  Denied complaints.  BP stable overnight.  No arrhythmias.  Administered KCl 40 meq p.o. last evening, presumptively for a potassium of 3.9 at 8:30 PM.  Potassium this morning is 5.5 mmol/L.     7/3: Feeling much better.  BP improved since yesterday, stable.  HD yesterday afternoon without event.  Hemodynamically stable throughout treatment has remained stable posttreatment.  Mental status improved, as well.  Resting comfortably on nasal cannula watching television    7/4: Feeling better.  BP stable.  BFR as prescribed.  Good appetite and intake.  No dyspnea at rest on baseline level of nasal cannula O2 supplementation.  Complaining of low back pain, upper buttocks in location.    PROBLEM LIST:    Principal Problem:    Ventricular arrhythmia  Active Problems:    Paroxysmal ventricular tachycardia (HCC)  Resolved Problems:    * No resolved hospital problems. *         DIET:    ADULT DIET; Dysphagia - Soft and Bite Sized; Low Sodium (2 gm); Low Potassium (Less than 3000 mg/day); 2000 ml     MEDS (scheduled):    melatonin  5 mg Oral Nightly    hydrALAZINE  10 mg Oral 2 times per day    amiodarone  400 mg Oral BID    Followed by    [START ON 7/11/2024] amiodarone  400 mg Oral Daily    Followed by    [START ON 7/18/2024] amiodarone  200 mg Oral Daily    apixaban  2.5 mg Oral BID    aspirin  81 mg Oral Daily    atorvastatin  40 mg Oral Nightly    bumetanide  2 mg Oral Daily    calcium acetate  1 capsule Oral TID WC    isosorbide dinitrate  5 mg Oral BID    therapeutic multivitamin-minerals  1 tablet Oral Daily    pantoprazole  40 mg Oral QAM AC    sodium chloride flush  5-40 mL IntraVENous 2 times per day    metoprolol succinate  12.5 mg Oral Daily        MEDS (infusions):   sodium chloride         MEDS (prn):  ipratropium 0.5 mg-albuterol 2.5 mg, sodium chloride flush, sodium chloride, polyethylene glycol, acetaminophen **OR** acetaminophen, perflutren lipid microspheres, HYDROcodone 5 mg - acetaminophen    PHYSICAL EXAM:     Patient Vitals for the past 24 hrs:   BP Temp Temp src Pulse Resp SpO2 Weight   07/04/24 1444 123/75 97.7 °F (36.5 °C) Oral 75 20 98 % --   07/04/24 1045 -- -- -- -- 17 -- --   07/04/24 1015 98/68 98.6 °F (37 °C) Oral 80 18 98 % --   07/04/24 0636 138/74 97.6 °F (36.4 °C) Oral 76 18 96 % --   07/04/24 0625 -- -- -- -- -- 91 % --   07/04/24 0615 -- -- -- -- -- (!) 87 % --   07/04/24 0600 -- -- -- -- -- -- 62.1 kg (137 lb)   07/04/24 0400 133/68 98.3 °F (36.8 °C) Oral 75 18 93 % --   07/03/24 2330 -- -- -- -- -- 93 % --   07/03/24 2306 122/71 98 °F (36.7 °C) Oral 75 18 91 % --   07/03/24 2028 (!) 136/94 -- -- 72 -- -- --   07/03/24 1930 (!) 114/57 98 °F (36.7 °C) Oral 76 17 93 % --   07/03/24 1800 124/75 -- -- 78 -- 91 % --   07/03/24 1700 137/80 -- -- 74 -- 92 % --   07/03/24 1600 134/77 98.5 °F (36.9 °C) Axillary 75 16 92 % --   @      Intake/Output Summary (Last 24 hours) at 7/4/2024 1508  Last data filed at 7/4/2024 1329  Gross per 24 hour   Intake 120 ml   Output 0 ml   Net 120 ml         Wt Readings from Last 3 Encounters:   07/04/24 62.1 kg (137 lb)   06/07/24 64.4 kg (142 lb)   05/11/24 64.5 kg (142 lb 2 oz)       Constitutional:  in no acute distress  HEENT: NC/AT, EOMI, sclera and conjunctiva are clear and anicteric, mucus membranes moist  Neck: Trachea midline, no JVD  Cardiovascular: S1, S2 regular rhythm, no murmur,or rub  Respiratory:  No crackles, no wheeze  Gastrointestinal:  Soft, nontender, nondistended, NABS  Ext: no edema, feet warm  Skin: dry, no rash  Neuro: awake, alert, interactive and appropriate      DATA:    Recent Labs     07/02/24  0412 07/03/24  0527 07/04/24  0651   WBC 10.5 9.8 10.8   HGB 8.5* 8.0* 8.4*   HCT

## 2024-07-04 NOTE — PROGRESS NOTES
Internal Medicine Progress Note     [facility]     Jose Rodrigez D.O., DALEOChepeI.                         Eddi Almonte D.O., DALEOChepeIChepe Abad D.O.     Ifrah Dave, MSN, APRN, NP-C  Edward Edwards, MSN, APRN-CNP  Aiden Muñoz, MSN, APRN, NP-C  Yasemin Bueno, MSN, APRN-CNP  Palmira Gabriel, MSN, APRN, NP-C     Primary Care Physician: Marek Andres DO   Admitting Physician:  Eddi Almonte DO  Admission date and time: 7/1/2024  2:24 AM    Room:  [address] [place] [address]  Admitting diagnosis: Paroxysmal ventricular tachycardia (HCC) [I47.29]  Ventricular arrhythmia [I49.9]  Chest pain, unspecified type [R07.9]  Acute on chronic congestive heart failure, unspecified heart failure type (HCC) [I50.9]    Patient Name: Marge Callejas  MRN: [id number]    Date of Service: 7/4/2024     Subjective:  Marge is a 84 y.o. female who was seen and examined today,7/4/2024, at the bedside.  Marge is feeling better each day.  Primary complaint is being unable to sleep due to increased sensation of shortness of breath at night.  Otherwise she voices no additional symptoms or concerns.  She is excited to be having some food from home brought in for the holiday.     Review of System:   Constitutional:   Much more awake and alert today.  HEENT:   Denies ear pain, sore throat, sinus or eye problems.  Cardiovascular:   Denies any chest pain, irregular heartbeats, or palpitations.   Respiratory:   No current shortness of breath.  Nocturnal dyspnea is present however.  Gastrointestinal:   Poor appetite overall and therefore decreased oral intake.  Genitourinary:    Denies any urgency, frequency, hematuria.  Voids very little in the setting of hemodialysis.  Extremities:   Chronic lower extremity swelling.   Neurology:    Denies any headache or focal neurological deficits, profound weakness.  Psch:   Significant improvement in her altered mental status.  She has returned to baseline with intact  texture.  Genitalia/Breast:  Deferred    Medication:  Scheduled Meds:   hydrALAZINE  10 mg Oral 2 times per day    amiodarone  400 mg Oral BID    Followed by    [START ON 7/11/2024] amiodarone  400 mg Oral Daily    Followed by    [START ON 7/18/2024] amiodarone  200 mg Oral Daily    apixaban  2.5 mg Oral BID    aspirin  81 mg Oral Daily    atorvastatin  40 mg Oral Nightly    bumetanide  2 mg Oral Daily    calcium acetate  1 capsule Oral TID WC    isosorbide dinitrate  5 mg Oral BID    therapeutic multivitamin-minerals  1 tablet Oral Daily    pantoprazole  40 mg Oral QAM AC    sodium chloride flush  5-40 mL IntraVENous 2 times per day    metoprolol succinate  12.5 mg Oral Daily     Continuous Infusions:   sodium chloride         Objective Data:  CBC with Differential:    Lab Results   Component Value Date/Time    WBC 10.8 07/04/2024 06:51 AM    RBC 2.55 07/04/2024 06:51 AM    HGB 8.4 07/04/2024 06:51 AM    HCT 26.8 07/04/2024 06:51 AM     07/04/2024 06:51 AM    .1 07/04/2024 06:51 AM    MCH 32.9 07/04/2024 06:51 AM    MCHC 31.3 07/04/2024 06:51 AM    RDW 21.0 07/04/2024 06:51 AM    NRBC 3 07/04/2024 06:51 AM    METASPCT 1 06/07/2024 03:53 PM    LYMPHOPCT 10 07/04/2024 06:51 AM    MONOPCT 6 07/04/2024 06:51 AM    MYELOPCT 1 05/06/2024 01:35 PM    MYELOPCT 0.9 04/03/2021 04:44 AM    EOSPCT 1 07/04/2024 06:51 AM    BASOPCT 0 07/04/2024 06:51 AM    MONOSABS 0.66 07/04/2024 06:51 AM    LYMPHSABS 1.03 07/04/2024 06:51 AM    EOSABS 0.09 07/04/2024 06:51 AM    BASOSABS 0.00 07/04/2024 06:51 AM     BMP:    Lab Results   Component Value Date/Time     07/04/2024 06:51 AM    K 4.0 07/04/2024 06:51 AM    K 4.5 05/15/2021 10:58 AM    CL 95 07/04/2024 06:51 AM    CO2 26 07/04/2024 06:51 AM    BUN 26 07/04/2024 06:51 AM    CREATININE 4.3 07/04/2024 06:51 AM    CALCIUM 8.8 07/04/2024 06:51 AM    GFRAA 15 02/02/2022 10:26 AM    LABGLOM 10 07/04/2024 06:51 AM    LABGLOM 14 04/29/2024 02:31 PM    GLUCOSE 115

## 2024-07-04 NOTE — PROGRESS NOTES
4 Eyes Skin Assessment     NAME:  Marge Callejas  YOB: 1940  MEDICAL RECORD NUMBER:  [id number]    The patient is being assessed for  Admission    I agree that at least one RN has performed a thorough Head to Toe Skin Assessment on the patient. ALL assessment sites listed below have been assessed.      Areas assessed by both nurses:    Head, Face, Ears, Shoulders, Back, Chest, Arms, Elbows, Hands, Sacrum. Buttock, Coccyx, Ischium, Legs. Feet and Heels, and Under Medical Devices         Does the Patient have a Wound? No noted wound(s)       Estuardo Prevention initiated by RN: Yes  Wound Care Orders initiated by RN: No    Pressure Injury (Stage 3,4, Unstageable, DTI, NWPT, and Complex wounds) if present, place Wound referral order by RN under : No    New Ostomies, if present place, Ostomy referral order under : No     Nurse 1 eSignature: Electronically signed by Yuki Chang RN on 7/4/24 at 1:28 AM EDT    **SHARE this note so that the co-signing nurse can place an eSignature**    Nurse 2 eSignature: {Esignature:[id number]}

## 2024-07-05 LAB
ANION GAP SERPL CALCULATED.3IONS-SCNC: 12 MMOL/L (ref 7–16)
BASOPHILS # BLD: 0 K/UL (ref 0–0.2)
BASOPHILS NFR BLD: 0 % (ref 0–2)
BUN SERPL-MCNC: 34 MG/DL (ref 6–23)
CALCIUM SERPL-MCNC: 8.9 MG/DL (ref 8.6–10.2)
CHLORIDE SERPL-SCNC: 95 MMOL/L (ref 98–107)
CO2 SERPL-SCNC: 26 MMOL/L (ref 22–29)
CREAT SERPL-MCNC: 5.3 MG/DL (ref 0.5–1)
EOSINOPHIL # BLD: 0.29 K/UL (ref 0.05–0.5)
EOSINOPHILS RELATIVE PERCENT: 3 % (ref 0–6)
ERYTHROCYTE [DISTWIDTH] IN BLOOD BY AUTOMATED COUNT: 21.2 % (ref 11.5–15)
GFR, ESTIMATED: 8 ML/MIN/1.73M2
GLUCOSE SERPL-MCNC: 116 MG/DL (ref 74–99)
HCT VFR BLD AUTO: 26.7 % (ref 34–48)
HGB BLD-MCNC: 8.5 G/DL (ref 11.5–15.5)
LYMPHOCYTES NFR BLD: 0.88 K/UL (ref 1.5–4)
LYMPHOCYTES RELATIVE PERCENT: 8 % (ref 20–42)
MAGNESIUM SERPL-MCNC: 2.1 MG/DL (ref 1.6–2.6)
MCH RBC QN AUTO: 33.3 PG (ref 26–35)
MCHC RBC AUTO-ENTMCNC: 31.8 G/DL (ref 32–34.5)
MCV RBC AUTO: 104.7 FL (ref 80–99.9)
MONOCYTES NFR BLD: 0.49 K/UL (ref 0.1–0.95)
MONOCYTES NFR BLD: 4 % (ref 2–12)
MYELOCYTES ABSOLUTE COUNT: 0.1 K/UL
MYELOCYTES: 1 %
NEUTROPHILS NFR BLD: 84 % (ref 43–80)
NEUTS SEG NFR BLD: 9.43 K/UL (ref 1.8–7.3)
NUCLEATED RED BLOOD CELLS: 3 PER 100 WBC
PHOSPHATE SERPL-MCNC: 1.8 MG/DL (ref 2.5–4.5)
PLATELET # BLD AUTO: 255 K/UL (ref 130–450)
PMV BLD AUTO: 12.2 FL (ref 7–12)
POTASSIUM SERPL-SCNC: 4 MMOL/L (ref 3.5–5)
RBC # BLD AUTO: 2.55 M/UL (ref 3.5–5.5)
RBC # BLD: ABNORMAL 10*6/UL
SODIUM SERPL-SCNC: 133 MMOL/L (ref 132–146)
WBC OTHER # BLD: 11.2 K/UL (ref 4.5–11.5)

## 2024-07-05 PROCEDURE — 6370000000 HC RX 637 (ALT 250 FOR IP)

## 2024-07-05 PROCEDURE — 2500000003 HC RX 250 WO HCPCS: Performed by: NURSE PRACTITIONER

## 2024-07-05 PROCEDURE — 80048 BASIC METABOLIC PNL TOTAL CA: CPT

## 2024-07-05 PROCEDURE — 6370000000 HC RX 637 (ALT 250 FOR IP): Performed by: INTERNAL MEDICINE

## 2024-07-05 PROCEDURE — 2060000000 HC ICU INTERMEDIATE R&B

## 2024-07-05 PROCEDURE — 84100 ASSAY OF PHOSPHORUS: CPT

## 2024-07-05 PROCEDURE — 99233 SBSQ HOSP IP/OBS HIGH 50: CPT | Performed by: INTERNAL MEDICINE

## 2024-07-05 PROCEDURE — 6370000000 HC RX 637 (ALT 250 FOR IP): Performed by: NURSE PRACTITIONER

## 2024-07-05 PROCEDURE — 2580000003 HC RX 258: Performed by: NURSE PRACTITIONER

## 2024-07-05 PROCEDURE — 85025 COMPLETE CBC W/AUTO DIFF WBC: CPT

## 2024-07-05 PROCEDURE — 94640 AIRWAY INHALATION TREATMENT: CPT

## 2024-07-05 PROCEDURE — 83735 ASSAY OF MAGNESIUM: CPT

## 2024-07-05 PROCEDURE — 6360000002 HC RX W HCPCS: Performed by: NURSE PRACTITIONER

## 2024-07-05 PROCEDURE — 36415 COLL VENOUS BLD VENIPUNCTURE: CPT

## 2024-07-05 PROCEDURE — 2700000000 HC OXYGEN THERAPY PER DAY

## 2024-07-05 PROCEDURE — 90935 HEMODIALYSIS ONE EVALUATION: CPT

## 2024-07-05 RX ORDER — SENNA AND DOCUSATE SODIUM 50; 8.6 MG/1; MG/1
2 TABLET, FILM COATED ORAL 2 TIMES DAILY PRN
Status: DISCONTINUED | OUTPATIENT
Start: 2024-07-05 | End: 2024-07-07 | Stop reason: HOSPADM

## 2024-07-05 RX ORDER — BUDESONIDE 0.5 MG/2ML
0.5 INHALANT ORAL
Status: DISCONTINUED | OUTPATIENT
Start: 2024-07-05 | End: 2024-07-07 | Stop reason: HOSPADM

## 2024-07-05 RX ADMIN — Medication 10 ML: at 22:29

## 2024-07-05 RX ADMIN — SENNOSIDES AND DOCUSATE SODIUM 2 TABLET: 50; 8.6 TABLET ORAL at 19:02

## 2024-07-05 RX ADMIN — BUMETANIDE 2 MG: 1 TABLET ORAL at 08:40

## 2024-07-05 RX ADMIN — ASPIRIN 81 MG: 81 TABLET, COATED ORAL at 08:40

## 2024-07-05 RX ADMIN — CALCIUM ACETATE 667 MG: 667 CAPSULE ORAL at 12:00

## 2024-07-05 RX ADMIN — AMIODARONE HYDROCHLORIDE 400 MG: 200 TABLET ORAL at 08:40

## 2024-07-05 RX ADMIN — HYDRALAZINE HYDROCHLORIDE 10 MG: 10 TABLET ORAL at 22:27

## 2024-07-05 RX ADMIN — CALCIUM ACETATE 667 MG: 667 CAPSULE ORAL at 19:02

## 2024-07-05 RX ADMIN — AMIODARONE HYDROCHLORIDE 400 MG: 200 TABLET ORAL at 22:28

## 2024-07-05 RX ADMIN — METOPROLOL SUCCINATE 12.5 MG: 25 TABLET, EXTENDED RELEASE ORAL at 15:53

## 2024-07-05 RX ADMIN — Medication 5 MG: at 22:28

## 2024-07-05 RX ADMIN — APIXABAN 2.5 MG: 2.5 TABLET, FILM COATED ORAL at 08:40

## 2024-07-05 RX ADMIN — IPRATROPIUM BROMIDE AND ALBUTEROL SULFATE 1 DOSE: .5; 2.5 SOLUTION RESPIRATORY (INHALATION) at 19:04

## 2024-07-05 RX ADMIN — ATORVASTATIN CALCIUM 40 MG: 40 TABLET, FILM COATED ORAL at 22:28

## 2024-07-05 RX ADMIN — BUDESONIDE 500 MCG: 0.5 INHALANT RESPIRATORY (INHALATION) at 19:04

## 2024-07-05 RX ADMIN — APIXABAN 2.5 MG: 2.5 TABLET, FILM COATED ORAL at 22:28

## 2024-07-05 RX ADMIN — Medication 10 ML: at 08:42

## 2024-07-05 RX ADMIN — PANTOPRAZOLE SODIUM 40 MG: 40 TABLET, DELAYED RELEASE ORAL at 05:46

## 2024-07-05 RX ADMIN — ISOSORBIDE DINITRATE 5 MG: 10 TABLET ORAL at 08:41

## 2024-07-05 RX ADMIN — ISOSORBIDE DINITRATE 5 MG: 10 TABLET ORAL at 22:28

## 2024-07-05 RX ADMIN — Medication 1 TABLET: at 08:40

## 2024-07-05 RX ADMIN — POLYETHYLENE GLYCOL 3350 17 G: 17 POWDER, FOR SOLUTION ORAL at 05:46

## 2024-07-05 ASSESSMENT — PAIN SCALES - GENERAL
PAINLEVEL_OUTOF10: 10
PAINLEVEL_OUTOF10: 6

## 2024-07-05 ASSESSMENT — PAIN DESCRIPTION - LOCATION: LOCATION: BACK

## 2024-07-05 ASSESSMENT — PAIN DESCRIPTION - DESCRIPTORS: DESCRIPTORS: ACHING

## 2024-07-05 ASSESSMENT — PAIN - FUNCTIONAL ASSESSMENT: PAIN_FUNCTIONAL_ASSESSMENT: PREVENTS OR INTERFERES SOME ACTIVE ACTIVITIES AND ADLS

## 2024-07-05 ASSESSMENT — PAIN SCALES - WONG BAKER: WONGBAKER_NUMERICALRESPONSE: HURTS EVEN MORE

## 2024-07-05 ASSESSMENT — PAIN DESCRIPTION - ORIENTATION: ORIENTATION: LOWER

## 2024-07-05 NOTE — PROGRESS NOTES
Internal Medicine Progress Note     [facility]     Jose Rodrigez D.O., DALEOChepeI.                         Eddi Almonte D.O., DALEOChepeI.  Paulino Abad D.O.     Ifrah Dave, MSN, APRN, NP-C  Edward Edwards, MSN, APRN-CNP  Aiden Muñoz, MSN, APRN, NP-C  Yasemin Bueno, MSN, APRN-CNP  Palmira Gabriel, MSN, APRN, NP-C     Primary Care Physician: Marek Andres DO   Admitting Physician:  Eddi Almonte DO  Admission date and time: 7/1/2024  2:24 AM    Room:  [address] [place] [address]  Admitting diagnosis: Paroxysmal ventricular tachycardia (HCC) [I47.29]  Ventricular arrhythmia [I49.9]  Chest pain, unspecified type [R07.9]  Acute on chronic congestive heart failure, unspecified heart failure type (HCC) [I50.9]    Patient Name: Marge Callejas  MRN: [id number]    Date of Service: 7/5/2024     Subjective:  Marge is a 84 y.o. female who was seen and examined today,7/5/2024, at the bedside.  Marge is feeling better overall.  No new symptoms or concerns are expressed.  She requires precertification for facility return.  She is otherwise medically stable to discharge once this can be arranged.    Review of System:   Constitutional:   Much more awake and alert today.  HEENT:   Denies ear pain, sore throat, sinus or eye problems.  Cardiovascular:   Denies any chest pain, or palpitations.  Respiratory:   No current shortness of breath.  Nocturnal dyspnea is present however.  Gastrointestinal:   Poor appetite overall and therefore decreased oral intake.  Genitourinary:    Denies any urgency, frequency, hematuria.  Voids very little in the setting of hemodialysis.  Extremities:   Chronic lower extremity swelling.   Neurology:    Denies any headache or focal neurological deficits, profound weakness.  Psch:   Significant improvement in her altered mental status.  She has returned to baseline with intact sensorium  Musculoskeletal:    Denies  myalgias, joint complaints or back pain.   Integumentary:  budesonide  0.5 mg Nebulization BID RT    melatonin  5 mg Oral Nightly    hydrALAZINE  10 mg Oral 2 times per day    amiodarone  400 mg Oral BID    Followed by    [START ON 7/11/2024] amiodarone  400 mg Oral Daily    Followed by    [START ON 7/18/2024] amiodarone  200 mg Oral Daily    apixaban  2.5 mg Oral BID    aspirin  81 mg Oral Daily    atorvastatin  40 mg Oral Nightly    bumetanide  2 mg Oral Daily    calcium acetate  1 capsule Oral TID WC    isosorbide dinitrate  5 mg Oral BID    therapeutic multivitamin-minerals  1 tablet Oral Daily    pantoprazole  40 mg Oral QAM AC    sodium chloride flush  5-40 mL IntraVENous 2 times per day    metoprolol succinate  12.5 mg Oral Daily     Continuous Infusions:   sodium chloride       Objective Data:  CBC with Differential:    Lab Results   Component Value Date/Time    WBC 11.2 07/05/2024 05:58 AM    RBC 2.55 07/05/2024 05:58 AM    HGB 8.5 07/05/2024 05:58 AM    HCT 26.7 07/05/2024 05:58 AM     07/05/2024 05:58 AM    .7 07/05/2024 05:58 AM    MCH 33.3 07/05/2024 05:58 AM    MCHC 31.8 07/05/2024 05:58 AM    RDW 21.2 07/05/2024 05:58 AM    NRBC 3 07/05/2024 05:58 AM    METASPCT 1 06/07/2024 03:53 PM    LYMPHOPCT 8 07/05/2024 05:58 AM    MONOPCT 4 07/05/2024 05:58 AM    MYELOPCT 1 07/05/2024 05:58 AM    MYELOPCT 0.9 04/03/2021 04:44 AM    EOSPCT 3 07/05/2024 05:58 AM    BASOPCT 0 07/05/2024 05:58 AM    MONOSABS 0.49 07/05/2024 05:58 AM    LYMPHSABS 0.88 07/05/2024 05:58 AM    EOSABS 0.29 07/05/2024 05:58 AM    BASOSABS 0.00 07/05/2024 05:58 AM     BMP:    Lab Results   Component Value Date/Time     07/05/2024 05:58 AM    K 4.0 07/05/2024 05:58 AM    K 4.5 05/15/2021 10:58 AM    CL 95 07/05/2024 05:58 AM    CO2 26 07/05/2024 05:58 AM    BUN 34 07/05/2024 05:58 AM    CREATININE 5.3 07/05/2024 05:58 AM    CALCIUM 8.9 07/05/2024 05:58 AM    GFRAA 15 02/02/2022 10:26 AM    LABGLOM 8 07/05/2024 05:58 AM    LABGLOM 14 04/29/2024 02:31 PM    GLUCOSE 116 07/05/2024

## 2024-07-05 NOTE — CARE COORDINATION
7/5/2024 219p () SS NOTE: SW confirmed with dtpatric Castillo plan remains for pt to return to [facility] on dc. DYLAN spoke with Joanne Gutierrez [facility] and requested they initiate precert. PT/OT to see for updated notes. Pt will NOT be able to transfer over the weekend as precert not obtained.     Electronically signed by GARY Fan on 7/5/2024 at 2:20 PM

## 2024-07-05 NOTE — PROGRESS NOTES
[facility]  MD Murtaza White, MD Krista Mendoza, CNP   Lucille Beth, JOSE Fernandez, CNP  Progress Note    7/5/2024    SUBJECTIVE:   7/2: Seen this morning around 10 AM.  Resting comfortably watching television.  Denied complaints.  BP stable overnight.  No arrhythmias.  Administered KCl 40 meq p.o. last evening, presumptively for a potassium of 3.9 at 8:30 PM.  Potassium this morning is 5.5 mmol/L.     7/3: Feeling much better.  BP improved since yesterday, stable.  HD yesterday afternoon without event.  Hemodynamically stable throughout treatment has remained stable posttreatment.  Mental status improved, as well.  Resting comfortably on nasal cannula watching television    7/4: Feeling better.  BP stable.  BFR as prescribed.  Good appetite and intake.  No dyspnea at rest on baseline level of nasal cannula O2 supplementation.  Complaining of low back pain, upper buttocks in location.    7/5: Dialysis today without event.  Tolerated therapy.  Feeling well posttreatment.  Denies complaint.  Breathing easily on nasal cannula.    PROBLEM LIST:    Principal Problem:    Ventricular arrhythmia  Active Problems:    Paroxysmal ventricular tachycardia (HCC)  Resolved Problems:    * No resolved hospital problems. *         DIET:    ADULT DIET; Dysphagia - Soft and Bite Sized; Low Sodium (2 gm); Low Potassium (Less than 3000 mg/day); 2000 ml     MEDS (scheduled):    budesonide  0.5 mg Nebulization BID RT    melatonin  5 mg Oral Nightly    hydrALAZINE  10 mg Oral 2 times per day    amiodarone  400 mg Oral BID    Followed by    [START ON 7/11/2024] amiodarone  400 mg Oral Daily    Followed by    [START ON 7/18/2024] amiodarone  200 mg Oral Daily    apixaban  2.5 mg Oral BID    aspirin  81 mg Oral Daily    atorvastatin  40 mg Oral Nightly    bumetanide  2 mg Oral Daily    calcium acetate  1 capsule Oral TID WC    isosorbide dinitrate  5 mg Oral BID    therapeutic

## 2024-07-05 NOTE — PROGRESS NOTES
[facility]  Department of Internal Medicine  Division of Pulmonary, Critical Care and Sleep Medicine  Consult Note      Hang Gonzalez, APRN-CNP        Primary Pulmonologist: Dr. Little    SUBJECTIVE: We are following Marge for acute on chronic respiratory insufficiency. Marge is currently on her baseline oxygen requirement of 4L. She is seen and examined during dialysis. Transferred out of ICU 7/4. No new concerns or complaints, denies shortness of breath.       OBJECTIVE:     PHYSICAL EXAM:   VITALS:   Vitals:    07/05/24 0052 07/05/24 0345 07/05/24 0524 07/05/24 0804   BP: (!) 142/62 130/63  119/65   Pulse: 83 83  74   Resp: 20 22  16   Temp: 97.9 °F (36.6 °C) 97.9 °F (36.6 °C)  98.8 °F (37.1 °C)   TempSrc: Oral Infrared  Oral   SpO2: 92% 90%  90%   Weight:   62.1 kg (137 lb)    Height:            Intake/Output Summary (Last 24 hours) at 7/5/2024 1153  Last data filed at 7/5/2024 0804  Gross per 24 hour   Intake 420 ml   Output --   Net 420 ml        CONSTITUTIONAL:    A&O x 3, NAD  SKIN:     No rash, No suspicious lesions, No skin discoloration  HEENT:     EOMI, MMM, No thrush  NECK:     No bruits, No JVP appreciated. RIJ tunneled dialysis catheter  CV:      RRR,  No murmur, No rubs, No gallops  PULMONARY:    Diminished BS,  No Wheezing, No Rales, No Rhonchi      No noted egophony  ABDOMEN:     Soft, non-tender. BS normal. No R/R/G  EXT:    No deformities .  No clubbing.       trace lower extremity edema, No venous stasis  PULSE:    Appears equal and palpable.  PSYCHIATRIC:   Seems appropriate, No acute psychosis  MS:    No fractures, No gross weakness  NEUROLOGIC:   The clinical assessment is non-focal     DATA: IMAGING & TESTING:     LABORATORY TESTS:    CBC with Differential:    Lab Results   Component Value Date/Time    WBC 11.2 07/05/2024

## 2024-07-05 NOTE — PROGRESS NOTES
Received phone call from patient's daughter Eliza Feldman who reached out to her mother's Electrophysiologist Dr. Wild who originally installed her mother's pacer device located in [facility].  Dr. Wild wasn't available but his on call Dr. Plunkett was and his  Ebony ([phone]) relayed that a consult to them would be needed in order for them to see her mother.  Eliza stated to this nurse to please pass this information on to Primary team and Cardiology so that the doctors could talk with the Electrophysiologist about her mother's pacer.  Eliza is also open to the idea of having [facility] Electrophysiology consulted as well dependent on her mother's condition in case it's best for her mother to be treated while still in the hospital vs waiting to be discharged.   This information was relayed to [facility] for Dr. Almonte.

## 2024-07-05 NOTE — PLAN OF CARE
Problem: Discharge Planning  Goal: Discharge to home or other facility with appropriate resources  Outcome: Progressing  Flowsheets (Taken 7/4/2024 2310)  Discharge to home or other facility with appropriate resources: Identify barriers to discharge with patient and caregiver     Problem: Pain  Goal: Verbalizes/displays adequate comfort level or baseline comfort level  Outcome: Progressing     Problem: Neurosensory - Adult  Goal: Achieves stable or improved neurological status  Outcome: Progressing  Flowsheets (Taken 7/4/2024 2310)  Achieves stable or improved neurological status: Assess for and report changes in neurological status     Problem: Respiratory - Adult  Goal: Achieves optimal ventilation and oxygenation  Outcome: Progressing  Flowsheets (Taken 7/4/2024 2310)  Achieves optimal ventilation and oxygenation: Assess for changes in respiratory status     Problem: Cardiovascular - Adult  Goal: Maintains optimal cardiac output and hemodynamic stability  Outcome: Progressing  Flowsheets (Taken 7/4/2024 2310)  Maintains optimal cardiac output and hemodynamic stability:   Monitor blood pressure and heart rate   Assess for signs of decreased cardiac output   Monitor urine output and notify Licensed Independent Practitioner for values outside of normal range  Goal: Absence of cardiac dysrhythmias or at baseline  Outcome: Progressing  Flowsheets (Taken 7/4/2024 2310)  Absence of cardiac dysrhythmias or at baseline: Monitor cardiac rate and rhythm     Problem: Skin/Tissue Integrity - Adult  Goal: Skin integrity remains intact  Outcome: Progressing  Flowsheets (Taken 7/4/2024 2310)  Skin Integrity Remains Intact: Monitor for areas of redness and/or skin breakdown     Problem: Musculoskeletal - Adult  Goal: Return mobility to safest level of function  Outcome: Progressing  Flowsheets (Taken 7/4/2024 2310)  Return Mobility to Safest Level of Function: Assess patient stability and activity tolerance for standing,  transferring and ambulating with or without assistive devices

## 2024-07-05 NOTE — PROGRESS NOTES
Inpatient Cardiology Consultation      Reason for Consult: Sustained tachyarrhythmia    Consulting Physician: Dr. Valdez     Requesting Physician: Dr. Almonte    Date of Consultation: 7/5/2024    History of Present Illness:   Marge Callejas is an 83-year-old female known to Dr. Mclaughlin.  She was most seen through inpatient consultation on 4/17/2024 with Dr. Brunner for chest pain.  Patient was found to have an abnormal stress test reading large sized fixed perfusion defect in the inferior wall, moderate-sized perfusion defects in the apex and moderate size partially reversible perfusion defect in the inferolateral wall.      In room:  Patient is sleeping comfortably and not in acute distress  Had 16 beats of recurrent nonsustained VT this morning but was asymptomatic and hemodynamically stable.  I spoke with electrophysiology and [employer] device rep on July 3, 2024.  I also spoke with patient daughter at length on July third 2024  Patient had episode of ventricular tachycardia on initial presentation which were aborted by ATP and there were no shocks.  Apparently her device is programmed for VT detection zone of 150 bpm and ATP after 90 beats of VT an shocking at a rate of 180 bpm.  Patient was on amiodarone IV and was transition to amiodarone p.o. 400 mg p.o. twice a day for 1 week then 400 mg p.o. daily for 1 week then 200 mg p.o. daily  Apparently this evening, the patient daughter is requesting electrophysiology to see the patient but our EP colleagues do not come to [facility].  The case was initially discussed with the EP on July 3, 2024 and agreed on amiodarone therapy with less recurrent VT episodes  Please perform device interrogation again tomorrow morning and if frequent VT's consider discussion with EP to see if patient need to be transferred to [facility]  The patient daughter reported patient has been having recurrent VT's and recently was at [facility] for VT's requiring  catheterization at [facility] last month and there was no PCI performed           Patient has known history of heart failure with reduced EF   Continue on Isordil, low-dose hydralazine and metoprolol succinate  Currently not on ACE, ARB, Entresto or spironolactone due to kidney dysfunction requiring hemodialysis  Eventually in the outpatient patient will need to discuss with her cardiologist to see if Entresto and spironolactone could be initiated given ongoing treatment for end-stage renal disease  Not on Jardiance currently due to electrolyte dysfunction   Monitor closely on telemetry  Monitor electrolyte and keep potassium at 4 magnesium at 2  Daily weight, strict renal monitoring, and low-salt  Rest of management per primary and other specialist involved  Follow-up with your electrophysiologist and cardiologist once discharged      Greater than 50 minutes was spent counseling the patient, reviewing the rationale for the above recommendations and reviewing the patient's current medication list, problem list and results of all previously ordered testing.         Shon Valdez MD  [facility]

## 2024-07-05 NOTE — PROGRESS NOTES
Physical Therapy      Physical Therapy    Room #:   0633/0633-01    Date: 2024       Patient Name: Marge Callejas  : 1940      MRN: [id number]     Patient unavailable for physical therapy treatment due to off floor at dialysis. Will attempt PT treatment at a later time. Thank you.      Sherita Dang, PTA    #[id number]

## 2024-07-05 NOTE — FLOWSHEET NOTE
07/05/24 1235   Vital Signs   /62   Pulse 78   Respirations 16   Post-Hemodialysis Assessment   Post-Treatment Procedures Blood returned;Catheter capped, clamped and heparinized x 2 ports   Machine Disinfection Process Acid/Vinegar Clean;Heat Disinfect;Exterior Machine Disinfection   Rinseback Volume (ml) 300 ml   Blood Volume Processed (Liters) 59.7 L   Dialyzer Clearance Lightly streaked   Duration of Treatment (minutes) 210 minutes   Hemodialysis Intake (ml) 300 ml   Hemodialysis Output (ml) 1500 ml   NET Removed (ml) 1200   Tolerated Treatment Good   Patient Response to Treatment HD tolerated as ordered. Lines flushed and capped x2 per protocol. Returned to room in stable condition   Edema Generalized +2;Non-pitting   RUE Edema Trace   LUE Edema Trace   RLE Edema +2;Pitting   LLE Edema +2;Pitting   Time Off 1220   Patient Disposition Return to room   Observations & Evaluations   Level of Consciousness 0   Heart Rhythm Regular   Respiratory Quality/Effort Unlabored

## 2024-07-06 LAB
ANION GAP SERPL CALCULATED.3IONS-SCNC: 11 MMOL/L (ref 7–16)
BASOPHILS # BLD: 0 K/UL (ref 0–0.2)
BASOPHILS NFR BLD: 0 % (ref 0–2)
BUN SERPL-MCNC: 20 MG/DL (ref 6–23)
CALCIUM SERPL-MCNC: 8.6 MG/DL (ref 8.6–10.2)
CHLORIDE SERPL-SCNC: 97 MMOL/L (ref 98–107)
CO2 SERPL-SCNC: 26 MMOL/L (ref 22–29)
CREAT SERPL-MCNC: 3.6 MG/DL (ref 0.5–1)
EKG ATRIAL RATE: 75 BPM
EKG Q-T INTERVAL: 576 MS
EKG QRS DURATION: 174 MS
EKG QTC CALCULATION (BAZETT): 643 MS
EKG R AXIS: -58 DEGREES
EKG T AXIS: 112 DEGREES
EKG VENTRICULAR RATE: 75 BPM
EOSINOPHIL # BLD: 0.17 K/UL (ref 0.05–0.5)
EOSINOPHILS RELATIVE PERCENT: 2 % (ref 0–6)
ERYTHROCYTE [DISTWIDTH] IN BLOOD BY AUTOMATED COUNT: 21.8 % (ref 11.5–15)
GFR, ESTIMATED: 12 ML/MIN/1.73M2
GLUCOSE SERPL-MCNC: 113 MG/DL (ref 74–99)
HCT VFR BLD AUTO: 27 % (ref 34–48)
HGB BLD-MCNC: 8.4 G/DL (ref 11.5–15.5)
LYMPHOCYTES NFR BLD: 0.87 K/UL (ref 1.5–4)
LYMPHOCYTES RELATIVE PERCENT: 9 % (ref 20–42)
MAGNESIUM SERPL-MCNC: 2 MG/DL (ref 1.6–2.6)
MCH RBC QN AUTO: 33.5 PG (ref 26–35)
MCHC RBC AUTO-ENTMCNC: 31.1 G/DL (ref 32–34.5)
MCV RBC AUTO: 107.6 FL (ref 80–99.9)
MICROORGANISM SPEC CULT: NORMAL
MONOCYTES NFR BLD: 1.04 K/UL (ref 0.1–0.95)
MONOCYTES NFR BLD: 10 % (ref 2–12)
NEUTROPHILS NFR BLD: 79 % (ref 43–80)
NEUTS SEG NFR BLD: 7.91 K/UL (ref 1.8–7.3)
NUCLEATED RED BLOOD CELLS: 2 PER 100 WBC
PHOSPHATE SERPL-MCNC: 1.9 MG/DL (ref 2.5–4.5)
PLATELET # BLD AUTO: 234 K/UL (ref 130–450)
PMV BLD AUTO: 12.1 FL (ref 7–12)
POTASSIUM SERPL-SCNC: 4.3 MMOL/L (ref 3.5–5)
RBC # BLD AUTO: 2.51 M/UL (ref 3.5–5.5)
RBC # BLD: ABNORMAL 10*6/UL
SERVICE CMNT-IMP: NORMAL
SODIUM SERPL-SCNC: 134 MMOL/L (ref 132–146)
SPECIMEN DESCRIPTION: NORMAL
WBC OTHER # BLD: 10 K/UL (ref 4.5–11.5)

## 2024-07-06 PROCEDURE — 6370000000 HC RX 637 (ALT 250 FOR IP): Performed by: NURSE PRACTITIONER

## 2024-07-06 PROCEDURE — 83735 ASSAY OF MAGNESIUM: CPT

## 2024-07-06 PROCEDURE — 2500000003 HC RX 250 WO HCPCS: Performed by: NURSE PRACTITIONER

## 2024-07-06 PROCEDURE — 84100 ASSAY OF PHOSPHORUS: CPT

## 2024-07-06 PROCEDURE — 6370000000 HC RX 637 (ALT 250 FOR IP)

## 2024-07-06 PROCEDURE — 6360000002 HC RX W HCPCS: Performed by: NURSE PRACTITIONER

## 2024-07-06 PROCEDURE — 2060000000 HC ICU INTERMEDIATE R&B

## 2024-07-06 PROCEDURE — 99233 SBSQ HOSP IP/OBS HIGH 50: CPT | Performed by: INTERNAL MEDICINE

## 2024-07-06 PROCEDURE — 80048 BASIC METABOLIC PNL TOTAL CA: CPT

## 2024-07-06 PROCEDURE — 2700000000 HC OXYGEN THERAPY PER DAY

## 2024-07-06 PROCEDURE — 36415 COLL VENOUS BLD VENIPUNCTURE: CPT

## 2024-07-06 PROCEDURE — 85025 COMPLETE CBC W/AUTO DIFF WBC: CPT

## 2024-07-06 PROCEDURE — 94640 AIRWAY INHALATION TREATMENT: CPT

## 2024-07-06 PROCEDURE — 6370000000 HC RX 637 (ALT 250 FOR IP): Performed by: INTERNAL MEDICINE

## 2024-07-06 PROCEDURE — 2580000003 HC RX 258: Performed by: NURSE PRACTITIONER

## 2024-07-06 RX ORDER — HYDROCODONE BITARTRATE AND ACETAMINOPHEN 5; 325 MG/1; MG/1
0.5 TABLET ORAL EVERY 8 HOURS PRN
Qty: 8 TABLET | Refills: 0 | Status: SHIPPED | OUTPATIENT
Start: 2024-07-06 | End: 2024-07-11

## 2024-07-06 RX ORDER — AMIODARONE HYDROCHLORIDE 200 MG/1
TABLET ORAL
Qty: 64 TABLET | Refills: 0 | DISCHARGE
Start: 2024-07-06 | End: 2024-08-17

## 2024-07-06 RX ORDER — METOPROLOL SUCCINATE 25 MG/1
12.5 TABLET, EXTENDED RELEASE ORAL DAILY
Qty: 30 TABLET | Refills: 3 | DISCHARGE
Start: 2024-07-07

## 2024-07-06 RX ORDER — HYDRALAZINE HYDROCHLORIDE 10 MG/1
10 TABLET, FILM COATED ORAL EVERY 12 HOURS SCHEDULED
Qty: 90 TABLET | Refills: 3 | DISCHARGE
Start: 2024-07-06

## 2024-07-06 RX ORDER — M-VIT,TX,IRON,MINS/CALC/FOLIC 27MG-0.4MG
1 TABLET ORAL DAILY
DISCHARGE
Start: 2024-07-06

## 2024-07-06 RX ORDER — PROCHLORPERAZINE MALEATE 5 MG/1
5 TABLET ORAL EVERY 6 HOURS PRN
Status: DISCONTINUED | OUTPATIENT
Start: 2024-07-06 | End: 2024-07-07 | Stop reason: HOSPADM

## 2024-07-06 RX ORDER — ASPIRIN 81 MG/1
81 TABLET ORAL DAILY
Qty: 30 TABLET | Refills: 3 | DISCHARGE
Start: 2024-07-07

## 2024-07-06 RX ORDER — ISOSORBIDE DINITRATE 5 MG/1
5 TABLET ORAL 2 TIMES DAILY
Qty: 90 TABLET | Refills: 3 | DISCHARGE
Start: 2024-07-06

## 2024-07-06 RX ORDER — MECOBALAMIN 5000 MCG
5 TABLET,DISINTEGRATING ORAL NIGHTLY
DISCHARGE
Start: 2024-07-06

## 2024-07-06 RX ORDER — SENNA AND DOCUSATE SODIUM 50; 8.6 MG/1; MG/1
2 TABLET, FILM COATED ORAL 2 TIMES DAILY PRN
DISCHARGE
Start: 2024-07-06

## 2024-07-06 RX ORDER — BUMETANIDE 2 MG/1
2 TABLET ORAL DAILY
Qty: 30 TABLET | Refills: 3 | DISCHARGE
Start: 2024-07-06

## 2024-07-06 RX ADMIN — BUMETANIDE 2 MG: 1 TABLET ORAL at 09:26

## 2024-07-06 RX ADMIN — Medication 10 ML: at 09:27

## 2024-07-06 RX ADMIN — Medication 1 TABLET: at 09:26

## 2024-07-06 RX ADMIN — BUDESONIDE 500 MCG: 0.5 INHALANT RESPIRATORY (INHALATION) at 06:32

## 2024-07-06 RX ADMIN — HYDRALAZINE HYDROCHLORIDE 10 MG: 10 TABLET ORAL at 20:33

## 2024-07-06 RX ADMIN — ISOSORBIDE DINITRATE 5 MG: 10 TABLET ORAL at 20:34

## 2024-07-06 RX ADMIN — ISOSORBIDE DINITRATE 5 MG: 10 TABLET ORAL at 09:27

## 2024-07-06 RX ADMIN — POLYETHYLENE GLYCOL 3350 17 G: 17 POWDER, FOR SOLUTION ORAL at 09:26

## 2024-07-06 RX ADMIN — CALCIUM ACETATE 667 MG: 667 CAPSULE ORAL at 09:26

## 2024-07-06 RX ADMIN — PANTOPRAZOLE SODIUM 40 MG: 40 TABLET, DELAYED RELEASE ORAL at 06:25

## 2024-07-06 RX ADMIN — HYDRALAZINE HYDROCHLORIDE 10 MG: 10 TABLET ORAL at 09:26

## 2024-07-06 RX ADMIN — ATORVASTATIN CALCIUM 40 MG: 40 TABLET, FILM COATED ORAL at 20:32

## 2024-07-06 RX ADMIN — AMIODARONE HYDROCHLORIDE 400 MG: 200 TABLET ORAL at 20:33

## 2024-07-06 RX ADMIN — ASPIRIN 81 MG: 81 TABLET, COATED ORAL at 09:26

## 2024-07-06 RX ADMIN — APIXABAN 2.5 MG: 2.5 TABLET, FILM COATED ORAL at 09:27

## 2024-07-06 RX ADMIN — CALCIUM ACETATE 667 MG: 667 CAPSULE ORAL at 17:42

## 2024-07-06 RX ADMIN — METOPROLOL SUCCINATE 12.5 MG: 25 TABLET, EXTENDED RELEASE ORAL at 09:26

## 2024-07-06 RX ADMIN — Medication 10 ML: at 20:35

## 2024-07-06 RX ADMIN — Medication 5 MG: at 20:32

## 2024-07-06 RX ADMIN — PROCHLORPERAZINE MALEATE 5 MG: 5 TABLET ORAL at 13:56

## 2024-07-06 RX ADMIN — CALCIUM ACETATE 667 MG: 667 CAPSULE ORAL at 13:00

## 2024-07-06 RX ADMIN — APIXABAN 2.5 MG: 2.5 TABLET, FILM COATED ORAL at 20:32

## 2024-07-06 RX ADMIN — AMIODARONE HYDROCHLORIDE 400 MG: 200 TABLET ORAL at 09:27

## 2024-07-06 RX ADMIN — SENNOSIDES AND DOCUSATE SODIUM 2 TABLET: 50; 8.6 TABLET ORAL at 09:26

## 2024-07-06 ASSESSMENT — PAIN SCALES - WONG BAKER: WONGBAKER_NUMERICALRESPONSE: HURTS EVEN MORE

## 2024-07-06 ASSESSMENT — PAIN DESCRIPTION - DESCRIPTORS: DESCRIPTORS: SORE

## 2024-07-06 ASSESSMENT — PAIN DESCRIPTION - ORIENTATION: ORIENTATION: LOWER;LEFT

## 2024-07-06 ASSESSMENT — PAIN DESCRIPTION - PAIN TYPE: TYPE: ACUTE PAIN

## 2024-07-06 ASSESSMENT — PAIN - FUNCTIONAL ASSESSMENT: PAIN_FUNCTIONAL_ASSESSMENT: ACTIVITIES ARE NOT PREVENTED

## 2024-07-06 ASSESSMENT — PAIN DESCRIPTION - FREQUENCY: FREQUENCY: INTERMITTENT

## 2024-07-06 ASSESSMENT — PAIN DESCRIPTION - LOCATION: LOCATION: BACK;OTHER (COMMENT)

## 2024-07-06 ASSESSMENT — PAIN SCALES - GENERAL: PAINLEVEL_OUTOF10: 4

## 2024-07-06 NOTE — PROGRESS NOTES
Internal Medicine Progress Note     [facility]     Jose Rodrigez D.O., DALEOChepeI.                         Eddi Almonte D.O., DALEOChepeI.  Paulino Abad D.O.     Ifrah Dave, MSN, APRN, NP-C  Edward Edwards, MSN, APRN-CNP  Aiden Muñoz, MSN, APRN, NP-C  Yasemin Bueno, MSN, APRN-CNP  Palmira Gabriel, MSN, APRN, NP-C     Primary Care Physician: Marek Andres DO   Admitting Physician:  Eddi Almonte DO  Admission date and time: 7/1/2024  2:24 AM    Room:  [address] [place] [address]  Admitting diagnosis: Paroxysmal ventricular tachycardia (HCC) [I47.29]  Ventricular arrhythmia [I49.9]  Chest pain, unspecified type [R07.9]  Acute on chronic congestive heart failure, unspecified heart failure type (HCC) [I50.9]    Patient Name: Marge Callejas  MRN: [id number]    Date of Service: 7/6/2024     Subjective:  Marge is a 84 y.o. female who was seen and examined today,7/6/2024, at the bedside.  Marge is feeling better overall.  She is tolerating current treatment without difficulty.  We discussed placement in the skilled nursing facility likely tomorrow.  Patient's daughter has been called and updated by Dr. Almonte.  No new symptoms or concerns.    Review of System:   Constitutional:   Much more awake and alert today.  HEENT:   Denies ear pain, sore throat, sinus or eye problems.  Cardiovascular:   Denies any chest pain, or palpitations.  Respiratory:   No current shortness of breath.  Nocturnal dyspnea has resolved.  Gastrointestinal:   Appetite and intake are improving.  No abdominal pain, nausea or vomiting..  Genitourinary:    Voids very little in the setting of hemodialysis.  Extremities:   Chronic lower extremity swelling.   Neurology:    Denies any headache or focal neurological deficits, profound weakness.  Psch:   No further altered mental status.  She has returned to baseline with intact sensorium  Musculoskeletal:    Denies  myalgias, joint complaints or back pain.   Integumentary:  texture.  Genitalia/Breast:  Deferred    Medication:  Scheduled Meds:   budesonide  0.5 mg Nebulization BID RT    melatonin  5 mg Oral Nightly    hydrALAZINE  10 mg Oral 2 times per day    amiodarone  400 mg Oral BID    Followed by    [START ON 7/11/2024] amiodarone  400 mg Oral Daily    Followed by    [START ON 7/18/2024] amiodarone  200 mg Oral Daily    apixaban  2.5 mg Oral BID    aspirin  81 mg Oral Daily    atorvastatin  40 mg Oral Nightly    bumetanide  2 mg Oral Daily    calcium acetate  1 capsule Oral TID WC    isosorbide dinitrate  5 mg Oral BID    therapeutic multivitamin-minerals  1 tablet Oral Daily    pantoprazole  40 mg Oral QAM AC    sodium chloride flush  5-40 mL IntraVENous 2 times per day    metoprolol succinate  12.5 mg Oral Daily     Continuous Infusions:   sodium chloride       Objective Data:  CBC with Differential:    Lab Results   Component Value Date/Time    WBC 10.0 07/06/2024 06:34 AM    RBC 2.51 07/06/2024 06:34 AM    HGB 8.4 07/06/2024 06:34 AM    HCT 27.0 07/06/2024 06:34 AM     07/06/2024 06:34 AM    .6 07/06/2024 06:34 AM    MCH 33.5 07/06/2024 06:34 AM    MCHC 31.1 07/06/2024 06:34 AM    RDW 21.8 07/06/2024 06:34 AM    NRBC 2 07/06/2024 06:34 AM    METASPCT 1 06/07/2024 03:53 PM    LYMPHOPCT 9 07/06/2024 06:34 AM    MONOPCT 10 07/06/2024 06:34 AM    MYELOPCT 1 07/05/2024 05:58 AM    MYELOPCT 0.9 04/03/2021 04:44 AM    EOSPCT 2 07/06/2024 06:34 AM    BASOPCT 0 07/06/2024 06:34 AM    MONOSABS 1.04 07/06/2024 06:34 AM    LYMPHSABS 0.87 07/06/2024 06:34 AM    EOSABS 0.17 07/06/2024 06:34 AM    BASOSABS 0.00 07/06/2024 06:34 AM     BMP:    Lab Results   Component Value Date/Time     07/06/2024 06:34 AM    K 4.3 07/06/2024 06:34 AM    K 4.5 05/15/2021 10:58 AM    CL 97 07/06/2024 06:34 AM    CO2 26 07/06/2024 06:34 AM    BUN 20 07/06/2024 06:34 AM    CREATININE 3.6 07/06/2024 06:34 AM    CALCIUM 8.6 07/06/2024 06:34 AM    GFRAA 15 02/02/2022 10:26 AM    LABGLOM 12

## 2024-07-06 NOTE — PROGRESS NOTES
[facility]  MD Murtaza White MD Ali Hassan, MD Lisa Kniska, CNP   Lucille Beth, JOSE Fernandez, CNP  Progress Note    7/6/2024    SUBJECTIVE:   7/2: Seen this morning around 10 AM.  Resting comfortably watching television.  Denied complaints.  BP stable overnight.  No arrhythmias.  Administered KCl 40 meq p.o. last evening, presumptively for a potassium of 3.9 at 8:30 PM.  Potassium this morning is 5.5 mmol/L.     7/3: Feeling much better.  BP improved since yesterday, stable.  HD yesterday afternoon without event.  Hemodynamically stable throughout treatment has remained stable posttreatment.  Mental status improved, as well.  Resting comfortably on nasal cannula watching television    7/4: Feeling better.  BP stable.  BFR as prescribed.  Good appetite and intake.  No dyspnea at rest on baseline level of nasal cannula O2 supplementation.  Complaining of low back pain, upper buttocks in location.    7/5: Dialysis today without event.  Tolerated therapy.  Feeling well posttreatment.  Denies complaint.  Breathing easily on nasal cannula.    7/6: Mild dyspepsia and nausea.  Complains of constipation.  Fatigue, not much different than baseline.  Denies dyspnea at rest supine on nasal cannula, also at her baseline.  No chest pain or palpitations.  Denies all other complaint.  ROS unremarkable.  Family at bedside.    PROBLEM LIST:    Principal Problem:    Ventricular arrhythmia  Active Problems:    Paroxysmal ventricular tachycardia (HCC)  Resolved Problems:    * No resolved hospital problems. *         DIET:    ADULT DIET; Dysphagia - Soft and Bite Sized; Low Sodium (2 gm); Low Potassium (Less than 3000 mg/day); 2000 ml     MEDS (scheduled):    budesonide  0.5 mg Nebulization BID RT    melatonin  5 mg Oral Nightly    hydrALAZINE  10 mg Oral 2 times per day    amiodarone  400 mg Oral BID    Followed by    [START ON 7/11/2024] amiodarone  400 mg Oral Daily    Followed

## 2024-07-06 NOTE — DISCHARGE SUMMARY
Internal Medicine Progress Note     [facility]     Jose Rodrigez D.O., DALEOCHRISTY Almonte D.O., [email or web address] Abad D.O.     Ifrah Dave, MSN, APRN, NP-C  Edward Edwards, MSN, APRN-CNP  Aiden Muñoz, MSN, APRN, NP-C  Yasemin Bueno, MSN, APRN-CNP  Palmira Gabriel, MSN, APRN, NP-C       Internal Medicine  Discharge Summary    NAME: Marge Callejas  :  1940  MRN:  [id number]  PCP:Marek Andres DO  ADMITTED: 2024      DISCHARGED: 24    ADMITTING PHYSICIAN: Eddi Almonte DO    CONSULTANT(S):   IP CONSULT TO CARDIOLOGY  IP CONSULT TO SOCIAL WORK  IP CONSULT TO CARDIOLOGY  IP CONSULT TO CRITICAL CARE  IP CONSULT TO NEPHROLOGY     ADMITTING DIAGNOSIS:   Paroxysmal ventricular tachycardia (HCC) [I47.29]  Ventricular arrhythmia [I49.9]  Chest pain, unspecified type [R07.9]  Acute on chronic congestive heart failure, unspecified heart failure type (HCC) [I50.9]     DISCHARGE DIAGNOSES:   Wide complex tachyarrhythmia, NSVT versus atrial fib/flutter with aberrancy on chronic Eliquis anticoagulation transition to amiodarone taper as an outpatient   Acute metabolic encephalopathy with complete resolution  Non-STEMI, type I versus type II  Acute on end-stage renal disease on hemodialysis  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  This was of an abrupt onset.  Upon ER arrival she was noted to be in a wide-complex tachyarrhythmia and was rather symptomatic.  Heart rates were sustained in the 140s to 150s but then had return to baseline rhythm.  EKG revealed tacky arrhythmia as described.  CBC shows a  chest demonstrate persistent bilateral pleural effusions most pronounced on the right which have not significantly changed.  There is prominent pulmonary vascularity with moderate cardiomegaly and tortuosity of the aorta.  There is a decrease in the left-sided pleural effusion as compared to the prior study.  The renal dialysis catheter and dual-chamber cardiac pacemaker are unchanged.     1. Persistent bilateral pleural effusions most pronounced on the right.. 2. Decrease in the left-sided pleural effusion as compared to the prior study. 3. Prominent pulmonary vascularity with moderate cardiomegaly and tortuosity of the aorta.     CT HEAD WO CONTRAST    Result Date: 7/2/2024  EXAMINATION: CT OF THE HEAD WITHOUT CONTRAST  7/2/2024 1:18 pm TECHNIQUE: CT of the head was performed without the administration of intravenous contrast. Automated exposure control, iterative reconstruction, and/or weight based adjustment of the mA/kV was utilized to reduce the radiation dose to as low as reasonably achievable. COMPARISON: None. HISTORY: ORDERING SYSTEM PROVIDED HISTORY: Change in mental status TECHNOLOGIST PROVIDED HISTORY: Reason for exam:->Change in mental status Has a \"code stroke\" or \"stroke alert\" been called?->No FINDINGS: BRAIN/VENTRICLES: There is no acute intracranial hemorrhage, mass effect or midline shift.  No abnormal extra-axial fluid collection.  The gray-white differentiation is maintained without evidence of an acute infarct.  There is no evidence of hydrocephalus. Ventricles mildly enlarged with sulci mildly prominent.  Bilateral symmetric periventricular areas decreased attenuation. ORBITS: The visualized portion of the orbits demonstrate no acute abnormality.  Remote bilateral ocular surgery. SINUSES: The visualized paranasal sinuses and mastoid air cells demonstrate rounded cystic area, lateral right maxillary sinus.  Mucosal thickening, left maxillary sinus.  Coarsening bilateral mastoid air cells with  the results of the current hospitalization, in addition to providing specific details for the plan of care and counseling regarding the diagnosis and prognosis.  The plan has been discussed in detail and they are aware of the specific conditions for emergent return, as well as the importance of follow-up.  Their questions are answered at this time and they are agreeable with the plan for discharge to nursing home    DISCHARGE MEDICATIONS:   Current Discharge Medication List             Details   HYDROcodone-acetaminophen (NORCO) 5-325 MG per tablet Take 0.5 tablets by mouth every 8 hours as needed for Pain for up to 5 days. Use caution as may cause drowsiness or sedation. Do not operate machinery, take while working, drive or drink alcohol while taking. Max Daily Amount: 1.5 tablets  Qty: 8 tablet, Refills: 0    Comments: Reduce doses taken as pain becomes manageable  Associated Diagnoses: Chronic pain syndrome      metoprolol succinate (TOPROL XL) 25 MG extended release tablet Take 0.5 tablets by mouth daily  Qty: 30 tablet, Refills: 3      sennosides-docusate sodium (SENOKOT-S) 8.6-50 MG tablet Take 2 tablets by mouth 2 times daily as needed for Constipation      melatonin 5 MG TBDP disintegrating tablet Take 1 tablet by mouth nightly                Details   aspirin 81 MG EC tablet Take 1 tablet by mouth daily  Qty: 30 tablet, Refills: 3      isosorbide dinitrate (ISORDIL) 5 MG tablet Take 1 tablet by mouth in the morning and at bedtime  Qty: 90 tablet, Refills: 3      amiodarone (CORDARONE) 200 MG tablet Take 2 tablets by mouth 2 times daily for 5 days, THEN 2 tablets daily for 7 days, THEN 1 tablet daily.  Qty: 64 tablet, Refills: 0      apixaban (ELIQUIS) 2.5 MG TABS tablet Take 1 tablet by mouth 2 times daily  Qty: 60 tablet      hydrALAZINE (APRESOLINE) 10 MG tablet Take 1 tablet by mouth every 12 hours  Qty: 90 tablet, Refills: 3      bumetanide (BUMEX) 2 MG tablet Take 1 tablet by mouth daily  Qty: 30

## 2024-07-06 NOTE — PROGRESS NOTES
Date: 2024       Patient Name: Marge Callejas  : 1940      MRN: [id number]    Patient unavailable for physical therapy treatment. Pt refused due to nausea . Will attempt PT treatment at a later time. Thank you.     Emma Rhodes PTA .

## 2024-07-06 NOTE — PROGRESS NOTES
Patient daughter, Eliza Feldman, would like a call back from Dr Almonte. Phone number is [phone].

## 2024-07-06 NOTE — PROGRESS NOTES
After opening pain pill and scanning, patient changed her mind and did not want. Medication wasted with charge RN, Yasemin.

## 2024-07-06 NOTE — PLAN OF CARE
Problem: Discharge Planning  Goal: Discharge to home or other facility with appropriate resources  Outcome: Progressing     Problem: Pain  Goal: Verbalizes/displays adequate comfort level or baseline comfort level  Outcome: Progressing     Problem: Neurosensory - Adult  Goal: Achieves stable or improved neurological status  Outcome: Progressing     Problem: Respiratory - Adult  Goal: Achieves optimal ventilation and oxygenation  Outcome: Progressing     Problem: Cardiovascular - Adult  Goal: Maintains optimal cardiac output and hemodynamic stability  Outcome: Progressing     Problem: Cardiovascular - Adult  Goal: Absence of cardiac dysrhythmias or at baseline  Outcome: Progressing     Problem: Skin/Tissue Integrity - Adult  Goal: Skin integrity remains intact  Outcome: Progressing  Flowsheets (Taken 7/5/2024 1900)  Skin Integrity Remains Intact: Monitor for areas of redness and/or skin breakdown     Problem: Musculoskeletal - Adult  Goal: Return mobility to safest level of function  Outcome: Progressing     Problem: Gastrointestinal - Adult  Goal: Maintains adequate nutritional intake  Outcome: Progressing     Problem: Genitourinary - Adult  Goal: Absence of urinary retention  Outcome: Progressing     Problem: Infection - Adult  Goal: Absence of infection at discharge  Outcome: Progressing  Flowsheets (Taken 7/5/2024 1900)  Absence of infection at discharge: Assess and monitor for signs and symptoms of infection     Problem: Metabolic/Fluid and Electrolytes - Adult  Goal: Electrolytes maintained within normal limits  Outcome: Progressing  Flowsheets (Taken 7/5/2024 1900)  Electrolytes maintained within normal limits: Monitor labs and assess patient for signs and symptoms of electrolyte imbalances     Problem: Metabolic/Fluid and Electrolytes - Adult  Goal: Hemodynamic stability and optimal renal function maintained  Outcome: Progressing  Flowsheets (Taken 7/5/2024 1900)  Hemodynamic stability and optimal renal

## 2024-07-06 NOTE — PROGRESS NOTES
Assessment and Plan  Patient is a 84 y.o. female with the following medical Problems:   Acute on chronic hypoxic respiratory failure secondary to acute pulmonary edema.  Acute pulmonary edema.  Bilateral pleural effusions.  COPD with mild acute exacerbation.  Secondary pulmonary hypertension based on echocardiography from April 9, 2021.  End-stage renal disease on hemodialysis.  Nonsustained V. tach.  Chronic atrial fibrillation on Eliquis.  Heart failure with reduced ejection fraction.  Anemia of chronic diseases.  History of nicotine abuse.  AAA.    Plan of care:  Supplemental oxygen to keep sat 88 to 94%.  Patient will need PFTs and sleep studies as outpatient.  Optimize volume status with hemodialysis.  Anticoagulation as per cardiology.  Patient is currently on Eliquis 2.5 mg twice a day.  Continue with scheduled Pulmicort and avoid beta agonist due to arrhythmias.  No indication for thoracentesis as effusion is unlikely to be of care and likely chronic.  Protonix for GI prophylaxis.  Consider speech therapy evaluation.    History of Present Illness:   Patient is a 84-year-old woman with above-mentioned medical problem who presented to [facility] on July 1, 2024 with progressive shortness of breath.  Patient was found to have wide-complex tachycardia likely related to V. tach.  Chest x-ray was personally reviewed and independently interpreted and showed pulmonary vascular congestion.  Patient was initially admitted to the ICU but was transferred to [facility].  She is currently on 6 L nasal cannula.  Patient denies fever, chills, rigors, and chest pain.  Chest x-ray showed bilateral pleural effusions.  Echocardiography showed secondary pulmonary hypertension.  Patient feels weak and debilitated.    Past Medical History:  Past Medical History:   Diagnosis Date    Arthritis     Atrial fibrillation (HCC)     Blood circulation, collateral     CHF (congestive heart failure) (HCC)     Chronic renal insufficiency,  respiratory distress    HEENT: Atraumatic/normocephalic, EOMI, WILY, pharynx clear, moist mucosa, redness of the uvula appreciated,   Neck: Supple, no jugular venous distension, lymphadenopathy, thyromegaly or carotid bruits  Chest: Decreased breath sounds, no wheezing, +ve crackles and no tenderness over ribs   Cardiovascular: Normal S1 , S2, regular rate and rhythm, no murmur, rub or gallop  Abdomen: Normal sounds present, soft, lax with no tenderness, no hepatosplenomegaly, and no masses  Extremities: +ve edema. Pulses are equally present.   Skin: intact, no rashes   Neurologic: Alert and oriented x 3, No focal deficit     Investigations:  Labs, radiological imaging and cardiac work up were personally reviewed        STAFF PHYSICIAN NOTE OF PERSONAL INVOLVEMENT IN CARE  As the attending physician, I certify that I personally reviewed the patient's history and personally examined the patient to confirm the physical findings described above, and that I reviewed the relevant imaging studies and available reports.  I also discussed the differential diagnosis and all of the proposed management plans with the patient and individuals accompanying the patient to this visit.  They had the opportunity to ask questions about the proposed management plans and to have those questions answered.      Electronically signed by Cedrick Naik MD on 7/6/2024 at 1:34 PM

## 2024-07-06 NOTE — RT PROTOCOL NOTE
RT Nebulizer Bronchodilator Protocol Note    There is a bronchodilator order in the chart from a provider indicating to follow the RT Bronchodilator Protocol and there is an “Initiate RT Bronchodilator Protocol” order as well (see protocol at bottom of note).    CXR Findings:  No results found.    The findings from the last RT Protocol Assessment were as follows:  Smoking: Smoker 15 pack years or more  Respiratory Pattern: Regular pattern and RR 12-20 bpm  Breath Sounds: Slightly diminished and/or crackles  Cough: Strong, spontaneous, non-productive  Indication for Bronchodilator Therapy: On home bronchodilators  Bronchodilator Assessment Score: 3    Aerosolized bronchodilator medication orders have been revised according to the RT Nebulizer Bronchodilator Protocol below.    Respiratory Therapist to perform RT Therapy Protocol Assessment initially then follow the protocol.  Repeat RT Therapy Protocol Assessment PRN for score 0-3 or on second treatment, BID, and PRN for scores above 3.    No Indications - adjust the frequency to every 6 hours PRN wheezing or bronchospasm, if no treatments needed after 48 hours then discontinue using Per Protocol order mode.     If indication present, adjust the RT bronchodilator orders based on the Bronchodilator Assessment Score as indicated below.  If a patient is on this medication at home then do not decrease Frequency below that used at home.    0-3 - enter or revise RT bronchodilator order(s) to equivalent RT Bronchodilator order with Frequency of every 4 hours PRN for wheezing or increased work of breathing using Per Protocol order mode.       4-6 - enter or revise RT Bronchodilator order(s) to two equivalent RT bronchodilator orders with one order with BID Frequency and one order with Frequency of every 4 hours PRN wheezing or increased work of breathing using Per Protocol order mode.         7-10 - enter or revise RT Bronchodilator order(s) to two equivalent RT

## 2024-07-06 NOTE — PROGRESS NOTES
[facility] Cardiology Inpatient Progress Note    Patient is a 84 y.o. female of Marek Andres DO seen in hospital follow up.     Chief complaint: Arrhythmia    HPI: Some SOB. No CP.    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    Coronary artery disease involving native coronary artery of native heart without angina pectoris    Abnormal EKG    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)    Herpes zoster with nervous system complication    Hypotension    Ventricular arrhythmia    Paroxysmal ventricular tachycardia (HCC)       No Known Allergies    Current Facility-Administered Medications   Medication Dose Route Frequency Provider Last Rate Last Admin    budesonide (PULMICORT) nebulizer suspension 500 mcg  0.5 mg Nebulization BID RT Yuki Gonzalez APRN - CNP   500 mcg at 07/06/24 0632    sennosides-docusate sodium (SENOKOT-S) 8.6-50 MG tablet 2 tablet  2 tablet Oral BID PRN Palmira Gabriel APRN - CNP   2 tablet at 07/06/24 0926    melatonin disintegrating tablet 5 mg  5 mg Oral Nightly Edward Edwards APRN - CNP   5 mg at 07/05/24 2228    hydrALAZINE (APRESOLINE) tablet 10 mg  10 mg Oral 2 times per day Shon Valdez MD   10 mg at 07/06/24 0926    amiodarone (CORDARONE) tablet 400 mg  400  medically.    Stress 4/19/2024   large-sized fixed perfusion defect in the inferior wall, moderate-sized fixed perfusion defect in the apex and a moderate-sized partially reversible perfusion defect in the inferolateral wall. LVEF 42% with global hypokinesis.    TTE 6/12/24 [place]  EF Calculated: 27 % The left ventricle is severely dilated. There is mild concentric left ventricular hypertrophy. There is evidence of a dilated cardiomyopathy. Left ventricle ejection fraction is severely reduced. Right ventricle size is at the upper limits of normal. Right ventricle systolic function is mildly reduced.   Findings MITRAL VALVE: Structure is normal. There is no stenosis. There is mild-to-moderate regurgitation: the effective regurgitant orifice (ERO) is 0.29 cm 2; the regurgitant volume (RV) is 38 mL. AORTIC VALVE: The valve is trileaflet. There is no stenosis or regurgitation. TRICUSPID VALVE: Structure is normal. There is mild regurgitation. There is no echocardiographic evidence of pulmonary hypertension. PULMONIC VALVE: Structure is normal. There is no stenosis or regurgitation. ATRIA: The left atrium is [id number] dilated. The right atrium is mildly dilated. A defibrillator lead is seen in the right atrium. LEFT VENTRICLE: The left ventricle is severely dilated. There is mild concentric left ventricular hypertrophy. There is evidence of a dilated cardiomyopathy. Left ventricle ejection fraction is severely reduced. Diastolic function is normal. EF CALCULATED: 27 % RIGHT VENTRICLE: Right ventricle size is at the upper limits of normal. Right ventricle systolic function is mildly reduced. A defibrillator lead is seen. PERICARDIAL AND PLEURA: The pericardium is normal. There is no pericardial effusion. MISCELLANEOUS: The aortic root diameter and arch appearance are normal.     ASSESSMENT & PLAN:    Patient Active Problem List   Diagnosis    Shortness of breath    Chronic combined systolic and diastolic  rationale for the above recommendations and reviewing the patient's current medication list, problem list and results of all previously ordered testing.    Geovanna Sidhu D.O.  Cardiologist  Cardiology, [facility]

## 2024-07-06 NOTE — CARE COORDINATION
DYLAN spoke with Joanne at [facility].  Auth obtained for patient to return to [facility].  Have notified Dr Almonte.  Plan to DC back to [facility] tomorrow, Sunday, 7/7/24.  2nd IMM letter given and copy placed in soft chart.  Have notified Joanne of likely DC tomorrow and she states auth will still be good.  Have placed patient on Will Call with [facility] for tomorrow.  Will need LUBA signed at DC.

## 2024-07-07 VITALS
TEMPERATURE: 98.9 F | RESPIRATION RATE: 18 BRPM | SYSTOLIC BLOOD PRESSURE: 121 MMHG | DIASTOLIC BLOOD PRESSURE: 66 MMHG | OXYGEN SATURATION: 96 % | WEIGHT: 139 LBS | HEIGHT: 64 IN | HEART RATE: 76 BPM | BODY MASS INDEX: 23.73 KG/M2

## 2024-07-07 LAB
ANION GAP SERPL CALCULATED.3IONS-SCNC: 13 MMOL/L (ref 7–16)
BASOPHILS # BLD: 0 K/UL (ref 0–0.2)
BASOPHILS NFR BLD: 0 % (ref 0–2)
BUN SERPL-MCNC: 27 MG/DL (ref 6–23)
CALCIUM SERPL-MCNC: 8.8 MG/DL (ref 8.6–10.2)
CHLORIDE SERPL-SCNC: 96 MMOL/L (ref 98–107)
CO2 SERPL-SCNC: 25 MMOL/L (ref 22–29)
CREAT SERPL-MCNC: 4.9 MG/DL (ref 0.5–1)
EOSINOPHIL # BLD: 0.34 K/UL (ref 0.05–0.5)
EOSINOPHILS RELATIVE PERCENT: 4 % (ref 0–6)
ERYTHROCYTE [DISTWIDTH] IN BLOOD BY AUTOMATED COUNT: 21.2 % (ref 11.5–15)
GFR, ESTIMATED: 8 ML/MIN/1.73M2
GLUCOSE SERPL-MCNC: 116 MG/DL (ref 74–99)
HCT VFR BLD AUTO: 26.8 % (ref 34–48)
HGB BLD-MCNC: 8.8 G/DL (ref 11.5–15.5)
LYMPHOCYTES NFR BLD: 0.43 K/UL (ref 1.5–4)
LYMPHOCYTES RELATIVE PERCENT: 4 % (ref 20–42)
MAGNESIUM SERPL-MCNC: 2 MG/DL (ref 1.6–2.6)
MCH RBC QN AUTO: 34.5 PG (ref 26–35)
MCHC RBC AUTO-ENTMCNC: 32.8 G/DL (ref 32–34.5)
MCV RBC AUTO: 105.1 FL (ref 80–99.9)
MICROORGANISM SPEC CULT: NORMAL
MONOCYTES NFR BLD: 0.51 K/UL (ref 0.1–0.95)
MONOCYTES NFR BLD: 5 % (ref 2–12)
NEUTROPHILS NFR BLD: 87 % (ref 43–80)
NEUTS SEG NFR BLD: 8.52 K/UL (ref 1.8–7.3)
NUCLEATED RED BLOOD CELLS: 1 PER 100 WBC
PHOSPHATE SERPL-MCNC: 2.2 MG/DL (ref 2.5–4.5)
PLATELET # BLD AUTO: 213 K/UL (ref 130–450)
PMV BLD AUTO: 12.1 FL (ref 7–12)
POTASSIUM SERPL-SCNC: 4.1 MMOL/L (ref 3.5–5)
RBC # BLD AUTO: 2.55 M/UL (ref 3.5–5.5)
RBC # BLD: ABNORMAL 10*6/UL
SERVICE CMNT-IMP: NORMAL
SODIUM SERPL-SCNC: 134 MMOL/L (ref 132–146)
SPECIMEN DESCRIPTION: NORMAL
WBC OTHER # BLD: 9.8 K/UL (ref 4.5–11.5)

## 2024-07-07 PROCEDURE — 85025 COMPLETE CBC W/AUTO DIFF WBC: CPT

## 2024-07-07 PROCEDURE — 99232 SBSQ HOSP IP/OBS MODERATE 35: CPT | Performed by: INTERNAL MEDICINE

## 2024-07-07 PROCEDURE — 2500000003 HC RX 250 WO HCPCS: Performed by: NURSE PRACTITIONER

## 2024-07-07 PROCEDURE — 6360000002 HC RX W HCPCS: Performed by: NURSE PRACTITIONER

## 2024-07-07 PROCEDURE — 2700000000 HC OXYGEN THERAPY PER DAY

## 2024-07-07 PROCEDURE — 99233 SBSQ HOSP IP/OBS HIGH 50: CPT | Performed by: INTERNAL MEDICINE

## 2024-07-07 PROCEDURE — 80048 BASIC METABOLIC PNL TOTAL CA: CPT

## 2024-07-07 PROCEDURE — 36415 COLL VENOUS BLD VENIPUNCTURE: CPT

## 2024-07-07 PROCEDURE — 6370000000 HC RX 637 (ALT 250 FOR IP): Performed by: NURSE PRACTITIONER

## 2024-07-07 PROCEDURE — 6370000000 HC RX 637 (ALT 250 FOR IP): Performed by: INTERNAL MEDICINE

## 2024-07-07 PROCEDURE — 2580000003 HC RX 258: Performed by: NURSE PRACTITIONER

## 2024-07-07 PROCEDURE — 94640 AIRWAY INHALATION TREATMENT: CPT

## 2024-07-07 PROCEDURE — 83735 ASSAY OF MAGNESIUM: CPT

## 2024-07-07 PROCEDURE — 84100 ASSAY OF PHOSPHORUS: CPT

## 2024-07-07 PROCEDURE — 6370000000 HC RX 637 (ALT 250 FOR IP)

## 2024-07-07 RX ADMIN — APIXABAN 2.5 MG: 2.5 TABLET, FILM COATED ORAL at 08:54

## 2024-07-07 RX ADMIN — METOPROLOL SUCCINATE 12.5 MG: 25 TABLET, EXTENDED RELEASE ORAL at 08:54

## 2024-07-07 RX ADMIN — IPRATROPIUM BROMIDE AND ALBUTEROL SULFATE 1 DOSE: .5; 2.5 SOLUTION RESPIRATORY (INHALATION) at 07:01

## 2024-07-07 RX ADMIN — ASPIRIN 81 MG: 81 TABLET, COATED ORAL at 08:55

## 2024-07-07 RX ADMIN — BUMETANIDE 2 MG: 1 TABLET ORAL at 08:54

## 2024-07-07 RX ADMIN — ISOSORBIDE DINITRATE 5 MG: 10 TABLET ORAL at 08:54

## 2024-07-07 RX ADMIN — CALCIUM ACETATE 667 MG: 667 CAPSULE ORAL at 08:55

## 2024-07-07 RX ADMIN — Medication 1 TABLET: at 08:54

## 2024-07-07 RX ADMIN — HYDRALAZINE HYDROCHLORIDE 10 MG: 10 TABLET ORAL at 08:54

## 2024-07-07 RX ADMIN — PANTOPRAZOLE SODIUM 40 MG: 40 TABLET, DELAYED RELEASE ORAL at 05:53

## 2024-07-07 RX ADMIN — BUDESONIDE 500 MCG: 0.5 INHALANT RESPIRATORY (INHALATION) at 07:01

## 2024-07-07 RX ADMIN — Medication 10 ML: at 08:55

## 2024-07-07 RX ADMIN — AMIODARONE HYDROCHLORIDE 400 MG: 200 TABLET ORAL at 08:54

## 2024-07-07 NOTE — PLAN OF CARE
Problem: Discharge Planning  Goal: Discharge to home or other facility with appropriate resources  Outcome: Progressing  Flowsheets  Taken 7/6/2024 2010 by Wendy Romeo RN  Discharge to home or other facility with appropriate resources:   Identify barriers to discharge with patient and caregiver   Arrange for needed discharge resources and transportation as appropriate   Identify discharge learning needs (meds, wound care, etc)   Refer to discharge planning if patient needs post-hospital services based on physician order or complex needs related to functional status, cognitive ability or social support system  Taken 7/6/2024 0800 by Jayne Alfonso RN  Discharge to home or other facility with appropriate resources:   Identify barriers to discharge with patient and caregiver   Arrange for needed discharge resources and transportation as appropriate   Identify discharge learning needs (meds, wound care, etc)     Problem: Pain  Goal: Verbalizes/displays adequate comfort level or baseline comfort level  Outcome: Progressing     Problem: Neurosensory - Adult  Goal: Achieves stable or improved neurological status  Outcome: Progressing     Problem: Respiratory - Adult  Goal: Achieves optimal ventilation and oxygenation  Outcome: Progressing  Flowsheets (Taken 7/6/2024 2010)  Achieves optimal ventilation and oxygenation:   Assess for changes in respiratory status   Assess for changes in mentation and behavior   Position to facilitate oxygenation and minimize respiratory effort   Assess and instruct to report shortness of breath or any respiratory difficulty     Problem: Cardiovascular - Adult  Goal: Maintains optimal cardiac output and hemodynamic stability  Outcome: Progressing  Flowsheets  Taken 7/6/2024 2010 by Wendy Romeo RN  Maintains optimal cardiac output and hemodynamic stability:   Monitor blood pressure and heart rate   Monitor urine output and notify Licensed Independent Practitioner for values outside of  Maintains adequate nutritional intake  Outcome: Progressing  Flowsheets (Taken 7/6/2024 0800 by Jayne Alfonso RN)  Maintains adequate nutritional intake:   Monitor percentage of each meal consumed   Identify factors contributing to decreased intake, treat as appropriate   Assist with meals as needed   Monitor intake and output, weight and lab values   Obtain nutritional consult as needed     Problem: Genitourinary - Adult  Goal: Absence of urinary retention  Outcome: Progressing  Flowsheets  Taken 7/6/2024 2010 by Wendy Romeo RN  Absence of urinary retention:   Assess patient’s ability to void and empty bladder   Monitor intake/output and perform bladder scan as needed  Taken 7/6/2024 0800 by Jayne Alfonso RN  Absence of urinary retention: Assess patient’s ability to void and empty bladder     Problem: Infection - Adult  Goal: Absence of infection at discharge  Outcome: Progressing  Flowsheets  Taken 7/6/2024 2010 by Wendy Romeo RN  Absence of infection at discharge: Assess and monitor for signs and symptoms of infection  Taken 7/6/2024 0800 by Jayne Alfonso RN  Absence of infection at discharge:   Assess and monitor for signs and symptoms of infection   Monitor lab/diagnostic results     Problem: Metabolic/Fluid and Electrolytes - Adult  Goal: Electrolytes maintained within normal limits  Outcome: Progressing  Flowsheets  Taken 7/6/2024 2010 by Wendy Romeo RN  Electrolytes maintained within normal limits:   Monitor labs and assess patient for signs and symptoms of electrolyte imbalances   Administer electrolyte replacement as ordered   Monitor response to electrolyte replacements, including repeat lab results as appropriate   Fluid restriction as ordered  Taken 7/6/2024 0800 by Jayne Alfonso RN  Electrolytes maintained within normal limits:   Monitor labs and assess patient for signs and symptoms of electrolyte imbalances   Administer electrolyte replacement as ordered   Monitor response to electrolyte

## 2024-07-07 NOTE — PROGRESS NOTES
Assessment and Plan  Patient is a 84 y.o. female with the following medical Problems:   Acute on chronic hypoxic respiratory failure secondary to acute pulmonary edema.  Acute pulmonary edema.  Bilateral pleural effusions.  COPD with mild acute exacerbation.  Secondary pulmonary hypertension based on echocardiography from April 9, 2021.  End-stage renal disease on hemodialysis.  Nonsustained V. tach.  Chronic atrial fibrillation on Eliquis.  Heart failure with reduced ejection fraction.(EF 27%)  Anemia of chronic diseases.  History of nicotine abuse.  AAA.    Plan of care:  Supplemental oxygen to keep sat 88 to 94%.  Patient will need PFTs and sleep studies as outpatient.  Optimize volume status with hemodialysis.  Anticoagulation as per cardiology.  Patient is currently on Eliquis 2.5 mg twice a day.  Continue with scheduled Pulmicort and avoid beta agonist due to arrhythmias.  No indication for thoracentesis as effusion is likely to occur and likely chronic.  Protonix for GI prophylaxis.  Consider speech therapy evaluation.    History of Present Illness:   Patient is a 84-year-old woman with above-mentioned medical problem who presented to [facility] on July 1, 2024 with progressive shortness of breath.  Patient was found to have wide-complex tachycardia likely related to V. tach.  Chest x-ray was personally reviewed and independently interpreted and showed pulmonary vascular congestion.  Patient was initially admitted to the ICU but was transferred to [facility].  She is currently on 6 L nasal cannula.  Patient denies fever, chills, rigors, and chest pain.  Chest x-ray showed bilateral pleural effusions.      Daily progress:  July 7, 2024: Patient continues to be on supplemental oxygen and currently on 6 L nasal cannula.  She has no fever, chills, or rigors.  Patient will follow-up as outpatient with EP team.  Patient is scheduled to be discharged today with follow-up as outpatient and with continuous  Take 0.5 tablets by mouth every 8 hours as needed for Pain for up to 5 days. Use caution as may cause drowsiness or sedation. Do not operate machinery, take while working, drive or drink alcohol while taking. Max Daily Amount: 1.5 tablets, Disp: 8 tablet, Rfl: 0    aspirin 81 MG EC tablet, Take 1 tablet by mouth daily, Disp: 30 tablet, Rfl: 3    isosorbide dinitrate (ISORDIL) 5 MG tablet, Take 1 tablet by mouth in the morning and at bedtime, Disp: 90 tablet, Rfl: 3    amiodarone (CORDARONE) 200 MG tablet, Take 2 tablets by mouth 2 times daily for 5 days, THEN 2 tablets daily for 7 days, THEN 1 tablet daily., Disp: 64 tablet, Rfl: 0    apixaban (ELIQUIS) 2.5 MG TABS tablet, Take 1 tablet by mouth 2 times daily, Disp: 60 tablet, Rfl:     hydrALAZINE (APRESOLINE) 10 MG tablet, Take 1 tablet by mouth every 12 hours, Disp: 90 tablet, Rfl: 3    metoprolol succinate (TOPROL XL) 25 MG extended release tablet, Take 0.5 tablets by mouth daily, Disp: 30 tablet, Rfl: 3    bumetanide (BUMEX) 2 MG tablet, Take 1 tablet by mouth daily, Disp: 30 tablet, Rfl: 3    sennosides-docusate sodium (SENOKOT-S) 8.6-50 MG tablet, Take 2 tablets by mouth 2 times daily as needed for Constipation, Disp: , Rfl:     melatonin 5 MG TBDP disintegrating tablet, Take 1 tablet by mouth nightly, Disp: , Rfl:     Multiple Vitamins-Minerals (THERAPEUTIC MULTIVITAMIN-MINERALS) tablet, Take 1 tablet by mouth daily, Disp: , Rfl:     meclizine (ANTIVERT) 12.5 MG tablet, Take 1 tablet by mouth 3 times daily as needed for Dizziness, Disp: , Rfl:     polyethylene glycol (MIRALAX) 17 g PACK packet, Take 17 g by mouth daily, Disp: , Rfl:     calcium acetate (PHOSLO) 667 MG CAPS capsule, Take 1 capsule by mouth 3 times daily (with meals), Disp: 180 capsule, Rfl:     OXYGEN, Inhale 3-4 L into the lungs continuous, Disp: , Rfl:     ipratropium 0.5 mg-albuterol 2.5 mg (DUONEB) 0.5-2.5 (3) MG/3ML SOLN nebulizer solution, Inhale 3 mLs into the lungs every 6 hours as  breath sounds, no wheezing, +ve crackles and no tenderness over ribs   Cardiovascular: Normal S1 , S2, regular rate and rhythm, no murmur, rub or gallop  Abdomen: Normal sounds present, soft, lax with no tenderness, no hepatosplenomegaly, and no masses  Extremities: +ve edema. Pulses are equally present.   Skin: intact, no rashes   Neurologic: Alert and oriented x 3, No focal deficit     Investigations:  Labs, radiological imaging and cardiac work up were personally reviewed        STAFF PHYSICIAN NOTE OF PERSONAL INVOLVEMENT IN CARE  As the attending physician, I certify that I personally reviewed the patient's history and personally examined the patient to confirm the physical findings described above, and that I reviewed the relevant imaging studies and available reports.  I also discussed the differential diagnosis and all of the proposed management plans with the patient and individuals accompanying the patient to this visit.  They had the opportunity to ask questions about the proposed management plans and to have those questions answered.      Electronically signed by Cedrick Naik MD on 7/7/2024 at 1:29 PM

## 2024-07-07 NOTE — PROGRESS NOTES
[facility] Cardiology Inpatient Progress Note    Patient is a 84 y.o. female of Marek Andres DO seen in hospital follow up.     Chief complaint: Arrhythmia    HPI: Some SOB. No CP.    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    Coronary artery disease involving native coronary artery of native heart without angina pectoris    Abnormal EKG    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)    Herpes zoster with nervous system complication    Hypotension    Ventricular arrhythmia    Paroxysmal ventricular tachycardia (HCC)       No Known Allergies    Current Facility-Administered Medications   Medication Dose Route Frequency Provider Last Rate Last Admin    prochlorperazine (COMPAZINE) tablet 5 mg  5 mg Oral Q6H PRN Edward Edwards APRN - CNP   5 mg at 07/06/24 1356    budesonide (PULMICORT) nebulizer suspension 500 mcg  0.5 mg Nebulization BID RT Yuki Gonzalez APRN - CNP   500 mcg at 07/07/24 0701    sennosides-docusate sodium (SENOKOT-S) 8.6-50 MG tablet 2 tablet  2 tablet Oral BID PRN Palmira Gabriel APRN - CNP   2 tablet at 07/06/24 0926    melatonin disintegrating tablet 5 mg  5 mg Oral Nightly Edward Edwards APRN - CNP   5 mg at 07/06/24 2032    hydrALAZINE (APRESOLINE) tablet 10 mg  10 mg

## 2024-07-21 ENCOUNTER — APPOINTMENT (OUTPATIENT)
Dept: GENERAL RADIOLOGY | Age: 84
DRG: 189 | End: 2024-07-21
Payer: MEDICARE

## 2024-07-21 ENCOUNTER — HOSPITAL ENCOUNTER (EMERGENCY)
Age: 84
Discharge: HOME | DRG: 189 | End: 2024-07-22
Attending: EMERGENCY MEDICINE
Payer: MEDICARE

## 2024-07-21 ENCOUNTER — APPOINTMENT (OUTPATIENT)
Dept: CT IMAGING | Age: 84
DRG: 189 | End: 2024-07-21
Attending: EMERGENCY MEDICINE
Payer: MEDICARE

## 2024-07-21 VITALS
RESPIRATION RATE: 18 BRPM | TEMPERATURE: 98.4 F | OXYGEN SATURATION: 95 % | HEART RATE: 83 BPM | DIASTOLIC BLOOD PRESSURE: 71 MMHG | SYSTOLIC BLOOD PRESSURE: 147 MMHG

## 2024-07-21 DIAGNOSIS — R41.0 CONFUSION: Primary | ICD-10-CM

## 2024-07-21 LAB
ALBUMIN SERPL-MCNC: 3.3 G/DL (ref 3.5–5.2)
ALP SERPL-CCNC: 162 U/L (ref 35–104)
ALT SERPL-CCNC: 14 U/L (ref 0–32)
ANION GAP SERPL CALCULATED.3IONS-SCNC: 14 MMOL/L (ref 7–16)
AST SERPL-CCNC: 20 U/L (ref 0–31)
BASOPHILS # BLD: 0.07 K/UL (ref 0–0.2)
BASOPHILS NFR BLD: 1 % (ref 0–2)
BILIRUB SERPL-MCNC: 0.5 MG/DL (ref 0–1.2)
BUN SERPL-MCNC: 52 MG/DL (ref 6–23)
CALCIUM SERPL-MCNC: 9.6 MG/DL (ref 8.6–10.2)
CHLORIDE SERPL-SCNC: 98 MMOL/L (ref 98–107)
CO2 SERPL-SCNC: 25 MMOL/L (ref 22–29)
CREAT SERPL-MCNC: 4.7 MG/DL (ref 0.5–1)
EOSINOPHIL # BLD: 0.25 K/UL (ref 0.05–0.5)
EOSINOPHILS RELATIVE PERCENT: 3 % (ref 0–6)
ERYTHROCYTE [DISTWIDTH] IN BLOOD BY AUTOMATED COUNT: 20.1 % (ref 11.5–15)
GFR, ESTIMATED: 9 ML/MIN/1.73M2
GLUCOSE SERPL-MCNC: 143 MG/DL (ref 74–99)
HCT VFR BLD AUTO: 27.9 % (ref 34–48)
HGB BLD-MCNC: 8.8 G/DL (ref 11.5–15.5)
IMM GRANULOCYTES # BLD AUTO: 0.06 K/UL (ref 0–0.58)
IMM GRANULOCYTES NFR BLD: 1 % (ref 0–5)
INR PPP: 1.3
LYMPHOCYTES NFR BLD: 1.71 K/UL (ref 1.5–4)
LYMPHOCYTES RELATIVE PERCENT: 19 % (ref 20–42)
MAGNESIUM SERPL-MCNC: 2.3 MG/DL (ref 1.6–2.6)
MCH RBC QN AUTO: 33.8 PG (ref 26–35)
MCHC RBC AUTO-ENTMCNC: 31.5 G/DL (ref 32–34.5)
MCV RBC AUTO: 107.3 FL (ref 80–99.9)
MONOCYTES NFR BLD: 0.78 K/UL (ref 0.1–0.95)
MONOCYTES NFR BLD: 9 % (ref 2–12)
NEUTROPHILS NFR BLD: 69 % (ref 43–80)
NEUTS SEG NFR BLD: 6.35 K/UL (ref 1.8–7.3)
PLATELET # BLD AUTO: 331 K/UL (ref 130–450)
PMV BLD AUTO: 11.6 FL (ref 7–12)
POTASSIUM SERPL-SCNC: 4.8 MMOL/L (ref 3.5–5)
PROT SERPL-MCNC: 7.3 G/DL (ref 6.4–8.3)
PROTHROMBIN TIME: 14 SEC (ref 9.3–12.4)
RBC # BLD AUTO: 2.6 M/UL (ref 3.5–5.5)
RBC # BLD: ABNORMAL 10*6/UL
SODIUM SERPL-SCNC: 137 MMOL/L (ref 132–146)
TROPONIN I SERPL HS-MCNC: 98 NG/L (ref 0–9)
WBC OTHER # BLD: 9.2 K/UL (ref 4.5–11.5)

## 2024-07-21 PROCEDURE — 84484 ASSAY OF TROPONIN QUANT: CPT

## 2024-07-21 PROCEDURE — 93005 ELECTROCARDIOGRAM TRACING: CPT | Performed by: EMERGENCY MEDICINE

## 2024-07-21 PROCEDURE — 85610 PROTHROMBIN TIME: CPT

## 2024-07-21 PROCEDURE — 80053 COMPREHEN METABOLIC PANEL: CPT

## 2024-07-21 PROCEDURE — 83735 ASSAY OF MAGNESIUM: CPT

## 2024-07-21 PROCEDURE — 99285 EMERGENCY DEPT VISIT HI MDM: CPT

## 2024-07-21 PROCEDURE — 70450 CT HEAD/BRAIN W/O DYE: CPT

## 2024-07-21 PROCEDURE — 71045 X-RAY EXAM CHEST 1 VIEW: CPT

## 2024-07-21 PROCEDURE — 85025 COMPLETE CBC W/AUTO DIFF WBC: CPT

## 2024-07-21 RX ORDER — ISOSORBIDE DINITRATE 5 MG/1
5 TABLET ORAL 2 TIMES DAILY
Status: ON HOLD | COMMUNITY

## 2024-07-21 RX ORDER — ACETAMINOPHEN 325 MG/1
650 TABLET ORAL EVERY 4 HOURS PRN
Status: ON HOLD | COMMUNITY

## 2024-07-21 RX ORDER — BISACODYL 10 MG
10 SUPPOSITORY, RECTAL RECTAL DAILY PRN
Status: ON HOLD | COMMUNITY

## 2024-07-21 RX ORDER — ATORVASTATIN CALCIUM 40 MG/1
40 TABLET, FILM COATED ORAL DAILY
Status: ON HOLD | COMMUNITY

## 2024-07-21 RX ORDER — BENZOIN
TINCTURE TOPICAL 2 TIMES DAILY
COMMUNITY
End: 2024-07-23

## 2024-07-21 RX ORDER — LACTULOSE 10 G/15ML
10 SOLUTION ORAL DAILY PRN
Status: ON HOLD | COMMUNITY

## 2024-07-21 RX ORDER — HYDROCODONE BITARTRATE AND ACETAMINOPHEN 5; 325 MG/1; MG/1
0.5 TABLET ORAL EVERY 8 HOURS PRN
Status: ON HOLD | COMMUNITY
Start: 2024-07-19 | End: 2024-07-26 | Stop reason: HOSPADM

## 2024-07-21 ASSESSMENT — PAIN - FUNCTIONAL ASSESSMENT: PAIN_FUNCTIONAL_ASSESSMENT: NONE - DENIES PAIN

## 2024-07-21 NOTE — ED NOTES
Patient was signed out to me by Dr. Ayala.  I did not perform a complete history and physical on the patient.  Please see Dr. Ayala's note for complete history and physical. Briefly, patient presents with concern for altered mental status. Signed out pending head CT.    Head CT negative for acute findings.    I consulted with hospitalist for admission.  Dr. Sarmiento evaluated the patient and declined the admission.     I evaluated the patient.  She is alert and oriented with no focal deficits.  I spoke to the patient's daughter, Jonathan, on the phone.  She states that the patient does not want admitted for observation or further testing at this time, and she states that the patient is at her neurologic baseline.  She feels comfortable the patient being discharged for she is going back to rehab where she will be monitored.  She will have the patient follow-up with her PCP and return to the ED if worsening or concerning symptoms.     Mamie Meade MD  07/21/24 9696

## 2024-07-21 NOTE — ED NOTES
[facility] called for transport back to SNF. ETA 3-4 hours    [facility]

## 2024-07-21 NOTE — ED PROVIDER NOTES
HPI:  7/21/24,   Time: 12:36 PM EDT       Marge Callejas is a 84 y.o. female presenting to the ED for ams, beginning hrs ago.  The complaint has been persistent, moderate in severity, and worsened by nothing.  Brought in by EMS from nursing home.  Concern for altered mental status.  Last known well 2 hours ago.  Altered per EMS.  Patient is dialysis by EMS unable to days.  Patient on arrival does talk and states does not want to be here wishes to be at [place].  No complaints of chest pain or headache.    Review of Systems:   Pertinent positives and negatives are stated within HPI, all other systems reviewed and are negative.          --------------------------------------------- PAST HISTORY ---------------------------------------------  Past Medical History:  has a past medical history of Arthritis, Atrial fibrillation (HCC), Blood circulation, collateral, CHF (congestive heart failure) (HCC), Chronic renal insufficiency, stage IV (severe) (HCC), Coronary artery disease involving native coronary artery of native heart without angina pectoris, History of cardiovascular stress test, History of echocardiogram, Hyperlipidemia, Hypertension, and Mixed restrictive and obstructive lung disease (HCC).    Past Surgical History:  has a past surgical history that includes Ectopic pregnancy surgery; Upper gastrointestinal endoscopy (N/A, 4/3/2021); Colonoscopy (N/A, 4/3/2021); Colonoscopy (4/3/2021); Colonoscopy (4/3/2021); Colonoscopy (4/3/2021); Upper gastrointestinal endoscopy (N/A, 4/7/2021); and Colonoscopy (N/A, 9/19/2023).    Social History:  reports that she quit smoking about 18 months ago. Her smoking use included cigarettes. She started smoking about 52 years ago. She has a 25.5 pack-year smoking history. She has never used smokeless tobacco. Drug use questions deferred to the physician. She reports that she does not drink alcohol.    Family History: family history includes Brain Cancer in her sister; Cancer in  condition is stable    NOTE: This report was transcribed using voice recognition software. Every effort was made to ensure accuracy; however, inadvertent computerized transcription errors may be present        Arnie Ayala MD  07/22/24 [id number]

## 2024-07-22 ENCOUNTER — OUTSIDE SERVICES (OUTPATIENT)
Dept: PRIMARY CARE CLINIC | Age: 84
End: 2024-07-22
Payer: MEDICARE

## 2024-07-22 VITALS
TEMPERATURE: 98.3 F | RESPIRATION RATE: 16 BRPM | OXYGEN SATURATION: 96 % | SYSTOLIC BLOOD PRESSURE: 114 MMHG | WEIGHT: 123 LBS | DIASTOLIC BLOOD PRESSURE: 60 MMHG | HEART RATE: 76 BPM | BODY MASS INDEX: 21.11 KG/M2

## 2024-07-22 DIAGNOSIS — I47.0 RE-ENTRY VENTRICULAR ARRHYTHMIA (HCC): ICD-10-CM

## 2024-07-22 DIAGNOSIS — N18.6 END STAGE RENAL DISEASE (HCC): ICD-10-CM

## 2024-07-22 DIAGNOSIS — J96.10 CHRONIC RESPIRATORY FAILURE, UNSPECIFIED WHETHER WITH HYPOXIA OR HYPERCAPNIA (HCC): Primary | ICD-10-CM

## 2024-07-22 DIAGNOSIS — N18.4 CHRONIC KIDNEY DISEASE, STAGE 4 (SEVERE) (HCC): ICD-10-CM

## 2024-07-22 DIAGNOSIS — I48.20 CHRONIC ATRIAL FIBRILLATION, UNSPECIFIED (HCC): ICD-10-CM

## 2024-07-22 DIAGNOSIS — I50.9 HEART FAILURE, UNSPECIFIED HF CHRONICITY, UNSPECIFIED HEART FAILURE TYPE (HCC): ICD-10-CM

## 2024-07-22 DIAGNOSIS — K21.9 GASTRO-ESOPHAGEAL REFLUX DISEASE WITHOUT ESOPHAGITIS: ICD-10-CM

## 2024-07-22 DIAGNOSIS — E11.9 TYPE 2 DIABETES MELLITUS WITHOUT COMPLICATION, UNSPECIFIED WHETHER LONG TERM INSULIN USE (HCC): ICD-10-CM

## 2024-07-22 DIAGNOSIS — J44.9 CHRONIC OBSTRUCTIVE PULMONARY DISEASE, UNSPECIFIED COPD TYPE (HCC): ICD-10-CM

## 2024-07-22 DIAGNOSIS — I10 ESSENTIAL (PRIMARY) HYPERTENSION: ICD-10-CM

## 2024-07-22 DIAGNOSIS — R53.1 GENERALIZED WEAKNESS: ICD-10-CM

## 2024-07-22 DIAGNOSIS — R26.2 AMBULATORY DYSFUNCTION: ICD-10-CM

## 2024-07-22 DIAGNOSIS — E78.5 HYPERLIPIDEMIA, UNSPECIFIED HYPERLIPIDEMIA TYPE: ICD-10-CM

## 2024-07-22 LAB
ALBUMIN SERPL-MCNC: 3.3 G/DL (ref 3.5–5.2)
ALP SERPL-CCNC: 154 U/L (ref 35–104)
ALT SERPL-CCNC: 10 U/L (ref 0–32)
ANION GAP SERPL CALCULATED.3IONS-SCNC: 14 MMOL/L (ref 7–16)
AST SERPL-CCNC: 23 U/L (ref 0–31)
BILIRUB SERPL-MCNC: 0.5 MG/DL (ref 0–1.2)
BUN SERPL-MCNC: 62 MG/DL (ref 6–23)
CALCIUM SERPL-MCNC: 9.4 MG/DL (ref 8.6–10.2)
CHLORIDE SERPL-SCNC: 98 MMOL/L (ref 98–107)
CHOLEST SERPL-MCNC: 136 MG/DL
CO2 SERPL-SCNC: 23 MMOL/L (ref 22–29)
CREAT SERPL-MCNC: 6 MG/DL (ref 0.5–1)
ERYTHROCYTE [DISTWIDTH] IN BLOOD BY AUTOMATED COUNT: 20.4 % (ref 11.5–15)
GFR, ESTIMATED: 7 ML/MIN/1.73M2
GLUCOSE SERPL-MCNC: 106 MG/DL (ref 74–99)
HBA1C MFR BLD: 4.8 % (ref 4–5.6)
HCT VFR BLD AUTO: 24.7 % (ref 34–48)
HDLC SERPL-MCNC: 46 MG/DL
HGB BLD-MCNC: 8 G/DL (ref 11.5–15.5)
LDLC SERPL CALC-MCNC: 72 MG/DL
MCH RBC QN AUTO: 34.6 PG (ref 26–35)
MCHC RBC AUTO-ENTMCNC: 32.4 G/DL (ref 32–34.5)
MCV RBC AUTO: 106.9 FL (ref 80–99.9)
PLATELET # BLD AUTO: 327 K/UL (ref 130–450)
PMV BLD AUTO: 12.4 FL (ref 7–12)
POTASSIUM SERPL-SCNC: 5.5 MMOL/L (ref 3.5–5)
PROT SERPL-MCNC: 6.9 G/DL (ref 6.4–8.3)
RBC # BLD AUTO: 2.31 M/UL (ref 3.5–5.5)
SODIUM SERPL-SCNC: 135 MMOL/L (ref 132–146)
TRIGL SERPL-MCNC: 88 MG/DL
VLDLC SERPL CALC-MCNC: 18 MG/DL
WBC OTHER # BLD: 9.3 K/UL (ref 4.5–11.5)

## 2024-07-22 PROCEDURE — 99306 1ST NF CARE HIGH MDM 50: CPT | Performed by: INTERNAL MEDICINE

## 2024-07-22 ASSESSMENT — ENCOUNTER SYMPTOMS
ABDOMINAL PAIN: 0
RHINORRHEA: 0
SORE THROAT: 0
SHORTNESS OF BREATH: 0
CONSTIPATION: 0
VOICE CHANGE: 0
NAUSEA: 0
TROUBLE SWALLOWING: 0
VOMITING: 0
CHEST TIGHTNESS: 0
BLOOD IN STOOL: 0
PHOTOPHOBIA: 0
COUGH: 0
BACK PAIN: 0
WHEEZING: 0
EYE PAIN: 0
DIARRHEA: 0

## 2024-07-22 NOTE — PROGRESS NOTES
Patient does not want to stay for TIA  work up. She is feeling better now. She wants to go back to Rehab. Spoke to Dr Shah.

## 2024-07-22 NOTE — PROGRESS NOTES
Visit Date: 2024  Marge Callejas (:  1940) is a 84 y.o. female.    History & Physical    Subjective   SUBJECTIVE/OBJECTIVE:  Past Medical History:   Diagnosis Date    Arthritis     Atrial fibrillation (HCC)     Blood circulation, collateral     CHF (congestive heart failure) (HCC)     Chronic renal insufficiency, stage IV (severe) (HCC) 2016    Coronary artery disease involving native coronary artery of native heart without angina pectoris 2024    History of cardiovascular stress test 2015    Lexiscan stress test    History of echocardiogram 2015    EF 29%    Hyperlipidemia     Hypertension     Mixed restrictive and obstructive lung disease (HCC) 2023      Past Surgical History:   Procedure Laterality Date    COLONOSCOPY N/A 4/3/2021    COLONOSCOPY POLYPECTOMY HOT SNARE performed by Lucio Nelson DO at [facility] ENDOSCOPY    COLONOSCOPY  4/3/2021    COLONOSCOPY WITH BIOPSY performed by Lucio Nelson DO at [facility] ENDOSCOPY    COLONOSCOPY  4/3/2021    COLONOSCOPY CONTROL HEMORRHAGE performed by Lucio Nelson DO at [facility] ENDOSCOPY    COLONOSCOPY  4/3/2021    COLONOSCOPY SUBMUCOSAL SPOT INJECTION performed by Lucio Nelson DO at [facility] ENDOSCOPY    COLONOSCOPY N/A 2023    COLONOSCOPY DIAGNOSTIC performed by Scooby Cormier MD at [facility] ENDOSCOPY    ECTOPIC PREGNANCY SURGERY      UPPER GASTROINTESTINAL ENDOSCOPY N/A 4/3/2021    EGD BIOPSY performed by Lucio Nelson DO at [facility] ENDOSCOPY    UPPER GASTROINTESTINAL ENDOSCOPY N/A 2021    EGD W/EUS FNA performed by Ana Gandhi MD at [facility] ENDOSCOPY      Family History   Problem Relation Age of Onset    Heart Disease Mother     No Known Problems Father     Other Sister         CHF    Cancer Sister     Brain Cancer Sister       Social History     Socioeconomic History    Marital status:    Tobacco Use    Smoking status: Former     Current packs/day: 0.00     Average packs/day: 0.5 packs/day for 51.0 years (25.5 ttl pk-yrs)

## 2024-07-23 ENCOUNTER — HOSPITAL ENCOUNTER (INPATIENT)
Age: 84
LOS: 6 days | Discharge: OTHER INSTITUTION | DRG: 189 | End: 2024-07-29
Attending: EMERGENCY MEDICINE | Admitting: FAMILY MEDICINE
Payer: MEDICARE

## 2024-07-23 ENCOUNTER — APPOINTMENT (OUTPATIENT)
Dept: GENERAL RADIOLOGY | Age: 84
DRG: 189 | End: 2024-07-23
Payer: MEDICARE

## 2024-07-23 DIAGNOSIS — J96.01 ACUTE RESPIRATORY FAILURE WITH HYPOXIA (HCC): Primary | ICD-10-CM

## 2024-07-23 DIAGNOSIS — I50.9 ACUTE ON CHRONIC CONGESTIVE HEART FAILURE, UNSPECIFIED HEART FAILURE TYPE (HCC): ICD-10-CM

## 2024-07-23 DIAGNOSIS — N18.6 ESRD (END STAGE RENAL DISEASE) (HCC): ICD-10-CM

## 2024-07-23 PROBLEM — J96.21 ACUTE ON CHRONIC RESPIRATORY FAILURE WITH HYPOXEMIA (HCC): Status: ACTIVE | Noted: 2024-07-23

## 2024-07-23 LAB
AADO2: 502.2 MMHG
AADO2: 551.9 MMHG
ALBUMIN SERPL-MCNC: 3.1 G/DL (ref 3.5–5.2)
ALP SERPL-CCNC: 143 U/L (ref 35–104)
ALT SERPL-CCNC: 12 U/L (ref 0–32)
ANION GAP SERPL CALCULATED.3IONS-SCNC: 13 MMOL/L (ref 7–16)
ANION GAP SERPL CALCULATED.3IONS-SCNC: 16 MMOL/L (ref 7–16)
AST SERPL-CCNC: 19 U/L (ref 0–31)
B PARAP IS1001 DNA NPH QL NAA+NON-PROBE: NOT DETECTED
B PERT DNA SPEC QL NAA+PROBE: NOT DETECTED
B.E.: 0.3 MMOL/L (ref -3–3)
B.E.: 1 MMOL/L (ref -3–3)
BASOPHILS # BLD: 0.04 K/UL (ref 0–0.2)
BASOPHILS NFR BLD: 0 % (ref 0–2)
BILIRUB SERPL-MCNC: 0.6 MG/DL (ref 0–1.2)
BNP SERPL-MCNC: ABNORMAL PG/ML (ref 0–450)
BUN SERPL-MCNC: 40 MG/DL (ref 6–23)
BUN SERPL-MCNC: 45 MG/DL (ref 6–23)
C PNEUM DNA NPH QL NAA+NON-PROBE: NOT DETECTED
CALCIUM SERPL-MCNC: 9 MG/DL (ref 8.6–10.2)
CALCIUM SERPL-MCNC: 9 MG/DL (ref 8.6–10.2)
CHLORIDE SERPL-SCNC: 93 MMOL/L (ref 98–107)
CHLORIDE SERPL-SCNC: 93 MMOL/L (ref 98–107)
CO2 SERPL-SCNC: 23 MMOL/L (ref 22–29)
CO2 SERPL-SCNC: 25 MMOL/L (ref 22–29)
COHB: 1.1 % (ref 0–1.5)
COHB: 1.7 % (ref 0–1.5)
CREAT SERPL-MCNC: 4.3 MG/DL (ref 0.5–1)
CREAT SERPL-MCNC: 4.8 MG/DL (ref 0.5–1)
CRITICAL: ABNORMAL
CRITICAL: ABNORMAL
DATE ANALYZED: ABNORMAL
DATE ANALYZED: ABNORMAL
DATE OF COLLECTION: ABNORMAL
DATE OF COLLECTION: ABNORMAL
EOSINOPHIL # BLD: 0.15 K/UL (ref 0.05–0.5)
EOSINOPHILS RELATIVE PERCENT: 2 % (ref 0–6)
ERYTHROCYTE [DISTWIDTH] IN BLOOD BY AUTOMATED COUNT: 20 % (ref 11.5–15)
FIO2: 100 %
FIO2: 100 %
FLUAV RNA NPH QL NAA+NON-PROBE: NOT DETECTED
FLUBV RNA NPH QL NAA+NON-PROBE: NOT DETECTED
GFR, ESTIMATED: 10 ML/MIN/1.73M2
GFR, ESTIMATED: 9 ML/MIN/1.73M2
GLUCOSE SERPL-MCNC: 191 MG/DL (ref 74–99)
GLUCOSE SERPL-MCNC: 262 MG/DL (ref 74–99)
HADV DNA NPH QL NAA+NON-PROBE: NOT DETECTED
HCO3: 25.9 MMOL/L (ref 22–26)
HCO3: 26.7 MMOL/L (ref 22–26)
HCOV 229E RNA NPH QL NAA+NON-PROBE: NOT DETECTED
HCOV HKU1 RNA NPH QL NAA+NON-PROBE: NOT DETECTED
HCOV NL63 RNA NPH QL NAA+NON-PROBE: NOT DETECTED
HCOV OC43 RNA NPH QL NAA+NON-PROBE: NOT DETECTED
HCT VFR BLD AUTO: 23.4 % (ref 34–48)
HGB BLD-MCNC: 7.6 G/DL (ref 11.5–15.5)
HHB: 1.6 % (ref 0–5)
HHB: 4.7 % (ref 0–5)
HMPV RNA NPH QL NAA+NON-PROBE: NOT DETECTED
HPIV1 RNA NPH QL NAA+NON-PROBE: NOT DETECTED
HPIV2 RNA NPH QL NAA+NON-PROBE: NOT DETECTED
HPIV3 RNA NPH QL NAA+NON-PROBE: NOT DETECTED
HPIV4 RNA NPH QL NAA+NON-PROBE: NOT DETECTED
IMM GRANULOCYTES # BLD AUTO: 0.06 K/UL (ref 0–0.58)
IMM GRANULOCYTES NFR BLD: 1 % (ref 0–5)
LAB: ABNORMAL
LAB: ABNORMAL
LYMPHOCYTES NFR BLD: 1.84 K/UL (ref 1.5–4)
LYMPHOCYTES RELATIVE PERCENT: 20 % (ref 20–42)
Lab: 1034
Lab: 1359
M PNEUMO DNA NPH QL NAA+NON-PROBE: NOT DETECTED
MCH RBC QN AUTO: 34.9 PG (ref 26–35)
MCHC RBC AUTO-ENTMCNC: 32.5 G/DL (ref 32–34.5)
MCV RBC AUTO: 107.3 FL (ref 80–99.9)
METHB: 0.1 % (ref 0–1.5)
METHB: 0.3 % (ref 0–1.5)
MODE: 5
MODE: ABNORMAL
MONOCYTES NFR BLD: 0.69 K/UL (ref 0.1–0.95)
MONOCYTES NFR BLD: 8 % (ref 2–12)
NEUTROPHILS NFR BLD: 70 % (ref 43–80)
NEUTS SEG NFR BLD: 6.43 K/UL (ref 1.8–7.3)
O2 SATURATION: 95.2 % (ref 92–98.5)
O2 SATURATION: 98.4 % (ref 92–98.5)
O2HB: 93.3 % (ref 94–97)
O2HB: 97.2 % (ref 94–97)
OPERATOR ID: 2067
OPERATOR ID: 2067
PATIENT TEMP: 37 C
PATIENT TEMP: 37 C
PCO2: 47.3 MMHG (ref 35–45)
PCO2: 48.6 MMHG (ref 35–45)
PEEP/CPAP: 5 CMH2O
PFO2: 0.89 MMHG/%
PFO2: 1.37 MMHG/%
PH BLOOD GAS: 7.36 (ref 7.35–7.45)
PH BLOOD GAS: 7.36 (ref 7.35–7.45)
PLATELET # BLD AUTO: 300 K/UL (ref 130–450)
PMV BLD AUTO: 11.9 FL (ref 7–12)
PO2: 137.2 MMHG (ref 75–100)
PO2: 88.8 MMHG (ref 75–100)
POTASSIUM SERPL-SCNC: 4.6 MMOL/L (ref 3.5–5)
POTASSIUM SERPL-SCNC: 5.3 MMOL/L (ref 3.5–5)
PROCALCITONIN SERPL-MCNC: 0.7 NG/ML (ref 0–0.08)
PROT SERPL-MCNC: 6.7 G/DL (ref 6.4–8.3)
PS: 10 CMH20
RBC # BLD AUTO: 2.18 M/UL (ref 3.5–5.5)
RI(T): 3.66
RI(T): 6.22
RSV RNA NPH QL NAA+NON-PROBE: NOT DETECTED
RV+EV RNA NPH QL NAA+NON-PROBE: NOT DETECTED
SARS-COV-2 RNA NPH QL NAA+NON-PROBE: NOT DETECTED
SODIUM SERPL-SCNC: 131 MMOL/L (ref 132–146)
SODIUM SERPL-SCNC: 132 MMOL/L (ref 132–146)
SOURCE, BLOOD GAS: ABNORMAL
SOURCE, BLOOD GAS: ABNORMAL
SPECIMEN DESCRIPTION: NORMAL
THB: 8.4 G/DL (ref 11.5–16.5)
THB: 8.4 G/DL (ref 11.5–16.5)
TIME ANALYZED: 1036
TIME ANALYZED: 1410
TROPONIN I SERPL HS-MCNC: 78 NG/L (ref 0–9)
TROPONIN I SERPL HS-MCNC: 78 NG/L (ref 0–9)
WBC OTHER # BLD: 9.2 K/UL (ref 4.5–11.5)

## 2024-07-23 PROCEDURE — 84484 ASSAY OF TROPONIN QUANT: CPT

## 2024-07-23 PROCEDURE — 36415 COLL VENOUS BLD VENIPUNCTURE: CPT

## 2024-07-23 PROCEDURE — 2060000000 HC ICU INTERMEDIATE R&B

## 2024-07-23 PROCEDURE — 99285 EMERGENCY DEPT VISIT HI MDM: CPT

## 2024-07-23 PROCEDURE — 84145 PROCALCITONIN (PCT): CPT

## 2024-07-23 PROCEDURE — 80048 BASIC METABOLIC PNL TOTAL CA: CPT

## 2024-07-23 PROCEDURE — 6370000000 HC RX 637 (ALT 250 FOR IP): Performed by: STUDENT IN AN ORGANIZED HEALTH CARE EDUCATION/TRAINING PROGRAM

## 2024-07-23 PROCEDURE — 82805 BLOOD GASES W/O2 SATURATION: CPT

## 2024-07-23 PROCEDURE — 5A09357 ASSISTANCE WITH RESPIRATORY VENTILATION, LESS THAN 24 CONSECUTIVE HOURS, CONTINUOUS POSITIVE AIRWAY PRESSURE: ICD-10-PCS | Performed by: INTERNAL MEDICINE

## 2024-07-23 PROCEDURE — 83880 ASSAY OF NATRIURETIC PEPTIDE: CPT

## 2024-07-23 PROCEDURE — 0202U NFCT DS 22 TRGT SARS-COV-2: CPT

## 2024-07-23 PROCEDURE — 94660 CPAP INITIATION&MGMT: CPT

## 2024-07-23 PROCEDURE — 71045 X-RAY EXAM CHEST 1 VIEW: CPT

## 2024-07-23 PROCEDURE — 93005 ELECTROCARDIOGRAM TRACING: CPT

## 2024-07-23 PROCEDURE — 85025 COMPLETE CBC W/AUTO DIFF WBC: CPT

## 2024-07-23 PROCEDURE — 80053 COMPREHEN METABOLIC PANEL: CPT

## 2024-07-23 RX ORDER — ISOSORBIDE DINITRATE 10 MG/1
5 TABLET ORAL 2 TIMES DAILY
Status: DISCONTINUED | OUTPATIENT
Start: 2024-07-23 | End: 2024-07-29 | Stop reason: HOSPADM

## 2024-07-23 RX ORDER — HEPARIN SODIUM 5000 [USP'U]/ML
5000 INJECTION, SOLUTION INTRAVENOUS; SUBCUTANEOUS EVERY 8 HOURS SCHEDULED
Status: DISCONTINUED | OUTPATIENT
Start: 2024-07-23 | End: 2024-07-23

## 2024-07-23 RX ORDER — HYDRALAZINE HYDROCHLORIDE 10 MG/1
10 TABLET, FILM COATED ORAL EVERY 12 HOURS SCHEDULED
Status: DISCONTINUED | OUTPATIENT
Start: 2024-07-23 | End: 2024-07-29 | Stop reason: HOSPADM

## 2024-07-23 RX ORDER — PANTOPRAZOLE SODIUM 40 MG/1
40 TABLET, DELAYED RELEASE ORAL
Status: DISCONTINUED | OUTPATIENT
Start: 2024-07-24 | End: 2024-07-29 | Stop reason: HOSPADM

## 2024-07-23 RX ORDER — ACETAMINOPHEN 325 MG/1
650 TABLET ORAL EVERY 6 HOURS PRN
Status: DISCONTINUED | OUTPATIENT
Start: 2024-07-23 | End: 2024-07-29 | Stop reason: HOSPADM

## 2024-07-23 RX ORDER — MECOBALAMIN 5000 MCG
5 TABLET,DISINTEGRATING ORAL NIGHTLY
Status: DISCONTINUED | OUTPATIENT
Start: 2024-07-23 | End: 2024-07-29 | Stop reason: HOSPADM

## 2024-07-23 RX ORDER — ASPIRIN 81 MG/1
81 TABLET ORAL DAILY
Status: DISCONTINUED | OUTPATIENT
Start: 2024-07-24 | End: 2024-07-29 | Stop reason: HOSPADM

## 2024-07-23 RX ORDER — ATORVASTATIN CALCIUM 40 MG/1
40 TABLET, FILM COATED ORAL DAILY
Status: DISCONTINUED | OUTPATIENT
Start: 2024-07-23 | End: 2024-07-29 | Stop reason: HOSPADM

## 2024-07-23 RX ORDER — MECLIZINE HCL 12.5 MG/1
12.5 TABLET ORAL 3 TIMES DAILY PRN
Status: DISCONTINUED | OUTPATIENT
Start: 2024-07-23 | End: 2024-07-29 | Stop reason: HOSPADM

## 2024-07-23 RX ORDER — SODIUM CHLORIDE 0.9 % (FLUSH) 0.9 %
5-40 SYRINGE (ML) INJECTION EVERY 12 HOURS SCHEDULED
Status: DISCONTINUED | OUTPATIENT
Start: 2024-07-23 | End: 2024-07-29 | Stop reason: HOSPADM

## 2024-07-23 RX ORDER — BUDESONIDE 0.5 MG/2ML
0.5 INHALANT ORAL
Status: DISCONTINUED | OUTPATIENT
Start: 2024-07-23 | End: 2024-07-29 | Stop reason: HOSPADM

## 2024-07-23 RX ORDER — LACTULOSE 10 G/15ML
10 SOLUTION ORAL DAILY PRN
Status: DISCONTINUED | OUTPATIENT
Start: 2024-07-23 | End: 2024-07-29 | Stop reason: HOSPADM

## 2024-07-23 RX ORDER — SODIUM CHLORIDE 9 MG/ML
INJECTION, SOLUTION INTRAVENOUS PRN
Status: DISCONTINUED | OUTPATIENT
Start: 2024-07-23 | End: 2024-07-29 | Stop reason: HOSPADM

## 2024-07-23 RX ORDER — POLYETHYLENE GLYCOL 3350 17 G/17G
17 POWDER, FOR SOLUTION ORAL DAILY PRN
Status: DISCONTINUED | OUTPATIENT
Start: 2024-07-23 | End: 2024-07-29 | Stop reason: HOSPADM

## 2024-07-23 RX ORDER — METOPROLOL SUCCINATE 25 MG/1
12.5 TABLET, EXTENDED RELEASE ORAL DAILY
Status: DISCONTINUED | OUTPATIENT
Start: 2024-07-24 | End: 2024-07-29 | Stop reason: HOSPADM

## 2024-07-23 RX ORDER — ACETAMINOPHEN 650 MG/1
650 SUPPOSITORY RECTAL EVERY 6 HOURS PRN
Status: DISCONTINUED | OUTPATIENT
Start: 2024-07-23 | End: 2024-07-29 | Stop reason: HOSPADM

## 2024-07-23 RX ORDER — IPRATROPIUM BROMIDE AND ALBUTEROL SULFATE 2.5; .5 MG/3ML; MG/3ML
1 SOLUTION RESPIRATORY (INHALATION) EVERY 6 HOURS PRN
Status: DISCONTINUED | OUTPATIENT
Start: 2024-07-23 | End: 2024-07-29 | Stop reason: HOSPADM

## 2024-07-23 RX ORDER — SODIUM CHLORIDE 0.9 % (FLUSH) 0.9 %
5-40 SYRINGE (ML) INJECTION PRN
Status: DISCONTINUED | OUTPATIENT
Start: 2024-07-23 | End: 2024-07-29 | Stop reason: HOSPADM

## 2024-07-23 RX ORDER — CALCIUM ACETATE 667 MG/1
1 CAPSULE ORAL
Status: DISCONTINUED | OUTPATIENT
Start: 2024-07-23 | End: 2024-07-29 | Stop reason: HOSPADM

## 2024-07-23 RX ORDER — BUMETANIDE 1 MG/1
2 TABLET ORAL DAILY
Status: DISCONTINUED | OUTPATIENT
Start: 2024-07-24 | End: 2024-07-29 | Stop reason: HOSPADM

## 2024-07-23 RX ADMIN — Medication 5 MG: at 21:15

## 2024-07-23 RX ADMIN — APIXABAN 2.5 MG: 2.5 TABLET, FILM COATED ORAL at 21:15

## 2024-07-23 RX ADMIN — ISOSORBIDE DINITRATE 5 MG: 10 TABLET ORAL at 21:15

## 2024-07-23 NOTE — H&P
Hospitalist History & Physical      PCP: Marek Andres DO    Date of Admission: 7/23/2024    Date of Service: Pt seen/examined on 7/23/2024 and is admitted to Inpatient with expected LOS greater than two midnights due to medical therapy.      Placed in Observation.    Chief Complaint: Respiratory distress  History Of Present Illness:    Ms. Marge Callejas, a 84 y.o. year old female  who  has a past medical history of Arthritis, Atrial fibrillation (HCC), Blood circulation, collateral, CHF (congestive heart failure) (HCC), Chronic renal insufficiency, stage IV (severe) ([facility]), Coronary artery disease involving native coronary artery of native heart without angina pectoris, History of cardiovascular stress test, History of echocardiogram, Hyperlipidemia, Hypertension, and Mixed restrictive and obstructive lung disease (HCC).     Patient was sent from nursing facility via EMS for concern of respiratory distress.  Patient has been having increasing need for oxygen supplementation in the last night she was up to 90 L/min nasal cannula.  Her daughter went to visit today and he was with altered mental status and her saturations were to the 60s, apparently in room air.  No report of fevers, chills, chest pain, abdominal pain, nausea, vomiting.    On ER evaluation patient was in acute distress 2/2 breathing issues, she was placed on BiPAP with improvement of her symptoms.  Afebrile.  Hemodynamically stable.  No medications given on the ER.  Chest x-ray with no acute findings.  Decision was to admit for further monitoring and workup.  Case was discussed with ER physician    Upon my evaluation patient feels a lot better.  Continue to deny pains.  Feels hungry daughter was at bedside    Past Medical History:   Diagnosis Date    Arthritis     Atrial fibrillation (HCC)     Blood circulation, collateral     CHF (congestive heart failure) (HCC)     Chronic renal insufficiency, stage IV (severe) ([facility]) 11/19/2016

## 2024-07-23 NOTE — ED PROVIDER NOTES
Department of Emergency Medicine   ED Provider Note  Admit Date/RoomTime: 7/23/2024 10:27 AM  ED Room: 09/09          History of Present Illness:  7/23/24, Time: 10:33 AM EDT       Marge Callejas is a 84 y.o. female presenting to the ED for SOB and hypoxia. EMS reports she was hypoxic to 60s on room air. She denies any complaints, no cough or congestion, CP, fevers or chills, abd pain, N/V/D, dysuria, hematuria, leg swelling.  Patient was recently seen in the hospital and reportedly supposed to be discharged on oxygen however EMS notes patient had not been getting her oxygen at the facility.  Does have a history of CHF, and ESRD on dialysis.     Additional history obtained from EMS - reporting hypoxia at 60s    Review of Systems:     Pertinent positives and negatives are stated within HPI.      --------------------------------------------- PAST HISTORY ---------------------------------------------  Past Medical History:  has a past medical history of Arthritis, Atrial fibrillation (HCC), Blood circulation, collateral, CHF (congestive heart failure) (HCC), Chronic renal insufficiency, stage IV (severe) (HCC), Coronary artery disease involving native coronary artery of native heart without angina pectoris, History of cardiovascular stress test, History of echocardiogram, Hyperlipidemia, Hypertension, and Mixed restrictive and obstructive lung disease (HCC).    Past Surgical History:  has a past surgical history that includes Ectopic pregnancy surgery; Upper gastrointestinal endoscopy (N/A, 4/3/2021); Colonoscopy (N/A, 4/3/2021); Colonoscopy (4/3/2021); Colonoscopy (4/3/2021); Colonoscopy (4/3/2021); Upper gastrointestinal endoscopy (N/A, 4/7/2021); and Colonoscopy (N/A, 9/19/2023).    Social History:  reports that she quit smoking about 18 months ago. Her smoking use included cigarettes. She started smoking about 52 years ago. She has a 25.5 pack-year smoking history. She has never used smokeless tobacco. Drug use

## 2024-07-23 NOTE — ED NOTES
Report given to ARBEN Pina from [facility], from [facility].   Report method by phone   The following was reviewed with receiving RN:   Current vital signs:  /65   Pulse 75   Temp 97.5 °F (36.4 °C)   Resp 16   Ht 1.626 m (5' 4\")   Wt 55.8 kg (123 lb)   SpO2 100%   BMI 21.11 kg/m²                      Any medication or safety alerts were reviewed. Any pending diagnostics and notifications were also reviewed, as well as any safety concerns or issues, abnormal labs, abnormal imaging, and abnormal assessment findings. Questions were answered.

## 2024-07-23 NOTE — ED NOTES
Pt requesting to eat, per attending at bedside pt can come off bi pap with NC to eat, pt repositioned, box lunch with pudding provided, resp called

## 2024-07-23 NOTE — CARE COORDINATION
Social Work /Transition of Care:    Pt presents to the ED secondary to respiratory distress from [facility].  Pt was discharged from [facility] on 7/7 to [facility].  Pt then [facility] to [facility] and was discharged to [facility].  Facility reports pt was on 9L oxygen.   Per ED physician, pt will be admitted with acute on chronic respiratory failure with hypoxemia.  Pt is currently on bipap.  Per liaison at [facility], pt will be able to return upon discharge.

## 2024-07-24 PROBLEM — J96.01 ACUTE RESPIRATORY FAILURE WITH HYPOXIA (HCC): Status: ACTIVE | Noted: 2024-07-24

## 2024-07-24 LAB
ANION GAP SERPL CALCULATED.3IONS-SCNC: 14 MMOL/L (ref 7–16)
BASOPHILS # BLD: 0 K/UL (ref 0–0.2)
BASOPHILS NFR BLD: 0 % (ref 0–2)
BUN SERPL-MCNC: 50 MG/DL (ref 6–23)
CALCIUM SERPL-MCNC: 8.9 MG/DL (ref 8.6–10.2)
CHLORIDE SERPL-SCNC: 92 MMOL/L (ref 98–107)
CO2 SERPL-SCNC: 25 MMOL/L (ref 22–29)
CREAT SERPL-MCNC: 5.2 MG/DL (ref 0.5–1)
EKG ATRIAL RATE: 70 BPM
EKG Q-T INTERVAL: 504 MS
EKG QRS DURATION: 206 MS
EKG QTC CALCULATION (BAZETT): 562 MS
EKG R AXIS: -67 DEGREES
EKG T AXIS: 105 DEGREES
EKG VENTRICULAR RATE: 75 BPM
EOSINOPHIL # BLD: 0 K/UL (ref 0.05–0.5)
EOSINOPHILS RELATIVE PERCENT: 0 % (ref 0–6)
ERYTHROCYTE [DISTWIDTH] IN BLOOD BY AUTOMATED COUNT: 19.9 % (ref 11.5–15)
GFR, ESTIMATED: 8 ML/MIN/1.73M2
GLUCOSE SERPL-MCNC: 166 MG/DL (ref 74–99)
HCT VFR BLD AUTO: 22.7 % (ref 34–48)
HGB BLD-MCNC: 7.1 G/DL (ref 11.5–15.5)
IMM GRANULOCYTES # BLD AUTO: 0.04 K/UL (ref 0–0.58)
IMM GRANULOCYTES NFR BLD: 1 % (ref 0–5)
LYMPHOCYTES NFR BLD: 0.79 K/UL (ref 1.5–4)
LYMPHOCYTES RELATIVE PERCENT: 10 % (ref 20–42)
MCH RBC QN AUTO: 33.8 PG (ref 26–35)
MCHC RBC AUTO-ENTMCNC: 31.3 G/DL (ref 32–34.5)
MCV RBC AUTO: 108.1 FL (ref 80–99.9)
MONOCYTES NFR BLD: 0.32 K/UL (ref 0.1–0.95)
MONOCYTES NFR BLD: 4 % (ref 2–12)
NEUTROPHILS NFR BLD: 85 % (ref 43–80)
NEUTS SEG NFR BLD: 6.43 K/UL (ref 1.8–7.3)
PLATELET # BLD AUTO: 269 K/UL (ref 130–450)
PMV BLD AUTO: 12 FL (ref 7–12)
POTASSIUM SERPL-SCNC: 5.1 MMOL/L (ref 3.5–5)
RBC # BLD AUTO: 2.1 M/UL (ref 3.5–5.5)
SODIUM SERPL-SCNC: 131 MMOL/L (ref 132–146)
WBC OTHER # BLD: 7.6 K/UL (ref 4.5–11.5)

## 2024-07-24 PROCEDURE — 99232 SBSQ HOSP IP/OBS MODERATE 35: CPT | Performed by: FAMILY MEDICINE

## 2024-07-24 PROCEDURE — 99221 1ST HOSP IP/OBS SF/LOW 40: CPT | Performed by: INTERNAL MEDICINE

## 2024-07-24 PROCEDURE — 6360000002 HC RX W HCPCS: Performed by: INTERNAL MEDICINE

## 2024-07-24 PROCEDURE — 94660 CPAP INITIATION&MGMT: CPT

## 2024-07-24 PROCEDURE — 6370000000 HC RX 637 (ALT 250 FOR IP): Performed by: STUDENT IN AN ORGANIZED HEALTH CARE EDUCATION/TRAINING PROGRAM

## 2024-07-24 PROCEDURE — 6360000002 HC RX W HCPCS: Performed by: STUDENT IN AN ORGANIZED HEALTH CARE EDUCATION/TRAINING PROGRAM

## 2024-07-24 PROCEDURE — 2060000000 HC ICU INTERMEDIATE R&B

## 2024-07-24 PROCEDURE — 99222 1ST HOSP IP/OBS MODERATE 55: CPT | Performed by: STUDENT IN AN ORGANIZED HEALTH CARE EDUCATION/TRAINING PROGRAM

## 2024-07-24 PROCEDURE — 5A1D70Z PERFORMANCE OF URINARY FILTRATION, INTERMITTENT, LESS THAN 6 HOURS PER DAY: ICD-10-PCS | Performed by: INTERNAL MEDICINE

## 2024-07-24 PROCEDURE — 85025 COMPLETE CBC W/AUTO DIFF WBC: CPT

## 2024-07-24 PROCEDURE — 90935 HEMODIALYSIS ONE EVALUATION: CPT

## 2024-07-24 PROCEDURE — 80048 BASIC METABOLIC PNL TOTAL CA: CPT

## 2024-07-24 PROCEDURE — 2580000003 HC RX 258: Performed by: STUDENT IN AN ORGANIZED HEALTH CARE EDUCATION/TRAINING PROGRAM

## 2024-07-24 PROCEDURE — 2500000003 HC RX 250 WO HCPCS: Performed by: STUDENT IN AN ORGANIZED HEALTH CARE EDUCATION/TRAINING PROGRAM

## 2024-07-24 PROCEDURE — 6370000000 HC RX 637 (ALT 250 FOR IP): Performed by: FAMILY MEDICINE

## 2024-07-24 PROCEDURE — 2700000000 HC OXYGEN THERAPY PER DAY

## 2024-07-24 PROCEDURE — 36415 COLL VENOUS BLD VENIPUNCTURE: CPT

## 2024-07-24 PROCEDURE — 94640 AIRWAY INHALATION TREATMENT: CPT

## 2024-07-24 RX ORDER — PREDNISONE 20 MG/1
20 TABLET ORAL 2 TIMES DAILY
Status: DISCONTINUED | OUTPATIENT
Start: 2024-07-24 | End: 2024-07-27

## 2024-07-24 RX ADMIN — ISOSORBIDE DINITRATE 5 MG: 10 TABLET ORAL at 08:59

## 2024-07-24 RX ADMIN — ATORVASTATIN CALCIUM 40 MG: 40 TABLET, FILM COATED ORAL at 08:59

## 2024-07-24 RX ADMIN — EPOETIN ALFA-EPBX 3000 UNITS: 3000 INJECTION, SOLUTION INTRAVENOUS; SUBCUTANEOUS at 16:45

## 2024-07-24 RX ADMIN — PANTOPRAZOLE SODIUM 40 MG: 40 TABLET, DELAYED RELEASE ORAL at 06:05

## 2024-07-24 RX ADMIN — Medication 5 MG: at 21:25

## 2024-07-24 RX ADMIN — HYDRALAZINE HYDROCHLORIDE 10 MG: 10 TABLET, FILM COATED ORAL at 09:01

## 2024-07-24 RX ADMIN — DOXYCYCLINE 100 MG: 100 INJECTION, POWDER, LYOPHILIZED, FOR SOLUTION INTRAVENOUS at 01:40

## 2024-07-24 RX ADMIN — ISOSORBIDE DINITRATE 5 MG: 10 TABLET ORAL at 21:25

## 2024-07-24 RX ADMIN — BUDESONIDE 500 MCG: 0.5 SUSPENSION RESPIRATORY (INHALATION) at 19:39

## 2024-07-24 RX ADMIN — METOPROLOL SUCCINATE 12.5 MG: 25 TABLET, EXTENDED RELEASE ORAL at 09:02

## 2024-07-24 RX ADMIN — SODIUM CHLORIDE, PRESERVATIVE FREE 10 ML: 5 INJECTION INTRAVENOUS at 09:02

## 2024-07-24 RX ADMIN — ASPIRIN 81 MG: 81 TABLET, COATED ORAL at 08:59

## 2024-07-24 RX ADMIN — SODIUM CHLORIDE, PRESERVATIVE FREE 10 ML: 5 INJECTION INTRAVENOUS at 01:43

## 2024-07-24 RX ADMIN — CALCIUM ACETATE 667 MG: 667 CAPSULE ORAL at 09:01

## 2024-07-24 RX ADMIN — PREDNISONE 20 MG: 20 TABLET ORAL at 21:26

## 2024-07-24 RX ADMIN — HYDRALAZINE HYDROCHLORIDE 10 MG: 10 TABLET, FILM COATED ORAL at 21:26

## 2024-07-24 RX ADMIN — DOXYCYCLINE 100 MG: 100 INJECTION, POWDER, LYOPHILIZED, FOR SOLUTION INTRAVENOUS at 23:47

## 2024-07-24 RX ADMIN — SODIUM CHLORIDE, PRESERVATIVE FREE 10 ML: 5 INJECTION INTRAVENOUS at 21:26

## 2024-07-24 RX ADMIN — BUMETANIDE 2 MG: 1 TABLET ORAL at 09:01

## 2024-07-24 RX ADMIN — APIXABAN 2.5 MG: 2.5 TABLET, FILM COATED ORAL at 08:59

## 2024-07-24 RX ADMIN — APIXABAN 2.5 MG: 2.5 TABLET, FILM COATED ORAL at 21:26

## 2024-07-24 RX ADMIN — DOXYCYCLINE 100 MG: 100 INJECTION, POWDER, LYOPHILIZED, FOR SOLUTION INTRAVENOUS at 11:51

## 2024-07-24 RX ADMIN — CALCIUM ACETATE 667 MG: 667 CAPSULE ORAL at 16:42

## 2024-07-24 RX ADMIN — PREDNISONE 20 MG: 20 TABLET ORAL at 11:48

## 2024-07-24 RX ADMIN — SODIUM ZIRCONIUM CYCLOSILICATE 10 G: 10 POWDER, FOR SUSPENSION ORAL at 01:41

## 2024-07-24 RX ADMIN — CALCIUM ACETATE 667 MG: 667 CAPSULE ORAL at 11:48

## 2024-07-24 RX ADMIN — BUDESONIDE 500 MCG: 0.5 SUSPENSION RESPIRATORY (INHALATION) at 08:24

## 2024-07-24 NOTE — FLOWSHEET NOTE
07/24/24 1625   Vital Signs   BP (!) 106/54   Temp 96.9 °F (36.1 °C)   Pulse 75   Respirations 18   Weight - Scale 59.5 kg (131 lb 2.8 oz)   Weight Method Bed scale   Percent Weight Change 6.65   Pain Assessment   Pain Assessment None - Denies Pain   Post-Hemodialysis Assessment   Post-Treatment Procedures Blood returned;Catheter capped, clamped and heparinized x 2 ports   Machine Disinfection Process Exterior Machine Disinfection;Heat Disinfect;Acid/Vinegar Clean   Rinseback Volume (ml) 300 ml   Blood Volume Processed (Liters) 53.8 L   Dialyzer Clearance Lightly streaked   Duration of Treatment (minutes) 210 minutes   Heparin Amount Administered During Treatment (mL) 0 mL   Hemodialysis Intake (ml) 300 ml   Hemodialysis Output (ml) 2300 ml   NET Removed (ml) 2000   Tolerated Treatment Good   Patient Response to Treatment Tolerated well   Bilateral Breath Sounds Diminished   Physician Notified No   Time Off 1620   Patient Disposition Return to room   Observations & Evaluations   Level of Consciousness 0   Oriented X 3

## 2024-07-24 NOTE — PLAN OF CARE
Problem: Discharge Planning  Goal: Discharge to home or other facility with appropriate resources  7/24/2024 0923 by Ebony Frank RN  Outcome: Progressing  7/24/2024 0211 by Lynda Payton RN  Outcome: Progressing     Problem: Skin/Tissue Integrity  Goal: Absence of new skin breakdown  Description: 1.  Monitor for areas of redness and/or skin breakdown  2.  Assess vascular access sites hourly  3.  Every 4-6 hours minimum:  Change oxygen saturation probe site  4.  Every 4-6 hours:  If on nasal continuous positive airway pressure, respiratory therapy assess nares and determine need for appliance change or resting period.  7/24/2024 0923 by Ebony Frank, RN  Outcome: Progressing  7/24/2024 0211 by Lynda Payton RN  Outcome: Progressing     Problem: Safety - Adult  Goal: Free from fall injury  7/24/2024 0923 by Ebony Frank, RN  Outcome: Progressing  7/24/2024 0211 by Lynda Payton, RN  Outcome: Progressing

## 2024-07-25 LAB
ANION GAP SERPL CALCULATED.3IONS-SCNC: 16 MMOL/L (ref 7–16)
BASOPHILS # BLD: 0.01 K/UL (ref 0–0.2)
BASOPHILS NFR BLD: 0 % (ref 0–2)
BUN SERPL-MCNC: 26 MG/DL (ref 6–23)
CALCIUM SERPL-MCNC: 8.7 MG/DL (ref 8.6–10.2)
CHLORIDE SERPL-SCNC: 92 MMOL/L (ref 98–107)
CO2 SERPL-SCNC: 23 MMOL/L (ref 22–29)
CREAT SERPL-MCNC: 3.3 MG/DL (ref 0.5–1)
CRP SERPL HS-MCNC: 10 MG/L (ref 0–5)
EKG ATRIAL RATE: 60 BPM
EKG Q-T INTERVAL: 510 MS
EKG QRS DURATION: 218 MS
EKG QTC CALCULATION (BAZETT): 569 MS
EKG R AXIS: -73 DEGREES
EKG T AXIS: 100 DEGREES
EKG VENTRICULAR RATE: 75 BPM
EOSINOPHIL # BLD: 0 K/UL (ref 0.05–0.5)
EOSINOPHILS RELATIVE PERCENT: 0 % (ref 0–6)
ERYTHROCYTE [DISTWIDTH] IN BLOOD BY AUTOMATED COUNT: 20.1 % (ref 11.5–15)
ERYTHROCYTE [SEDIMENTATION RATE] IN BLOOD BY WESTERGREN METHOD: 62 MM/HR (ref 0–20)
FERRITIN SERPL-MCNC: 3494 NG/ML
GFR, ESTIMATED: 14 ML/MIN/1.73M2
GLUCOSE SERPL-MCNC: 125 MG/DL (ref 74–99)
HCT VFR BLD AUTO: 23 % (ref 34–48)
HGB BLD-MCNC: 7.3 G/DL (ref 11.5–15.5)
IMM GRANULOCYTES # BLD AUTO: 0.06 K/UL (ref 0–0.58)
IMM GRANULOCYTES NFR BLD: 1 % (ref 0–5)
IRON SATN MFR SERPL: 62 % (ref 15–50)
IRON SERPL-MCNC: 91 UG/DL (ref 37–145)
LYMPHOCYTES NFR BLD: 0.84 K/UL (ref 1.5–4)
LYMPHOCYTES RELATIVE PERCENT: 9 % (ref 20–42)
MCH RBC QN AUTO: 34.9 PG (ref 26–35)
MCHC RBC AUTO-ENTMCNC: 31.7 G/DL (ref 32–34.5)
MCV RBC AUTO: 110 FL (ref 80–99.9)
MONOCYTES NFR BLD: 0.51 K/UL (ref 0.1–0.95)
MONOCYTES NFR BLD: 5 % (ref 2–12)
NEUTROPHILS NFR BLD: 86 % (ref 43–80)
NEUTS SEG NFR BLD: 8.5 K/UL (ref 1.8–7.3)
PLATELET # BLD AUTO: 283 K/UL (ref 130–450)
PMV BLD AUTO: 12 FL (ref 7–12)
POTASSIUM SERPL-SCNC: 4.5 MMOL/L (ref 3.5–5)
PROCALCITONIN SERPL-MCNC: 0.46 NG/ML (ref 0–0.08)
RBC # BLD AUTO: 2.09 M/UL (ref 3.5–5.5)
RBC # BLD: ABNORMAL 10*6/UL
SODIUM SERPL-SCNC: 131 MMOL/L (ref 132–146)
TIBC SERPL-MCNC: 146 UG/DL (ref 250–450)
WBC OTHER # BLD: 9.9 K/UL (ref 4.5–11.5)

## 2024-07-25 PROCEDURE — 86140 C-REACTIVE PROTEIN: CPT

## 2024-07-25 PROCEDURE — 6370000000 HC RX 637 (ALT 250 FOR IP): Performed by: FAMILY MEDICINE

## 2024-07-25 PROCEDURE — 82728 ASSAY OF FERRITIN: CPT

## 2024-07-25 PROCEDURE — 97161 PT EVAL LOW COMPLEX 20 MIN: CPT

## 2024-07-25 PROCEDURE — 6370000000 HC RX 637 (ALT 250 FOR IP): Performed by: STUDENT IN AN ORGANIZED HEALTH CARE EDUCATION/TRAINING PROGRAM

## 2024-07-25 PROCEDURE — 2580000003 HC RX 258: Performed by: STUDENT IN AN ORGANIZED HEALTH CARE EDUCATION/TRAINING PROGRAM

## 2024-07-25 PROCEDURE — 2500000003 HC RX 250 WO HCPCS: Performed by: STUDENT IN AN ORGANIZED HEALTH CARE EDUCATION/TRAINING PROGRAM

## 2024-07-25 PROCEDURE — 94660 CPAP INITIATION&MGMT: CPT

## 2024-07-25 PROCEDURE — 6360000002 HC RX W HCPCS: Performed by: STUDENT IN AN ORGANIZED HEALTH CARE EDUCATION/TRAINING PROGRAM

## 2024-07-25 PROCEDURE — 83540 ASSAY OF IRON: CPT

## 2024-07-25 PROCEDURE — 2700000000 HC OXYGEN THERAPY PER DAY

## 2024-07-25 PROCEDURE — 36415 COLL VENOUS BLD VENIPUNCTURE: CPT

## 2024-07-25 PROCEDURE — 85652 RBC SED RATE AUTOMATED: CPT

## 2024-07-25 PROCEDURE — 99232 SBSQ HOSP IP/OBS MODERATE 35: CPT | Performed by: FAMILY MEDICINE

## 2024-07-25 PROCEDURE — 97535 SELF CARE MNGMENT TRAINING: CPT

## 2024-07-25 PROCEDURE — 83550 IRON BINDING TEST: CPT

## 2024-07-25 PROCEDURE — 94640 AIRWAY INHALATION TREATMENT: CPT

## 2024-07-25 PROCEDURE — 97165 OT EVAL LOW COMPLEX 30 MIN: CPT

## 2024-07-25 PROCEDURE — 90945 DIALYSIS ONE EVALUATION: CPT

## 2024-07-25 PROCEDURE — 1200000000 HC SEMI PRIVATE

## 2024-07-25 PROCEDURE — 84145 PROCALCITONIN (PCT): CPT

## 2024-07-25 PROCEDURE — 93010 ELECTROCARDIOGRAM REPORT: CPT | Performed by: INTERNAL MEDICINE

## 2024-07-25 PROCEDURE — 97530 THERAPEUTIC ACTIVITIES: CPT

## 2024-07-25 PROCEDURE — 99232 SBSQ HOSP IP/OBS MODERATE 35: CPT | Performed by: INTERNAL MEDICINE

## 2024-07-25 PROCEDURE — 85025 COMPLETE CBC W/AUTO DIFF WBC: CPT

## 2024-07-25 PROCEDURE — 80048 BASIC METABOLIC PNL TOTAL CA: CPT

## 2024-07-25 RX ORDER — DOXYCYCLINE HYCLATE 100 MG/1
100 CAPSULE ORAL EVERY 12 HOURS SCHEDULED
Status: DISCONTINUED | OUTPATIENT
Start: 2024-07-25 | End: 2024-07-27

## 2024-07-25 RX ADMIN — Medication 5 MG: at 20:10

## 2024-07-25 RX ADMIN — ISOSORBIDE DINITRATE 5 MG: 10 TABLET ORAL at 20:09

## 2024-07-25 RX ADMIN — APIXABAN 2.5 MG: 2.5 TABLET, FILM COATED ORAL at 20:09

## 2024-07-25 RX ADMIN — BUDESONIDE 500 MCG: 0.5 SUSPENSION RESPIRATORY (INHALATION) at 08:44

## 2024-07-25 RX ADMIN — SODIUM CHLORIDE, PRESERVATIVE FREE 10 ML: 5 INJECTION INTRAVENOUS at 07:48

## 2024-07-25 RX ADMIN — ISOSORBIDE DINITRATE 5 MG: 10 TABLET ORAL at 07:51

## 2024-07-25 RX ADMIN — CALCIUM ACETATE 667 MG: 667 CAPSULE ORAL at 07:49

## 2024-07-25 RX ADMIN — PREDNISONE 20 MG: 20 TABLET ORAL at 20:09

## 2024-07-25 RX ADMIN — PANTOPRAZOLE SODIUM 40 MG: 40 TABLET, DELAYED RELEASE ORAL at 06:32

## 2024-07-25 RX ADMIN — METOPROLOL SUCCINATE 12.5 MG: 25 TABLET, EXTENDED RELEASE ORAL at 07:52

## 2024-07-25 RX ADMIN — CALCIUM ACETATE 667 MG: 667 CAPSULE ORAL at 12:28

## 2024-07-25 RX ADMIN — HYDRALAZINE HYDROCHLORIDE 10 MG: 10 TABLET, FILM COATED ORAL at 20:10

## 2024-07-25 RX ADMIN — SODIUM CHLORIDE, PRESERVATIVE FREE 10 ML: 5 INJECTION INTRAVENOUS at 20:10

## 2024-07-25 RX ADMIN — BUDESONIDE 500 MCG: 0.5 SUSPENSION RESPIRATORY (INHALATION) at 19:51

## 2024-07-25 RX ADMIN — DOXYCYCLINE HYCLATE 100 MG: 100 CAPSULE ORAL at 20:09

## 2024-07-25 RX ADMIN — APIXABAN 2.5 MG: 2.5 TABLET, FILM COATED ORAL at 07:52

## 2024-07-25 RX ADMIN — ASPIRIN 81 MG: 81 TABLET, COATED ORAL at 07:48

## 2024-07-25 RX ADMIN — BUMETANIDE 2 MG: 1 TABLET ORAL at 07:49

## 2024-07-25 RX ADMIN — DOXYCYCLINE HYCLATE 100 MG: 100 CAPSULE ORAL at 09:23

## 2024-07-25 RX ADMIN — ATORVASTATIN CALCIUM 40 MG: 40 TABLET, FILM COATED ORAL at 07:48

## 2024-07-25 RX ADMIN — PREDNISONE 20 MG: 20 TABLET ORAL at 07:52

## 2024-07-25 RX ADMIN — HYDRALAZINE HYDROCHLORIDE 10 MG: 10 TABLET, FILM COATED ORAL at 07:51

## 2024-07-25 ASSESSMENT — PAIN SCALES - GENERAL
PAINLEVEL_OUTOF10: 0
PAINLEVEL_OUTOF10: 0

## 2024-07-25 NOTE — CARE COORDINATION
7/25/24  Update CM Note.  Pt admitted 7/23/24 acute on chronic resp distress. From [facility].  Pt with ESRD receiving HD at the SNF (prior arrangement was with [facility] in [place] [facility] 10[facility])  [facility] once [facility] are available. Jerry Castillo in agreement with PR plan. Destination/[facility] updated.  Ambulette form/envelope on chart.   Jayshree MENDOZA RN-BC  [phone].  After requesting to start precert received notification from [facility] liaison that pt was not a paid bedhold and therefor they cannot accept pt. Spoke with jerry Castillo , facility choices 1st [facility]-referral sent. Alternate choices 2nd [facility], [facility]rd [facility]    [id number] Addendum  [facility] declined, referral to [facility].  They are able to apply for one time contract with [facility] but only if 3 other facilities have declined.  Referral to [facility] sent    [id number] declined referral, [facility] Liaison notified

## 2024-07-25 NOTE — CONSULTS
[facility]  MD Murtaza White MD ALI HASSAN MD .    Patient's Name: Marge Callejas  3:51 PM  7/25/2024      Seen in her room , on o2/nc comfortable in NAD . Having lunch .   History of Present Ilness:      84 y.o. year old female  who  has a past medical history of Arthritis, Atrial fibrillation (HCC), Blood circulation, collateral, CHF (congestive heart failure) (HCC), Chronic renal insufficiency, stage IV (severe) (HCC), Coronary artery disease involving native coronary artery of native heart without angina pectoris, History of cardiovascular stress test, History of echocardiogram, Hyperlipidemia, Hypertension, and Mixed restrictive and obstructive lung disease (HCC).      Patient was sent from nursing facility via EMS for concern of respiratory distress  Pt known to me from office and hospital admission   Most recently she had prolonged hospital stay at [facility]   He has hx of HF and her HD schedule was adjusted to MWF to help with her volume overload status  BP has been a limiting factor in terms of achieiving UF   She is seen in her room she is alert oriented she is lying in bed comfortably   She has JVD 2+ she has LE edema 1-2+ .    Past Medical History:   Diagnosis Date    Arthritis     Atrial fibrillation (HCC)     Blood circulation, collateral     CHF (congestive heart failure) (HCC)     Chronic renal insufficiency, stage IV (severe) (HCC) 11/19/2016    Coronary artery disease involving native coronary artery of native heart without angina pectoris 4/17/2024    History of cardiovascular stress test 07/2015    Lexiscan stress test    History of echocardiogram 07/27/2015    EF 29%    Hyperlipidemia     Hypertension     Mixed restrictive and obstructive lung disease (HCC) 7/14/2023       Past Surgical History:   Procedure Laterality Date    COLONOSCOPY N/A 4/3/2021    COLONOSCOPY POLYPECTOMY HOT SNARE performed by Lucio Nelson DO at [facility] ENDOSCOPY    COLONOSCOPY  
[facility]  MD Murtaza White MD ALI HASSAN MD .  Consultation  Patient's Name: Marge Callejas  12:02 PM  7/24/2024    Nephrologist: Alban Ross MD    Reason for Consult:  ESRD   Requesting Physician:  Marek Andres DO    Chief Complaint:  shortness of breath     History Obtained From:  patient , records staff    History of Present Ilness:      84 y.o. year old female  who  has a past medical history of Arthritis, Atrial fibrillation (HCC), Blood circulation, collateral, CHF (congestive heart failure) ([facility]), Chronic renal insufficiency, stage IV (severe) ([facility]), Coronary artery disease involving native coronary artery of native heart without angina pectoris, History of cardiovascular stress test, History of echocardiogram, Hyperlipidemia, Hypertension, and Mixed restrictive and obstructive lung disease ([facility]).      Patient was sent from nursing facility via EMS for concern of respiratory distress  Pt known to me from office and hospital admission   Most recently she had prolonged hospital stay at [facility]   He has hx of HF and her HD schedule was adjusted to MWF to help with her volume overload status  BP has been a limiting factor in terms of achieiving UF   She is seen in her room she is alert oriented she is lying in bed comfortably   She has JVD 2+ she has LE edema 1-2+ .    Past Medical History:   Diagnosis Date    Arthritis     Atrial fibrillation (HCC)     Blood circulation, collateral     CHF (congestive heart failure) ([facility])     Chronic renal insufficiency, stage IV (severe) ([facility]) 11/19/2016    Coronary artery disease involving native coronary artery of native heart without angina pectoris 4/17/2024    History of cardiovascular stress test 07/2015    Lexiscan stress test    History of echocardiogram 07/27/2015    EF 29%    Hyperlipidemia     Hypertension     Mixed restrictive and obstructive lung disease (HCC) 7/14/2023       Past Surgical History:   Procedure 
    ASSESSMENT:  Acute Exacerbation of COPD  Acute on Chronic Hypoxic and Hypercapnic Respiratory Failure   H/O Non-Sustained Ventricular Tachycardia   S/P ICD Placement   HFrEF  Secondary Pulmonary Hypertension   Trace bilateral pleural effusions.  Cholelithiasis.  Chronic pancreatitis.  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  19 mm ectasia of the proximal left common iliac artery.  Degenerating leiomyomas of the uterus.  Macrocytic Anemia  Lymphopenia     PLAN:  O2, IS  Doxycycline, Pulmicort, DuoNeb   No role for thoracentesis  No for for bronchoscopy     Thank you Ousmane Galaviz MD very much for allowing me to see this patient in consultation and follow up.    Care reviewed with nursing staff, medical and surgical specialty care, primary care and the patient's family as available. Restraints are ordered when the patient can do harm to him/herself by pulling out devices.    Roland Collins MD, M.D.

## 2024-07-25 NOTE — FLOWSHEET NOTE
07/25/24 1805   Vital Signs   /66   Temp (!) 96 °F (35.6 °C)   Pulse 75   Post-Hemodialysis Assessment   Post-Treatment Procedures Blood returned;Catheter capped, clamped and heparinized x 2 ports   Machine Disinfection Process Acid/Vinegar Clean;Heat Disinfect;Exterior Machine Disinfection   Rinseback Volume (ml) 300 ml   Blood Volume Processed (Liters) 0 L   Dialyzer Clearance Lightly streaked   Duration of Treatment (minutes) 120 minutes   Heparin Amount Administered During Treatment (mL) 0 mL   Hemodialysis Intake (ml) 300 ml   Hemodialysis Output (ml) 2000 ml   NET Removed (ml) 1700   Tolerated Treatment Good   Patient Response to Treatment tolerated well, blood returned, cath care per policy/procedure, lines flushed, heparin instilled ports capped

## 2024-07-25 NOTE — DISCHARGE INSTR - COC
Last 1 Encounters:   07/24/24 59.5 kg (131 lb 2.8 oz)     Mental Status:  oriented and alert    IV Access:  - None    Nursing Mobility/ADLs:  Walking   Dependent  Transfer  Dependent  Bathing  Dependent  Dressing  Dependent  Toileting  Assisted  Feeding  Independent  Med Admin  Assisted  Med Delivery   whole    Wound Care Documentation and Therapy:  Wound 07/23/24 Heel Left (Active)   Wound Etiology Pressure Stage 1 07/23/24 1945   Wound Cleansed Soap and water 07/23/24 1945   Dressing/Treatment Open to air 07/25/24 0705   Offloading for Diabetic Foot Ulcers Offloading ordered 07/25/24 0705   Wound Length (cm) 1.5 cm 07/24/24 0958   Wound Width (cm) 2.5 cm 07/24/24 0958   Wound Depth (cm) 0.1 cm 07/24/24 0958   Wound Surface Area (cm^2) 3.75 cm^2 07/24/24 0958   Wound Volume (cm^3) 0.375 cm^3 07/24/24 0958   Wound Assessment Dry;Pink/red 07/25/24 0705   Drainage Amount None (dry) 07/25/24 0705   Odor None 07/24/24 0958   Verna-wound Assessment Dry/flaky;Fragile 07/24/24 0958   Number of days: 1       Wound 07/23/24 Buttocks Medial (Active)   Wound Etiology Pressure Stage 1 07/23/24 1945   Dressing/Treatment Open to air 07/25/24 0705   Offloading for Diabetic Foot Ulcers Offloading ordered 07/25/24 0705   Wound Length (cm) 4.4 cm 07/24/24 0958   Wound Width (cm) 4 cm 07/24/24 0958   Wound Depth (cm) 0.1 cm 07/24/24 0958   Wound Surface Area (cm^2) 17.6 cm^2 07/24/24 0958   Wound Volume (cm^3) 1.76 cm^3 07/24/24 0958   Wound Assessment Purple/maroon 07/24/24 0705   Drainage Amount None (dry) 07/25/24 0705   Odor None 07/24/24 0705   Number of days: 1        Elimination:  Continence:   Bowel: No  Bladder: Yes  Urinary Catheter: None   Colostomy/Ileostomy/Ileal Conduit: No       Date of Last BM: 7/29/24    Intake/Output Summary (Last 24 hours) at 7/25/2024 1132  Last data filed at 7/24/2024 1625  Gross per 24 hour   Intake 300 ml   Output 2300 ml   Net -2000 ml     I/O last 3 completed shifts:  In: 300   Out: 2300

## 2024-07-26 LAB
ANION GAP SERPL CALCULATED.3IONS-SCNC: 13 MMOL/L (ref 7–16)
BASOPHILS # BLD: 0 K/UL (ref 0–0.2)
BASOPHILS NFR BLD: 0 % (ref 0–2)
BUN SERPL-MCNC: 40 MG/DL (ref 6–23)
CALCIUM SERPL-MCNC: 8.7 MG/DL (ref 8.6–10.2)
CHLORIDE SERPL-SCNC: 95 MMOL/L (ref 98–107)
CO2 SERPL-SCNC: 24 MMOL/L (ref 22–29)
CREAT SERPL-MCNC: 4.2 MG/DL (ref 0.5–1)
EOSINOPHIL # BLD: 0 K/UL (ref 0.05–0.5)
EOSINOPHILS RELATIVE PERCENT: 0 % (ref 0–6)
ERYTHROCYTE [DISTWIDTH] IN BLOOD BY AUTOMATED COUNT: 19.9 % (ref 11.5–15)
GFR, ESTIMATED: 10 ML/MIN/1.73M2
GLUCOSE SERPL-MCNC: 133 MG/DL (ref 74–99)
HCT VFR BLD AUTO: 24 % (ref 34–48)
HGB BLD-MCNC: 7.7 G/DL (ref 11.5–15.5)
IMM GRANULOCYTES # BLD AUTO: 0.1 K/UL (ref 0–0.58)
IMM GRANULOCYTES NFR BLD: 1 % (ref 0–5)
LYMPHOCYTES NFR BLD: 0.95 K/UL (ref 1.5–4)
LYMPHOCYTES RELATIVE PERCENT: 9 % (ref 20–42)
MCH RBC QN AUTO: 34.8 PG (ref 26–35)
MCHC RBC AUTO-ENTMCNC: 32.1 G/DL (ref 32–34.5)
MCV RBC AUTO: 108.6 FL (ref 80–99.9)
MONOCYTES NFR BLD: 0.49 K/UL (ref 0.1–0.95)
MONOCYTES NFR BLD: 5 % (ref 2–12)
NEUTROPHILS NFR BLD: 85 % (ref 43–80)
NEUTS SEG NFR BLD: 8.84 K/UL (ref 1.8–7.3)
PLATELET # BLD AUTO: 298 K/UL (ref 130–450)
PMV BLD AUTO: 12 FL (ref 7–12)
POTASSIUM SERPL-SCNC: 4.3 MMOL/L (ref 3.5–5)
RBC # BLD AUTO: 2.21 M/UL (ref 3.5–5.5)
SODIUM SERPL-SCNC: 132 MMOL/L (ref 132–146)
WBC OTHER # BLD: 10.4 K/UL (ref 4.5–11.5)

## 2024-07-26 PROCEDURE — 99232 SBSQ HOSP IP/OBS MODERATE 35: CPT | Performed by: INTERNAL MEDICINE

## 2024-07-26 PROCEDURE — 6370000000 HC RX 637 (ALT 250 FOR IP): Performed by: FAMILY MEDICINE

## 2024-07-26 PROCEDURE — 99232 SBSQ HOSP IP/OBS MODERATE 35: CPT | Performed by: FAMILY MEDICINE

## 2024-07-26 PROCEDURE — 1200000000 HC SEMI PRIVATE

## 2024-07-26 PROCEDURE — 6370000000 HC RX 637 (ALT 250 FOR IP): Performed by: STUDENT IN AN ORGANIZED HEALTH CARE EDUCATION/TRAINING PROGRAM

## 2024-07-26 PROCEDURE — 2700000000 HC OXYGEN THERAPY PER DAY

## 2024-07-26 PROCEDURE — 2500000003 HC RX 250 WO HCPCS: Performed by: STUDENT IN AN ORGANIZED HEALTH CARE EDUCATION/TRAINING PROGRAM

## 2024-07-26 PROCEDURE — 90935 HEMODIALYSIS ONE EVALUATION: CPT

## 2024-07-26 PROCEDURE — 6360000002 HC RX W HCPCS: Performed by: INTERNAL MEDICINE

## 2024-07-26 PROCEDURE — 2580000003 HC RX 258: Performed by: STUDENT IN AN ORGANIZED HEALTH CARE EDUCATION/TRAINING PROGRAM

## 2024-07-26 PROCEDURE — 85025 COMPLETE CBC W/AUTO DIFF WBC: CPT

## 2024-07-26 PROCEDURE — 6360000002 HC RX W HCPCS: Performed by: STUDENT IN AN ORGANIZED HEALTH CARE EDUCATION/TRAINING PROGRAM

## 2024-07-26 PROCEDURE — 36415 COLL VENOUS BLD VENIPUNCTURE: CPT

## 2024-07-26 PROCEDURE — 94640 AIRWAY INHALATION TREATMENT: CPT

## 2024-07-26 PROCEDURE — 80048 BASIC METABOLIC PNL TOTAL CA: CPT

## 2024-07-26 RX ORDER — DOXYCYCLINE HYCLATE 100 MG/1
100 CAPSULE ORAL EVERY 12 HOURS SCHEDULED
Qty: 7 CAPSULE | Refills: 0 | Status: ON HOLD
Start: 2024-07-26 | End: 2024-07-30

## 2024-07-26 RX ORDER — BUDESONIDE AND FORMOTEROL FUMARATE DIHYDRATE 160; 4.5 UG/1; UG/1
2 AEROSOL RESPIRATORY (INHALATION) 2 TIMES DAILY
Qty: 30.6 G | Refills: 0 | Status: ON HOLD
Start: 2024-07-26

## 2024-07-26 RX ORDER — PREDNISONE 20 MG/1
20 TABLET ORAL 2 TIMES DAILY
Qty: 8 TABLET | Refills: 0
Start: 2024-07-26 | End: 2024-07-29 | Stop reason: HOSPADM

## 2024-07-26 RX ADMIN — DOXYCYCLINE HYCLATE 100 MG: 100 CAPSULE ORAL at 21:52

## 2024-07-26 RX ADMIN — DOXYCYCLINE HYCLATE 100 MG: 100 CAPSULE ORAL at 11:47

## 2024-07-26 RX ADMIN — POLYETHYLENE GLYCOL 3350 17 GRAM ORAL POWDER PACKET 17 G: at 15:40

## 2024-07-26 RX ADMIN — HYDRALAZINE HYDROCHLORIDE 10 MG: 10 TABLET, FILM COATED ORAL at 11:48

## 2024-07-26 RX ADMIN — CALCIUM ACETATE 667 MG: 667 CAPSULE ORAL at 11:47

## 2024-07-26 RX ADMIN — APIXABAN 2.5 MG: 2.5 TABLET, FILM COATED ORAL at 20:32

## 2024-07-26 RX ADMIN — CALCIUM ACETATE 667 MG: 667 CAPSULE ORAL at 15:40

## 2024-07-26 RX ADMIN — METOPROLOL SUCCINATE 12.5 MG: 25 TABLET, EXTENDED RELEASE ORAL at 11:48

## 2024-07-26 RX ADMIN — PANTOPRAZOLE SODIUM 40 MG: 40 TABLET, DELAYED RELEASE ORAL at 06:05

## 2024-07-26 RX ADMIN — HYDRALAZINE HYDROCHLORIDE 10 MG: 10 TABLET, FILM COATED ORAL at 20:31

## 2024-07-26 RX ADMIN — EPOETIN ALFA-EPBX 3000 UNITS: 3000 INJECTION, SOLUTION INTRAVENOUS; SUBCUTANEOUS at 17:45

## 2024-07-26 RX ADMIN — BUDESONIDE 500 MCG: 0.5 SUSPENSION RESPIRATORY (INHALATION) at 20:06

## 2024-07-26 RX ADMIN — SODIUM CHLORIDE, PRESERVATIVE FREE 10 ML: 5 INJECTION INTRAVENOUS at 11:49

## 2024-07-26 RX ADMIN — BUMETANIDE 2 MG: 1 TABLET ORAL at 11:47

## 2024-07-26 RX ADMIN — Medication 5 MG: at 20:32

## 2024-07-26 RX ADMIN — SODIUM CHLORIDE, PRESERVATIVE FREE 10 ML: 5 INJECTION INTRAVENOUS at 20:32

## 2024-07-26 RX ADMIN — ISOSORBIDE DINITRATE 5 MG: 10 TABLET ORAL at 11:46

## 2024-07-26 RX ADMIN — APIXABAN 2.5 MG: 2.5 TABLET, FILM COATED ORAL at 11:47

## 2024-07-26 RX ADMIN — ATORVASTATIN CALCIUM 40 MG: 40 TABLET, FILM COATED ORAL at 11:47

## 2024-07-26 RX ADMIN — PREDNISONE 20 MG: 20 TABLET ORAL at 11:47

## 2024-07-26 RX ADMIN — ISOSORBIDE DINITRATE 5 MG: 10 TABLET ORAL at 20:32

## 2024-07-26 RX ADMIN — PREDNISONE 20 MG: 20 TABLET ORAL at 20:32

## 2024-07-26 RX ADMIN — ASPIRIN 81 MG: 81 TABLET, COATED ORAL at 11:46

## 2024-07-26 ASSESSMENT — PAIN SCALES - GENERAL: PAINLEVEL_OUTOF10: 0

## 2024-07-26 NOTE — FLOWSHEET NOTE
07/26/24 1050   Vital Signs   BP (!) 102/51   Temp 96.9 °F (36.1 °C)   Pulse 62   Respirations 18   Weight - Scale 58.8 kg (129 lb 10.1 oz)   Weight Method Bed scale   Percent Weight Change -1.18   Pain Assessment   Pain Assessment None - Denies Pain   Post-Hemodialysis Assessment   Post-Treatment Procedures Blood returned;Catheter capped, clamped and heparinized x 2 ports   Machine Disinfection Process Exterior Machine Disinfection;Heat Disinfect;Acid/Vinegar Clean   Rinseback Volume (ml) 300 ml   Blood Volume Processed (Liters) 0 L   Dialyzer Clearance Lightly streaked   Duration of Treatment (minutes) 210 minutes   Heparin Amount Administered During Treatment (mL) 0 mL   Hemodialysis Intake (ml) 300 ml   Hemodialysis Output (ml) 2300 ml   NET Removed (ml) 2000   Tolerated Treatment Good   Patient Response to Treatment tolerated well   Bilateral Breath Sounds Diminished   Physician Notified No   Time Off 1047   Patient Disposition Return to room   Observations & Evaluations   Level of Consciousness 0

## 2024-07-26 NOTE — CARE COORDINATION
CM update note.  Pt was a [facility] .  [facility].  [facility] will try for a one time contract since there has been 3 denials.  [facility] can accept and will start insurance precert today.  Jacob/destination updated.  [facility] form with envelope placed on the soft chart.  Pt will do HD at the facility.  Normally attends [facility].  Pt daughter Jonathan lozoya[dated.  CM/SW to follow.  Cortez Valentino RN -[phone].

## 2024-07-27 PROCEDURE — 6370000000 HC RX 637 (ALT 250 FOR IP): Performed by: FAMILY MEDICINE

## 2024-07-27 PROCEDURE — 99232 SBSQ HOSP IP/OBS MODERATE 35: CPT | Performed by: FAMILY MEDICINE

## 2024-07-27 PROCEDURE — 6360000002 HC RX W HCPCS: Performed by: STUDENT IN AN ORGANIZED HEALTH CARE EDUCATION/TRAINING PROGRAM

## 2024-07-27 PROCEDURE — 1200000000 HC SEMI PRIVATE

## 2024-07-27 PROCEDURE — 2500000003 HC RX 250 WO HCPCS: Performed by: STUDENT IN AN ORGANIZED HEALTH CARE EDUCATION/TRAINING PROGRAM

## 2024-07-27 PROCEDURE — 6370000000 HC RX 637 (ALT 250 FOR IP): Performed by: STUDENT IN AN ORGANIZED HEALTH CARE EDUCATION/TRAINING PROGRAM

## 2024-07-27 PROCEDURE — 94640 AIRWAY INHALATION TREATMENT: CPT

## 2024-07-27 PROCEDURE — 2700000000 HC OXYGEN THERAPY PER DAY

## 2024-07-27 PROCEDURE — 2580000003 HC RX 258: Performed by: STUDENT IN AN ORGANIZED HEALTH CARE EDUCATION/TRAINING PROGRAM

## 2024-07-27 RX ORDER — PREDNISONE 10 MG/1
10 TABLET ORAL DAILY
Status: DISCONTINUED | OUTPATIENT
Start: 2024-08-01 | End: 2024-07-29 | Stop reason: HOSPADM

## 2024-07-27 RX ORDER — PREDNISONE 20 MG/1
20 TABLET ORAL DAILY
Status: DISCONTINUED | OUTPATIENT
Start: 2024-07-27 | End: 2024-07-27 | Stop reason: SDUPTHER

## 2024-07-27 RX ORDER — PREDNISONE 20 MG/1
20 TABLET ORAL DAILY
Status: COMPLETED | OUTPATIENT
Start: 2024-07-28 | End: 2024-07-29

## 2024-07-27 RX ORDER — PREDNISONE 5 MG/1
5 TABLET ORAL DAILY
Status: DISCONTINUED | OUTPATIENT
Start: 2024-08-03 | End: 2024-07-29 | Stop reason: HOSPADM

## 2024-07-27 RX ADMIN — SODIUM CHLORIDE, PRESERVATIVE FREE 10 ML: 5 INJECTION INTRAVENOUS at 08:44

## 2024-07-27 RX ADMIN — HYDRALAZINE HYDROCHLORIDE 10 MG: 10 TABLET, FILM COATED ORAL at 08:42

## 2024-07-27 RX ADMIN — IPRATROPIUM BROMIDE AND ALBUTEROL SULFATE 1 DOSE: 2.5; .5 SOLUTION RESPIRATORY (INHALATION) at 09:58

## 2024-07-27 RX ADMIN — SODIUM CHLORIDE, PRESERVATIVE FREE 10 ML: 5 INJECTION INTRAVENOUS at 20:39

## 2024-07-27 RX ADMIN — ISOSORBIDE DINITRATE 5 MG: 10 TABLET ORAL at 08:42

## 2024-07-27 RX ADMIN — Medication 5 MG: at 20:38

## 2024-07-27 RX ADMIN — HYDRALAZINE HYDROCHLORIDE 10 MG: 10 TABLET, FILM COATED ORAL at 20:38

## 2024-07-27 RX ADMIN — PANTOPRAZOLE SODIUM 40 MG: 40 TABLET, DELAYED RELEASE ORAL at 04:44

## 2024-07-27 RX ADMIN — ATORVASTATIN CALCIUM 40 MG: 40 TABLET, FILM COATED ORAL at 08:42

## 2024-07-27 RX ADMIN — APIXABAN 2.5 MG: 2.5 TABLET, FILM COATED ORAL at 08:42

## 2024-07-27 RX ADMIN — METOPROLOL SUCCINATE 12.5 MG: 25 TABLET, EXTENDED RELEASE ORAL at 08:43

## 2024-07-27 RX ADMIN — PREDNISONE 20 MG: 20 TABLET ORAL at 08:42

## 2024-07-27 RX ADMIN — CALCIUM ACETATE 667 MG: 667 CAPSULE ORAL at 08:42

## 2024-07-27 RX ADMIN — CALCIUM ACETATE 667 MG: 667 CAPSULE ORAL at 17:07

## 2024-07-27 RX ADMIN — APIXABAN 2.5 MG: 2.5 TABLET, FILM COATED ORAL at 20:38

## 2024-07-27 RX ADMIN — ASPIRIN 81 MG: 81 TABLET, COATED ORAL at 08:42

## 2024-07-27 RX ADMIN — BUDESONIDE 500 MCG: 0.5 SUSPENSION RESPIRATORY (INHALATION) at 09:58

## 2024-07-27 RX ADMIN — BUMETANIDE 2 MG: 1 TABLET ORAL at 08:42

## 2024-07-27 RX ADMIN — BUDESONIDE 500 MCG: 0.5 SUSPENSION RESPIRATORY (INHALATION) at 19:50

## 2024-07-27 RX ADMIN — CALCIUM ACETATE 667 MG: 667 CAPSULE ORAL at 11:50

## 2024-07-27 RX ADMIN — DOXYCYCLINE HYCLATE 100 MG: 100 CAPSULE ORAL at 08:42

## 2024-07-27 RX ADMIN — ISOSORBIDE DINITRATE 5 MG: 10 TABLET ORAL at 20:38

## 2024-07-27 ASSESSMENT — PAIN SCALES - GENERAL
PAINLEVEL_OUTOF10: 0
PAINLEVEL_OUTOF10: 0

## 2024-07-27 NOTE — PLAN OF CARE
Problem: Discharge Planning  Goal: Discharge to home or other facility with appropriate resources  7/27/2024 1010 by Beena Menjivar RN  Outcome: Progressing  7/27/2024 0237 by Janna Gonzalez RN  Outcome: Progressing     Problem: Skin/Tissue Integrity  Goal: Absence of new skin breakdown  Description: 1.  Monitor for areas of redness and/or skin breakdown  2.  Assess vascular access sites hourly  3.  Every 4-6 hours minimum:  Change oxygen saturation probe site  4.  Every 4-6 hours:  If on nasal continuous positive airway pressure, respiratory therapy assess nares and determine need for appliance change or resting period.  7/27/2024 1010 by Beena Menjivar RN  Outcome: Progressing  7/27/2024 0237 by Janna Gonzalez RN  Outcome: Progressing     Problem: Safety - Adult  Goal: Free from fall injury  7/27/2024 1010 by Beena Menjivar RN  Outcome: Progressing  7/27/2024 0237 by Janna Gonzalez RN  Outcome: Progressing     Problem: Chronic Conditions and Co-morbidities  Goal: Patient's chronic conditions and co-morbidity symptoms are monitored and maintained or improved  7/27/2024 1010 by Beena Menjivar RN  Outcome: Progressing  7/27/2024 0237 by Janna Gonzalez RN  Outcome: Progressing

## 2024-07-28 PROCEDURE — 99232 SBSQ HOSP IP/OBS MODERATE 35: CPT | Performed by: INTERNAL MEDICINE

## 2024-07-28 PROCEDURE — 94640 AIRWAY INHALATION TREATMENT: CPT

## 2024-07-28 PROCEDURE — 1200000000 HC SEMI PRIVATE

## 2024-07-28 PROCEDURE — 6370000000 HC RX 637 (ALT 250 FOR IP): Performed by: INTERNAL MEDICINE

## 2024-07-28 PROCEDURE — 2580000003 HC RX 258: Performed by: STUDENT IN AN ORGANIZED HEALTH CARE EDUCATION/TRAINING PROGRAM

## 2024-07-28 PROCEDURE — 6370000000 HC RX 637 (ALT 250 FOR IP): Performed by: STUDENT IN AN ORGANIZED HEALTH CARE EDUCATION/TRAINING PROGRAM

## 2024-07-28 PROCEDURE — 99231 SBSQ HOSP IP/OBS SF/LOW 25: CPT | Performed by: FAMILY MEDICINE

## 2024-07-28 PROCEDURE — 2700000000 HC OXYGEN THERAPY PER DAY

## 2024-07-28 PROCEDURE — 6360000002 HC RX W HCPCS: Performed by: STUDENT IN AN ORGANIZED HEALTH CARE EDUCATION/TRAINING PROGRAM

## 2024-07-28 PROCEDURE — 2500000003 HC RX 250 WO HCPCS: Performed by: STUDENT IN AN ORGANIZED HEALTH CARE EDUCATION/TRAINING PROGRAM

## 2024-07-28 RX ADMIN — APIXABAN 2.5 MG: 2.5 TABLET, FILM COATED ORAL at 08:37

## 2024-07-28 RX ADMIN — ISOSORBIDE DINITRATE 5 MG: 10 TABLET ORAL at 08:37

## 2024-07-28 RX ADMIN — CALCIUM ACETATE 667 MG: 667 CAPSULE ORAL at 16:30

## 2024-07-28 RX ADMIN — SODIUM CHLORIDE, PRESERVATIVE FREE 10 ML: 5 INJECTION INTRAVENOUS at 20:45

## 2024-07-28 RX ADMIN — Medication 5 MG: at 20:40

## 2024-07-28 RX ADMIN — HYDRALAZINE HYDROCHLORIDE 10 MG: 10 TABLET, FILM COATED ORAL at 08:36

## 2024-07-28 RX ADMIN — ASPIRIN 81 MG: 81 TABLET, COATED ORAL at 08:36

## 2024-07-28 RX ADMIN — METOPROLOL SUCCINATE 12.5 MG: 25 TABLET, EXTENDED RELEASE ORAL at 08:37

## 2024-07-28 RX ADMIN — PANTOPRAZOLE SODIUM 40 MG: 40 TABLET, DELAYED RELEASE ORAL at 06:39

## 2024-07-28 RX ADMIN — ATORVASTATIN CALCIUM 40 MG: 40 TABLET, FILM COATED ORAL at 08:37

## 2024-07-28 RX ADMIN — SODIUM CHLORIDE, PRESERVATIVE FREE 10 ML: 5 INJECTION INTRAVENOUS at 08:39

## 2024-07-28 RX ADMIN — CALCIUM ACETATE 667 MG: 667 CAPSULE ORAL at 11:42

## 2024-07-28 RX ADMIN — PREDNISONE 20 MG: 20 TABLET ORAL at 08:38

## 2024-07-28 RX ADMIN — HYDRALAZINE HYDROCHLORIDE 10 MG: 10 TABLET, FILM COATED ORAL at 20:40

## 2024-07-28 RX ADMIN — APIXABAN 2.5 MG: 2.5 TABLET, FILM COATED ORAL at 20:40

## 2024-07-28 RX ADMIN — CALCIUM ACETATE 667 MG: 667 CAPSULE ORAL at 08:37

## 2024-07-28 RX ADMIN — BUMETANIDE 2 MG: 1 TABLET ORAL at 08:37

## 2024-07-28 RX ADMIN — ISOSORBIDE DINITRATE 5 MG: 10 TABLET ORAL at 20:40

## 2024-07-28 RX ADMIN — BUDESONIDE 500 MCG: 0.5 SUSPENSION RESPIRATORY (INHALATION) at 10:07

## 2024-07-28 ASSESSMENT — PAIN SCALES - GENERAL: PAINLEVEL_OUTOF10: 0

## 2024-07-28 NOTE — PLAN OF CARE
Problem: Discharge Planning  Goal: Discharge to home or other facility with appropriate resources  7/28/2024 0804 by Beena Menjivar RN  Outcome: Progressing  7/28/2024 0413 by Janna Gonzalez RN  Outcome: Progressing     Problem: Skin/Tissue Integrity  Goal: Absence of new skin breakdown  Description: 1.  Monitor for areas of redness and/or skin breakdown  2.  Assess vascular access sites hourly  3.  Every 4-6 hours minimum:  Change oxygen saturation probe site  4.  Every 4-6 hours:  If on nasal continuous positive airway pressure, respiratory therapy assess nares and determine need for appliance change or resting period.  7/28/2024 0804 by Beena Menjivar RN  Outcome: Progressing  7/28/2024 0413 by Janna Gonzalez RN  Outcome: Progressing     Problem: Safety - Adult  Goal: Free from fall injury  7/28/2024 0804 by Beena Menjivar RN  Outcome: Progressing  7/28/2024 0413 by Janna Gonzalez RN  Outcome: Progressing     Problem: Chronic Conditions and Co-morbidities  Goal: Patient's chronic conditions and co-morbidity symptoms are monitored and maintained or improved  7/28/2024 0804 by Beena Menjivar RN  Outcome: Progressing  7/28/2024 0413 by Janna Gonzalez RN  Outcome: Progressing

## 2024-07-29 VITALS
HEIGHT: 64 IN | SYSTOLIC BLOOD PRESSURE: 113 MMHG | HEART RATE: 81 BPM | WEIGHT: 126.98 LBS | DIASTOLIC BLOOD PRESSURE: 85 MMHG | OXYGEN SATURATION: 93 % | BODY MASS INDEX: 21.68 KG/M2 | RESPIRATION RATE: 18 BRPM | TEMPERATURE: 97.3 F

## 2024-07-29 LAB
ALBUMIN SERPL-MCNC: 3.2 G/DL (ref 3.5–5.2)
ALP SERPL-CCNC: 182 U/L (ref 35–104)
ALT SERPL-CCNC: 15 U/L (ref 0–32)
ANION GAP SERPL CALCULATED.3IONS-SCNC: 11 MMOL/L (ref 7–16)
ANION GAP SERPL CALCULATED.3IONS-SCNC: 13 MMOL/L (ref 7–16)
ANION GAP SERPL CALCULATED.3IONS-SCNC: 14 MMOL/L (ref 7–16)
AST SERPL-CCNC: 17 U/L (ref 0–31)
BILIRUB SERPL-MCNC: 0.4 MG/DL (ref 0–1.2)
BUN SERPL-MCNC: 103 MG/DL (ref 6–23)
BUN SERPL-MCNC: 95 MG/DL (ref 6–23)
BUN SERPL-MCNC: 99 MG/DL (ref 6–23)
CALCIUM SERPL-MCNC: 9.3 MG/DL (ref 8.6–10.2)
CALCIUM SERPL-MCNC: 9.5 MG/DL (ref 8.6–10.2)
CALCIUM SERPL-MCNC: 9.8 MG/DL (ref 8.6–10.2)
CHLORIDE SERPL-SCNC: 93 MMOL/L (ref 98–107)
CHLORIDE SERPL-SCNC: 95 MMOL/L (ref 98–107)
CHLORIDE SERPL-SCNC: 95 MMOL/L (ref 98–107)
CO2 SERPL-SCNC: 23 MMOL/L (ref 22–29)
CO2 SERPL-SCNC: 23 MMOL/L (ref 22–29)
CO2 SERPL-SCNC: 25 MMOL/L (ref 22–29)
CREAT SERPL-MCNC: 5.2 MG/DL (ref 0.5–1)
CREAT SERPL-MCNC: 5.4 MG/DL (ref 0.5–1)
CREAT SERPL-MCNC: 5.5 MG/DL (ref 0.5–1)
ERYTHROCYTE [DISTWIDTH] IN BLOOD BY AUTOMATED COUNT: 20.6 % (ref 11.5–15)
GFR, ESTIMATED: 7 ML/MIN/1.73M2
GFR, ESTIMATED: 7 ML/MIN/1.73M2
GFR, ESTIMATED: 8 ML/MIN/1.73M2
GLUCOSE SERPL-MCNC: 131 MG/DL (ref 74–99)
GLUCOSE SERPL-MCNC: 133 MG/DL (ref 74–99)
GLUCOSE SERPL-MCNC: 138 MG/DL (ref 74–99)
HCT VFR BLD AUTO: 26.1 % (ref 34–48)
HGB BLD-MCNC: 8.3 G/DL (ref 11.5–15.5)
MAGNESIUM SERPL-MCNC: 2.1 MG/DL (ref 1.6–2.6)
MCH RBC QN AUTO: 34.6 PG (ref 26–35)
MCHC RBC AUTO-ENTMCNC: 31.8 G/DL (ref 32–34.5)
MCV RBC AUTO: 108.8 FL (ref 80–99.9)
PHOSPHATE SERPL-MCNC: 1.4 MG/DL (ref 2.5–4.5)
PHOSPHATE SERPL-MCNC: 1.5 MG/DL (ref 2.5–4.5)
PHOSPHATE SERPL-MCNC: 1.5 MG/DL (ref 2.5–4.5)
PLATELET # BLD AUTO: 300 K/UL (ref 130–450)
PMV BLD AUTO: 12.1 FL (ref 7–12)
POTASSIUM SERPL-SCNC: 6.5 MMOL/L (ref 3.5–5)
POTASSIUM SERPL-SCNC: 6.5 MMOL/L (ref 3.5–5)
POTASSIUM SERPL-SCNC: 6.6 MMOL/L (ref 3.5–5)
PROT SERPL-MCNC: 6.6 G/DL (ref 6.4–8.3)
RBC # BLD AUTO: 2.4 M/UL (ref 3.5–5.5)
SODIUM SERPL-SCNC: 130 MMOL/L (ref 132–146)
SODIUM SERPL-SCNC: 131 MMOL/L (ref 132–146)
SODIUM SERPL-SCNC: 131 MMOL/L (ref 132–146)
WBC OTHER # BLD: 12.6 K/UL (ref 4.5–11.5)

## 2024-07-29 PROCEDURE — 80053 COMPREHEN METABOLIC PANEL: CPT

## 2024-07-29 PROCEDURE — 2580000003 HC RX 258: Performed by: STUDENT IN AN ORGANIZED HEALTH CARE EDUCATION/TRAINING PROGRAM

## 2024-07-29 PROCEDURE — 2700000000 HC OXYGEN THERAPY PER DAY

## 2024-07-29 PROCEDURE — 85027 COMPLETE CBC AUTOMATED: CPT

## 2024-07-29 PROCEDURE — 99233 SBSQ HOSP IP/OBS HIGH 50: CPT | Performed by: INTERNAL MEDICINE

## 2024-07-29 PROCEDURE — 90935 HEMODIALYSIS ONE EVALUATION: CPT

## 2024-07-29 PROCEDURE — 94640 AIRWAY INHALATION TREATMENT: CPT

## 2024-07-29 PROCEDURE — 83735 ASSAY OF MAGNESIUM: CPT

## 2024-07-29 PROCEDURE — 6370000000 HC RX 637 (ALT 250 FOR IP): Performed by: INTERNAL MEDICINE

## 2024-07-29 PROCEDURE — 6360000002 HC RX W HCPCS: Performed by: STUDENT IN AN ORGANIZED HEALTH CARE EDUCATION/TRAINING PROGRAM

## 2024-07-29 PROCEDURE — 36415 COLL VENOUS BLD VENIPUNCTURE: CPT

## 2024-07-29 PROCEDURE — 80048 BASIC METABOLIC PNL TOTAL CA: CPT

## 2024-07-29 PROCEDURE — 6370000000 HC RX 637 (ALT 250 FOR IP): Performed by: STUDENT IN AN ORGANIZED HEALTH CARE EDUCATION/TRAINING PROGRAM

## 2024-07-29 PROCEDURE — 99239 HOSP IP/OBS DSCHRG MGMT >30: CPT | Performed by: FAMILY MEDICINE

## 2024-07-29 PROCEDURE — 84100 ASSAY OF PHOSPHORUS: CPT

## 2024-07-29 PROCEDURE — 2500000003 HC RX 250 WO HCPCS: Performed by: INTERNAL MEDICINE

## 2024-07-29 RX ORDER — PREDNISONE 10 MG/1
TABLET ORAL
Qty: 2 TABLET | Refills: 0 | Status: ON HOLD
Start: 2024-08-01 | End: 2024-07-30

## 2024-07-29 RX ORDER — CALCIUM GLUCONATE 10 MG/ML
1000 INJECTION, SOLUTION INTRAVENOUS ONCE
Status: COMPLETED | OUTPATIENT
Start: 2024-07-29 | End: 2024-07-29

## 2024-07-29 RX ORDER — GLUCAGON 1 MG/ML
1 KIT INJECTION PRN
Status: DISCONTINUED | OUTPATIENT
Start: 2024-07-29 | End: 2024-07-29 | Stop reason: HOSPADM

## 2024-07-29 RX ORDER — DEXTROSE MONOHYDRATE 100 MG/ML
INJECTION, SOLUTION INTRAVENOUS CONTINUOUS PRN
Status: DISCONTINUED | OUTPATIENT
Start: 2024-07-29 | End: 2024-07-29 | Stop reason: HOSPADM

## 2024-07-29 RX ORDER — CALCIUM GLUCONATE 94 MG/ML
1000 INJECTION, SOLUTION INTRAVENOUS ONCE
Status: DISCONTINUED | OUTPATIENT
Start: 2024-07-29 | End: 2024-07-29 | Stop reason: SDUPTHER

## 2024-07-29 RX ADMIN — ATORVASTATIN CALCIUM 40 MG: 40 TABLET, FILM COATED ORAL at 12:12

## 2024-07-29 RX ADMIN — BUDESONIDE 500 MCG: 0.5 SUSPENSION RESPIRATORY (INHALATION) at 07:46

## 2024-07-29 RX ADMIN — SODIUM CHLORIDE, PRESERVATIVE FREE 10 ML: 5 INJECTION INTRAVENOUS at 12:12

## 2024-07-29 RX ADMIN — PANTOPRAZOLE SODIUM 40 MG: 40 TABLET, DELAYED RELEASE ORAL at 06:33

## 2024-07-29 RX ADMIN — ASPIRIN 81 MG: 81 TABLET, COATED ORAL at 12:11

## 2024-07-29 RX ADMIN — METOPROLOL SUCCINATE 12.5 MG: 25 TABLET, EXTENDED RELEASE ORAL at 12:10

## 2024-07-29 RX ADMIN — BUMETANIDE 2 MG: 1 TABLET ORAL at 12:11

## 2024-07-29 RX ADMIN — CALCIUM GLUCONATE 1000 MG: 10 INJECTION, SOLUTION INTRAVENOUS at 09:26

## 2024-07-29 RX ADMIN — APIXABAN 2.5 MG: 2.5 TABLET, FILM COATED ORAL at 12:11

## 2024-07-29 RX ADMIN — HYDRALAZINE HYDROCHLORIDE 10 MG: 10 TABLET, FILM COATED ORAL at 12:11

## 2024-07-29 RX ADMIN — PREDNISONE 20 MG: 20 TABLET ORAL at 12:12

## 2024-07-29 RX ADMIN — ISOSORBIDE DINITRATE 5 MG: 10 TABLET ORAL at 12:10

## 2024-07-29 NOTE — CARE COORDINATION
Discharge order noted. Per Jackie our case management assistant, sana has been approved for [facility]. I spoke with Cortez from [facility]. She said she has to wait until the one time contract with [facility] is signed and then patient can admit. She is working on that with the  from [facility] and will let me know when patient is good to admit. Jacob/destination complete. Ambulette form with envelope placed on the soft chart. Pt will do HD at the facility. Normally attends [facility]. Pt daughter Jonathan updated. Jonathan said she will pay for ambulette transportation. Transportation tentatively set up via [employer] [facility] for 4 pm today.   Danae Jeff RN CM  [phone]        Per Cortez from [facility], patient is okay to discharge today. Transportation set up via [employer] [facility] for 4 pm today. Charge nurse, RN, liaison, patient and patent's daughter Jonathan all notified. Jonathan call and will pay [employer] prior to transport. Pt will do HD at the facility.   Danae Jeff RN CM  [phone]

## 2024-07-29 NOTE — PROGRESS NOTES
[facility]  PULMONARY/CRITICAL CARE PROGRESS NOTE    Patient: Marge Callejas  MRN: [id number]  : 1940    Encounter Date: 2024  Encounter Time: 12:00 PM     Date of Admission: .2024 10:27 AM    Consulting Physician:  Primary Care Physician:     Marek Andres DO     [phone]    Reason for Consultation: COPD    Problem List:  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    Coronary artery disease involving native coronary artery of native heart without angina pectoris    Abnormal EKG    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  Overnight the patient had no shortness of breath, cough, increased work of breathing.    HOME MEDICATIONS:  Prior to Admission medications    Medication Sig Start Date End Date Taking? Authorizing Provider   acetaminophen (TYLENOL) 325 MG tablet Take 2 tablets by mouth every 4 hours as needed for Pain or Fever    Provider, MD Alonso 
    [facility]  PULMONARY/CRITICAL CARE PROGRESS NOTE    Patient: Marge Callejas  MRN: [id number]  : 1940    Encounter Date: 2024  Encounter Time: 1:21 PM     Date of Admission: .2024 10:27 AM    Consulting Physician:  Primary Care Physician:     Marek Andres DO     [phone]    Reason for Consultation: COPD    Problem List:  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    Coronary artery disease involving native coronary artery of native heart without angina pectoris    Abnormal EKG    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  Overnight the patient had no shortness of breath, cough, increased work of breathing.    HOME MEDICATIONS:  Prior to Admission medications    Medication Sig Start Date End Date Taking? Authorizing Provider   budesonide-formoterol (SYMBICORT) 160-4.5 MCG/ACT AERO Inhale 2 puffs into the lungs 2 times daily 24  Yes Ousmane Galaviz MD 
    Hospitalist Progress Note      Synopsis: Patient admitted on 7/23/2024 Ms. Marge Callejas, a 84 y.o. year old female  who  has a past medical history of Arthritis, Atrial fibrillation ([facility]), Blood circulation, collateral, CHF (congestive heart failure) ([facility]), Chronic renal insufficiency, stage IV (severe) ([facility]), Coronary artery disease involving native coronary artery of native heart without angina pectoris, History of cardiovascular stress test, History of echocardiogram, Hyperlipidemia, Hypertension, and Mixed restrictive and obstructive lung disease ([facility]).      Patient was sent from nursing facility via EMS for concern of respiratory distress.  Patient has been having increasing need for oxygen supplementation in the last night she was up to 90 L/min nasal cannula.  Her daughter went to visit today and he was with altered mental status and her saturations were to the 60s, apparently in room air.  No report of fevers, chills, chest pain, abdominal pain, nausea, vomiting.    On ER evaluation patient was in acute distress 2/2 breathing issues, she was placed on BiPAP with improvement of her symptoms.  Afebrile.  Hemodynamically stable.  No medications given on the ER.  Chest x-ray with no acute findings.  Decision was to admit for further monitoring and workup.  Case was discussed with ER physician     Upon my evaluation patient feels a lot better.  Continue to deny pains.  Feels hungry daughter was at bedside    Subjective  Patient seen and examined chart reviewed discussed with nursing staff and case management no overnight events reported.  Patient feels better she had hemodialysis today she feels weak and tired.  No chest pain.  Comfortable nasal cannula O2 she is awaiting insurance authorization for placement    Exam:  /73   Pulse 74   Temp 97.7 °F (36.5 °C) (Temporal)   Resp 16   Ht 1.626 m (5' 4\")   Wt 58.8 kg (129 lb 10.1 oz)   SpO2 96%   BMI 22.25 kg/m²   -General:  Awake, alert, oriented 
    Hospitalist Progress Note      Synopsis: Patient admitted on 7/23/2024 Ms. Marge Callejas, a 84 y.o. year old female  who  has a past medical history of Arthritis, Atrial fibrillation ([facility]), Blood circulation, collateral, CHF (congestive heart failure) ([facility]), Chronic renal insufficiency, stage IV (severe) ([facility]), Coronary artery disease involving native coronary artery of native heart without angina pectoris, History of cardiovascular stress test, History of echocardiogram, Hyperlipidemia, Hypertension, and Mixed restrictive and obstructive lung disease ([facility]).      Patient was sent from nursing facility via EMS for concern of respiratory distress.  Patient has been having increasing need for oxygen supplementation in the last night she was up to 90 L/min nasal cannula.  Her daughter went to visit today and he was with altered mental status and her saturations were to the 60s, apparently in room air.  No report of fevers, chills, chest pain, abdominal pain, nausea, vomiting.    On ER evaluation patient was in acute distress 2/2 breathing issues, she was placed on BiPAP with improvement of her symptoms.  Afebrile.  Hemodynamically stable.  No medications given on the ER.  Chest x-ray with no acute findings.  Decision was to admit for further monitoring and workup.  Case was discussed with ER physician     Upon my evaluation patient feels a lot better.  Continue to deny pains.  Feels hungry daughter was at bedside    Subjective  Seen and examined chart reviewed discussed with nursing staff no overnight events reported patient comfortable nasal cannula O2 no chest pain or shortness of breath she is awaiting insurance authorization for placement  Exam:  /62   Pulse 77   Temp 97.4 °F (36.3 °C) (Temporal)   Resp 18   Ht 1.626 m (5' 4\")   Wt 58.8 kg (129 lb 10.1 oz)   SpO2 97%   BMI 22.25 kg/m²   -General:  Awake, alert, oriented X 2.  Well developed, well nourished, well groomed.  No apparent 
    Hospitalist Progress Note      Synopsis: Patient admitted on 7/23/2024 Ms. Marge Callejas, a 84 y.o. year old female  who  has a past medical history of Arthritis, Atrial fibrillation ([facility]), Blood circulation, collateral, CHF (congestive heart failure) ([facility]), Chronic renal insufficiency, stage IV (severe) ([facility]), Coronary artery disease involving native coronary artery of native heart without angina pectoris, History of cardiovascular stress test, History of echocardiogram, Hyperlipidemia, Hypertension, and Mixed restrictive and obstructive lung disease ([facility]).      Patient was sent from nursing facility via EMS for concern of respiratory distress.  Patient has been having increasing need for oxygen supplementation in the last night she was up to 90 L/min nasal cannula.  Her daughter went to visit today and he was with altered mental status and her saturations were to the 60s, apparently in room air.  No report of fevers, chills, chest pain, abdominal pain, nausea, vomiting.    On ER evaluation patient was in acute distress 2/2 breathing issues, she was placed on BiPAP with improvement of her symptoms.  Afebrile.  Hemodynamically stable.  No medications given on the ER.  Chest x-ray with no acute findings.  Decision was to admit for further monitoring and workup.  Case was discussed with ER physician     Upon my evaluation patient feels a lot better.  Continue to deny pains.  Feels hungry daughter was at bedside    Subjective  Seen and examined chart reviewed discussed with nursing staff and case management no overnight events reported patient slightly confused seems to be at baseline she is currently on nasal cannula O2 alternating with BiPAP    Exam:  BP (!) 113/58   Pulse 77   Temp 97.9 °F (36.6 °C) (Temporal)   Resp 16   Ht 1.626 m (5' 4\")   Wt 55.8 kg (123 lb)   SpO2 97%   BMI 21.11 kg/m²   -General:  Awake, alert, oriented X 2.  Well developed, well nourished, well groomed.  No apparent 
    Hospitalist Progress Note      Synopsis: Patient admitted on 7/23/2024 Ms. Marge Callejas, a 84 y.o. year old female  who  has a past medical history of Arthritis, Atrial fibrillation ([facility]), Blood circulation, collateral, CHF (congestive heart failure) ([facility]), Chronic renal insufficiency, stage IV (severe) ([facility]), Coronary artery disease involving native coronary artery of native heart without angina pectoris, History of cardiovascular stress test, History of echocardiogram, Hyperlipidemia, Hypertension, and Mixed restrictive and obstructive lung disease ([facility]).      Patient was sent from nursing facility via EMS for concern of respiratory distress.  Patient has been having increasing need for oxygen supplementation in the last night she was up to 90 L/min nasal cannula.  Her daughter went to visit today and he was with altered mental status and her saturations were to the 60s, apparently in room air.  No report of fevers, chills, chest pain, abdominal pain, nausea, vomiting.    On ER evaluation patient was in acute distress 2/2 breathing issues, she was placed on BiPAP with improvement of her symptoms.  Afebrile.  Hemodynamically stable.  No medications given on the ER.  Chest x-ray with no acute findings.  Decision was to admit for further monitoring and workup.  Case was discussed with ER physician     Upon my evaluation patient feels a lot better.  Continue to deny pains.  Feels hungry daughter was at bedside    Subjective  Patient seen and examined chart reviewed discussed with staff and case management and patient daughter at bedside no overnight events reported patient currently on 6 L oxygen via nasal cannula normally uses 3-4 will wean down as tolerated.  If she continues to improve we will plan to possibly discharge her tomorrow    Exam:  /61   Pulse 75   Temp 97.8 °F (36.6 °C)   Resp 20   Ht 1.626 m (5' 4\")   Wt 59.5 kg (131 lb 2.8 oz)   SpO2 95%   BMI 22.52 kg/m²   -General:  Awake, alert, 
    Hospitalist Progress Note      Synopsis: Patient admitted on 7/23/2024 Ms. Marge Callejas, a 84 y.o. year old female  who  has a past medical history of Arthritis, Atrial fibrillation ([facility]), Blood circulation, collateral, CHF (congestive heart failure) ([facility]), Chronic renal insufficiency, stage IV (severe) ([facility]), Coronary artery disease involving native coronary artery of native heart without angina pectoris, History of cardiovascular stress test, History of echocardiogram, Hyperlipidemia, Hypertension, and Mixed restrictive and obstructive lung disease ([facility]).      Patient was sent from nursing facility via EMS for concern of respiratory distress.  Patient has been having increasing need for oxygen supplementation in the last night she was up to 90 L/min nasal cannula.  Her daughter went to visit today and he was with altered mental status and her saturations were to the 60s, apparently in room air.  No report of fevers, chills, chest pain, abdominal pain, nausea, vomiting.    On ER evaluation patient was in acute distress 2/2 breathing issues, she was placed on BiPAP with improvement of her symptoms.  Afebrile.  Hemodynamically stable.  No medications given on the ER.  Chest x-ray with no acute findings.  Decision was to admit for further monitoring and workup.  Case was discussed with ER physician     Upon my evaluation patient feels a lot better.  Continue to deny pains.  Feels hungry daughter was at bedside    Subjective  Seen and examined chart reviewed discussed with nursing staff no overnight events reported.  Patient is comfortable nasal cannula O2 no chest pain or shortness of breath she is medically stable for discharge awaiting insurance Auth and placement  Exam:  /69   Pulse 83   Temp 97.2 °F (36.2 °C) (Temporal)   Resp 17   Ht 1.626 m (5' 4\")   Wt 58.8 kg (129 lb 10.1 oz)   SpO2 94%   BMI 22.25 kg/m²   -General:  Awake, alert, oriented X 2.  Well developed, well nourished, well groomed. 
    [facility]  PULMONARY/CRITICAL CARE PROGRESS NOTE    Patient: Marge Callejas  MRN: [id number]  : 1940    Encounter Date: 2024  Encounter Time: 3:23 PM     Date of Admission: .2024 10:27 AM    Consulting Physician:  Primary Care Physician:     Marek Andres DO     [phone]    Reason for Consultation: COPD    Problem List:  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    Coronary artery disease involving native coronary artery of native heart without angina pectoris    Abnormal EKG    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  Overnight the patient had no shortness of breath, cough, increased work of breathing.    HOME MEDICATIONS:  Prior to Admission medications    Medication Sig Start Date End Date Taking? Authorizing Provider   budesonide-formoterol (SYMBICORT) 160-4.5 MCG/ACT AERO Inhale 2 puffs into the lungs 2 times daily 24  Yes Ousmane Galaviz MD 
4 Eyes Skin Assessment     NAME:  Marge Callejas  YOB: 1940  MEDICAL RECORD NUMBER:  [id number]    The patient is being assessed for  Admission    I agree that at least one RN has performed a thorough Head to Toe Skin Assessment on the patient. ALL assessment sites listed below have been assessed.      Areas assessed by both nurses:    Head, Face, Ears, Shoulders, Back, Chest, Arms, Elbows, Hands, Sacrum. Buttock, Coccyx, Ischium, Legs. Feet and Heels, and Under Medical Devices         Does the Patient have a Wound? Yes wound(s) were present on assessment. LDA wound assessment was Initiated and completed by RN       Estuardo Prevention initiated by RN: Yes  Wound Care Orders initiated by RN: Yes    Pressure Injury (Stage 3,4, Unstageable, DTI, NWPT, and Complex wounds) if present, place Wound referral order by RN under : No    New Ostomies, if present place, Ostomy referral order under : No     Nurse 1 eSignature: Electronically signed by Lynda Payton RN on 7/24/24 at 3:37 AM EDT    **SHARE this note so that the co-signing nurse can place an eSignature**    Nurse 2 eSignature: Electronically signed by Chilango Nguyen RN on 7/24/24 at 4:23 AM EDT  
4 Eyes Skin Assessment     NAME:  Marge Callejas  YOB: 1940  MEDICAL RECORD NUMBER:  [id number]    The patient is being assessed for  Transfer to New Unit    I agree that at least one RN has performed a thorough Head to Toe Skin Assessment on the patient. ALL assessment sites listed below have been assessed.      Areas assessed by both nurses:    Head, Face, Ears, Shoulders, Back, Chest, Arms, Elbows, Hands, Sacrum. Buttock, Coccyx, Ischium, Legs. Feet and Heels, Under Medical Devices , and Other            Skin is clean, dry and intact.  Patients abimbola heels are boggy, dry and flaky      Does the Patient have a Wound? No noted wound(s)       Estuardo Prevention initiated by RN: Yes  Wound Care Orders initiated by RN: No    Pressure Injury (Stage 3,4, Unstageable, DTI, NWPT, and Complex wounds) if present, place Wound referral order by RN under : No    New Ostomies, if present place, Ostomy referral order under : No     Nurse 1 eSignature: Electronically signed by Janna Gonzalez RN on 7/26/24 at 5:38 AM EDT    **SHARE this note so that the co-signing nurse can place an eSignature**    Nurse 2 eSignature: Electronically signed by Ximena Bustillos RN on 7/26/24 at 5:40 AM EDT  
[facility]  MD Murtaza White, MD Krista Mendoza, CNP   Lucille Beth, JOSE Fernandez, CNP  Progress Note    Patient's Name: Marge Callejas  6:52 PM  7/27/2024    Subjective  7/26: Seen on HD tolerating treatment UF goal 2 L Vitals stable     7/27: Resting comfortably.  Denies complaint.  In good spirits.  Good appetite and intake.    History of Present Ilness:      84 y.o. year old female  who  has a past medical history of Arthritis, Atrial fibrillation ([facility]), Blood circulation, collateral, CHF (congestive heart failure) ([facility]), Chronic renal insufficiency, stage IV (severe) ([facility]), Coronary artery disease involving native coronary artery of native heart without angina pectoris, History of cardiovascular stress test, History of echocardiogram, Hyperlipidemia, Hypertension, and Mixed restrictive and obstructive lung disease ([facility]).      Patient was sent from nursing facility via EMS for concern of respiratory distress  Pt known to me from office and hospital admission   [facility]   He has hx of HF and her HD schedule was adjusted to MWF to help with her volume overload status  BP has been a limiting factor in terms of achieiving UF   She is seen in her room she is alert oriented she is lying in bed comfortably   She has JVD 2+ she has LE edema 1-2+ .    Allergies:  Patient has no known allergies.    Current Medications:    [START ON 7/28/2024] predniSONE (DELTASONE) tablet 20 mg, Daily   Followed by  [START ON 7/30/2024] predniSONE (DELTASONE) tablet 15 mg, Daily   Followed by  [START ON 8/1/2024] predniSONE (DELTASONE) tablet 10 mg, Daily   Followed by  [START ON 8/3/2024] predniSONE (DELTASONE) tablet 5 mg, Daily  epoetin carmelina-epbx (RETACRIT) injection 3,000 Units, Once per day on Mon Wed Fri  sodium chloride flush 0.9 % injection 5-40 mL, 2 times per day  sodium chloride flush 0.9 % injection 5-40 mL, 
Awaiting urine for a specimen at this time, patient is aware that one is needed, she states she does not urinate very often d/t dialysis, patient is a/o x4 and knows to call when able to go. A specimen cup is also at bedside for when patient has mucus she can expectorate for a specimen, she is aware of what is needed. Call light is in reach.  
Faxed pt discharge paperwork to pt cardiologist per request of family/cardiologist  
Message sent to Dr. Galaviz  to see if he could come update the family at bedside.  
Messaged Dr. Ross per patient request to see when patient would be getting dialysis.   
Nurse to nurse called to [facility].   
Nurse to nurse given to SOV  
Patient had a critical lab of 6.6, notified kris eagle NP, who stated for me to notify nephrology as well, he then placed orders for insulin once, and abdullahi HAYNES, notified Dr. Dawson who stated to instead have patient receive CA gluconate and he called to have patient taken to dialysis STAT and that the CA gluconate would be delivered in dialysis. Patient is aware.   
Patient refusing Bipap monica Guevara, RRT-ACCS  
Pt refusing to wear BiPAP. She stated she does not even want it in the room, she will not wear it.  
Pulmonology consult called to  per perfect serv patient added to census.   
RN at bedside, Pt crying and yelling d/t ATB IV infusion. RN assessed the IV site, no signs of infiltration and IV still patent. RN asked pt if she wants to replace the IV. Pt yelled, \"No! No needles\". RN educated pt on the importance of ATB administration and if the ATB is interrupted it could delay care. Pt continues to refuse the remainder of the ATB infusion and IV replacement.   
06/07/2024 03:53 PM    LYMPHOPCT 9 07/26/2024 04:24 AM    MONOPCT 5 07/26/2024 04:24 AM    MYELOPCT 1 07/05/2024 05:58 AM    MYELOPCT 0.9 04/03/2021 04:44 AM    EOSPCT 0 07/26/2024 04:24 AM    BASOPCT 0 07/26/2024 04:24 AM    MONOSABS 0.49 07/26/2024 04:24 AM    LYMPHSABS 0.95 07/26/2024 04:24 AM    EOSABS 0.00 07/26/2024 04:24 AM    BASOSABS 0.00 07/26/2024 04:24 AM     CMP:    Lab Results   Component Value Date/Time     07/29/2024 08:06 AM    K 6.5 07/29/2024 08:06 AM    K 4.5 05/15/2021 10:58 AM    CL 95 07/29/2024 08:06 AM    CO2 25 07/29/2024 08:06 AM     07/29/2024 08:06 AM    CREATININE 5.5 07/29/2024 08:06 AM    GFRAA 15 02/02/2022 10:26 AM    LABGLOM 7 07/29/2024 08:06 AM    LABGLOM 14 04/29/2024 02:31 PM    GLUCOSE 133 07/29/2024 08:06 AM    GLUCOSE 102 11/17/2010 06:31 AM    CALCIUM 9.8 07/29/2024 08:06 AM    BILITOT 0.4 07/29/2024 04:21 AM    ALKPHOS 182 07/29/2024 04:21 AM    AST 17 07/29/2024 04:21 AM    ALT 15 07/29/2024 04:21 AM       ASSESSMENT:  Acute Exacerbation of COPD  Acute on Chronic Hypoxic and Hypercapnic Respiratory Failure   H/O Non-Sustained Ventricular Tachycardia   S/P ICD Placement   HFrEF  Secondary Pulmonary Hypertension   Trace bilateral pleural effusions.  Cholelithiasis.  Chronic pancreatitis.  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  19 mm ectasia of the proximal left common iliac artery.  Degenerating leiomyomas of the uterus.  Macrocytic Anemia  Lymphopenia      PLAN:  O2, IS  Doxycycline, Pulmicort, DuoNeb   No role for thoracentesis  No plan for bronchoscopy      - supportive care  - await D/C planning   - PO prednisone taper   - follow up in office in 2 weeks time with Dr. Nelson in [facility]       Fabi Navarrete DO      
Few steps forward and backward with WW Michael  50 feet with WW Mod I    Stair negotiation: ascended and descended  NT  TBD   ROM BUE:  Defer to OT note  BLE:  WFL     Strength BUE:  Defer to OT note  BLE:  3+/5  WFL   Balance Sitting EOB:  SBA  Dynamic Standing:  Michael with WW  Sitting EOB:  Independent   Dynamic Standing:  Mod I with WW     Pt is A & O x 3  Sensation:  NT  Edema:  none noted      Patient education  Pt educated on role of PT, safety during mobility    Patient response to education:   Pt verbalized understanding Pt demonstrated skill Pt requires further education in this area   yes yes yes     ASSESSMENT:    Conditions Requiring Skilled Therapeutic Intervention:    [x]Decreased strength     [x]Decreased ROM  [x]Decreased functional mobility  [x]Decreased balance   [x]Decreased endurance   []Decreased posture  []Decreased sensation  []Decreased coordination   []Decreased vision  []Decreased safety awareness   []Increased pain       Comments:  pt semi-supine in bed upon entry and agreeable to PT evaluation. Pt able to complete bed mobility with light assist to trunk. Pt sat EOB for approximately 10 minutes during session with no assist for sitting balance. STS x2 from EOB with light lift assist. Few steps forward and backward with WW and balance assist. Mobility limited by fatigue. Pt returned to bed at end of session and positioned in seated.  All needs met and call light in reach. All lines remained intact.     Patient to benefit from continued skilled PT at [facility] to improve functional mobility and decrease fall risk.     Treatment:  Patient practiced and was instructed in the following treatment:    Bed Mobility: VCs provided for sequencing and safety during mobility. Manual assist provided for completion of task.  Transfer Training: Verbal and tactile cueing provided for sequencing and safety during mobility. Manual assist provided for completion of task  Gait Training: Ambulation with WW and verbal cues 
  Hematologic/lymphatic: no bleeding , no cougulation issues .   Musculoskeletal:no joint pain , no swelling .   Neurological: no headaches ,no weakness , no numbness .   Endocrine: no thirst , no weight issues .     Physical exam:   Vital signs /85   Pulse 81   Temp 97.3 °F (36.3 °C) (Temporal)   Resp 18   Ht 1.626 m (5' 4\")   Wt 57.6 kg (126 lb 15.8 oz)   SpO2 93%   BMI 21.80 kg/m²   Gen : NAD , appropriate for stated age .   Head : at , nc   Neck : supple , no thyromegaly noted .   Eyes : EOMI , PERRLA   CV : RRR , No M/R/G .   Lungs: CTAB , no wheezing , good flow heard b/l   Abd : soft , NT , BS + , No Organomegaly appreciated .   Skin : soft, dry .   Neuro : CN  II-XII grossly intact , no focal neurologic deficit .   Psych : cooperative .     Data:   Labs:  CBC with Differential:    Lab Results   Component Value Date/Time    WBC 12.6 07/29/2024 04:21 AM    RBC 2.40 07/29/2024 04:21 AM    HGB 8.3 07/29/2024 04:21 AM    HCT 26.1 07/29/2024 04:21 AM     07/29/2024 04:21 AM    .8 07/29/2024 04:21 AM    MCH 34.6 07/29/2024 04:21 AM    MCHC 31.8 07/29/2024 04:21 AM    RDW 20.6 07/29/2024 04:21 AM    NRBC 1 07/07/2024 05:01 AM    METASPCT 1 06/07/2024 03:53 PM    LYMPHOPCT 9 07/26/2024 04:24 AM    MONOPCT 5 07/26/2024 04:24 AM    MYELOPCT 1 07/05/2024 05:58 AM    MYELOPCT 0.9 04/03/2021 04:44 AM    EOSPCT 0 07/26/2024 04:24 AM    BASOPCT 0 07/26/2024 04:24 AM    MONOSABS 0.49 07/26/2024 04:24 AM    LYMPHSABS 0.95 07/26/2024 04:24 AM    EOSABS 0.00 07/26/2024 04:24 AM    BASOSABS 0.00 07/26/2024 04:24 AM     CMP:    Lab Results   Component Value Date/Time     07/29/2024 08:06 AM    K 6.5 07/29/2024 08:06 AM    K 4.5 05/15/2021 10:58 AM    CL 95 07/29/2024 08:06 AM    CO2 25 07/29/2024 08:06 AM     07/29/2024 08:06 AM    CREATININE 5.5 07/29/2024 08:06 AM    GFRAA 15 02/02/2022 10:26 AM    LABGLOM 7 07/29/2024 08:06 AM    LABGLOM 14 04/29/2024 02:31 PM    GLUCOSE 133 07/29/2024 
04:24 AM    HGB 7.7 07/26/2024 04:24 AM    HCT 24.0 07/26/2024 04:24 AM     07/26/2024 04:24 AM    .6 07/26/2024 04:24 AM    MCH 34.8 07/26/2024 04:24 AM    MCHC 32.1 07/26/2024 04:24 AM    RDW 19.9 07/26/2024 04:24 AM    NRBC 1 07/07/2024 05:01 AM    METASPCT 1 06/07/2024 03:53 PM    LYMPHOPCT 9 07/26/2024 04:24 AM    MONOPCT 5 07/26/2024 04:24 AM    MYELOPCT 1 07/05/2024 05:58 AM    MYELOPCT 0.9 04/03/2021 04:44 AM    EOSPCT 0 07/26/2024 04:24 AM    BASOPCT 0 07/26/2024 04:24 AM    MONOSABS 0.49 07/26/2024 04:24 AM    LYMPHSABS 0.95 07/26/2024 04:24 AM    EOSABS 0.00 07/26/2024 04:24 AM    BASOSABS 0.00 07/26/2024 04:24 AM     CMP:    Lab Results   Component Value Date/Time     07/26/2024 04:24 AM    K 4.3 07/26/2024 04:24 AM    K 4.5 05/15/2021 10:58 AM    CL 95 07/26/2024 04:24 AM    CO2 24 07/26/2024 04:24 AM    BUN 40 07/26/2024 04:24 AM    CREATININE 4.2 07/26/2024 04:24 AM    GFRAA 15 02/02/2022 10:26 AM    LABGLOM 10 07/26/2024 04:24 AM    LABGLOM 14 04/29/2024 02:31 PM    GLUCOSE 133 07/26/2024 04:24 AM    GLUCOSE 102 11/17/2010 06:31 AM    CALCIUM 8.7 07/26/2024 04:24 AM    BILITOT 0.6 07/23/2024 10:48 AM    ALKPHOS 143 07/23/2024 10:48 AM    AST 19 07/23/2024 10:48 AM    ALT 12 07/23/2024 10:48 AM     Ionized Calcium:  No components found for: \"IONCA\"  Magnesium:    Lab Results   Component Value Date/Time    MG 2.3 07/21/2024 12:45 PM     Phosphorus:    Lab Results   Component Value Date/Time    PHOS 2.2 07/07/2024 05:01 AM     U/A:    Lab Results   Component Value Date/Time    COLORU Yellow 05/08/2024 11:06 AM    PHUR 7.5 05/08/2024 11:06 AM    PHUR 8.5 04/16/2024 09:39 PM    LABCAST FEW 11/22/2016 07:10 PM    WBCUA 6 TO 9 05/08/2024 11:06 AM    RBCUA 0 TO 2 05/08/2024 11:06 AM    YEAST Present 04/01/2021 09:40 PM    BACTERIA 1+ 05/08/2024 11:06 AM    CLARITYU CLOUDY 05/28/2021 06:43 PM    LEUKOCYTESUR SMALL 05/08/2024 11:06 AM    UROBILINOGEN 0.2 05/08/2024 11:06 AM    BILIRUBINUR 
Results   Component Value Date/Time    PHOS 2.2 07/07/2024 05:01 AM     U/A:    Lab Results   Component Value Date/Time    COLORU Yellow 05/08/2024 11:06 AM    PHUR 7.5 05/08/2024 11:06 AM    PHUR 8.5 04/16/2024 09:39 PM    LABCAST FEW 11/22/2016 07:10 PM    WBCUA 6 TO 9 05/08/2024 11:06 AM    RBCUA 0 TO 2 05/08/2024 11:06 AM    YEAST Present 04/01/2021 09:40 PM    BACTERIA 1+ 05/08/2024 11:06 AM    CLARITYU CLOUDY 05/28/2021 06:43 PM    LEUKOCYTESUR SMALL 05/08/2024 11:06 AM    UROBILINOGEN 0.2 05/08/2024 11:06 AM    BILIRUBINUR NEGATIVE 05/08/2024 11:06 AM    BLOODU TRACE 05/28/2021 06:43 PM    GLUCOSEU NEGATIVE 05/08/2024 11:06 AM     Microalbumen/Creatinine ratio:  No components found for: \"RUCREAT\"  Iron Saturation:  No components found for: \"PERCENTFE\"  TIBC:    Lab Results   Component Value Date/Time    TIBC 146 07/25/2024 05:24 AM     FERRITIN:    Lab Results   Component Value Date/Time    FERRITIN 3,494 07/25/2024 05:24 AM        Imaging:  XR CHEST PORTABLE   Final Result   No new findings or significant changes, as described.             Assessment    ESRD  Anemia due to ESRD  Secondary hyperparathyroidism/mineral bone disease due to ESRD  History of hypertension  CHF, HFrEF, LVEF 30%  History of AF with RVR  Pulmonary HTN   Noncompliance/nonadherence to multiple aspects of her care after she is discharged from the hospital, accounting for her frequent readmissions.  Attempts at counseling her and her daughter in the past have proven unsuccessful    Recommendations    Continue HD for solute and volume management per Monday Wedensday Friday schedule  ARLIN: Retacrit while here (ferritin >3000)  Check phosph pth in am   Continue supportive care        Electronically signed by Murtaza Dawson MD on 7/28/2024 at 1:49 PM  
facilitated ADL tasks, functional transfers, functional mobility, bed mobility to address safety awareness, implementation of fall prevention strategies, & functional engagement throughout daily activities. Pt would benefit from continued skilled OT to increase safety and independence with completion of ADL/IADL tasks for functional independence and quality of life.    Treatment: OT treatment provided this date includes:   ADL-  Instruction/training on safety and adapted techniques for completion of ADLs: Therapist facilitated & pt educated on activity modifications/adaptations to promote implementation of fall prevention strategies, EC/WS strategies, & safety awareness throughout ADLs.   Mobility-  Instruction/training on safety and improved independence with bed mobility/functional transfers and functional mobility.   Sitting EOB ~10 minutes to improve dynamic sitting balance and activity tolerance during ADLs.   Activity tolerance- Instruction/training on energy conservation/work simplification for completion of ADLs.    Skilled positioning/alignment-  Therapist facilitated proper positioning/alignment throughout session to maintain skin/joint integrity & proper body mechanics.    Rehab Potential: Good for established goals     LTG: maximize independence with ADLs to return to PLOF    Patient and/or family were instructed on functional diagnosis, prognosis/goals and OT plan of care. Demonstrated fair understanding.     Eval Complexity: Low  History: Expanded chart review of medical records and additional review of physical, cognitive, or psychosocial history related to current functional performance  Exam: 3+ performance deficits  Assistance/Modification: Min/mod assistance or modifications required to perform tasks. May have comorbidities that affect occupational performance.    Evaluation time includes thorough review of current medical information, gathering information on past medical & social history & PLOF, 
07/26/2024    HCT 24.0 (L) 07/26/2024    .6 (H) 07/26/2024     07/26/2024    LYMPHOPCT 9 (L) 07/26/2024    RBC 2.21 (L) 07/26/2024    MCH 34.8 07/26/2024    MCHC 32.1 07/26/2024    RDW 19.9 (H) 07/26/2024    NEUTOPHILPCT 85 (H) 07/26/2024    MONOPCT 5 07/26/2024    EOSPCT 0 07/26/2024    BASOPCT 0 07/26/2024    NEUTROABS 8.84 (H) 07/26/2024    LYMPHSABS 0.95 (L) 07/26/2024    MONOSABS 0.49 07/26/2024    EOSABS 0.00 (L) 07/26/2024    BASOSABS 0.00 07/26/2024       Recent Labs     07/26/24  0424 07/25/24  0524 07/24/24  0601   WBC 10.4 9.9 7.6   HGB 7.7* 7.3* 7.1*   HCT 24.0* 23.0* 22.7*   .6* 110.0* 108.1*    283 269         BMP:   Recent Labs     07/26/24 0424      K 4.3   CL 95*   CO2 24   BUN 40*   CREATININE 4.2*         MG:   Lab Results   Component Value Date/Time    MG 2.3 07/21/2024 12:45 PM     Ca/Phos:   Lab Results   Component Value Date    CALCIUM 8.7 07/26/2024    PHOS 2.2 (L) 07/07/2024     Amylase: No results found for: \"AMYLASE\"  Lipase:   Lab Results   Component Value Date    LIPASE 11 (L) 05/08/2024     LIVER PROFILE:   No results for input(s): \"AST\", \"ALT\", \"LIPASE\", \"AMYLASE\", \"BILIDIR\", \"BILITOT\", \"ALKPHOS\" in the last 72 hours.    Invalid input(s): \"ALB\"      PT/INR: No results for input(s): \"PROTIME\", \"INR\" in the last 72 hours.  APTT: No results for input(s): \"APTT\" in the last 72 hours.    Cardiac Enzymes:  Lab Results   Component Value Date    CKTOTAL 58 07/25/2015    CKMB 3.8 07/25/2015    TROPONINI 0.03 05/15/2021       Hgb A1C:   Lab Results   Component Value Date    LABA1C 4.8 07/22/2024     No results found for: \"EAG\"  KALINA: No results found for: \"KALINA\"  ESR:   Lab Results   Component Value Date    SEDRATE 62 (H) 07/25/2024     CRP:   Lab Results   Component Value Date    CRP 10.0 (H) 07/25/2024     D Dimer:   Lab Results   Component Value Date    DDIMER 1723 03/31/2021     Folate and B12:   Lab Results   Component Value Date    [id number] 806 04/18/2024

## 2024-07-29 NOTE — FLOWSHEET NOTE
07/29/24 1140   Vital Signs   /65   Temp (!) 96.6 °F (35.9 °C)   Pulse 75   Respirations 18   Weight - Scale 57.6 kg (126 lb 15.8 oz)   Weight Method Bed scale   Percent Weight Change -2.04   Pain Assessment   Pain Assessment None - Denies Pain   Post-Hemodialysis Assessment   Post-Treatment Procedures Catheter capped, clamped and heparinized x 2 ports;Blood returned   Machine Disinfection Process Exterior Machine Disinfection   Rinseback Volume (ml) 300 ml   Blood Volume Processed (Liters) 58.6 L   Dialyzer Clearance Lightly streaked   Duration of Treatment (minutes) 210 minutes   Heparin Amount Administered During Treatment (mL) 0 mL   Hemodialysis Intake (ml) 300 ml   Hemodialysis Output (ml) 1700 ml   NET Removed (ml) 1400   Tolerated Treatment Good   Patient Response to Treatment tolerated well   Bilateral Breath Sounds Diminished   Physician Notified No   Time Off 1138   Patient Disposition Return to room   Observations & Evaluations   Level of Consciousness 0

## 2024-07-29 NOTE — DISCHARGE SUMMARY
with ER physician     Upon my evaluation patient feels a lot better.  Continue to deny pains.  Feels hungry daughter was at bedside   \"      Brief Hospital course:     --Acute on chronic respiratory failure mixed type.  Possible COPD exacerbation.  Fluid overload?  Steroids, inhalers supplemental oxygen/BiPAP, Bumex and hemodialysis.  Pulmonology consult  Back to baseline will be discharged to nursing facility on tapering prednisone Bumex and hemodialysis     -- End-stage renal disease, hemodialysis per nephrology     --Heart failure reduced ejection fraction, continue home meds Bumex.  Dialysis per nephrology     -- Mild hyperkalemia Lokelma low potassium diet     --Pulmonary hypertension     --History of nonsustained V. Tach     --Chronic A-fib on Eliquis rate controlled     --Status post ICD placement     --Anemia of chronic disease monitor and transfuse for hemoglobin less than 7     --Disposition skilled nursing facility    At time of discharge patient was asymptomatic, physical exam was unremarkable, patient was very eager to be discharged.    Condition at discharge improved and stable    Exam:     /67   Pulse 75   Temp 97.2 °F (36.2 °C) (Infrared)   Resp 18   Ht 1.626 m (5' 4\")   Wt 58.8 kg (129 lb 10.1 oz)   SpO2 92%   BMI 22.25 kg/m²     -General:  Awake, alert, oriented X 3.  Well developed, well nourished, well groomed.  No apparent distress.    -HEENT:  Normocephalic, atraumatic.  Pupils equal, round, reactive to light.  No scleral icterus.  No conjunctival injection.  Normal lips, teeth, and gums.  No nasal discharge.  Neck:  Supple    -Heart:  RRR, no murmurs, gallops, rubs    -Lungs:  CTA bilaterally, bilat symmetrical expansion, no wheeze, rales, or rhonchi    -Abdomen:  Bowel sounds present, soft, nontender, no masses, no organomegaly, no peritoneal signs    -Extremities: normal ROM. No Cyanosis clubbing or edema    -Skin: Warm and dry    -Neuro:  AAO x 3 .Cranial nerves 2-12 intact, no

## 2024-07-29 NOTE — PLAN OF CARE
Problem: Discharge Planning  Goal: Discharge to home or other facility with appropriate resources  7/29/2024 1226 by Geneva Merino RN  Outcome: Progressing     Problem: Skin/Tissue Integrity  Goal: Absence of new skin breakdown  Description: 1.  Monitor for areas of redness and/or skin breakdown  2.  Assess vascular access sites hourly  3.  Every 4-6 hours minimum:  Change oxygen saturation probe site  4.  Every 4-6 hours:  If on nasal continuous positive airway pressure, respiratory therapy assess nares and determine need for appliance change or resting period.  7/29/2024 1226 by Geneva Merino RN  Outcome: Progressing     Problem: Safety - Adult  Goal: Free from fall injury  7/29/2024 1226 by Geneva Merino RN  Outcome: Progressing     Problem: Chronic Conditions and Co-morbidities  Goal: Patient's chronic conditions and co-morbidity symptoms are monitored and maintained or improved  7/29/2024 1226 by Geneva Merino RN  Outcome: Progressing     Problem: Pain  Goal: Verbalizes/displays adequate comfort level or baseline comfort level  7/29/2024 1226 by Geneva Merino RN  Outcome: Progressing

## 2024-07-30 ENCOUNTER — APPOINTMENT (OUTPATIENT)
Dept: CT IMAGING | Age: 84
End: 2024-07-30
Attending: EMERGENCY MEDICINE
Payer: MEDICARE

## 2024-07-30 ENCOUNTER — APPOINTMENT (OUTPATIENT)
Dept: GENERAL RADIOLOGY | Age: 84
End: 2024-07-30
Payer: MEDICARE

## 2024-07-30 ENCOUNTER — HOSPITAL ENCOUNTER (OUTPATIENT)
Age: 84
Setting detail: OBSERVATION
Discharge: NURSING, REHAB OR LONG-TERM CARE | End: 2024-08-05
Attending: EMERGENCY MEDICINE | Admitting: INTERNAL MEDICINE
Payer: MEDICARE

## 2024-07-30 DIAGNOSIS — D63.8 ANEMIA OF CHRONIC DISEASE: Chronic | ICD-10-CM

## 2024-07-30 DIAGNOSIS — R55 SYNCOPE AND COLLAPSE: Primary | ICD-10-CM

## 2024-07-30 DIAGNOSIS — I50.21 ACUTE SYSTOLIC CHF (CONGESTIVE HEART FAILURE) (HCC): ICD-10-CM

## 2024-07-30 DIAGNOSIS — I48.20 CHRONIC ATRIAL FIBRILLATION (HCC): ICD-10-CM

## 2024-07-30 DIAGNOSIS — I47.20 VT (VENTRICULAR TACHYCARDIA) (HCC): ICD-10-CM

## 2024-07-30 DIAGNOSIS — R41.82 ALTERED MENTAL STATUS, UNSPECIFIED ALTERED MENTAL STATUS TYPE: ICD-10-CM

## 2024-07-30 LAB
ALBUMIN SERPL-MCNC: 3.1 G/DL (ref 3.5–5.2)
ALP SERPL-CCNC: 151 U/L (ref 35–104)
ALT SERPL-CCNC: 17 U/L (ref 0–32)
ANION GAP SERPL CALCULATED.3IONS-SCNC: 15 MMOL/L (ref 7–16)
AST SERPL-CCNC: 18 U/L (ref 0–31)
B.E.: 0.6 MMOL/L (ref -3–3)
BASOPHILS # BLD: 0 K/UL (ref 0–0.2)
BASOPHILS NFR BLD: 0 % (ref 0–2)
BILIRUB SERPL-MCNC: 0.4 MG/DL (ref 0–1.2)
BUN SERPL-MCNC: 45 MG/DL (ref 6–23)
CALCIUM SERPL-MCNC: 8.7 MG/DL (ref 8.6–10.2)
CHLORIDE SERPL-SCNC: 95 MMOL/L (ref 98–107)
CO2 SERPL-SCNC: 23 MMOL/L (ref 22–29)
CREAT SERPL-MCNC: 3 MG/DL (ref 0.5–1)
CRITICAL: ABNORMAL
DATE ANALYZED: ABNORMAL
DATE OF COLLECTION: ABNORMAL
EKG ATRIAL RATE: 41 BPM
EKG P-R INTERVAL: 232 MS
EKG Q-T INTERVAL: 484 MS
EKG QRS DURATION: 174 MS
EKG QTC CALCULATION (BAZETT): 544 MS
EKG R AXIS: 106 DEGREES
EKG T AXIS: -72 DEGREES
EKG VENTRICULAR RATE: 76 BPM
EOSINOPHIL # BLD: 0 K/UL (ref 0.05–0.5)
EOSINOPHILS RELATIVE PERCENT: 0 % (ref 0–6)
ERYTHROCYTE [DISTWIDTH] IN BLOOD BY AUTOMATED COUNT: 21.1 % (ref 11.5–15)
GFR, ESTIMATED: 15 ML/MIN/1.73M2
GLUCOSE SERPL-MCNC: 184 MG/DL (ref 74–99)
HCO3: 23.5 MMOL/L (ref 22–26)
HCT VFR BLD AUTO: 23.8 % (ref 34–48)
HGB BLD-MCNC: 7.8 G/DL (ref 11.5–15.5)
INR PPP: 1.3
LAB: ABNORMAL
LYMPHOCYTES NFR BLD: 0.58 K/UL (ref 1.5–4)
LYMPHOCYTES RELATIVE PERCENT: 4 % (ref 20–42)
Lab: 1248
MAGNESIUM SERPL-MCNC: 1.9 MG/DL (ref 1.6–2.6)
MCH RBC QN AUTO: 35.6 PG (ref 26–35)
MCHC RBC AUTO-ENTMCNC: 32.8 G/DL (ref 32–34.5)
MCV RBC AUTO: 108.7 FL (ref 80–99.9)
METAMYELOCYTES ABSOLUTE COUNT: 0.12 K/UL (ref 0–0.12)
METAMYELOCYTES: 1 % (ref 0–1)
MODE: ABNORMAL
MONOCYTES NFR BLD: 0.35 K/UL (ref 0.1–0.95)
MONOCYTES NFR BLD: 3 % (ref 2–12)
NEUTROPHILS NFR BLD: 92 % (ref 43–80)
NEUTS SEG NFR BLD: 12.35 K/UL (ref 1.8–7.3)
O2 SATURATION: 95.8 % (ref 92–98.5)
OPERATOR ID: 5123
PATIENT TEMP: 37 C
PCO2: 33.1 MMHG (ref 35–45)
PH BLOOD GAS: 7.47 (ref 7.35–7.45)
PLATELET # BLD AUTO: 266 K/UL (ref 130–450)
PMV BLD AUTO: 11.9 FL (ref 7–12)
PO2: 73.7 MMHG (ref 75–100)
POTASSIUM SERPL-SCNC: 3.9 MMOL/L (ref 3.5–5)
PROT SERPL-MCNC: 6.1 G/DL (ref 6.4–8.3)
PROTHROMBIN TIME: 14.4 SEC (ref 9.3–12.4)
RBC # BLD AUTO: 2.19 M/UL (ref 3.5–5.5)
RBC # BLD: ABNORMAL 10*6/UL
SODIUM SERPL-SCNC: 133 MMOL/L (ref 132–146)
SOURCE, BLOOD GAS: ABNORMAL
TIME ANALYZED: 1301
TROPONIN I SERPL HS-MCNC: 74 NG/L (ref 0–9)
WBC OTHER # BLD: 13.4 K/UL (ref 4.5–11.5)

## 2024-07-30 PROCEDURE — 83735 ASSAY OF MAGNESIUM: CPT

## 2024-07-30 PROCEDURE — 99222 1ST HOSP IP/OBS MODERATE 55: CPT | Performed by: INTERNAL MEDICINE

## 2024-07-30 PROCEDURE — 71045 X-RAY EXAM CHEST 1 VIEW: CPT

## 2024-07-30 PROCEDURE — 93010 ELECTROCARDIOGRAM REPORT: CPT | Performed by: INTERNAL MEDICINE

## 2024-07-30 PROCEDURE — G0378 HOSPITAL OBSERVATION PER HR: HCPCS

## 2024-07-30 PROCEDURE — 6370000000 HC RX 637 (ALT 250 FOR IP): Performed by: INTERNAL MEDICINE

## 2024-07-30 PROCEDURE — 99285 EMERGENCY DEPT VISIT HI MDM: CPT

## 2024-07-30 PROCEDURE — 82803 BLOOD GASES ANY COMBINATION: CPT

## 2024-07-30 PROCEDURE — 84484 ASSAY OF TROPONIN QUANT: CPT

## 2024-07-30 PROCEDURE — 85025 COMPLETE CBC W/AUTO DIFF WBC: CPT

## 2024-07-30 PROCEDURE — 70450 CT HEAD/BRAIN W/O DYE: CPT

## 2024-07-30 PROCEDURE — 6360000002 HC RX W HCPCS: Performed by: INTERNAL MEDICINE

## 2024-07-30 PROCEDURE — 85610 PROTHROMBIN TIME: CPT

## 2024-07-30 PROCEDURE — 93005 ELECTROCARDIOGRAM TRACING: CPT | Performed by: EMERGENCY MEDICINE

## 2024-07-30 PROCEDURE — 2580000003 HC RX 258: Performed by: INTERNAL MEDICINE

## 2024-07-30 PROCEDURE — 94640 AIRWAY INHALATION TREATMENT: CPT

## 2024-07-30 PROCEDURE — 80053 COMPREHEN METABOLIC PANEL: CPT

## 2024-07-30 RX ORDER — LANOLIN ALCOHOL/MO/W.PET/CERES
3 CREAM (GRAM) TOPICAL NIGHTLY PRN
Status: DISCONTINUED | OUTPATIENT
Start: 2024-07-30 | End: 2024-07-30

## 2024-07-30 RX ORDER — CALCIUM CARBONATE 500 MG/1
500 TABLET, CHEWABLE ORAL 3 TIMES DAILY PRN
Status: DISCONTINUED | OUTPATIENT
Start: 2024-07-30 | End: 2024-08-05 | Stop reason: HOSPADM

## 2024-07-30 RX ORDER — ACETAMINOPHEN 325 MG/1
650 TABLET ORAL EVERY 6 HOURS PRN
Status: DISCONTINUED | OUTPATIENT
Start: 2024-07-30 | End: 2024-08-05 | Stop reason: HOSPADM

## 2024-07-30 RX ORDER — BENZONATATE 100 MG/1
100 CAPSULE ORAL 3 TIMES DAILY PRN
Status: DISCONTINUED | OUTPATIENT
Start: 2024-07-30 | End: 2024-08-05 | Stop reason: HOSPADM

## 2024-07-30 RX ORDER — LACTULOSE 10 G/15ML
10 SOLUTION ORAL DAILY PRN
Status: DISCONTINUED | OUTPATIENT
Start: 2024-07-30 | End: 2024-08-02

## 2024-07-30 RX ORDER — SODIUM CHLORIDE 0.9 % (FLUSH) 0.9 %
5-40 SYRINGE (ML) INJECTION EVERY 12 HOURS SCHEDULED
Status: DISCONTINUED | OUTPATIENT
Start: 2024-07-30 | End: 2024-08-05 | Stop reason: HOSPADM

## 2024-07-30 RX ORDER — ONDANSETRON 4 MG/1
4 TABLET, ORALLY DISINTEGRATING ORAL EVERY 8 HOURS PRN
Status: DISCONTINUED | OUTPATIENT
Start: 2024-07-30 | End: 2024-07-30 | Stop reason: CLARIF

## 2024-07-30 RX ORDER — POLYETHYLENE GLYCOL 3350 17 G/17G
17 POWDER, FOR SOLUTION ORAL DAILY
Status: DISCONTINUED | OUTPATIENT
Start: 2024-07-31 | End: 2024-08-05 | Stop reason: HOSPADM

## 2024-07-30 RX ORDER — SODIUM CHLORIDE 9 MG/ML
INJECTION, SOLUTION INTRAVENOUS PRN
Status: DISCONTINUED | OUTPATIENT
Start: 2024-07-30 | End: 2024-08-05 | Stop reason: HOSPADM

## 2024-07-30 RX ORDER — METOPROLOL SUCCINATE 25 MG/1
12.5 TABLET, EXTENDED RELEASE ORAL DAILY
Status: DISCONTINUED | OUTPATIENT
Start: 2024-07-31 | End: 2024-08-05 | Stop reason: HOSPADM

## 2024-07-30 RX ORDER — ONDANSETRON 2 MG/ML
4 INJECTION INTRAMUSCULAR; INTRAVENOUS EVERY 6 HOURS PRN
Status: DISCONTINUED | OUTPATIENT
Start: 2024-07-30 | End: 2024-07-30 | Stop reason: CLARIF

## 2024-07-30 RX ORDER — SENNA AND DOCUSATE SODIUM 50; 8.6 MG/1; MG/1
1 TABLET, FILM COATED ORAL 2 TIMES DAILY PRN
Status: ON HOLD | COMMUNITY

## 2024-07-30 RX ORDER — BUMETANIDE 1 MG/1
2 TABLET ORAL DAILY
Status: DISCONTINUED | OUTPATIENT
Start: 2024-07-31 | End: 2024-08-05 | Stop reason: HOSPADM

## 2024-07-30 RX ORDER — DOXYCYCLINE HYCLATE 100 MG/1
100 CAPSULE ORAL 2 TIMES DAILY
COMMUNITY
Start: 2024-07-30 | End: 2024-08-05

## 2024-07-30 RX ORDER — PROCHLORPERAZINE MALEATE 10 MG
10 TABLET ORAL EVERY 8 HOURS PRN
Status: DISCONTINUED | OUTPATIENT
Start: 2024-07-30 | End: 2024-08-05 | Stop reason: HOSPADM

## 2024-07-30 RX ORDER — POLYETHYLENE GLYCOL 3350 17 G/17G
17 POWDER, FOR SOLUTION ORAL DAILY PRN
Status: DISCONTINUED | OUTPATIENT
Start: 2024-07-30 | End: 2024-08-02

## 2024-07-30 RX ORDER — MECLIZINE HCL 12.5 MG/1
12.5 TABLET ORAL 3 TIMES DAILY PRN
Status: DISCONTINUED | OUTPATIENT
Start: 2024-07-30 | End: 2024-08-05 | Stop reason: HOSPADM

## 2024-07-30 RX ORDER — AMIODARONE HYDROCHLORIDE 200 MG/1
400 TABLET ORAL DAILY
Status: DISCONTINUED | OUTPATIENT
Start: 2024-07-31 | End: 2024-07-31

## 2024-07-30 RX ORDER — SENNA AND DOCUSATE SODIUM 50; 8.6 MG/1; MG/1
1 TABLET, FILM COATED ORAL 2 TIMES DAILY PRN
Status: DISCONTINUED | OUTPATIENT
Start: 2024-07-30 | End: 2024-08-02

## 2024-07-30 RX ORDER — ATORVASTATIN CALCIUM 40 MG/1
40 TABLET, FILM COATED ORAL NIGHTLY
Status: DISCONTINUED | OUTPATIENT
Start: 2024-07-30 | End: 2024-08-05 | Stop reason: HOSPADM

## 2024-07-30 RX ORDER — PETROLATUM,WHITE/LANOLIN
OINTMENT (GRAM) TOPICAL PRN
COMMUNITY
End: 2024-08-10

## 2024-07-30 RX ORDER — ISOSORBIDE DINITRATE 10 MG/1
5 TABLET ORAL 2 TIMES DAILY
Status: DISCONTINUED | OUTPATIENT
Start: 2024-07-30 | End: 2024-08-05 | Stop reason: HOSPADM

## 2024-07-30 RX ORDER — DOXYCYCLINE HYCLATE 100 MG/1
100 CAPSULE ORAL 2 TIMES DAILY
Status: DISCONTINUED | OUTPATIENT
Start: 2024-07-30 | End: 2024-08-05 | Stop reason: HOSPADM

## 2024-07-30 RX ORDER — CALCIUM ACETATE 667 MG/1
1 CAPSULE ORAL
Status: DISCONTINUED | OUTPATIENT
Start: 2024-07-30 | End: 2024-08-05 | Stop reason: HOSPADM

## 2024-07-30 RX ORDER — PANTOPRAZOLE SODIUM 40 MG/1
40 TABLET, DELAYED RELEASE ORAL
Status: DISCONTINUED | OUTPATIENT
Start: 2024-07-31 | End: 2024-08-05 | Stop reason: HOSPADM

## 2024-07-30 RX ORDER — ACETAMINOPHEN 650 MG/1
650 SUPPOSITORY RECTAL EVERY 6 HOURS PRN
Status: DISCONTINUED | OUTPATIENT
Start: 2024-07-30 | End: 2024-08-05 | Stop reason: HOSPADM

## 2024-07-30 RX ORDER — BISACODYL 10 MG
10 SUPPOSITORY, RECTAL RECTAL DAILY PRN
Status: DISCONTINUED | OUTPATIENT
Start: 2024-07-30 | End: 2024-08-02

## 2024-07-30 RX ORDER — PREDNISONE 5 MG/1
15 TABLET ORAL DAILY
Status: ON HOLD | COMMUNITY
Start: 2024-07-30 | End: 2024-08-03 | Stop reason: HOSPADM

## 2024-07-30 RX ORDER — IPRATROPIUM BROMIDE AND ALBUTEROL SULFATE 2.5; .5 MG/3ML; MG/3ML
1 SOLUTION RESPIRATORY (INHALATION) EVERY 6 HOURS PRN
Status: DISCONTINUED | OUTPATIENT
Start: 2024-07-30 | End: 2024-08-05 | Stop reason: HOSPADM

## 2024-07-30 RX ORDER — AMIODARONE HYDROCHLORIDE 400 MG/1
400 TABLET ORAL DAILY
Status: ON HOLD | COMMUNITY
End: 2024-08-01 | Stop reason: HOSPADM

## 2024-07-30 RX ORDER — PROCHLORPERAZINE EDISYLATE 5 MG/ML
10 INJECTION INTRAMUSCULAR; INTRAVENOUS EVERY 6 HOURS PRN
Status: DISCONTINUED | OUTPATIENT
Start: 2024-07-30 | End: 2024-08-05 | Stop reason: HOSPADM

## 2024-07-30 RX ORDER — HYDRALAZINE HYDROCHLORIDE 20 MG/ML
10 INJECTION INTRAMUSCULAR; INTRAVENOUS EVERY 6 HOURS PRN
Status: DISCONTINUED | OUTPATIENT
Start: 2024-07-30 | End: 2024-08-05 | Stop reason: HOSPADM

## 2024-07-30 RX ORDER — ASPIRIN 81 MG/1
81 TABLET ORAL DAILY
Status: DISCONTINUED | OUTPATIENT
Start: 2024-07-31 | End: 2024-08-05 | Stop reason: HOSPADM

## 2024-07-30 RX ORDER — SODIUM CHLORIDE 0.9 % (FLUSH) 0.9 %
5-40 SYRINGE (ML) INJECTION PRN
Status: DISCONTINUED | OUTPATIENT
Start: 2024-07-30 | End: 2024-08-05 | Stop reason: HOSPADM

## 2024-07-30 RX ORDER — HYDRALAZINE HYDROCHLORIDE 10 MG/1
10 TABLET, FILM COATED ORAL EVERY 12 HOURS SCHEDULED
Status: DISCONTINUED | OUTPATIENT
Start: 2024-07-30 | End: 2024-07-31

## 2024-07-30 RX ORDER — MECOBALAMIN 5000 MCG
5 TABLET,DISINTEGRATING ORAL NIGHTLY
Status: DISCONTINUED | OUTPATIENT
Start: 2024-07-30 | End: 2024-08-05 | Stop reason: HOSPADM

## 2024-07-30 RX ADMIN — DOXYCYCLINE HYCLATE 100 MG: 100 CAPSULE ORAL at 20:47

## 2024-07-30 RX ADMIN — ARFORMOTEROL TARTRATE: 15 SOLUTION RESPIRATORY (INHALATION) at 20:05

## 2024-07-30 RX ADMIN — APIXABAN 2.5 MG: 2.5 TABLET, FILM COATED ORAL at 20:47

## 2024-07-30 RX ADMIN — SODIUM CHLORIDE, PRESERVATIVE FREE 10 ML: 5 INJECTION INTRAVENOUS at 20:48

## 2024-07-30 RX ADMIN — ATORVASTATIN CALCIUM 40 MG: 40 TABLET, FILM COATED ORAL at 20:47

## 2024-07-30 RX ADMIN — Medication 5 MG: at 20:47

## 2024-07-30 ASSESSMENT — PAIN SCALES - GENERAL: PAINLEVEL_OUTOF10: 0

## 2024-07-30 ASSESSMENT — PAIN SCALES - WONG BAKER: WONGBAKER_NUMERICALRESPONSE: HURTS EVEN MORE

## 2024-07-30 NOTE — H&P
in the left-sided pleural effusion as compared to the prior study.  The renal dialysis catheter and dual-chamber cardiac pacemaker are unchanged.     1. Persistent bilateral pleural effusions most pronounced on the right.. 2. Decrease in the left-sided pleural effusion as compared to the prior study. 3. Prominent pulmonary vascularity with moderate cardiomegaly and tortuosity of the aorta.     CT HEAD WO CONTRAST    Result Date: 7/2/2024  EXAMINATION: CT OF THE HEAD WITHOUT CONTRAST  7/2/2024 1:18 pm TECHNIQUE: CT of the head was performed without the administration of intravenous contrast. Automated exposure control, iterative reconstruction, and/or weight based adjustment of the mA/kV was utilized to reduce the radiation dose to as low as reasonably achievable. COMPARISON: None. HISTORY: ORDERING SYSTEM PROVIDED HISTORY: Change in mental status TECHNOLOGIST PROVIDED HISTORY: Reason for exam:->Change in mental status Has a \"code stroke\" or \"stroke alert\" been called?->No FINDINGS: BRAIN/VENTRICLES: There is no acute intracranial hemorrhage, mass effect or midline shift.  No abnormal extra-axial fluid collection.  The gray-white differentiation is maintained without evidence of an acute infarct.  There is no evidence of hydrocephalus. Ventricles mildly enlarged with sulci mildly prominent.  Bilateral symmetric periventricular areas decreased attenuation. ORBITS: The visualized portion of the orbits demonstrate no acute abnormality.  Remote bilateral ocular surgery. SINUSES: The visualized paranasal sinuses and mastoid air cells demonstrate rounded cystic area, lateral right maxillary sinus.  Mucosal thickening, left maxillary sinus.  Coarsening bilateral mastoid air cells with resorption. SOFT TISSUES/SKULL:  No acute abnormality of the visualized skull or soft tissues.     Right maxillary retention cyst. Left maxillary sinusitis. Chronic bilateral mastoiditis. Mild cerebral atrophy. Chronic ischemic white matter

## 2024-07-30 NOTE — ED PROVIDER NOTES
HPI:  7/30/24,   Time: 12:08 PM EDT       Marge Callejas is a 84 y.o. female presenting to the ED for syncope/low o2, beginning hrs ago.  The complaint has been intermittent, severe in severity, and worsened by nothing.  Brought in by EMS from nursing home.  Had dialysis today.  Dialysis Tuesday Thursday Saturday.  Syncopized in dialysis.  Syncopized again at nursing home.  Also syncopized per EMS.  Patient denies pain.  History limited.  Increased O2 requirement per EMS.    Review of Systems:   Pertinent positives and negatives are stated within HPI, all other systems reviewed and are negative.          --------------------------------------------- PAST HISTORY ---------------------------------------------  Past Medical History:  has a past medical history of Arthritis, Atrial fibrillation (HCC), Blood circulation, collateral, CHF (congestive heart failure) (HCC), Chronic renal insufficiency, stage IV (severe) (HCC), Coronary artery disease involving native coronary artery of native heart without angina pectoris, History of cardiovascular stress test, History of echocardiogram, Hyperlipidemia, Hypertension, and Mixed restrictive and obstructive lung disease (HCC).    Past Surgical History:  has a past surgical history that includes Ectopic pregnancy surgery; Upper gastrointestinal endoscopy (N/A, 4/3/2021); Colonoscopy (N/A, 4/3/2021); Colonoscopy (4/3/2021); Colonoscopy (4/3/2021); Colonoscopy (4/3/2021); Upper gastrointestinal endoscopy (N/A, 4/7/2021); and Colonoscopy (N/A, 9/19/2023).    Social History:  reports that she quit smoking about 18 months ago. Her smoking use included cigarettes. She started smoking about 52 years ago. She has a 25.5 pack-year smoking history. She has never used smokeless tobacco. Drug use questions deferred to the physician. She reports that she does not drink alcohol.    Family History: family history includes Brain Cancer in her sister; Cancer in her sister; Heart Disease in her

## 2024-07-31 ENCOUNTER — APPOINTMENT (OUTPATIENT)
Age: 84
End: 2024-07-31
Attending: INTERNAL MEDICINE
Payer: MEDICARE

## 2024-07-31 PROBLEM — I47.20 VT (VENTRICULAR TACHYCARDIA) (HCC): Status: ACTIVE | Noted: 2024-07-31

## 2024-07-31 LAB
ALBUMIN SERPL-MCNC: 3.3 G/DL (ref 3.5–5.2)
ALP SERPL-CCNC: 155 U/L (ref 35–104)
ALT SERPL-CCNC: 16 U/L (ref 0–32)
ANION GAP SERPL CALCULATED.3IONS-SCNC: 11 MMOL/L (ref 7–16)
AST SERPL-CCNC: 15 U/L (ref 0–31)
BILIRUB SERPL-MCNC: 0.3 MG/DL (ref 0–1.2)
BUN SERPL-MCNC: 63 MG/DL (ref 6–23)
CALCIUM SERPL-MCNC: 9.3 MG/DL (ref 8.6–10.2)
CHLORIDE SERPL-SCNC: 99 MMOL/L (ref 98–107)
CHOLEST SERPL-MCNC: 143 MG/DL
CO2 SERPL-SCNC: 27 MMOL/L (ref 22–29)
CREAT SERPL-MCNC: 4 MG/DL (ref 0.5–1)
ECHO AO ASC DIAM: 3.2 CM
ECHO AO ASCENDING AORTA INDEX: 1.99 CM/M2
ECHO AV AREA PEAK VELOCITY: 2 CM2
ECHO AV AREA VTI: 1.7 CM2
ECHO AV AREA/BSA PEAK VELOCITY: 1.2 CM2/M2
ECHO AV AREA/BSA VTI: 1.1 CM2/M2
ECHO AV CUSP MM: 1.6 CM
ECHO AV MEAN GRADIENT: 6 MMHG
ECHO AV MEAN VELOCITY: 1.2 M/S
ECHO AV PEAK GRADIENT: 12 MMHG
ECHO AV PEAK VELOCITY: 1.7 M/S
ECHO AV VELOCITY RATIO: 0.71
ECHO AV VTI: 27.7 CM
ECHO BSA: 1.61 M2
ECHO EST RA PRESSURE: 3 MMHG
ECHO LA DIAMETER INDEX: 3.23 CM/M2
ECHO LA DIAMETER: 5.2 CM
ECHO LA VOL A-L A2C: 89 ML (ref 22–52)
ECHO LA VOL A-L A4C: 114 ML (ref 22–52)
ECHO LA VOL MOD A2C: 85 ML (ref 22–52)
ECHO LA VOL MOD A4C: 111 ML (ref 22–52)
ECHO LA VOLUME AREA LENGTH: 101 ML
ECHO LA VOLUME INDEX A-L A2C: 55 ML/M2 (ref 16–34)
ECHO LA VOLUME INDEX A-L A4C: 71 ML/M2 (ref 16–34)
ECHO LA VOLUME INDEX AREA LENGTH: 63 ML/M2 (ref 16–34)
ECHO LA VOLUME INDEX MOD A2C: 53 ML/M2 (ref 16–34)
ECHO LA VOLUME INDEX MOD A4C: 69 ML/M2 (ref 16–34)
ECHO LV EJECTION FRACTION A2C: 35 %
ECHO LV EJECTION FRACTION A4C: 41 %
ECHO LV FRACTIONAL SHORTENING: 5 % (ref 28–44)
ECHO LV INTERNAL DIMENSION DIASTOLE INDEX: 3.42 CM/M2
ECHO LV INTERNAL DIMENSION DIASTOLIC: 5.5 CM (ref 3.9–5.3)
ECHO LV INTERNAL DIMENSION SYSTOLIC INDEX: 3.23 CM/M2
ECHO LV INTERNAL DIMENSION SYSTOLIC: 5.2 CM
ECHO LV IVSD: 1.3 CM (ref 0.6–0.9)
ECHO LV IVSS: 1.8 CM
ECHO LV MASS 2D: 242 G (ref 67–162)
ECHO LV MASS INDEX 2D: 150.3 G/M2 (ref 43–95)
ECHO LV POSTERIOR WALL DIASTOLIC: 0.9 CM (ref 0.6–0.9)
ECHO LV POSTERIOR WALL SYSTOLIC: 0.8 CM
ECHO LV RELATIVE WALL THICKNESS RATIO: 0.33
ECHO LVOT AREA: 3.1 CM2
ECHO LVOT AV VTI INDEX: 0.56
ECHO LVOT DIAM: 2 CM
ECHO LVOT MEAN GRADIENT: 3 MMHG
ECHO LVOT PEAK GRADIENT: 5 MMHG
ECHO LVOT PEAK VELOCITY: 1.2 M/S
ECHO LVOT STROKE VOLUME INDEX: 30 ML/M2
ECHO LVOT SV: 48.4 ML
ECHO LVOT VTI: 15.4 CM
ECHO MV A VELOCITY: 0.25 M/S
ECHO MV AREA VTI: 1.6 CM2
ECHO MV E DECELERATION TIME (DT): 296.8 MS
ECHO MV E VELOCITY: 1.09 M/S
ECHO MV E/A RATIO: 4.36
ECHO MV LVOT VTI INDEX: 2.01
ECHO MV MAX VELOCITY: 1.3 M/S
ECHO MV MEAN GRADIENT: 3 MMHG
ECHO MV MEAN VELOCITY: 0.8 M/S
ECHO MV PEAK GRADIENT: 7 MMHG
ECHO MV REGURGITANT VELOCITY PISA: 4.7 M/S
ECHO MV REGURGITANT VTIA: 167.5 CM
ECHO PULMONARY ARTERY SYSTOLIC PRESSURE (PASP): 55 MMHG
ECHO PVEIN PEAK S VELOCITY: 0.4 M/S
ECHO RIGHT VENTRICULAR SYSTOLIC PRESSURE (RVSP): 55 MMHG
ECHO RV INTERNAL DIMENSION: 3.1 CM
ECHO TV REGURGITANT MAX VELOCITY: 3.62 M/S
ECHO TV REGURGITANT PEAK GRADIENT: 52 MMHG
ERYTHROCYTE [DISTWIDTH] IN BLOOD BY AUTOMATED COUNT: 21.3 % (ref 11.5–15)
GFR, ESTIMATED: 11 ML/MIN/1.73M2
GLUCOSE SERPL-MCNC: 104 MG/DL (ref 74–99)
HBA1C MFR BLD: 4.9 % (ref 4–5.6)
HCT VFR BLD AUTO: 25.1 % (ref 34–48)
HDLC SERPL-MCNC: 62 MG/DL
HGB BLD-MCNC: 8.3 G/DL (ref 11.5–15.5)
LDLC SERPL CALC-MCNC: 65 MG/DL
MAGNESIUM SERPL-MCNC: 1.9 MG/DL (ref 1.6–2.6)
MCH RBC QN AUTO: 35.3 PG (ref 26–35)
MCHC RBC AUTO-ENTMCNC: 33.1 G/DL (ref 32–34.5)
MCV RBC AUTO: 106.8 FL (ref 80–99.9)
PHOSPHATE SERPL-MCNC: 2.7 MG/DL (ref 2.5–4.5)
PLATELET # BLD AUTO: 250 K/UL (ref 130–450)
PMV BLD AUTO: 11.6 FL (ref 7–12)
POTASSIUM SERPL-SCNC: 4.8 MMOL/L (ref 3.5–5)
PROT SERPL-MCNC: 6.1 G/DL (ref 6.4–8.3)
RBC # BLD AUTO: 2.35 M/UL (ref 3.5–5.5)
SODIUM SERPL-SCNC: 137 MMOL/L (ref 132–146)
T3FREE SERPL-MCNC: 1.37 PG/ML (ref 2–4.4)
T4 FREE SERPL-MCNC: 1.1 NG/DL (ref 0.9–1.7)
TRIGL SERPL-MCNC: 79 MG/DL
TSH SERPL DL<=0.05 MIU/L-ACNC: 17.54 UIU/ML (ref 0.27–4.2)
VLDLC SERPL CALC-MCNC: 16 MG/DL
WBC OTHER # BLD: 10.7 K/UL (ref 4.5–11.5)

## 2024-07-31 PROCEDURE — 97530 THERAPEUTIC ACTIVITIES: CPT

## 2024-07-31 PROCEDURE — 83735 ASSAY OF MAGNESIUM: CPT

## 2024-07-31 PROCEDURE — 93306 TTE W/DOPPLER COMPLETE: CPT

## 2024-07-31 PROCEDURE — 2700000000 HC OXYGEN THERAPY PER DAY

## 2024-07-31 PROCEDURE — 6360000002 HC RX W HCPCS: Performed by: INTERNAL MEDICINE

## 2024-07-31 PROCEDURE — 6370000000 HC RX 637 (ALT 250 FOR IP): Performed by: INTERNAL MEDICINE

## 2024-07-31 PROCEDURE — 84100 ASSAY OF PHOSPHORUS: CPT

## 2024-07-31 PROCEDURE — 84439 ASSAY OF FREE THYROXINE: CPT

## 2024-07-31 PROCEDURE — 6360000002 HC RX W HCPCS: Performed by: STUDENT IN AN ORGANIZED HEALTH CARE EDUCATION/TRAINING PROGRAM

## 2024-07-31 PROCEDURE — 96366 THER/PROPH/DIAG IV INF ADDON: CPT

## 2024-07-31 PROCEDURE — 99223 1ST HOSP IP/OBS HIGH 75: CPT | Performed by: INTERNAL MEDICINE

## 2024-07-31 PROCEDURE — 93306 TTE W/DOPPLER COMPLETE: CPT | Performed by: INTERNAL MEDICINE

## 2024-07-31 PROCEDURE — G0378 HOSPITAL OBSERVATION PER HR: HCPCS

## 2024-07-31 PROCEDURE — 80053 COMPREHEN METABOLIC PANEL: CPT

## 2024-07-31 PROCEDURE — 84443 ASSAY THYROID STIM HORMONE: CPT

## 2024-07-31 PROCEDURE — 96365 THER/PROPH/DIAG IV INF INIT: CPT

## 2024-07-31 PROCEDURE — 80061 LIPID PANEL: CPT

## 2024-07-31 PROCEDURE — 94640 AIRWAY INHALATION TREATMENT: CPT

## 2024-07-31 PROCEDURE — 97161 PT EVAL LOW COMPLEX 20 MIN: CPT

## 2024-07-31 PROCEDURE — 97165 OT EVAL LOW COMPLEX 30 MIN: CPT

## 2024-07-31 PROCEDURE — 84481 FREE ASSAY (FT-3): CPT

## 2024-07-31 PROCEDURE — 2580000003 HC RX 258: Performed by: INTERNAL MEDICINE

## 2024-07-31 PROCEDURE — 36415 COLL VENOUS BLD VENIPUNCTURE: CPT

## 2024-07-31 PROCEDURE — 83036 HEMOGLOBIN GLYCOSYLATED A1C: CPT

## 2024-07-31 PROCEDURE — 99232 SBSQ HOSP IP/OBS MODERATE 35: CPT | Performed by: STUDENT IN AN ORGANIZED HEALTH CARE EDUCATION/TRAINING PROGRAM

## 2024-07-31 PROCEDURE — 85027 COMPLETE CBC AUTOMATED: CPT

## 2024-07-31 PROCEDURE — APPSS180 APP SPLIT SHARED TIME > 60 MINUTES: Performed by: NURSE PRACTITIONER

## 2024-07-31 RX ORDER — MAGNESIUM SULFATE 1 G/100ML
1000 INJECTION INTRAVENOUS ONCE
Status: COMPLETED | OUTPATIENT
Start: 2024-07-31 | End: 2024-07-31

## 2024-07-31 RX ORDER — AMIODARONE HYDROCHLORIDE 200 MG/1
400 TABLET ORAL 2 TIMES DAILY
Status: DISCONTINUED | OUTPATIENT
Start: 2024-07-31 | End: 2024-08-05 | Stop reason: HOSPADM

## 2024-07-31 RX ADMIN — SODIUM ZIRCONIUM CYCLOSILICATE 10 G: 10 POWDER, FOR SUSPENSION ORAL at 10:52

## 2024-07-31 RX ADMIN — ARFORMOTEROL TARTRATE: 15 SOLUTION RESPIRATORY (INHALATION) at 21:13

## 2024-07-31 RX ADMIN — SODIUM ZIRCONIUM CYCLOSILICATE 10 G: 10 POWDER, FOR SUSPENSION ORAL at 21:10

## 2024-07-31 RX ADMIN — MAGNESIUM SULFATE HEPTAHYDRATE 1000 MG: 1 INJECTION, SOLUTION INTRAVENOUS at 12:00

## 2024-07-31 RX ADMIN — ISOSORBIDE DINITRATE 5 MG: 10 TABLET ORAL at 10:51

## 2024-07-31 RX ADMIN — PANTOPRAZOLE SODIUM 40 MG: 40 TABLET, DELAYED RELEASE ORAL at 10:49

## 2024-07-31 RX ADMIN — SODIUM CHLORIDE, PRESERVATIVE FREE 10 ML: 5 INJECTION INTRAVENOUS at 21:11

## 2024-07-31 RX ADMIN — AMIODARONE HYDROCHLORIDE 400 MG: 200 TABLET ORAL at 10:49

## 2024-07-31 RX ADMIN — PREDNISONE 15 MG: 10 TABLET ORAL at 10:49

## 2024-07-31 RX ADMIN — SODIUM ZIRCONIUM CYCLOSILICATE 10 G: 10 POWDER, FOR SUSPENSION ORAL at 13:54

## 2024-07-31 RX ADMIN — DOXYCYCLINE HYCLATE 100 MG: 100 CAPSULE ORAL at 11:00

## 2024-07-31 RX ADMIN — APIXABAN 2.5 MG: 2.5 TABLET, FILM COATED ORAL at 20:53

## 2024-07-31 RX ADMIN — Medication 5 MG: at 20:53

## 2024-07-31 RX ADMIN — ASPIRIN 81 MG: 81 TABLET, COATED ORAL at 10:50

## 2024-07-31 RX ADMIN — APIXABAN 2.5 MG: 2.5 TABLET, FILM COATED ORAL at 10:49

## 2024-07-31 RX ADMIN — ARFORMOTEROL TARTRATE: 15 SOLUTION RESPIRATORY (INHALATION) at 09:45

## 2024-07-31 RX ADMIN — ATORVASTATIN CALCIUM 40 MG: 40 TABLET, FILM COATED ORAL at 20:53

## 2024-07-31 RX ADMIN — BUMETANIDE 2 MG: 1 TABLET ORAL at 10:50

## 2024-07-31 RX ADMIN — DOXYCYCLINE HYCLATE 100 MG: 100 CAPSULE ORAL at 20:53

## 2024-07-31 RX ADMIN — POLYETHYLENE GLYCOL 3350 17 G: 17 POWDER, FOR SOLUTION ORAL at 10:50

## 2024-07-31 RX ADMIN — METOPROLOL SUCCINATE 12.5 MG: 25 TABLET, EXTENDED RELEASE ORAL at 10:49

## 2024-07-31 ASSESSMENT — PAIN SCALES - GENERAL: PAINLEVEL_OUTOF10: 0

## 2024-07-31 NOTE — DISCHARGE INSTR - COC
Continuity of Care Form    Patient Name: Marge Callejas   :  1940  MRN:  [id number]    Admit date:  2024  Discharge date:  24      Code Status Order: Full Code   Advance Directives:     Admitting Physician:  Merary Melvin DO  PCP: Marek Andres DO    Discharging Nurse: Macrina MENDOZA,RN  Discharging Hospital Unit/Room#: 8205/8205-A  Discharging Unit Phone Number: [phone]    Emergency Contact:   Extended Emergency Contact Information  Primary Emergency Contact: Jonathan Feldman  Home Phone: [phone]  Mobile Phone: [phone]  Relation: Child   needed? No  Secondary Emergency Contact: Sabrina Ireland  Home Phone: [phone]  Mobile Phone: [phone]  Relation: Brother/Sister   needed? No    Past Surgical History:  Past Surgical History:   Procedure Laterality Date    COLONOSCOPY N/A 4/3/2021    COLONOSCOPY POLYPECTOMY HOT SNARE performed by Lucio Nelson DO at [facility] ENDOSCOPY    COLONOSCOPY  4/3/2021    COLONOSCOPY WITH BIOPSY performed by Lucio Nelson DO at [facility] ENDOSCOPY    COLONOSCOPY  4/3/2021    COLONOSCOPY CONTROL HEMORRHAGE performed by Lucio Nelson DO at [facility] ENDOSCOPY    COLONOSCOPY  4/3/2021    COLONOSCOPY SUBMUCOSAL SPOT INJECTION performed by Lucio Nelson DO at [facility] ENDOSCOPY    COLONOSCOPY N/A 2023    COLONOSCOPY DIAGNOSTIC performed by Scooby Cormier MD at [facility] ENDOSCOPY    ECTOPIC PREGNANCY SURGERY      UPPER GASTROINTESTINAL ENDOSCOPY N/A 4/3/2021    EGD BIOPSY performed by Lucio Nelson DO at [facility] ENDOSCOPY    UPPER GASTROINTESTINAL ENDOSCOPY N/A 2021    EGD W/EUS FNA performed by Ana Gandhi MD at [facility] ENDOSCOPY       Immunization History:   Immunization History   Administered Date(s) Administered    COVID-19, MODERNA Bivalent, (age 12y+), IM, 50 mcg/0.5 mL 2023    COVID-19, MODERNA, ( formula), (age 12y+), IM, 50mcg/0.5mL 2023    Influenza, High Dose (Fluzone 65 yrs and older) 10/30/2018

## 2024-07-31 NOTE — PLAN OF CARE
Problem: Skin/Tissue Integrity  Goal: Absence of new skin breakdown  Description: 1.  Monitor for areas of redness and/or skin breakdown  2.  Assess vascular access sites hourly  3.  Every 4-6 hours minimum:  Change oxygen saturation probe site  4.  Every 4-6 hours:  If on nasal continuous positive airway pressure, respiratory therapy assess nares and determine need for appliance change or resting period.  Outcome: Progressing     Problem: Safety - Adult  Goal: Free from fall injury  Outcome: Progressing     Problem: Discharge Planning  Goal: Discharge to home or other facility with appropriate resources  Outcome: Progressing     Problem: Neurosensory - Adult  Goal: Achieves stable or improved neurological status  Outcome: Progressing

## 2024-08-01 LAB
ANION GAP SERPL CALCULATED.3IONS-SCNC: 16 MMOL/L (ref 7–16)
BUN SERPL-MCNC: 91 MG/DL (ref 6–23)
CALCIUM SERPL-MCNC: 9 MG/DL (ref 8.6–10.2)
CHLORIDE SERPL-SCNC: 97 MMOL/L (ref 98–107)
CO2 SERPL-SCNC: 25 MMOL/L (ref 22–29)
CREAT SERPL-MCNC: 5 MG/DL (ref 0.5–1)
GFR, ESTIMATED: 8 ML/MIN/1.73M2
GLUCOSE SERPL-MCNC: 128 MG/DL (ref 74–99)
POTASSIUM SERPL-SCNC: 5.2 MMOL/L (ref 3.5–5)
SODIUM SERPL-SCNC: 138 MMOL/L (ref 132–146)

## 2024-08-01 PROCEDURE — G0378 HOSPITAL OBSERVATION PER HR: HCPCS

## 2024-08-01 PROCEDURE — 6370000000 HC RX 637 (ALT 250 FOR IP): Performed by: INTERNAL MEDICINE

## 2024-08-01 PROCEDURE — 2580000003 HC RX 258: Performed by: INTERNAL MEDICINE

## 2024-08-01 PROCEDURE — 99232 SBSQ HOSP IP/OBS MODERATE 35: CPT | Performed by: INTERNAL MEDICINE

## 2024-08-01 PROCEDURE — 80048 BASIC METABOLIC PNL TOTAL CA: CPT

## 2024-08-01 PROCEDURE — 90935 HEMODIALYSIS ONE EVALUATION: CPT

## 2024-08-01 PROCEDURE — 2700000000 HC OXYGEN THERAPY PER DAY

## 2024-08-01 PROCEDURE — 6370000000 HC RX 637 (ALT 250 FOR IP)

## 2024-08-01 PROCEDURE — 94640 AIRWAY INHALATION TREATMENT: CPT

## 2024-08-01 PROCEDURE — 99232 SBSQ HOSP IP/OBS MODERATE 35: CPT | Performed by: STUDENT IN AN ORGANIZED HEALTH CARE EDUCATION/TRAINING PROGRAM

## 2024-08-01 PROCEDURE — 6360000002 HC RX W HCPCS: Performed by: INTERNAL MEDICINE

## 2024-08-01 PROCEDURE — 36415 COLL VENOUS BLD VENIPUNCTURE: CPT

## 2024-08-01 RX ORDER — AMIODARONE HYDROCHLORIDE 400 MG/1
400 TABLET ORAL DAILY
Qty: 90 TABLET | Refills: 1 | Status: SHIPPED | OUTPATIENT
Start: 2024-08-15 | End: 2024-08-10

## 2024-08-01 RX ORDER — AMIODARONE HYDROCHLORIDE 400 MG/1
400 TABLET ORAL 2 TIMES DAILY
Qty: 26 TABLET | Refills: 0 | Status: SHIPPED | OUTPATIENT
Start: 2024-08-01 | End: 2024-08-10

## 2024-08-01 RX ORDER — LEVOTHYROXINE SODIUM 0.03 MG/1
25 TABLET ORAL DAILY
Status: DISCONTINUED | OUTPATIENT
Start: 2024-08-01 | End: 2024-08-05 | Stop reason: HOSPADM

## 2024-08-01 RX ORDER — MEXILETINE HYDROCHLORIDE 150 MG/1
150 CAPSULE ORAL EVERY 8 HOURS SCHEDULED
Status: DISCONTINUED | OUTPATIENT
Start: 2024-08-01 | End: 2024-08-05 | Stop reason: HOSPADM

## 2024-08-01 RX ADMIN — BUMETANIDE 2 MG: 1 TABLET ORAL at 09:25

## 2024-08-01 RX ADMIN — PREDNISONE 15 MG: 10 TABLET ORAL at 09:27

## 2024-08-01 RX ADMIN — ATORVASTATIN CALCIUM 40 MG: 40 TABLET, FILM COATED ORAL at 20:08

## 2024-08-01 RX ADMIN — SODIUM ZIRCONIUM CYCLOSILICATE 10 G: 10 POWDER, FOR SUSPENSION ORAL at 09:26

## 2024-08-01 RX ADMIN — MEXILETINE HYDROCHLORIDE 150 MG: 150 CAPSULE ORAL at 19:42

## 2024-08-01 RX ADMIN — METOPROLOL SUCCINATE 12.5 MG: 25 TABLET, EXTENDED RELEASE ORAL at 10:09

## 2024-08-01 RX ADMIN — ASPIRIN 81 MG: 81 TABLET, COATED ORAL at 09:25

## 2024-08-01 RX ADMIN — SODIUM ZIRCONIUM CYCLOSILICATE 10 G: 10 POWDER, FOR SUSPENSION ORAL at 20:10

## 2024-08-01 RX ADMIN — SODIUM CHLORIDE, PRESERVATIVE FREE 10 ML: 5 INJECTION INTRAVENOUS at 09:33

## 2024-08-01 RX ADMIN — APIXABAN 2.5 MG: 2.5 TABLET, FILM COATED ORAL at 09:26

## 2024-08-01 RX ADMIN — Medication 5 MG: at 20:08

## 2024-08-01 RX ADMIN — AMIODARONE HYDROCHLORIDE 400 MG: 200 TABLET ORAL at 09:25

## 2024-08-01 RX ADMIN — APIXABAN 2.5 MG: 2.5 TABLET, FILM COATED ORAL at 20:08

## 2024-08-01 RX ADMIN — POLYETHYLENE GLYCOL 3350 17 G: 17 POWDER, FOR SOLUTION ORAL at 09:25

## 2024-08-01 RX ADMIN — PANTOPRAZOLE SODIUM 40 MG: 40 TABLET, DELAYED RELEASE ORAL at 05:45

## 2024-08-01 RX ADMIN — AMIODARONE HYDROCHLORIDE 400 MG: 200 TABLET ORAL at 20:08

## 2024-08-01 RX ADMIN — ISOSORBIDE DINITRATE 5 MG: 10 TABLET ORAL at 09:25

## 2024-08-01 RX ADMIN — DOXYCYCLINE HYCLATE 100 MG: 100 CAPSULE ORAL at 20:09

## 2024-08-01 RX ADMIN — DOXYCYCLINE HYCLATE 100 MG: 100 CAPSULE ORAL at 09:25

## 2024-08-01 RX ADMIN — ARFORMOTEROL TARTRATE: 15 SOLUTION RESPIRATORY (INHALATION) at 20:31

## 2024-08-01 RX ADMIN — SODIUM CHLORIDE, PRESERVATIVE FREE 5 ML: 5 INJECTION INTRAVENOUS at 20:11

## 2024-08-01 NOTE — PLAN OF CARE
Problem: Chronic Conditions and Co-morbidities  Goal: Patient's chronic conditions and co-morbidity symptoms are monitored and maintained or improved  Outcome: Progressing     Problem: Skin/Tissue Integrity  Goal: Absence of new skin breakdown  Description: 1.  Monitor for areas of redness and/or skin breakdown  2.  Assess vascular access sites hourly  3.  Every 4-6 hours minimum:  Change oxygen saturation probe site  4.  Every 4-6 hours:  If on nasal continuous positive airway pressure, respiratory therapy assess nares and determine need for appliance change or resting period.  7/31/2024 2204 by Leidy Gonzalez RN  Outcome: Progressing  7/31/2024 1245 by Esau Bauer RN  Outcome: Progressing     Problem: Safety - Adult  Goal: Free from fall injury  7/31/2024 2204 by Leidy Gonzalez RN  Outcome: Progressing  7/31/2024 1245 by Esau Bauer RN  Outcome: Progressing     Problem: Discharge Planning  Goal: Discharge to home or other facility with appropriate resources  7/31/2024 2204 by Leidy Gonzalez RN  Outcome: Progressing  7/31/2024 1245 by Esau Bauer RN  Outcome: Progressing     Problem: Pain  Goal: Verbalizes/displays adequate comfort level or baseline comfort level  Outcome: Progressing     Problem: Neurosensory - Adult  Goal: Achieves stable or improved neurological status  7/31/2024 2204 by Leidy Gonzalez RN  Outcome: Progressing  7/31/2024 1245 by Esau Bauer RN  Outcome: Progressing  Goal: Absence of seizures  Outcome: Progressing  Goal: Remains free of injury related to seizures activity  Outcome: Progressing  Goal: Achieves maximal functionality and self care  Outcome: Progressing

## 2024-08-01 NOTE — FLOWSHEET NOTE
08/01/24 1615   Vital Signs   /63   Temp 97.5 °F (36.4 °C)   Pulse 75   Respirations 18   Post-Hemodialysis Assessment   Post-Treatment Procedures Blood returned;Catheter capped, clamped and heparinized x 2 ports   Machine Disinfection Process Acid/Vinegar Clean;Heat Disinfect   Rinseback Volume (ml) 300 ml   Blood Volume Processed (Liters) 54.3 L   Dialyzer Clearance Lightly streaked   Duration of Treatment (minutes) 173 minutes   Heparin Amount Administered During Treatment (mL) 0 mL   Hemodialysis Intake (ml) 300 ml   Hemodialysis Output (ml) 1700 ml   NET Removed (ml) 1400   Patient Response to Treatment tolerated well almost finished with treatment she had 25 beats of vtach.  The patient stated she felt some shortness of breath and slightly dizzy but it went away quickly.  Dr. Dawson notified.  Treatment terminated 7 minutes early.   Bilateral Breath Sounds Diminished   Edema Generalized   Patient Disposition Return to room   Observations & Evaluations   Level of Consciousness 0   Oriented X 3   Heart Rhythm Regular   Respiratory Quality/Effort Unlabored

## 2024-08-02 LAB
ANION GAP SERPL CALCULATED.3IONS-SCNC: 11 MMOL/L (ref 7–16)
BUN SERPL-MCNC: 52 MG/DL (ref 6–23)
CALCIUM SERPL-MCNC: 9.4 MG/DL (ref 8.6–10.2)
CHLORIDE SERPL-SCNC: 98 MMOL/L (ref 98–107)
CO2 SERPL-SCNC: 28 MMOL/L (ref 22–29)
CREAT SERPL-MCNC: 3.1 MG/DL (ref 0.5–1)
GFR, ESTIMATED: 15 ML/MIN/1.73M2
GLUCOSE SERPL-MCNC: 112 MG/DL (ref 74–99)
PHOSPHATE SERPL-MCNC: 2.8 MG/DL (ref 2.5–4.5)
POTASSIUM SERPL-SCNC: 4.3 MMOL/L (ref 3.5–5)
SODIUM SERPL-SCNC: 137 MMOL/L (ref 132–146)

## 2024-08-02 PROCEDURE — 6370000000 HC RX 637 (ALT 250 FOR IP)

## 2024-08-02 PROCEDURE — 6370000000 HC RX 637 (ALT 250 FOR IP): Performed by: INTERNAL MEDICINE

## 2024-08-02 PROCEDURE — 2580000003 HC RX 258: Performed by: INTERNAL MEDICINE

## 2024-08-02 PROCEDURE — 99232 SBSQ HOSP IP/OBS MODERATE 35: CPT | Performed by: STUDENT IN AN ORGANIZED HEALTH CARE EDUCATION/TRAINING PROGRAM

## 2024-08-02 PROCEDURE — 6360000002 HC RX W HCPCS: Performed by: INTERNAL MEDICINE

## 2024-08-02 PROCEDURE — 36415 COLL VENOUS BLD VENIPUNCTURE: CPT

## 2024-08-02 PROCEDURE — 99232 SBSQ HOSP IP/OBS MODERATE 35: CPT | Performed by: INTERNAL MEDICINE

## 2024-08-02 PROCEDURE — 2700000000 HC OXYGEN THERAPY PER DAY

## 2024-08-02 PROCEDURE — G0378 HOSPITAL OBSERVATION PER HR: HCPCS

## 2024-08-02 PROCEDURE — 94640 AIRWAY INHALATION TREATMENT: CPT

## 2024-08-02 PROCEDURE — 6370000000 HC RX 637 (ALT 250 FOR IP): Performed by: STUDENT IN AN ORGANIZED HEALTH CARE EDUCATION/TRAINING PROGRAM

## 2024-08-02 PROCEDURE — 80048 BASIC METABOLIC PNL TOTAL CA: CPT

## 2024-08-02 PROCEDURE — 84100 ASSAY OF PHOSPHORUS: CPT

## 2024-08-02 PROCEDURE — 6360000002 HC RX W HCPCS

## 2024-08-02 RX ORDER — SENNA AND DOCUSATE SODIUM 50; 8.6 MG/1; MG/1
1 TABLET, FILM COATED ORAL DAILY
Status: DISCONTINUED | OUTPATIENT
Start: 2024-08-02 | End: 2024-08-05 | Stop reason: HOSPADM

## 2024-08-02 RX ORDER — BISACODYL 10 MG
10 SUPPOSITORY, RECTAL RECTAL DAILY
Status: DISCONTINUED | OUTPATIENT
Start: 2024-08-02 | End: 2024-08-05 | Stop reason: HOSPADM

## 2024-08-02 RX ADMIN — SODIUM CHLORIDE, PRESERVATIVE FREE 10 ML: 5 INJECTION INTRAVENOUS at 09:30

## 2024-08-02 RX ADMIN — Medication 5 MG: at 20:49

## 2024-08-02 RX ADMIN — PREDNISONE 15 MG: 10 TABLET ORAL at 10:40

## 2024-08-02 RX ADMIN — SENNOSIDES AND DOCUSATE SODIUM 1 TABLET: 50; 8.6 TABLET ORAL at 20:50

## 2024-08-02 RX ADMIN — ARFORMOTEROL TARTRATE: 15 SOLUTION RESPIRATORY (INHALATION) at 21:43

## 2024-08-02 RX ADMIN — BISACODYL 10 MG: 10 SUPPOSITORY RECTAL at 20:50

## 2024-08-02 RX ADMIN — ASPIRIN 81 MG: 81 TABLET, COATED ORAL at 10:32

## 2024-08-02 RX ADMIN — SACUBITRIL AND VALSARTAN 0.5 TABLET: 24; 26 TABLET, FILM COATED ORAL at 20:48

## 2024-08-02 RX ADMIN — ARFORMOTEROL TARTRATE: 15 SOLUTION RESPIRATORY (INHALATION) at 06:42

## 2024-08-02 RX ADMIN — DOXYCYCLINE HYCLATE 100 MG: 100 CAPSULE ORAL at 10:32

## 2024-08-02 RX ADMIN — SODIUM ZIRCONIUM CYCLOSILICATE 10 G: 10 POWDER, FOR SUSPENSION ORAL at 09:30

## 2024-08-02 RX ADMIN — MEXILETINE HYDROCHLORIDE 150 MG: 150 CAPSULE ORAL at 17:00

## 2024-08-02 RX ADMIN — DOXYCYCLINE HYCLATE 100 MG: 100 CAPSULE ORAL at 20:49

## 2024-08-02 RX ADMIN — MEXILETINE HYDROCHLORIDE 150 MG: 150 CAPSULE ORAL at 20:49

## 2024-08-02 RX ADMIN — SODIUM CHLORIDE, PRESERVATIVE FREE 10 ML: 5 INJECTION INTRAVENOUS at 20:51

## 2024-08-02 RX ADMIN — ISOSORBIDE DINITRATE 5 MG: 10 TABLET ORAL at 10:33

## 2024-08-02 RX ADMIN — ATORVASTATIN CALCIUM 40 MG: 40 TABLET, FILM COATED ORAL at 20:50

## 2024-08-02 RX ADMIN — ISOSORBIDE DINITRATE 5 MG: 10 TABLET ORAL at 20:49

## 2024-08-02 RX ADMIN — EPOETIN ALFA-EPBX 2000 UNITS: 2000 INJECTION, SOLUTION INTRAVENOUS; SUBCUTANEOUS at 18:34

## 2024-08-02 RX ADMIN — PANTOPRAZOLE SODIUM 40 MG: 40 TABLET, DELAYED RELEASE ORAL at 06:03

## 2024-08-02 RX ADMIN — APIXABAN 2.5 MG: 2.5 TABLET, FILM COATED ORAL at 10:33

## 2024-08-02 RX ADMIN — BUMETANIDE 2 MG: 1 TABLET ORAL at 10:32

## 2024-08-02 RX ADMIN — APIXABAN 2.5 MG: 2.5 TABLET, FILM COATED ORAL at 20:50

## 2024-08-02 RX ADMIN — MEXILETINE HYDROCHLORIDE 150 MG: 150 CAPSULE ORAL at 06:03

## 2024-08-02 RX ADMIN — SACUBITRIL AND VALSARTAN 0.5 TABLET: 24; 26 TABLET, FILM COATED ORAL at 10:32

## 2024-08-02 RX ADMIN — AMIODARONE HYDROCHLORIDE 400 MG: 200 TABLET ORAL at 10:32

## 2024-08-02 RX ADMIN — AMIODARONE HYDROCHLORIDE 400 MG: 200 TABLET ORAL at 20:50

## 2024-08-02 RX ADMIN — LEVOTHYROXINE SODIUM 25 MCG: 0.03 TABLET ORAL at 06:03

## 2024-08-02 NOTE — CONSULTS
[facility]IC[facility]  Consultation Report  PATIENT: Marge Callejas  MEDICAL RECORD NUMBER: [id number]  DATE OF SERVICE:  7/31/2024  ATTENDING ELECTROPHYSIOLOGIST: Paula Lara MD  PRIMARY ELECTROPHYSIOLOGIST: Paula Lara MD  REFERRING PHYSICIAN: No ref. provider found and Marek Andres DO  CHIEF COMPLAINT: Syncope    HPI: This is a 84 y.o. 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    Coronary artery disease involving native coronary artery of native heart without angina pectoris    Abnormal EKG    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 The patient has history of chronic HFrEF, mixed ischemic and nonischemic cardiomyopathy, CRT-D in situ, coronary artery disease, persistent atrial fibrillation, chronic RBBB/LAFB, AAA, hypertension, hyperlipidemia, ESRD on HD, 
   Alcohol use: Never    Drug use: Defer         Review of Systems   Pertinent positives and negatives as noted in HPI.      PHYSICAL EXAM:    Vitals:    08/02/24 1830   BP: (!) 99/48   Pulse: 75   Resp:    Temp:    SpO2:        - General: No acute distress. Awake and conversant.  - Head: NCAT.   - Eyes: Anicteric. Round symmetric pupils.  - CV: Regular rate by peripheral pulse. No cyanosis   - Respiratory: Respirations are non-labored   - Abdomen: Soft, non-tender, non-distended. No guarding or rebound tenderness  - Rectal: Solid brown stool, painful to palpation   - Skin: Warm. No rashes or ulcers.  - MSK: No obvious deformities. No edema.    LABS:  CBC  Recent Labs     07/31/24  0602   WBC 10.7   HGB 8.3*   HCT 25.1*        BMP  Recent Labs     08/02/24  0640      K 4.3   CL 98   CO2 28   BUN 52*   CREATININE 3.1*   CALCIUM 9.4     Liver Function  Recent Labs     07/31/24  0602   BILITOT 0.3   AST 15   ALT 16   ALKPHOS 155*     No results for input(s): \"LACTATE\" in the last 72 hours.  No results for input(s): \"INR\" in the last 72 hours.    Invalid input(s): \"PT\", \"PTT\"    RADIOLOGY  I have personally reviewed all relevant imaging:        ASSESSMENT/PLAN:    84 y.o. female with constipation     - patient did not tolerate attempt at disimpaction  - bowel regimen: scheduled rectal suppository, senna, and glycolax; prn milk of mag   -Discussed with Dr. Kevin Chase DO  General Surgery Resident, PGY-1    Electronically signed by Fernando Chase DO on 8/2/24 at 6:57 PM EDT    
I personally reviewed all of the above labs, imaging, history, past medical history, social history, family history, surgical history, medications, review of systems, and data.  I reviewed available records.  All of the assessments and recommendations are personally from me.  I personally counseled and educated the patient and coordinated care with other healthcare professionals as needed.  I communicated the above and results to patient's family/caregiver if asked or needed.  All of the above cardiac medical decisions are personally from me.  Please see my additional contributions to the history, physical exam, assessment, and recommendations below.      History of chief complaint:  She was receiving hemodialysis yesterday and her vision close done on her.  Reportedly the nurse told her that she did have a brief loss of consciousness.  She denies any chest discomfort, shortness of breath, or palpitations.    Review of systems:     Heart: as above   Lungs: as above   Eyes: denies changes in vision or discharge.    Ears: denies changes in hearing or pain.   Nose: denies epistaxis or masses   Throat: denies sore throat or trouble swallowing.    Neuro: denies numbness, tingling, tremors.   Skin: denies rashes or itching.   : denies hematuria, dysuria   GI: denies vomiting, diarrhea   Psych: denies mood changed, anxiety, depression.       Physical exam:  BP (!) 110/47   Pulse 77   Temp 97.8 °F (36.6 °C) (Temporal)   Resp 20   Ht 1.626 m (5' 4\")   Wt 57.7 kg (127 lb 3.2 oz)   SpO2 98%   BMI 21.83 kg/m²   Constitutional: A&O x3, communicates well, no acute distress.  Eyes: extraocular muscles intact, PERRL.  Normal lids & conjunctiva.  No icterus.   ENT: clear, no bleeding.  No external masses.  Lips normal formation.  Neck: supple, full ROM, no JVD, no bruits, no lymphadenopathy.  No masses.  trachea midline.  Heart: Distant to auscultation.  Regular rate & rhythm, normal S1 & S2, faint systolic murmur.  No

## 2024-08-02 NOTE — FLOWSHEET NOTE
Orthostatic Bpscomplete     08/02/24 1301   Vital Signs   Temp 97.7 °F (36.5 °C)   Pulse 76   BP (!) 90/52   MAP (Calculated) 65   MAP (mmHg) (!) 64   Orthostatic B/P and Pulse? Yes   Blood Pressure Lying 90/52   Pulse Lying 65 PER MINUTE   Blood Pressure Sitting 109/53   Pulse Sitting 75 PER MINUTE   Blood Pressure Standing   (unable to stand to complete BP)   Pulse Standing 78 PER MINUTE   Oxygen Therapy   SpO2 93 %

## 2024-08-03 PROCEDURE — 6370000000 HC RX 637 (ALT 250 FOR IP): Performed by: INTERNAL MEDICINE

## 2024-08-03 PROCEDURE — G0378 HOSPITAL OBSERVATION PER HR: HCPCS

## 2024-08-03 PROCEDURE — 6370000000 HC RX 637 (ALT 250 FOR IP)

## 2024-08-03 PROCEDURE — 90935 HEMODIALYSIS ONE EVALUATION: CPT

## 2024-08-03 PROCEDURE — 6360000002 HC RX W HCPCS: Performed by: INTERNAL MEDICINE

## 2024-08-03 PROCEDURE — 94640 AIRWAY INHALATION TREATMENT: CPT

## 2024-08-03 PROCEDURE — 2700000000 HC OXYGEN THERAPY PER DAY

## 2024-08-03 PROCEDURE — 6370000000 HC RX 637 (ALT 250 FOR IP): Performed by: STUDENT IN AN ORGANIZED HEALTH CARE EDUCATION/TRAINING PROGRAM

## 2024-08-03 PROCEDURE — 99232 SBSQ HOSP IP/OBS MODERATE 35: CPT | Performed by: SURGERY

## 2024-08-03 PROCEDURE — 2580000003 HC RX 258: Performed by: INTERNAL MEDICINE

## 2024-08-03 PROCEDURE — 99239 HOSP IP/OBS DSCHRG MGMT >30: CPT | Performed by: STUDENT IN AN ORGANIZED HEALTH CARE EDUCATION/TRAINING PROGRAM

## 2024-08-03 RX ORDER — BENZONATATE 100 MG/1
100 CAPSULE ORAL 3 TIMES DAILY PRN
Qty: 30 CAPSULE | Refills: 0 | Status: ON HOLD | DISCHARGE
Start: 2024-08-03 | End: 2024-08-13

## 2024-08-03 RX ORDER — CALCIUM CARBONATE 500 MG/1
500 TABLET, CHEWABLE ORAL 3 TIMES DAILY PRN
Qty: 30 TABLET | Refills: 0 | Status: ON HOLD | DISCHARGE
Start: 2024-08-03 | End: 2024-09-02

## 2024-08-03 RX ORDER — LEVOTHYROXINE SODIUM 0.03 MG/1
25 TABLET ORAL DAILY
Qty: 30 TABLET | Refills: 3 | Status: ON HOLD | DISCHARGE
Start: 2024-08-04

## 2024-08-03 RX ORDER — MEXILETINE HYDROCHLORIDE 150 MG/1
150 CAPSULE ORAL EVERY 8 HOURS SCHEDULED
Qty: 90 CAPSULE | Refills: 3 | Status: ON HOLD | DISCHARGE
Start: 2024-08-03

## 2024-08-03 RX ADMIN — MEXILETINE HYDROCHLORIDE 150 MG: 150 CAPSULE ORAL at 05:09

## 2024-08-03 RX ADMIN — AMIODARONE HYDROCHLORIDE 400 MG: 200 TABLET ORAL at 22:36

## 2024-08-03 RX ADMIN — MEXILETINE HYDROCHLORIDE 150 MG: 150 CAPSULE ORAL at 22:37

## 2024-08-03 RX ADMIN — APIXABAN 2.5 MG: 2.5 TABLET, FILM COATED ORAL at 22:37

## 2024-08-03 RX ADMIN — ARFORMOTEROL TARTRATE: 15 SOLUTION RESPIRATORY (INHALATION) at 19:57

## 2024-08-03 RX ADMIN — BUMETANIDE 2 MG: 1 TABLET ORAL at 14:42

## 2024-08-03 RX ADMIN — ARFORMOTEROL TARTRATE: 15 SOLUTION RESPIRATORY (INHALATION) at 05:47

## 2024-08-03 RX ADMIN — SODIUM CHLORIDE, PRESERVATIVE FREE 10 ML: 5 INJECTION INTRAVENOUS at 22:22

## 2024-08-03 RX ADMIN — PANTOPRAZOLE SODIUM 40 MG: 40 TABLET, DELAYED RELEASE ORAL at 05:09

## 2024-08-03 RX ADMIN — PREDNISONE 15 MG: 10 TABLET ORAL at 16:22

## 2024-08-03 RX ADMIN — SENNOSIDES AND DOCUSATE SODIUM 1 TABLET: 50; 8.6 TABLET ORAL at 14:42

## 2024-08-03 RX ADMIN — ISOSORBIDE DINITRATE 5 MG: 10 TABLET ORAL at 22:38

## 2024-08-03 RX ADMIN — LEVOTHYROXINE SODIUM 25 MCG: 0.03 TABLET ORAL at 05:09

## 2024-08-03 RX ADMIN — ATORVASTATIN CALCIUM 40 MG: 40 TABLET, FILM COATED ORAL at 22:37

## 2024-08-03 RX ADMIN — Medication 5 MG: at 22:36

## 2024-08-03 RX ADMIN — MEXILETINE HYDROCHLORIDE 150 MG: 150 CAPSULE ORAL at 16:22

## 2024-08-03 RX ADMIN — DOXYCYCLINE HYCLATE 100 MG: 100 CAPSULE ORAL at 22:37

## 2024-08-03 RX ADMIN — ASPIRIN 81 MG: 81 TABLET, COATED ORAL at 14:42

## 2024-08-03 RX ADMIN — SODIUM CHLORIDE, PRESERVATIVE FREE 10 ML: 5 INJECTION INTRAVENOUS at 11:15

## 2024-08-03 NOTE — FLOWSHEET NOTE
08/03/24 1040   Vital Signs   BP (!) 97/56   Temp 96.9 °F (36.1 °C)   Pulse 78   Respirations 18   Weight - Scale 58.6 kg (129 lb 3 oz)   Weight Method Bed scale   Percent Weight Change -3.3   Pain Assessment   Pain Assessment None - Denies Pain   Post-Hemodialysis Assessment   Post-Treatment Procedures Blood returned;Catheter capped, clamped and heparinized x 2 ports   Machine Disinfection Process Exterior Machine Disinfection   Rinseback Volume (ml) 300 ml   Blood Volume Processed (Liters) 51.2 L   Dialyzer Clearance Moderately streaked   Duration of Treatment (minutes) 180 minutes   Heparin Amount Administered During Treatment (mL) 0 mL   Hemodialysis Intake (ml) 300 ml   Hemodialysis Output (ml) 1300 ml   NET Removed (ml) 1000   Tolerated Treatment Good   Patient Response to Treatment tolerated well   Bilateral Breath Sounds Diminished   RLE Edema +1;Pitting   LLE Edema +1   Physician Notified No   Time Off 1040   Patient Disposition Return to room   Observations & Evaluations   Level of Consciousness 0   Oriented X 3

## 2024-08-04 PROCEDURE — G0378 HOSPITAL OBSERVATION PER HR: HCPCS

## 2024-08-04 PROCEDURE — 6370000000 HC RX 637 (ALT 250 FOR IP)

## 2024-08-04 PROCEDURE — 6370000000 HC RX 637 (ALT 250 FOR IP): Performed by: STUDENT IN AN ORGANIZED HEALTH CARE EDUCATION/TRAINING PROGRAM

## 2024-08-04 PROCEDURE — 6370000000 HC RX 637 (ALT 250 FOR IP): Performed by: INTERNAL MEDICINE

## 2024-08-04 PROCEDURE — 99232 SBSQ HOSP IP/OBS MODERATE 35: CPT | Performed by: STUDENT IN AN ORGANIZED HEALTH CARE EDUCATION/TRAINING PROGRAM

## 2024-08-04 PROCEDURE — 2580000003 HC RX 258: Performed by: INTERNAL MEDICINE

## 2024-08-04 PROCEDURE — 2700000000 HC OXYGEN THERAPY PER DAY

## 2024-08-04 RX ADMIN — APIXABAN 2.5 MG: 2.5 TABLET, FILM COATED ORAL at 23:19

## 2024-08-04 RX ADMIN — ISOSORBIDE DINITRATE 5 MG: 10 TABLET ORAL at 09:01

## 2024-08-04 RX ADMIN — ATORVASTATIN CALCIUM 40 MG: 40 TABLET, FILM COATED ORAL at 23:18

## 2024-08-04 RX ADMIN — SODIUM CHLORIDE, PRESERVATIVE FREE 10 ML: 5 INJECTION INTRAVENOUS at 23:17

## 2024-08-04 RX ADMIN — ASPIRIN 81 MG: 81 TABLET, COATED ORAL at 09:01

## 2024-08-04 RX ADMIN — LEVOTHYROXINE SODIUM 25 MCG: 0.03 TABLET ORAL at 07:00

## 2024-08-04 RX ADMIN — SODIUM CHLORIDE, PRESERVATIVE FREE 10 ML: 5 INJECTION INTRAVENOUS at 09:01

## 2024-08-04 RX ADMIN — DOXYCYCLINE HYCLATE 100 MG: 100 CAPSULE ORAL at 23:18

## 2024-08-04 RX ADMIN — AMIODARONE HYDROCHLORIDE 400 MG: 200 TABLET ORAL at 23:18

## 2024-08-04 RX ADMIN — DOXYCYCLINE HYCLATE 100 MG: 100 CAPSULE ORAL at 09:01

## 2024-08-04 RX ADMIN — PANTOPRAZOLE SODIUM 40 MG: 40 TABLET, DELAYED RELEASE ORAL at 07:00

## 2024-08-04 RX ADMIN — MEXILETINE HYDROCHLORIDE 150 MG: 150 CAPSULE ORAL at 23:17

## 2024-08-04 RX ADMIN — ISOSORBIDE DINITRATE 5 MG: 10 TABLET ORAL at 23:19

## 2024-08-04 RX ADMIN — MEXILETINE HYDROCHLORIDE 150 MG: 150 CAPSULE ORAL at 16:15

## 2024-08-04 RX ADMIN — SENNOSIDES AND DOCUSATE SODIUM 1 TABLET: 50; 8.6 TABLET ORAL at 09:01

## 2024-08-04 RX ADMIN — Medication 5 MG: at 23:19

## 2024-08-04 RX ADMIN — APIXABAN 2.5 MG: 2.5 TABLET, FILM COATED ORAL at 09:01

## 2024-08-04 RX ADMIN — AMIODARONE HYDROCHLORIDE 400 MG: 200 TABLET ORAL at 09:01

## 2024-08-04 RX ADMIN — BUMETANIDE 2 MG: 1 TABLET ORAL at 09:01

## 2024-08-04 RX ADMIN — BISACODYL 10 MG: 10 SUPPOSITORY RECTAL at 09:01

## 2024-08-04 RX ADMIN — PREDNISONE 15 MG: 10 TABLET ORAL at 09:02

## 2024-08-04 NOTE — PLAN OF CARE
Problem: Chronic Conditions and Co-morbidities  Goal: Patient's chronic conditions and co-morbidity symptoms are monitored and maintained or improved  Outcome: Progressing     Problem: Skin/Tissue Integrity  Goal: Absence of new skin breakdown  Description: 1.  Monitor for areas of redness and/or skin breakdown  2.  Assess vascular access sites hourly  3.  Every 4-6 hours minimum:  Change oxygen saturation probe site  4.  Every 4-6 hours:  If on nasal continuous positive airway pressure, respiratory therapy assess nares and determine need for appliance change or resting period.  Outcome: Progressing     Problem: Safety - Adult  Goal: Free from fall injury  Outcome: Progressing     Problem: Discharge Planning  Goal: Discharge to home or other facility with appropriate resources  Outcome: Progressing     Problem: Pain  Goal: Verbalizes/displays adequate comfort level or baseline comfort level  Outcome: Progressing     Problem: Neurosensory - Adult  Goal: Achieves stable or improved neurological status  Outcome: Progressing  Goal: Absence of seizures  Outcome: Progressing  Goal: Remains free of injury related to seizures activity  Outcome: Progressing  Goal: Achieves maximal functionality and self care  Outcome: Progressing

## 2024-08-05 VITALS
HEIGHT: 64 IN | HEART RATE: 74 BPM | DIASTOLIC BLOOD PRESSURE: 60 MMHG | WEIGHT: 129.41 LBS | RESPIRATION RATE: 16 BRPM | SYSTOLIC BLOOD PRESSURE: 106 MMHG | TEMPERATURE: 97.7 F | OXYGEN SATURATION: 97 % | BODY MASS INDEX: 22.09 KG/M2

## 2024-08-05 LAB
ANION GAP SERPL CALCULATED.3IONS-SCNC: 14 MMOL/L (ref 7–16)
BASOPHILS # BLD: 0 K/UL (ref 0–0.2)
BASOPHILS NFR BLD: 0 % (ref 0–2)
BUN SERPL-MCNC: 78 MG/DL (ref 6–23)
CALCIUM SERPL-MCNC: 9.3 MG/DL (ref 8.6–10.2)
CHLORIDE SERPL-SCNC: 96 MMOL/L (ref 98–107)
CO2 SERPL-SCNC: 23 MMOL/L (ref 22–29)
CREAT SERPL-MCNC: 4 MG/DL (ref 0.5–1)
EOSINOPHIL # BLD: 0 K/UL (ref 0.05–0.5)
EOSINOPHILS RELATIVE PERCENT: 0 % (ref 0–6)
ERYTHROCYTE [DISTWIDTH] IN BLOOD BY AUTOMATED COUNT: 20.9 % (ref 11.5–15)
GFR, ESTIMATED: 11 ML/MIN/1.73M2
GLUCOSE SERPL-MCNC: 103 MG/DL (ref 74–99)
HCT VFR BLD AUTO: 23.6 % (ref 34–48)
HGB BLD-MCNC: 7.7 G/DL (ref 11.5–15.5)
LYMPHOCYTES NFR BLD: 1.49 K/UL (ref 1.5–4)
LYMPHOCYTES RELATIVE PERCENT: 13 % (ref 20–42)
MCH RBC QN AUTO: 35.5 PG (ref 26–35)
MCHC RBC AUTO-ENTMCNC: 32.6 G/DL (ref 32–34.5)
MCV RBC AUTO: 108.8 FL (ref 80–99.9)
MONOCYTES NFR BLD: 0.59 K/UL (ref 0.1–0.95)
MONOCYTES NFR BLD: 5 % (ref 2–12)
NEUTROPHILS NFR BLD: 82 % (ref 43–80)
NUCLEATED RED BLOOD CELLS: 1 PER 100 WBC
PLATELET # BLD AUTO: 172 K/UL (ref 130–450)
PMV BLD AUTO: 12.3 FL (ref 7–12)
POTASSIUM SERPL-SCNC: 5 MMOL/L (ref 3.5–5)
RBC # BLD AUTO: 2.17 M/UL (ref 3.5–5.5)
RBC # BLD: ABNORMAL 10*6/UL
SODIUM SERPL-SCNC: 133 MMOL/L (ref 132–146)
WBC OTHER # BLD: 11.3 K/UL (ref 4.5–11.5)

## 2024-08-05 PROCEDURE — 94640 AIRWAY INHALATION TREATMENT: CPT

## 2024-08-05 PROCEDURE — 2700000000 HC OXYGEN THERAPY PER DAY

## 2024-08-05 PROCEDURE — 2580000003 HC RX 258: Performed by: INTERNAL MEDICINE

## 2024-08-05 PROCEDURE — 6370000000 HC RX 637 (ALT 250 FOR IP): Performed by: INTERNAL MEDICINE

## 2024-08-05 PROCEDURE — G0378 HOSPITAL OBSERVATION PER HR: HCPCS

## 2024-08-05 PROCEDURE — 85025 COMPLETE CBC W/AUTO DIFF WBC: CPT

## 2024-08-05 PROCEDURE — 6360000002 HC RX W HCPCS: Performed by: INTERNAL MEDICINE

## 2024-08-05 PROCEDURE — 80048 BASIC METABOLIC PNL TOTAL CA: CPT

## 2024-08-05 PROCEDURE — 99232 SBSQ HOSP IP/OBS MODERATE 35: CPT | Performed by: STUDENT IN AN ORGANIZED HEALTH CARE EDUCATION/TRAINING PROGRAM

## 2024-08-05 PROCEDURE — 36415 COLL VENOUS BLD VENIPUNCTURE: CPT

## 2024-08-05 PROCEDURE — 6370000000 HC RX 637 (ALT 250 FOR IP): Performed by: STUDENT IN AN ORGANIZED HEALTH CARE EDUCATION/TRAINING PROGRAM

## 2024-08-05 PROCEDURE — 6370000000 HC RX 637 (ALT 250 FOR IP)

## 2024-08-05 RX ADMIN — POLYETHYLENE GLYCOL 3350 17 G: 17 POWDER, FOR SOLUTION ORAL at 08:54

## 2024-08-05 RX ADMIN — ISOSORBIDE DINITRATE 5 MG: 10 TABLET ORAL at 08:53

## 2024-08-05 RX ADMIN — SENNOSIDES AND DOCUSATE SODIUM 1 TABLET: 50; 8.6 TABLET ORAL at 08:54

## 2024-08-05 RX ADMIN — DOXYCYCLINE HYCLATE 100 MG: 100 CAPSULE ORAL at 08:54

## 2024-08-05 RX ADMIN — METOPROLOL SUCCINATE 12.5 MG: 25 TABLET, EXTENDED RELEASE ORAL at 08:54

## 2024-08-05 RX ADMIN — PANTOPRAZOLE SODIUM 40 MG: 40 TABLET, DELAYED RELEASE ORAL at 06:18

## 2024-08-05 RX ADMIN — AMIODARONE HYDROCHLORIDE 400 MG: 200 TABLET ORAL at 08:53

## 2024-08-05 RX ADMIN — ARFORMOTEROL TARTRATE: 15 SOLUTION RESPIRATORY (INHALATION) at 08:39

## 2024-08-05 RX ADMIN — BUMETANIDE 2 MG: 1 TABLET ORAL at 08:54

## 2024-08-05 RX ADMIN — MICONAZOLE NITRATE: 20 OINTMENT TOPICAL at 15:45

## 2024-08-05 RX ADMIN — PREDNISONE 15 MG: 10 TABLET ORAL at 08:55

## 2024-08-05 RX ADMIN — SACUBITRIL AND VALSARTAN 0.5 TABLET: 24; 26 TABLET, FILM COATED ORAL at 08:52

## 2024-08-05 RX ADMIN — APIXABAN 2.5 MG: 2.5 TABLET, FILM COATED ORAL at 08:54

## 2024-08-05 RX ADMIN — LEVOTHYROXINE SODIUM 25 MCG: 0.03 TABLET ORAL at 06:19

## 2024-08-05 RX ADMIN — SODIUM CHLORIDE, PRESERVATIVE FREE 10 ML: 5 INJECTION INTRAVENOUS at 08:52

## 2024-08-05 RX ADMIN — ASPIRIN 81 MG: 81 TABLET, COATED ORAL at 08:53

## 2024-08-05 ASSESSMENT — PAIN SCALES - GENERAL
PAINLEVEL_OUTOF10: 0
PAINLEVEL_OUTOF10: 0

## 2024-08-05 NOTE — DISCHARGE SUMMARY
Patient did not want to go to [facility] and preferred [facility].  Referral has been made.  Discussed with daughter at bedside.  Please consider this note as my progress note for 8/3/2024.            Hospitalist Discharge Summary    Patient ID: Marge Callejas   Patient : 1940  Patient's PCP: Marek Andres DO    Admit Date: 2024   Admitting Physician: Merary Melvin DO    Discharge Date:  8/3/2024   Discharge Physician: Lincoln Serna MD   Discharge Condition: Stable  Discharge Disposition: Skilled Facility      Hospital course in brief:  (Please refer to daily progress notes for a comprehensive review of the hospitalization by requesting medical records)      This is an 84-year-old lady with past medical history of arthritis, A-fib with pacemaker, CHF, ESRD on hemodialysis, CAD, HLD, HTN, COPD presented hemodialysis center after she was altered, short of breath and passed out.  Patient was altered at dialysis and only received 1 hour of dialysis prior to being sent to ER.  She was recently discharged from [facility] on 2024 and was treated for possible COPD exacerbation versus fluid overload.  Cardiology, nephrology and EP has been consulted.  Pacemaker interrogation has revealed ventricular tachycardia at 164 bpm which terminated after ATP after 41 seconds on 2024 at 10:07 AM.  This is likely cause of her syncopal episode at her dialysis unit.  Pacemaker has been reprogrammed.  Low-dose of Synthroid 25 mcg daily has been started.      patient's pre-CERT has been obtained and evaluated through the weekend.  She had an episode of V. tach at around 1540 PM which was terminated by ATP.  She has been started on mexiletine by EP and wanted to observe for 24 hours to ensure no more VT occurs prior to discharge.  8/3 patient was seen in hemodialysis unit.  Patient asymptomatic overnight.  Tolerated hemodialysis well.  Cardiology and EP cleared for 
Exception - unselect if not a suspected or confirmed emergency medical condition->Emergency Medical Condition (MA) What reading provider will be dictating this exam?->CRC FINDINGS: BRAIN/VENTRICLES: There is no acute intracranial hemorrhage, mass effect or midline shift. No abnormal extra-axial fluid collection.  The gray-white differentiation is maintained without evidence of an acute infarct. There is prominence of the ventricles and sulci due to global parenchymal volume loss. There are nonspecific areas of hypoattenuation within the periventricular and subcortical white matter, which likely represent chronic microvascular ischemic change. There is moderate cerebral atrophy present with periventricular white matter changes. ORBITS: The visualized portion of the orbits demonstrate no acute abnormality. SINUSES: The visualized paranasal sinuses and mastoid air cells demonstrate no acute abnormality. SOFT TISSUES/SKULL: No acute abnormality of the visualized skull or soft tissues.     1. No acute intracranial abnormality. 2. Moderate cerebral atrophy with periventricular white matter changes. 3. Nonspecific white matter changes, likely related to chronic microvascular ischemic disease.     XR CHEST PORTABLE    Result Date: 7/21/2024  EXAMINATION: ONE XRAY VIEW OF THE CHEST 7/21/2024 1:26 pm COMPARISON: 07/04/2024 HISTORY: ORDERING SYSTEM PROVIDED HISTORY: ams TECHNOLOGIST PROVIDED HISTORY: Reason for exam:->ams FINDINGS: The heart is enlarged.  Note is made of a right dialysis catheter.  There is no focal consolidation to suggest pneumonia.  There is minimal vascular congestion.  There are trace bilateral pleural effusions.     1. Marked cardiomegaly 2. Minimal vascular congestion 3. Trace bilateral pleural effusions.       Discharge Medications:      Medication List        START taking these medications      benzocaine-menthol 15-3.6 MG lozenge  Commonly known as: CEPACOL SORE THROAT  Take 1 lozenge by mouth every

## 2024-08-05 NOTE — CARE COORDINATION
08/01/24 CM Update:  Pt admitted OBS for syncope and collapse Cardiology EP and nephrology consulted Pt CRT-D was reprogrammed by EP Plan is [facility] Talked with Cortez she submitted precert today [facility] destination complete Ambulette form on soft chart will need completed day of discharge CM/SW to follow Electronically signed by Jarad Leyva RN CM on 8/1/2024 at 9:48 AM     5:42pm Rec'd notice from Cortez at [facility] precert has been obtained and good until Sunday Electronically signed by Jarad Leyva RN CM on 8/1/2024 at 5:43 PM   
08/02/24 CM update:  Pt admitted OBS from ED for syncope and collapse Pt had 25 beats of VTACH 08/01 while at dialysis pt started on new medication Plan is to return to [facility] Precert has been obtained and expires on Tuesday 8/6 (updated from liaison) [facility] destination complete Ambulette form on soft chart will need complete day of discharge CM/SW to follow Electronically signed by Jarad Leyva RN CM on 8/2/2024 at 10:17 AM   
08/04/24 Discharge order noted  [facility]  [facility] changed plan and now referral needs to be made to [facility] Referral to be made on Monday Electronically signed by Jarad Leyva RN CM on 8/4/2024 at 8:02 AM     12:13pm Went to see patient to talk with her about plan to return to [facility] advised that I spoke with her on 07/31 in room and she told me her plan was to return to [facility] She now states that she does not want to go back there because she does not like how they do their dialysis she states that they do dialysis every day for 3 hours and she does not want to do that.  Electronically signed by Jarad Leyva RN CM on 8/4/2024 at 12:16 PM     5:26pm Spoke with pt daughter at nurses station about [facility] choice CM explained that she spoke with the patient on her first day on the unit to ask if she wanted to return to [facility] and pt told CM yes.  Pt daughter concerned that pt is getting dialysis 5 days per week at [facility] and that pt kidney doctor does not want the patient to have that many days of dialysis CM voice that she confirmed with [facility] that they do dialysis 4 days a week.  BRISA also explained it is our policy to not change nursing facilities and that precert has been obtained for [facility] Her reply was that the patient had only been at [facility] one day and that [facility] does not accept the patient's insurance She indicated she would call the facility tomorrow CM advised that she would call the liasion in the morning and let her know Electronically signed by Jarad Leyva RN on 8/4/2024 at 5:30 PM   
08/04/24 Discharge order noted Pt has been accepted to [facility] and precert has been transferred for her to discharge there today Oscar contacted for transport at 5pm BSRN, liaison and daughter notified of transfer Electronically signed by Jarad Leyva RN CM on 8/5/2024 at 3:13 PM   
Care Coordiantion  Received a call asking if the patient can be discharged today. Spoke to the patients daughter  Jonathan Feldman she is upset that noone called her to check on the patints plan for discharge. Per the daughter she wanted  her mom to go to [facility] instead. Spoke to Rn on unit and the patient is not being discharged today . Will make referral to [facility] on Monday.  
Social Work /Transition of Care:    Pt presents to the ED secondary to altered mental status while at dialysis.  Per report, pt plan was for pt to have dialysis treatments at the facility and today she only had 1 hr of her dialysis treatment.  Pt discharged from this hospital yesterday 7/29 to [facility] the [facility].  Pt is admitted observation with syncope and collapse.  SW left voice message for [place] of the [facility] liaison.    
Nursing Facility            Current DME:              Type of Home Care services:  Skilled Therapy    ADLS  Prior functional level: Assistance with the following:, Bathing, Dressing, Toileting, Cooking, Housework, Shopping, Mobility  Current functional level: Assistance with the following:, Bathing, Dressing, Toileting, Cooking, Housework, Shopping, Mobility    PT AM-PAC:   /24  OT AM-PAC:   /24    Family can provide assistance at DC: No  Would you like Case Management to discuss the discharge plan with any other family members/significant others, and if so, who?    Plans to Return to Present Housing: Yes  Other Identified Issues/Barriers to RETURNING to current housing: none  Potential Assistance needed at discharge: Skilled Nursing Facility            Potential DME:    Patient expects to discharge to: Skilled nursing facility  Plan for transportation at discharge:  ambulette    Financial    Payor: [employer] MEDICARE / Plan: [employer] CHOICE-PPO MEDICARE / Product Type: *No Product type* /     Does insurance require precert for SNF: Yes    Potential assistance Purchasing Medications:    Meds-to-Beds request:        [id number] - [place], [place] - [address] [phone] - F [phone]  [address] [place] [id number]  Phone: [phone] Fax: [phone]    [facility] [address] [phone] -  [phone]  [address] [place] [id number]  Phone: [phone] Fax: [phone]      Notes:    Factors facilitating achievement of predicted outcomes: Family support    Barriers to discharge: none    Additional Case Management Notes: see notes    The Plan for Transition of Care is related to the following treatment goals of Syncope and collapse [R55]        The Patient and/or Patient Representative Agree with the Discharge Plan?  yes    Jarad Leyva RN  Case Management Department  Ph: [phone] Fax: na

## 2024-08-06 LAB
ALBUMIN SERPL-MCNC: 3.5 G/DL (ref 3.5–5.2)
ALP SERPL-CCNC: 118 U/L (ref 35–104)
ALT SERPL-CCNC: 22 U/L (ref 0–32)
ANION GAP SERPL CALCULATED.3IONS-SCNC: 19 MMOL/L (ref 7–16)
AST SERPL-CCNC: 20 U/L (ref 0–31)
BILIRUB SERPL-MCNC: 0.5 MG/DL (ref 0–1.2)
BUN SERPL-MCNC: 94 MG/DL (ref 6–23)
CALCIUM SERPL-MCNC: 9.6 MG/DL (ref 8.6–10.2)
CHLORIDE SERPL-SCNC: 93 MMOL/L (ref 98–107)
CHOLEST SERPL-MCNC: 155 MG/DL
CO2 SERPL-SCNC: 22 MMOL/L (ref 22–29)
CREAT SERPL-MCNC: 5 MG/DL (ref 0.5–1)
ERYTHROCYTE [DISTWIDTH] IN BLOOD BY AUTOMATED COUNT: 21.1 % (ref 11.5–15)
GFR, ESTIMATED: 8 ML/MIN/1.73M2
GLUCOSE SERPL-MCNC: 122 MG/DL (ref 74–99)
HBA1C MFR BLD: 5 % (ref 4–5.6)
HCT VFR BLD AUTO: 25.9 % (ref 34–48)
HDLC SERPL-MCNC: 76 MG/DL
HGB BLD-MCNC: 8.2 G/DL (ref 11.5–15.5)
LDLC SERPL CALC-MCNC: 69 MG/DL
MCH RBC QN AUTO: 34.5 PG (ref 26–35)
MCHC RBC AUTO-ENTMCNC: 31.7 G/DL (ref 32–34.5)
MCV RBC AUTO: 108.8 FL (ref 80–99.9)
PLATELET # BLD AUTO: 195 K/UL (ref 130–450)
PMV BLD AUTO: 12.5 FL (ref 7–12)
POTASSIUM SERPL-SCNC: 5.3 MMOL/L (ref 3.5–5)
PROT SERPL-MCNC: 6.4 G/DL (ref 6.4–8.3)
RBC # BLD AUTO: 2.38 M/UL (ref 3.5–5.5)
SODIUM SERPL-SCNC: 134 MMOL/L (ref 132–146)
TRIGL SERPL-MCNC: 50 MG/DL
TSH SERPL DL<=0.05 MIU/L-ACNC: 15.48 UIU/ML (ref 0.27–4.2)
VLDLC SERPL CALC-MCNC: 10 MG/DL
WBC OTHER # BLD: 12.5 K/UL (ref 4.5–11.5)

## 2024-08-07 ENCOUNTER — OUTSIDE SERVICES (OUTPATIENT)
Dept: PRIMARY CARE CLINIC | Age: 84
End: 2024-08-07
Payer: MEDICARE

## 2024-08-07 DIAGNOSIS — N18.4 CHRONIC KIDNEY DISEASE, STAGE 4 (SEVERE) (HCC): ICD-10-CM

## 2024-08-07 DIAGNOSIS — E78.5 HYPERLIPIDEMIA, UNSPECIFIED HYPERLIPIDEMIA TYPE: ICD-10-CM

## 2024-08-07 DIAGNOSIS — Z99.2 DEPENDENCE ON RENAL DIALYSIS (HCC): ICD-10-CM

## 2024-08-07 DIAGNOSIS — R55 SYNCOPE, UNSPECIFIED SYNCOPE TYPE: Primary | ICD-10-CM

## 2024-08-07 DIAGNOSIS — I50.9 HEART FAILURE, UNSPECIFIED HF CHRONICITY, UNSPECIFIED HEART FAILURE TYPE (HCC): ICD-10-CM

## 2024-08-07 DIAGNOSIS — K21.9 GASTRO-ESOPHAGEAL REFLUX DISEASE WITHOUT ESOPHAGITIS: ICD-10-CM

## 2024-08-07 DIAGNOSIS — E03.9 HYPOTHYROIDISM, UNSPECIFIED TYPE: ICD-10-CM

## 2024-08-07 DIAGNOSIS — I48.0 PAROXYSMAL ATRIAL FIBRILLATION (HCC): ICD-10-CM

## 2024-08-07 DIAGNOSIS — R26.2 AMBULATORY DYSFUNCTION: ICD-10-CM

## 2024-08-07 DIAGNOSIS — I47.20 VENTRICULAR TACHYCARDIA, UNSPECIFIED (HCC): ICD-10-CM

## 2024-08-07 DIAGNOSIS — R53.1 GENERALIZED WEAKNESS: ICD-10-CM

## 2024-08-07 DIAGNOSIS — J44.9 CHRONIC OBSTRUCTIVE PULMONARY DISEASE, UNSPECIFIED COPD TYPE (HCC): ICD-10-CM

## 2024-08-07 DIAGNOSIS — I10 ESSENTIAL (PRIMARY) HYPERTENSION: ICD-10-CM

## 2024-08-07 PROCEDURE — 99306 1ST NF CARE HIGH MDM 50: CPT | Performed by: INTERNAL MEDICINE

## 2024-08-07 NOTE — PROGRESS NOTES
Visit Date: 2024  Marge Callejas (:  1940) is a 84 y.o. female.    History & Physical    Subjective   SUBJECTIVE/OBJECTIVE:  Past Medical History:   Diagnosis Date    Arthritis     Atrial fibrillation (HCC)     Blood circulation, collateral     CHF (congestive heart failure) (HCC)     Chronic renal insufficiency, stage IV (severe) (HCC) 2016    Coronary artery disease involving native coronary artery of native heart without angina pectoris 2024    History of cardiovascular stress test 2015    Lexiscan stress test    History of echocardiogram 2015    EF 29%    Hyperlipidemia     Hypertension     Mixed restrictive and obstructive lung disease (HCC) 2023      Past Surgical History:   Procedure Laterality Date    COLONOSCOPY N/A 4/3/2021    COLONOSCOPY POLYPECTOMY HOT SNARE performed by Lucio Nelson DO at [facility] ENDOSCOPY    COLONOSCOPY  4/3/2021    COLONOSCOPY WITH BIOPSY performed by Lucio Nelson DO at [facility] ENDOSCOPY    COLONOSCOPY  4/3/2021    COLONOSCOPY CONTROL HEMORRHAGE performed by Lucio Nelson DO at [facility] ENDOSCOPY    COLONOSCOPY  4/3/2021    COLONOSCOPY SUBMUCOSAL SPOT INJECTION performed by Lucio Nelson DO at [facility] ENDOSCOPY    COLONOSCOPY N/A 2023    COLONOSCOPY DIAGNOSTIC performed by Scooby Cormier MD at [facility] ENDOSCOPY    ECTOPIC PREGNANCY SURGERY      UPPER GASTROINTESTINAL ENDOSCOPY N/A 4/3/2021    EGD BIOPSY performed by Lucio Nelson DO at [facility] ENDOSCOPY    UPPER GASTROINTESTINAL ENDOSCOPY N/A 2021    EGD W/EUS FNA performed by Ana Gandhi MD at [facility] ENDOSCOPY      Family History   Problem Relation Age of Onset    Heart Disease Mother     No Known Problems Father     Other Sister         CHF    Cancer Sister     Brain Cancer Sister       Social History     Socioeconomic History    Marital status:    Tobacco Use    Smoking status: Former     Current packs/day: 0.00     Average packs/day: 0.5 packs/day for 51.0 years (25.5 ttl pk-yrs)

## 2024-08-08 VITALS
HEART RATE: 77 BPM | TEMPERATURE: 98.3 F | RESPIRATION RATE: 20 BRPM | DIASTOLIC BLOOD PRESSURE: 60 MMHG | SYSTOLIC BLOOD PRESSURE: 122 MMHG | OXYGEN SATURATION: 95 % | BODY MASS INDEX: 22.13 KG/M2 | WEIGHT: 129 LBS

## 2024-08-08 ASSESSMENT — ENCOUNTER SYMPTOMS
PHOTOPHOBIA: 0
DIARRHEA: 0
VOMITING: 0
CONSTIPATION: 0
BACK PAIN: 0
WHEEZING: 0
COUGH: 0
NAUSEA: 0
ABDOMINAL PAIN: 0
VOICE CHANGE: 0
BLOOD IN STOOL: 0
EYE PAIN: 0
RHINORRHEA: 0
SHORTNESS OF BREATH: 0
SORE THROAT: 0
CHEST TIGHTNESS: 0
TROUBLE SWALLOWING: 0

## 2024-08-09 ENCOUNTER — HOSPITAL ENCOUNTER (INPATIENT)
Age: 84
LOS: 17 days | Discharge: NURSING, REHAB OR LONG-TERM CARE | DRG: 291 | End: 2024-08-29
Attending: EMERGENCY MEDICINE | Admitting: STUDENT IN AN ORGANIZED HEALTH CARE EDUCATION/TRAINING PROGRAM
Payer: MEDICARE

## 2024-08-09 ENCOUNTER — APPOINTMENT (OUTPATIENT)
Dept: CT IMAGING | Age: 84
DRG: 291 | End: 2024-08-09
Payer: MEDICARE

## 2024-08-09 ENCOUNTER — APPOINTMENT (OUTPATIENT)
Dept: GENERAL RADIOLOGY | Age: 84
DRG: 291 | End: 2024-08-09
Payer: MEDICARE

## 2024-08-09 DIAGNOSIS — M79.672 HEEL PAIN, BILATERAL: ICD-10-CM

## 2024-08-09 DIAGNOSIS — Z99.2 ESRD ON DIALYSIS (HCC): ICD-10-CM

## 2024-08-09 DIAGNOSIS — N18.6 ESRD ON DIALYSIS (HCC): ICD-10-CM

## 2024-08-09 DIAGNOSIS — I50.21 ACUTE SYSTOLIC CHF (CONGESTIVE HEART FAILURE) (HCC): ICD-10-CM

## 2024-08-09 DIAGNOSIS — R10.12 ABDOMINAL PAIN, LEFT UPPER QUADRANT: ICD-10-CM

## 2024-08-09 DIAGNOSIS — R60.0 BILATERAL LOWER EXTREMITY EDEMA: ICD-10-CM

## 2024-08-09 DIAGNOSIS — M79.671 HEEL PAIN, BILATERAL: ICD-10-CM

## 2024-08-09 DIAGNOSIS — I50.43 ACUTE ON CHRONIC COMBINED SYSTOLIC AND DIASTOLIC HEART FAILURE (HCC): Primary | ICD-10-CM

## 2024-08-09 LAB
ALBUMIN SERPL-MCNC: 3 G/DL (ref 3.5–5.2)
ALP SERPL-CCNC: 114 U/L (ref 35–104)
ALT SERPL-CCNC: 25 U/L (ref 0–32)
ANION GAP SERPL CALCULATED.3IONS-SCNC: 10 MMOL/L (ref 7–16)
AST SERPL-CCNC: 23 U/L (ref 0–31)
BASOPHILS # BLD: 0.01 K/UL (ref 0–0.2)
BASOPHILS NFR BLD: 0 % (ref 0–2)
BILIRUB SERPL-MCNC: 0.3 MG/DL (ref 0–1.2)
BNP SERPL-MCNC: ABNORMAL PG/ML (ref 0–450)
BUN SERPL-MCNC: 27 MG/DL (ref 6–23)
CALCIUM SERPL-MCNC: 8.7 MG/DL (ref 8.6–10.2)
CHLORIDE SERPL-SCNC: 93 MMOL/L (ref 98–107)
CO2 SERPL-SCNC: 29 MMOL/L (ref 22–29)
CREAT SERPL-MCNC: 2.6 MG/DL (ref 0.5–1)
EOSINOPHIL # BLD: 0.09 K/UL (ref 0.05–0.5)
EOSINOPHILS RELATIVE PERCENT: 1 % (ref 0–6)
ERYTHROCYTE [DISTWIDTH] IN BLOOD BY AUTOMATED COUNT: 20.3 % (ref 11.5–15)
GFR, ESTIMATED: 18 ML/MIN/1.73M2
GLUCOSE SERPL-MCNC: 111 MG/DL (ref 74–99)
HCT VFR BLD AUTO: 23.9 % (ref 34–48)
HGB BLD-MCNC: 7.5 G/DL (ref 11.5–15.5)
IMM GRANULOCYTES # BLD AUTO: 0.03 K/UL (ref 0–0.58)
IMM GRANULOCYTES NFR BLD: 1 % (ref 0–5)
LACTATE BLDV-SCNC: 1.8 MMOL/L (ref 0.5–2.2)
LIPASE SERPL-CCNC: 11 U/L (ref 13–60)
LYMPHOCYTES NFR BLD: 1.21 K/UL (ref 1.5–4)
LYMPHOCYTES RELATIVE PERCENT: 19 % (ref 20–42)
MAGNESIUM SERPL-MCNC: 1.9 MG/DL (ref 1.6–2.6)
MCH RBC QN AUTO: 33.9 PG (ref 26–35)
MCHC RBC AUTO-ENTMCNC: 31.4 G/DL (ref 32–34.5)
MCV RBC AUTO: 108.1 FL (ref 80–99.9)
MONOCYTES NFR BLD: 0.76 K/UL (ref 0.1–0.95)
MONOCYTES NFR BLD: 12 % (ref 2–12)
NEUTROPHILS NFR BLD: 66 % (ref 43–80)
NEUTS SEG NFR BLD: 4.15 K/UL (ref 1.8–7.3)
PHOSPHATE SERPL-MCNC: 2.9 MG/DL (ref 2.5–4.5)
PLATELET # BLD AUTO: 140 K/UL (ref 130–450)
PMV BLD AUTO: 12.7 FL (ref 7–12)
POTASSIUM SERPL-SCNC: 3.7 MMOL/L (ref 3.5–5)
PROT SERPL-MCNC: 5.8 G/DL (ref 6.4–8.3)
RBC # BLD AUTO: 2.21 M/UL (ref 3.5–5.5)
RBC # BLD: ABNORMAL 10*6/UL
SODIUM SERPL-SCNC: 132 MMOL/L (ref 132–146)
TROPONIN I SERPL HS-MCNC: 81 NG/L (ref 0–9)
TROPONIN I SERPL HS-MCNC: 82 NG/L (ref 0–9)
WBC OTHER # BLD: 6.3 K/UL (ref 4.5–11.5)

## 2024-08-09 PROCEDURE — 83880 ASSAY OF NATRIURETIC PEPTIDE: CPT

## 2024-08-09 PROCEDURE — 84484 ASSAY OF TROPONIN QUANT: CPT

## 2024-08-09 PROCEDURE — 85025 COMPLETE CBC W/AUTO DIFF WBC: CPT

## 2024-08-09 PROCEDURE — 71045 X-RAY EXAM CHEST 1 VIEW: CPT

## 2024-08-09 PROCEDURE — 83690 ASSAY OF LIPASE: CPT

## 2024-08-09 PROCEDURE — 80053 COMPREHEN METABOLIC PANEL: CPT

## 2024-08-09 PROCEDURE — 83735 ASSAY OF MAGNESIUM: CPT

## 2024-08-09 PROCEDURE — 74176 CT ABD & PELVIS W/O CONTRAST: CPT

## 2024-08-09 PROCEDURE — 99285 EMERGENCY DEPT VISIT HI MDM: CPT

## 2024-08-09 PROCEDURE — 84100 ASSAY OF PHOSPHORUS: CPT

## 2024-08-09 PROCEDURE — 83605 ASSAY OF LACTIC ACID: CPT

## 2024-08-09 PROCEDURE — 71250 CT THORAX DX C-: CPT

## 2024-08-09 ASSESSMENT — PAIN - FUNCTIONAL ASSESSMENT: PAIN_FUNCTIONAL_ASSESSMENT: NONE - DENIES PAIN

## 2024-08-09 NOTE — ED PROVIDER NOTES
[facility] EMERGENCY DEPARTMENT  EMERGENCY DEPARTMENT ENCOUNTER        Pt Name: Marge Callejas  MRN: [id number]  Birthdate 1940  Date of evaluation: 8/9/2024  Provider: Eric Chaidez MD  PCP: Marek Andres DO  Note Started: 6:33 PM EDT 8/9/24    CHIEF COMPLAINT       Chief Complaint   Patient presents with    Leg Swelling     Patient states that her daughter noticed her lower extremities were swollen. Patient states no sob and no chest pain       HISTORY OF PRESENT ILLNESS: 1 or more Elements   History From: Patient and EMS    Limitations to history : None  Social Determinants : None    Marge Callejas is a 84 y.o. female with a history of chronic hypoxic respiratory failure, congestive heart failure, coronary artery disease, end-stage renal disease on dialysis, hypertension, hyperlipidemia, atrial fibrillation on Eliquis and amiodarone who presents to the emergency room with complaints of bilateral heel pain, bilateral lower extremity edema and left upper abdominal pain.    She mentions that the bilateral lower extremity edema has been progressing since the past week.  She started having left upper abdominal pain going on since around 4 PM today.  No nausea or vomiting.    As per EMS, she had hemodialysis today where 0.3 L of fluids were removed and the dialysis were limited by hypotension.    Patient is on a Retacrit injection for anemia.    Patient was recently discharged on 8/5 into the skilled nursing facility while she was admitted for syncopal episode during dialysis.  She did have episode of V. tach while inpatient and was started on mexiletine.    Patient does have a history of hypertension and takes hydralazine and metoprolol.    Echo on 7/30/2024 with findings of severely reduced LV systolic function with EF of 20 to 25%.    Denies any fever, chills, nausea, vomiting, headache, dizziness, vision changes, neck tenderness or stiffness, weakness, chest pain,

## 2024-08-10 PROBLEM — R60.0 LOWER EXTREMITY EDEMA: Status: ACTIVE | Noted: 2024-08-10

## 2024-08-10 LAB
ANION GAP SERPL CALCULATED.3IONS-SCNC: 12 MMOL/L (ref 7–16)
BASOPHILS # BLD: 0.02 K/UL (ref 0–0.2)
BASOPHILS NFR BLD: 0 % (ref 0–2)
BUN SERPL-MCNC: 29 MG/DL (ref 6–23)
CALCIUM SERPL-MCNC: 8.7 MG/DL (ref 8.6–10.2)
CHLORIDE SERPL-SCNC: 93 MMOL/L (ref 98–107)
CO2 SERPL-SCNC: 27 MMOL/L (ref 22–29)
CREAT SERPL-MCNC: 3.3 MG/DL (ref 0.5–1)
EOSINOPHIL # BLD: 0.1 K/UL (ref 0.05–0.5)
EOSINOPHILS RELATIVE PERCENT: 1 % (ref 0–6)
ERYTHROCYTE [DISTWIDTH] IN BLOOD BY AUTOMATED COUNT: 20.3 % (ref 11.5–15)
FOLATE SERPL-MCNC: 15.4 NG/ML (ref 4.8–24.2)
GFR, ESTIMATED: 13 ML/MIN/1.73M2
GLUCOSE SERPL-MCNC: 111 MG/DL (ref 74–99)
HCT VFR BLD AUTO: 23 % (ref 34–48)
HGB BLD-MCNC: 7.2 G/DL (ref 11.5–15.5)
IMM GRANULOCYTES # BLD AUTO: 0.03 K/UL (ref 0–0.58)
IMM GRANULOCYTES NFR BLD: 0 % (ref 0–5)
LYMPHOCYTES NFR BLD: 1.34 K/UL (ref 1.5–4)
LYMPHOCYTES RELATIVE PERCENT: 18 % (ref 20–42)
MCH RBC QN AUTO: 33.8 PG (ref 26–35)
MCHC RBC AUTO-ENTMCNC: 31.3 G/DL (ref 32–34.5)
MCV RBC AUTO: 108 FL (ref 80–99.9)
MONOCYTES NFR BLD: 0.8 K/UL (ref 0.1–0.95)
MONOCYTES NFR BLD: 11 % (ref 2–12)
NEUTROPHILS NFR BLD: 70 % (ref 43–80)
NEUTS SEG NFR BLD: 5.3 K/UL (ref 1.8–7.3)
PLATELET, FLUORESCENCE: 131 K/UL (ref 130–450)
PMV BLD AUTO: 12.6 FL (ref 7–12)
POTASSIUM SERPL-SCNC: 3.7 MMOL/L (ref 3.5–5)
RBC # BLD AUTO: 2.13 M/UL (ref 3.5–5.5)
RBC # BLD: ABNORMAL 10*6/UL
SODIUM SERPL-SCNC: 132 MMOL/L (ref 132–146)
VIT B12 SERPL-MCNC: 827 PG/ML (ref 211–946)
WBC OTHER # BLD: 7.6 K/UL (ref 4.5–11.5)

## 2024-08-10 PROCEDURE — 85025 COMPLETE CBC W/AUTO DIFF WBC: CPT

## 2024-08-10 PROCEDURE — 90935 HEMODIALYSIS ONE EVALUATION: CPT

## 2024-08-10 PROCEDURE — 80048 BASIC METABOLIC PNL TOTAL CA: CPT

## 2024-08-10 PROCEDURE — 82607 VITAMIN B-12: CPT

## 2024-08-10 PROCEDURE — 6370000000 HC RX 637 (ALT 250 FOR IP): Performed by: FAMILY MEDICINE

## 2024-08-10 PROCEDURE — 6360000002 HC RX W HCPCS: Performed by: FAMILY MEDICINE

## 2024-08-10 PROCEDURE — 5A1D70Z PERFORMANCE OF URINARY FILTRATION, INTERMITTENT, LESS THAN 6 HOURS PER DAY: ICD-10-PCS | Performed by: STUDENT IN AN ORGANIZED HEALTH CARE EDUCATION/TRAINING PROGRAM

## 2024-08-10 PROCEDURE — 94640 AIRWAY INHALATION TREATMENT: CPT

## 2024-08-10 PROCEDURE — 02HV33Z INSERTION OF INFUSION DEVICE INTO SUPERIOR VENA CAVA, PERCUTANEOUS APPROACH: ICD-10-PCS | Performed by: STUDENT IN AN ORGANIZED HEALTH CARE EDUCATION/TRAINING PROGRAM

## 2024-08-10 PROCEDURE — 2700000000 HC OXYGEN THERAPY PER DAY

## 2024-08-10 PROCEDURE — 82746 ASSAY OF FOLIC ACID SERUM: CPT

## 2024-08-10 PROCEDURE — G0378 HOSPITAL OBSERVATION PER HR: HCPCS

## 2024-08-10 PROCEDURE — 94664 DEMO&/EVAL PT USE INHALER: CPT

## 2024-08-10 PROCEDURE — 99222 1ST HOSP IP/OBS MODERATE 55: CPT | Performed by: FAMILY MEDICINE

## 2024-08-10 PROCEDURE — 2580000003 HC RX 258: Performed by: FAMILY MEDICINE

## 2024-08-10 PROCEDURE — 99232 SBSQ HOSP IP/OBS MODERATE 35: CPT | Performed by: INTERNAL MEDICINE

## 2024-08-10 RX ORDER — AMIODARONE HYDROCHLORIDE 200 MG/1
400 TABLET ORAL 2 TIMES DAILY
Status: DISCONTINUED | OUTPATIENT
Start: 2024-08-10 | End: 2024-08-10

## 2024-08-10 RX ORDER — PROCHLORPERAZINE EDISYLATE 5 MG/ML
5 INJECTION INTRAMUSCULAR; INTRAVENOUS EVERY 6 HOURS PRN
Status: DISCONTINUED | OUTPATIENT
Start: 2024-08-10 | End: 2024-08-29 | Stop reason: HOSPADM

## 2024-08-10 RX ORDER — POLYETHYLENE GLYCOL 3350 17 G/17G
17 POWDER, FOR SOLUTION ORAL DAILY PRN
Status: DISCONTINUED | OUTPATIENT
Start: 2024-08-10 | End: 2024-08-29 | Stop reason: HOSPADM

## 2024-08-10 RX ORDER — IPRATROPIUM BROMIDE AND ALBUTEROL SULFATE 2.5; .5 MG/3ML; MG/3ML
1 SOLUTION RESPIRATORY (INHALATION) EVERY 6 HOURS PRN
Status: DISCONTINUED | OUTPATIENT
Start: 2024-08-10 | End: 2024-08-28

## 2024-08-10 RX ORDER — AMIODARONE HYDROCHLORIDE 200 MG/1
400 TABLET ORAL DAILY
Status: DISCONTINUED | OUTPATIENT
Start: 2024-08-10 | End: 2024-08-10 | Stop reason: SDUPTHER

## 2024-08-10 RX ORDER — AMIODARONE HYDROCHLORIDE 400 MG/1
400 TABLET ORAL DAILY
Status: ON HOLD | COMMUNITY
Start: 2024-08-15 | End: 2024-08-16 | Stop reason: DRUGHIGH

## 2024-08-10 RX ORDER — EPOETIN ALFA-EPBX 2000 [IU]/ML
1 INJECTION, SOLUTION INTRAVENOUS; SUBCUTANEOUS SEE ADMIN INSTRUCTIONS
Status: ON HOLD | COMMUNITY
End: 2024-08-16 | Stop reason: ALTCHOICE

## 2024-08-10 RX ORDER — LEVOTHYROXINE SODIUM 25 UG/1
25 TABLET ORAL DAILY
Status: DISCONTINUED | OUTPATIENT
Start: 2024-08-10 | End: 2024-08-12

## 2024-08-10 RX ORDER — SODIUM CHLORIDE 0.9 % (FLUSH) 0.9 %
5-40 SYRINGE (ML) INJECTION PRN
Status: DISCONTINUED | OUTPATIENT
Start: 2024-08-10 | End: 2024-08-29 | Stop reason: HOSPADM

## 2024-08-10 RX ORDER — AMIODARONE HYDROCHLORIDE 200 MG/1
400 TABLET ORAL 2 TIMES DAILY
Status: DISCONTINUED | OUTPATIENT
Start: 2024-08-10 | End: 2024-08-10 | Stop reason: SDUPTHER

## 2024-08-10 RX ORDER — HYDRALAZINE HYDROCHLORIDE 10 MG/1
10 TABLET, FILM COATED ORAL EVERY 12 HOURS SCHEDULED
Status: DISCONTINUED | OUTPATIENT
Start: 2024-08-10 | End: 2024-08-11

## 2024-08-10 RX ORDER — CALCIUM CARBONATE 500 MG/1
500 TABLET, CHEWABLE ORAL 3 TIMES DAILY PRN
Status: DISCONTINUED | OUTPATIENT
Start: 2024-08-10 | End: 2024-08-29 | Stop reason: HOSPADM

## 2024-08-10 RX ORDER — ONDANSETRON 4 MG/1
4 TABLET, ORALLY DISINTEGRATING ORAL EVERY 8 HOURS PRN
Status: DISCONTINUED | OUTPATIENT
Start: 2024-08-10 | End: 2024-08-10

## 2024-08-10 RX ORDER — MECLIZINE HCL 12.5 MG 12.5 MG/1
12.5 TABLET ORAL 3 TIMES DAILY PRN
Status: DISCONTINUED | OUTPATIENT
Start: 2024-08-10 | End: 2024-08-29 | Stop reason: HOSPADM

## 2024-08-10 RX ORDER — MEXILETINE HYDROCHLORIDE 150 MG/1
150 CAPSULE ORAL EVERY 8 HOURS SCHEDULED
Status: DISCONTINUED | OUTPATIENT
Start: 2024-08-10 | End: 2024-08-29 | Stop reason: HOSPADM

## 2024-08-10 RX ORDER — BUMETANIDE 1 MG/1
2 TABLET ORAL DAILY
Status: DISCONTINUED | OUTPATIENT
Start: 2024-08-10 | End: 2024-08-23

## 2024-08-10 RX ORDER — METOPROLOL SUCCINATE 25 MG/1
12.5 TABLET, EXTENDED RELEASE ORAL DAILY
Status: DISCONTINUED | OUTPATIENT
Start: 2024-08-10 | End: 2024-08-29 | Stop reason: HOSPADM

## 2024-08-10 RX ORDER — AMIODARONE HYDROCHLORIDE 200 MG/1
400 TABLET ORAL DAILY
Status: DISCONTINUED | OUTPATIENT
Start: 2024-08-15 | End: 2024-08-10

## 2024-08-10 RX ORDER — SENNA AND DOCUSATE SODIUM 50; 8.6 MG/1; MG/1
1 TABLET, FILM COATED ORAL 2 TIMES DAILY PRN
Status: DISCONTINUED | OUTPATIENT
Start: 2024-08-10 | End: 2024-08-29 | Stop reason: HOSPADM

## 2024-08-10 RX ORDER — ISOSORBIDE DINITRATE 10 MG/1
5 TABLET ORAL 2 TIMES DAILY
Status: DISCONTINUED | OUTPATIENT
Start: 2024-08-10 | End: 2024-08-12

## 2024-08-10 RX ORDER — LACTULOSE 10 G/15ML
10 SOLUTION ORAL DAILY PRN
Status: DISCONTINUED | OUTPATIENT
Start: 2024-08-10 | End: 2024-08-29 | Stop reason: HOSPADM

## 2024-08-10 RX ORDER — AMIODARONE HYDROCHLORIDE 400 MG/1
400 TABLET ORAL 2 TIMES DAILY
Status: ON HOLD | COMMUNITY
Start: 2024-08-06 | End: 2024-08-16 | Stop reason: HOSPADM

## 2024-08-10 RX ORDER — ASPIRIN 81 MG/1
81 TABLET ORAL DAILY
Status: DISCONTINUED | OUTPATIENT
Start: 2024-08-10 | End: 2024-08-29 | Stop reason: HOSPADM

## 2024-08-10 RX ORDER — ACETAMINOPHEN 650 MG/1
650 SUPPOSITORY RECTAL EVERY 6 HOURS PRN
Status: DISCONTINUED | OUTPATIENT
Start: 2024-08-10 | End: 2024-08-29 | Stop reason: HOSPADM

## 2024-08-10 RX ORDER — MECOBALAMIN 5000 MCG
5 TABLET,DISINTEGRATING ORAL NIGHTLY
Status: DISCONTINUED | OUTPATIENT
Start: 2024-08-10 | End: 2024-08-29 | Stop reason: HOSPADM

## 2024-08-10 RX ORDER — BISACODYL 10 MG
10 SUPPOSITORY, RECTAL RECTAL DAILY PRN
Status: DISCONTINUED | OUTPATIENT
Start: 2024-08-10 | End: 2024-08-29 | Stop reason: HOSPADM

## 2024-08-10 RX ORDER — SODIUM CHLORIDE 9 MG/ML
INJECTION, SOLUTION INTRAVENOUS PRN
Status: DISCONTINUED | OUTPATIENT
Start: 2024-08-10 | End: 2024-08-29 | Stop reason: HOSPADM

## 2024-08-10 RX ORDER — ATORVASTATIN CALCIUM 40 MG/1
40 TABLET, FILM COATED ORAL DAILY
Status: DISCONTINUED | OUTPATIENT
Start: 2024-08-10 | End: 2024-08-29 | Stop reason: HOSPADM

## 2024-08-10 RX ORDER — PANTOPRAZOLE SODIUM 40 MG/1
40 TABLET, DELAYED RELEASE ORAL
Status: DISCONTINUED | OUTPATIENT
Start: 2024-08-10 | End: 2024-08-29 | Stop reason: HOSPADM

## 2024-08-10 RX ORDER — SODIUM CHLORIDE 0.9 % (FLUSH) 0.9 %
5-40 SYRINGE (ML) INJECTION EVERY 12 HOURS SCHEDULED
Status: DISCONTINUED | OUTPATIENT
Start: 2024-08-10 | End: 2024-08-29 | Stop reason: HOSPADM

## 2024-08-10 RX ORDER — ONDANSETRON 2 MG/ML
4 INJECTION INTRAMUSCULAR; INTRAVENOUS EVERY 6 HOURS PRN
Status: DISCONTINUED | OUTPATIENT
Start: 2024-08-10 | End: 2024-08-10

## 2024-08-10 RX ORDER — POLYETHYLENE GLYCOL 3350 17 G/17G
17 POWDER, FOR SOLUTION ORAL DAILY
Status: DISCONTINUED | OUTPATIENT
Start: 2024-08-10 | End: 2024-08-29 | Stop reason: HOSPADM

## 2024-08-10 RX ORDER — ACETAMINOPHEN 325 MG/1
650 TABLET ORAL EVERY 6 HOURS PRN
Status: DISCONTINUED | OUTPATIENT
Start: 2024-08-10 | End: 2024-08-29 | Stop reason: HOSPADM

## 2024-08-10 RX ORDER — FLUTICASONE PROPIONATE AND SALMETEROL 250; 50 UG/1; UG/1
1 POWDER RESPIRATORY (INHALATION) 2 TIMES DAILY
COMMUNITY

## 2024-08-10 RX ORDER — BENZONATATE 100 MG/1
100 CAPSULE ORAL 3 TIMES DAILY PRN
Status: DISCONTINUED | OUTPATIENT
Start: 2024-08-10 | End: 2024-08-29 | Stop reason: HOSPADM

## 2024-08-10 RX ADMIN — SODIUM CHLORIDE, PRESERVATIVE FREE 10 ML: 5 INJECTION INTRAVENOUS at 20:12

## 2024-08-10 RX ADMIN — APIXABAN 2.5 MG: 2.5 TABLET, FILM COATED ORAL at 20:14

## 2024-08-10 RX ADMIN — CALCIUM CARBONATE 500 MG: 500 TABLET, CHEWABLE ORAL at 11:02

## 2024-08-10 RX ADMIN — ISOSORBIDE DINITRATE 5 MG: 10 TABLET ORAL at 08:10

## 2024-08-10 RX ADMIN — LEVOTHYROXINE SODIUM 25 MCG: 0.03 TABLET ORAL at 08:10

## 2024-08-10 RX ADMIN — BENZONATATE 100 MG: 100 CAPSULE ORAL at 11:03

## 2024-08-10 RX ADMIN — Medication 5 MG: at 20:13

## 2024-08-10 RX ADMIN — SACUBITRIL AND VALSARTAN 0.5 TABLET: 24; 26 TABLET, FILM COATED ORAL at 20:13

## 2024-08-10 RX ADMIN — METOPROLOL SUCCINATE 12.5 MG: 25 TABLET, EXTENDED RELEASE ORAL at 11:02

## 2024-08-10 RX ADMIN — APIXABAN 2.5 MG: 2.5 TABLET, FILM COATED ORAL at 11:03

## 2024-08-10 RX ADMIN — SACUBITRIL AND VALSARTAN 0.5 TABLET: 24; 26 TABLET, FILM COATED ORAL at 08:09

## 2024-08-10 RX ADMIN — ATORVASTATIN CALCIUM 40 MG: 40 TABLET, FILM COATED ORAL at 11:03

## 2024-08-10 RX ADMIN — SODIUM CHLORIDE, PRESERVATIVE FREE 10 ML: 5 INJECTION INTRAVENOUS at 11:08

## 2024-08-10 RX ADMIN — MEXILETINE HYDROCHLORIDE 150 MG: 150 CAPSULE ORAL at 21:41

## 2024-08-10 RX ADMIN — ASPIRIN 81 MG: 81 TABLET, COATED ORAL at 08:09

## 2024-08-10 RX ADMIN — BUMETANIDE 2 MG: 1 TABLET ORAL at 08:09

## 2024-08-10 RX ADMIN — PANTOPRAZOLE SODIUM 40 MG: 40 TABLET, DELAYED RELEASE ORAL at 11:03

## 2024-08-10 RX ADMIN — ISOSORBIDE DINITRATE 5 MG: 10 TABLET ORAL at 20:14

## 2024-08-10 RX ADMIN — ARFORMOTEROL TARTRATE: 15 SOLUTION RESPIRATORY (INHALATION) at 08:34

## 2024-08-10 RX ADMIN — HYDRALAZINE HYDROCHLORIDE 10 MG: 10 TABLET, FILM COATED ORAL at 20:14

## 2024-08-10 ASSESSMENT — PAIN SCALES - GENERAL
PAINLEVEL_OUTOF10: 0

## 2024-08-10 ASSESSMENT — PAIN - FUNCTIONAL ASSESSMENT: PAIN_FUNCTIONAL_ASSESSMENT: NONE - DENIES PAIN

## 2024-08-10 NOTE — PROGRESS NOTES
Pt to Dialysis with transport. Procedural consent for dialysis signed by pt. All belongings with pt. RN called report to 8S RN and instructed dialysis to send pt to clean floor bed when done.

## 2024-08-10 NOTE — H&P
Hospital Medicine History & Physical      PCP: Marek Andres DO    Date of Admission: 8/9/2024    Date of Service: Pt seen/examined on 8/10/2024 and  Placed in Observation.    Chief Complaint: Shortness of breath, lower extremity edema      History Of Present Illness:    84 y.o. female who presented to [facility] with shortness of breath and complaints of lower extremity edema that is worse in the last few days.  She was at dialysis. and could not complete her treatment given that she was very short of breath.  In the ER blood pressure did fall to low of 92/56.  Patient also complained of some intermittent abdominal pain CT abdomen pelvis showed diffuse colorectal retention and a stable 5.6 cm abdominal aorta and dilatation of the proximal left iliac artery and small bilateral pleural effusion.  CT chest with small bilateral pleural effusions with no evidence of pneumonia. Patient denies any pain in her calfs formational the swelling has been significantly getting worse over the last few days.    Past Medical History:          Diagnosis Date    Arthritis     Atrial fibrillation ([facility])     Blood circulation, collateral     CHF (congestive heart failure) ([facility])     Chronic renal insufficiency, stage IV (severe) ([facility]) 11/19/2016    Coronary artery disease involving native coronary artery of native heart without angina pectoris 4/17/2024    History of cardiovascular stress test 07/2015    Lexiscan stress test    History of echocardiogram 07/27/2015    EF 29%    Hyperlipidemia     Hypertension     Mixed restrictive and obstructive lung disease ([facility]) 7/14/2023       Past Surgical History:          Procedure Laterality Date    COLONOSCOPY N/A 4/3/2021    COLONOSCOPY POLYPECTOMY HOT SNARE performed by Lucio eNlson DO at [facility] ENDOSCOPY    COLONOSCOPY  4/3/2021    COLONOSCOPY WITH BIOPSY performed by Lucio Nelson DO at [facility] ENDOSCOPY    COLONOSCOPY  4/3/2021    COLONOSCOPY CONTROL HEMORRHAGE

## 2024-08-10 NOTE — PROGRESS NOTES
HOSPITALIST PROGRESS NOTES     ADMITTING DATE AND TIME: 8/9/2024  6:23 PM  had concerns including Leg Swelling (Patient states that her daughter noticed her lower extremities were swollen. Patient states no sob and no chest pain).    ADMIT DX: Abdominal pain, left upper quadrant [R10.12]  Acute on chronic combined systolic and diastolic heart failure (HCC) [I50.43]  Lower extremity edema [R60.0]  ESRD on dialysis (HCC) [N18.6, Z99.2]  Bilateral lower extremity edema [R60.0]  Heel pain, bilateral [M79.671, M79.672]      SUBJECTIVE:  Patient is being followed for Abdominal pain, left upper quadrant [R10.12]  Acute on chronic combined systolic and diastolic heart failure (HCC) [I50.43]  Lower extremity edema [R60.0]  ESRD on dialysis (HCC) [N18.6, Z99.2]  Bilateral lower extremity edema [R60.0]  Heel pain, bilateral [M79.671, M79.672]     Patient was seen examined and evaluated  Recent lab results, charts and pertinent diagnostic imaging reviewed   Ancillary service notes reviewed   NAD, getting dialysis     Care reviewed with nursing staff, medical and surgical specialty care, primary care and the patient's family as available.   ROS: denies fever, chills, cp, sob, n/v, HA unless stated above.      sodium chloride flush  5-40 mL IntraVENous 2 times per day    apixaban  2.5 mg Oral BID    aspirin  81 mg Oral Daily    atorvastatin  40 mg Oral Daily    arformoterol 15 mcg-budesonide 0.25 mg neb solution   Nebulization BID RT    bumetanide  2 mg Oral Daily    [START ON 8/12/2024] epoetin carmelina-epbx  2,000 Units SubCUTAneous Once per day on Monday Wednesday Friday    hydrALAZINE  10 mg Oral 2 times per day    isosorbide dinitrate  5 mg Oral BID    levothyroxine  25 mcg Oral Daily    melatonin  5 mg Oral Nightly    metoprolol succinate  12.5 mg Oral Daily    mexiletine  150 mg Oral 3

## 2024-08-10 NOTE — FLOWSHEET NOTE
08/10/24 1457   Vital Signs   /60   Temp (!) 95.9 °F (35.5 °C)   Pulse 75   Post-Hemodialysis Assessment   Post-Treatment Procedures Blood returned;Catheter capped, clamped and heparinized x 2 ports   Machine Disinfection Process Acid/Vinegar Clean;Heat Disinfect;Bleach;Exterior Machine Disinfection   Rinseback Volume (ml) 300 ml   Blood Volume Processed (Liters) 69.3 L   Dialyzer Clearance Lightly streaked   Duration of Treatment (minutes) 210 minutes   Heparin Amount Administered During Treatment (mL) 0 mL   Hemodialysis Intake (ml) 300 ml   Hemodialysis Output (ml) 2300 ml   NET Removed (ml) 2000   Tolerated Treatment Good   Patient Response to Treatment tolerated well, blood returned, cath care per policy/procedure, lines flushed, heparin instille ports capped

## 2024-08-11 PROBLEM — I50.43 ACUTE ON CHRONIC COMBINED SYSTOLIC AND DIASTOLIC HEART FAILURE (HCC): Status: ACTIVE | Noted: 2024-08-11

## 2024-08-11 LAB
ALBUMIN SERPL-MCNC: 2.9 G/DL (ref 3.5–5.2)
ALP SERPL-CCNC: 109 U/L (ref 35–104)
ALT SERPL-CCNC: 30 U/L (ref 0–32)
AMMONIA PLAS-SCNC: 13 UMOL/L (ref 11–51)
ANION GAP SERPL CALCULATED.3IONS-SCNC: 10 MMOL/L (ref 7–16)
AST SERPL-CCNC: 27 U/L (ref 0–31)
BASOPHILS # BLD: 0.02 K/UL (ref 0–0.2)
BASOPHILS # BLD: 0.02 K/UL (ref 0–0.2)
BASOPHILS NFR BLD: 0 % (ref 0–2)
BASOPHILS NFR BLD: 0 % (ref 0–2)
BILIRUB DIRECT SERPL-MCNC: <0.2 MG/DL (ref 0–0.3)
BILIRUB INDIRECT SERPL-MCNC: ABNORMAL MG/DL (ref 0–1)
BILIRUB SERPL-MCNC: 0.4 MG/DL (ref 0–1.2)
BNP SERPL-MCNC: ABNORMAL PG/ML (ref 0–450)
BUN SERPL-MCNC: 15 MG/DL (ref 6–23)
CALCIUM SERPL-MCNC: 8.6 MG/DL (ref 8.6–10.2)
CHLORIDE SERPL-SCNC: 96 MMOL/L (ref 98–107)
CO2 SERPL-SCNC: 27 MMOL/L (ref 22–29)
CREAT SERPL-MCNC: 2.2 MG/DL (ref 0.5–1)
EOSINOPHIL # BLD: 0.05 K/UL (ref 0.05–0.5)
EOSINOPHIL # BLD: 0.07 K/UL (ref 0.05–0.5)
EOSINOPHILS RELATIVE PERCENT: 1 % (ref 0–6)
EOSINOPHILS RELATIVE PERCENT: 1 % (ref 0–6)
ERYTHROCYTE [DISTWIDTH] IN BLOOD BY AUTOMATED COUNT: 19.7 % (ref 11.5–15)
ERYTHROCYTE [DISTWIDTH] IN BLOOD BY AUTOMATED COUNT: 19.9 % (ref 11.5–15)
FERRITIN SERPL-MCNC: 3894 NG/ML
GFR, ESTIMATED: 22 ML/MIN/1.73M2
GLUCOSE SERPL-MCNC: 87 MG/DL (ref 74–99)
HCT VFR BLD AUTO: 22.8 % (ref 34–48)
HCT VFR BLD AUTO: 23.6 % (ref 34–48)
HGB BLD-MCNC: 7.3 G/DL (ref 11.5–15.5)
HGB BLD-MCNC: 7.4 G/DL (ref 11.5–15.5)
IMM GRANULOCYTES # BLD AUTO: <0.03 K/UL (ref 0–0.58)
IMM GRANULOCYTES # BLD AUTO: <0.03 K/UL (ref 0–0.58)
IMM GRANULOCYTES NFR BLD: 0 % (ref 0–5)
IMM GRANULOCYTES NFR BLD: 0 % (ref 0–5)
INR PPP: 1.5
IRON SATN MFR SERPL: 54 % (ref 15–50)
IRON SERPL-MCNC: 69 UG/DL (ref 37–145)
LYMPHOCYTES NFR BLD: 0.86 K/UL (ref 1.5–4)
LYMPHOCYTES NFR BLD: 1.02 K/UL (ref 1.5–4)
LYMPHOCYTES RELATIVE PERCENT: 13 % (ref 20–42)
LYMPHOCYTES RELATIVE PERCENT: 13 % (ref 20–42)
MAGNESIUM SERPL-MCNC: 1.8 MG/DL (ref 1.6–2.6)
MCH RBC QN AUTO: 33.5 PG (ref 26–35)
MCH RBC QN AUTO: 34.4 PG (ref 26–35)
MCHC RBC AUTO-ENTMCNC: 31.4 G/DL (ref 32–34.5)
MCHC RBC AUTO-ENTMCNC: 32 G/DL (ref 32–34.5)
MCV RBC AUTO: 106.8 FL (ref 80–99.9)
MCV RBC AUTO: 107.5 FL (ref 80–99.9)
MONOCYTES NFR BLD: 0.58 K/UL (ref 0.1–0.95)
MONOCYTES NFR BLD: 0.66 K/UL (ref 0.1–0.95)
MONOCYTES NFR BLD: 9 % (ref 2–12)
MONOCYTES NFR BLD: 9 % (ref 2–12)
NEUTROPHILS NFR BLD: 77 % (ref 43–80)
NEUTROPHILS NFR BLD: 77 % (ref 43–80)
NEUTS SEG NFR BLD: 5.22 K/UL (ref 1.8–7.3)
NEUTS SEG NFR BLD: 5.9 K/UL (ref 1.8–7.3)
PLATELET # BLD AUTO: 122 K/UL (ref 130–450)
PLATELET # BLD AUTO: 123 K/UL (ref 130–450)
PMV BLD AUTO: 12.3 FL (ref 7–12)
PMV BLD AUTO: 12.4 FL (ref 7–12)
POTASSIUM SERPL-SCNC: 3.5 MMOL/L (ref 3.5–5)
PROT SERPL-MCNC: 5.4 G/DL (ref 6.4–8.3)
PROTHROMBIN TIME: 16.7 SEC (ref 9.3–12.4)
RBC # BLD AUTO: 2.12 M/UL (ref 3.5–5.5)
RBC # BLD AUTO: 2.21 M/UL (ref 3.5–5.5)
SODIUM SERPL-SCNC: 133 MMOL/L (ref 132–146)
T4 FREE SERPL-MCNC: 1.4 NG/DL (ref 0.9–1.7)
TIBC SERPL-MCNC: 127 UG/DL (ref 250–450)
TSH SERPL DL<=0.05 MIU/L-ACNC: 15.68 UIU/ML (ref 0.27–4.2)
WBC OTHER # BLD: 6.8 K/UL (ref 4.5–11.5)
WBC OTHER # BLD: 7.7 K/UL (ref 4.5–11.5)

## 2024-08-11 PROCEDURE — 36415 COLL VENOUS BLD VENIPUNCTURE: CPT

## 2024-08-11 PROCEDURE — 80053 COMPREHEN METABOLIC PANEL: CPT

## 2024-08-11 PROCEDURE — 84443 ASSAY THYROID STIM HORMONE: CPT

## 2024-08-11 PROCEDURE — 2580000003 HC RX 258: Performed by: FAMILY MEDICINE

## 2024-08-11 PROCEDURE — 99232 SBSQ HOSP IP/OBS MODERATE 35: CPT | Performed by: INTERNAL MEDICINE

## 2024-08-11 PROCEDURE — 96366 THER/PROPH/DIAG IV INF ADDON: CPT

## 2024-08-11 PROCEDURE — 6370000000 HC RX 637 (ALT 250 FOR IP): Performed by: FAMILY MEDICINE

## 2024-08-11 PROCEDURE — 82728 ASSAY OF FERRITIN: CPT

## 2024-08-11 PROCEDURE — 84207 ASSAY OF VITAMIN B-6: CPT

## 2024-08-11 PROCEDURE — 93284 PRGRMG EVAL IMPLANTABLE DFB: CPT | Performed by: INTERNAL MEDICINE

## 2024-08-11 PROCEDURE — 6360000002 HC RX W HCPCS: Performed by: FAMILY MEDICINE

## 2024-08-11 PROCEDURE — 84425 ASSAY OF VITAMIN B-1: CPT

## 2024-08-11 PROCEDURE — 36556 INSERT NON-TUNNEL CV CATH: CPT

## 2024-08-11 PROCEDURE — 96365 THER/PROPH/DIAG IV INF INIT: CPT

## 2024-08-11 PROCEDURE — 82140 ASSAY OF AMMONIA: CPT

## 2024-08-11 PROCEDURE — G0378 HOSPITAL OBSERVATION PER HR: HCPCS

## 2024-08-11 PROCEDURE — 83735 ASSAY OF MAGNESIUM: CPT

## 2024-08-11 PROCEDURE — 83550 IRON BINDING TEST: CPT

## 2024-08-11 PROCEDURE — 84439 ASSAY OF FREE THYROXINE: CPT

## 2024-08-11 PROCEDURE — 85025 COMPLETE CBC W/AUTO DIFF WBC: CPT

## 2024-08-11 PROCEDURE — 83880 ASSAY OF NATRIURETIC PEPTIDE: CPT

## 2024-08-11 PROCEDURE — 82248 BILIRUBIN DIRECT: CPT

## 2024-08-11 PROCEDURE — 99222 1ST HOSP IP/OBS MODERATE 55: CPT | Performed by: INTERNAL MEDICINE

## 2024-08-11 PROCEDURE — 85610 PROTHROMBIN TIME: CPT

## 2024-08-11 PROCEDURE — APPSS60 APP SPLIT SHARED TIME 46-60 MINUTES

## 2024-08-11 PROCEDURE — 83540 ASSAY OF IRON: CPT

## 2024-08-11 PROCEDURE — 99223 1ST HOSP IP/OBS HIGH 75: CPT | Performed by: INTERNAL MEDICINE

## 2024-08-11 PROCEDURE — 94640 AIRWAY INHALATION TREATMENT: CPT

## 2024-08-11 PROCEDURE — 2700000000 HC OXYGEN THERAPY PER DAY

## 2024-08-11 PROCEDURE — 6360000002 HC RX W HCPCS: Performed by: INTERNAL MEDICINE

## 2024-08-11 PROCEDURE — 6370000000 HC RX 637 (ALT 250 FOR IP): Performed by: INTERNAL MEDICINE

## 2024-08-11 RX ORDER — AMIODARONE HYDROCHLORIDE 200 MG/1
200 TABLET ORAL DAILY
Status: DISCONTINUED | OUTPATIENT
Start: 2024-08-11 | End: 2024-08-17

## 2024-08-11 RX ORDER — MAGNESIUM SULFATE IN WATER 40 MG/ML
2000 INJECTION, SOLUTION INTRAVENOUS ONCE
Status: COMPLETED | OUTPATIENT
Start: 2024-08-11 | End: 2024-08-11

## 2024-08-11 RX ADMIN — APIXABAN 2.5 MG: 2.5 TABLET, FILM COATED ORAL at 21:02

## 2024-08-11 RX ADMIN — MEXILETINE HYDROCHLORIDE 150 MG: 150 CAPSULE ORAL at 21:03

## 2024-08-11 RX ADMIN — MAGNESIUM SULFATE HEPTAHYDRATE 2000 MG: 40 INJECTION, SOLUTION INTRAVENOUS at 10:58

## 2024-08-11 RX ADMIN — MEXILETINE HYDROCHLORIDE 150 MG: 150 CAPSULE ORAL at 05:51

## 2024-08-11 RX ADMIN — ATORVASTATIN CALCIUM 40 MG: 40 TABLET, FILM COATED ORAL at 08:22

## 2024-08-11 RX ADMIN — MEXILETINE HYDROCHLORIDE 150 MG: 150 CAPSULE ORAL at 15:07

## 2024-08-11 RX ADMIN — AMIODARONE HYDROCHLORIDE 200 MG: 200 TABLET ORAL at 21:02

## 2024-08-11 RX ADMIN — METOPROLOL SUCCINATE 12.5 MG: 25 TABLET, EXTENDED RELEASE ORAL at 08:15

## 2024-08-11 RX ADMIN — ARFORMOTEROL TARTRATE: 15 SOLUTION RESPIRATORY (INHALATION) at 17:13

## 2024-08-11 RX ADMIN — LEVOTHYROXINE SODIUM 25 MCG: 0.03 TABLET ORAL at 05:51

## 2024-08-11 RX ADMIN — ARFORMOTEROL TARTRATE: 15 SOLUTION RESPIRATORY (INHALATION) at 09:03

## 2024-08-11 RX ADMIN — PANTOPRAZOLE SODIUM 40 MG: 40 TABLET, DELAYED RELEASE ORAL at 05:51

## 2024-08-11 RX ADMIN — Medication 5 MG: at 21:01

## 2024-08-11 RX ADMIN — BUMETANIDE 2 MG: 1 TABLET ORAL at 08:17

## 2024-08-11 RX ADMIN — SACUBITRIL AND VALSARTAN 0.5 TABLET: 24; 26 TABLET, FILM COATED ORAL at 08:14

## 2024-08-11 RX ADMIN — APIXABAN 2.5 MG: 2.5 TABLET, FILM COATED ORAL at 08:16

## 2024-08-11 RX ADMIN — SODIUM CHLORIDE, PRESERVATIVE FREE 10 ML: 5 INJECTION INTRAVENOUS at 08:15

## 2024-08-11 RX ADMIN — SODIUM CHLORIDE, PRESERVATIVE FREE 5 ML: 5 INJECTION INTRAVENOUS at 21:04

## 2024-08-11 RX ADMIN — POLYETHYLENE GLYCOL 3350 17 G: 17 POWDER, FOR SOLUTION ORAL at 08:17

## 2024-08-11 RX ADMIN — ASPIRIN 81 MG: 81 TABLET, COATED ORAL at 08:17

## 2024-08-11 RX ADMIN — HYDRALAZINE HYDROCHLORIDE 10 MG: 10 TABLET, FILM COATED ORAL at 08:15

## 2024-08-11 RX ADMIN — ISOSORBIDE DINITRATE 5 MG: 10 TABLET ORAL at 08:22

## 2024-08-11 NOTE — PROGRESS NOTES
HOSPITALIST PROGRESS NOTES     ADMITTING DATE AND TIME: 8/9/2024  6:23 PM  had concerns including Leg Swelling (Patient states that her daughter noticed her lower extremities were swollen. Patient states no sob and no chest pain).    ADMIT DX: Abdominal pain, left upper quadrant [R10.12]  Acute on chronic combined systolic and diastolic heart failure (HCC) [I50.43]  Lower extremity edema [R60.0]  ESRD on dialysis (HCC) [N18.6, Z99.2]  Bilateral lower extremity edema [R60.0]  Heel pain, bilateral [M79.671, M79.672]      SUBJECTIVE:  Patient is being followed for Abdominal pain, left upper quadrant [R10.12]  Acute on chronic combined systolic and diastolic heart failure (HCC) [I50.43]  Lower extremity edema [R60.0]  ESRD on dialysis (HCC) [N18.6, Z99.2]  Bilateral lower extremity edema [R60.0]  Heel pain, bilateral [M79.671, M79.672]     Patient was seen examined and evaluated  Recent lab results, charts and pertinent diagnostic imaging reviewed   Ancillary service notes reviewed   NAD, wants me to go Taoism   Aox 3    Care reviewed with nursing staff, medical and surgical specialty care, primary care and the patient's family as available.   ROS: denies fever, chills, cp, sob, n/v, HA unless stated above.      sodium chloride flush  5-40 mL IntraVENous 2 times per day    apixaban  2.5 mg Oral BID    aspirin  81 mg Oral Daily    atorvastatin  40 mg Oral Daily    arformoterol 15 mcg-budesonide 0.25 mg neb solution   Nebulization BID RT    bumetanide  2 mg Oral Daily    [START ON 8/12/2024] epoetin carmelina-epbx  2,000 Units SubCUTAneous Once per day on Monday Wednesday Friday    hydrALAZINE  10 mg Oral 2 times per day    isosorbide dinitrate  5 mg Oral BID    levothyroxine  25 mcg Oral Daily    melatonin  5 mg Oral Nightly    metoprolol succinate  12.5 mg Oral Daily    mexiletine   technology due to software limitations. Not all errors are caught and corrected. If there are questions or concerns about the content of this note or information contained within the body of this dictation they should be addressed directly with the author for clarification.    Electronically signed by Lalo Glover MD on 8/11/2024 at 9:32 AM

## 2024-08-11 NOTE — CONSULTS
Inpatient Cardiology Consultation      Reason for Consult: ADHF    Consulting Physician: Dr. Vyas    Requesting Physician:  Lalo Glover MD    Date of Consultation: 8/11/2024    HISTORY OF PRESENT ILLNESS:   Patient is an 84-year-old female who is primarily known to Dr. Mclaughlin in [facility].  She has been seen by [facility] cardiology for inpatient consultation most recently by Dr. Vyas 7/31/2024 for syncope with history of HFrEF.  Syncope deemed secondary to VT patient was seen by EP.    HPI:  Patient presented to ER 8/9/2024 with complaints of bilateral heel pain, left upper quadrant abdominal pain and lower extremity edema.  She had dialysis earlier that day were only 0.3 L of fluid removed and her dialysis was limited due to hypotension.  Patient admitted for acute on chronic heart failure.  Nephrology following and managing dialysis.  Patient having waxing waning mentation with suspicion for underlying dementia.  Amiodarone held by primary service 2/2 prolonged QTc of 552.  Patient with elevated TSH and normal T4 free.  Patient with soft blood pressure throughout.  VS upon arrival 97.5, 15 respirations, 75 pulse, 101/55, 95% on 4 L nasal cannula  Labs: Potassium 3.5, chloride 96, BUN 15, creatinine 2.6 > 2.2, GFR 22, magnesium 1.8, lactate 1.8, glucose 87, calcium 8.6, total protein 5.4, BNP 36,900 > 37,800 (7/23: >70,000), troponin 81 > 82 (7/30: 78 > 74), albumin 2.9, alk phos 109, TSH 15.68, T4 free 1.4, WBC 6.8, H&H 7.5 > 7.3/22.5, platelet 123  CT chest: Small bilateral pleural effusions with adjacent atelectasis.  Cardiomegaly.  Calcified no evidence for pneumonia  CT abdomen: Diffuse colonic fecal retention.  Stable dilatation of distal abdominal aorta up to 5.6 cm and dilatation of proximal left iliac artery.  Small bilateral pleural effusions.  CXR: Cardiomegaly with pulmonary vascular congestion.  Mild basilar opacities and mild bilateral pleural effusion    Upon assessment today 8/11/2024      LIVER PROFILE:  Recent Labs     08/09/24  1914 08/11/24  1035   AST 23 27   ALT 25 30      Latest Reference Range & Units 08/11/24 10:35   TSH, 3rd Generation 0.27 - 4.20 uIU/mL 15.68 (H)   T4 Free 0.9 - 1.7 ng/dL 1.4   (H): Data is abnormally high   Latest Reference Range & Units 08/09/24 19:14 08/09/24 21:19 08/11/24 10:35   Pro-BNP 0 - 450 pg/mL 36,968 (H)  37,875 (H)   Troponin, High Sensitivity 0 - 9 ng/L 81 (H) 82 (H)    (H): Data is abnormally high    CT chest:  1. Small bilateral pleural effusions with adjacent atelectasis.  2. Cardiomegaly.  3. Calcified no evidence for pneumonia    CT abdomen:  1. Small bilateral pleural effusions with adjacent atelectasis.   2. Cardiomegaly.   3. Calcified no evidence for pneumonia     CXR:  1. Cardiomegaly with pulmonary vascular congestion.  2. Mild bibasilar opacities and mild bilateral pleural effusion.          Assessment and plan to follow as per Dr. Vyas.    Electronically signed by MADELAINE Mccallum CNP on 8/11/2024 at 1:02 PM       I have personally spent more than 51% of the total time involved in performing this consultation.              I have personally and separately seen and evaluated the patient.  I personally and separately obtained the history and performed the physical exam.  I personally reviewed all of the above labs, imaging, history, past medical history, social history, family history, surgical history, medications, review of systems, and data.  I reviewed available records.  All of the assessments and recommendations are personally from me.  I personally counseled and educated the patient and coordinated care with other healthcare professionals as needed.  I communicated the above and results to patient's family/caregiver if asked or needed.  All of the above cardiac medical decisions are personally from me.  Please see my additional contributions to the history, physical exam, assessment, and recommendations below.        History of

## 2024-08-11 NOTE — CONSULTS
I have personally spent more than 51% of the total time involved in performing this consultation.   I have personally and separately seen and evaluated the patient.  I personally and separately obtained the history and performed the physical exam.  I personally reviewed all of the above labs, imaging, history, past medical history, social history, family history, surgical history, medications, review of systems, and data.  I reviewed available records.  All of the assessments and recommendations are personally from me.  I personally counseled and educated the patient and coordinated care with other healthcare professionals as needed.  I communicated the above and results to patient's family/caregiver if asked or needed.  All of the above cardiac medical decisions are personally from me.  Please see my additional contributions to the history, physical exam, assessment, and recommendations below.      History of chief complaint:  She states that she presented due to increased lower extremity edema.  He did have some increase in her baseline shortness of breath as well.  No chest pain.    Review of systems:     Heart: as above   Lungs: as above   Eyes: denies changes in vision or discharge.    Ears: denies changes in hearing or pain.   Nose: denies epistaxis or masses   Throat: denies sore throat or trouble swallowing.    Neuro: denies numbness, tingling, tremors.   Skin: denies rashes or itching.   : denies hematuria, dysuria   GI: denies vomiting, diarrhea   Psych: denies mood changed, anxiety, depression.       Physical exam:  BP 98/63   Pulse 76   Temp 97 °F (36.1 °C) (Temporal)   Resp 18   SpO2 94%   Constitutional: She is not a great historian but she is A&O x3, communicates well, no acute distress.  Eyes: extraocular muscles intact, PERRL.  Normal lids & conjunctiva.  No icterus.   ENT: clear, no bleeding.  No external masses.  Lips normal formation.  Neck: supple, full ROM,? JVD, no bruits, no

## 2024-08-11 NOTE — CONSULTS
[facility]  MD Murtaza White MD Ali Hassan, MD Lisa Kniska, JUDE Beth, JOSE Fernandez CNP  Consultation    Patient's Name: Marge Callejas  7:48 PM  8/11/2024    Nephrologist: Murtaza Dawson MD    Reason for Consult: ESRD management, dyspnea  Requesting Physician:  Marek Andres DO    Chief Complaint: Dyspnea    History Obtained From: Patient, chart    History of Present Ilness:    Ms. Callejas is a pleasant 84-year-old woman well-known to service as we have followed her throughout her multiple admissions over the past several months.  She is followed longitudinally from nephrology standpoint by Dr. Burns as an outpatient.  She has a complicated past medical history as noted below but which includes ESRD.  She undergoes chronic intermittent hemodialysis 3 times per week through left upper extremity AV fistula.  She has CHF, systolic and diastolic, LVEF around 30%, as well as permanent atrial fibrillation, coronary artery disease, hypertension.  Over the past several months she was admitted multiple times for dyspnea typically associated with pulmonary edema, peripheral edema, tachycardia.  Extensive evaluation to be performed at that time.  She has been ultrafiltrated on hemodialysis to dry weight prior to every discharge and typically within 2 to 3 days she develops marked peripheral swelling and pulmonary edema.  She was just discharged on 8/5/2024 after a 6-day admission.    She presented yesterday a.m. with dyspnea.  We performed hemodialysis yesterday morning, accomplishing 2.3 L UF.  Symptoms improved immediately, though as of this afternoon she tells me she feels dyspneic.  No cough wheeze or infection.  No chest pain or palpitations.  No headache or lightheadedness.  Denies acute mental status change.  ROS otherwise unremarkable.    Past Medical History:   Diagnosis Date    Arthritis     Atrial fibrillation (HCC)     Blood  noted in HPI.  Otherwise unremarkable    Physical exam:   BP (!) 93/47   Pulse 75   Temp 97.3 °F (36.3 °C) (Temporal)   Resp 18   SpO2 94%   Age-appropriate pleasant -American woman in no apparent distress  NC/AT EOMI sclera conjunctiva clear and anicteric mucous membranes moist  Neck soft supple trachea midline no carotid bruit no JVD  Bilateral basilar crackles, very mild prolonged expiratory phase  Regular normal S1 and S2, no murmur no rub   abdomen soft nontender nondistended NABS no mass no hepatosplenomegaly  1-2+ edema distally in lower extremities and dependently in sacrum and lower back  Pulses 1-2+ x4  No rash or lesion on visible portions of integument  Moves all 4 extremities  Cranial nerves II through XII grossly intact  Awake, alert, interactive and appropriate        Data:   Labs:  CBC with Differential:    Lab Results   Component Value Date/Time    WBC 6.8 08/11/2024 10:35 AM    RBC 2.12 08/11/2024 10:35 AM    HGB 7.3 08/11/2024 10:35 AM    HCT 22.8 08/11/2024 10:35 AM     08/11/2024 10:35 AM    .5 08/11/2024 10:35 AM    MCH 34.4 08/11/2024 10:35 AM    MCHC 32.0 08/11/2024 10:35 AM    RDW 19.7 08/11/2024 10:35 AM    NRBC 1 08/05/2024 06:10 AM    METASPCT 1 07/30/2024 12:37 PM    LYMPHOPCT 13 08/11/2024 10:35 AM    MONOPCT 9 08/11/2024 10:35 AM    MYELOPCT 1 07/05/2024 05:58 AM    MYELOPCT 0.9 04/03/2021 04:44 AM    EOSPCT 1 08/11/2024 10:35 AM    BASOPCT 0 08/11/2024 10:35 AM    MONOSABS 0.58 08/11/2024 10:35 AM    LYMPHSABS 0.86 08/11/2024 10:35 AM    EOSABS 0.05 08/11/2024 10:35 AM    BASOSABS 0.02 08/11/2024 10:35 AM     CMP:    Lab Results   Component Value Date/Time     08/11/2024 04:18 AM    K 3.5 08/11/2024 04:18 AM    K 4.5 05/15/2021 10:58 AM    CL 96 08/11/2024 04:18 AM    CO2 27 08/11/2024 04:18 AM    BUN 15 08/11/2024 04:18 AM    CREATININE 2.2 08/11/2024 04:18 AM    GFRAA 15 02/02/2022 10:26 AM    LABGLOM 22 08/11/2024 04:18 AM    LABGLOM 14 04/29/2024

## 2024-08-11 NOTE — CONSULTS
[facility]  Consultation Report  PATIENT: Marge Callejas  MEDICAL RECORD NUMBER: [id number]  DATE OF SERVICE:  8/11/2024  ATTENDING ELECTROPHYSIOLOGIST: Juliann Madrid MD  PRIMARY ELECTROPHYSIOLOGIST: Dr Lara  REFERRING PHYSICIAN: No ref. provider found and Marek Andres DO  CHIEF COMPLAINT: Dyspnea and leg edema    HPI: This is a 84 y.o. female with a history of chronic HFrEF, mixed ischemic and nonischemic cardiomyopathy, CRT-D in situ placed for primary prevention in April 2022, coronary artery disease, persistent atrial fibrillation, chronic RBBB/LAFB, AAA, hypertension, hyperlipidemia, ESRD on HD, COPD, chronic hypoxic respiratory failure, CVA and GERD. The patient has been in and out of the hospital and nursing home since April 2024 when she had HZV infection .    She was  hospitalized at [facility] in June 2024 due to multiple ICD discharges when she was placed on amiodarone.  She has chronic systolic heart failure, NYHA class 3, with severe systolic dysfunction, secondary to an ischemic cardiomyopathy due to a prior inferolateral MI. Echocardiogram in July 2021 revealed ejection fraction was 47 percent with severe hypokinesis of the qitta-ab-wed inferior and posterolateral walls. Pharmacologic vasodilator nuclear stress test in August 2021 showed a vnhme-ni-rat inferior and lateral scar without reversible ischemia. Ejection fraction was 30 percent. She underwent right- and left-heart catheterization and coronary angiography in August 2021. Cardiac catheterization showed (in mm Hg): mean right atrium = 6; right ventricle end-diastolic = 4; pulmonary artery = 59/18 (mean = 35), consistent with moderate pulmonary hypertension; mean pulmonary capillary wedge = 16; and left ventricle end-diastolic = 17. Cardiac index was 2.4 L/min/m^2 by the Raphael method. Coronary angiography showed a subtotal 90-percent stenosis of the right coronary  most appropriate final programming to provide for consistent delivery of the appropriate therapy and to verify function of the device.     I have independently reviewed all of the ECGs and rhythm strips per above     Assessment/Plan: This is a 84 y.o. female with a history of     Abnormal EKG--atrial fibrillation with biventricular pacing  QTc prolonged secondary to amiodarone but <500 ms corrected for QRS prolongation due to pacing      CRT-D in situ--Che Molina Luis DONNELL  DOI: 4/29/22.  Appropriate device function noted    3. Sustained ventricular tachycardia  Patient hospitalized in June at [facility] and thereafter at [facility] for recurrent episodes of VT treated with ICD discharges and ATP therapies  Patient evaluated by cardiology and electrophysiology.  Device reprogrammed and patient started on amiodarone therapy     4. Persistent atrial fibrillation  - [id number] of >3  Patient on Eliquis for systemic anticoagulation  On Toprol for rate control    5. Chronic HFrEF and mixed ischemic and nonischemic cardiomyopathy  - TTE: 7/28/2015: LVEF 29%.  - TTE: 11/21/2016: LVEF 20-25%.  - TTE: 11/4/2019: LVEF 20%.  - TTE: 4/9/2021: LVEF 30%.   - TTE: 7/31/2024: LVEF of 20 - 25%.  - GDMT limited due to renal failure and hypotension     6. Coronary artery disease  -See results of coronary angiography and stress testing above  Patient has no symptoms related to CAD i.e. chest discomfort     7. AAA  - 5.4 cm saccular aneurysm of the distal abdominal aorta      8. Hypertension     9. Hyperlipidemia     10. ESRD on HD     11. COPD   Chronic hypoxic respiratory failure     12. CVA   - Remote basal ganglia lacunar infarct on CT head 7/24/2015.      13. Anemia    Recommendations:    Resume amiodarone 200 mg daily to prevent recurrent episodes of VT  2.   No episodes of ventricular tachycardia documented by her device since her last discharge from the hospital and therefore no other intervention from EP standpoint needed  3.  Treatment

## 2024-08-12 LAB
ALBUMIN SERPL-MCNC: 3.1 G/DL (ref 3.5–5.2)
ALP SERPL-CCNC: 110 U/L (ref 35–104)
ALT SERPL-CCNC: 27 U/L (ref 0–32)
ANION GAP SERPL CALCULATED.3IONS-SCNC: 12 MMOL/L (ref 7–16)
AST SERPL-CCNC: 23 U/L (ref 0–31)
BASOPHILS # BLD: 0.02 K/UL (ref 0–0.2)
BASOPHILS NFR BLD: 0 % (ref 0–2)
BILIRUB SERPL-MCNC: 0.4 MG/DL (ref 0–1.2)
BUN SERPL-MCNC: 22 MG/DL (ref 6–23)
CALCIUM SERPL-MCNC: 8.8 MG/DL (ref 8.6–10.2)
CHLORIDE SERPL-SCNC: 94 MMOL/L (ref 98–107)
CO2 SERPL-SCNC: 25 MMOL/L (ref 22–29)
CREAT SERPL-MCNC: 3.3 MG/DL (ref 0.5–1)
EOSINOPHIL # BLD: 0.13 K/UL (ref 0.05–0.5)
EOSINOPHILS RELATIVE PERCENT: 2 % (ref 0–6)
ERYTHROCYTE [DISTWIDTH] IN BLOOD BY AUTOMATED COUNT: 19.9 % (ref 11.5–15)
GFR, ESTIMATED: 13 ML/MIN/1.73M2
GLUCOSE SERPL-MCNC: 109 MG/DL (ref 74–99)
HCT VFR BLD AUTO: 24.5 % (ref 34–48)
HGB BLD-MCNC: 7.5 G/DL (ref 11.5–15.5)
IMM GRANULOCYTES # BLD AUTO: 0.03 K/UL (ref 0–0.58)
IMM GRANULOCYTES NFR BLD: 0 % (ref 0–5)
LYMPHOCYTES NFR BLD: 1.24 K/UL (ref 1.5–4)
LYMPHOCYTES RELATIVE PERCENT: 18 % (ref 20–42)
MAGNESIUM SERPL-MCNC: 2.4 MG/DL (ref 1.6–2.6)
MCH RBC QN AUTO: 33.5 PG (ref 26–35)
MCHC RBC AUTO-ENTMCNC: 30.6 G/DL (ref 32–34.5)
MCV RBC AUTO: 109.4 FL (ref 80–99.9)
MONOCYTES NFR BLD: 0.69 K/UL (ref 0.1–0.95)
MONOCYTES NFR BLD: 10 % (ref 2–12)
NEUTROPHILS NFR BLD: 69 % (ref 43–80)
NEUTS SEG NFR BLD: 4.69 K/UL (ref 1.8–7.3)
PATH REV BLD -IMP: NORMAL
PHOSPHATE SERPL-MCNC: 4 MG/DL (ref 2.5–4.5)
PLATELET # BLD AUTO: 127 K/UL (ref 130–450)
PMV BLD AUTO: 12.7 FL (ref 7–12)
POTASSIUM SERPL-SCNC: 3.7 MMOL/L (ref 3.5–5)
PROT SERPL-MCNC: 5.7 G/DL (ref 6.4–8.3)
RBC # BLD AUTO: 2.24 M/UL (ref 3.5–5.5)
SODIUM SERPL-SCNC: 131 MMOL/L (ref 132–146)
WBC OTHER # BLD: 6.8 K/UL (ref 4.5–11.5)

## 2024-08-12 PROCEDURE — 83735 ASSAY OF MAGNESIUM: CPT

## 2024-08-12 PROCEDURE — 6360000002 HC RX W HCPCS: Performed by: FAMILY MEDICINE

## 2024-08-12 PROCEDURE — 2700000000 HC OXYGEN THERAPY PER DAY

## 2024-08-12 PROCEDURE — 94640 AIRWAY INHALATION TREATMENT: CPT

## 2024-08-12 PROCEDURE — 36415 COLL VENOUS BLD VENIPUNCTURE: CPT

## 2024-08-12 PROCEDURE — 99232 SBSQ HOSP IP/OBS MODERATE 35: CPT | Performed by: INTERNAL MEDICINE

## 2024-08-12 PROCEDURE — G0378 HOSPITAL OBSERVATION PER HR: HCPCS

## 2024-08-12 PROCEDURE — 97161 PT EVAL LOW COMPLEX 20 MIN: CPT

## 2024-08-12 PROCEDURE — 6370000000 HC RX 637 (ALT 250 FOR IP): Performed by: FAMILY MEDICINE

## 2024-08-12 PROCEDURE — 6370000000 HC RX 637 (ALT 250 FOR IP)

## 2024-08-12 PROCEDURE — 6370000000 HC RX 637 (ALT 250 FOR IP): Performed by: INTERNAL MEDICINE

## 2024-08-12 PROCEDURE — 1200000000 HC SEMI PRIVATE

## 2024-08-12 PROCEDURE — 80053 COMPREHEN METABOLIC PANEL: CPT

## 2024-08-12 PROCEDURE — 90935 HEMODIALYSIS ONE EVALUATION: CPT

## 2024-08-12 PROCEDURE — 84100 ASSAY OF PHOSPHORUS: CPT

## 2024-08-12 PROCEDURE — 97165 OT EVAL LOW COMPLEX 30 MIN: CPT

## 2024-08-12 PROCEDURE — 99232 SBSQ HOSP IP/OBS MODERATE 35: CPT | Performed by: STUDENT IN AN ORGANIZED HEALTH CARE EDUCATION/TRAINING PROGRAM

## 2024-08-12 PROCEDURE — 2580000003 HC RX 258: Performed by: FAMILY MEDICINE

## 2024-08-12 PROCEDURE — 85025 COMPLETE CBC W/AUTO DIFF WBC: CPT

## 2024-08-12 RX ORDER — MIDODRINE HYDROCHLORIDE 5 MG/1
5 TABLET ORAL
Status: DISCONTINUED | OUTPATIENT
Start: 2024-08-12 | End: 2024-08-18 | Stop reason: SDUPTHER

## 2024-08-12 RX ORDER — LEVOTHYROXINE SODIUM 50 UG/1
50 TABLET ORAL DAILY
Status: DISCONTINUED | OUTPATIENT
Start: 2024-08-13 | End: 2024-08-29 | Stop reason: HOSPADM

## 2024-08-12 RX ADMIN — SODIUM CHLORIDE, PRESERVATIVE FREE 10 ML: 5 INJECTION INTRAVENOUS at 09:30

## 2024-08-12 RX ADMIN — MIDODRINE HYDROCHLORIDE 5 MG: 5 TABLET ORAL at 16:39

## 2024-08-12 RX ADMIN — LEVOTHYROXINE SODIUM 25 MCG: 0.03 TABLET ORAL at 06:37

## 2024-08-12 RX ADMIN — ARFORMOTEROL TARTRATE: 15 SOLUTION RESPIRATORY (INHALATION) at 09:13

## 2024-08-12 RX ADMIN — ASPIRIN 81 MG: 81 TABLET, COATED ORAL at 09:30

## 2024-08-12 RX ADMIN — APIXABAN 2.5 MG: 2.5 TABLET, FILM COATED ORAL at 09:30

## 2024-08-12 RX ADMIN — APIXABAN 2.5 MG: 2.5 TABLET, FILM COATED ORAL at 21:35

## 2024-08-12 RX ADMIN — Medication 5 MG: at 21:35

## 2024-08-12 RX ADMIN — MEXILETINE HYDROCHLORIDE 150 MG: 150 CAPSULE ORAL at 09:30

## 2024-08-12 RX ADMIN — ARFORMOTEROL TARTRATE: 15 SOLUTION RESPIRATORY (INHALATION) at 22:07

## 2024-08-12 RX ADMIN — MEXILETINE HYDROCHLORIDE 150 MG: 150 CAPSULE ORAL at 06:37

## 2024-08-12 RX ADMIN — AMIODARONE HYDROCHLORIDE 200 MG: 200 TABLET ORAL at 09:30

## 2024-08-12 RX ADMIN — EPOETIN ALFA-EPBX 2000 UNITS: 2000 INJECTION, SOLUTION INTRAVENOUS; SUBCUTANEOUS at 16:39

## 2024-08-12 RX ADMIN — ATORVASTATIN CALCIUM 40 MG: 40 TABLET, FILM COATED ORAL at 09:30

## 2024-08-12 RX ADMIN — SODIUM CHLORIDE, PRESERVATIVE FREE 5 ML: 5 INJECTION INTRAVENOUS at 21:23

## 2024-08-12 RX ADMIN — PANTOPRAZOLE SODIUM 40 MG: 40 TABLET, DELAYED RELEASE ORAL at 06:37

## 2024-08-12 NOTE — PROGRESS NOTES
[facility]  MD Murtaza White, MD Krista Mendoza, CNP   Lucille Beth, JOSE Fernandez, JUDE  Progress Note    8/12/2024    SUBJECTIVE:   8/12 : Laying in bed, no acute distress. Daughter is at the bedside. For dialysis this afternoon. She denies any dyspnea. She is edematous. Blood pressure is low.     PROBLEM LIST:    Principal Problem:    Lower extremity edema  Active Problems:    Acute on chronic combined systolic and diastolic heart failure (HCC)  Resolved Problems:    * No resolved hospital problems. *         DIET:    ADULT DIET; Regular; Low Sodium (2 gm); Low Potassium (Less than 3000 mg/day)     MEDS (scheduled):    [START ON 8/13/2024] levothyroxine  50 mcg Oral Daily    midodrine  5 mg Oral TID WC    amiodarone  200 mg Oral Daily    sodium chloride flush  5-40 mL IntraVENous 2 times per day    apixaban  2.5 mg Oral BID    aspirin  81 mg Oral Daily    atorvastatin  40 mg Oral Daily    arformoterol 15 mcg-budesonide 0.25 mg neb solution   Nebulization BID RT    [Held by provider] bumetanide  2 mg Oral Daily    epoetin carmelina-epbx  2,000 Units SubCUTAneous Once per day on Monday Wednesday Friday    melatonin  5 mg Oral Nightly    metoprolol succinate  12.5 mg Oral Daily    [Held by provider] mexiletine  150 mg Oral 3 times per day    pantoprazole  40 mg Oral QAM AC    polyethylene glycol  17 g Oral Daily    sacubitril-valsartan  0.5 tablet Oral BID       MEDS (infusions):   sodium chloride         MEDS (prn):  sodium chloride flush, sodium chloride, polyethylene glycol, acetaminophen **OR** acetaminophen, benzocaine-menthol, benzonatate, bisacodyl, calcium carbonate, ipratropium 0.5 mg-albuterol 2.5 mg, lactulose, magnesium hydroxide, meclizine, Polyvinyl Alcohol-Povidone PF, sennosides-docusate sodium, sodium chloride, prochlorperazine    PHYSICAL EXAM:     Patient Vitals for the past 24 hrs:   BP Temp Temp src Pulse Resp SpO2   08/12/24 1557  107/61 97.8 °F (36.6 °C) Temporal 76 16 95 %   08/12/24 1217 (!) 87/52 98 °F (36.7 °C) Temporal 75 16 99 %   08/12/24 0914 -- -- -- -- -- 100 %   08/12/24 0756 (!) 98/58 98.3 °F (36.8 °C) Temporal 75 16 98 %   08/12/24 0400 (!) 93/54 98.5 °F (36.9 °C) Temporal 74 16 99 %   08/11/24 2318 (!) 93/58 98.2 °F (36.8 °C) Temporal 79 16 95 %   08/11/24 2045 (!) 98/51 97.9 °F (36.6 °C) Temporal 75 16 98 %   @      Intake/Output Summary (Last 24 hours) at 8/12/2024 1645  Last data filed at 8/12/2024 1410  Gross per 24 hour   Intake 300 ml   Output 0 ml   Net 300 ml         Wt Readings from Last 3 Encounters:   08/05/24 58.7 kg (129 lb 6.6 oz)   07/29/24 57.6 kg (126 lb 15.8 oz)   07/07/24 63 kg (139 lb)       Constitutional:  in no acute distress  HEENT: NC/AT, EOMI, sclera and conjunctiva are clear and anicteric, mucus membranes moist  Neck: Trachea midline, no JVD  Cardiovascular: S1, S2 regular rhythm, no murmur,or rub  Respiratory:  CTAB in the upper lobes, fine bibasilar crackles.  No wheeze  Gastrointestinal:  Soft, nontender, nondistended, NABS  Ext: 2+ dependent edema, feet warm  Skin: dry, no rash  Neuro: awake, alert, interactive      DATA:    Recent Labs     08/11/24 0418 08/11/24  1035 08/12/24  0726   WBC 7.7 6.8 6.8   HGB 7.4* 7.3* 7.5*   HCT 23.6* 22.8* 24.5*   .8* 107.5* 109.4*   * 123* 127*     Recent Labs     08/09/24  1914 08/10/24  1022 08/11/24 0418 08/11/24  1035 08/12/24  0726    132 133  --  131*   K 3.7 3.7 3.5  --  3.7   CL 93* 93* 96*  --  94*   CO2 29 27 27  --  25   MG 1.9  --  1.8  --  2.4   PHOS 2.9  --   --   --  4.0   BUN 27* 29* 15  --  22   CREATININE 2.6* 3.3* 2.2*  --  3.3*   ALT 25  --   --  30 27   AST 23  --   --  27 23   BILIDIR  --   --   --  <0.2  --    BILITOT 0.3  --   --  0.4 0.4   ALKPHOS 114*  --   --  109* 110*       Lab Results   Component Value Date    LABPROT 0.3 (H) 04/11/2021    LABPROT 0.3 04/11/2021     Assessment     ESRD  Anemia due to

## 2024-08-12 NOTE — PROGRESS NOTES
[facility] Hospitalist Progress Note    Admitting Date and Time: 8/9/2024  6:23 PM  Admit Dx: Abdominal pain, left upper quadrant [R10.12]  Acute on chronic combined systolic and diastolic heart failure (HCC) [I50.43]  Lower extremity edema [R60.0]  ESRD on dialysis (HCC) [N18.6, Z99.2]  Bilateral lower extremity edema [R60.0]  Heel pain, bilateral [M79.671, M79.672]    Subjective:  Patient is being followed for Abdominal pain, left upper quadrant [R10.12]  Acute on chronic combined systolic and diastolic heart failure (HCC) [I50.43]  Lower extremity edema [R60.0]  ESRD on dialysis (HCC) [N18.6, Z99.2]  Bilateral lower extremity edema [R60.0]  Heel pain, bilateral [M79.671, M79.672]   Pt feels dyspenic, was seen before HD, looks slightly volume overloaded.  Per RN: No major concerns.    ROS: denies fever, chills, cp, sob, n/v, HA unless stated above.      amiodarone  200 mg Oral Daily    sodium chloride flush  5-40 mL IntraVENous 2 times per day    apixaban  2.5 mg Oral BID    aspirin  81 mg Oral Daily    atorvastatin  40 mg Oral Daily    arformoterol 15 mcg-budesonide 0.25 mg neb solution   Nebulization BID RT    [Held by provider] bumetanide  2 mg Oral Daily    epoetin carmelina-epbx  2,000 Units SubCUTAneous Once per day on Monday Wednesday Friday    levothyroxine  25 mcg Oral Daily    melatonin  5 mg Oral Nightly    metoprolol succinate  12.5 mg Oral Daily    mexiletine  150 mg Oral 3 times per day    pantoprazole  40 mg Oral QAM AC    polyethylene glycol  17 g Oral Daily    sacubitril-valsartan  0.5 tablet Oral BID     sodium chloride flush, 5-40 mL, PRN  sodium chloride, , PRN  polyethylene glycol, 17 g, Daily PRN  acetaminophen, 650 mg, Q6H PRN   Or  acetaminophen, 650 mg, Q6H PRN  benzocaine-menthol, 1 lozenge, Q2H PRN  benzonatate, 100 mg, TID PRN  bisacodyl, 10 mg, Daily PRN  calcium carbonate, 500 mg, TID PRN  ipratropium 0.5 mg-albuterol 2.5 mg, 1 Dose, Q6H PRN  lactulose, 10 g, Daily PRN  magnesium  Paroxysmal A-fib  Prolonged QTc  ICD in situ   Hold amiodarone, continue Eliquis  Telemetry monitoring     8.  Ambulatory dysfunction  Recurrent hospital admission  PT OT evaluation  Likely need placement    9. Hypotensive - held bumex, hydralazine,isordil, mexitil    10. Hypothyroidism - TSH elevated, increased dose of synthyroid     Disposition: Pending medical stability, precert & bed availability, [facility]   DVT prophylaxis - Eliquis      NOTE: This report was transcribed using voice recognition software. Every effort was made to ensure accuracy; however, inadvertent computerized transcription errors may be present.  Electronically signed by Misty Oseguera MD on 8/12/2024 at 1:45 PM

## 2024-08-12 NOTE — PROGRESS NOTES
Messaged attending thru perfect serve regarding medical advise on giving or holding off Mexitil dose for 2 PM with vitals trending low as recorded. Awaiting response. Aware about pt awaits HD.    Response: Hold.

## 2024-08-12 NOTE — PROGRESS NOTES
Informed the nurse assigned in the am to call lab [id number] with Steffanie regarding urine test. This RN is not aware about this test and was not endorsed.

## 2024-08-12 NOTE — CARE COORDINATION
08/12/24 Transition of care:  Pt admitted from ED for abdominal pain Pt is hypotensive Pt does HD nephrology consulted PT/OT pending Pt just d/c from here to [facility] Met with pt to confirm that the plan is to return Pt states she wants to go to [facility] if able Left message for Leticia to call back CM/SW to follow Electronically signed by Jarad Leyva RN CM on 8/12/2024 at 8:57 AM     11:20am Pt is not a bed hold at [facility] and pt returning there will be dependent on bed availability Electronically signed by Jarad Leyva RN CM on 8/12/2024 at 11:21 AM     2:54pm PT/OT contacted for onelia for precert [employer] attending UR recommends inpatient status Electronically signed by Jarad Leyva RN on 8/12/2024 at 2:55 PM

## 2024-08-12 NOTE — PROGRESS NOTES
Physical Therapy  Physical Therapy Initial Assessment     Name: Marge Callejas  : 1940  MRN: [id number]      Date of Service: 2024    Evaluating PT:  Estiven Elise PT, DPT    Room #:  8201/8201-A  Diagnosis:  Abdominal pain, left upper quadrant [R10.12]  Acute on chronic combined systolic and diastolic heart failure (HCC) [I50.43]  Lower extremity edema [R60.0]  ESRD on dialysis (HCC) [N18.6, Z99.2]  Bilateral lower extremity edema [R60.0]  Heel pain, bilateral [M79.671, M79.672]  PMHx/PSHx:  afib, arthritis, CHF, HLD, HTN  Procedure/Surgery:  N/A  Precautions:  fall risk, TSM, bed alarm, O2, [place]  Equipment Needs:  TBD    SUBJECTIVE:    Pt admitted from [facility]. Pt reports she is non-ambulatory PTA.    OBJECTIVE:   Initial Evaluation  Date: 24 Treatment Short Term/ Long Term   Goals   AM-PAC 6 Clicks 10/24     Was pt agreeable to Eval/treatment? yes     Does pt have pain? Unrated RLE     Bed Mobility  Rolling: min A  Supine to sit: min A  Sit to supine: min A  Scooting: min A  Rolling: mod I  Supine to sit: mod I  Sit to supine: mod I  Scooting: mod I   Transfers Sit to stand: NT, pt declined  Stand to sit: NT  Stand pivot: NT  Sit to stand: mod I  Stand to sit: mod I  Stand pivot: mod I with AAD   Ambulation    NT  Make ambulation goal as appropriate   Stair negotiation: ascended and descended  NT  NT     Strength/ROM:   BLE grossly 4/5  BLE AROM WFL    Balance:   Static Sitting: SBA  Dynamic Sitting: SBA  Static Standing: NT  Dynamic Standing: NT    Pt is A & O x 3  Sensation:  Pt denies numbness and tingling to extremities  Edema:  unremarkable    Vitals:  SpO2 and HR were stable during session    Therapeutic Exercises:    Bed mobility: supine<>sit, cued for EOB positioning  Balance: sitting EOB ~3'  BLE AROM    Patient education  Pt educated on role of PT, importance of functional mobility during hospital stay    Patient response to education:   Pt verbalized understanding Pt demonstrated

## 2024-08-12 NOTE — ACP (ADVANCE CARE PLANNING)
Advance Care Planning   Healthcare Decision Maker:    Primary Decision Maker: Jonathan Feldman - Child - [phone]    Secondary Decision Maker: Sabrina Ireland - Brother/Sister - [phone]    Click here to complete Healthcare Decision Makers including selection of the Healthcare Decision Maker Relationship (ie \"Primary\").       Electronically signed by Jarad Leyva RN CM on 8/12/2024 at 8:31 AM

## 2024-08-12 NOTE — PROGRESS NOTES
OCCUPATIONAL THERAPY INITIAL EVALUATION    [facility]  [address], [place]      Date:2024                                                  Patient Name: Marge Callejas  MRN: [id number]  : 1940  Room: [address] [place] [address]    Evaluating OT: DENAE Aburto, OTR/L  # [id number]    Referring Provider:  Gama Parham MD   Specific Provider Orders:  \"OT Eval and Treat\"  8-10-24    Diagnosis: Abdominal pain, left upper quadrant [R10.12]  Acute on chronic combined systolic and diastolic heart failure (HCC) [I50.43]  Lower extremity edema [R60.0]  ESRD on dialysis (HCC) [N18.6, Z99.2]  Bilateral lower extremity edema [R60.0]  Heel pain, bilateral [M79.671, M79.672]    Pt was admitted w/ LE swelling, Left Upper Abdominal Pain, Frankie Heel Pain    Pertinent Medical History:  Pt has a past medical history of Arthritis, Atrial fibrillation (HCC), Blood circulation, collateral, CHF (congestive heart failure) (HCC), Chronic renal insufficiency, stage IV (severe) (HCC), Coronary artery disease involving native coronary artery of native heart without angina pectoris, History of cardiovascular stress test, History of echocardiogram, Hyperlipidemia, Hypertension, and Mixed restrictive and obstructive lung disease (HCC).,  has a past surgical history that includes Ectopic pregnancy surgery; Upper gastrointestinal endoscopy (N/A, 4/3/2021); Colonoscopy (N/A, 4/3/2021); Colonoscopy (4/3/2021); Colonoscopy (4/3/2021); Colonoscopy (4/3/2021); Upper gastrointestinal endoscopy (N/A, 2021); and Colonoscopy (N/A, 2023).    Surgeries this admission: None     Precautions:  Fall Risk  Cognition - Alarms, TSM  HD MWF    Assessment of current deficits   [x] Functional mobility  [x]ADLs  [x] Strength               [x]Cognition   [x] Functional transfers   [x] IADLs         [x] Safety Awareness   [x]Endurance   [] Fine Coordination              [x] Balance     []

## 2024-08-12 NOTE — PROGRESS NOTES
Called pharmacy talked to Bernard regarding Retacrit 2000 U SQ if ok to give before dialysis. HD is scheduled at 5PM or so. Confirmed \"ok\" to give.

## 2024-08-13 LAB
ANION GAP SERPL CALCULATED.3IONS-SCNC: 10 MMOL/L (ref 7–16)
BUN SERPL-MCNC: 7 MG/DL (ref 6–23)
CALCIUM SERPL-MCNC: 8.3 MG/DL (ref 8.6–10.2)
CHLORIDE SERPL-SCNC: 101 MMOL/L (ref 98–107)
CO2 SERPL-SCNC: 27 MMOL/L (ref 22–29)
CREAT SERPL-MCNC: 1.6 MG/DL (ref 0.5–1)
EKG ATRIAL RATE: 300 BPM
EKG Q-T INTERVAL: 552 MS
EKG QRS DURATION: 234 MS
EKG QTC CALCULATION (BAZETT): 616 MS
EKG R AXIS: -95 DEGREES
EKG T AXIS: 72 DEGREES
EKG VENTRICULAR RATE: 75 BPM
GFR, ESTIMATED: 32 ML/MIN/1.73M2
GLUCOSE SERPL-MCNC: 157 MG/DL (ref 74–99)
POTASSIUM SERPL-SCNC: 3.4 MMOL/L (ref 3.5–5)
SODIUM SERPL-SCNC: 138 MMOL/L (ref 132–146)

## 2024-08-13 PROCEDURE — 2700000000 HC OXYGEN THERAPY PER DAY

## 2024-08-13 PROCEDURE — 90935 HEMODIALYSIS ONE EVALUATION: CPT

## 2024-08-13 PROCEDURE — 6370000000 HC RX 637 (ALT 250 FOR IP): Performed by: STUDENT IN AN ORGANIZED HEALTH CARE EDUCATION/TRAINING PROGRAM

## 2024-08-13 PROCEDURE — 80048 BASIC METABOLIC PNL TOTAL CA: CPT

## 2024-08-13 PROCEDURE — 1200000000 HC SEMI PRIVATE

## 2024-08-13 PROCEDURE — 36415 COLL VENOUS BLD VENIPUNCTURE: CPT

## 2024-08-13 PROCEDURE — 2580000003 HC RX 258: Performed by: FAMILY MEDICINE

## 2024-08-13 PROCEDURE — 99232 SBSQ HOSP IP/OBS MODERATE 35: CPT | Performed by: STUDENT IN AN ORGANIZED HEALTH CARE EDUCATION/TRAINING PROGRAM

## 2024-08-13 PROCEDURE — 6370000000 HC RX 637 (ALT 250 FOR IP): Performed by: FAMILY MEDICINE

## 2024-08-13 PROCEDURE — 99232 SBSQ HOSP IP/OBS MODERATE 35: CPT | Performed by: INTERNAL MEDICINE

## 2024-08-13 PROCEDURE — 6370000000 HC RX 637 (ALT 250 FOR IP)

## 2024-08-13 PROCEDURE — 6370000000 HC RX 637 (ALT 250 FOR IP): Performed by: INTERNAL MEDICINE

## 2024-08-13 RX ADMIN — AMIODARONE HYDROCHLORIDE 200 MG: 200 TABLET ORAL at 08:34

## 2024-08-13 RX ADMIN — MIDODRINE HYDROCHLORIDE 5 MG: 5 TABLET ORAL at 16:41

## 2024-08-13 RX ADMIN — LEVOTHYROXINE SODIUM 50 MCG: 0.05 TABLET ORAL at 06:48

## 2024-08-13 RX ADMIN — ASPIRIN 81 MG: 81 TABLET, COATED ORAL at 08:34

## 2024-08-13 RX ADMIN — SODIUM CHLORIDE, PRESERVATIVE FREE 10 ML: 5 INJECTION INTRAVENOUS at 08:34

## 2024-08-13 RX ADMIN — MIDODRINE HYDROCHLORIDE 5 MG: 5 TABLET ORAL at 12:09

## 2024-08-13 RX ADMIN — PANTOPRAZOLE SODIUM 40 MG: 40 TABLET, DELAYED RELEASE ORAL at 06:48

## 2024-08-13 RX ADMIN — APIXABAN 2.5 MG: 2.5 TABLET, FILM COATED ORAL at 08:34

## 2024-08-13 RX ADMIN — MIDODRINE HYDROCHLORIDE 5 MG: 5 TABLET ORAL at 08:34

## 2024-08-13 RX ADMIN — ATORVASTATIN CALCIUM 40 MG: 40 TABLET, FILM COATED ORAL at 08:34

## 2024-08-13 RX ADMIN — Medication 5 MG: at 20:36

## 2024-08-13 RX ADMIN — APIXABAN 2.5 MG: 2.5 TABLET, FILM COATED ORAL at 20:36

## 2024-08-13 RX ADMIN — SODIUM CHLORIDE, PRESERVATIVE FREE 10 ML: 5 INJECTION INTRAVENOUS at 22:26

## 2024-08-13 NOTE — PROGRESS NOTES
Spiritual Health Assessment/Progress Note  [facility]  [place]    (P) Initial Encounter,  ,  ,      Name: Marge Callejas MRN: [id number]    Age: 84 y.o.     Sex: female   Language: English   Jain: Anabaptist   Lower extremity edema     Date: 8/12/2024                           Spiritual Assessment began in [facility]        Referral/Consult From: (P) Patient, Nurse   Encounter Overview/Reason: (P) Initial Encounter  Service Provided For: (P) Patient    Jody, Belief, Meaning:   Patient identifies as spiritual and has beliefs or practices that help with coping during difficult times  Family/Friends No family/friends present      Importance and Influence:  Patient has spiritual/personal beliefs that influence decisions regarding their health  Family/Friends no family/friends present    Community:  Patient feels well-supported. Support system includes: Children and Extended family  Family/Friends Other: No family present    Assessment and Plan of Care:     Patient Interventions include: Facilitated expression of thoughts and feelings, Explored spiritual coping/struggle/distress and theological reflection, Affirmed coping skills/support systems, and Engaged in life review and/or legacy  Family/Friends Interventions include: Other: No family present    Patient Plan of Care: Spiritual Care available upon further referral  Family/Friends Plan of Care: Other: No family present    Electronically signed by Chaplain Roc on 8/12/2024 at 10:46 PM

## 2024-08-13 NOTE — CARE COORDINATION
Transition of care update. HD completed yesterday. BP today is 99/48. O2 is 4 L at 97%. Discharge plan is [facility]. Dependent on bed availability. She is not a bed hold. Called [facility] to start precert. Per nephrology note yesterday, Continue HD for solute and volume management --will resume next dialysis on the TTS schedule. Nephrology also starting midodrine 5 mg TID, to help with fluid removal on dialysis. Cardiology signed off. Ambulance form in an envelope in the soft chart. LUBA and destination updated. CM/SW will follow.  Electronically signed by Jarad Bailey RN on 8/13/2024 at 10:46 AM

## 2024-08-13 NOTE — PROGRESS NOTES
PROGRESS NOTE     CARDIOLOGY    Chief complaint: Seen today for follow up, management & recommendations for shortness of breath, cardiomyopathy, coronary artery disease.    She denies chest pain.  She states that her breathing is better today.  She was lying flat in bed.  She was comfortable and in no distress..    Wt Readings from Last 3 Encounters:   08/05/24 58.7 kg (129 lb 6.6 oz)   07/29/24 57.6 kg (126 lb 15.8 oz)   07/07/24 63 kg (139 lb)     Temp Readings from Last 3 Encounters:   08/13/24 97.4 °F (36.3 °C) (Temporal)   08/05/24 97.7 °F (36.5 °C) (Temporal)   07/29/24 97.3 °F (36.3 °C) (Temporal)     BP Readings from Last 3 Encounters:   08/13/24 (!) 99/48   08/05/24 106/60   07/29/24 113/85     Pulse Readings from Last 3 Encounters:   08/13/24 75   08/05/24 74   07/29/24 81         Intake/Output Summary (Last 24 hours) at 8/13/2024 1043  Last data filed at 8/12/2024 2050  Gross per 24 hour   Intake 480 ml   Output 1800 ml   Net -1320 ml       Recent Labs     08/11/24  0418 08/11/24  1035 08/12/24  0726   WBC 7.7 6.8 6.8   HGB 7.4* 7.3* 7.5*   HCT 23.6* 22.8* 24.5*   .8* 107.5* 109.4*   * 123* 127*     Recent Labs     08/11/24  0418 08/12/24  0726    131*   K 3.5 3.7   CL 96* 94*   CO2 27 25   PHOS  --  4.0   BUN 15 22   CREATININE 2.2* 3.3*   MG 1.8 2.4     Recent Labs     08/11/24  1035   PROTIME 16.7*   INR 1.5     No results for input(s): \"CKTOTAL\", \"CKMB\", \"CKMBINDEX\", \"TROPONINI\" in the last 72 hours.  No results for input(s): \"BNP\" in the last 72 hours.  No results for input(s): \"CHOL\", \"HDL\", \"TRIG\" in the last 72 hours.    Invalid input(s): \"CHOLHDLR\", \"LDLCALCU\"  No results for input(s): \"TROPHS\" in the last 72 hours.        levothyroxine (SYNTHROID) tablet 50 mcg, Daily  midodrine (PROAMATINE) tablet 5 mg, TID WC  amiodarone (CORDARONE) tablet 200 mg, Daily  sodium chloride flush 0.9 % injection 5-40 mL, 2 times per day  sodium chloride flush 0.9 % injection 5-40 mL,

## 2024-08-13 NOTE — FLOWSHEET NOTE
08/12/24 2050   Vital Signs   /67   Temp 97.1 °F (36.2 °C)   Pulse 75   Respirations 18   Post-Hemodialysis Assessment   Post-Treatment Procedures Blood returned;Catheter capped, clamped and heparinized x 2 ports   Machine Disinfection Process Acid/Vinegar Clean;Heat Disinfect;Exterior Machine Disinfection   Rinseback Volume (ml) 300 ml   Blood Volume Processed (Liters) 51.8 L   Dialyzer Clearance Lightly streaked   Duration of Treatment (minutes) 210 minutes   Hemodialysis Intake (ml) 300 ml   Hemodialysis Output (ml) 1800 ml   NET Removed (ml) 1500   Tolerated Treatment Good   Patient Response to Treatment tolerated well, blood returned, dressing changed, transported back to room   Bilateral Breath Sounds Diminished   Edema Generalized;Facial;Right lower extremity;Left lower extremity   RUE Edema None   LUE Edema None   RLE Edema Pitting;+3   LLE Edema Pitting;+3   Facial Edema +1   Perineal Edema None   Sacral Edema None   Physician Notified No   Time Off 2041   Patient Disposition Return to room

## 2024-08-13 NOTE — FLOWSHEET NOTE
08/13/24 1542   Vital Signs   /68   Temp (!) 96.4 °F (35.8 °C)   Pulse 75   Respirations 18   Weight - Scale 59 kg (130 lb 1.1 oz)   Percent Weight Change 0   Post-Hemodialysis Assessment   Post-Treatment Procedures Blood returned;Catheter capped, clamped and heparinized x 2 ports   Machine Disinfection Process Acid/Vinegar Clean;Heat Disinfect   Rinseback Volume (ml) 300 ml   Blood Volume Processed (Liters) 52.2 L   Dialyzer Clearance Lightly streaked   Duration of Treatment (minutes) 180 minutes   Heparin Amount Administered During Treatment (mL) 0 mL   Hemodialysis Intake (ml) 300 ml   Hemodialysis Output (ml) 1800 ml   NET Removed (ml) 1500   Patient Response to Treatment tolerated tx well   Bilateral Breath Sounds Diminished   Patient Disposition Return to room   Observations & Evaluations   Level of Consciousness 0   Oriented X 3   Heart Rhythm Regular   Respiratory Quality/Effort Unlabored

## 2024-08-13 NOTE — PROGRESS NOTES
[facility] Hospitalist Progress Note    Admitting Date and Time: 8/9/2024  6:23 PM  Admit Dx: Abdominal pain, left upper quadrant [R10.12]  Acute on chronic combined systolic and diastolic heart failure (HCC) [I50.43]  Lower extremity edema [R60.0]  ESRD on dialysis (HCC) [N18.6, Z99.2]  Bilateral lower extremity edema [R60.0]  Heel pain, bilateral [M79.671, M79.672]    Subjective:  Patient is being followed for Abdominal pain, left upper quadrant [R10.12]  Acute on chronic combined systolic and diastolic heart failure (HCC) [I50.43]  Lower extremity edema [R60.0]  ESRD on dialysis (HCC) [N18.6, Z99.2]  Bilateral lower extremity edema [R60.0]  Heel pain, bilateral [M79.671, M79.672]   Pt feels dyspenic, was seen before HD, hypotensive.  Per RN: No major concerns.    ROS: denies fever, chills, cp, sob, n/v, HA unless stated above.      levothyroxine  50 mcg Oral Daily    midodrine  5 mg Oral TID WC    amiodarone  200 mg Oral Daily    sodium chloride flush  5-40 mL IntraVENous 2 times per day    apixaban  2.5 mg Oral BID    aspirin  81 mg Oral Daily    atorvastatin  40 mg Oral Daily    arformoterol 15 mcg-budesonide 0.25 mg neb solution   Nebulization BID RT    [Held by provider] bumetanide  2 mg Oral Daily    epoetin carmelina-epbx  2,000 Units SubCUTAneous Once per day on Monday Wednesday Friday    melatonin  5 mg Oral Nightly    metoprolol succinate  12.5 mg Oral Daily    [Held by provider] mexiletine  150 mg Oral 3 times per day    pantoprazole  40 mg Oral QAM AC    polyethylene glycol  17 g Oral Daily    sacubitril-valsartan  0.5 tablet Oral BID     sodium chloride flush, 5-40 mL, PRN  sodium chloride, , PRN  polyethylene glycol, 17 g, Daily PRN  acetaminophen, 650 mg, Q6H PRN   Or  acetaminophen, 650 mg, Q6H PRN  benzocaine-menthol, 1 lozenge, Q2H PRN  benzonatate, 100 mg, TID PRN  bisacodyl, 10 mg, Daily PRN  calcium carbonate, 500 mg, TID PRN  ipratropium 0.5 mg-albuterol 2.5 mg, 1 Dose, Q6H  worse in the last few days.  She was at dialysis. and could not complete her treatment given that she was very short of breath.  In the ER blood pressure did fall to low of 92/56.  Patient also complained of some intermittent abdominal pain CT abdomen pelvis showed diffuse colorectal retention and a stable 5.6 cm abdominal aorta and dilatation of the proximal left iliac artery and small bilateral pleural effusion.  CT chest with small bilateral pleural effusions with no evidence of pneumonia. Patient denies any pain in her calfs formational the swelling has been significantly getting worse over the last few days. Wax and wean mentation, needs placement.          1.  Acute metabolic encephalopathy - Improved  Secondary delirium, Wax and wean mentation  Suspected underlying dementia  B12 folate,B1, B6, ammonia are unremarkable , TSH elevated  Monitor electrolytes replace as needed  She likely need placement  PT OT to evaluate  Reorient the patient  Ambulate as appropriate     2. Acute on chronic decompensated heart failure reduced ejection fraction EF of 20-25%  Bilateral lower extremity edema, dyspnea on presentation  Fluid adjustment on dialysis  Assess volume status after dialysis, still edematous leg   Pending heart echo  Continue GDMT  Consulted Cardiology,  Cardiology signed off.       3. Acute on chronic hypoxic respiratory failure - Improved  Secondary to ADHF  Baseline 3 to 4 L nasal cannula  Increased oxygen requirement  Continue diuresis, hemodialysis  Continue breathing treatment  Monitor respiratory status     4. Uptrending cr - End-stage renal disease  On hemodialysis  Nephrology on board, held bumex, on entersto     5.  Macrocytic anemia  B12 folate reassuring  Check peripheral blood smear, liver function test  Repeat CBC, monitor hemoglobin     6.  Bilateral lower extremity pain and episodes of hypotension  Secondary to dialysis, electrolyte imbalance  Needs dialysis adjustment  Nephrology on

## 2024-08-13 NOTE — PROGRESS NOTES
[facility]  MD Murtaza White MD Ali Hassan, MD Lisa Kniska, CNP   Lucille Beth, JOSE Fernandez, JUDE  Progress Note    8/13/2024    SUBJECTIVE:   8/12 : Laying in bed, no acute distress. Daughter is at the bedside. For dialysis this afternoon. She denies any dyspnea. She is edematous. Blood pressure is low.     8/13: Seen while on dialysis. Tolerating therapy without issues. BP is stable, but on the lower side. She denies any dyspnea, chest pain, or palpitations. Lower extremities are edematous.     PROBLEM LIST:    Principal Problem:    Lower extremity edema  Active Problems:    Acute on chronic combined systolic and diastolic heart failure (HCC)  Resolved Problems:    * No resolved hospital problems. *         DIET:    ADULT DIET; Regular; Low Sodium (2 gm); Low Potassium (Less than 3000 mg/day)     MEDS (scheduled):    levothyroxine  50 mcg Oral Daily    midodrine  5 mg Oral TID WC    amiodarone  200 mg Oral Daily    sodium chloride flush  5-40 mL IntraVENous 2 times per day    apixaban  2.5 mg Oral BID    aspirin  81 mg Oral Daily    atorvastatin  40 mg Oral Daily    arformoterol 15 mcg-budesonide 0.25 mg neb solution   Nebulization BID RT    [Held by provider] bumetanide  2 mg Oral Daily    epoetin carmelina-epbx  2,000 Units SubCUTAneous Once per day on Monday Wednesday Friday    melatonin  5 mg Oral Nightly    metoprolol succinate  12.5 mg Oral Daily    [Held by provider] mexiletine  150 mg Oral 3 times per day    pantoprazole  40 mg Oral QAM AC    polyethylene glycol  17 g Oral Daily    sacubitril-valsartan  0.5 tablet Oral BID       MEDS (infusions):   sodium chloride         MEDS (prn):  sodium chloride flush, sodium chloride, polyethylene glycol, acetaminophen **OR** acetaminophen, benzocaine-menthol, benzonatate, bisacodyl, calcium carbonate, ipratropium 0.5 mg-albuterol 2.5 mg, lactulose, magnesium hydroxide, meclizine, Polyvinyl Alcohol-Povidone PF,    ALKPHOS  --  109* 110*       Lab Results   Component Value Date    LABPROT 0.3 (H) 04/11/2021    LABPROT 0.3 04/11/2021     Assessment     ESRD  Anemia due to ESRD  Secondary hyperparathyroidism/mineral bone disease due to ESRD  History of hypertension  CHF, HFrEF, LVEF 30%  History of AF with RVR  Pulmonary HTN   Noncompliance/nonadherence to multiple aspects of her care after she is discharged from the hospital, accounting for her frequent readmissions.  Attempts at counseling her and her daughter in the past have proven unsuccessful     BP is low  - isordil and hydralazine have been discontinued   Na 131, likely due to hypervolemia   BP remains low     Recommendations     Continue HD for solute and volume management --TTS schedule   Consider UF tomorrow for additional fluid removal   Continue midodrine 5 mg TID, hopefully this will help with fluid removal on dialysis. Dose may need increased if hypotension persists.   ARLIN: Retacrit while here  Hold phosphorus binder as phosphorus is low   Ok for Entresto, will monitor potassium levels closely and follow blood pressure.   5.   Follow labs   6.  Continue supportive care        Electronically signed by MADELAINE Nicole CNP on 8/13/2024 at 3:45 PM

## 2024-08-13 NOTE — DISCHARGE INSTR - COC
Continuity of Care Form    Patient Name: Marge Callejas   :  1940  MRN:  [id number]    Admit date:  2024  Discharge date:  ***    Code Status Order: Full Code   Advance Directives:   Advance Care Flowsheet Documentation             Admitting Physician:  Misty Oseguera MD  PCP: Marek Andres DO    Discharging Nurse: TR  Discharging Hospital Unit/Room#: 8201/8201-A  Discharging Unit Phone Number: [phone]    Emergency Contact:   Extended Emergency Contact Information  Primary Emergency Contact: Jonathan Feldman  Home Phone: [phone]  Mobile Phone: [phone]  Relation: Child   needed? No  Secondary Emergency Contact: Sabrina Ireland  Home Phone: [phone]  Mobile Phone: [phone]  Relation: Brother/Sister   needed? No    Past Surgical History:  Past Surgical History:   Procedure Laterality Date    COLONOSCOPY N/A 4/3/2021    COLONOSCOPY POLYPECTOMY HOT SNARE performed by Lucio Nelson DO at [facility] [place] ENDOSCOPY    COLONOSCOPY  4/3/2021    COLONOSCOPY WITH BIOPSY performed by Lucio Nelson DO at [facility] [place] ENDOSCOPY    COLONOSCOPY  4/3/2021    COLONOSCOPY CONTROL HEMORRHAGE performed by Lucio Nelson DO at [facility] [place] ENDOSCOPY    COLONOSCOPY  4/3/2021    COLONOSCOPY SUBMUCOSAL SPOT INJECTION performed by Lucio Nelson DO at [facility] [place] ENDOSCOPY    COLONOSCOPY N/A 2023    COLONOSCOPY DIAGNOSTIC performed by Scooby Cormier MD at [facility] [place] ENDOSCOPY    ECTOPIC PREGNANCY SURGERY      UPPER GASTROINTESTINAL ENDOSCOPY N/A 4/3/2021    EGD BIOPSY performed by Lucio Nelson DO at [facility] [place] ENDOSCOPY    UPPER GASTROINTESTINAL ENDOSCOPY N/A 2021    EGD W/EUS FNA performed by Ana Gandhi MD at [facility] [place] ENDOSCOPY       Immunization History:   Immunization History   Administered Date(s) Administered    COVID-19, MODERNA Bivalent, (age 12y+), IM, 50 mcg/0.5 mL 2023    COVID-19, MODERNA, (- formula), (age 12y+), IM, 50mcg/0.5mL 2023    Influenza, High Dose (Fluzone 65 yrs and older) 10/30/2018  transfer of Marge Callejas  is necessary for the continuing treatment of the diagnosis listed and that she requires Skilled Nursing Facility for less 30 days.     Update Admission H&P: {CHP DME Changes in HandP:[id number]}    PHYSICIAN SIGNATURE:  {Esignature:[id number]}

## 2024-08-14 LAB
ANION GAP SERPL CALCULATED.3IONS-SCNC: 10 MMOL/L (ref 7–16)
BUN SERPL-MCNC: 11 MG/DL (ref 6–23)
CALCIUM SERPL-MCNC: 8.4 MG/DL (ref 8.6–10.2)
CHLORIDE SERPL-SCNC: 100 MMOL/L (ref 98–107)
CO2 SERPL-SCNC: 26 MMOL/L (ref 22–29)
CREAT SERPL-MCNC: 2.5 MG/DL (ref 0.5–1)
ERYTHROCYTE [DISTWIDTH] IN BLOOD BY AUTOMATED COUNT: 19.7 % (ref 11.5–15)
GFR, ESTIMATED: 19 ML/MIN/1.73M2
GLUCOSE SERPL-MCNC: 94 MG/DL (ref 74–99)
HCT VFR BLD AUTO: 23.3 % (ref 34–48)
HGB BLD-MCNC: 7.3 G/DL (ref 11.5–15.5)
MCH RBC QN AUTO: 34 PG (ref 26–35)
MCHC RBC AUTO-ENTMCNC: 31.3 G/DL (ref 32–34.5)
MCV RBC AUTO: 108.4 FL (ref 80–99.9)
PLATELET # BLD AUTO: 110 K/UL (ref 130–450)
PMV BLD AUTO: 12.7 FL (ref 7–12)
POTASSIUM SERPL-SCNC: 3.5 MMOL/L (ref 3.5–5)
PYRIDOXAL PHOS SERPL-SCNC: 10.6 NMOL/L (ref 20–125)
RBC # BLD AUTO: 2.15 M/UL (ref 3.5–5.5)
SODIUM SERPL-SCNC: 136 MMOL/L (ref 132–146)
WBC OTHER # BLD: 6.9 K/UL (ref 4.5–11.5)

## 2024-08-14 PROCEDURE — 6370000000 HC RX 637 (ALT 250 FOR IP): Performed by: STUDENT IN AN ORGANIZED HEALTH CARE EDUCATION/TRAINING PROGRAM

## 2024-08-14 PROCEDURE — 85027 COMPLETE CBC AUTOMATED: CPT

## 2024-08-14 PROCEDURE — 6360000002 HC RX W HCPCS: Performed by: FAMILY MEDICINE

## 2024-08-14 PROCEDURE — 6370000000 HC RX 637 (ALT 250 FOR IP): Performed by: INTERNAL MEDICINE

## 2024-08-14 PROCEDURE — 80048 BASIC METABOLIC PNL TOTAL CA: CPT

## 2024-08-14 PROCEDURE — 1200000000 HC SEMI PRIVATE

## 2024-08-14 PROCEDURE — 2580000003 HC RX 258: Performed by: FAMILY MEDICINE

## 2024-08-14 PROCEDURE — 99231 SBSQ HOSP IP/OBS SF/LOW 25: CPT | Performed by: STUDENT IN AN ORGANIZED HEALTH CARE EDUCATION/TRAINING PROGRAM

## 2024-08-14 PROCEDURE — 36415 COLL VENOUS BLD VENIPUNCTURE: CPT

## 2024-08-14 PROCEDURE — 6370000000 HC RX 637 (ALT 250 FOR IP): Performed by: FAMILY MEDICINE

## 2024-08-14 PROCEDURE — 6370000000 HC RX 637 (ALT 250 FOR IP)

## 2024-08-14 PROCEDURE — 94640 AIRWAY INHALATION TREATMENT: CPT

## 2024-08-14 PROCEDURE — 97530 THERAPEUTIC ACTIVITIES: CPT

## 2024-08-14 PROCEDURE — 2700000000 HC OXYGEN THERAPY PER DAY

## 2024-08-14 RX ADMIN — Medication 5 MG: at 19:58

## 2024-08-14 RX ADMIN — MIDODRINE HYDROCHLORIDE 5 MG: 5 TABLET ORAL at 16:52

## 2024-08-14 RX ADMIN — APIXABAN 2.5 MG: 2.5 TABLET, FILM COATED ORAL at 19:58

## 2024-08-14 RX ADMIN — APIXABAN 2.5 MG: 2.5 TABLET, FILM COATED ORAL at 09:49

## 2024-08-14 RX ADMIN — MIDODRINE HYDROCHLORIDE 5 MG: 5 TABLET ORAL at 09:49

## 2024-08-14 RX ADMIN — LEVOTHYROXINE SODIUM 50 MCG: 0.05 TABLET ORAL at 05:41

## 2024-08-14 RX ADMIN — AMIODARONE HYDROCHLORIDE 200 MG: 200 TABLET ORAL at 09:49

## 2024-08-14 RX ADMIN — ATORVASTATIN CALCIUM 40 MG: 40 TABLET, FILM COATED ORAL at 09:49

## 2024-08-14 RX ADMIN — EPOETIN ALFA-EPBX 2000 UNITS: 2000 INJECTION, SOLUTION INTRAVENOUS; SUBCUTANEOUS at 17:39

## 2024-08-14 RX ADMIN — ASPIRIN 81 MG: 81 TABLET, COATED ORAL at 09:49

## 2024-08-14 RX ADMIN — ARFORMOTEROL TARTRATE: 15 SOLUTION RESPIRATORY (INHALATION) at 08:24

## 2024-08-14 RX ADMIN — PANTOPRAZOLE SODIUM 40 MG: 40 TABLET, DELAYED RELEASE ORAL at 05:41

## 2024-08-14 RX ADMIN — SODIUM CHLORIDE, PRESERVATIVE FREE 10 ML: 5 INJECTION INTRAVENOUS at 19:59

## 2024-08-14 RX ADMIN — SODIUM CHLORIDE, PRESERVATIVE FREE 10 ML: 5 INJECTION INTRAVENOUS at 09:49

## 2024-08-14 NOTE — PLAN OF CARE
Problem: Skin/Tissue Integrity  Goal: Absence of new skin breakdown  Description: 1.  Monitor for areas of redness and/or skin breakdown  2.  Assess vascular access sites hourly  3.  Every 4-6 hours minimum:  Change oxygen saturation probe site  4.  Every 4-6 hours:  If on nasal continuous positive airway pressure, respiratory therapy assess nares and determine need for appliance change or resting period.  Outcome: Progressing     Problem: Safety - Adult  Goal: Free from fall injury  Outcome: Progressing     Problem: Chronic Conditions and Co-morbidities  Goal: Patient's chronic conditions and co-morbidity symptoms are monitored and maintained or improved  Outcome: Progressing     Problem: Risk for Elopement  Goal: Patient will not exit the unit/facility without proper excort  Outcome: Progressing     Problem: Respiratory - Adult  Goal: Achieves optimal ventilation and oxygenation  Outcome: Progressing     Problem: Musculoskeletal - Adult  Goal: Return mobility to safest level of function  Outcome: Progressing     Problem: Metabolic/Fluid and Electrolytes - Adult  Goal: Hemodynamic stability and optimal renal function maintained  Outcome: Progressing

## 2024-08-14 NOTE — CARE COORDINATION
08/14/24 CM Update:  Pt remains for hypotension during/after HD Plan is to return to [facility] started precert yesterday LUBA and destination complete Ambulance form is in soft chart will need completed day of discharge Electronically signed by Jarad Leyva RN CM on 8/14/2024 at 8:51 AM     2:48pm Rec'd call from Leticia at [facility] pt insurance is requesting new PT notes  Pt needs to be agreeable to participate in therapy Electronically signed by Jarad Leyva RN CM on 8/14/2024 at 2:50 PM

## 2024-08-14 NOTE — ADT AUTH CERT
Utilization Reviews       08/13/24  by Jamia Avila, RN   Last updated by Jamia Avila, RN on 8/14/2024 1156     Review Status Created By   In Primary Jamia Avila RN       Review Type   --      Criteria Review   DATE: 08/13/24  TYPE OF BED:      Service: Medical  Level of Care: Intermediate Care     Patient has or is expected to exceed 2 midnights of medically necessary care.               RELEVANT BASELINES:     Baseline 3 to 4 L nasal cannula      ESRD on HD     Anemia due to ESRD            PERTINENT UPDATES:     Hold amiodarone due to prolonged QT      Nephrology on board, held bumex, on entersto     Assess volume status after dialysis, still edematous leg --Hypotension still limiting factor for UF on dialysis --added midodrine               VITALS:     96.4   18   79    87/49  62                       92% 4LNC              ABNL/PERTINENT LABS/RADIOLOGY/DIAGNOSTIC STUDIES:     08/13/24 17:58  Potassium: 3.4 (L)  Chloride: 101  CARBON DIOXIDE: 27  BUN,BUNPL: 7  Creatinine: 1.6 (H)  Anion Gap: 10  Est, Glom Filt Rate: 32 (L)  Glucose: 157 (H)  Calcium: 8.3 (L)                 PHYSICAL EXAM:      Lower extremities are edematous.         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  End-stage renal disease.  Dialysis dependent.  They have been having difficulty removing fluid due to her hypotension.  Hydralazine and nitrates have been discontinued.  The blood pressure is improving  Moderately severe mitral regurgitation  Moderately severe aortic regurgitation  Hypervolemia due to the above.  For dialysis today.  Electrolytes per dialysis.  Recurring ventricular tachycardia.  Recurring syncope due to the ventricular tachycardia.  Paroxysmal atrial fibrillation  CRT-T  Severe/oxygen requiring COPD  Hypertension.  Now too low as described above but  mg  Dose: 200 mg  Freq: DAILY Route: PO        apixaban (ELIQUIS) tablet 2.5 mg  Dose: 2.5 mg  Freq: 2 TIMES DAILY Route: PO        arformoterol 15 mcg-budesonide 0.25 mg neb solution  Freq: 2 TIMES DAILY RESP Route: NEBULIZATION  Start: 08/10/24 0800         aspirin EC tablet 81 mg  Dose: 81 mg  Freq: DAILY Route: PO         epoetin carmelina-epbx (RETACRIT) injection 2,000 Units  Dose: 2,000 Units  Freq: Once per day on Monday Wednesday Friday Route: SC        mexiletine (MEXITIL) capsule 150 mg  Dose: 150 mg  Freq: 3 times per day Route: PO     midodrine (PROAMATINE) tablet 5 mg  Dose: 5 mg  Freq: 3 TIMES DAILY WITH MEALS Route: PO        pantoprazole (PROTONIX) tablet 40 mg  Dose: 40 mg  Freq: DAILY BEFORE BREAKFAST Route: PO              ORDERS:     Misty Oseguera MD  Physician  Internal Medicine     Assessment:     Principal Problem:    Lower extremity edema  Active Problems:    Acute on chronic combined systolic and diastolic heart failure (HCC)  Resolved Problems:    * No resolved hospital problems. *        Plan:           1.  Acute metabolic encephalopathy - Improved  Secondary delirium, Wax and wean mentation  Suspected underlying dementia  B12 folate,B1, B6, ammonia are unremarkable , TSH elevated  Monitor electrolytes replace as needed  She likely need placement  PT OT to evaluate  Reorient the patient  Ambulate as appropriate     2. Acute on chronic decompensated heart failure reduced ejection fraction EF of 20-25%  Bilateral lower extremity edema, dyspnea on presentation  Fluid adjustment on dialysis  Assess volume status after dialysis, still edematous leg   Pending heart echo  Continue GDMT  Consult Cardiology     3. Acute on chronic hypoxic respiratory failure  Secondary to ADHF  Baseline 3 to 4 L nasal cannula  Increased oxygen requirement  Continue diuresis, hemodialysis  Continue breathing treatment  Monitor respiratory status     4. Uptrending cr - End-stage renal disease  On

## 2024-08-14 NOTE — PROGRESS NOTES
Physical Therapy  Physical Therapy Treatment     Name: Marge Callejas  : 1940  MRN: [id number]      Date of Service: 2024    Evaluating PT:  Estiven Elise PT, DPT    Room #:  8201/8201-A  Diagnosis:  Abdominal pain, left upper quadrant [R10.12]  Acute on chronic combined systolic and diastolic heart failure (HCC) [I50.43]  Lower extremity edema [R60.0]  ESRD on dialysis (HCC) [N18.6, Z99.2]  Bilateral lower extremity edema [R60.0]  Heel pain, bilateral [M79.671, M79.672]  PMHx/PSHx:  afib, arthritis, CHF, HLD, HTN  Procedure/Surgery:  N/A  Precautions:  fall risk, TSM, bed alarm, O2, [place]  Equipment Needs:  TBD    SUBJECTIVE:    Pt admitted from [facility]. Pt reports she is non-ambulatory PTA.    OBJECTIVE:   Initial Evaluation  Date: 24 Treatment Date: 24 Short Term/ Long Term   Goals   AM-PAC 6 Clicks 10/24 10/24    Was pt agreeable to Eval/treatment? yes yes    Does pt have pain? Unrated RLE RLE and L side pain     Bed Mobility  Rolling: min A  Supine to sit: min A  Sit to supine: min A  Scooting: min A Rolling: NT  Supine to sit: Mod A  Sit to supine: Mod A  Scooting: min A Rolling: mod I  Supine to sit: mod I  Sit to supine: mod I  Scooting: mod I   Transfers Sit to stand: NT, pt declined  Stand to sit: NT  Stand pivot: NT Sit to stand: ModA  Stand to sit: ModA  Stand pivot: NT Sit to stand: mod I  Stand to sit: mod I  Stand pivot: mod I with AAD   Ambulation    NT ~3 side steps toward HOB with WW ModA Make ambulation goal as appropriate   Stair negotiation: ascended and descended  NT NT NT     Strength/ROM:   BLE grossly 4/5  BLE AROM WFL    Balance:   Static Sitting: SBA  Dynamic Sitting: SBA  Static Standing: Michael WW  Dynamic Standing: ModA WW    Pt is A & O x 3  Sensation:  Pt denies numbness and tingling to extremities  Edema:  unremarkable    Vitals:  SPO2 3.5L/min: 92-95%  HR: 90's bpm     Patient education  Pt educated on role of PT, importance of functional mobility during  hospital stay    Patient response to education:   Pt verbalized understanding Pt demonstrated skill Pt requires further education in this area   yes N/A no     ASSESSMENT:    Comments:  Pt was in bed upon PT entry and agreeable to partipate. Transferred to EOB with assist for trunk. Once sitting EOB demonstrated fair balance. Completed sit<>stand transfer (x3 reps) to WW with lift assist and cues for hand placement. Able to maintain stand ~20 seconds and required 1-2 minute seated rest breaks between reps. Side stepped at EOB with WW with slowed pace, decreased balance, and poor WW management. SOB noted with exertion. Pt was returned to supine and positioned in chair position with all needs met and call light in reach.    Treatment:  Patient practiced and was instructed in the following treatment:    Bed mobility training - pt given verbal and tactile cues to facilitate proper sequencing and safety during rolling and supine>sit as well as provided with physical assistance to complete task   Sitting EOB for >10 minutes for upright tolerance, postural awareness and BLE ROM  Transfer training - pt was given verbal and tactile cues to facilitate proper hand placement, technique and safety during sit to stand and stand to sit as well as provided with physical assistance.  Skilled positioning - Pt placed in the chair position with pillows utilized to facilitate upright posture, joint and skin integrity, and interaction with environment.     Skilled monitoring of vitals throughout session.     PLAN:    Patient is making  good  progress towards established goals.  Will continue with current POC.      Time in  1430  Time out  1453    Total Treatment Time  23 minutes     CPT codes:  [] Gait training 44617 0 minutes  [] Manual therapy 93485 0 minutes  [x] Therapeutic activities 80919 23 minutes  [] Therapeutic exercises 49932 0 minutes  [] Neuromuscular reeducation 93022 0 minutes    Kimberly Clayton PT, DPT  [id number]

## 2024-08-14 NOTE — PROGRESS NOTES
Walk-in-Shower, Elevated-height Commode, Grab bars throughout  Equipment owned:  None reported     Available Family Assist:  Staff assist     Prior Level of Function:  Assume pt received assist w/ ADLs, Transfers and Mobility using WW vs W/C for ambulation.   Driving:  No  Occupation:  None reported     Pain Level:  Pt reported mild pain R LE, Relief w/ Rest and Repositioning, Nsg Notified   Additional Complaints:  General weakness, fatigue     Cognition: A & O x 2 - able to state her name, ID current location as \"hospital\"       Able to Follow Multi-Step Commands w/ Min-Mod VCs              Memory:  fair               Sequencing:  fair               Problem solving:  fair               Judgement/safety:  fair               Communication:  fair               Insight:  fair       Vitals/Lab Values:  WFL on 3L O2 90-95% HR WFL                Functional Assessment:  AM-PAC Daily Activity Raw Score: 14/24       Initial Eval Status  Date: 8-12-24    Treatment Status  Date:  8/14/24 STGs = LTGs  Time frame: 10-14 days   Feeding NT     Pt declined     Supervision   Progress to eating meals up in chair  IND   Grooming Min A     Simulated seated EOB - pt declined  Unable to tolerate ambulating to or standing at sink    Min A  Simulated task, declined to complete any this date   SUP/Set up  Seated/Standing at the Sink   UB Dressing Mod A/set up     Simulated - seated EOB - pt declined     Mod A  Managing gown at EOB, simulated, pt declined to change gown this date  SUP/set up      LB Dressing Max A/set up     Simulated - pt declined  Pt ed for safe/adaptive techs, use of adaptive equip    Max A  Simulated   Mod A/set up      Bathing Max A     Simulated - pt declined  Pt ed re: Benefits of use of Shower chair/Tub Bench, resources for obtaining equip     Max A  Simulated  Mod A/set up       Toileting Max A     Max A Hygiene/Clothing adjustment - Simulated - pt declined    Dep  Simulated  Mod A      Bed Mobility  Supine to sit:  Mod A   Sit to supine:  Mod A      VCs for safety     Mod A  Supine < > Sit  Supine to sit: SUP  Sit to supine: USP      Functional Transfers NT     Pt declined    Mod A  Sit < > stand  Completed x 3 stands with rest period between tasks due to SOB Min A      Functional Mobility NT     Pt declined     Mod A  With walker   Taking a few steps towards HOB    Min A      Balance Sitting:     Static:  SUP    Dynamic:  SUP w/ functional ax EOB      Standing:     Static:  NT    Dynamic:  Nt w/ functional ax/mobility      Sitting: Indep  Standing: Mod A with walker  Sitting:     Static:  SUP    Dynamic:  SUP w/ functional ax     Standing:     Static:  Min A w/ AD PRN    Dynamic:  Min A w/ functional ax/mobility w/ AD PRN   Activity Tolerance Fair(-)     Sitting Tolerance w/ light ax ~ 10 mins    Fair Fair(+)   Visual/  Perceptual     Hearing: WNL   Glasses: No     WFL moderately [place]  Hearing Aids:  No                       Education:  Pt was educated through out treatment regarding proper technique & safety with bed mobility, functional transfers & mobility, ADL compensatory strategies to ease tasks, improve safety & prevent falls to return home safely.      Comments: Upon arrival pt was in bed & agreeable for therapy. At end of session pt was returned to bed, HOB upright, all lines and tubes intact, call light within reach.     Pt has made Fair- progress towards set goals.   Continue with current plan of care    Treatment Time In: 2:25            Treatment Time Out: 2:50           Treatment Charges: Mins Units   Ther Ex  14769     Manual Therapy 45313     Thera Activities 49809 25 2   ADL/Home Mgt [id number]     Neuro Re-ed 01116     Group Therapy      Orthotic manage/training  10587     Non-Billable Time     Total Timed Treatment 25 2       Melinda Naqvi, MERRY/KAYLA [id number]

## 2024-08-14 NOTE — PROGRESS NOTES
[facility] Hospitalist Progress Note    Admitting Date and Time: 8/9/2024  6:23 PM  Admit Dx: Abdominal pain, left upper quadrant [R10.12]  Acute on chronic combined systolic and diastolic heart failure (HCC) [I50.43]  Lower extremity edema [R60.0]  ESRD on dialysis (HCC) [N18.6, Z99.2]  Bilateral lower extremity edema [R60.0]  Heel pain, bilateral [M79.671, M79.672]    Subjective:  Patient is being followed for Abdominal pain, left upper quadrant [R10.12]  Acute on chronic combined systolic and diastolic heart failure (HCC) [I50.43]  Lower extremity edema [R60.0]  ESRD on dialysis (HCC) [N18.6, Z99.2]  Bilateral lower extremity edema [R60.0]  Heel pain, bilateral [M79.671, M79.672]   Pt feels better, hypotension improved, daughter at bedside updated.  Per RN: No major concerns.    ROS: denies fever, chills, cp, sob, n/v, HA unless stated above.      levothyroxine  50 mcg Oral Daily    midodrine  5 mg Oral TID WC    amiodarone  200 mg Oral Daily    sodium chloride flush  5-40 mL IntraVENous 2 times per day    apixaban  2.5 mg Oral BID    aspirin  81 mg Oral Daily    atorvastatin  40 mg Oral Daily    arformoterol 15 mcg-budesonide 0.25 mg neb solution   Nebulization BID RT    [Held by provider] bumetanide  2 mg Oral Daily    epoetin carmelina-epbx  2,000 Units SubCUTAneous Once per day on Monday Wednesday Friday    melatonin  5 mg Oral Nightly    metoprolol succinate  12.5 mg Oral Daily    [Held by provider] mexiletine  150 mg Oral 3 times per day    pantoprazole  40 mg Oral QAM AC    polyethylene glycol  17 g Oral Daily    sacubitril-valsartan  0.5 tablet Oral BID     sodium chloride flush, 5-40 mL, PRN  sodium chloride, , PRN  polyethylene glycol, 17 g, Daily PRN  acetaminophen, 650 mg, Q6H PRN   Or  acetaminophen, 650 mg, Q6H PRN  benzocaine-menthol, 1 lozenge, Q2H PRN  benzonatate, 100 mg, TID PRN  bisacodyl, 10 mg, Daily PRN  calcium carbonate, 500 mg, TID PRN  ipratropium 0.5 mg-albuterol 2.5 mg, 1 Dose,  adjustment  Nephrology on board  Monitor blood pressure     7.  Paroxysmal A-fib  Prolonged QTc  ICD in situ   Hold amiodarone due to prolonged QT. She has a paced rhythm, continue Eliquis, resumed amiodarone  Telemetry monitoring     8.  Ambulatory dysfunction  Recurrent hospital admission  PT OT evaluation  Likely need placement    9. Hypotensive - held bumex, hydralazine,isordil, mexitil, started midodrine 5 mg TID     10. Hypothyroidism - TSH elevated, increased dose of synthyroid     Disposition: Pending medical stability once ok per nephrology, precert & bed availability, [facility]   DVT prophylaxis - Eliquis      NOTE: This report was transcribed using voice recognition software. Every effort was made to ensure accuracy; however, inadvertent computerized transcription errors may be present.  Electronically signed by Misty Oseguera MD on 8/14/2024 at 12:46 PM

## 2024-08-14 NOTE — PLAN OF CARE
Problem: Skin/Tissue Integrity  Goal: Absence of new skin breakdown  Description: 1.  Monitor for areas of redness and/or skin breakdown  2.  Assess vascular access sites hourly  3.  Every 4-6 hours minimum:  Change oxygen saturation probe site  4.  Every 4-6 hours:  If on nasal continuous positive airway pressure, respiratory therapy assess nares and determine need for appliance change or resting period.  8/13/2024 2254 by Leidy Gonzalez RN  Outcome: Progressing  8/13/2024 1650 by Kimberly Carlson RN  Outcome: Progressing  8/13/2024 1554 by Sierra Marsh RN  Outcome: Progressing     Problem: Safety - Adult  Goal: Free from fall injury  8/13/2024 2254 by Leidy Gonzalez RN  Outcome: Progressing  8/13/2024 1650 by Kimberly Carlson RN  Outcome: Progressing  8/13/2024 1554 by Sierra Marsh RN  Outcome: Progressing     Problem: Chronic Conditions and Co-morbidities  Goal: Patient's chronic conditions and co-morbidity symptoms are monitored and maintained or improved  8/13/2024 2254 by Leidy Gonzalez RN  Outcome: Progressing  8/13/2024 1650 by Kimberly Carlson RN  Outcome: Progressing  8/13/2024 1554 by Sierra Marsh RN  Outcome: Progressing     Problem: Risk for Elopement  Goal: Patient will not exit the unit/facility without proper excort  8/13/2024 2254 by Leidy Gonzalez RN  Outcome: Progressing  8/13/2024 1650 by Kimberly Carlson RN  Outcome: Progressing  8/13/2024 1554 by Sierra Marsh RN  Outcome: Progressing     Problem: Respiratory - Adult  Goal: Achieves optimal ventilation and oxygenation  8/13/2024 2254 by Leidy Gonzalez RN  Outcome: Progressing  8/13/2024 1650 by Kimberly Carlson RN  Outcome: Progressing  8/13/2024 1554 by Sierra Marsh RN  Outcome: Progressing     Problem: Musculoskeletal - Adult  Goal: Return mobility to safest level of function  8/13/2024 2254 by Leidy Gonzalez RN  Outcome: Progressing  8/13/2024 1650 by Kimberly Carlson RN  Outcome: Progressing  8/13/2024 1554 by Maximo

## 2024-08-15 LAB
ANION GAP SERPL CALCULATED.3IONS-SCNC: 12 MMOL/L (ref 7–16)
BUN SERPL-MCNC: 20 MG/DL (ref 6–23)
CALCIUM SERPL-MCNC: 8.8 MG/DL (ref 8.6–10.2)
CHLORIDE SERPL-SCNC: 101 MMOL/L (ref 98–107)
CO2 SERPL-SCNC: 24 MMOL/L (ref 22–29)
CREAT SERPL-MCNC: 3.8 MG/DL (ref 0.5–1)
ERYTHROCYTE [DISTWIDTH] IN BLOOD BY AUTOMATED COUNT: 19.4 % (ref 11.5–15)
GFR, ESTIMATED: 12 ML/MIN/1.73M2
GLUCOSE SERPL-MCNC: 105 MG/DL (ref 74–99)
HCT VFR BLD AUTO: 24.2 % (ref 34–48)
HGB BLD-MCNC: 7.5 G/DL (ref 11.5–15.5)
MAGNESIUM SERPL-MCNC: 2 MG/DL (ref 1.6–2.6)
MCH RBC QN AUTO: 33.6 PG (ref 26–35)
MCHC RBC AUTO-ENTMCNC: 31 G/DL (ref 32–34.5)
MCV RBC AUTO: 108.5 FL (ref 80–99.9)
PHOSPHATE SERPL-MCNC: 3.6 MG/DL (ref 2.5–4.5)
PLATELET # BLD AUTO: 117 K/UL (ref 130–450)
PMV BLD AUTO: 12.6 FL (ref 7–12)
POTASSIUM SERPL-SCNC: 3.8 MMOL/L (ref 3.5–5)
RBC # BLD AUTO: 2.23 M/UL (ref 3.5–5.5)
SODIUM SERPL-SCNC: 137 MMOL/L (ref 132–146)
WBC OTHER # BLD: 7.4 K/UL (ref 4.5–11.5)

## 2024-08-15 PROCEDURE — 84100 ASSAY OF PHOSPHORUS: CPT

## 2024-08-15 PROCEDURE — 6370000000 HC RX 637 (ALT 250 FOR IP): Performed by: FAMILY MEDICINE

## 2024-08-15 PROCEDURE — 6370000000 HC RX 637 (ALT 250 FOR IP)

## 2024-08-15 PROCEDURE — 90935 HEMODIALYSIS ONE EVALUATION: CPT

## 2024-08-15 PROCEDURE — 99232 SBSQ HOSP IP/OBS MODERATE 35: CPT

## 2024-08-15 PROCEDURE — 2700000000 HC OXYGEN THERAPY PER DAY

## 2024-08-15 PROCEDURE — 83735 ASSAY OF MAGNESIUM: CPT

## 2024-08-15 PROCEDURE — 6370000000 HC RX 637 (ALT 250 FOR IP): Performed by: STUDENT IN AN ORGANIZED HEALTH CARE EDUCATION/TRAINING PROGRAM

## 2024-08-15 PROCEDURE — 36415 COLL VENOUS BLD VENIPUNCTURE: CPT

## 2024-08-15 PROCEDURE — 6370000000 HC RX 637 (ALT 250 FOR IP): Performed by: INTERNAL MEDICINE

## 2024-08-15 PROCEDURE — 85027 COMPLETE CBC AUTOMATED: CPT

## 2024-08-15 PROCEDURE — 2580000003 HC RX 258: Performed by: FAMILY MEDICINE

## 2024-08-15 PROCEDURE — 1200000000 HC SEMI PRIVATE

## 2024-08-15 PROCEDURE — 80048 BASIC METABOLIC PNL TOTAL CA: CPT

## 2024-08-15 RX ADMIN — POLYETHYLENE GLYCOL 3350 17 G: 17 POWDER, FOR SOLUTION ORAL at 08:49

## 2024-08-15 RX ADMIN — ATORVASTATIN CALCIUM 40 MG: 40 TABLET, FILM COATED ORAL at 08:49

## 2024-08-15 RX ADMIN — MIDODRINE HYDROCHLORIDE 5 MG: 5 TABLET ORAL at 11:36

## 2024-08-15 RX ADMIN — Medication 5 MG: at 21:18

## 2024-08-15 RX ADMIN — SODIUM CHLORIDE, PRESERVATIVE FREE 10 ML: 5 INJECTION INTRAVENOUS at 08:49

## 2024-08-15 RX ADMIN — APIXABAN 2.5 MG: 2.5 TABLET, FILM COATED ORAL at 21:18

## 2024-08-15 RX ADMIN — MIDODRINE HYDROCHLORIDE 5 MG: 5 TABLET ORAL at 08:49

## 2024-08-15 RX ADMIN — APIXABAN 2.5 MG: 2.5 TABLET, FILM COATED ORAL at 08:49

## 2024-08-15 RX ADMIN — LEVOTHYROXINE SODIUM 50 MCG: 0.05 TABLET ORAL at 06:08

## 2024-08-15 RX ADMIN — SODIUM CHLORIDE, PRESERVATIVE FREE 5 ML: 5 INJECTION INTRAVENOUS at 21:20

## 2024-08-15 RX ADMIN — PANTOPRAZOLE SODIUM 40 MG: 40 TABLET, DELAYED RELEASE ORAL at 06:08

## 2024-08-15 RX ADMIN — ASPIRIN 81 MG: 81 TABLET, COATED ORAL at 08:49

## 2024-08-15 RX ADMIN — AMIODARONE HYDROCHLORIDE 200 MG: 200 TABLET ORAL at 08:49

## 2024-08-15 NOTE — PLAN OF CARE
Problem: Skin/Tissue Integrity  Goal: Absence of new skin breakdown  Description: 1.  Monitor for areas of redness and/or skin breakdown  2.  Assess vascular access sites hourly  3.  Every 4-6 hours minimum:  Change oxygen saturation probe site  4.  Every 4-6 hours:  If on nasal continuous positive airway pressure, respiratory therapy assess nares and determine need for appliance change or resting period.  8/14/2024 2100 by Jayshree Oconnell RN  Outcome: Progressing  8/14/2024 1757 by Kimberly Carlson RN  Outcome: Progressing     Problem: Safety - Adult  Goal: Free from fall injury  8/14/2024 2100 by Jayshree Oconnell RN  Outcome: Progressing  8/14/2024 1757 by Kimberly Carlson RN  Outcome: Progressing     Problem: Chronic Conditions and Co-morbidities  Goal: Patient's chronic conditions and co-morbidity symptoms are monitored and maintained or improved  8/14/2024 2100 by Jayshree Oconnell RN  Outcome: Progressing  8/14/2024 1757 by Kimberly Carlson RN  Outcome: Progressing     Problem: Risk for Elopement  Goal: Patient will not exit the unit/facility without proper excort  8/14/2024 2100 by Jayshree Oconnell RN  Outcome: Progressing  8/14/2024 1757 by Kimberly Carlson RN  Outcome: Progressing     Problem: Respiratory - Adult  Goal: Achieves optimal ventilation and oxygenation  8/14/2024 2100 by Jayshree Oconnell RN  Outcome: Progressing  8/14/2024 1757 by Kimberly Carlson RN  Outcome: Progressing     Problem: Musculoskeletal - Adult  Goal: Return mobility to safest level of function  8/14/2024 2100 by Jayshree Oconnell RN  Outcome: Progressing  8/14/2024 1757 by Kimberly Carlson RN  Outcome: Progressing     Problem: Metabolic/Fluid and Electrolytes - Adult  Goal: Hemodynamic stability and optimal renal function maintained  8/14/2024 2100 by Jayshree Oconnell RN  Outcome: Progressing  8/14/2024 1757 by Kimberly Carlson RN  Outcome: Progressing

## 2024-08-15 NOTE — PROGRESS NOTES
[facility]  MD Murtaza White MD Ali Hassan, MD Lisa Kniska, CNP   Lucille Beth, JOSE Fernandez, JUDE  Progress Note    8/15/2024    SUBJECTIVE:   8/12 : Laying in bed, no acute distress. Daughter is at the bedside. For dialysis this afternoon. She denies any dyspnea. She is edematous. Blood pressure is low.     8/13: Seen while on dialysis. Tolerating therapy without issues. BP is stable, but on the lower side. She denies any dyspnea, chest pain, or palpitations. Lower extremities are edematous.     8/15:     PROBLEM LIST:    Principal Problem:    Lower extremity edema  Active Problems:    Acute on chronic combined systolic and diastolic heart failure (HCC)  Resolved Problems:    * No resolved hospital problems. *         DIET:    ADULT DIET; Regular; Low Sodium (2 gm); Low Potassium (Less than 3000 mg/day)     MEDS (scheduled):    levothyroxine  50 mcg Oral Daily    midodrine  5 mg Oral TID WC    amiodarone  200 mg Oral Daily    sodium chloride flush  5-40 mL IntraVENous 2 times per day    apixaban  2.5 mg Oral BID    aspirin  81 mg Oral Daily    atorvastatin  40 mg Oral Daily    arformoterol 15 mcg-budesonide 0.25 mg neb solution   Nebulization BID RT    [Held by provider] bumetanide  2 mg Oral Daily    epoetin carmelina-epbx  2,000 Units SubCUTAneous Once per day on Monday Wednesday Friday    melatonin  5 mg Oral Nightly    metoprolol succinate  12.5 mg Oral Daily    [Held by provider] mexiletine  150 mg Oral 3 times per day    pantoprazole  40 mg Oral QAM AC    polyethylene glycol  17 g Oral Daily    sacubitril-valsartan  0.5 tablet Oral BID       MEDS (infusions):   sodium chloride         MEDS (prn):  sodium chloride flush, sodium chloride, polyethylene glycol, acetaminophen **OR** acetaminophen, benzocaine-menthol, benzonatate, bisacodyl, calcium carbonate, ipratropium 0.5 mg-albuterol 2.5 mg, lactulose, magnesium hydroxide, meclizine, Polyvinyl  multiple aspects of her care after she is discharged from the hospital, accounting for her frequent readmissions.  Attempts at counseling her and her daughter in the past have proven unsuccessful     BP is low  - isordil and hydralazine have been discontinued   Na 131, likely due to hypervolemia   BP remains low     Recommendations     Continue HD for solute and volume management --TTS schedule   Continue midodrine 5 mg TID, hopefully this will help with fluid removal on dialysis. Continue at discharge   ARLIN: Retacrit while here  Hold phosphorus binder as phosphorus is low   Ok for Entresto, will monitor potassium levels closely and follow blood pressure.   6.   Ok to discharge from renal standpoint after dialysis today   7. She is scheduled for dialysis outpatient tomorrow. Hopefully with the addition of midodrine, ultrafiltration on dialysis treatments can be achieved.       Electronically signed by MADELAINE Nicole CNP on 8/15/2024 at 12:22 PM

## 2024-08-15 NOTE — CARE COORDINATION
08/15/24 CM Update:  Pt remains for hypotension and medication adjustment Plan is for [facility] Checked with Leticia Stricklandert is still pending [facility] and destination completed Ambulance form on soft chart will need completed day of discharge CM/SW to follow Electronically signed by Jarad Leyva RN CM on 8/15/2024 at 10:47 AM

## 2024-08-15 NOTE — PROGRESS NOTES
acute distress  Skin: warm and dry  Head: normocephalic and atraumatic  Eyes: pupils equal, round, and reactive to light, extraocular eye movements intact, conjunctivae normal  Neck: neck supple and non tender without mass   Pulmonary/Chest: diminished bilaterally- no wheezes, rales or rhonchi, normal air movement, no respiratory distress  Cardiovascular: normal rate, normal S1 and S2 and no carotid bruits  Abdomen: soft, non-tender, non-distended, normal bowel sounds, no masses or organomegaly  Extremities: no cyanosis, no clubbing and + edema  Neurologic: no cranial nerve deficit and speech normal    Labs/Imaging/Diagnostics    Labs:  CBC:  Recent Labs     08/14/24  0737 08/15/24  0548   WBC 6.9 7.4   RBC 2.15* 2.23*   HGB 7.3* 7.5*   HCT 23.3* 24.2*   .4* 108.5*   RDW 19.7* 19.4*   * 117*     CHEMISTRIES:  Recent Labs     08/13/24  1758 08/14/24  0737 08/15/24  0548    136 137   K 3.4* 3.5 3.8    100 101   CO2 27 26 24   BUN 7 11 20   CREATININE 1.6* 2.5* 3.8*   GLUCOSE 157* 94 105*   PHOS  --   --  3.6   MG  --   --  2.0     PT/INR:No results for input(s): \"PROTIME\", \"INR\" in the last 72 hours.  APTT:No results for input(s): \"APTT\" in the last 72 hours.  LIVER PROFILE:No results for input(s): \"AST\", \"ALT\", \"BILIDIR\", \"BILITOT\", \"ALKPHOS\" in the last 72 hours.    Imaging Last 24 Hours:  No results found.  Assessment//Plan           Hospital Problems             Last Modified POA    * (Principal) Lower extremity edema 8/10/2024 Yes    Acute on chronic combined systolic and diastolic heart failure (HCC) 8/11/2024 Yes     Assessment & Plan  Assessment:     Principal Problem:    Lower extremity edema  Active Problems:    Acute on chronic combined systolic and diastolic heart failure (HCC)  Resolved Problems:    * No resolved hospital problems. *    Plan:         1.  Acute metabolic encephalopathy - Improved  Secondary delirium, Wax and wean mentation  Suspected underlying dementia  B12  folate,B1, B6, ammonia are unremarkable , TSH elevated  PT OT on board, needs placement  Ambulate as appropriate     2. 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   Moderately severe mitral regurgitation  Moderately severe aortic regurgitation  Bilateral lower extremity edema, dyspnea on presentation  Fluid adjustment on dialysis  Assess volume status after dialysis, still edematous leg   Pending heart echo  Continue GDMT  Consulted Cardiology,  Cardiology signed off.       3. Acute on chronic hypoxic respiratory failure - Improved  Secondary to ADHF  Baseline 3 to 4 L nasal cannula  Increased oxygen requirement  Continue diuresis, hemodialysis  Continue breathing treatment  Monitor respiratory status     4. Uptrending cr - End-stage renal disease  On hemodialysis  Nephrology on board, held bumex, on entersto  Continue HD for solute and volume management --TTS schedule   Continue midodrine 5 mg TID     5.  Macrocytic anemia  B12 folate reassuring  ARLIN: Retacrit while here   Repeat CBC, monitor hemoglobin    6.  Bilateral lower extremity pain and episodes of hypotension  Secondary to dialysis, electrolyte imbalance, Nephrology on board  Needs dialysis adjustment, had a session of HD today, feeling stable  Monitor blood pressure     7.  Paroxysmal A-fib  Prolonged QTc  ICD in situ   Hold amiodarone due to prolonged QT. She has a paced rhythm, continue Eliquis, resumed amiodarone  Telemetry monitoring     8.  Ambulatory dysfunction  Recurrent hospital admission  PT OT evaluation  Likely need placement     9. Hypotensive - held bumex, hydralazine,isordil, mexitil, started midodrine 5 mg TID      10. Hypothyroidism - TSH elevated, increased dose of synthyroid     Disposition: possible discharge tomorrow if medically stable. Plan is for [facility] Gray is still pending   DVT prophylaxis - Eliquis

## 2024-08-15 NOTE — FLOWSHEET NOTE
08/15/24 1525   Vital Signs   BP (!) 119/58   Temp 96.8 °F (36 °C)   Pulse 77   Weight - Scale 57.4 kg (126 lb 8.7 oz)   Weight Method Bed scale   Percent Weight Change -2.71   Post-Hemodialysis Assessment   Post-Treatment Procedures Blood returned;Catheter capped, clamped and heparinized x 2 ports   Machine Disinfection Process Acid/Vinegar Clean;Heat Disinfect;Exterior Machine Disinfection   Rinseback Volume (ml) 300 ml   Blood Volume Processed (Liters) 50.9 L   Dialyzer Clearance Lightly streaked   Duration of Treatment (minutes) 180 minutes   Heparin Amount Administered During Treatment (mL) 0 mL   Hemodialysis Intake (ml) 300 ml   Hemodialysis Output (ml) 2000 ml   NET Removed (ml) 1700   Tolerated Treatment Good   Patient Response to Treatment tolerated well, blood returned, cath care per policy/procedure, lines flushed, heparin instilled, ports capped

## 2024-08-16 LAB — VIT B1 PYROPHOSHATE BLD-SCNC: 148 NMOL/L (ref 70–180)

## 2024-08-16 PROCEDURE — 6370000000 HC RX 637 (ALT 250 FOR IP): Performed by: INTERNAL MEDICINE

## 2024-08-16 PROCEDURE — 6370000000 HC RX 637 (ALT 250 FOR IP): Performed by: FAMILY MEDICINE

## 2024-08-16 PROCEDURE — 99232 SBSQ HOSP IP/OBS MODERATE 35: CPT

## 2024-08-16 PROCEDURE — 6370000000 HC RX 637 (ALT 250 FOR IP)

## 2024-08-16 PROCEDURE — 2580000003 HC RX 258: Performed by: FAMILY MEDICINE

## 2024-08-16 PROCEDURE — 1200000000 HC SEMI PRIVATE

## 2024-08-16 PROCEDURE — 2700000000 HC OXYGEN THERAPY PER DAY

## 2024-08-16 PROCEDURE — 6360000002 HC RX W HCPCS: Performed by: FAMILY MEDICINE

## 2024-08-16 PROCEDURE — 90935 HEMODIALYSIS ONE EVALUATION: CPT

## 2024-08-16 PROCEDURE — 6370000000 HC RX 637 (ALT 250 FOR IP): Performed by: STUDENT IN AN ORGANIZED HEALTH CARE EDUCATION/TRAINING PROGRAM

## 2024-08-16 RX ORDER — AMIODARONE HYDROCHLORIDE 200 MG/1
200 TABLET ORAL DAILY
Qty: 30 TABLET | Refills: 1 | OUTPATIENT
Start: 2024-08-17

## 2024-08-16 RX ORDER — LEVOTHYROXINE SODIUM 50 UG/1
50 TABLET ORAL DAILY
Qty: 30 TABLET | Refills: 0 | Status: SHIPPED | OUTPATIENT
Start: 2024-08-17 | End: 2024-09-16

## 2024-08-16 RX ORDER — MIDODRINE HYDROCHLORIDE 5 MG/1
5 TABLET ORAL
Qty: 30 TABLET | Refills: 0 | Status: SHIPPED | OUTPATIENT
Start: 2024-08-16 | End: 2024-08-27 | Stop reason: HOSPADM

## 2024-08-16 RX ADMIN — ASPIRIN 81 MG: 81 TABLET, COATED ORAL at 08:46

## 2024-08-16 RX ADMIN — SODIUM CHLORIDE, PRESERVATIVE FREE 10 ML: 5 INJECTION INTRAVENOUS at 21:42

## 2024-08-16 RX ADMIN — PANTOPRAZOLE SODIUM 40 MG: 40 TABLET, DELAYED RELEASE ORAL at 06:41

## 2024-08-16 RX ADMIN — MIDODRINE HYDROCHLORIDE 5 MG: 5 TABLET ORAL at 08:46

## 2024-08-16 RX ADMIN — AMIODARONE HYDROCHLORIDE 200 MG: 200 TABLET ORAL at 08:46

## 2024-08-16 RX ADMIN — APIXABAN 2.5 MG: 2.5 TABLET, FILM COATED ORAL at 21:42

## 2024-08-16 RX ADMIN — SODIUM CHLORIDE, PRESERVATIVE FREE 10 ML: 5 INJECTION INTRAVENOUS at 08:46

## 2024-08-16 RX ADMIN — EPOETIN ALFA-EPBX 2000 UNITS: 2000 INJECTION, SOLUTION INTRAVENOUS; SUBCUTANEOUS at 16:25

## 2024-08-16 RX ADMIN — APIXABAN 2.5 MG: 2.5 TABLET, FILM COATED ORAL at 08:46

## 2024-08-16 RX ADMIN — MIDODRINE HYDROCHLORIDE 5 MG: 5 TABLET ORAL at 16:24

## 2024-08-16 RX ADMIN — Medication 5 MG: at 21:42

## 2024-08-16 RX ADMIN — ATORVASTATIN CALCIUM 40 MG: 40 TABLET, FILM COATED ORAL at 08:46

## 2024-08-16 RX ADMIN — LEVOTHYROXINE SODIUM 50 MCG: 0.05 TABLET ORAL at 06:41

## 2024-08-16 RX ADMIN — SACUBITRIL AND VALSARTAN 0.5 TABLET: 24; 26 TABLET, FILM COATED ORAL at 21:42

## 2024-08-16 RX ADMIN — MIDODRINE HYDROCHLORIDE 5 MG: 5 TABLET ORAL at 11:19

## 2024-08-16 NOTE — FLOWSHEET NOTE
08/16/24 1535   Vital Signs   BP (!) 110/57   Temp 96.8 °F (36 °C)   Pulse 77   Weight - Scale 55.8 kg (123 lb 0.3 oz)   Weight Method Bed scale   Percent Weight Change -2.79   Post-Hemodialysis Assessment   Post-Treatment Procedures Blood returned;Catheter capped, clamped and heparinized x 2 ports   Machine Disinfection Process Acid/Vinegar Clean;Heat Disinfect;Exterior Machine Disinfection   Rinseback Volume (ml) 300 ml   Blood Volume Processed (Liters) 51.9 L   Dialyzer Clearance Lightly streaked   Duration of Treatment (minutes) 180 minutes   Heparin Amount Administered During Treatment (mL) 0 mL   Hemodialysis Intake (ml) 300 ml   Hemodialysis Output (ml) 1500 ml   NET Removed (ml) 1200   Tolerated Treatment Good   Patient Response to Treatment tolerated well, blood returned, cath care per policy/procedure, lines flushed, heparin instilled ports capped

## 2024-08-16 NOTE — CARE COORDINATION
08/16/24 CM Update:  Rec'd email that P2P is requested by pt insurance Perfect julita attending directed to Dr Parham Pt plan is [facility] waiting for P2P to be complete [facility] and destination complete Ambulance form on soft chart will need completed day of discharge CM/SW to follow Electronically signed by Jarad Leyva RN CM on 8/16/2024 at 7:49 AM     9:26am Dr Parham is not working today Dr Mancera is attending Perfect serve will not direct messages to her Notified Dr Caraballo via text message concerning above and she will notify Dr Mancera of above Provided Dr Caraballo with Ensemble MD Mile Rodriguez's number who could assist Dr Mancera with the P2P as well CM/SW to follow Electronically signed by Jarad Leyva RN on 8/16/2024 at 9:31 AM     10:09am Precert has been obtained and pt can d/c to facility when medically stable [facility] notified of facility's request for pt to have HD today before discharging Electronically signed by Jarad Leyva RN on 8/16/2024 at 10:10 AM

## 2024-08-16 NOTE — PROGRESS NOTES
[facility]  MD Murtaza White MD Ali Hassan, MD Lisa Kniska, CNP   Lucille Beth, JOSE Fernandez, JUDE  Progress Note    8/16/2024    SUBJECTIVE:   8/12 : Laying in bed, no acute distress. Daughter is at the bedside. For dialysis this afternoon. She denies any dyspnea. She is edematous. Blood pressure is low.     8/13: Seen while on dialysis. Tolerating therapy without issues. BP is stable, but on the lower side. She denies any dyspnea, chest pain, or palpitations. Lower extremities are edematous.     8/16: Seen while on dialysis. Tolerating treatment without issues. She denies any dyspnea, chest pain, or palpitations. For discharge later this afternoon.     PROBLEM LIST:    Principal Problem:    Lower extremity edema  Active Problems:    Acute on chronic combined systolic and diastolic heart failure (HCC)  Resolved Problems:    * No resolved hospital problems. *         DIET:    ADULT DIET; Regular; Low Sodium (2 gm); Low Potassium (Less than 3000 mg/day)     MEDS (scheduled):    levothyroxine  50 mcg Oral Daily    midodrine  5 mg Oral TID WC    amiodarone  200 mg Oral Daily    sodium chloride flush  5-40 mL IntraVENous 2 times per day    apixaban  2.5 mg Oral BID    aspirin  81 mg Oral Daily    atorvastatin  40 mg Oral Daily    arformoterol 15 mcg-budesonide 0.25 mg neb solution   Nebulization BID RT    [Held by provider] bumetanide  2 mg Oral Daily    epoetin carmelina-epbx  2,000 Units SubCUTAneous Once per day on Monday Wednesday Friday    melatonin  5 mg Oral Nightly    metoprolol succinate  12.5 mg Oral Daily    [Held by provider] mexiletine  150 mg Oral 3 times per day    pantoprazole  40 mg Oral QAM AC    polyethylene glycol  17 g Oral Daily    sacubitril-valsartan  0.5 tablet Oral BID       MEDS (infusions):   sodium chloride         MEDS (prn):  sodium chloride flush, sodium chloride, polyethylene glycol, acetaminophen **OR** acetaminophen,

## 2024-08-16 NOTE — CARE COORDINATION
8/16/24, DYLAN Update-Patient is set up for a 6pm  by Clovis Velez for patient to go to [facility].  Those informed:  Patient, Jonathan-daughter, nursing and Leticia from [facility].  Ambulance form, face sheet and envelope is on soft chart.  SW to follow.    Minna Kirby PAM  [facility] Case Management  [phone]

## 2024-08-16 NOTE — PROGRESS NOTES
Progress Note  Date:2024       Room:8201/8201-A  Patient Name:Marge Callejas     YOB: 1940     Age:84 y.o.        Subjective    Subjective:  Symptoms:  Improved.  No shortness of breath, chest pain or chest pressure.    Diet:  Adequate intake.  No nausea or vomiting.    Activity level: Impaired due to weakness.    Pain:  She complains of pain that is mild.  She reports pain is unchanged.  Pain is well controlled.      Patient is being followed for Abdominal pain, left upper quadrant, Acute on chronic combined systolic and diastolic heart failure, Lower extremity edema, ESRD on dialysis, Bilateral lower extremity edema, Heel pain, bilateral,    Pt feels better today .    Review of Systems   Constitutional:  Positive for activity change and fatigue.   HENT: Negative.     Eyes: Negative.    Respiratory: Negative.  Negative for shortness of breath.    Cardiovascular: Negative.  Negative for chest pain.   Gastrointestinal: Negative.  Negative for nausea and vomiting.   Endocrine: Negative.    Genitourinary: Negative.    Musculoskeletal: Negative.    Skin: Negative.    Neurological: Negative.    Hematological: Negative.    Psychiatric/Behavioral: Negative.      denies fever, chills, cp, sob, n/v, HA unless stated above.     Talked to patients daughter Jonathan. After the dialysis, patient was assessed before discharge and was feeling well. As per daughter, she is still week and would like to stay over [facility]. I will hold the discharge and reassess her tomorrow morning.     Objective         Vitals Last 24 Hours:  TEMPERATURE:  Temp  Av.8 °F (36.6 °C)  Min: 96.8 °F (36 °C)  Max: 99.1 °F (37.3 °C)  RESPIRATIONS RANGE: Resp  Av  Min: 18  Max: 18  PULSE OXIMETRY RANGE: SpO2  Av %  Min: 91 %  Max: 94 %  PULSE RANGE: Pulse  Av.2  Min: 74  Max: 84  BLOOD PRESSURE RANGE: Systolic (24hrs), Av , Min:101 , Max:134   ; Diastolic (24hrs), Av, Min:57, Max:66    I/O (24Hr):    Intake/Output  metabolic encephalopathy  3. Acute on chronic hypoxic respiratory failure  4. ESRD on HD    Plan:         1.  Acute metabolic encephalopathy - Improved  Secondary delirium, Wax and wean mentation  Suspected underlying dementia  B12 folate,B1, B6, ammonia are unremarkable , TSH elevated  PT OT on board, needs placement  Ambulate as appropriate     2. 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   Moderately severe mitral regurgitation  Moderately severe aortic regurgitation  Bilateral lower extremity edema, dyspnea on presentation  Fluid adjustment on dialysis  Assess volume status after dialysis, edema looks better.  Pending heart echo  Continue GDMT  Consulted Cardiology,  Cardiology signed off.       3. Acute on chronic hypoxic respiratory failure - Improved  Secondary to ADHF, ejection fraction EF of 20-25%  Baseline 3 to 4 L nasal cannula  Patient back to baseline now  Continue diuresis, hemodialysis  Monitor respiratory status     4. Uptrending cr - End-stage renal disease  On hemodialysis  Nephrology on board, held bumex, on entersto  Continue HD for solute and volume management --TTS schedule   Continue midodrine 5 mg TID     5.  Macrocytic anemia  B12 folate reassuring  ARLIN: Retacrit while here   Repeat CBC, monitor hemoglobin    6.  Bilateral lower extremity pain and episodes of hypotension  Secondary to dialysis, electrolyte imbalance, Nephrology on board  Needs dialysis adjustment, had a session of HD yesterday, feeling stable today  Monitor blood pressure     7.  Paroxysmal A-fib  Prolonged QTc  ICD in situ   Will start amiodarone at 200 mg daily   She has a paced rhythm, continue Eliquis, resumed amiodarone  Telemetry monitoring     8.  Ambulatory dysfunction  Recurrent hospital admission  PT OT evaluation  Approved for rehab, peer to peer completed     9. Hypotensive - held bumex,

## 2024-08-16 NOTE — PROGRESS NOTES
Daughter at bedside. States patient not feeling well. Patient states she had \"some nausea.\" Denies emesis. VSS. Daughter stating she does not want patient discharged just to turn around and return to hospital. Went over cardiac and primary care notes with daughter. Concerns passed along to Dr Mancera who called daughter. Dr Mancera called this nurse and stated discharge is being placed on hold.  will speak with daughter tomorrow. Clovis Cuello notified. Attempted to call [facility] and no answer.

## 2024-08-17 ENCOUNTER — APPOINTMENT (OUTPATIENT)
Dept: GENERAL RADIOLOGY | Age: 84
DRG: 291 | End: 2024-08-17
Payer: MEDICARE

## 2024-08-17 LAB
ALBUMIN SERPL-MCNC: 3.2 G/DL (ref 3.5–5.2)
ALP SERPL-CCNC: 132 U/L (ref 35–104)
ALT SERPL-CCNC: 24 U/L (ref 0–32)
ANION GAP SERPL CALCULATED.3IONS-SCNC: 15 MMOL/L (ref 7–16)
AST SERPL-CCNC: 27 U/L (ref 0–31)
BASOPHILS # BLD: 0 K/UL (ref 0–0.2)
BASOPHILS NFR BLD: 0 % (ref 0–2)
BILIRUB SERPL-MCNC: 0.5 MG/DL (ref 0–1.2)
BUN SERPL-MCNC: 11 MG/DL (ref 6–23)
CALCIUM SERPL-MCNC: 8.7 MG/DL (ref 8.6–10.2)
CHLORIDE SERPL-SCNC: 101 MMOL/L (ref 98–107)
CO2 SERPL-SCNC: 21 MMOL/L (ref 22–29)
CREAT SERPL-MCNC: 2.3 MG/DL (ref 0.5–1)
EOSINOPHIL # BLD: 0 K/UL (ref 0.05–0.5)
EOSINOPHILS RELATIVE PERCENT: 0 % (ref 0–6)
ERYTHROCYTE [DISTWIDTH] IN BLOOD BY AUTOMATED COUNT: 18.7 % (ref 11.5–15)
GFR, ESTIMATED: 21 ML/MIN/1.73M2
GLUCOSE BLD-MCNC: 126 MG/DL (ref 74–99)
GLUCOSE SERPL-MCNC: 122 MG/DL (ref 74–99)
HCT VFR BLD AUTO: 26.9 % (ref 34–48)
HGB BLD-MCNC: 8.5 G/DL (ref 11.5–15.5)
LYMPHOCYTES NFR BLD: 0.52 K/UL (ref 1.5–4)
LYMPHOCYTES RELATIVE PERCENT: 5 % (ref 20–42)
MCH RBC QN AUTO: 35.3 PG (ref 26–35)
MCHC RBC AUTO-ENTMCNC: 31.6 G/DL (ref 32–34.5)
MCV RBC AUTO: 111.6 FL (ref 80–99.9)
MONOCYTES NFR BLD: 0.44 K/UL (ref 0.1–0.95)
MONOCYTES NFR BLD: 5 % (ref 2–12)
NEUTROPHILS NFR BLD: 90 % (ref 43–80)
NEUTS SEG NFR BLD: 8.74 K/UL (ref 1.8–7.3)
PLATELET, FLUORESCENCE: 114 K/UL (ref 130–450)
PMV BLD AUTO: 12.5 FL (ref 7–12)
POTASSIUM SERPL-SCNC: 3.6 MMOL/L (ref 3.5–5)
PROT SERPL-MCNC: 6.4 G/DL (ref 6.4–8.3)
RBC # BLD AUTO: 2.41 M/UL (ref 3.5–5.5)
RBC # BLD: ABNORMAL 10*6/UL
SODIUM SERPL-SCNC: 137 MMOL/L (ref 132–146)
TROPONIN I SERPL HS-MCNC: 101 NG/L (ref 0–9)
WBC OTHER # BLD: 9.7 K/UL (ref 4.5–11.5)

## 2024-08-17 PROCEDURE — 6370000000 HC RX 637 (ALT 250 FOR IP): Performed by: STUDENT IN AN ORGANIZED HEALTH CARE EDUCATION/TRAINING PROGRAM

## 2024-08-17 PROCEDURE — 90935 HEMODIALYSIS ONE EVALUATION: CPT

## 2024-08-17 PROCEDURE — 2580000003 HC RX 258

## 2024-08-17 PROCEDURE — 82962 GLUCOSE BLOOD TEST: CPT

## 2024-08-17 PROCEDURE — 6370000000 HC RX 637 (ALT 250 FOR IP)

## 2024-08-17 PROCEDURE — 2580000003 HC RX 258: Performed by: FAMILY MEDICINE

## 2024-08-17 PROCEDURE — 6360000002 HC RX W HCPCS: Performed by: FAMILY MEDICINE

## 2024-08-17 PROCEDURE — 6360000002 HC RX W HCPCS: Performed by: STUDENT IN AN ORGANIZED HEALTH CARE EDUCATION/TRAINING PROGRAM

## 2024-08-17 PROCEDURE — 84484 ASSAY OF TROPONIN QUANT: CPT

## 2024-08-17 PROCEDURE — 6370000000 HC RX 637 (ALT 250 FOR IP): Performed by: INTERNAL MEDICINE

## 2024-08-17 PROCEDURE — 36415 COLL VENOUS BLD VENIPUNCTURE: CPT

## 2024-08-17 PROCEDURE — 93005 ELECTROCARDIOGRAM TRACING: CPT

## 2024-08-17 PROCEDURE — 99233 SBSQ HOSP IP/OBS HIGH 50: CPT

## 2024-08-17 PROCEDURE — 94640 AIRWAY INHALATION TREATMENT: CPT

## 2024-08-17 PROCEDURE — 6370000000 HC RX 637 (ALT 250 FOR IP): Performed by: FAMILY MEDICINE

## 2024-08-17 PROCEDURE — 2700000000 HC OXYGEN THERAPY PER DAY

## 2024-08-17 PROCEDURE — 6360000002 HC RX W HCPCS

## 2024-08-17 PROCEDURE — 80053 COMPREHEN METABOLIC PANEL: CPT

## 2024-08-17 PROCEDURE — 85025 COMPLETE CBC W/AUTO DIFF WBC: CPT

## 2024-08-17 PROCEDURE — 71045 X-RAY EXAM CHEST 1 VIEW: CPT

## 2024-08-17 PROCEDURE — 99233 SBSQ HOSP IP/OBS HIGH 50: CPT | Performed by: STUDENT IN AN ORGANIZED HEALTH CARE EDUCATION/TRAINING PROGRAM

## 2024-08-17 PROCEDURE — 2060000000 HC ICU INTERMEDIATE R&B

## 2024-08-17 PROCEDURE — 2580000003 HC RX 258: Performed by: STUDENT IN AN ORGANIZED HEALTH CARE EDUCATION/TRAINING PROGRAM

## 2024-08-17 RX ORDER — AMIODARONE HYDROCHLORIDE 200 MG/1
200 TABLET ORAL 2 TIMES DAILY WITH MEALS
Status: DISCONTINUED | OUTPATIENT
Start: 2024-08-17 | End: 2024-08-29 | Stop reason: HOSPADM

## 2024-08-17 RX ORDER — METOPROLOL TARTRATE 1 MG/ML
5 INJECTION, SOLUTION INTRAVENOUS EVERY 6 HOURS
Status: DISCONTINUED | OUTPATIENT
Start: 2024-08-17 | End: 2024-08-17

## 2024-08-17 RX ADMIN — AMIODARONE HYDROCHLORIDE 200 MG: 200 TABLET ORAL at 17:33

## 2024-08-17 RX ADMIN — MEXILETINE HYDROCHLORIDE 150 MG: 150 CAPSULE ORAL at 22:25

## 2024-08-17 RX ADMIN — AMIODARONE HYDROCHLORIDE 1 MG/MIN: 50 INJECTION, SOLUTION INTRAVENOUS at 20:42

## 2024-08-17 RX ADMIN — LEVOTHYROXINE SODIUM 50 MCG: 0.05 TABLET ORAL at 06:20

## 2024-08-17 RX ADMIN — PANTOPRAZOLE SODIUM 40 MG: 40 TABLET, DELAYED RELEASE ORAL at 06:20

## 2024-08-17 RX ADMIN — APIXABAN 2.5 MG: 2.5 TABLET, FILM COATED ORAL at 09:00

## 2024-08-17 RX ADMIN — ASPIRIN 81 MG: 81 TABLET, COATED ORAL at 08:59

## 2024-08-17 RX ADMIN — AMIODARONE HYDROCHLORIDE 1 MG/MIN: 50 INJECTION, SOLUTION INTRAVENOUS at 10:13

## 2024-08-17 RX ADMIN — AMIODARONE HYDROCHLORIDE 200 MG: 200 TABLET ORAL at 08:59

## 2024-08-17 RX ADMIN — SACUBITRIL AND VALSARTAN 0.5 TABLET: 24; 26 TABLET, FILM COATED ORAL at 20:43

## 2024-08-17 RX ADMIN — Medication 5 MG: at 20:44

## 2024-08-17 RX ADMIN — APIXABAN 2.5 MG: 2.5 TABLET, FILM COATED ORAL at 20:43

## 2024-08-17 RX ADMIN — ATORVASTATIN CALCIUM 40 MG: 40 TABLET, FILM COATED ORAL at 08:59

## 2024-08-17 RX ADMIN — MIDODRINE HYDROCHLORIDE 5 MG: 5 TABLET ORAL at 17:33

## 2024-08-17 RX ADMIN — METOPROLOL SUCCINATE 12.5 MG: 25 TABLET, EXTENDED RELEASE ORAL at 08:59

## 2024-08-17 RX ADMIN — SODIUM CHLORIDE, PRESERVATIVE FREE 10 ML: 5 INJECTION INTRAVENOUS at 09:06

## 2024-08-17 RX ADMIN — MIDODRINE HYDROCHLORIDE 5 MG: 5 TABLET ORAL at 07:29

## 2024-08-17 RX ADMIN — BENZONATATE 100 MG: 100 CAPSULE ORAL at 17:33

## 2024-08-17 RX ADMIN — SACUBITRIL AND VALSARTAN 0.5 TABLET: 24; 26 TABLET, FILM COATED ORAL at 10:21

## 2024-08-17 RX ADMIN — MIDODRINE HYDROCHLORIDE 5 MG: 5 TABLET ORAL at 12:54

## 2024-08-17 RX ADMIN — ARFORMOTEROL TARTRATE: 15 SOLUTION RESPIRATORY (INHALATION) at 09:26

## 2024-08-17 RX ADMIN — SODIUM CHLORIDE, PRESERVATIVE FREE 10 ML: 5 INJECTION INTRAVENOUS at 20:44

## 2024-08-17 NOTE — FLOWSHEET NOTE
08/17/24 0805   Vital Signs   BP (!) 108/57   Temp 96.9 °F (36.1 °C)   Pulse 93   Respirations 18   Post-Hemodialysis Assessment   Post-Treatment Procedures Blood returned;Catheter capped, clamped and heparinized x 2 ports   Machine Disinfection Process Acid/Vinegar Clean;Heat Disinfect   Rinseback Volume (ml) 300 ml   Blood Volume Processed (Liters) 9 L   Dialyzer Clearance Lightly streaked   Duration of Treatment (minutes) 49 minutes   Heparin Amount Administered During Treatment (mL) 0 mL   Hemodialysis Intake (ml) 500 ml   Hemodialysis Output (ml) 247 ml   NET Removed (ml) -253   Patient Response to Treatment Patient did not tolerate this morning.  She started to say \" i dont feel right.  I have double vision.  I am jittery inside\".  Vital signs remained stable throughout treatment.  Notified Dr. Dawson of the patients condition as it is abnormal for this patient to respond this way.  He terminated her treatment and ordered a CXR.  Primary notified of the patients status and events during dialysis.   Edema Generalized None   Patient Disposition Return to room   Observations & Evaluations   Level of Consciousness 0   Oriented X 3   Heart Rhythm Regular

## 2024-08-17 NOTE — PLAN OF CARE
Problem: Skin/Tissue Integrity  Goal: Absence of new skin breakdown  Description: 1.  Monitor for areas of redness and/or skin breakdown  2.  Assess vascular access sites hourly  3.  Every 4-6 hours minimum:  Change oxygen saturation probe site  4.  Every 4-6 hours:  If on nasal continuous positive airway pressure, respiratory therapy assess nares and determine need for appliance change or resting period.  8/16/2024 2225 by Светлана Olvera RN  Outcome: Progressing  8/16/2024 1848 by Sierra Marsh RN  Outcome: Progressing     Problem: Safety - Adult  Goal: Free from fall injury  8/16/2024 2225 by Светлана Olvera RN  Outcome: Progressing  8/16/2024 1848 by Sierra Marsh RN  Outcome: Progressing     Problem: Chronic Conditions and Co-morbidities  Goal: Patient's chronic conditions and co-morbidity symptoms are monitored and maintained or improved  8/16/2024 2225 by Светлана Olvera RN  Outcome: Progressing  8/16/2024 1848 by Sierra Marsh RN  Outcome: Progressing     Problem: Risk for Elopement  Goal: Patient will not exit the unit/facility without proper excort  8/16/2024 2225 by Светлана Olvera RN  Outcome: Progressing  8/16/2024 1848 by Sierra Marsh RN  Outcome: Progressing     Problem: Respiratory - Adult  Goal: Achieves optimal ventilation and oxygenation  8/16/2024 2225 by Светлана Olvera RN  Outcome: Progressing  8/16/2024 1848 by Sierra Marsh RN  Outcome: Progressing     Problem: Musculoskeletal - Adult  Goal: Return mobility to safest level of function  8/16/2024 2225 by Светлана Olvera RN  Outcome: Progressing  8/16/2024 1848 by Sierra Marsh RN  Outcome: Progressing     Problem: Metabolic/Fluid and Electrolytes - Adult  Goal: Hemodynamic stability and optimal renal function maintained  8/16/2024 2225 by Светлана Olvera RN  Outcome: Progressing  8/16/2024 1848 by Sierra Marsh RN  Outcome: Progressing

## 2024-08-17 NOTE — PROGRESS NOTES
Nurse to nurse report called to [facility] [place] on 4SE for patient's transfer to room 4508 as ordered.

## 2024-08-17 NOTE — PROGRESS NOTES
Progress Note  Date:2024       Room:8201/8201-A  Patient Name:Marge Callejas     YOB: 1940     Age:84 y.o.        Subjective    Subjective:  Symptoms:  Worsening.  She reports shortness of breath, malaise and anxiety.    Diet:  Poor intake.    Activity level: Impaired due to weakness.    Pain:  She complains of pain that is mild.  She reports pain is unchanged.  Pain is well controlled.       Review of Systems   Constitutional:  Positive for activity change, appetite change and diaphoresis.   HENT: Negative.     Eyes: Negative.    Respiratory:  Positive for shortness of breath.    Cardiovascular:  Positive for palpitations.   Gastrointestinal: Negative.    Endocrine: Negative.    Genitourinary: Negative.    Musculoskeletal:  Positive for myalgias.   Skin: Negative.    Neurological: Negative.    Psychiatric/Behavioral: Negative.       Patient had 8 beats of Vtach last night. Today in the morning while getting dialysis, she had 31 beats. She was feeling week, diaphoretic and tingly. Dialysis was terminated and she was brought to the floor. She again was going in and out of Vtach. Talked to EP Dr. Cool. Will start her on amiodarone drip and transfer to a cardiac floor. Vitals have been stable during my assessment.    Objective         Vitals Last 24 Hours:  TEMPERATURE:  Temp  Av.9 °F (36.6 °C)  Min: 96.8 °F (36 °C)  Max: 99.5 °F (37.5 °C)  RESPIRATIONS RANGE: Resp  Av.6  Min: 16  Max: 20  PULSE OXIMETRY RANGE: SpO2  Av.1 %  Min: 89 %  Max: 98 %  PULSE RANGE: Pulse  Av.3  Min: 74  Max: 99  BLOOD PRESSURE RANGE: Systolic (24hrs), Av , Min:101 , Max:151   ; Diastolic (24hrs), Av, Min:53, Max:77    I/O (24Hr):    Intake/Output Summary (Last 24 hours) at 2024 0934  Last data filed at 2024 0805  Gross per 24 hour   Intake 980 ml   Output 1747 ml   Net -767 ml     Objective:  General Appearance:  Uncomfortable.    Vital signs: (most recent): Blood pressure 109/77,       Patient had 8 beats of Vtach last night. Today in the morning while getting dialysis, she had 31 beats. She was feeling week, diaphoretic and tingly. Dialysis was terminated and she was brought to the floor. She again was going in and out of Vtach. Talked to EP Dr. Cool. Will start her on amiodarone drip and transfer to a cardiac floor. Vitals have been stable during my assessment.    Assessment & Plan  Assessment:     1. Acute on chronic decompensated heart failure reduced ejection fraction EF of 20-25%  2. Acute metabolic encephalopathy  3. Acute on chronic hypoxic respiratory failure  4. ESRD on HD     Plan:         1.  Acute metabolic encephalopathy - Improved  Secondary delirium, Wax and wean mentation  Suspected underlying dementia  B12 folate,B1, B6, ammonia are unremarkable , TSH elevated, dose adjusted  PT OT on board, needs placement  Ambulate as appropriate     2. 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   Moderately severe mitral regurgitation  Moderately severe aortic regurgitation  Bilateral lower extremity edema resolved, dyspnea on presentation which is still persistent  Pending heart echo  Continue GDMT  Cardiology signed off.       3. Acute on chronic hypoxic respiratory failure - Improved  Secondary to ADHF, ejection fraction EF of 20-25%  Baseline 3 to 4 L nasal cannula  Patient back to baseline now  Continue diuresis, hemodialysis  Monitor respiratory status     4. Uptrending cr - End-stage renal disease  On hemodialysis  Nephrology on board, held bumex, on entersto  Continue HD for solute and volume management --TTS schedule   Continue midodrine 5 mg TID      5.  Macrocytic anemia  B12 folate reassuring  ARLIN: Retacrit while here   Repeat CBC, monitor hemoglobin     6.  Bilateral lower extremity pain and episodes of hypotension  Secondary to dialysis, electrolyte

## 2024-08-17 NOTE — PROGRESS NOTES
[facility]  Inpatient progress report  PATIENT: Marge Callejas  MEDICAL RECORD NUMBER: [id number]  DATE OF SERVICE:  8/17/2024  ATTENDING ELECTROPHYSIOLOGIST: Hang Cool DO  PRIMARY ELECTROPHYSIOLOGIST: .me  REFERRING PHYSICIAN: No ref. provider found and Marek Andres DO  CHIEF COMPLAINT: Dyspnea and leg edema    HPI: Marge Callejas is a 84 y.o. female with a history of nonvalvular permanent AF, NYHA class III HFrEF-mixed sp Abbott CRT-D (DOI: 4/29/2022 -Dr Wild at [place]), bifascicular block (130 ms, RBBB/LPFB), moderate-severe AI, moderate-severe MR, moderate pulmonary HTN, AAA (5.4 cm), CVA, HTN, GI bleed sp clip/cautery of Dieulafoy lesion (4/2021), ESRD on HD, anemia of chronic disease, COPD, and OA. She historically receives her care at [facility].  Her home medication list includes amiodarone 400 mg twice daily, apixaban 2.5 mg twice daily, aspirin 81 mg daily, atorvastatin 40 mg daily, Bumex 2 mg daily, hydralazine 10 mg twice daily, Isordil 5 mg twice daily, metoprolol XL 12.5 mg daily, mexiletine 150 mg 3 times daily, Entresto 12-13 mg twice daily, and PPI.  In 2015 or earlier, patient was diagnosed with HFrEF (LVEF = 29% TTE).  Nuclear stress test reported inferior wall infarct, but no ischemia.  In 2016 or earlier, she was diagnosed with atrial fibrillation, bifascicular block (QRS D = 128 ms, RBBB, LPFB).  In 2021, TTE reported LVEF remained 30% and nuclear stress test reported areas of infarct and no ischemia.  [facility] reported 90% mid RCA stenosis with distal filling via antegrade flow as well as collaterals.  No intervention was performed.  In 4/2021, she was admitted for GI bleed (Hb = 6.9).  Her home antithrombotic medications at that time included apixaban 2.5 mg twice daily.  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   Chronic renal insufficiency, stage IV (severe) ([facility]) 2016    Coronary artery disease involving native coronary artery of native heart without angina pectoris 2024    History of cardiovascular stress test 2015    Lexiscan stress test    History of echocardiogram 2015    EF 29%    Hyperlipidemia     Hypertension     Mixed restrictive and obstructive lung disease ([facility]) 2023       Family History   Problem Relation Age of Onset    Heart Disease Mother     No Known Problems Father     Other Sister         CHF    Cancer Sister     Brain Cancer Sister        Social History     Tobacco Use    Smoking status: Former     Current packs/day: 0.00     Average packs/day: 0.5 packs/day for 51.0 years (25.5 ttl pk-yrs)     Types: Cigarettes     Start date:      Quit date: 2023     Years since quittin.6    Smokeless tobacco: Never   Substance Use Topics    Alcohol use: Never       Current Facility-Administered Medications   Medication Dose Route Frequency Provider Last Rate Last Admin    amiodarone (CORDARONE) 450 mg in dextrose 5 % 250 mL infusion  1 mg/min IntraVENous Continuous Favian Mancera MD 33.3 mL/hr at 24 1013 0.999 mg/min at 24 1013    levothyroxine (SYNTHROID) tablet 50 mcg  50 mcg Oral Daily Misty Oseguera MD   50 mcg at 24 0620    midodrine (PROAMATINE) tablet 5 mg  5 mg Oral TID  Palmira Fernandez APRN - CNP   5 mg at 24 0729    sodium chloride flush 0.9 % injection 5-40 mL  5-40 mL IntraVENous 2 times per day Gama Parham MD   10 mL at 24 0906    sodium chloride flush 0.9 % injection 5-40 mL  5-40 mL IntraVENous PRN Gama Parham MD        0.9 % sodium chloride infusion   IntraVENous PRN Gama Parham MD        polyethylene glycol (GLYCOLAX) packet 17 g  17 g Oral Daily PRN Gama Parham MD        acetaminophen (TYLENOL) tablet 650 mg  650 mg Oral Q6H PRN Gama Parham MD        Or    acetaminophen (TYLENOL)

## 2024-08-17 NOTE — PROGRESS NOTES
[facility]  MD Murtaza White MD Ali Hassan, MD Lisa Kniska, CNP   Lucille Beth, JOSE Fernandez, JUDE  Progress Note    8/17/2024    SUBJECTIVE:   8/12 : Laying in bed, no acute distress. Daughter is at the bedside. For dialysis this afternoon. She denies any dyspnea. She is edematous. Blood pressure is low.     8/13: Seen while on dialysis. Tolerating therapy without issues. BP is stable, but on the lower side. She denies any dyspnea, chest pain, or palpitations. Lower extremities are edematous.     8/16: Seen while on dialysis. Tolerating treatment without issues. She denies any dyspnea, chest pain, or palpitations. For discharge later this afternoon.     8/17 HD today no reported issues   Charts reviewed .   PROBLEM LIST:    Principal Problem:    Lower extremity edema  Active Problems:    Acute on chronic combined systolic and diastolic heart failure (HCC)  Resolved Problems:    * No resolved hospital problems. *         DIET:    ADULT DIET; Regular; Low Sodium (2 gm); Low Potassium (Less than 3000 mg/day)     MEDS (scheduled):    amiodarone  200 mg Oral BID WC    levothyroxine  50 mcg Oral Daily    midodrine  5 mg Oral TID WC    sodium chloride flush  5-40 mL IntraVENous 2 times per day    apixaban  2.5 mg Oral BID    aspirin  81 mg Oral Daily    atorvastatin  40 mg Oral Daily    arformoterol 15 mcg-budesonide 0.25 mg neb solution   Nebulization BID RT    [Held by provider] bumetanide  2 mg Oral Daily    epoetin carmelina-epbx  2,000 Units SubCUTAneous Once per day on Monday Wednesday Friday    melatonin  5 mg Oral Nightly    metoprolol succinate  12.5 mg Oral Daily    [Held by provider] mexiletine  150 mg Oral 3 times per day    pantoprazole  40 mg Oral QAM AC    polyethylene glycol  17 g Oral Daily    sacubitril-valsartan  0.5 tablet Oral BID       MEDS (infusions):   amiodarone 0.999 mg/min (08/17/24 1013)    sodium chloride         MEDS (prn):  sodium chloride    .5* 111.6*   *  --      Recent Labs     08/15/24  0548 08/17/24  0843    137   K 3.8 3.6    101   CO2 24 21*   MG 2.0  --    PHOS 3.6  --    BUN 20 11   CREATININE 3.8* 2.3*   ALT  --  24   AST  --  27   BILITOT  --  0.5   ALKPHOS  --  132*       Lab Results   Component Value Date    LABPROT 0.3 (H) 04/11/2021    LABPROT 0.3 04/11/2021     Assessment     ESRD  Anemia due to ESRD  Secondary hyperparathyroidism/mineral bone disease due to ESRD  History of hypertension  CHF, HFrEF, LVEF 30%  History of AF with RVR  Pulmonary HTN   Noncompliance/nonadherence to multiple aspects of her care after she is discharged from the hospital, accounting for her frequent readmissions.  Attempts at counseling her and her daughter in the past have proven unsuccessful     BP is low  - isordil and hydralazine have been discontinued   BP has improved with the addition of midodrine     Recommendations     Continue HD for solute and volume management --TTS schedule   Continue midodrine 5 mg TID, hopefully this will help with fluid removal on dialysis. Continue at discharge   ARLIN: Retacrit while here  Hold phosphorus binder as phosphorus is low   Ok for Entresto, will monitor potassium levels closely and follow blood pressure.       Electronically signed by Alban Ross MD on 8/17/2024 at 1:18 PM

## 2024-08-18 ENCOUNTER — APPOINTMENT (OUTPATIENT)
Dept: GENERAL RADIOLOGY | Age: 84
DRG: 291 | End: 2024-08-18
Payer: MEDICARE

## 2024-08-18 LAB
ALBUMIN SERPL-MCNC: 3 G/DL (ref 3.5–5.2)
ALBUMIN SERPL-MCNC: 3.8 G/DL (ref 3.5–5.2)
ALP SERPL-CCNC: 110 U/L (ref 35–104)
ALP SERPL-CCNC: 118 U/L (ref 35–104)
ALT SERPL-CCNC: 20 U/L (ref 0–32)
ALT SERPL-CCNC: 21 U/L (ref 0–32)
ANION GAP SERPL CALCULATED.3IONS-SCNC: 13 MMOL/L (ref 7–16)
ANION GAP SERPL CALCULATED.3IONS-SCNC: 14 MMOL/L (ref 7–16)
AST SERPL-CCNC: 30 U/L (ref 0–31)
AST SERPL-CCNC: 31 U/L (ref 0–31)
B.E.: 0 MMOL/L (ref -3–3)
BASOPHILS # BLD: 0.01 K/UL (ref 0–0.2)
BASOPHILS # BLD: 0.01 K/UL (ref 0–0.2)
BASOPHILS NFR BLD: 0 % (ref 0–2)
BASOPHILS NFR BLD: 0 % (ref 0–2)
BILIRUB SERPL-MCNC: 0.5 MG/DL (ref 0–1.2)
BILIRUB SERPL-MCNC: 0.8 MG/DL (ref 0–1.2)
BUN SERPL-MCNC: 18 MG/DL (ref 6–23)
BUN SERPL-MCNC: 6 MG/DL (ref 6–23)
CALCIUM SERPL-MCNC: 8.7 MG/DL (ref 8.6–10.2)
CALCIUM SERPL-MCNC: 8.7 MG/DL (ref 8.6–10.2)
CHLORIDE SERPL-SCNC: 100 MMOL/L (ref 98–107)
CHLORIDE SERPL-SCNC: 97 MMOL/L (ref 98–107)
CO2 SERPL-SCNC: 23 MMOL/L (ref 22–29)
CO2 SERPL-SCNC: 26 MMOL/L (ref 22–29)
COHB: 1.2 % (ref 0–1.5)
COMMENT: ABNORMAL
CREAT SERPL-MCNC: 1.6 MG/DL (ref 0.5–1)
CREAT SERPL-MCNC: 3.5 MG/DL (ref 0.5–1)
CRITICAL: ABNORMAL
DATE ANALYZED: ABNORMAL
DATE OF COLLECTION: ABNORMAL
EOSINOPHIL # BLD: 0.01 K/UL (ref 0.05–0.5)
EOSINOPHIL # BLD: 0.08 K/UL (ref 0.05–0.5)
EOSINOPHILS RELATIVE PERCENT: 0 % (ref 0–6)
EOSINOPHILS RELATIVE PERCENT: 1 % (ref 0–6)
ERYTHROCYTE [DISTWIDTH] IN BLOOD BY AUTOMATED COUNT: 18.5 % (ref 11.5–15)
ERYTHROCYTE [DISTWIDTH] IN BLOOD BY AUTOMATED COUNT: 18.8 % (ref 11.5–15)
GFR, ESTIMATED: 12 ML/MIN/1.73M2
GFR, ESTIMATED: 32 ML/MIN/1.73M2
GLUCOSE SERPL-MCNC: 121 MG/DL (ref 74–99)
GLUCOSE SERPL-MCNC: 134 MG/DL (ref 74–99)
HCO3: 24.2 MMOL/L (ref 22–26)
HCT VFR BLD AUTO: 23.7 % (ref 34–48)
HCT VFR BLD AUTO: 24.6 % (ref 34–48)
HGB BLD-MCNC: 7.4 G/DL (ref 11.5–15.5)
HGB BLD-MCNC: 7.9 G/DL (ref 11.5–15.5)
HHB: 10.3 % (ref 0–5)
IMM GRANULOCYTES # BLD AUTO: 0.03 K/UL (ref 0–0.58)
IMM GRANULOCYTES # BLD AUTO: 0.08 K/UL (ref 0–0.58)
IMM GRANULOCYTES NFR BLD: 0 % (ref 0–5)
IMM GRANULOCYTES NFR BLD: 1 % (ref 0–5)
LAB: ABNORMAL
LACTATE BLDV-SCNC: 3.2 MMOL/L (ref 0.5–2.2)
LYMPHOCYTES NFR BLD: 1.42 K/UL (ref 1.5–4)
LYMPHOCYTES NFR BLD: 1.64 K/UL (ref 1.5–4)
LYMPHOCYTES RELATIVE PERCENT: 18 % (ref 20–42)
LYMPHOCYTES RELATIVE PERCENT: 19 % (ref 20–42)
Lab: 529
MAGNESIUM SERPL-MCNC: 1.8 MG/DL (ref 1.6–2.6)
MCH RBC QN AUTO: 33.3 PG (ref 26–35)
MCH RBC QN AUTO: 35.1 PG (ref 26–35)
MCHC RBC AUTO-ENTMCNC: 31.2 G/DL (ref 32–34.5)
MCHC RBC AUTO-ENTMCNC: 32.1 G/DL (ref 32–34.5)
MCV RBC AUTO: 106.8 FL (ref 80–99.9)
MCV RBC AUTO: 109.3 FL (ref 80–99.9)
METHB: 0.3 % (ref 0–1.5)
MODE: ABNORMAL
MONOCYTES NFR BLD: 0.55 K/UL (ref 0.1–0.95)
MONOCYTES NFR BLD: 0.69 K/UL (ref 0.1–0.95)
MONOCYTES NFR BLD: 7 % (ref 2–12)
MONOCYTES NFR BLD: 8 % (ref 2–12)
NEUTROPHILS NFR BLD: 71 % (ref 43–80)
NEUTROPHILS NFR BLD: 74 % (ref 43–80)
NEUTS SEG NFR BLD: 5.68 K/UL (ref 1.8–7.3)
NEUTS SEG NFR BLD: 6.05 K/UL (ref 1.8–7.3)
O2 SATURATION: 89.5 % (ref 92–98.5)
O2HB: 88.2 % (ref 94–97)
OPERATOR ID: 2420
PATIENT TEMP: 22 C
PCO2: 37 MMHG (ref 35–45)
PH BLOOD GAS: 7.43 (ref 7.35–7.45)
PH VENOUS: 7.33 (ref 7.35–7.45)
PHOSPHATE SERPL-MCNC: 1.9 MG/DL (ref 2.5–4.5)
PLATELET # BLD AUTO: 102 K/UL (ref 130–450)
PLATELET # BLD AUTO: 106 K/UL (ref 130–450)
PMV BLD AUTO: 12.3 FL (ref 7–12)
PMV BLD AUTO: 12.5 FL (ref 7–12)
PO2: 59.4 MMHG (ref 75–100)
POTASSIUM SERPL-SCNC: 3.5 MMOL/L (ref 3.5–5)
POTASSIUM SERPL-SCNC: 3.56 MMOL/L (ref 3.5–5)
POTASSIUM SERPL-SCNC: 3.9 MMOL/L (ref 3.5–5)
PROT SERPL-MCNC: 5.8 G/DL (ref 6.4–8.3)
PROT SERPL-MCNC: 6.9 G/DL (ref 6.4–8.3)
RBC # BLD AUTO: 2.22 M/UL (ref 3.5–5.5)
RBC # BLD AUTO: 2.25 M/UL (ref 3.5–5.5)
SODIUM SERPL-SCNC: 136 MMOL/L (ref 132–146)
SODIUM SERPL-SCNC: 137 MMOL/L (ref 132–146)
SOURCE, BLOOD GAS: ABNORMAL
THB: 8.8 G/DL (ref 11.5–16.5)
TIME ANALYZED: 535
TROPONIN I SERPL HS-MCNC: 105 NG/L (ref 0–9)
TROPONIN I SERPL HS-MCNC: 118 NG/L (ref 0–9)
WBC OTHER # BLD: 7.7 K/UL (ref 4.5–11.5)
WBC OTHER # BLD: 8.6 K/UL (ref 4.5–11.5)

## 2024-08-18 PROCEDURE — 2700000000 HC OXYGEN THERAPY PER DAY

## 2024-08-18 PROCEDURE — 36600 WITHDRAWAL OF ARTERIAL BLOOD: CPT

## 2024-08-18 PROCEDURE — 6370000000 HC RX 637 (ALT 250 FOR IP)

## 2024-08-18 PROCEDURE — 6370000000 HC RX 637 (ALT 250 FOR IP): Performed by: STUDENT IN AN ORGANIZED HEALTH CARE EDUCATION/TRAINING PROGRAM

## 2024-08-18 PROCEDURE — 84484 ASSAY OF TROPONIN QUANT: CPT

## 2024-08-18 PROCEDURE — 5A0955A ASSISTANCE WITH RESPIRATORY VENTILATION, GREATER THAN 96 CONSECUTIVE HOURS, HIGH NASAL FLOW/VELOCITY: ICD-10-PCS | Performed by: STUDENT IN AN ORGANIZED HEALTH CARE EDUCATION/TRAINING PROGRAM

## 2024-08-18 PROCEDURE — 6370000000 HC RX 637 (ALT 250 FOR IP): Performed by: FAMILY MEDICINE

## 2024-08-18 PROCEDURE — 2580000003 HC RX 258: Performed by: FAMILY MEDICINE

## 2024-08-18 PROCEDURE — 90935 HEMODIALYSIS ONE EVALUATION: CPT

## 2024-08-18 PROCEDURE — 99233 SBSQ HOSP IP/OBS HIGH 50: CPT

## 2024-08-18 PROCEDURE — 82800 BLOOD PH: CPT

## 2024-08-18 PROCEDURE — 6360000002 HC RX W HCPCS: Performed by: FAMILY MEDICINE

## 2024-08-18 PROCEDURE — 6360000002 HC RX W HCPCS: Performed by: STUDENT IN AN ORGANIZED HEALTH CARE EDUCATION/TRAINING PROGRAM

## 2024-08-18 PROCEDURE — 36415 COLL VENOUS BLD VENIPUNCTURE: CPT

## 2024-08-18 PROCEDURE — 80053 COMPREHEN METABOLIC PANEL: CPT

## 2024-08-18 PROCEDURE — 83605 ASSAY OF LACTIC ACID: CPT

## 2024-08-18 PROCEDURE — 80074 ACUTE HEPATITIS PANEL: CPT

## 2024-08-18 PROCEDURE — 99232 SBSQ HOSP IP/OBS MODERATE 35: CPT | Performed by: STUDENT IN AN ORGANIZED HEALTH CARE EDUCATION/TRAINING PROGRAM

## 2024-08-18 PROCEDURE — 71045 X-RAY EXAM CHEST 1 VIEW: CPT

## 2024-08-18 PROCEDURE — 85025 COMPLETE CBC W/AUTO DIFF WBC: CPT

## 2024-08-18 PROCEDURE — 71046 X-RAY EXAM CHEST 2 VIEWS: CPT

## 2024-08-18 PROCEDURE — P9047 ALBUMIN (HUMAN), 25%, 50ML: HCPCS | Performed by: INTERNAL MEDICINE

## 2024-08-18 PROCEDURE — 2060000000 HC ICU INTERMEDIATE R&B

## 2024-08-18 PROCEDURE — 86317 IMMUNOASSAY INFECTIOUS AGENT: CPT

## 2024-08-18 PROCEDURE — 82805 BLOOD GASES W/O2 SATURATION: CPT

## 2024-08-18 PROCEDURE — 94640 AIRWAY INHALATION TREATMENT: CPT

## 2024-08-18 PROCEDURE — 84132 ASSAY OF SERUM POTASSIUM: CPT

## 2024-08-18 PROCEDURE — 84100 ASSAY OF PHOSPHORUS: CPT

## 2024-08-18 PROCEDURE — 83735 ASSAY OF MAGNESIUM: CPT

## 2024-08-18 PROCEDURE — 6360000002 HC RX W HCPCS

## 2024-08-18 PROCEDURE — 6360000002 HC RX W HCPCS: Performed by: INTERNAL MEDICINE

## 2024-08-18 RX ORDER — BUMETANIDE 0.25 MG/ML
1 INJECTION INTRAMUSCULAR; INTRAVENOUS ONCE
Status: COMPLETED | OUTPATIENT
Start: 2024-08-18 | End: 2024-08-18

## 2024-08-18 RX ORDER — AMIODARONE HYDROCHLORIDE 200 MG/1
200 TABLET ORAL 2 TIMES DAILY WITH MEALS
Qty: 180 TABLET | Refills: 1 | OUTPATIENT
Start: 2024-08-18

## 2024-08-18 RX ORDER — ALBUMIN (HUMAN) 12.5 G/50ML
25 SOLUTION INTRAVENOUS ONCE
Status: COMPLETED | OUTPATIENT
Start: 2024-08-18 | End: 2024-08-18

## 2024-08-18 RX ADMIN — SACUBITRIL AND VALSARTAN 0.5 TABLET: 24; 26 TABLET, FILM COATED ORAL at 09:00

## 2024-08-18 RX ADMIN — BUMETANIDE 1 MG: 0.25 INJECTION INTRAMUSCULAR; INTRAVENOUS at 05:36

## 2024-08-18 RX ADMIN — BENZONATATE 100 MG: 100 CAPSULE ORAL at 01:06

## 2024-08-18 RX ADMIN — ACETAMINOPHEN 650 MG: 325 TABLET ORAL at 19:11

## 2024-08-18 RX ADMIN — MIDODRINE HYDROCHLORIDE 5 MG: 5 TABLET ORAL at 18:09

## 2024-08-18 RX ADMIN — MIDODRINE HYDROCHLORIDE 15 MG: 10 TABLET ORAL at 22:18

## 2024-08-18 RX ADMIN — AMIODARONE HYDROCHLORIDE 200 MG: 200 TABLET ORAL at 09:00

## 2024-08-18 RX ADMIN — SODIUM CHLORIDE, PRESERVATIVE FREE 10 ML: 5 INJECTION INTRAVENOUS at 09:00

## 2024-08-18 RX ADMIN — IPRATROPIUM BROMIDE AND ALBUTEROL SULFATE 1 DOSE: 2.5; .5 SOLUTION RESPIRATORY (INHALATION) at 05:41

## 2024-08-18 RX ADMIN — APIXABAN 2.5 MG: 2.5 TABLET, FILM COATED ORAL at 09:00

## 2024-08-18 RX ADMIN — APIXABAN 2.5 MG: 2.5 TABLET, FILM COATED ORAL at 22:18

## 2024-08-18 RX ADMIN — ALBUMIN (HUMAN) 25 G: 25 SOLUTION INTRAVENOUS at 18:14

## 2024-08-18 RX ADMIN — IPRATROPIUM BROMIDE AND ALBUTEROL SULFATE 1 DOSE: 2.5; .5 SOLUTION RESPIRATORY (INHALATION) at 20:11

## 2024-08-18 RX ADMIN — BUMETANIDE 1 MG: 0.25 INJECTION INTRAMUSCULAR; INTRAVENOUS at 20:15

## 2024-08-18 RX ADMIN — ARFORMOTEROL TARTRATE: 15 SOLUTION RESPIRATORY (INHALATION) at 20:11

## 2024-08-18 RX ADMIN — MIDODRINE HYDROCHLORIDE 5 MG: 5 TABLET ORAL at 09:00

## 2024-08-18 RX ADMIN — IPRATROPIUM BROMIDE AND ALBUTEROL SULFATE 1 DOSE: 2.5; .5 SOLUTION RESPIRATORY (INHALATION) at 11:27

## 2024-08-18 RX ADMIN — ARFORMOTEROL TARTRATE: 15 SOLUTION RESPIRATORY (INHALATION) at 05:42

## 2024-08-18 RX ADMIN — PANTOPRAZOLE SODIUM 40 MG: 40 TABLET, DELAYED RELEASE ORAL at 05:38

## 2024-08-18 RX ADMIN — ASPIRIN 81 MG: 81 TABLET, COATED ORAL at 09:00

## 2024-08-18 RX ADMIN — SODIUM CHLORIDE, PRESERVATIVE FREE 10 ML: 5 INJECTION INTRAVENOUS at 22:18

## 2024-08-18 RX ADMIN — BENZONATATE 100 MG: 100 CAPSULE ORAL at 09:00

## 2024-08-18 RX ADMIN — ATORVASTATIN CALCIUM 40 MG: 40 TABLET, FILM COATED ORAL at 09:00

## 2024-08-18 RX ADMIN — LEVOTHYROXINE SODIUM 50 MCG: 0.05 TABLET ORAL at 05:38

## 2024-08-18 RX ADMIN — Medication 5 MG: at 22:17

## 2024-08-18 RX ADMIN — MIDODRINE HYDROCHLORIDE 5 MG: 5 TABLET ORAL at 12:22

## 2024-08-18 RX ADMIN — MEXILETINE HYDROCHLORIDE 150 MG: 150 CAPSULE ORAL at 05:38

## 2024-08-18 ASSESSMENT — PAIN SCALES - GENERAL
PAINLEVEL_OUTOF10: 0

## 2024-08-18 NOTE — PROGRESS NOTES
Progress Note  Date:2024       Room:[facility]8/4508-A  Patient Name:Marge Callejas     YOB: 1940     Age:84 y.o.        Subjective    Subjective:  Symptoms:  Worsening.  She reports shortness of breath, malaise, cough and anxiety.    Diet:  Poor intake.    Activity level: Impaired due to weakness.    Pain:  She reports no pain.       Review of Systems   Constitutional:  Positive for activity change and appetite change.   HENT: Negative.     Eyes: Negative.    Respiratory:  Positive for cough and shortness of breath.    Gastrointestinal: Negative.    Endocrine: Negative.    Genitourinary: Negative.    Musculoskeletal:  Positive for myalgias.   Skin: Negative.    Neurological: Negative.    Psychiatric/Behavioral: Negative.       Objective         Vitals Last 24 Hours:  TEMPERATURE:  Temp  Av.5 °F (31.4 °C)  Min: 33.8 °F (1 °C)  Max: 99.1 °F (37.3 °C)  RESPIRATIONS RANGE: Resp  Av.3  Min: 16  Max: 22  PULSE OXIMETRY RANGE: SpO2  Av.8 %  Min: 90 %  Max: 100 %  PULSE RANGE: Pulse  Av.6  Min: 64  Max: 86  BLOOD PRESSURE RANGE: Systolic (24hrs), Av , Min:91 , Max:125   ; Diastolic (24hrs), Av, Min:51, Max:69    I/O (24Hr):  No intake or output data in the 24 hours ending 24 0937    Objective:  General Appearance:  Uncomfortable and well-appearing.    Vital signs: (most recent): Blood pressure (!) 105/53, pulse 82, temperature 97.1 °F (36.2 °C), temperature source Temporal, resp. rate 22, height 1.626 m (5' 4\"), weight 55.8 kg (123 lb 0.3 oz), SpO2 93%.  Vital signs are normal.    Output: Producing urine and producing stool.    HEENT: Normal HEENT exam.    Lungs:  Normal respiratory rate and increased effort.  There are rales.    Heart: Normal rate.  Regular rhythm.  S1 normal and S2 normal.    Abdomen: Abdomen is soft and non-distended.  There are no signs of ascites.  Bowel sounds are normal.   There is no abdominal tenderness.     Extremities: Normal range of motion.   resolved, dyspnea on presentation which is still persistent  Pending heart echo  Continue GDMT  Cardiology signed off.       3. Acute on chronic hypoxic respiratory failure   Secondary to ADHF, ejection fraction EF of 20-25%  Baseline 3 to 4 L nasal cannula  Patient could not complete session of HD yesterday, today more congested  Had a dose of bumex last night, increased oxygen requirement to 6 l  Continue diuresis, hemodialysis  Monitor respiratory status  Will get an extra session of dialysis today     4. Uptrending cr - End-stage renal disease  On hemodialysis  Nephrology on board, held bumex, on entersto  Continue HD for solute and volume management --TTS schedule   Continue midodrine 5 mg TID      5.  Macrocytic anemia  B12 folate reassuring  ARLIN: Retacrit while here   Repeat CBC, monitor hemoglobin     6.  Bilateral lower extremity pain and episodes of hypotension  Secondary to dialysis, electrolyte imbalance, Nephrology on board  Needs dialysis adjustment, had a session of HD yesterday, feeling stable today  Monitor blood pressure     7.  Vtach, Paroxysmal A-fib  Prolonged QTc  ICD in situ   She has a paced rhythm, continue Eliquis, was on PO amiodarone  Telemetry monitoring, went into sustained Vtach yesterday while dialysis   Started on amiodarone infusion for Sustained Vtach   Drip d/c'ed, amiodarone increased 200 mg BID  Restarted mexiletine 150 TID.    8.  Ambulatory dysfunction  Recurrent hospital admission  PT OT evaluation  Approved for rehab, peer to peer completed  Patient is staying as she is still not cleared     9. Hypotensive - held bumex, hydralazine,isordil, started midodrine 5 mg TID     10. Hypothyroidism - TSH elevated, increased dose of synthyroid     Disposition: Patient is staying as she is still not cleared. Plan to go to rehab on d/c.     Electronically signed by Favian Mancera MD on 8/17/24 at 9:34 AM EDT

## 2024-08-18 NOTE — PROGRESS NOTES
Message sent via perfect serve to Dr. Kareem Stark regarding the patient's increasing O2 demands. Pt went from 5L nasal cannula to 8L high flow. ABG, STAT CXR, and one time dose of bumex ordered.

## 2024-08-18 NOTE — PLAN OF CARE
Problem: Skin/Tissue Integrity  Goal: Absence of new skin breakdown  Description: 1.  Monitor for areas of redness and/or skin breakdown  2.  Assess vascular access sites hourly  3.  Every 4-6 hours minimum:  Change oxygen saturation probe site  4.  Every 4-6 hours:  If on nasal continuous positive airway pressure, respiratory therapy assess nares and determine need for appliance change or resting period.  8/17/2024 2347 by Ebony Arellano RN  Outcome: Progressing  8/17/2024 1724 by Olga Alejo RN  Outcome: Progressing     Problem: Safety - Adult  Goal: Free from fall injury  8/17/2024 2347 by Ebony Arellano RN  Outcome: Progressing  8/17/2024 1724 by Olga Alejo RN  Outcome: Progressing     Problem: Chronic Conditions and Co-morbidities  Goal: Patient's chronic conditions and co-morbidity symptoms are monitored and maintained or improved  8/17/2024 2347 by Ebony Aerllano RN  Outcome: Progressing  8/17/2024 1724 by Olga Alejo RN  Outcome: Progressing     Problem: Risk for Elopement  Goal: Patient will not exit the unit/facility without proper excort  8/17/2024 2347 by Ebony Arellano RN  Outcome: Progressing  Flowsheets (Taken 8/17/2024 2000)  Nursing Interventions for Elopement Risk:   Reduce environmental triggers   Make sure patient has all necessary personal care items  8/17/2024 1724 by Olga Alejo RN  Outcome: Progressing     Problem: Respiratory - Adult  Goal: Achieves optimal ventilation and oxygenation  8/17/2024 2347 by Ebony Arellano RN  Outcome: Progressing  8/17/2024 1724 by Olga Alejo RN  Outcome: Progressing     Problem: Musculoskeletal - Adult  Goal: Return mobility to safest level of function  8/17/2024 2347 by Ebony Arellano RN  Outcome: Progressing  8/17/2024 1724 by Olga Alejo RN  Outcome: Progressing     Problem: Metabolic/Fluid and Electrolytes - Adult  Goal: Hemodynamic stability and optimal renal function maintained  8/17/2024 2347 by Ebony Arellano RN  Outcome: Progressing  8/17/2024 1724

## 2024-08-18 NOTE — FLOWSHEET NOTE
Pt completed 4.48 hrs of a 5 hr HD tx on a 3K bath with 2.8L of UF removed safely. Tx termed with 12 min remaining d/t blood volume -15.3%. Post report to Wendy THEODORE. Escorted back to room by 2 floor RN's. Pt stable at HD discharge.   08/18/24 1930   Vital Signs   /65   Temp 98.2 °F (36.8 °C)   Pulse 74   Respirations 18   Weight - Scale 52.9 kg (116 lb 10 oz)   Weight Method Bed scale   Percent Weight Change -5.2   Pain Assessment   Pain Assessment None - Denies Pain   Pain Level 0   Post-Hemodialysis Assessment   Post-Treatment Procedures Blood returned;Catheter capped, clamped with Citrate x 2 ports   Machine Disinfection Process Acid/Vinegar Clean;Heat Disinfect;Exterior Machine Disinfection   Rinseback Volume (ml) 300 ml   Blood Volume Processed (Liters) 56 L   Dialyzer Clearance Moderately streaked   Duration of Treatment (minutes) 288 minutes   Hemodialysis Intake (ml) 300 ml   Hemodialysis Output (ml) 3100 ml   NET Removed (ml) 2800   Tolerated Treatment Good   Patient Response to Treatment Tolerated tx fairly well; midodrine and albumin 25 g admin for pressure support; tx termed wtih 12 min remainig due to BV -15.3; Post report to Wendy THEODORE; pt stable at HD discharge; taken back to room by 2 floor RN's.   Bilateral Breath Sounds Diminished   Edema Generalized   Time Off 1930   Patient Disposition Return to room   Observations & Evaluations   Level of Consciousness 0   Oriented X 3   Heart Rhythm Regular   Respiratory Quality/Effort Unlabored   O2 Device High flow nasal cannula   Skin Condition/Temp Dry;Warm   Abdomen Inspection Soft   Bowel Sounds (All Quadrants) Active

## 2024-08-18 NOTE — PROGRESS NOTES
4 Eyes Skin Assessment     NAME:  Marge Callejas  YOB: 1940  MEDICAL RECORD NUMBER:  [id number]    The patient is being assessed for  Transfer to New Unit    I agree that at least one RN has performed a thorough Head to Toe Skin Assessment on the patient. ALL assessment sites listed below have been assessed.      Areas assessed by both nurses:    Head, Face, Ears, Shoulders, Back, Chest, Arms, Elbows, Hands, Sacrum. Buttock, Coccyx, Ischium, Legs. Feet and Heels, and Under Medical Devices         Does the Patient have a Wound? No noted wound(s)       Estuardo Prevention initiated by RN: No  Wound Care Orders initiated by RN: No    Pressure Injury (Stage 3,4, Unstageable, DTI, NWPT, and Complex wounds) if present, place Wound referral order by RN under : No    New Ostomies, if present place, Ostomy referral order under : No     Nurse 1 eSignature: Electronically signed by Ebony Arellano RN on 8/18/24 at 3:11 AM EDT    **SHARE this note so that the co-signing nurse can place an eSignature**    Nurse 2 eSignature: Electronically signed by Vicky Burgess RN on 8/18/24 at 6:14 AM EDT

## 2024-08-18 NOTE — DISCHARGE INSTRUCTIONS
________________________________________________________________________________________________________________________  Electrophysiology Discharge Instructions:   During your admission changes were made to how you ICD functions in an effort to avoid additional shocks. Please ensure you have your remote near your  bed for ongoing monitoring.   You will be sent home on Amiodarone 200mg twice a day, this is an antiarrythmic drug and should reduce the amount of arrhythmias coming from the bottom part of the heart.   You will be sent home on Mexitil 150 mg every 8 hours this is another antiarrythmic drug that should reduce the amount of arrhythmias coming from the bottom part of the heart.   A follow up office appointment has been made for you on 10/04/24 at 2 Pm in the [facility] office for ongoing monitoring and follow up. If you have any device related questions and or need to reschedule please call [phone].    Remote monitoring of your ICD with [facility] office will be arranged.  ________________________________________________________________________________________________________________________

## 2024-08-18 NOTE — PLAN OF CARE
Problem: Skin/Tissue Integrity  Goal: Absence of new skin breakdown  Description: 1.  Monitor for areas of redness and/or skin breakdown  2.  Assess vascular access sites hourly  3.  Every 4-6 hours minimum:  Change oxygen saturation probe site  4.  Every 4-6 hours:  If on nasal continuous positive airway pressure, respiratory therapy assess nares and determine need for appliance change or resting period.  8/18/2024 1029 by Sharron Bradley RN  Outcome: Progressing  8/17/2024 2347 by Ebony Arellano RN  Outcome: Progressing     Problem: Safety - Adult  Goal: Free from fall injury  8/18/2024 1029 by Sharron Bradley, RN  Outcome: Progressing  8/17/2024 2347 by Ebony Arellano RN  Outcome: Progressing     Problem: Chronic Conditions and Co-morbidities  Goal: Patient's chronic conditions and co-morbidity symptoms are monitored and maintained or improved  8/18/2024 1029 by Sharron Bradley RN  Outcome: Progressing  8/17/2024 2347 by Ebony Arellano, RN  Outcome: Progressing

## 2024-08-18 NOTE — PROGRESS NOTES
[facility]  MD Murtaza White MD Ali Hassan, MD Lisa Kniska, CNP   Lucille Beth, JOSE Fernandez, JUDE  Progress Note    8/18/2024    SUBJECTIVE:   8/12 : Laying in bed, no acute distress. Daughter is at the bedside. For dialysis this afternoon. She denies any dyspnea. She is edematous. Blood pressure is low.     8/13: Seen while on dialysis. Tolerating therapy without issues. BP is stable, but on the lower side. She denies any dyspnea, chest pain, or palpitations. Lower extremities are edematous.     8/16: Seen while on dialysis. Tolerating treatment without issues. She denies any dyspnea, chest pain, or palpitations. For discharge later this afternoon.     8/17 HD today no reported issues   Charts reviewed .     8/18 increased o2 requirement  Clinically in HF .     PROBLEM LIST:    Principal Problem:    Lower extremity edema  Active Problems:    Acute on chronic combined systolic and diastolic heart failure (HCC)  Resolved Problems:    * No resolved hospital problems. *         DIET:    ADULT DIET; Regular; Low Sodium (2 gm); Low Potassium (Less than 3000 mg/day)     MEDS (scheduled):    amiodarone  200 mg Oral BID WC    levothyroxine  50 mcg Oral Daily    midodrine  5 mg Oral TID WC    sodium chloride flush  5-40 mL IntraVENous 2 times per day    apixaban  2.5 mg Oral BID    aspirin  81 mg Oral Daily    atorvastatin  40 mg Oral Daily    arformoterol 15 mcg-budesonide 0.25 mg neb solution   Nebulization BID RT    [Held by provider] bumetanide  2 mg Oral Daily    epoetin carmelina-epbx  2,000 Units SubCUTAneous Once per day on Monday Wednesday Friday    melatonin  5 mg Oral Nightly    metoprolol succinate  12.5 mg Oral Daily    mexiletine  150 mg Oral 3 times per day    pantoprazole  40 mg Oral QAM AC    polyethylene glycol  17 g Oral Daily    sacubitril-valsartan  0.5 tablet Oral BID       MEDS (infusions):   sodium chloride         MEDS (prn):  sodium chloride

## 2024-08-18 NOTE — PROGRESS NOTES
C-scope reported small polyps in the sigmoid and Dieulafoy lesion with adherent clot, which was treated with clip/cautery.  She was discharged on OAC.  In 5/2021 ECG reported QRS D = 130 ms with RBBB/LPFB.  In 4/2022, Abbott CRT-D was implanted.  On review of PA/LAT chest x-ray, CS lead is in a mid-apical, lateral-anterolateral position. ECG after implant reported QRSD of 160 ms.  In 1/2023, ECG reported QRSd of 198 ms in the setting of biventricular pacing.  In 9/2023, she was admitted for possible GI bleed (Hb = 7.3).  Colonoscopy reportedly normal.  Ultimately, anemia felt to be related to anemia of Chronic disease/ESRD, not GI bleed.  She was discharged with OAC (?incorrect dose), aspirin 81 mg daily, and PPI.  In 4/2024, ECG of underlying rhythm revealed QRSD of 148 ms with non-LBBB IVCD.  In 6/2024, patient reportedly was admitted to [facility] for ICD shocks.  She was discharged with amiodarone.  In 7/2024, she reportedly had VT at 147 bpm terminated with ATP.  For unclear reason, amiodarone was discontinued later that month.  Shortly after, patient had 2 recurrence of VT, of which 1 reportedly was at 164 bpm associated with syncope and terminated with ATP after 41 seconds.  Amiodarone 400 mg twice daily and mexiletine 150 mg 3 times daily were initiated.  On 8/9/2024, patient presented with chief complaint of LE edema.  She was diagnosed with ADHF.  Was consulted, who recommended reduction of antiplatelets therapy in order to avoid hypotension with dialysis and permit greater fluid removal.  EP service was consulted, who noted QTc of 580 ms in the setting of ventricular pacing with QRS D of 234 ms. Amiodarone was reduced to 200 mg daily.  For unclear reason mexiletine was held since admission by Dr. Parham.    8/17/2024: EP service asked to reevaluate the patient for episodes of NSVT.    8/18/2024: No recurrence of VT since titration of antiarrhythmics and turning off CS lead.  Patient denies any complaints  amiodarone.  - In 7/2024, she had VT at 147 bpm terminated with ATP.  For unclear reason amiodarone was discontinued later that month.  Shortly after, patient had 2 recurrences of VT, of which one reportedly was at 164 bpm associated with syncope and terminated with ATP after 41 seconds.  Amiodarone 400 mg twice daily and mexiletine 150 mg 3 times daily prescribed.  - On 8/9/2024, patient presented with chief complaint of LE edema.  She was diagnosed with ADHF.  Cardiology consulted, who recommended reduction in antihypertensive therapy in order to avoid hypotension with dialysis and permit greater fluid removal.  EP service was consulted, who noted QTc ~ 580 ms in the setting of ventricular pacing with QRSd of 234 ms.  Amiodarone was reduced to 200 mg daily.  For unclear reason mexiletine was held.  - On 8/17/2024, I was contacted to reevaluate the patient.  On review of her ICD, she has had multiple episodes of VT terminated with ATP.  - See #2 regarding CRT.  - Maintain potassium >4 and magnesium >2.  - Continue metoprolol XL 12.5 mg daily.  Titrate as able.  - Continue amiodarone 200 mg twice daily.  While on amiodarone, recommend monitoring of TFTs and LFTs every 6 months, as well as annual chest x-ray and eye exam.   - Home mexiletine 150 mg 3 times daily held since time of admission.  On clear why this was held since admission by Dr. Parham (admitting service). I attempted to contact this physician but did not answer.  Additionally, I contacted patient's nurse to inquire as to the reason why this medication was held, but she similarly was unaware.  Mexiletine restarted on 8/17/2024.  Continue mexiletine 150 mg TID.  - Follow-up with Dr Lara on 10/4/2024 at 2 PM.  - EP service will sign off.    NYHA class III HFrEF-mixed sp Abbott CRT-D (DOI: 4/29/2022- Dr Wild at [place])   - CRT eval:  -- Indication: LVEF = 30% (TTE 4/2021), QRSD = 130 ms with RBBB/LPFB  -- CS lead location: Mid-- apical,

## 2024-08-19 ENCOUNTER — APPOINTMENT (OUTPATIENT)
Dept: GENERAL RADIOLOGY | Age: 84
DRG: 291 | End: 2024-08-19
Payer: MEDICARE

## 2024-08-19 PROBLEM — E44.0 MODERATE PROTEIN-CALORIE MALNUTRITION (HCC): Chronic | Status: ACTIVE | Noted: 2024-08-19

## 2024-08-19 LAB
GLUCOSE BLD-MCNC: 109 MG/DL (ref 74–99)
GLUCOSE BLD-MCNC: 328 MG/DL (ref 74–99)
GLUCOSE BLD-MCNC: 336 MG/DL (ref 74–99)
HAV IGM SERPL QL IA: NONREACTIVE
HBV CORE IGM SERPL QL IA: NONREACTIVE
HBV SURFACE AB SERPL IA-ACNC: <3.1 MIU/ML (ref 0–9.99)
HBV SURFACE AG SERPL QL IA: NONREACTIVE
HCV AB SERPL QL IA: NONREACTIVE

## 2024-08-19 PROCEDURE — 2060000000 HC ICU INTERMEDIATE R&B

## 2024-08-19 PROCEDURE — 90935 HEMODIALYSIS ONE EVALUATION: CPT

## 2024-08-19 PROCEDURE — 99221 1ST HOSP IP/OBS SF/LOW 40: CPT | Performed by: INTERNAL MEDICINE

## 2024-08-19 PROCEDURE — 94640 AIRWAY INHALATION TREATMENT: CPT

## 2024-08-19 PROCEDURE — 6370000000 HC RX 637 (ALT 250 FOR IP)

## 2024-08-19 PROCEDURE — 6360000002 HC RX W HCPCS: Performed by: FAMILY MEDICINE

## 2024-08-19 PROCEDURE — 6360000002 HC RX W HCPCS

## 2024-08-19 PROCEDURE — 94660 CPAP INITIATION&MGMT: CPT

## 2024-08-19 PROCEDURE — 6370000000 HC RX 637 (ALT 250 FOR IP): Performed by: FAMILY MEDICINE

## 2024-08-19 PROCEDURE — 6370000000 HC RX 637 (ALT 250 FOR IP): Performed by: STUDENT IN AN ORGANIZED HEALTH CARE EDUCATION/TRAINING PROGRAM

## 2024-08-19 PROCEDURE — 82962 GLUCOSE BLOOD TEST: CPT

## 2024-08-19 PROCEDURE — 71045 X-RAY EXAM CHEST 1 VIEW: CPT

## 2024-08-19 PROCEDURE — 2580000003 HC RX 258

## 2024-08-19 PROCEDURE — 94669 MECHANICAL CHEST WALL OSCILL: CPT

## 2024-08-19 PROCEDURE — 2580000003 HC RX 258: Performed by: FAMILY MEDICINE

## 2024-08-19 PROCEDURE — 93005 ELECTROCARDIOGRAM TRACING: CPT | Performed by: STUDENT IN AN ORGANIZED HEALTH CARE EDUCATION/TRAINING PROGRAM

## 2024-08-19 PROCEDURE — 99233 SBSQ HOSP IP/OBS HIGH 50: CPT

## 2024-08-19 PROCEDURE — 2700000000 HC OXYGEN THERAPY PER DAY

## 2024-08-19 RX ORDER — GUAIFENESIN 400 MG/1
400 TABLET ORAL 3 TIMES DAILY
Status: DISCONTINUED | OUTPATIENT
Start: 2024-08-19 | End: 2024-08-25

## 2024-08-19 RX ORDER — INSULIN LISPRO 100 [IU]/ML
0-8 INJECTION, SOLUTION INTRAVENOUS; SUBCUTANEOUS
Status: DISCONTINUED | OUTPATIENT
Start: 2024-08-19 | End: 2024-08-29 | Stop reason: HOSPADM

## 2024-08-19 RX ORDER — INSULIN LISPRO 100 [IU]/ML
0-4 INJECTION, SOLUTION INTRAVENOUS; SUBCUTANEOUS NIGHTLY
Status: DISCONTINUED | OUTPATIENT
Start: 2024-08-19 | End: 2024-08-29 | Stop reason: HOSPADM

## 2024-08-19 RX ORDER — ALBUMIN (HUMAN) 12.5 G/50ML
25 SOLUTION INTRAVENOUS PRN
Status: DISCONTINUED | OUTPATIENT
Start: 2024-08-19 | End: 2024-08-29 | Stop reason: HOSPADM

## 2024-08-19 RX ADMIN — MIDODRINE HYDROCHLORIDE 15 MG: 10 TABLET ORAL at 16:58

## 2024-08-19 RX ADMIN — ARFORMOTEROL TARTRATE: 15 SOLUTION RESPIRATORY (INHALATION) at 19:25

## 2024-08-19 RX ADMIN — Medication 5 MG: at 20:02

## 2024-08-19 RX ADMIN — INSULIN LISPRO 6 UNITS: 100 INJECTION, SOLUTION INTRAVENOUS; SUBCUTANEOUS at 18:40

## 2024-08-19 RX ADMIN — MEXILETINE HYDROCHLORIDE 150 MG: 150 CAPSULE ORAL at 09:55

## 2024-08-19 RX ADMIN — AMIODARONE HYDROCHLORIDE 200 MG: 200 TABLET ORAL at 09:55

## 2024-08-19 RX ADMIN — SODIUM CHLORIDE, PRESERVATIVE FREE 10 ML: 5 INJECTION INTRAVENOUS at 09:00

## 2024-08-19 RX ADMIN — ARFORMOTEROL TARTRATE: 15 SOLUTION RESPIRATORY (INHALATION) at 08:13

## 2024-08-19 RX ADMIN — INSULIN LISPRO 4 UNITS: 100 INJECTION, SOLUTION INTRAVENOUS; SUBCUTANEOUS at 20:01

## 2024-08-19 RX ADMIN — WATER 40 MG: 1 INJECTION INTRAMUSCULAR; INTRAVENOUS; SUBCUTANEOUS at 11:06

## 2024-08-19 RX ADMIN — ATORVASTATIN CALCIUM 40 MG: 40 TABLET, FILM COATED ORAL at 09:54

## 2024-08-19 RX ADMIN — IPRATROPIUM BROMIDE AND ALBUTEROL SULFATE 1 DOSE: 2.5; .5 SOLUTION RESPIRATORY (INHALATION) at 19:25

## 2024-08-19 RX ADMIN — SODIUM CHLORIDE, PRESERVATIVE FREE 10 ML: 5 INJECTION INTRAVENOUS at 20:01

## 2024-08-19 RX ADMIN — GUAIFENESIN 400 MG: 400 TABLET ORAL at 20:01

## 2024-08-19 RX ADMIN — MEXILETINE HYDROCHLORIDE 150 MG: 150 CAPSULE ORAL at 16:26

## 2024-08-19 RX ADMIN — AMIODARONE HYDROCHLORIDE 200 MG: 200 TABLET ORAL at 16:39

## 2024-08-19 RX ADMIN — CEFEPIME 1000 MG: 1 INJECTION, POWDER, FOR SOLUTION INTRAMUSCULAR; INTRAVENOUS at 09:45

## 2024-08-19 RX ADMIN — MIDODRINE HYDROCHLORIDE 15 MG: 10 TABLET ORAL at 12:00

## 2024-08-19 RX ADMIN — APIXABAN 2.5 MG: 2.5 TABLET, FILM COATED ORAL at 20:01

## 2024-08-19 RX ADMIN — GUAIFENESIN 400 MG: 400 TABLET ORAL at 09:45

## 2024-08-19 RX ADMIN — SACUBITRIL AND VALSARTAN 0.5 TABLET: 24; 26 TABLET, FILM COATED ORAL at 09:54

## 2024-08-19 RX ADMIN — IPRATROPIUM BROMIDE AND ALBUTEROL SULFATE 1 DOSE: 2.5; .5 SOLUTION RESPIRATORY (INHALATION) at 11:30

## 2024-08-19 RX ADMIN — APIXABAN 2.5 MG: 2.5 TABLET, FILM COATED ORAL at 09:55

## 2024-08-19 RX ADMIN — WATER 40 MG: 1 INJECTION INTRAMUSCULAR; INTRAVENOUS; SUBCUTANEOUS at 16:40

## 2024-08-19 RX ADMIN — MIDODRINE HYDROCHLORIDE 15 MG: 10 TABLET ORAL at 08:07

## 2024-08-19 RX ADMIN — ASPIRIN 81 MG: 81 TABLET, COATED ORAL at 09:54

## 2024-08-19 RX ADMIN — METOPROLOL SUCCINATE 12.5 MG: 25 TABLET, EXTENDED RELEASE ORAL at 09:55

## 2024-08-19 RX ADMIN — EPOETIN ALFA-EPBX 2000 UNITS: 2000 INJECTION, SOLUTION INTRAVENOUS; SUBCUTANEOUS at 16:26

## 2024-08-19 ASSESSMENT — PAIN SCALES - GENERAL: PAINLEVEL_OUTOF10: 0

## 2024-08-19 NOTE — PROGRESS NOTES
Patients BP currently 90/54. Message sent via perfect serve to Dr. Tidwell regarding patients night time cardiac medications, ok to hold per Dr. Tidwell.

## 2024-08-19 NOTE — PROGRESS NOTES
RRT called, Physician ordered CPAP for patient and patient refused to even have the machine on. AirVO was then ordered, Pt tolerating at 40L and 100% with a Sat of 98%. Bipap is left in the room on standby incase patient needs for the future.

## 2024-08-19 NOTE — PROGRESS NOTES
RRT called due to patient being in respiratory distress post dialysis. Patient's breathing very labored. This RN messaged the provider regarding respiratory status and pulse ox reading in the 60's, after increasing O2 to 12 L HF her pulse ox showed 84 but would not read correctly.     This RN was instructed to call an RRT by Dr. Tidwell.  During RRT labs were drawn, patient refused the bipap and was placed on airvo sating 100%    RRT was then concluded.

## 2024-08-19 NOTE — PROGRESS NOTES
From: Sigifredo Montana  To: Jarrett Beth MD  Sent: 9/12/2019 10:34 AM CDT  Subject: Medication Question    Would Dr. Beth please refill my pain medication prescription.   appropriate     2. 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   Moderately severe mitral regurgitation  Moderately severe aortic regurgitation  Bilateral lower extremity edema resolved, dyspnea on presentation which is still persistent  Pending heart echo  Continue GDMT  Cardiology signed off.       3. Acute on chronic hypoxic respiratory failure   Secondary to ADHF, ejection fraction EF of 20-25%  Baseline 3 to 4 L nasal cannula, currently at 10 l high flow  Patient had an extra session of HD yesterday, still very congested  Last night RRT was called, hypoxic, refusing NIV  Xray suggestive of new pneumonia, started cefepime  Sputum cultures, Pulmonary consult  Chest physiotherapy, iv steroids  Follow up on the results    4. Uptrending cr - End-stage renal disease  On hemodialysis  Nephrology on board, held bumex, on entersto  Continue HD for solute and volume management --TTS schedule   Increased midodrine to 15 mg TID      5.  Macrocytic anemia  B12 folate reassuring  ARLIN: Retacrit while here   Repeat CBC, monitor hemoglobin     6.  Bilateral lower extremity pain and episodes of hypotension  Secondary to dialysis, electrolyte imbalance, Nephrology on board  Had a session of HD yesterday  Monitor blood pressure     7.  Vtach, Paroxysmal A-fib  Prolonged QTc  ICD in situ   She has a paced rhythm, continue Eliquis, was on PO amiodarone  Telemetry monitoring, went into sustained Vtach yesterday while dialysis   Started on amiodarone infusion for Sustained Vtach   Drip d/c'ed, amiodarone increased 200 mg BID  Restarted mexiletine 150 TID.    8.  Ambulatory dysfunction  Recurrent hospital admission  PT OT evaluation  Approved for rehab, peer to peer completed  Patient is staying as she is still not cleared     9. Hypotensive - held bumex, hydralazine,isordil, started midodrine 5 mg TID     10.  Hypothyroidism - TSH elevated, increased dose of synthyroid     Disposition: Currently medically not clear to be discharged. Plan to go to rehab on d/c.     Electronically signed by Favian Mancera MD on 8/17/24 at 9:34 AM EDT

## 2024-08-19 NOTE — PROGRESS NOTES
Renal Dose Adjustment Policy (Generic)     This patient is on medication that requires renal, weight, and/or indication dose adjustment.      Date Body Weight IBW  Adjusted BW SCr  CrCl Dialysis status   8/19/2024 52.9 kg (116 lb 10 oz) Ideal body weight: 54.7 kg (120 lb 9.5 oz) Serum creatinine: 1.6 mg/dL (H) 08/18/24 2015  Estimated creatinine clearance: 23 mL/min (A) HD       Pharmacy has dose-adjusted the following medication(s):    Date Previous Order Adjusted Order   8/19/2024 2000 mg q24h 1000 mg q24h       These changes were made per protocol according to the [facility]   Automatic Renal Dose Adjustment Policy.     *Please note this dose may need readjusted if patient's condition changes.    Please contact pharmacy with any questions regarding these changes.    Sterling Troncoso RPH  8/19/2024  9:26 AM

## 2024-08-19 NOTE — PROGRESS NOTES
Comprehensive Nutrition Assessment    Type and Reason for Visit:  Initial, RD Nutrition Re-Screen/LOS    Nutrition Recommendations/Plan:   Continue Diet.    Will Start ONS and monitor.       Malnutrition Assessment:  Malnutrition Status:  Moderate malnutrition (08/19/24 1236)    Context:  Chronic Illness     Findings of the 6 clinical characteristics of malnutrition:  Energy Intake:  75% or less estimated energy requirements for 1 month or longer  Weight Loss:  Unable to assess (2/2 fluid shifts)     Body Fat Loss:   (moderate) Orbital, Triceps, Buccal region   Muscle Mass Loss:   (moderate) Temples (temporalis), Clavicles (pectoralis & deltoids), Hand (interosseous), Scapula (trapezius)  Fluid Accumulation:  Unable to assess (2/2 CHF/ESRD)     Strength:  Not Performed    Nutrition Assessment:    Pt adm from SNF w/ BLE edema/SOB and LUQ pain.  PMHx CHF, HTN, HLD, ICM, CAD, ESRD/HD, non-compliance, VT, anemia.  Adm w/ CHFe, small B/L pleural effusions, acute metabolic encephalopathy, Acute on Chronic Hypoxic Respiratory Failure, hypotension and noted VT.  Noted RRT for respiratory distress post-HD 8/8.  At risk d/t pt currently meets criteria for Moderate Malnutrition w/ ongoing poor appetite/intake since adm w/ increased needs for noted wounds; Also at risk for Refeeding Syndrome w/ current hypophos, monitor/replace lytes PRN.  Pt states dislike for hospital food and family/Dtr bringing in foods from home at times per pt.  Will Start ONS and monitor.    Nutrition Related Findings:    A&O, Qagan [place], U/L dentures, Abd/BS WDL, +diarrhea, intermittent nausea, generalized +1 edema, -I/O's, elevated Cr/LA/BGL/Alk Phos, hypophosphatemia, +HD, K+ WNL Wound Type: Multiple, Pressure Injury, Stage I       Current Nutrition Intake & Therapies:    Average Meal Intake: 1-25%  Average Supplements Intake: None Ordered  ADULT DIET; Regular; Low Sodium (2 gm); Low Potassium (Less than 3000 mg/day)    Anthropometric Measures:  Height:  162.6 cm (5' 4\")  Ideal Body Weight (IBW): 120 lbs (55 kg)    Admission Body Weight: 59 kg (130 lb) (actual 8/13)  Current Body Weight: 52.6 kg (116 lb) (bed 8/18),   IBW. Weight Source: Bed Scale  Current BMI (kg/m2): 19.9  Usual Body Weight: 64.9 kg (143 lb) (bed 9/19/23 per EMR; GERMAN Wt change properly d/t wt fluctuations ~120's-140's likely 2/2 fluid shifts)  % Weight Change (Calculated): -18.9  Weight Adjustment For: No Adjustment                 BMI Categories: Underweight (BMI less than 22) age over 65    Estimated Daily Nutrient Needs:  Energy Requirements Based On: Formula  Weight Used for Energy Requirements: Current  Energy (kcal/day): MSJx1.3SF per CBW= 1506-7961  Weight Used for Protein Requirements: Current  Protein (g/day): 1.5-1.8gm/kg CBW= 80-95  Method Used for Fluid Requirements: Standard Renal  Fluid (ml/day): per renal mgmt    Nutrition Diagnosis:   Moderate malnutrition, In context of chronic illness related to renal dysfunction (2/2 ESRD/HD w/ ICM) as evidenced by Criteria as identified in malnutrition assessment    Nutrition Interventions:   Food and/or Nutrient Delivery: Continue Current Diet, Start Oral Nutrition Supplement  Nutrition Education/Counseling: Education not indicated  Coordination of Nutrition Care: Continue to monitor while inpatient       Goals:     Goals: PO intake 50% or greater, by next RD assessment       Nutrition Monitoring and Evaluation:   Behavioral-Environmental Outcomes: None Identified  Food/Nutrient Intake Outcomes: Food and Nutrient Intake, Supplement Intake  Physical Signs/Symptoms Outcomes: Biochemical Data, Chewing or Swallowing, Diarrhea, GI Status, Nausea or Vomiting, Fluid Status or Edema, Nutrition Focused Physical Findings, Skin, Weight    Discharge Planning:    Continue Oral Nutrition Supplement     Ozzy Bauman, PEDRO, LD  Contact: ext [phone]

## 2024-08-19 NOTE — FLOWSHEET NOTE
08/19/24 1551   Vital Signs   /62   Temp 98 °F (36.7 °C)   Pulse 68   Respirations 18   Weight - Scale 52.6 kg (115 lb 15.4 oz)   Weight Method Bedside scale   Percent Weight Change -0.57   Pain Assessment   Pain Assessment None - Denies Pain   Post-Hemodialysis Assessment   Post-Treatment Procedures Blood returned;Catheter capped, clamped with Citrate x 2 ports   Machine Disinfection Process Acid/Vinegar Clean;Exterior Machine Disinfection;Heat Disinfect   Rinseback Volume (ml) 300 ml   Blood Volume Processed (Liters) 0 L   Dialyzer Clearance Moderately streaked   Duration of Treatment (minutes) 180 minutes   Hemodialysis Intake (ml) 300 ml   Hemodialysis Output (ml) 1300 ml   NET Removed (ml) 1000   Tolerated Treatment Good   Patient Response to Treatment Tolerated ultrafiltration well   Bilateral Breath Sounds Diminished   Patient Disposition Return to room   Observations & Evaluations   Level of Consciousness 0   Heart Rhythm Regular   Respiratory Quality/Effort Unlabored

## 2024-08-19 NOTE — CARE COORDINATION
Pt transferred from  with resp distress. She is currently on 10 liters high flow. She also had 11 beats of Vtach at dialysis. Discharge plan once medically stable is [facility]. Auth obtained, good through Wed 8/21. LUBA/destination completed. Transport envelope on soft chart. CM to follow (TF)        BHAVANA RoqueN,RN  Case Management  [phone]

## 2024-08-19 NOTE — PROGRESS NOTES
[facility]  MD Murtaza White MD Ali Hassan, MD Lisa Kniska, CNP   Lucille Beth, JOSE Fernandez, JUDE  Progress Note    8/19/2024    SUBJECTIVE:   8/12 : Laying in bed, no acute distress. Daughter is at the bedside. For dialysis this afternoon. She denies any dyspnea. She is edematous. Blood pressure is low.     8/13: Seen while on dialysis. Tolerating therapy without issues. BP is stable, but on the lower side. She denies any dyspnea, chest pain, or palpitations. Lower extremities are edematous.     8/16: Seen while on dialysis. Tolerating treatment without issues. She denies any dyspnea, chest pain, or palpitations. For discharge later this afternoon.     8/17 HD today no reported issues   Charts reviewed .     8/18 increased o2 requirement  Clinically in HF .     8/19: Laying in bed, on 10 liters of oxygen via high flow nasal canula. Feels a bit better. Hesitant to go to HD today as she cramped yesterday. BP stable, on the lower side. RRT called last night for hypoxia and she was placed on AirVo for a short time.     PROBLEM LIST:    Principal Problem:    Lower extremity edema  Active Problems:    Acute on chronic combined systolic and diastolic heart failure (HCC)  Resolved Problems:    * No resolved hospital problems. *         DIET:    ADULT DIET; Regular; Low Sodium (2 gm); Low Potassium (Less than 3000 mg/day)     MEDS (scheduled):    methylPREDNISolone  40 mg IntraVENous Q8H    guaiFENesin  400 mg Oral TID    cefepime  1,000 mg IntraVENous Q24H    midodrine  15 mg Oral TID WC    amiodarone  200 mg Oral BID WC    levothyroxine  50 mcg Oral Daily    sodium chloride flush  5-40 mL IntraVENous 2 times per day    apixaban  2.5 mg Oral BID    aspirin  81 mg Oral Daily    atorvastatin  40 mg Oral Daily    arformoterol 15 mcg-budesonide 0.25 mg neb solution   Nebulization BID RT    [Held by provider] bumetanide  2 mg Oral Daily    epoetin carmelina-epbx  2,000 Units

## 2024-08-19 NOTE — PLAN OF CARE
Problem: Safety - Adult  Goal: Free from fall injury  8/18/2024 2327 by Vicky Burgess, RN  Outcome: Progressing  8/18/2024 1029 by Sharron Bradley, RN  Outcome: Progressing     Problem: Skin/Tissue Integrity  Goal: Absence of new skin breakdown  Description: 1.  Monitor for areas of redness and/or skin breakdown  2.  Assess vascular access sites hourly  3.  Every 4-6 hours minimum:  Change oxygen saturation probe site  4.  Every 4-6 hours:  If on nasal continuous positive airway pressure, respiratory therapy assess nares and determine need for appliance change or resting period.  8/18/2024 2327 by Vicky Burgess, RN  Outcome: Progressing  8/18/2024 1029 by Sharron Bradley, RN  Outcome: Progressing

## 2024-08-19 NOTE — CONSULTS
cardiovascular stress test 2015    History of echocardiogram 2015    Hyperlipidemia        Past Surgical History:   Procedure Laterality Date    COLONOSCOPY N/A 4/3/2021    COLONOSCOPY POLYPECTOMY HOT SNARE performed by Lucio Nelson DO at [facility] ENDOSCOPY    COLONOSCOPY  4/3/2021    COLONOSCOPY WITH BIOPSY performed by Lucio Nelson DO at [facility] ENDOSCOPY    COLONOSCOPY  4/3/2021    COLONOSCOPY CONTROL HEMORRHAGE performed by Lucio Nelson DO at [facility] ENDOSCOPY    COLONOSCOPY  4/3/2021    COLONOSCOPY SUBMUCOSAL SPOT INJECTION performed by Lucio Nelson DO at [facility] ENDOSCOPY    COLONOSCOPY N/A 2023    COLONOSCOPY DIAGNOSTIC performed by Scooby Cormier MD at [facility] ENDOSCOPY    ECTOPIC PREGNANCY SURGERY      UPPER GASTROINTESTINAL ENDOSCOPY N/A 4/3/2021    EGD BIOPSY performed by Lucio Nelson DO at [facility] ENDOSCOPY    UPPER GASTROINTESTINAL ENDOSCOPY N/A 2021    EGD W/EUS FNA performed by Ana Gandhi MD at [facility] ENDOSCOPY       She indicated that her mother is . She indicated that her father is . She indicated that two of her three sisters are alive.      Social History     Socioeconomic History    Marital status:      Spouse name: Not on file    Number of children: Not on file    Years of education: Not on file    Highest education level: Not on file   Occupational History    Not on file   Tobacco Use    Smoking status: Former     Current packs/day: 0.00     Average packs/day: 0.5 packs/day for 51.0 years (25.5 ttl pk-yrs)     Types: Cigarettes     Start date:      Quit date: 2023     Years since quittin.6    Smokeless tobacco: Never   Vaping Use    Vaping status: Never Used   Substance and Sexual Activity    Alcohol use: Never    Drug use: Defer    Sexual activity: Defer   Other Topics Concern    Not on file   Social History Narrative    Attended [employer] until    [employer] [phone] 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  Retired in .     Social  (LIPITOR) 40 MG tablet Take 1 tablet by mouth daily   Yes Alonso Fonseca MD   aspirin 81 MG EC tablet Take 1 tablet by mouth daily 7/7/24  Yes Edward Edwards APRN - CNP   hydrALAZINE (APRESOLINE) 10 MG tablet Take 1 tablet by mouth every 12 hours 7/6/24  Yes Edward Edwards APRN - CNP   metoprolol succinate (TOPROL XL) 25 MG extended release tablet Take 0.5 tablets by mouth daily 7/7/24  Yes Edward Edwards APRN - CNP   bumetanide (BUMEX) 2 MG tablet Take 1 tablet by mouth daily 7/6/24  Yes Edward Edwards APRN - CNP   pantoprazole (PROTONIX) 40 MG tablet Take 1 tablet by mouth every morning (before breakfast) 4/20/21  Yes Eddi Almonte,    Benzocaine-Menthol (CEPACOL) 6-10 MG LOZG lozenge Take 1 lozenge by mouth every 2 hours as needed for Sore Throat    Alonso Fonseca MD   apixaban (ELIQUIS) 2.5 MG TABS tablet Take 1 tablet by mouth 2 times daily    Alonso Fonseca MD   Polyvinyl Alcohol-Povidone PF (REFRESH) 1.4-0.6 % SOLN ophthalmic solution Place 1 drop into both eyes daily as needed (dry eyes)    Alonso Fonseca MD   calcium carbonate (TUMS) 500 MG chewable tablet Take 1 tablet by mouth 3 times daily as needed for Heartburn 8/3/24 9/2/24  Lincoln Serna MD   OXYGEN Inhale 1-5 L into the lungs continuous prn (maintain SPO2 >/= 90%) Wean as tolerated.    ProviderAlonso MD   sennosides-docusate sodium (SENOKOT-S) 8.6-50 MG tablet Take 1 tablet by mouth 2 times daily as needed for Constipation    Alonso Fonseca MD   acetaminophen (TYLENOL) 325 MG tablet Take 2 tablets by mouth every 4 hours as needed for Pain or Fever    Alonso Fonseca MD   lactulose (CHRONULAC) 10 GM/15ML solution Take 15 mLs by mouth daily as needed (constipation)    Alonso Fonseca MD   bisacodyl (DULCOLAX) 10 MG suppository Place 1 suppository rectally daily as needed for Constipation    Alonso Fonseca MD   melatonin 5 MG TBDP disintegrating tablet Take 1 tablet by mouth nightly

## 2024-08-19 NOTE — PROGRESS NOTES
Patient's O2 on the monitor dropped to 81%, this RN went into the patient's room and she was taking her airvo mask off. This RN told the patient to place it back on because her O2 was dropping, pt stated 'I was alright until all those doctors came in, I'm not taking anymore meds.\". This RN told the patient her medications were to help her get better, pt stated \"Get out of my face.\".

## 2024-08-19 NOTE — PROGRESS NOTES
Patient had 9 beats of Vtach followed by 11 beats of Vtach. Perfect serve sent to Dr. Tidwell regarding the matter. Pt not sustaining at the moment so we are monitoring per Dr. Tidwell.      EKG done and transmitted.

## 2024-08-19 NOTE — SIGNIFICANT EVENT
Rapid Response Team Note  Date of event: 8/18/2024   Time of event: 20:07  Marge Callejas 84 y.o. year old female   YOB: 1940   Admit date:  8/9/2024   Location: [address] [place] [address]-A   Witnessed? : [x]Yes  [] No  Monitored? : [x]Yes  [] No  Code status: [x] Full  [] DNR-CCA  []DNR-CC  ______________________________________________________________________  Reason for RRT:    [] RR < 8     [] RR > 28   [] SpO2 <90%   [] HR < 40 bpm   [] HR > 130 bpm  [] SBP < 90 mmHg    [] LOC    [] Seizures    [] Significant Bleeding Event   [x] Other: unaable to get pulse ox    Subjective:   RRT was called regarding the above event.  Patient came in fluid overloaded.  The patient returned from dialysis and was unable to get a pulse ox to read.  Tried multiple different spots.  Try to ABGs but unable to be obtained, patient screaming and talking to us.  Will obtain VBG but only venous pH was obtained..  Give the patient 1 mg IV Bumex, repeat chest x-ray shows significant improvement in pulmonary edema from previous chest x-ray.  Patient refused CPAP, patient was put on Airvo saturating 100%.  Will follow labs.  Patient says that she is tired of this and does not want to be stuck anymore.    Objective:   Vital signs:   Vitals:    08/18/24 2143 08/18/24 2153 08/18/24 2256 08/18/24 2310   BP: (!) 96/52 (!) 62/30  (!) 82/40   Pulse: 85 81  72   Resp: 22 18     Temp: 97.8 °F (36.6 °C) 98 °F (36.7 °C)     TempSrc: Temporal Temporal     SpO2: 99% 100% 99%    Weight:       Height:         Initial Condition:  Conscious   [x] Yes  [] No     Breathing [x] Yes  [] No     Pulse  [x] Yes  [] No    Airway:   [x] Open/ Clear     Intervention: [] None  [] Pooled secretions     [] Suctioned  [] Stridor      [] Intubation    Lungs:   [] Symmetrical chest rise/ CTABL Intervention: [] None  [] Use of accessory muscles    [x] NIV (CPAP/BiPAP)  [] Cyanosis      [] Nasal Oxygen/Mask  [] Wheezing       [x] ABG             [x] CXR  [] Other:      Phosphorus:   Lab Results   Component Value Date/Time    PHOS 1.9 08/18/2024 08:15 PM     Troponin: No results for input(s): \"TROPONINI\" in the last 72 hours.      Teams Assessment and Plan:  Acute on chronic respiratory failure  Pt on 4L O2 baseline   Monitor labs  Cont with dialysis to remove fluids   Goals of care discussion needed, pt expressed wishes of not wantting to be intubated if it came to this and that she's ready to move on \"right now\" however had just gotten 2 ABG's and was upset  De-escalate airvo as tolerated   ?  ?  Disposition:  [x] No transfer   [] Transfer to monitor floor  [] Transfer to: [] MICU [] NICU [] CCU [] SICU    Patient’s family updated:  [] Yes  [] No   Discussed with:  [x] Critical Care Intensivist: Dr. Navarrete      [] Attending: Favian Mancera MD      [] Primary Care Provider: Marek Andres, DO      [] Other: ?    Seferino Gutierrez MD MD PGY-3  8/18/2024 11:56 PM  Attending Physician:Favian Mancera MD

## 2024-08-20 LAB
ALBUMIN SERPL-MCNC: 3 G/DL (ref 3.5–5.2)
ALP SERPL-CCNC: 108 U/L (ref 35–104)
ALT SERPL-CCNC: 22 U/L (ref 0–32)
ANION GAP SERPL CALCULATED.3IONS-SCNC: 13 MMOL/L (ref 7–16)
AST SERPL-CCNC: 39 U/L (ref 0–31)
BASOPHILS # BLD: 0 K/UL (ref 0–0.2)
BASOPHILS NFR BLD: 0 % (ref 0–2)
BILIRUB SERPL-MCNC: 0.4 MG/DL (ref 0–1.2)
BUN SERPL-MCNC: 24 MG/DL (ref 6–23)
CALCIUM SERPL-MCNC: 9.1 MG/DL (ref 8.6–10.2)
CHLORIDE SERPL-SCNC: 99 MMOL/L (ref 98–107)
CO2 SERPL-SCNC: 22 MMOL/L (ref 22–29)
CREAT SERPL-MCNC: 3.6 MG/DL (ref 0.5–1)
EKG ATRIAL RATE: 77 BPM
EKG Q-T INTERVAL: 500 MS
EKG QRS DURATION: 184 MS
EKG QTC CALCULATION (BAZETT): 540 MS
EKG R AXIS: 109 DEGREES
EKG T AXIS: -66 DEGREES
EKG VENTRICULAR RATE: 70 BPM
EOSINOPHIL # BLD: 0 K/UL (ref 0.05–0.5)
EOSINOPHILS RELATIVE PERCENT: 0 % (ref 0–6)
ERYTHROCYTE [DISTWIDTH] IN BLOOD BY AUTOMATED COUNT: 19 % (ref 11.5–15)
GFR, ESTIMATED: 12 ML/MIN/1.73M2
GLUCOSE BLD-MCNC: 117 MG/DL (ref 74–99)
GLUCOSE BLD-MCNC: 143 MG/DL (ref 74–99)
GLUCOSE BLD-MCNC: 172 MG/DL (ref 74–99)
GLUCOSE BLD-MCNC: 219 MG/DL (ref 74–99)
GLUCOSE SERPL-MCNC: 87 MG/DL (ref 74–99)
HCT VFR BLD AUTO: 25.4 % (ref 34–48)
HGB BLD-MCNC: 7.7 G/DL (ref 11.5–15.5)
LACTATE BLDV-SCNC: 1.4 MMOL/L (ref 0.5–2.2)
LYMPHOCYTES NFR BLD: 0.38 K/UL (ref 1.5–4)
LYMPHOCYTES RELATIVE PERCENT: 6 % (ref 20–42)
MCH RBC QN AUTO: 33.8 PG (ref 26–35)
MCHC RBC AUTO-ENTMCNC: 30.3 G/DL (ref 32–34.5)
MCV RBC AUTO: 111.4 FL (ref 80–99.9)
MONOCYTES NFR BLD: 0.05 K/UL (ref 0.1–0.95)
MONOCYTES NFR BLD: 1 % (ref 2–12)
NEUTROPHILS NFR BLD: 93 % (ref 43–80)
NEUTS SEG NFR BLD: 5.86 K/UL (ref 1.8–7.3)
PHOSPHATE SERPL-MCNC: 2.7 MG/DL (ref 2.5–4.5)
PLATELET # BLD AUTO: 111 K/UL (ref 130–450)
PMV BLD AUTO: 12.4 FL (ref 7–12)
POTASSIUM SERPL-SCNC: 4.3 MMOL/L (ref 3.5–5)
PROCALCITONIN SERPL-MCNC: 1.99 NG/ML (ref 0–0.08)
PROT SERPL-MCNC: 6 G/DL (ref 6.4–8.3)
RBC # BLD AUTO: 2.28 M/UL (ref 3.5–5.5)
RBC # BLD: ABNORMAL 10*6/UL
SODIUM SERPL-SCNC: 134 MMOL/L (ref 132–146)
WBC OTHER # BLD: 6.3 K/UL (ref 4.5–11.5)

## 2024-08-20 PROCEDURE — 2700000000 HC OXYGEN THERAPY PER DAY

## 2024-08-20 PROCEDURE — 6370000000 HC RX 637 (ALT 250 FOR IP): Performed by: STUDENT IN AN ORGANIZED HEALTH CARE EDUCATION/TRAINING PROGRAM

## 2024-08-20 PROCEDURE — 6370000000 HC RX 637 (ALT 250 FOR IP)

## 2024-08-20 PROCEDURE — 6370000000 HC RX 637 (ALT 250 FOR IP): Performed by: FAMILY MEDICINE

## 2024-08-20 PROCEDURE — 2580000003 HC RX 258

## 2024-08-20 PROCEDURE — 94640 AIRWAY INHALATION TREATMENT: CPT

## 2024-08-20 PROCEDURE — 82962 GLUCOSE BLOOD TEST: CPT

## 2024-08-20 PROCEDURE — 80053 COMPREHEN METABOLIC PANEL: CPT

## 2024-08-20 PROCEDURE — 83605 ASSAY OF LACTIC ACID: CPT

## 2024-08-20 PROCEDURE — 93005 ELECTROCARDIOGRAM TRACING: CPT | Performed by: STUDENT IN AN ORGANIZED HEALTH CARE EDUCATION/TRAINING PROGRAM

## 2024-08-20 PROCEDURE — 2580000003 HC RX 258: Performed by: FAMILY MEDICINE

## 2024-08-20 PROCEDURE — 84100 ASSAY OF PHOSPHORUS: CPT

## 2024-08-20 PROCEDURE — 2060000000 HC ICU INTERMEDIATE R&B

## 2024-08-20 PROCEDURE — 93010 ELECTROCARDIOGRAM REPORT: CPT | Performed by: INTERNAL MEDICINE

## 2024-08-20 PROCEDURE — 84145 PROCALCITONIN (PCT): CPT

## 2024-08-20 PROCEDURE — 94660 CPAP INITIATION&MGMT: CPT

## 2024-08-20 PROCEDURE — 85025 COMPLETE CBC W/AUTO DIFF WBC: CPT

## 2024-08-20 PROCEDURE — 6360000002 HC RX W HCPCS: Performed by: FAMILY MEDICINE

## 2024-08-20 PROCEDURE — 94669 MECHANICAL CHEST WALL OSCILL: CPT

## 2024-08-20 PROCEDURE — 6360000002 HC RX W HCPCS

## 2024-08-20 PROCEDURE — 90935 HEMODIALYSIS ONE EVALUATION: CPT

## 2024-08-20 PROCEDURE — 36415 COLL VENOUS BLD VENIPUNCTURE: CPT

## 2024-08-20 PROCEDURE — 99232 SBSQ HOSP IP/OBS MODERATE 35: CPT

## 2024-08-20 PROCEDURE — 99232 SBSQ HOSP IP/OBS MODERATE 35: CPT | Performed by: INTERNAL MEDICINE

## 2024-08-20 PROCEDURE — 2500000003 HC RX 250 WO HCPCS

## 2024-08-20 RX ADMIN — APIXABAN 2.5 MG: 2.5 TABLET, FILM COATED ORAL at 09:20

## 2024-08-20 RX ADMIN — WATER 40 MG: 1 INJECTION INTRAMUSCULAR; INTRAVENOUS; SUBCUTANEOUS at 20:05

## 2024-08-20 RX ADMIN — AMIODARONE HYDROCHLORIDE 200 MG: 200 TABLET ORAL at 17:23

## 2024-08-20 RX ADMIN — GUAIFENESIN 400 MG: 400 TABLET ORAL at 20:05

## 2024-08-20 RX ADMIN — PANTOPRAZOLE SODIUM 40 MG: 40 TABLET, DELAYED RELEASE ORAL at 06:05

## 2024-08-20 RX ADMIN — SODIUM CHLORIDE, PRESERVATIVE FREE 10 ML: 5 INJECTION INTRAVENOUS at 20:05

## 2024-08-20 RX ADMIN — Medication 5 MG: at 20:05

## 2024-08-20 RX ADMIN — LEVOTHYROXINE SODIUM 50 MCG: 0.05 TABLET ORAL at 06:05

## 2024-08-20 RX ADMIN — WATER 40 MG: 1 INJECTION INTRAMUSCULAR; INTRAVENOUS; SUBCUTANEOUS at 09:18

## 2024-08-20 RX ADMIN — DOXYCYCLINE 100 MG: 100 INJECTION, POWDER, LYOPHILIZED, FOR SOLUTION INTRAVENOUS at 17:26

## 2024-08-20 RX ADMIN — APIXABAN 2.5 MG: 2.5 TABLET, FILM COATED ORAL at 20:05

## 2024-08-20 RX ADMIN — INSULIN LISPRO 2 UNITS: 100 INJECTION, SOLUTION INTRAVENOUS; SUBCUTANEOUS at 09:18

## 2024-08-20 RX ADMIN — AMIODARONE HYDROCHLORIDE 200 MG: 200 TABLET ORAL at 09:20

## 2024-08-20 RX ADMIN — CEFEPIME 1000 MG: 1 INJECTION, POWDER, FOR SOLUTION INTRAMUSCULAR; INTRAVENOUS at 09:26

## 2024-08-20 RX ADMIN — MEXILETINE HYDROCHLORIDE 150 MG: 150 CAPSULE ORAL at 20:05

## 2024-08-20 RX ADMIN — POLYETHYLENE GLYCOL 3350 17 G: 17 POWDER, FOR SOLUTION ORAL at 09:18

## 2024-08-20 RX ADMIN — MEXILETINE HYDROCHLORIDE 150 MG: 150 CAPSULE ORAL at 06:05

## 2024-08-20 RX ADMIN — SACUBITRIL AND VALSARTAN 0.5 TABLET: 24; 26 TABLET, FILM COATED ORAL at 20:05

## 2024-08-20 RX ADMIN — ATORVASTATIN CALCIUM 40 MG: 40 TABLET, FILM COATED ORAL at 09:20

## 2024-08-20 RX ADMIN — SODIUM CHLORIDE, PRESERVATIVE FREE 10 ML: 5 INJECTION INTRAVENOUS at 09:23

## 2024-08-20 RX ADMIN — GUAIFENESIN 400 MG: 400 TABLET ORAL at 09:21

## 2024-08-20 RX ADMIN — MIDODRINE HYDROCHLORIDE 15 MG: 10 TABLET ORAL at 09:20

## 2024-08-20 RX ADMIN — ARFORMOTEROL TARTRATE: 15 SOLUTION RESPIRATORY (INHALATION) at 08:26

## 2024-08-20 RX ADMIN — ASPIRIN 81 MG: 81 TABLET, COATED ORAL at 09:20

## 2024-08-20 RX ADMIN — MIDODRINE HYDROCHLORIDE 15 MG: 10 TABLET ORAL at 17:23

## 2024-08-20 RX ADMIN — ARFORMOTEROL TARTRATE: 15 SOLUTION RESPIRATORY (INHALATION) at 19:45

## 2024-08-20 RX ADMIN — WATER 40 MG: 1 INJECTION INTRAMUSCULAR; INTRAVENOUS; SUBCUTANEOUS at 01:53

## 2024-08-20 RX ADMIN — MIDODRINE HYDROCHLORIDE 15 MG: 10 TABLET ORAL at 12:30

## 2024-08-20 NOTE — PROGRESS NOTES
Progress Note  Date:2024       Room:[address] [place] [address]  Patient Name:Marge Callejas     YOB: 1940     Age:84 y.o.        Subjective    Subjective:  Symptoms:  Improved.  She reports cough.    Diet:  Adequate intake.    Activity level: Returning to normal.    Pain:  She reports no pain.       Review of Systems   Constitutional:  Positive for activity change, appetite change and fatigue.   HENT: Negative.     Eyes: Negative.    Respiratory:  Positive for cough.    Gastrointestinal: Negative.    Endocrine: Negative.    Genitourinary: Negative.    Musculoskeletal:  Positive for myalgias.   Skin: Negative.    Neurological: Negative.    Psychiatric/Behavioral: Negative.       Objective         Vitals Last 24 Hours:  TEMPERATURE:  Temp  Av.6 °F (36.4 °C)  Min: 97.4 °F (36.3 °C)  Max: 98 °F (36.7 °C)  RESPIRATIONS RANGE: Resp  Av.7  Min: 12  Max: 22  PULSE OXIMETRY RANGE: SpO2  Av.8 %  Min: 92 %  Max: 100 %  PULSE RANGE: Pulse  Av.1  Min: 60  Max: 78  BLOOD PRESSURE RANGE: Systolic (24hrs), Av , Min:90 , Max:122   ; Diastolic (24hrs), Av, Min:44, Max:62    I/O (24Hr):    Intake/Output Summary (Last 24 hours) at 2024 1025  Last data filed at 2024 1551  Gross per 24 hour   Intake 300 ml   Output 1300 ml   Net -1000 ml       Objective:  General Appearance:  Uncomfortable and well-appearing.    Vital signs: (most recent): Blood pressure (!) 91/46, pulse 62, temperature 97.4 °F (36.3 °C), temperature source Infrared, resp. rate 16, height 1.626 m (5' 4\"), weight 52.6 kg (115 lb 15.4 oz), SpO2 96%.  Vital signs are normal.    Output: Producing urine and producing stool.    HEENT: Normal HEENT exam.    Lungs:  Normal effort and normal respiratory rate.  Breath sounds clear to auscultation.    Heart: Normal rate.  Regular rhythm.  S1 normal and S2 normal.    Abdomen: Abdomen is soft and non-distended.  There are no signs of ascites.  Bowel sounds are normal.   There is no abdominal

## 2024-08-20 NOTE — FLOWSHEET NOTE
08/20/24 1629   Vital Signs   BP (!) 103/54   Temp 96.9 °F (36.1 °C)   Pulse 73   Respirations 18   Weight - Scale 53 kg (116 lb 13.5 oz)   Weight Method Bed scale   Percent Weight Change 0.76   Pain Assessment   Pain Assessment None - Denies Pain   Post-Hemodialysis Assessment   Post-Treatment Procedures Blood returned;Catheter capped, clamped and heparinized x 2 ports   Machine Disinfection Process Exterior Machine Disinfection;Heat Disinfect;Acid/Vinegar Clean   Rinseback Volume (ml) 300 ml   Blood Volume Processed (Liters) 51.7 L   Dialyzer Clearance Moderately streaked   Duration of Treatment (minutes) 180 minutes   Heparin Amount Administered During Treatment (mL) 0 mL   Hemodialysis Intake (ml) 300 ml   Hemodialysis Output (ml) 1047 ml   NET Removed (ml) 747   Tolerated Treatment Fair   Interventions Taken Ultrafiltration goal decreased   Patient Response to Treatment tolerated minimal fluid removal   Bilateral Breath Sounds Diminished   Edema None   Physician Notified No   Time Off 1614   Patient Disposition Return to room   Observations & Evaluations   Level of Consciousness 0   Oriented X 3

## 2024-08-20 NOTE — PLAN OF CARE
Problem: Skin/Tissue Integrity  Goal: Absence of new skin breakdown  Description: 1.  Monitor for areas of redness and/or skin breakdown  2.  Assess vascular access sites hourly  3.  Every 4-6 hours minimum:  Change oxygen saturation probe site  4.  Every 4-6 hours:  If on nasal continuous positive airway pressure, respiratory therapy assess nares and determine need for appliance change or resting period.  Outcome: Progressing     Problem: Safety - Adult  Goal: Free from fall injury  Outcome: Progressing     Problem: Chronic Conditions and Co-morbidities  Goal: Patient's chronic conditions and co-morbidity symptoms are monitored and maintained or improved  Outcome: Progressing  Flowsheets (Taken 8/20/2024 0807)  Care Plan - Patient's Chronic Conditions and Co-Morbidity Symptoms are Monitored and Maintained or Improved: Monitor and assess patient's chronic conditions and comorbid symptoms for stability, deterioration, or improvement     Problem: Risk for Elopement  Goal: Patient will not exit the unit/facility without proper excort  Outcome: Progressing  Flowsheets (Taken 8/20/2024 0807)  Nursing Interventions for Elopement Risk: Assist with personal care needs such as toileting, eating, dressing, as needed to reduce the risk of wandering     Problem: Respiratory - Adult  Goal: Achieves optimal ventilation and oxygenation  Outcome: Progressing  Flowsheets (Taken 8/20/2024 0807)  Achieves optimal ventilation and oxygenation: Assess for changes in respiratory status     Problem: Musculoskeletal - Adult  Goal: Return mobility to safest level of function  Outcome: Progressing  Flowsheets (Taken 8/20/2024 0807)  Return Mobility to Safest Level of Function: Assess patient stability and activity tolerance for standing, transferring and ambulating with or without assistive devices     Problem: Metabolic/Fluid and Electrolytes - Adult  Goal: Hemodynamic stability and optimal renal function maintained  Outcome:

## 2024-08-20 NOTE — CARE COORDINATION
CM Update: Pt now on 6 liters O2. Pulmonology ordered MBSS to r/o aspiration. Discharge plan when pt is medically stable is [facility]. Auth obtained, good through Wed 8/21. [facility]/destination completed. Transport envelope on soft chart. CM to follow (TF)           BHAVANA RoqueN,RN  Case Management  [phone]

## 2024-08-20 NOTE — PROGRESS NOTES
08/19/24 2152   NIV Type   $NIV $Daily Charge   Ventilator ID [id number]   NIV Started/Stopped On   Equipment Type [id number]   Mode CPAP   Mask Type Full face mask   Mask Size Medium   Assessment   Pulse 60   Heart Rate Source Monitor   Respirations 14   SpO2 95 %   Level of Consciousness 0   Comfort Level Good   Using Accessory Muscles No   Mask Compliance Good   Skin Assessment Clean, dry, & intact   Skin Protection for O2 Device Yes   Orientation Middle   Location Nose   Intervention(s) Skin Barrier   Settings/Measurements   PIP Observed 8 cm H20   CPAP/EPAP 8 cmH2O   Vt (Measured) 499 mL   FiO2  60 %   Minute Volume (L/min) 10.8 Liters   Mask Leak (lpm) 31 lpm   Patient's Home Machine No   Alarm Settings   Low Pressure (cmH2O) 6 cmH2O   High Pressure (cmH2O) 30 cmH2O   RR Low (bpm) 10   RR High (bpm) 35 br/min

## 2024-08-20 NOTE — PROGRESS NOTES
Pulmonary Critical Care Medicine           PULMONARY  CRITICAL CARE   SERVICE DAILY PROGRESS  NOTE     2024   Hospital LOS:  LOS: 8 days     Impression / Recommendations:  Acute on chronic hypoxic respiratory failure 2/2 aspiration pneumonia vs CAP vs HAP  Acute decompensated heart failure  HFrEF, EF 20-25%  Paroxysmal atrial fibrillation, on Eliquis s/p ICD, on amiodarone, EP previously following  Prolonged QTc   ESRD on HD, nephro following  Macrocytic anemia  Hypothyroidism    Baseline is 3-4L NC, currently on 6L HFNC, condition is improving, wean oxygen as tolerated  HD per nephro  Obtain swallow evaluation for possible aspiration  Currently on cefepime, will add doxycycline in setting of elevated procalcitonin  Follow sputum culture result       Interval History/Event Changes:  Patient seen and examined at bedside in no acute distress. She states that her breathing still is not as good as it should be. She denies any chest pain or palpitations. She does not feel short of breath but is coughing up more sputum.     Allergies  No Known Allergies    Review of Systems    A pertinent review of systems was performed and was otherwise non-contributory.    Vitals-     BP (!) 101/52   Pulse 63   Temp 98.1 °F (36.7 °C) (Infrared)   Resp 15   Ht 1.626 m (5' 4\")   Wt 52.6 kg (115 lb 15.4 oz)   SpO2 97%   BMI 19.90 kg/m²    Tmax: Temp (24hrs), Av.7 °F (36.5 °C), Min:97.4 °F (36.3 °C), Max:98.1 °F (36.7 °C)      Hemodynamics:  Cuff:   Systolic (24hrs), Av , Min:90 , Max:103   /Diastolic (24hrs), Av, Min:44, Max:54    Cuff MAP:MAP (mmHg)  Av.3  Min: 59  Max: 98  P:   Pulse  Av.9  Min: 60  Max: 73      Airway:                         Observed RR: Resp  Avg: 15.4  Min: 12  Max: 22   Observed O2 sats: SpO2  Av.6 %  Min: 82 %  Max: 100 %      Intake/Output Summary (Last 24 hours) at 2024 1606  Last data filed at 2024 1027  Gross per 24 hour   Intake 240 ml   Output --   Net 240  reviewed the chart lab work and imaging.  I agree with above.  Modifications and amendment of note made as necessary.    In addition, the following apply:    Current Facility-Administered Medications   Medication Dose Route Frequency Provider Last Rate Last Admin    methylPREDNISolone sodium succ (SOLU-MEDROL) 40 mg in sterile water 1 mL injection  40 mg IntraVENous Q12H Favian Mancera MD        doxycycline (VIBRAMYCIN) 100 mg in sodium chloride 0.9 % 100 mL IVPB ([id number])  100 mg IntraVENous Q12H Macrina Redd DO guaiFENesin tablet 400 mg  400 mg Oral TID Favian Mancera MD   400 mg at 08/20/24 0921    cefepime (MAXIPIME) 1,000 mg in sodium chloride 0.9 % 50 mL IVPB ([id number])  1,000 mg IntraVENous Q24H Favian Mancera MD   Stopped at 08/20/24 1230    albumin human 25% IV solution 25 g  25 g IntraVENous PRN Alban Ross MD        insulin lispro (HUMALOG,ADMELOG) injection vial 0-8 Units  0-8 Units SubCUTAneous TID  Favian Mancera MD   2 Units at 08/20/24 0918    insulin lispro (HUMALOG,ADMELOG) injection vial 0-4 Units  0-4 Units SubCUTAneous Nightly Favian Mancera MD   4 Units at 08/19/24 2001    midodrine (PROAMATINE) tablet 15 mg  15 mg Oral TID  Lincoln Serna MD   15 mg at 08/20/24 1230    amiodarone (CORDARONE) tablet 200 mg  200 mg Oral BID  Favian Mancera MD   200 mg at 08/20/24 0920    levothyroxine (SYNTHROID) tablet 50 mcg  50 mcg Oral Daily Misty Oseguera MD   50 mcg at 08/20/24 0605    sodium chloride flush 0.9 % injection 5-40 mL  5-40 mL IntraVENous 2 times per day Gama Parham MD   10 mL at 08/20/24 0923    sodium chloride flush 0.9 % injection 5-40 mL  5-40 mL IntraVENous PRN Gama Parham MD        0.9 % sodium chloride infusion   IntraVENous PRN Gama Parham MD        polyethylene glycol (GLYCOLAX) packet 17 g  17 g Oral Daily PRN Gama Parham MD        acetaminophen (TYLENOL) tablet 650 mg  650 mg Oral Q6H PRN Gama Parham MD

## 2024-08-20 NOTE — PROGRESS NOTES
Perfect serve message sent to Dr. Tidwell regarding patients BP and if ok to give morning mexiletine, OK given by Dr. Tidwell to give medication

## 2024-08-20 NOTE — PROGRESS NOTES
Speech Language Pathology  NAME:  Marge Callejas  :  1940  DATE: 2024  ROOM:  [facility]    Pt unavailable for  Modified Barium Swallow Study (MBSS) Discussed with radiology staff who report not appropriate per RN     REASON:  With other medical staff dialysis per radiology report     Will re-attempt as appropriate.       Thank You    Krista Ashford MSCCC/SLP  Speech Language Pathologist  [phone]

## 2024-08-21 ENCOUNTER — APPOINTMENT (OUTPATIENT)
Dept: GENERAL RADIOLOGY | Age: 84
DRG: 291 | End: 2024-08-21
Payer: MEDICARE

## 2024-08-21 LAB
ALBUMIN SERPL-MCNC: 3.2 G/DL (ref 3.5–5.2)
ALP SERPL-CCNC: 111 U/L (ref 35–104)
ALT SERPL-CCNC: 24 U/L (ref 0–32)
ANION GAP SERPL CALCULATED.3IONS-SCNC: 15 MMOL/L (ref 7–16)
AST SERPL-CCNC: 36 U/L (ref 0–31)
BASOPHILS # BLD: 0.01 K/UL (ref 0–0.2)
BASOPHILS NFR BLD: 0 % (ref 0–2)
BILIRUB SERPL-MCNC: 0.3 MG/DL (ref 0–1.2)
BUN SERPL-MCNC: 26 MG/DL (ref 6–23)
CALCIUM SERPL-MCNC: 9 MG/DL (ref 8.6–10.2)
CHLORIDE SERPL-SCNC: 100 MMOL/L (ref 98–107)
CO2 SERPL-SCNC: 25 MMOL/L (ref 22–29)
CREAT SERPL-MCNC: 2.5 MG/DL (ref 0.5–1)
EKG ATRIAL RATE: 51 BPM
EKG ATRIAL RATE: 53 BPM
EKG Q-T INTERVAL: 496 MS
EKG Q-T INTERVAL: 538 MS
EKG Q-T INTERVAL: 548 MS
EKG QRS DURATION: 176 MS
EKG QRS DURATION: 180 MS
EKG QRS DURATION: 190 MS
EKG QTC CALCULATION (BAZETT): 503 MS
EKG QTC CALCULATION (BAZETT): 556 MS
EKG QTC CALCULATION (BAZETT): 592 MS
EKG R AXIS: 103 DEGREES
EKG R AXIS: 109 DEGREES
EKG R AXIS: 109 DEGREES
EKG T AXIS: -70 DEGREES
EKG T AXIS: -75 DEGREES
EKG T AXIS: -81 DEGREES
EKG VENTRICULAR RATE: 62 BPM
EKG VENTRICULAR RATE: 62 BPM
EKG VENTRICULAR RATE: 73 BPM
EOSINOPHIL # BLD: 0 K/UL (ref 0.05–0.5)
EOSINOPHILS RELATIVE PERCENT: 0 % (ref 0–6)
ERYTHROCYTE [DISTWIDTH] IN BLOOD BY AUTOMATED COUNT: 18.1 % (ref 11.5–15)
GFR, ESTIMATED: 19 ML/MIN/1.73M2
GLUCOSE BLD-MCNC: 101 MG/DL (ref 74–99)
GLUCOSE BLD-MCNC: 116 MG/DL (ref 74–99)
GLUCOSE BLD-MCNC: 232 MG/DL (ref 74–99)
GLUCOSE BLD-MCNC: 340 MG/DL (ref 74–99)
GLUCOSE SERPL-MCNC: 184 MG/DL (ref 74–99)
HCT VFR BLD AUTO: 24.3 % (ref 34–48)
HGB BLD-MCNC: 7.6 G/DL (ref 11.5–15.5)
IMM GRANULOCYTES # BLD AUTO: 0.03 K/UL (ref 0–0.58)
IMM GRANULOCYTES NFR BLD: 0 % (ref 0–5)
LYMPHOCYTES NFR BLD: 0.99 K/UL (ref 1.5–4)
LYMPHOCYTES RELATIVE PERCENT: 12 % (ref 20–42)
MCH RBC QN AUTO: 34.1 PG (ref 26–35)
MCHC RBC AUTO-ENTMCNC: 31.3 G/DL (ref 32–34.5)
MCV RBC AUTO: 109 FL (ref 80–99.9)
MONOCYTES NFR BLD: 0.29 K/UL (ref 0.1–0.95)
MONOCYTES NFR BLD: 3 % (ref 2–12)
NEUTROPHILS NFR BLD: 84 % (ref 43–80)
NEUTS SEG NFR BLD: 7.17 K/UL (ref 1.8–7.3)
PLATELET, FLUORESCENCE: 125 K/UL (ref 130–450)
PMV BLD AUTO: 12.7 FL (ref 7–12)
POTASSIUM SERPL-SCNC: 3.8 MMOL/L (ref 3.5–5)
PROT SERPL-MCNC: 5.8 G/DL (ref 6.4–8.3)
RBC # BLD AUTO: 2.23 M/UL (ref 3.5–5.5)
SODIUM SERPL-SCNC: 140 MMOL/L (ref 132–146)
WBC OTHER # BLD: 8.5 K/UL (ref 4.5–11.5)

## 2024-08-21 PROCEDURE — 2580000003 HC RX 258

## 2024-08-21 PROCEDURE — 93010 ELECTROCARDIOGRAM REPORT: CPT | Performed by: INTERNAL MEDICINE

## 2024-08-21 PROCEDURE — 6360000002 HC RX W HCPCS

## 2024-08-21 PROCEDURE — 36415 COLL VENOUS BLD VENIPUNCTURE: CPT

## 2024-08-21 PROCEDURE — 6360000002 HC RX W HCPCS: Performed by: FAMILY MEDICINE

## 2024-08-21 PROCEDURE — 2580000003 HC RX 258: Performed by: FAMILY MEDICINE

## 2024-08-21 PROCEDURE — 94640 AIRWAY INHALATION TREATMENT: CPT

## 2024-08-21 PROCEDURE — 6370000000 HC RX 637 (ALT 250 FOR IP)

## 2024-08-21 PROCEDURE — 2060000000 HC ICU INTERMEDIATE R&B

## 2024-08-21 PROCEDURE — 85025 COMPLETE CBC W/AUTO DIFF WBC: CPT

## 2024-08-21 PROCEDURE — 93005 ELECTROCARDIOGRAM TRACING: CPT | Performed by: STUDENT IN AN ORGANIZED HEALTH CARE EDUCATION/TRAINING PROGRAM

## 2024-08-21 PROCEDURE — 6370000000 HC RX 637 (ALT 250 FOR IP): Performed by: STUDENT IN AN ORGANIZED HEALTH CARE EDUCATION/TRAINING PROGRAM

## 2024-08-21 PROCEDURE — 82962 GLUCOSE BLOOD TEST: CPT

## 2024-08-21 PROCEDURE — 99233 SBSQ HOSP IP/OBS HIGH 50: CPT

## 2024-08-21 PROCEDURE — 2700000000 HC OXYGEN THERAPY PER DAY

## 2024-08-21 PROCEDURE — 74230 X-RAY XM SWLNG FUNCJ C+: CPT

## 2024-08-21 PROCEDURE — 80053 COMPREHEN METABOLIC PANEL: CPT

## 2024-08-21 PROCEDURE — 94660 CPAP INITIATION&MGMT: CPT

## 2024-08-21 PROCEDURE — 99232 SBSQ HOSP IP/OBS MODERATE 35: CPT | Performed by: INTERNAL MEDICINE

## 2024-08-21 PROCEDURE — 92611 MOTION FLUOROSCOPY/SWALLOW: CPT

## 2024-08-21 PROCEDURE — 6370000000 HC RX 637 (ALT 250 FOR IP): Performed by: FAMILY MEDICINE

## 2024-08-21 PROCEDURE — 92526 ORAL FUNCTION THERAPY: CPT

## 2024-08-21 PROCEDURE — 2500000003 HC RX 250 WO HCPCS

## 2024-08-21 RX ADMIN — SACUBITRIL AND VALSARTAN 0.5 TABLET: 24; 26 TABLET, FILM COATED ORAL at 21:37

## 2024-08-21 RX ADMIN — ASPIRIN 81 MG: 81 TABLET, COATED ORAL at 09:25

## 2024-08-21 RX ADMIN — METOPROLOL SUCCINATE 12.5 MG: 25 TABLET, EXTENDED RELEASE ORAL at 09:24

## 2024-08-21 RX ADMIN — ARFORMOTEROL TARTRATE: 15 SOLUTION RESPIRATORY (INHALATION) at 08:53

## 2024-08-21 RX ADMIN — GUAIFENESIN 400 MG: 400 TABLET ORAL at 21:37

## 2024-08-21 RX ADMIN — Medication 5 MG: at 21:37

## 2024-08-21 RX ADMIN — SALINE NASAL SPRAY 1 SPRAY: 1.5 SOLUTION NASAL at 09:23

## 2024-08-21 RX ADMIN — AMIODARONE HYDROCHLORIDE 200 MG: 200 TABLET ORAL at 16:32

## 2024-08-21 RX ADMIN — GUAIFENESIN 400 MG: 400 TABLET ORAL at 12:49

## 2024-08-21 RX ADMIN — LEVOTHYROXINE SODIUM 50 MCG: 0.05 TABLET ORAL at 05:30

## 2024-08-21 RX ADMIN — MEXILETINE HYDROCHLORIDE 150 MG: 150 CAPSULE ORAL at 13:27

## 2024-08-21 RX ADMIN — APIXABAN 2.5 MG: 2.5 TABLET, FILM COATED ORAL at 21:37

## 2024-08-21 RX ADMIN — SACUBITRIL AND VALSARTAN 0.5 TABLET: 24; 26 TABLET, FILM COATED ORAL at 09:23

## 2024-08-21 RX ADMIN — SODIUM CHLORIDE, PRESERVATIVE FREE 10 ML: 5 INJECTION INTRAVENOUS at 21:38

## 2024-08-21 RX ADMIN — AMIODARONE HYDROCHLORIDE 200 MG: 200 TABLET ORAL at 09:24

## 2024-08-21 RX ADMIN — MEXILETINE HYDROCHLORIDE 150 MG: 150 CAPSULE ORAL at 05:30

## 2024-08-21 RX ADMIN — PANTOPRAZOLE SODIUM 40 MG: 40 TABLET, DELAYED RELEASE ORAL at 05:30

## 2024-08-21 RX ADMIN — ATORVASTATIN CALCIUM 40 MG: 40 TABLET, FILM COATED ORAL at 09:23

## 2024-08-21 RX ADMIN — MIDODRINE HYDROCHLORIDE 15 MG: 10 TABLET ORAL at 09:24

## 2024-08-21 RX ADMIN — MIDODRINE HYDROCHLORIDE 15 MG: 10 TABLET ORAL at 11:48

## 2024-08-21 RX ADMIN — GUAIFENESIN 400 MG: 400 TABLET ORAL at 09:25

## 2024-08-21 RX ADMIN — MEXILETINE HYDROCHLORIDE 150 MG: 150 CAPSULE ORAL at 09:24

## 2024-08-21 RX ADMIN — INSULIN LISPRO 6 UNITS: 100 INJECTION, SOLUTION INTRAVENOUS; SUBCUTANEOUS at 12:51

## 2024-08-21 RX ADMIN — WATER 40 MG: 1 INJECTION INTRAMUSCULAR; INTRAVENOUS; SUBCUTANEOUS at 09:29

## 2024-08-21 RX ADMIN — MIDODRINE HYDROCHLORIDE 15 MG: 10 TABLET ORAL at 16:32

## 2024-08-21 RX ADMIN — MEXILETINE HYDROCHLORIDE 150 MG: 150 CAPSULE ORAL at 21:37

## 2024-08-21 RX ADMIN — DOXYCYCLINE 100 MG: 100 INJECTION, POWDER, LYOPHILIZED, FOR SOLUTION INTRAVENOUS at 16:43

## 2024-08-21 RX ADMIN — DOXYCYCLINE 100 MG: 100 INJECTION, POWDER, LYOPHILIZED, FOR SOLUTION INTRAVENOUS at 05:29

## 2024-08-21 RX ADMIN — INSULIN LISPRO 2 UNITS: 100 INJECTION, SOLUTION INTRAVENOUS; SUBCUTANEOUS at 09:42

## 2024-08-21 RX ADMIN — APIXABAN 2.5 MG: 2.5 TABLET, FILM COATED ORAL at 09:25

## 2024-08-21 RX ADMIN — CEFEPIME 1000 MG: 1 INJECTION, POWDER, FOR SOLUTION INTRAMUSCULAR; INTRAVENOUS at 09:33

## 2024-08-21 RX ADMIN — EPOETIN ALFA-EPBX 2000 UNITS: 2000 INJECTION, SOLUTION INTRAVENOUS; SUBCUTANEOUS at 16:44

## 2024-08-21 RX ADMIN — WATER 40 MG: 1 INJECTION INTRAMUSCULAR; INTRAVENOUS; SUBCUTANEOUS at 21:37

## 2024-08-21 RX ADMIN — SODIUM CHLORIDE, PRESERVATIVE FREE 10 ML: 5 INJECTION INTRAVENOUS at 09:30

## 2024-08-21 ASSESSMENT — PAIN SCALES - GENERAL: PAINLEVEL_OUTOF10: 0

## 2024-08-21 NOTE — PROGRESS NOTES
Pt IV located in L AC. PT continues to bend arm which triggers pump to alarm. RN attempted to place alternate IV twice without success. Pt education reinforce on the importance of maintaining straight arm to allow medication to be administered. Electronically signed by Darvin Brown RN on 8/21/2024 at 6:40 PM

## 2024-08-21 NOTE — PROGRESS NOTES
ORAL NUTRITION SUPPLEMENT; Lunch; Renal Oral Supplement  ADULT DIET; Easy to Chew; Low Sodium (2 gm); Low Potassium (Less than 3000 mg/day)     MEDS (scheduled):    methylPREDNISolone  40 mg IntraVENous Q12H    doxycycline (VIBRAMYCIN) IV  100 mg IntraVENous Q12H    guaiFENesin  400 mg Oral TID    cefepime  1,000 mg IntraVENous Q24H    insulin lispro  0-8 Units SubCUTAneous TID WC    insulin lispro  0-4 Units SubCUTAneous Nightly    midodrine  15 mg Oral TID WC    amiodarone  200 mg Oral BID WC    levothyroxine  50 mcg Oral Daily    sodium chloride flush  5-40 mL IntraVENous 2 times per day    apixaban  2.5 mg Oral BID    aspirin  81 mg Oral Daily    atorvastatin  40 mg Oral Daily    arformoterol 15 mcg-budesonide 0.25 mg neb solution   Nebulization BID RT    [Held by provider] bumetanide  2 mg Oral Daily    epoetin carmelina-epbx  2,000 Units SubCUTAneous Once per day on Monday Wednesday Friday    melatonin  5 mg Oral Nightly    metoprolol succinate  12.5 mg Oral Daily    mexiletine  150 mg Oral 3 times per day    pantoprazole  40 mg Oral QAM AC    polyethylene glycol  17 g Oral Daily    sacubitril-valsartan  0.5 tablet Oral BID       MEDS (infusions):   sodium chloride         MEDS (prn):  albumin human 25%, sodium chloride flush, sodium chloride, polyethylene glycol, acetaminophen **OR** acetaminophen, benzocaine-menthol, benzonatate, bisacodyl, calcium carbonate, ipratropium 0.5 mg-albuterol 2.5 mg, lactulose, magnesium hydroxide, meclizine, Polyvinyl Alcohol-Povidone PF, sennosides-docusate sodium, sodium chloride, prochlorperazine    PHYSICAL EXAM:     Patient Vitals for the past 24 hrs:   BP Temp Temp src Pulse Resp SpO2 Weight   08/21/24 1538 102/63 97.8 °F (36.6 °C) Oral 62 19 95 % --   08/21/24 1327 (!) 102/48 -- -- 71 -- -- --   08/21/24 1147 (!) 97/54 -- -- 65 16 91 % --   08/21/24 0802 112/68 97.9 °F (36.6 °C) Oral 77 13 91 % --   08/21/24 0530 (!) 120/51 97.8 °F (36.6 °C) -- 66 16 98 % --   08/21/24 0000  (!) 116/59 97.8 °F (36.6 °C) Temporal 73 18 99 % --   08/20/24 2248 -- -- -- -- 17 -- --   08/20/24 1956 123/65 97.3 °F (36.3 °C) Temporal 65 20 96 % --   08/20/24 1652 (!) 103/53 96.9 °F (36.1 °C) Infrared 70 23 92 % --   08/20/24 1629 (!) 103/54 96.9 °F (36.1 °C) -- 73 18 -- 53 kg (116 lb 13.5 oz)   @      Intake/Output Summary (Last 24 hours) at 8/21/2024 1610  Last data filed at 8/21/2024 0938  Gross per 24 hour   Intake 420 ml   Output 1047 ml   Net -627 ml             Wt Readings from Last 3 Encounters:   08/20/24 53 kg (116 lb 13.5 oz)   08/05/24 58.7 kg (129 lb 6.6 oz)   07/29/24 57.6 kg (126 lb 15.8 oz)       Constitutional:  in no acute distress  HEENT: NC/AT, EOMI, sclera and conjunctiva are clear and anicteric, mucus membranes moist  Neck: Trachea midline, no JVD  Cardiovascular: S1, S2 regular rhythm, no murmur,or rub  Respiratory:  Fine bibasilar crackles.  No wheeze  Gastrointestinal:  Soft, nontender, nondistended, NABS  Ext: trace dependent edema, feet warm  Skin: dry, no rash  Neuro: awake, alert, interactive      DATA:    Recent Labs     08/18/24 2015 08/20/24  0754 08/21/24  0505   WBC 8.6 6.3 8.5   HGB 7.9* 7.7* 7.6*   HCT 24.6* 25.4* 24.3*   .3* 111.4* 109.0*   * 111*  --      Recent Labs     08/18/24 2015 08/20/24  0456 08/20/24  0754 08/21/24  0505    134  --  140   K 3.5 4.3  --  3.8   CL 97* 99  --  100   CO2 26 22  --  25   MG 1.8  --   --   --    PHOS 1.9*  --  2.7  --    BUN 6 24*  --  26*   CREATININE 1.6* 3.6*  --  2.5*   ALT 21 22  --  24   AST 30 39*  --  36*   BILITOT 0.8 0.4  --  0.3   ALKPHOS 118* 108*  --  111*       Lab Results   Component Value Date    LABPROT 0.3 (H) 04/11/2021    LABPROT 0.3 04/11/2021     Assessment     ESRD  Anemia due to ESRD  Secondary hyperparathyroidism/mineral bone disease due to ESRD  History of hypertension  CHF, HFrEF, LVEF 30%  History of AF with RVR  Pulmonary HTN   Noncompliance/nonadherence to multiple aspects of her care

## 2024-08-21 NOTE — PROGRESS NOTES
[facility]  PULMONARY/CRITICAL CARE PROGRESS NOTE    Patient: Marge Callejas  MRN: [id number]  : 1940    Encounter Date: 2024  Encounter Time: 11:56 AM     Date of Admission: .2024  6:23 PM    Consulting Physician:  Primary Care Physician:     Marek Andres DO     [phone]    Reason for Consultation: Dyspnea    Problem List:  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    Coronary artery disease involving native coronary artery of native heart without angina pectoris    Abnormal EKG    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  Overnight the patient had no shortness of breath, cough, increased work of breathing.    HOME MEDICATIONS:  Prior to Admission medications   Last 3 Encounters:   24 65   24 74   24 81       CURRENT PULSE OXIMETRY: SpO2: 91 %  24HR PULSE OXIMETRY RANGE: SpO2  Av.9 %  Min: 91 %  Max: 99 %  CVP:      ________________________________________________________________________    VENTILATOR SETTINGS (if applicable):         Vent Information  Ventilator ID: [id number]  Additional Respiratory Assessments  Pulse: 65  Respirations: 16  SpO2: 91 %  ETCO2:  Peak Inspiratory Pressure:  End-Inspiratory Plateau Pressure:    ABG:  No results for input(s): \"PH\", \"PO2\", \"PCO2\", \"HCO3\", \"BE\", \"O2SAT\", \"METHB\", \"O2HB\", \"COHB\", \"O2CON\", \"HHB\", \"THB\" in the last 72 hours.  FiO2 : 60 %     ________________________________________________________________________    IV ACCESS:    NUTRITION: ADULT ORAL NUTRITION SUPPLEMENT; Breakfast, Dinner; Wound Healing Oral Supplement  ADULT ORAL NUTRITION SUPPLEMENT; Breakfast, Dinner; Fortified Gelatin Oral Supplement  ADULT ORAL NUTRITION SUPPLEMENT; Lunch; Renal Oral Supplement  ADULT DIET; Easy to Chew; Low Sodium (2 gm); Low Potassium (Less than 3000 mg/day)    INTAKE/OUTPUTS:  I/O last 3 completed shifts:  In: 540 [P.O.:240]  Out: 1047     Intake/Output Summary (Last 24 hours) at 2024 1156  Last data filed at 2024 1629  Gross per 24 hour   Intake 300 ml   Output 1047 ml   Net -747 ml       General Appearance: alert and oriented to person, place and time, well-developed and   well-nourished, in no acute distress   Eyes: pupils equal, round, and reactive to light, extraocular eye movements intact, conjunctivae normal and sclera anicteric   Neck: neck supple and non tender without mass, no thyromegaly, no thyroid nodules and no cervical adenopathy   Pulmonary/Chest: decreased breath sounds at bases, no accessory muscles of inspiration noted  Cardiovascular: normal rate, regular rhythm, normal S1 and S2, no murmurs, rubs, clicks or gallops, distal pulses intact, no carotid bruits, no murmurs, no gallops, no carotid

## 2024-08-21 NOTE — PROGRESS NOTES
SPEECH/LANGUAGE PATHOLOGY  VIDEOFLUOROSCOPIC STUDY OF SWALLOWING (MBS)   and PLAN OF CARE    PATIENT NAME:  Marge Callejas  (female)     MRN:  [id number]    :  1940  (84 y.o.)  STATUS:  Inpatient: Room 4508/4508-A    TODAY'S DATE:  2024  REFERRING PROVIDER:   Dr. Mancera   SPECIFIC PROVIDER ORDER: FL modified barium swallow with video  Date of order:  24   REASON FOR REFERRAL: dysphagia   EVALUATING THERAPIST: Minna Olivier SLP      RESULTS:      DYSPHAGIA DIAGNOSIS:  Clinical indicators of minimal-mild oropharyngeal phase dysphagia     DIET RECOMMENDATIONS:  Easy to chew consistency solids (IDDSI level 7, transitional) with  thin liquids (IDDSI level 0)    FEEDING RECOMMENDATIONS:    Assistance level:  Set-up is required for all oral intake     Compensatory strategies recommended: NO STRAW     Discussed recommendations with:  patient nurse in person    Laryngeal Penetration and Aspiration:  Penetration WITHOUT aspiration was observed in today's study with  thin liquid    SPEECH THERAPY  PLAN OF CARE   The dysphagia POC is established based on physician order and dysphagia diagnosis    Dysphagia therapy is not recommended following today's session due to independent with implementation of strategies/modifications.       Conditions Requiring Skilled Therapeutic Intervention for dysphagia:    Patient is performing below functional baseline d/t  current acute condition, Multiple diagnoses, multiple medications, and increased dependency upon caregivers.    SPECIFIC DYSPHAGIA INTERVENTIONS TO INCLUDE:     Not applicable no therapy warranted     Specific instructions for next treatment:  not applicable   Treatment Goals:    Short Term Goals:  Not applicable no therapy warranted     Long Term Goals:   Not applicable no therapy warranted      Patient/family Goal:    Did not state.  Will further assess during treatment.    Plan of care discussed with Patient                     ADMITTING DIAGNOSIS: Abdominal  SLP in unstructured Q&A session relative to identified deficit areas; indicated understanding of all information provided via satisfactory verbal response.

## 2024-08-21 NOTE — PROGRESS NOTES
[facility]  MD Murtaza White MD Ali Hassan, MD Lisa Kniska, CNP   Lucille Beth, JOSE Fernandez, JUDE  Progress Note    8/20/2024    SUBJECTIVE:   8/12 : Laying in bed, no acute distress. Daughter is at the bedside. For dialysis this afternoon. She denies any dyspnea. She is edematous. Blood pressure is low.     8/13: Seen while on dialysis. Tolerating therapy without issues. BP is stable, but on the lower side. She denies any dyspnea, chest pain, or palpitations. Lower extremities are edematous.     8/16: Seen while on dialysis. Tolerating treatment without issues. She denies any dyspnea, chest pain, or palpitations. For discharge later this afternoon.     8/17 HD today no reported issues   Charts reviewed .     8/18 increased o2 requirement  Clinically in HF .     8/19: Laying in bed, on 10 liters of oxygen via high flow nasal canula. Feels a bit better. Hesitant to go to HD today as she cramped yesterday. BP stable, on the lower side. RRT called last night for hypoxia and she was placed on AirVo for a short time.     8/20: Seen while on dialysis. Tolerating therapy without issues, though UF had to be decreased because her BP dropped during treatment. Fluid removal was limited. She is feeling better, though still has some dyspnea.     PROBLEM LIST:    Principal Problem:    Lower extremity edema  Active Problems:    Acute on chronic combined systolic and diastolic heart failure (HCC)    Moderate protein-calorie malnutrition (HCC)  Resolved Problems:    * No resolved hospital problems. *         DIET:    ADULT DIET; Regular; Low Sodium (2 gm); Low Potassium (Less than 3000 mg/day)  ADULT ORAL NUTRITION SUPPLEMENT; Breakfast, Dinner; Wound Healing Oral Supplement  ADULT ORAL NUTRITION SUPPLEMENT; Breakfast, Dinner; Fortified Gelatin Oral Supplement  ADULT ORAL NUTRITION SUPPLEMENT; Lunch; Renal Oral Supplement     MEDS (scheduled):    methylPREDNISolone  40 mg

## 2024-08-21 NOTE — PROGRESS NOTES
Pt provided with sample cup this AM after speaking with Dr. Mancera to collect sputum culture. Pt educated on use and purpose of sample. Pt acknowledges. Collection container left at bedside.     Pt has provided 4 samples of saliva today without any collection of sputum. Pt again educated on deep breathing and coughing before producing sample. Collection not completed at this time. Education reinforced, pt acknowledged and sample cup at bedside. Electronically signed by Darvin Brown RN on 8/21/2024 at 5:43 PM

## 2024-08-21 NOTE — CARE COORDINATION
CM Update: Discharge plan when pt is medically stable is [facility]. Auth obtained, good through Wed 8/21. If pt does not dc, will need therapy updates and a new precert. [facility]/destination completed. Transport envelope on soft chart. Awaiting MBSS. CM to follow for dc (TF)      BHAVANA RoqueN,RN  Case Management  [phone]

## 2024-08-21 NOTE — PROGRESS NOTES
Progress Note  Date:2024       Room:[address] [place] [address]  Patient Name:Marge Callejas     YOB: 1940     Age:84 y.o.        Subjective    Subjective:  Symptoms:  Improved.  She reports cough.    Diet:  Adequate intake.    Activity level: Returning to normal.    Pain:  She reports no pain.       Review of Systems   Constitutional:  Positive for activity change and fatigue.   HENT: Negative.     Eyes: Negative.    Respiratory:  Positive for cough.    Gastrointestinal: Negative.    Endocrine: Negative.    Genitourinary: Negative.    Musculoskeletal:  Positive for myalgias.   Skin: Negative.    Neurological: Negative.    Psychiatric/Behavioral: Negative.       Objective         Vitals Last 24 Hours:  TEMPERATURE:  Temp  Av.5 °F (36.4 °C)  Min: 96.9 °F (36.1 °C)  Max: 98.1 °F (36.7 °C)  RESPIRATIONS RANGE: Resp  Av.5  Min: 13  Max: 23  PULSE OXIMETRY RANGE: SpO2  Av.4 %  Min: 91 %  Max: 100 %  PULSE RANGE: Pulse  Av.8  Min: 63  Max: 77  BLOOD PRESSURE RANGE: Systolic (24hrs), Av , Min:95 , Max:123   ; Diastolic (24hrs), Av, Min:49, Max:68    I/O (24Hr):    Intake/Output Summary (Last 24 hours) at 2024 0855  Last data filed at 2024 1629  Gross per 24 hour   Intake 540 ml   Output 1047 ml   Net -507 ml       Objective:  General Appearance:  Uncomfortable and well-appearing.    Vital signs: (most recent): Blood pressure 112/68, pulse 77, temperature 97.9 °F (36.6 °C), temperature source Oral, resp. rate 13, height 1.626 m (5' 4\"), weight 53 kg (116 lb 13.5 oz), SpO2 91%.  Vital signs are normal.    Output: Producing urine and producing stool.    HEENT: Normal HEENT exam.    Lungs:  Normal effort and normal respiratory rate.  Breath sounds clear to auscultation.    Heart: Normal rate.  Regular rhythm.  S1 normal and S2 normal.    Abdomen: Abdomen is soft and non-distended.  There are no signs of ascites.  Bowel sounds are normal.   There is no abdominal tenderness.    appropriate     2. 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   Moderately severe mitral regurgitation  Moderately severe aortic regurgitation  Bilateral lower extremity edema resolved, dyspnea on presentation which is still persistent  ECHO 07/31/2024:    Left Ventricle: Severely reduced left ventricular systolic function with a visually estimated EF of 20 - 25%. Pacemaker/ICD lead present in the right ventricle. Severely reduced systolic function.  Continue GDMT  Cardiology signed off.       3. Acute on chronic hypoxic respiratory failure   2/2 aspiration pneumonia vs CAP vs HAP  Secondary to ADHF, ejection fraction EF of 20-25%  Baseline 3 to 4 L nasal cannula  During hospitalisation, was up to 10 HFNC  Oxygenation getting better now, ON 7 L  Patient had an extra session of HD yesterday, still very congested  LXray suggestive of pneumonia, started cefepime, added doxyxycline  Sputum cultures, Pulmonary  following  Chest physiotherapy, iv steroids q12h  Procal 1.99  Ordered swallow study to rule out aspiration    4. Uptrending cr - End-stage renal disease  On hemodialysis  Nephrology on board, held bumex, on entersto  Continue HD for solute and volume management --TTS schedule      5.  Macrocytic anemia  B12 folate reassuring  ARLIN: Retacrit while here   Repeat CBC, monitor hemoglobin     6.  Episodes of hypotension  Secondary to dialysis, electrolyte imbalance, Nephrology on board  Monitor blood pressure  Increased midodrine to 15 mg TID      7.  Vtach, Paroxysmal A-fib  Prolonged QTc  ICD in situ   She has a paced rhythm, continue Eliquis, was on PO amiodarone  Telemetry monitoring, went into sustained Vtach yesterday while dialysis   Started on amiodarone infusion for Sustained Vtach   Drip d/c'ed, amiodarone increased 200 mg BID  Restarted mexiletine 150 TID.    8.  Ambulatory

## 2024-08-21 NOTE — PLAN OF CARE
Problem: Skin/Tissue Integrity  Goal: Absence of new skin breakdown  Description: 1.  Monitor for areas of redness and/or skin breakdown  2.  Assess vascular access sites hourly  3.  Every 4-6 hours minimum:  Change oxygen saturation probe site  4.  Every 4-6 hours:  If on nasal continuous positive airway pressure, respiratory therapy assess nares and determine need for appliance change or resting period.  Outcome: Progressing     Problem: Safety - Adult  Goal: Free from fall injury  Outcome: Progressing     Problem: Chronic Conditions and Co-morbidities  Goal: Patient's chronic conditions and co-morbidity symptoms are monitored and maintained or improved  Outcome: Progressing     Problem: Risk for Elopement  Goal: Patient will not exit the unit/facility without proper excort  Outcome: Progressing     Problem: Respiratory - Adult  Goal: Achieves optimal ventilation and oxygenation  Outcome: Progressing     Problem: Musculoskeletal - Adult  Goal: Return mobility to safest level of function  Outcome: Progressing     Problem: Metabolic/Fluid and Electrolytes - Adult  Goal: Hemodynamic stability and optimal renal function maintained  Outcome: Progressing     Problem: Discharge Planning  Goal: Discharge to home or other facility with appropriate resources  Outcome: Progressing     Problem: Nutrition Deficit:  Goal: Optimize nutritional status  Outcome: Progressing     Problem: Pain  Goal: Verbalizes/displays adequate comfort level or baseline comfort level  Outcome: Progressing

## 2024-08-21 NOTE — PLAN OF CARE
Problem: Safety - Adult  Goal: Free from fall injury  8/20/2024 2044 by Vicky Burgess RN  Outcome: Progressing  8/20/2024 1215 by Dasha Velez RN  Outcome: Progressing     Problem: Chronic Conditions and Co-morbidities  Goal: Patient's chronic conditions and co-morbidity symptoms are monitored and maintained or improved  8/20/2024 2044 by Vicky Burgess RN  Outcome: Progressing  8/20/2024 1215 by Dasha Velez RN  Outcome: Progressing  Flowsheets (Taken 8/20/2024 0807)  Care Plan - Patient's Chronic Conditions and Co-Morbidity Symptoms are Monitored and Maintained or Improved: Monitor and assess patient's chronic conditions and comorbid symptoms for stability, deterioration, or improvement

## 2024-08-22 ENCOUNTER — APPOINTMENT (OUTPATIENT)
Dept: GENERAL RADIOLOGY | Age: 84
DRG: 291 | End: 2024-08-22
Payer: MEDICARE

## 2024-08-22 LAB
ALBUMIN SERPL-MCNC: 3.2 G/DL (ref 3.5–5.2)
ALP SERPL-CCNC: 117 U/L (ref 35–104)
ALT SERPL-CCNC: 30 U/L (ref 0–32)
ANION GAP SERPL CALCULATED.3IONS-SCNC: 13 MMOL/L (ref 7–16)
ANION GAP SERPL CALCULATED.3IONS-SCNC: 15 MMOL/L (ref 7–16)
AST SERPL-CCNC: 45 U/L (ref 0–31)
B.E.: 0.6 MMOL/L (ref -3–3)
BASOPHILS # BLD: 0 K/UL (ref 0–0.2)
BASOPHILS # BLD: 0 K/UL (ref 0–0.2)
BASOPHILS NFR BLD: 0 % (ref 0–2)
BASOPHILS NFR BLD: 0 % (ref 0–2)
BILIRUB SERPL-MCNC: 0.3 MG/DL (ref 0–1.2)
BUN SERPL-MCNC: 21 MG/DL (ref 6–23)
BUN SERPL-MCNC: 55 MG/DL (ref 6–23)
CALCIUM SERPL-MCNC: 9 MG/DL (ref 8.6–10.2)
CALCIUM SERPL-MCNC: 9.2 MG/DL (ref 8.6–10.2)
CHLORIDE SERPL-SCNC: 97 MMOL/L (ref 98–107)
CHLORIDE SERPL-SCNC: 98 MMOL/L (ref 98–107)
CO2 SERPL-SCNC: 24 MMOL/L (ref 22–29)
CO2 SERPL-SCNC: 24 MMOL/L (ref 22–29)
COHB: 1.3 % (ref 0–1.5)
CREAT SERPL-MCNC: 1.8 MG/DL (ref 0.5–1)
CREAT SERPL-MCNC: 3.7 MG/DL (ref 0.5–1)
CRITICAL: ABNORMAL
DATE ANALYZED: ABNORMAL
DATE OF COLLECTION: ABNORMAL
EOSINOPHIL # BLD: 0 K/UL (ref 0.05–0.5)
EOSINOPHIL # BLD: 0 K/UL (ref 0.05–0.5)
EOSINOPHILS RELATIVE PERCENT: 0 % (ref 0–6)
EOSINOPHILS RELATIVE PERCENT: 0 % (ref 0–6)
ERYTHROCYTE [DISTWIDTH] IN BLOOD BY AUTOMATED COUNT: 18.2 % (ref 11.5–15)
ERYTHROCYTE [DISTWIDTH] IN BLOOD BY AUTOMATED COUNT: 18.5 % (ref 11.5–15)
GFR, ESTIMATED: 12 ML/MIN/1.73M2
GFR, ESTIMATED: 28 ML/MIN/1.73M2
GLUCOSE BLD-MCNC: 102 MG/DL (ref 74–99)
GLUCOSE BLD-MCNC: 169 MG/DL (ref 74–99)
GLUCOSE BLD-MCNC: 232 MG/DL (ref 74–99)
GLUCOSE BLD-MCNC: 327 MG/DL (ref 74–99)
GLUCOSE SERPL-MCNC: 159 MG/DL (ref 74–99)
GLUCOSE SERPL-MCNC: 220 MG/DL (ref 74–99)
HCO3: 25.4 MMOL/L (ref 22–26)
HCT VFR BLD AUTO: 22.9 % (ref 34–48)
HCT VFR BLD AUTO: 24.8 % (ref 34–48)
HGB BLD-MCNC: 7 G/DL (ref 11.5–15.5)
HGB BLD-MCNC: 7.6 G/DL (ref 11.5–15.5)
HHB: 2.5 % (ref 0–5)
IMM GRANULOCYTES # BLD AUTO: 0.06 K/UL (ref 0–0.58)
IMM GRANULOCYTES # BLD AUTO: 0.09 K/UL (ref 0–0.58)
IMM GRANULOCYTES NFR BLD: 1 % (ref 0–5)
IMM GRANULOCYTES NFR BLD: 1 % (ref 0–5)
LAB: ABNORMAL
LYMPHOCYTES NFR BLD: 0.72 K/UL (ref 1.5–4)
LYMPHOCYTES NFR BLD: 0.88 K/UL (ref 1.5–4)
LYMPHOCYTES RELATIVE PERCENT: 9 % (ref 20–42)
LYMPHOCYTES RELATIVE PERCENT: 9 % (ref 20–42)
Lab: 1618
MAGNESIUM SERPL-MCNC: 1.8 MG/DL (ref 1.6–2.6)
MCH RBC QN AUTO: 32.9 PG (ref 26–35)
MCH RBC QN AUTO: 33 PG (ref 26–35)
MCHC RBC AUTO-ENTMCNC: 30.6 G/DL (ref 32–34.5)
MCHC RBC AUTO-ENTMCNC: 30.6 G/DL (ref 32–34.5)
MCV RBC AUTO: 107.4 FL (ref 80–99.9)
MCV RBC AUTO: 108 FL (ref 80–99.9)
METHB: 0.4 % (ref 0–1.5)
MODE: ABNORMAL
MONOCYTES NFR BLD: 0.26 K/UL (ref 0.1–0.95)
MONOCYTES NFR BLD: 0.29 K/UL (ref 0.1–0.95)
MONOCYTES NFR BLD: 3 % (ref 2–12)
MONOCYTES NFR BLD: 4 % (ref 2–12)
NEUTROPHILS NFR BLD: 87 % (ref 43–80)
NEUTROPHILS NFR BLD: 87 % (ref 43–80)
NEUTS SEG NFR BLD: 7.07 K/UL (ref 1.8–7.3)
NEUTS SEG NFR BLD: 8.17 K/UL (ref 1.8–7.3)
O2 SATURATION: 97.5 % (ref 92–98.5)
O2HB: 95.8 % (ref 94–97)
OPERATOR ID: ABNORMAL
PATIENT TEMP: 37 C
PCO2: 41.5 MMHG (ref 35–45)
PH BLOOD GAS: 7.41 (ref 7.35–7.45)
PHOSPHATE SERPL-MCNC: 2.9 MG/DL (ref 2.5–4.5)
PLATELET # BLD AUTO: 132 K/UL (ref 130–450)
PLATELET # BLD AUTO: 134 K/UL (ref 130–450)
PMV BLD AUTO: 13 FL (ref 7–12)
PMV BLD AUTO: 13 FL (ref 7–12)
PO2: 101 MMHG (ref 75–100)
POTASSIUM SERPL-SCNC: 3.6 MMOL/L (ref 3.5–5)
POTASSIUM SERPL-SCNC: 3.9 MMOL/L (ref 3.5–5)
PROT SERPL-MCNC: 5.8 G/DL (ref 6.4–8.3)
RBC # BLD AUTO: 2.12 M/UL (ref 3.5–5.5)
RBC # BLD AUTO: 2.31 M/UL (ref 3.5–5.5)
SODIUM SERPL-SCNC: 135 MMOL/L (ref 132–146)
SODIUM SERPL-SCNC: 136 MMOL/L (ref 132–146)
SOURCE, BLOOD GAS: ABNORMAL
THB: 7.8 G/DL (ref 11.5–16.5)
TIME ANALYZED: 1625
WBC OTHER # BLD: 8.1 K/UL (ref 4.5–11.5)
WBC OTHER # BLD: 9.4 K/UL (ref 4.5–11.5)

## 2024-08-22 PROCEDURE — 36415 COLL VENOUS BLD VENIPUNCTURE: CPT

## 2024-08-22 PROCEDURE — 80048 BASIC METABOLIC PNL TOTAL CA: CPT

## 2024-08-22 PROCEDURE — 2500000003 HC RX 250 WO HCPCS

## 2024-08-22 PROCEDURE — 6370000000 HC RX 637 (ALT 250 FOR IP): Performed by: FAMILY MEDICINE

## 2024-08-22 PROCEDURE — 6370000000 HC RX 637 (ALT 250 FOR IP): Performed by: STUDENT IN AN ORGANIZED HEALTH CARE EDUCATION/TRAINING PROGRAM

## 2024-08-22 PROCEDURE — 2060000000 HC ICU INTERMEDIATE R&B

## 2024-08-22 PROCEDURE — 6360000002 HC RX W HCPCS: Performed by: NURSE PRACTITIONER

## 2024-08-22 PROCEDURE — 6360000002 HC RX W HCPCS: Performed by: INTERNAL MEDICINE

## 2024-08-22 PROCEDURE — 2580000003 HC RX 258

## 2024-08-22 PROCEDURE — 82805 BLOOD GASES W/O2 SATURATION: CPT

## 2024-08-22 PROCEDURE — 94640 AIRWAY INHALATION TREATMENT: CPT

## 2024-08-22 PROCEDURE — 6360000002 HC RX W HCPCS

## 2024-08-22 PROCEDURE — 6370000000 HC RX 637 (ALT 250 FOR IP)

## 2024-08-22 PROCEDURE — 97530 THERAPEUTIC ACTIVITIES: CPT

## 2024-08-22 PROCEDURE — P9047 ALBUMIN (HUMAN), 25%, 50ML: HCPCS | Performed by: INTERNAL MEDICINE

## 2024-08-22 PROCEDURE — 97535 SELF CARE MNGMENT TRAINING: CPT

## 2024-08-22 PROCEDURE — 82962 GLUCOSE BLOOD TEST: CPT

## 2024-08-22 PROCEDURE — 84100 ASSAY OF PHOSPHORUS: CPT

## 2024-08-22 PROCEDURE — 2580000003 HC RX 258: Performed by: FAMILY MEDICINE

## 2024-08-22 PROCEDURE — 71045 X-RAY EXAM CHEST 1 VIEW: CPT

## 2024-08-22 PROCEDURE — 6360000002 HC RX W HCPCS: Performed by: FAMILY MEDICINE

## 2024-08-22 PROCEDURE — 94660 CPAP INITIATION&MGMT: CPT

## 2024-08-22 PROCEDURE — 85025 COMPLETE CBC W/AUTO DIFF WBC: CPT

## 2024-08-22 PROCEDURE — 90935 HEMODIALYSIS ONE EVALUATION: CPT

## 2024-08-22 PROCEDURE — 99232 SBSQ HOSP IP/OBS MODERATE 35: CPT | Performed by: INTERNAL MEDICINE

## 2024-08-22 PROCEDURE — 83735 ASSAY OF MAGNESIUM: CPT

## 2024-08-22 PROCEDURE — 6370000000 HC RX 637 (ALT 250 FOR IP): Performed by: NURSE PRACTITIONER

## 2024-08-22 PROCEDURE — 80053 COMPREHEN METABOLIC PANEL: CPT

## 2024-08-22 PROCEDURE — 99232 SBSQ HOSP IP/OBS MODERATE 35: CPT

## 2024-08-22 PROCEDURE — 2700000000 HC OXYGEN THERAPY PER DAY

## 2024-08-22 RX ORDER — IPRATROPIUM BROMIDE AND ALBUTEROL SULFATE 2.5; .5 MG/3ML; MG/3ML
1 SOLUTION RESPIRATORY (INHALATION) ONCE
Status: COMPLETED | OUTPATIENT
Start: 2024-08-22 | End: 2024-08-22

## 2024-08-22 RX ORDER — MAGNESIUM SULFATE 1 G/100ML
1000 INJECTION INTRAVENOUS ONCE
Status: COMPLETED | OUTPATIENT
Start: 2024-08-22 | End: 2024-08-22

## 2024-08-22 RX ORDER — ALBUMIN (HUMAN) 12.5 G/50ML
25 SOLUTION INTRAVENOUS ONCE
Status: COMPLETED | OUTPATIENT
Start: 2024-08-22 | End: 2024-08-22

## 2024-08-22 RX ADMIN — SODIUM CHLORIDE, PRESERVATIVE FREE 10 ML: 5 INJECTION INTRAVENOUS at 08:00

## 2024-08-22 RX ADMIN — IPRATROPIUM BROMIDE AND ALBUTEROL SULFATE 1 DOSE: 2.5; .5 SOLUTION RESPIRATORY (INHALATION) at 20:35

## 2024-08-22 RX ADMIN — IPRATROPIUM BROMIDE AND ALBUTEROL SULFATE 1 DOSE: .5; 2.5 SOLUTION RESPIRATORY (INHALATION) at 16:12

## 2024-08-22 RX ADMIN — ALBUMIN (HUMAN) 25 G: 0.25 INJECTION, SOLUTION INTRAVENOUS at 11:16

## 2024-08-22 RX ADMIN — PANTOPRAZOLE SODIUM 40 MG: 40 TABLET, DELAYED RELEASE ORAL at 05:59

## 2024-08-22 RX ADMIN — INSULIN LISPRO 4 UNITS: 100 INJECTION, SOLUTION INTRAVENOUS; SUBCUTANEOUS at 21:10

## 2024-08-22 RX ADMIN — GUAIFENESIN 400 MG: 400 TABLET ORAL at 21:08

## 2024-08-22 RX ADMIN — AMIODARONE HYDROCHLORIDE 200 MG: 200 TABLET ORAL at 08:01

## 2024-08-22 RX ADMIN — SODIUM CHLORIDE, PRESERVATIVE FREE 10 ML: 5 INJECTION INTRAVENOUS at 21:09

## 2024-08-22 RX ADMIN — WATER 40 MG: 1 INJECTION INTRAMUSCULAR; INTRAVENOUS; SUBCUTANEOUS at 21:08

## 2024-08-22 RX ADMIN — GUAIFENESIN 400 MG: 400 TABLET ORAL at 08:01

## 2024-08-22 RX ADMIN — MEXILETINE HYDROCHLORIDE 150 MG: 150 CAPSULE ORAL at 05:59

## 2024-08-22 RX ADMIN — METOPROLOL SUCCINATE 12.5 MG: 25 TABLET, EXTENDED RELEASE ORAL at 08:01

## 2024-08-22 RX ADMIN — SALINE NASAL SPRAY 1 SPRAY: 1.5 SOLUTION NASAL at 21:09

## 2024-08-22 RX ADMIN — ALBUMIN (HUMAN) 25 G: 0.25 INJECTION, SOLUTION INTRAVENOUS at 12:38

## 2024-08-22 RX ADMIN — Medication 250 MG: at 12:42

## 2024-08-22 RX ADMIN — ATORVASTATIN CALCIUM 40 MG: 40 TABLET, FILM COATED ORAL at 08:02

## 2024-08-22 RX ADMIN — MIDODRINE HYDROCHLORIDE 15 MG: 10 TABLET ORAL at 08:01

## 2024-08-22 RX ADMIN — INSULIN LISPRO 2 UNITS: 100 INJECTION, SOLUTION INTRAVENOUS; SUBCUTANEOUS at 18:26

## 2024-08-22 RX ADMIN — MAGNESIUM SULFATE HEPTAHYDRATE 1000 MG: 1 INJECTION, SOLUTION INTRAVENOUS at 16:52

## 2024-08-22 RX ADMIN — WATER 40 MG: 1 INJECTION INTRAMUSCULAR; INTRAVENOUS; SUBCUTANEOUS at 08:02

## 2024-08-22 RX ADMIN — MEXILETINE HYDROCHLORIDE 150 MG: 150 CAPSULE ORAL at 21:09

## 2024-08-22 RX ADMIN — APIXABAN 2.5 MG: 2.5 TABLET, FILM COATED ORAL at 21:09

## 2024-08-22 RX ADMIN — SALINE NASAL SPRAY 1 SPRAY: 1.5 SOLUTION NASAL at 08:00

## 2024-08-22 RX ADMIN — Medication 5 MG: at 21:08

## 2024-08-22 RX ADMIN — DOXYCYCLINE 100 MG: 100 INJECTION, POWDER, LYOPHILIZED, FOR SOLUTION INTRAVENOUS at 04:40

## 2024-08-22 RX ADMIN — ARFORMOTEROL TARTRATE: 15 SOLUTION RESPIRATORY (INHALATION) at 20:35

## 2024-08-22 RX ADMIN — DOXYCYCLINE 100 MG: 100 INJECTION, POWDER, LYOPHILIZED, FOR SOLUTION INTRAVENOUS at 16:53

## 2024-08-22 RX ADMIN — CEFEPIME 1000 MG: 1 INJECTION, POWDER, FOR SOLUTION INTRAMUSCULAR; INTRAVENOUS at 12:19

## 2024-08-22 RX ADMIN — ASPIRIN 81 MG: 81 TABLET, COATED ORAL at 08:01

## 2024-08-22 RX ADMIN — APIXABAN 2.5 MG: 2.5 TABLET, FILM COATED ORAL at 08:02

## 2024-08-22 RX ADMIN — LEVOTHYROXINE SODIUM 50 MCG: 0.05 TABLET ORAL at 05:59

## 2024-08-22 RX ADMIN — MIDODRINE HYDROCHLORIDE 15 MG: 10 TABLET ORAL at 12:12

## 2024-08-22 RX ADMIN — SACUBITRIL AND VALSARTAN 0.5 TABLET: 24; 26 TABLET, FILM COATED ORAL at 21:09

## 2024-08-22 RX ADMIN — GUAIFENESIN 400 MG: 400 TABLET ORAL at 12:12

## 2024-08-22 RX ADMIN — SACUBITRIL AND VALSARTAN 0.5 TABLET: 24; 26 TABLET, FILM COATED ORAL at 08:01

## 2024-08-22 RX ADMIN — MEXILETINE HYDROCHLORIDE 150 MG: 150 CAPSULE ORAL at 12:39

## 2024-08-22 NOTE — PROGRESS NOTES
OCCUPATIONAL THERAPY BEDSIDE TREATMENT NOTE   [facility]  [address], [place]       Date:2024  Patient Name: Marge Callejas  MRN: [id number]  : 1940  Room: [address] [place] [address]     Per OT Eval:    Evaluating OT: Dana Wilkinson, DENAE, OTR/L  # [id number]     Referring Provider:  Gama Parham MD   Specific Provider Orders:  \"OT Eval and Treat\"  8-10-24     Diagnosis: Abdominal pain, left upper quadrant [R10.12]  Acute on chronic combined systolic and diastolic heart failure (HCC) [I50.43]  Lower extremity edema [R60.0]  ESRD on dialysis (HCC) [N18.6, Z99.2]  Bilateral lower extremity edema [R60.0]  Heel pain, bilateral [M79.671, M79.672]     Pt was admitted w/ LE swelling, Left Upper Abdominal Pain, Frankie Heel Pain  24:  Pt experienced ~ 8 beats of VTach and Respiratory Distress, worsened during Dialysis.  Transferred to [facility] for continued care.       Pertinent Medical History:  Pt has a past medical history of Arthritis, Atrial fibrillation (HCC), Blood circulation, collateral, CHF (congestive heart failure) (HCC), Chronic renal insufficiency, stage IV (severe) (HCC), Coronary artery disease involving native coronary artery of native heart without angina pectoris, History of cardiovascular stress test, History of echocardiogram, Hyperlipidemia, Hypertension, and Mixed restrictive and obstructive lung disease (HCC).,  has a past surgical history that includes Ectopic pregnancy surgery; Upper gastrointestinal endoscopy (N/A, 4/3/2021); Colonoscopy (N/A, 4/3/2021); Colonoscopy (4/3/2021); Colonoscopy (4/3/2021); Colonoscopy (4/3/2021); Upper gastrointestinal endoscopy (N/A, 2021); and Colonoscopy (N/A, 2023).     Surgeries this admission: None      Precautions:  Fall Risk  Cognition - Alarms, HD TRS  6L O2 HF NC, continuous pulse-ox  Easy To Chew Solids, Thin Liquids, No Straw     Assessment of current deficits   [x] Functional  to participate in minimal ax for therapy.  Family present at b/s.  Skillful monitoring of vitals during session.  RN called to b/s toward end of session.  Pt properly positioned in high dumont position to facilitate pt's respiratory status, improve function and safety w/ bed-level ax.    At end of session pt was left in a good position w/ /RN and family at b/s.  Vitals and pt's response to tx reported to RN.  All lines and tubes intact, call light within reach.     Pt has made Fair(-) progress towards set goals.   Continue with current plan of care    Treatment Time In: 1540  Treatment Time Out: 1555         Treatment Charges: Mins Units   Ther Ex  [id number]     Manual Therapy 13017     Thera Activities 30195 15 1   ADL/Home Mgt 06410     Neuro Re-ed 32018     Group Therapy      Orthotic manage/training  09957     Non-Billable Time     Total Timed Treatment 15 1     Dana Wilkinson, MOT, OTR/L  # [id number]

## 2024-08-22 NOTE — PROGRESS NOTES
[facility]  PULMONARY/CRITICAL CARE PROGRESS NOTE    Patient: Marge Callejas  MRN: [id number]  : 1940    Encounter Date: 2024  Encounter Time: 3:16 PM     Date of Admission: .2024  6:23 PM    Consulting Physician:  Primary Care Physician:     Marek Andres DO     [phone]    Reason for Consultation: Dyspnea    Problem List:  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    Coronary artery disease involving native coronary artery of native heart without angina pectoris    Abnormal EKG    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  Overnight the patient had no shortness of breath, cough, increased work of breathing.    HOME MEDICATIONS:  Prior to Admission medications

## 2024-08-22 NOTE — PROGRESS NOTES
Physical Therapy  Physical Therapy Treatment     Name: Marge Callejas  : 1940  MRN: [id number]      Date of Service: 2024    Evaluating PT:  Estiven Elise PT, DPT    Room #:  4508/4508-A  Diagnosis:  Abdominal pain, left upper quadrant [R10.12]  Acute on chronic combined systolic and diastolic heart failure (HCC) [I50.43]  Lower extremity edema [R60.0]  ESRD on dialysis (HCC) [N18.6, Z99.2]  Bilateral lower extremity edema [R60.0]  Heel pain, bilateral [M79.671, M79.672]  PMHx/PSHx:  afib, arthritis, CHF, HLD, HTN  Procedure/Surgery:  N/A  Precautions:  fall risk, TSM, bed alarm, O2, [place]  Equipment Needs:  TBD    SUBJECTIVE:    Pt admitted from [facility]. Pt reports she is non-ambulatory PTA.    OBJECTIVE:   Initial Evaluation  Date: 24 Treatment Date: 24 Short Term/ Long Term   Goals   AM-PAC 6 Clicks 10/24 8/24    Was pt agreeable to Eval/treatment? yes Yes with encouragement    Does pt have pain? Unrated RLE RLE and L side pain     Bed Mobility  Rolling: min A  Supine to sit: min A  Sit to supine: min A  Scooting: min A Rolling: Mod A  Supine to sit: NT  Sit to supine: NT  Scooting: NT Rolling: mod I  Supine to sit: mod I  Sit to supine: mod I  Scooting: mod I   Transfers Sit to stand: NT, pt declined  Stand to sit: NT  Stand pivot: NT Sit to stand: NT  Stand to sit: NT  Stand pivot: NT Sit to stand: mod I  Stand to sit: mod I  Stand pivot: mod I with AAD   Ambulation    NT NT Make ambulation goal as appropriate   Stair negotiation: ascended and descended  NT NT NT     Strength/ROM:   BLE grossly 4/5  BLE AROM WFL    Balance:   Static Sitting: SBA  Dynamic Sitting: SBA  Static Standing: Michael WW  Dynamic Standing: ModA WW    Pt is A & O x 3  Sensation:  Pt denies numbness and tingling to extremities  Edema:  unremarkable    Vitals:  SPO2 4L/min: 72/82%  HR: 90's bpm     Patient education  Pt educated on role of PT, importance of functional mobility during hospital stay    Patient

## 2024-08-22 NOTE — PLAN OF CARE
Problem: Safety - Adult  Goal: Free from fall injury  8/21/2024 2153 by Vicky Burgess, RN  Outcome: Progressing  8/21/2024 1116 by Yuki Doan, RN  Outcome: Progressing     Problem: Chronic Conditions and Co-morbidities  Goal: Patient's chronic conditions and co-morbidity symptoms are monitored and maintained or improved  8/21/2024 2153 by Vicky Burgess RN  Outcome: Progressing  8/21/2024 1116 by Yuki Doan, RN  Outcome: Progressing     Problem: Risk for Elopement  Goal: Patient will not exit the unit/facility without proper excort  8/21/2024 2153 by Vicky Burgess, RN  Outcome: Progressing  8/21/2024 1116 by Yuki Doan, RN  Outcome: Progressing

## 2024-08-22 NOTE — FLOWSHEET NOTE
08/22/24 1207   Vital Signs   BP (!) 92/37   Temp 97.6 °F (36.4 °C)   Pulse 62   Respirations 17   Weight - Scale 52 kg (114 lb 10.2 oz)   Percent Weight Change -1.89   Post-Hemodialysis Assessment   Post-Treatment Procedures Blood returned;Catheter capped, clamped and heparinized x 2 ports   Machine Disinfection Process Acid/Vinegar Clean;Heat Disinfect   Rinseback Volume (ml) 300 ml   Blood Volume Processed (Liters) 51.6 L   Dialyzer Clearance Lightly streaked   Duration of Treatment (minutes) 180 minutes   Heparin Amount Administered During Treatment (mL) 0 mL   Hemodialysis Intake (ml) 300 ml   Hemodialysis Output (ml) 1300 ml   NET Removed (ml) 1000   Patient Response to Treatment tolerated 1 liter of fluid removal with one dose of albumin given.  Patient confused at times.  Over all tolerated fairly well.   Bilateral Breath Sounds Diminished   Patient Disposition Return to room   Observations & Evaluations   Level of Consciousness 0   Heart Rhythm Regular   Respiratory Quality/Effort Unlabored

## 2024-08-22 NOTE — PROGRESS NOTES
[facility]  MD Murtaza White, MD Krista Mendoza, CNP   Lucille Beth, JOSE Fernandez, JUDE  Progress Note    8/22/2024    SUBJECTIVE:   8/12 : Laying in bed, no acute distress. Daughter is at the bedside. For dialysis this afternoon. She denies any dyspnea. She is edematous. Blood pressure is low.     8/13: Seen while on dialysis. Tolerating therapy without issues. BP is stable, but on the lower side. She denies any dyspnea, chest pain, or palpitations. Lower extremities are edematous.     8/16: Seen while on dialysis. Tolerating treatment without issues. She denies any dyspnea, chest pain, or palpitations. For discharge later this afternoon.     8/17 HD today no reported issues   Charts reviewed .     8/18 increased o2 requirement  Clinically in HF .     8/19: Laying in bed, on 10 liters of oxygen via high flow nasal canula. Feels a bit better. Hesitant to go to HD today as she cramped yesterday. BP stable, on the lower side. RRT called last night for hypoxia and she was placed on AirVo for a short time.     8/20: Seen while on dialysis. Tolerating therapy without issues, though UF had to be decreased because her BP dropped during treatment. Fluid removal was limited. She is feeling better, though still has some dyspnea.     8/21:  Sitting up in bed.  NAD  Oxygen on per NC.  She complained of nausea on dialysis yesterday.  Denies chest pain, palpitations or SOB.  Denies nausea today.  Family at bedside.     8/22:  Laying in bed.  Agitated today because she wants to go home.  Denies chest pain, palpitations or SOB.  Denies nausea.    PROBLEM LIST:    Principal Problem:    Lower extremity edema  Active Problems:    Acute on chronic combined systolic and diastolic heart failure (HCC)    Moderate protein-calorie malnutrition (HCC)  Resolved Problems:    * No resolved hospital problems. *         DIET:    ADULT ORAL NUTRITION SUPPLEMENT; Breakfast, Dinner;

## 2024-08-22 NOTE — CARE COORDINATION
CM Update: Auth  for [facility]. Requested therapy updates after HD today. Spoke to Leticia, she will be in the hospital later this afternoon to see pt and will re-initiate precert. Pt currently on 6 liters O2. IV Antbx continue. [facility]/destination completed. Transport envelope on soft chart. CM will continue to follow(TF)      BHAVANA RoqueN,RN  Case Management  [phone]

## 2024-08-22 NOTE — PLAN OF CARE
Problem: Skin/Tissue Integrity  Goal: Absence of new skin breakdown  Description: 1.  Monitor for areas of redness and/or skin breakdown  2.  Assess vascular access sites hourly  3.  Every 4-6 hours minimum:  Change oxygen saturation probe site  4.  Every 4-6 hours:  If on nasal continuous positive airway pressure, respiratory therapy assess nares and determine need for appliance change or resting period.  8/22/2024 0935 by Yuki Doan RN  Outcome: Progressing  8/21/2024 2153 by Vicky Burgess RN  Outcome: Progressing     Problem: Safety - Adult  Goal: Free from fall injury  8/22/2024 0935 by Yuki Doan RN  Outcome: Progressing  8/21/2024 2153 by Vicky Burgess RN  Outcome: Progressing     Problem: Chronic Conditions and Co-morbidities  Goal: Patient's chronic conditions and co-morbidity symptoms are monitored and maintained or improved  8/22/2024 0935 by Yuki Doan RN  Outcome: Progressing  8/21/2024 2153 by Vicky Burgess RN  Outcome: Progressing     Problem: Risk for Elopement  Goal: Patient will not exit the unit/facility without proper excort  8/22/2024 0935 by Yuki Doan RN  Outcome: Progressing  8/21/2024 2153 by Vicky Burgess RN  Outcome: Progressing     Problem: Respiratory - Adult  Goal: Achieves optimal ventilation and oxygenation  8/22/2024 0935 by Yuki Doan RN  Outcome: Progressing  8/21/2024 2153 by Vicky Burgess RN  Outcome: Progressing     Problem: Musculoskeletal - Adult  Goal: Return mobility to safest level of function  8/22/2024 0935 by Yuki Doan RN  Outcome: Progressing  8/21/2024 2153 by Vicky Burgess RN  Outcome: Progressing     Problem: Metabolic/Fluid and Electrolytes - Adult  Goal: Hemodynamic stability and optimal renal function maintained  8/22/2024 0935 by Yuki Doan RN  Outcome: Progressing  8/21/2024 2153 by Vicky Burgess RN  Outcome: Progressing     Problem: Discharge

## 2024-08-22 NOTE — PROGRESS NOTES
Pt O2 86% on 8L HFNC, HR 58, /65. Dr. Mancera notified via [employer].     Pt did RRT after last dialysis appointment and was on AirVo.     ABG obtained and sent, Bipap applied and respiratory treatment given, per Dr. Mancera. STAT CXR ordered as well. Electronically signed by Darvin Brown RN on 8/22/2024 at 4:24 PM      1800: Pt removed Continuous bipap. Pt placed on 6L NC. O2 93%

## 2024-08-22 NOTE — PROGRESS NOTES
Pt is very agitated, angry, and verbally abusive. This RN has tried multiple times to verbally redirect and reorient patient but patient is threatening on \"getting up from off this bed and leaving.\" I am concerned for this pt's safety as she is a fall risk. This RN messaged Dr. Mancera via [employer] to see if patient could have something to calm her down. Pt's bed alarm on. Plan of care ongoing.     Electronically signed by Yuki Doan RN on 8/22/2024 at 1:48 PM

## 2024-08-22 NOTE — PROGRESS NOTES
Progress Note  Date:2024       Room:[address] [place] [address]  Patient Name:Marge Callejas     YOB: 1940     Age:84 y.o.        Subjective    Subjective:  Symptoms:  Improved.  She reports cough.    Diet:  Adequate intake.    Activity level: Returning to normal.    Pain:  She reports no pain.       Review of Systems   Constitutional:  Positive for activity change and fatigue.   HENT: Negative.     Eyes: Negative.    Respiratory:  Positive for cough.    Gastrointestinal: Negative.    Endocrine: Negative.    Genitourinary: Negative.    Musculoskeletal:  Positive for myalgias.   Skin: Negative.    Neurological: Negative.    Psychiatric/Behavioral: Negative.       Objective         Vitals Last 24 Hours:  TEMPERATURE:  Temp  Av.5 °F (36.4 °C)  Min: 97 °F (36.1 °C)  Max: 98.2 °F (36.8 °C)  RESPIRATIONS RANGE: Resp  Av.7  Min: 17  Max: 23  PULSE OXIMETRY RANGE: SpO2  Av.4 %  Min: 93 %  Max: 98 %  PULSE RANGE: Pulse  Av.9  Min: 54  Max: 78  BLOOD PRESSURE RANGE: Systolic (24hrs), Av , Min:92 , Max:133   ; Diastolic (24hrs), Av, Min:37, Max:73    I/O (24Hr):    Intake/Output Summary (Last 24 hours) at 2024 1254  Last data filed at 2024 1207  Gross per 24 hour   Intake 300 ml   Output 1300 ml   Net -1000 ml       Objective:  General Appearance:  Uncomfortable and well-appearing.    Vital signs: (most recent): Blood pressure (!) 92/37, pulse 62, temperature 97.6 °F (36.4 °C), resp. rate 17, height 1.626 m (5' 4\"), weight 52 kg (114 lb 10.2 oz), SpO2 94%.  Vital signs are normal.    Output: Producing urine and producing stool.    HEENT: Normal HEENT exam.    Lungs:  Normal effort and normal respiratory rate.  Breath sounds clear to auscultation.    Heart: Normal rate.  Regular rhythm.  S1 normal and S2 normal.    Abdomen: Abdomen is soft and non-distended.  There are no signs of ascites.  Bowel sounds are normal.   There is no abdominal tenderness.     Extremities: Normal range of

## 2024-08-22 NOTE — PROGRESS NOTES
Pt refused chest xray. This RN attempted to provide education, pt refused & stated she wanted to go home and told this RN to get out of room. Bed alarm on, call light within reach. Pt turned on right side. Electronically signed by Darvin Brown RN on 8/22/2024 at 2:55 PM

## 2024-08-23 LAB
GLUCOSE BLD-MCNC: 147 MG/DL (ref 74–99)
GLUCOSE BLD-MCNC: 164 MG/DL (ref 74–99)
GLUCOSE BLD-MCNC: 183 MG/DL (ref 74–99)
GLUCOSE BLD-MCNC: 270 MG/DL (ref 74–99)

## 2024-08-23 PROCEDURE — 2060000000 HC ICU INTERMEDIATE R&B

## 2024-08-23 PROCEDURE — 6370000000 HC RX 637 (ALT 250 FOR IP): Performed by: STUDENT IN AN ORGANIZED HEALTH CARE EDUCATION/TRAINING PROGRAM

## 2024-08-23 PROCEDURE — 6370000000 HC RX 637 (ALT 250 FOR IP)

## 2024-08-23 PROCEDURE — 6370000000 HC RX 637 (ALT 250 FOR IP): Performed by: FAMILY MEDICINE

## 2024-08-23 PROCEDURE — 6360000002 HC RX W HCPCS

## 2024-08-23 PROCEDURE — 99233 SBSQ HOSP IP/OBS HIGH 50: CPT

## 2024-08-23 PROCEDURE — 2580000003 HC RX 258

## 2024-08-23 PROCEDURE — 97530 THERAPEUTIC ACTIVITIES: CPT

## 2024-08-23 PROCEDURE — 82962 GLUCOSE BLOOD TEST: CPT

## 2024-08-23 PROCEDURE — 97535 SELF CARE MNGMENT TRAINING: CPT

## 2024-08-23 PROCEDURE — 6360000002 HC RX W HCPCS: Performed by: FAMILY MEDICINE

## 2024-08-23 PROCEDURE — 99232 SBSQ HOSP IP/OBS MODERATE 35: CPT | Performed by: INTERNAL MEDICINE

## 2024-08-23 PROCEDURE — 2580000003 HC RX 258: Performed by: FAMILY MEDICINE

## 2024-08-23 PROCEDURE — 94640 AIRWAY INHALATION TREATMENT: CPT

## 2024-08-23 PROCEDURE — 2700000000 HC OXYGEN THERAPY PER DAY

## 2024-08-23 PROCEDURE — 99221 1ST HOSP IP/OBS SF/LOW 40: CPT

## 2024-08-23 PROCEDURE — 94660 CPAP INITIATION&MGMT: CPT

## 2024-08-23 PROCEDURE — 2500000003 HC RX 250 WO HCPCS

## 2024-08-23 RX ORDER — BUMETANIDE 1 MG/1
1 TABLET ORAL DAILY
Status: DISCONTINUED | OUTPATIENT
Start: 2024-08-24 | End: 2024-08-29 | Stop reason: HOSPADM

## 2024-08-23 RX ORDER — FERROUS SULFATE 325(65) MG
325 TABLET ORAL 2 TIMES DAILY WITH MEALS
Status: DISCONTINUED | OUTPATIENT
Start: 2024-08-23 | End: 2024-08-29 | Stop reason: HOSPADM

## 2024-08-23 RX ORDER — GLUCAGON 1 MG/ML
1 KIT INJECTION PRN
Status: DISCONTINUED | OUTPATIENT
Start: 2024-08-23 | End: 2024-08-29 | Stop reason: HOSPADM

## 2024-08-23 RX ORDER — DEXTROSE MONOHYDRATE 100 MG/ML
INJECTION, SOLUTION INTRAVENOUS CONTINUOUS PRN
Status: DISCONTINUED | OUTPATIENT
Start: 2024-08-23 | End: 2024-08-29 | Stop reason: HOSPADM

## 2024-08-23 RX ADMIN — ARFORMOTEROL TARTRATE: 15 SOLUTION RESPIRATORY (INHALATION) at 08:16

## 2024-08-23 RX ADMIN — SACUBITRIL AND VALSARTAN 0.5 TABLET: 24; 26 TABLET, FILM COATED ORAL at 08:52

## 2024-08-23 RX ADMIN — AMIODARONE HYDROCHLORIDE 200 MG: 200 TABLET ORAL at 08:37

## 2024-08-23 RX ADMIN — ATORVASTATIN CALCIUM 40 MG: 40 TABLET, FILM COATED ORAL at 08:37

## 2024-08-23 RX ADMIN — LEVOTHYROXINE SODIUM 50 MCG: 0.05 TABLET ORAL at 08:37

## 2024-08-23 RX ADMIN — CEFEPIME 1000 MG: 1 INJECTION, POWDER, FOR SOLUTION INTRAMUSCULAR; INTRAVENOUS at 08:54

## 2024-08-23 RX ADMIN — MIDODRINE HYDROCHLORIDE 15 MG: 10 TABLET ORAL at 08:37

## 2024-08-23 RX ADMIN — MEXILETINE HYDROCHLORIDE 150 MG: 150 CAPSULE ORAL at 20:15

## 2024-08-23 RX ADMIN — APIXABAN 2.5 MG: 2.5 TABLET, FILM COATED ORAL at 08:38

## 2024-08-23 RX ADMIN — Medication 5 MG: at 20:16

## 2024-08-23 RX ADMIN — DOXYCYCLINE 100 MG: 100 INJECTION, POWDER, LYOPHILIZED, FOR SOLUTION INTRAVENOUS at 18:16

## 2024-08-23 RX ADMIN — WATER 40 MG: 1 INJECTION INTRAMUSCULAR; INTRAVENOUS; SUBCUTANEOUS at 08:36

## 2024-08-23 RX ADMIN — MIDODRINE HYDROCHLORIDE 15 MG: 10 TABLET ORAL at 12:15

## 2024-08-23 RX ADMIN — AMIODARONE HYDROCHLORIDE 200 MG: 200 TABLET ORAL at 18:05

## 2024-08-23 RX ADMIN — ARFORMOTEROL TARTRATE: 15 SOLUTION RESPIRATORY (INHALATION) at 19:39

## 2024-08-23 RX ADMIN — SACUBITRIL AND VALSARTAN 0.5 TABLET: 24; 26 TABLET, FILM COATED ORAL at 20:16

## 2024-08-23 RX ADMIN — PANTOPRAZOLE SODIUM 40 MG: 40 TABLET, DELAYED RELEASE ORAL at 08:37

## 2024-08-23 RX ADMIN — EPOETIN ALFA-EPBX 2000 UNITS: 2000 INJECTION, SOLUTION INTRAVENOUS; SUBCUTANEOUS at 18:06

## 2024-08-23 RX ADMIN — FERROUS SULFATE TAB 325 MG (65 MG ELEMENTAL FE) 325 MG: 325 (65 FE) TAB at 18:05

## 2024-08-23 RX ADMIN — INSULIN LISPRO 4 UNITS: 100 INJECTION, SOLUTION INTRAVENOUS; SUBCUTANEOUS at 18:07

## 2024-08-23 RX ADMIN — WATER 40 MG: 1 INJECTION INTRAMUSCULAR; INTRAVENOUS; SUBCUTANEOUS at 20:15

## 2024-08-23 RX ADMIN — GUAIFENESIN 400 MG: 400 TABLET ORAL at 08:37

## 2024-08-23 RX ADMIN — MIDODRINE HYDROCHLORIDE 15 MG: 10 TABLET ORAL at 18:05

## 2024-08-23 RX ADMIN — GUAIFENESIN 400 MG: 400 TABLET ORAL at 15:01

## 2024-08-23 RX ADMIN — SALINE NASAL SPRAY 1 SPRAY: 1.5 SOLUTION NASAL at 08:36

## 2024-08-23 RX ADMIN — ASPIRIN 81 MG: 81 TABLET, COATED ORAL at 08:37

## 2024-08-23 RX ADMIN — MEXILETINE HYDROCHLORIDE 150 MG: 150 CAPSULE ORAL at 15:01

## 2024-08-23 RX ADMIN — SODIUM CHLORIDE, PRESERVATIVE FREE 10 ML: 5 INJECTION INTRAVENOUS at 08:36

## 2024-08-23 RX ADMIN — GUAIFENESIN 400 MG: 400 TABLET ORAL at 20:16

## 2024-08-23 RX ADMIN — DOXYCYCLINE 100 MG: 100 INJECTION, POWDER, LYOPHILIZED, FOR SOLUTION INTRAVENOUS at 04:33

## 2024-08-23 RX ADMIN — APIXABAN 2.5 MG: 2.5 TABLET, FILM COATED ORAL at 20:16

## 2024-08-23 NOTE — PROGRESS NOTES
Progress Note  Date:2024       Room:[address] [place] [address]-  Patient Name:Marge Callejas     YOB: 1940     Age:84 y.o.        Subjective    Subjective:  Symptoms:  Stable.    Diet:  Adequate intake.    Activity level: Returning to normal.    Pain:  She reports no pain.       Review of Systems   Constitutional:  Positive for activity change and fatigue.   HENT: Negative.     Eyes: Negative.    Gastrointestinal: Negative.    Endocrine: Negative.    Genitourinary: Negative.    Musculoskeletal:  Positive for myalgias.   Skin: Negative.    Neurological: Negative.    Psychiatric/Behavioral: Negative.       Objective         Vitals Last 24 Hours:  TEMPERATURE:  Temp  Av.6 °F (36.4 °C)  Min: 97.3 °F (36.3 °C)  Max: 98 °F (36.7 °C)  RESPIRATIONS RANGE: Resp  Avg: 15.8  Min: 14  Max: 18  PULSE OXIMETRY RANGE: SpO2  Av.6 %  Min: 91 %  Max: 100 %  PULSE RANGE: Pulse  Av.1  Min: 56  Max: 101  BLOOD PRESSURE RANGE: Systolic (24hrs), Av , Min:109 , Max:129   ; Diastolic (24hrs), Av, Min:54, Max:66    I/O (24Hr):  No intake or output data in the 24 hours ending 24 1617      Objective:  General Appearance:  Uncomfortable and well-appearing.    Vital signs: (most recent): Blood pressure 114/66, pulse 77, temperature 97.5 °F (36.4 °C), temperature source Temporal, resp. rate 15, height 1.626 m (5' 4\"), weight 52 kg (114 lb 10.2 oz), SpO2 96%.  Vital signs are normal.    Output: Producing urine and producing stool.    HEENT: Normal HEENT exam.    Lungs:  Normal effort and normal respiratory rate.  Breath sounds clear to auscultation.    Heart: Normal rate.  Regular rhythm.  S1 normal and S2 normal.    Abdomen: Abdomen is soft and non-distended.  There are no signs of ascites.  Bowel sounds are normal.   There is no abdominal tenderness.     Extremities: Normal range of motion.    Pulses: Distal pulses are intact.    Neurological: Patient is alert and oriented to person, place and time.    Pupils:

## 2024-08-23 NOTE — CONSULTS
Palliative Care Department  [phone]  Palliative Care Initial Consult  Provider Montserrat Barrios, MADELAINE - CNP      PATIENT: Marge Callejas  : 1940  MRN: [id number]  ADMISSION DATE: 2024  6:23 PM  Referring Provider: Favian Mancera MD     Palliative Medicine was consulted on hospital day 11 for assistance with Goals of care, Symptom management     HPI:     Clinical Summary:Marge Callejas is a 84 y.o. y/o female with a history of A-fib, CHF EF 20- 25%, ICD, severe systolic dysfunction, stage IV chronic renal insufficiency, ESRD on HD, CAD, hypertension, hyperlipidemia mixed restrictive and obstructive lung disease, chronic respiratory failure on 4 L at baseline, AAA who presented to [facility] on 2024 from home with shortness of breath, and bilateral lower extremity edema.  Admitted for further medical management of acute on chronic congestive heart failure with reduced ejection fraction.  Nephrology following, reporting difficulty removing fluid due to hypotension.  She has been seen by cardiology, recommended reduction in antihypertensive therapy to prevent hypotension during dialysis and allow more fluid to be removed, cardiology signed off.  EP consulted to further evaluate discontinuation of amiodarone due to prolonged QT, patient was taking for CRT-D, recommended close monitor potassium and magnesium, continue Metroprolol, amiodarone, and home mexiletine, will plan to follow-up outpatient, EP has signed off. since.  Hospital course complicated with RRT on 2024, due to respiratory failure, found to have pneumonia, pulmonology consulted, on p.o. doxycycline and cefdinir.  Yesterday patient had an RRT was dialysis due to respiratory failure, placed on Airvo, currently on 8 L nasal cannula O2.  There were concern for aspiration, she had MBSS with easy to chew consistent solids with thin liquids diet recommendation.  Patient refused stat chest x-ray. Palliative medicine consulted to

## 2024-08-23 NOTE — PROGRESS NOTES
[facility]  MD Murtaza White MD Ali Hassan, MD Lisa Kniska, CNP   Lucille Beth, JOSE Fernandez, JUDE  Progress Note    8/23/2024    SUBJECTIVE:   8/12 : Laying in bed, no acute distress. Daughter is at the bedside. For dialysis this afternoon. She denies any dyspnea. She is edematous. Blood pressure is low.     8/13: Seen while on dialysis. Tolerating therapy without issues. BP is stable, but on the lower side. She denies any dyspnea, chest pain, or palpitations. Lower extremities are edematous.     8/16: Seen while on dialysis. Tolerating treatment without issues. She denies any dyspnea, chest pain, or palpitations. For discharge later this afternoon.     8/17 HD today no reported issues   Charts reviewed .     8/18 increased o2 requirement  Clinically in HF .     8/19: Laying in bed, on 10 liters of oxygen via high flow nasal canula. Feels a bit better. Hesitant to go to HD today as she cramped yesterday. BP stable, on the lower side. RRT called last night for hypoxia and she was placed on AirVo for a short time.     8/20: Seen while on dialysis. Tolerating therapy without issues, though UF had to be decreased because her BP dropped during treatment. Fluid removal was limited. She is feeling better, though still has some dyspnea.     8/21:  Sitting up in bed.  NAD  Oxygen on per NC.  She complained of nausea on dialysis yesterday.  Denies chest pain, palpitations or SOB.  Denies nausea today.  Family at bedside.     8/22:  Laying in bed.  Agitated today because she wants to go home.  Denies chest pain, palpitations or SOB.  Denies nausea.    8/23: Laying in bed, no acute distress. She is on 5 liters of oxygen. She denies any dyspnea, chest pain, or palpitations. No lower extremity edema. Blood pressure is stable.     PROBLEM LIST:    Principal Problem:    Lower extremity edema  Active Problems:    Acute on chronic combined systolic and diastolic heart failure  (HCC)    Moderate protein-calorie malnutrition (HCC)  Resolved Problems:    * No resolved hospital problems. *         DIET:    ADULT ORAL NUTRITION SUPPLEMENT; Breakfast, Dinner; Wound Healing Oral Supplement  ADULT ORAL NUTRITION SUPPLEMENT; Breakfast, Dinner; Fortified Gelatin Oral Supplement  ADULT ORAL NUTRITION SUPPLEMENT; Lunch; Renal Oral Supplement  ADULT DIET; Easy to Chew; Low Sodium (2 gm); Low Potassium (Less than 3000 mg/day)     MEDS (scheduled):    [START ON 8/24/2024] bumetanide  1 mg Oral Daily    ferrous sulfate  325 mg Oral BID WC    methylPREDNISolone  40 mg IntraVENous Q12H    doxycycline (VIBRAMYCIN) IV  100 mg IntraVENous Q12H    guaiFENesin  400 mg Oral TID    cefepime  1,000 mg IntraVENous Q24H    insulin lispro  0-8 Units SubCUTAneous TID WC    insulin lispro  0-4 Units SubCUTAneous Nightly    midodrine  15 mg Oral TID WC    amiodarone  200 mg Oral BID WC    levothyroxine  50 mcg Oral Daily    sodium chloride flush  5-40 mL IntraVENous 2 times per day    apixaban  2.5 mg Oral BID    aspirin  81 mg Oral Daily    atorvastatin  40 mg Oral Daily    arformoterol 15 mcg-budesonide 0.25 mg neb solution   Nebulization BID RT    epoetin carmelina-epbx  2,000 Units SubCUTAneous Once per day on Monday Wednesday Friday    melatonin  5 mg Oral Nightly    metoprolol succinate  12.5 mg Oral Daily    mexiletine  150 mg Oral 3 times per day    pantoprazole  40 mg Oral QAM AC    polyethylene glycol  17 g Oral Daily    sacubitril-valsartan  0.5 tablet Oral BID       MEDS (infusions):   dextrose      sodium chloride         MEDS (prn):  glucose, dextrose bolus **OR** dextrose bolus, glucagon (rDNA), dextrose, albumin human 25%, sodium chloride flush, sodium chloride, polyethylene glycol, acetaminophen **OR** acetaminophen, benzocaine-menthol, benzonatate, bisacodyl, calcium carbonate, ipratropium 0.5 mg-albuterol 2.5 mg, lactulose, meclizine, Polyvinyl Alcohol-Povidone PF, sennosides-docusate sodium, sodium

## 2024-08-23 NOTE — PLAN OF CARE
Problem: Skin/Tissue Integrity  Goal: Absence of new skin breakdown  Description: 1.  Monitor for areas of redness and/or skin breakdown  2.  Assess vascular access sites hourly  3.  Every 4-6 hours minimum:  Change oxygen saturation probe site  4.  Every 4-6 hours:  If on nasal continuous positive airway pressure, respiratory therapy assess nares and determine need for appliance change or resting period.  8/22/2024 2112 by Loren Barnett, RN  Outcome: Progressing  8/22/2024 0935 by Yuki Doan, RN  Outcome: Progressing     Problem: Safety - Adult  Goal: Free from fall injury  8/22/2024 2112 by Loren Barnett, RN  Outcome: Progressing  8/22/2024 0935 by Yuki Doan, RN  Outcome: Progressing

## 2024-08-23 NOTE — PROGRESS NOTES
[facility]  PULMONARY/CRITICAL CARE PROGRESS NOTE    Patient: Marge Callejas  MRN: [id number]  : 1940    Encounter Date: 2024  Encounter Time: 10:41 AM     Date of Admission: .2024  6:23 PM    Consulting Physician:  Primary Care Physician:     Marek Andres DO     [phone]    Reason for Consultation: Dyspnea    Problem List:  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    Coronary artery disease involving native coronary artery of native heart without angina pectoris    Abnormal EKG    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  Overnight the patient had no shortness of breath, cough, increased work of breathing.    HOME MEDICATIONS:  Prior to Admission medications   117*       PT/INR: No results for input(s): \"PROTIME\", \"INR\" in the last 72 hours.  APTT: No results for input(s): \"APTT\" in the last 72 hours.    Cardiac Enzymes:  Lab Results   Component Value Date    CKTOTAL 58 07/25/2015    CKMB 3.8 07/25/2015    TROPONINI 0.03 05/15/2021       Hgb A1C:   Lab Results   Component Value Date    LABA1C 5.0 08/06/2024     No results found for: \"EAG\"  KALINA: No results found for: \"KALINA\"  ESR:   Lab Results   Component Value Date    SEDRATE 62 (H) 07/25/2024     CRP:   Lab Results   Component Value Date    CRP 10.0 (H) 07/25/2024     D Dimer:   Lab Results   Component Value Date    DDIMER 1723 03/31/2021     Folate and B12:   Lab Results   Component Value Date    ACWNQLLV78 827 08/10/2024   ,   Lab Results   Component Value Date    FOLATE 15.4 08/10/2024       Lactic Acid:   Lab Results   Component Value Date    LACTA 1.4 08/20/2024     Ammonia:   Cortisol:  Thyroid Studies:  Lab Results   Component Value Date    TSH 15.68 (H) 08/11/2024    P6ECJPE 72.47 (L) 07/25/2015     XR CHEST PORTABLE   Final Result   Cardiomegaly with mild right-sided pleural effusion.  Areas of subsegmental   atelectasis in both bases.      No significant changes since the study of August 19.         FL MODIFIED BARIUM SWALLOW W VIDEO   Final Result   Mild swallowing dysfunction evident by oral phase delay with penetration of   thin liquid and nectar consistency barium.  No barium aspiration.      Please see separate speech pathology report for full discussion of findings   and recommendations.         XR CHEST PORTABLE   Final Result   Cardiomegaly.  Some signs that can indicated decompensated congestive heart   failure.         XR CHEST PORTABLE   Final Result   There is a new consolidative process projecting over the right middle lobe   concerning for pneumonia and/or atelectasis.         XR CHEST (2 VW)   Final Result   Findings for decompensated congestive heart failure, progression of volume   overload

## 2024-08-23 NOTE — PROGRESS NOTES
Physical Therapy  Physical Therapy Treatment     Name: Marge Callejas  : 1940  MRN: [id number]      Date of Service: 2024    Evaluating PT:  Estiven Elise PT, DPT    Room #:  4508/4508-A  Diagnosis:  Abdominal pain, left upper quadrant [R10.12]  Acute on chronic combined systolic and diastolic heart failure (HCC) [I50.43]  Lower extremity edema [R60.0]  ESRD on dialysis (HCC) [N18.6, Z99.2]  Bilateral lower extremity edema [R60.0]  Heel pain, bilateral [M79.671, M79.672]  PMHx/PSHx:  afib, arthritis, CHF, HLD, HTN  Procedure/Surgery:  N/A  Precautions:  fall risk, TSM, bed alarm, O2, [place]  Equipment Needs:  TBD    SUBJECTIVE:    Pt admitted from [facility]. Pt reports she is non-ambulatory PTA.    OBJECTIVE:   Initial Evaluation  Date: 24 Treatment Date: 24 Short Term/ Long Term   Goals   AM-PAC 6 Clicks 10/24 10/24    Was pt agreeable to Eval/treatment? yes Yes with encouragement    Does pt have pain? Unrated RLE No specific c/o pain     Bed Mobility  Rolling: min A  Supine to sit: min A  Sit to supine: min A  Scooting: min A Rolling: ModA  Supine to sit: MaxA  Sit to supine: MaxA  Scooting: MaxA Rolling: mod I  Supine to sit: mod I  Sit to supine: mod I  Scooting: mod I   Transfers Sit to stand: NT, pt declined  Stand to sit: NT  Stand pivot: NT Sit to stand: MaxA  Stand to sit: MaxA  Stand pivot: NT Sit to stand: mod I  Stand to sit: mod I  Stand pivot: mod I with AAD   Ambulation    NT Lateral steps at EOB with FWW and MaxA (3 repetitions) Make ambulation goal as appropriate   Stair negotiation: ascended and descended  NT NT NT     Sensation:  Pt denies numbness and tingling to extremities  Edema:  Unremarkable   Vitals:  SpO2: % on 8L/min O2 via HFHC    Therapeutic Exercises:  Rolling x2, dynamic balance activities while sitting EOB for ~15 minutes, high-repetitions of lateral ambulation, STSx3 from EOB    Patient education  Pt educated on role of PT in acute setting, benefits of

## 2024-08-23 NOTE — PROGRESS NOTES
OCCUPATIONAL THERAPY BEDSIDE TREATMENT NOTE   [facility]  [address] [place], [place]       Date:2024  Patient Name: Marge Callejas  MRN: [id number]  : 1940  Room: [address] [place] [address]     Per OT Eval:    Evaluating OT: Dana Wilkinson, DENAE, OTR/L  # [id number]     Referring Provider:  Gama Parham MD   Specific Provider Orders:  \"OT Eval and Treat\"  8-10-24     Diagnosis: Abdominal pain, left upper quadrant [R10.12]  Acute on chronic combined systolic and diastolic heart failure (HCC) [I50.43]  Lower extremity edema [R60.0]  ESRD on dialysis (HCC) [N18.6, Z99.2]  Bilateral lower extremity edema [R60.0]  Heel pain, bilateral [M79.671, M79.672]     Pt was admitted w/ LE swelling, Left Upper Abdominal Pain, Frankie Heel Pain  24:  Pt experienced ~ 8 beats of VTach and Respiratory Distress, worsened during Dialysis.  Transferred to [facility] for continued care.       Pertinent Medical History:  Pt has a past medical history of Arthritis, Atrial fibrillation (HCC), Blood circulation, collateral, CHF (congestive heart failure) (HCC), Chronic renal insufficiency, stage IV (severe) (HCC), Coronary artery disease involving native coronary artery of native heart without angina pectoris, History of cardiovascular stress test, History of echocardiogram, Hyperlipidemia, Hypertension, and Mixed restrictive and obstructive lung disease (HCC).,  has a past surgical history that includes Ectopic pregnancy surgery; Upper gastrointestinal endoscopy (N/A, 4/3/2021); Colonoscopy (N/A, 4/3/2021); Colonoscopy (4/3/2021); Colonoscopy (4/3/2021); Colonoscopy (4/3/2021); Upper gastrointestinal endoscopy (N/A, 2021); and Colonoscopy (N/A, 2023).     Surgeries this admission: None      Precautions:  Fall Risk  Cognition - Alarms, HD TRS  8L O2 HF NC, continuous pulse-ox  Easy To Chew Solids, Thin Liquids, No Straw  TAPS     Assessment of current deficits   [x]

## 2024-08-23 NOTE — CARE COORDINATION
Chart reviewed. PT 8/24  OT 10/24. Leticia from [facility] informed to start precert. . Per Leticia from [facility]. patient is on 8 liters of oxygen and they cannot accept at that liter flow. She will continue to follow and if patient  cannot be weaned to 6 L .a snf list with dialysis dens and a ability  to handle higher oxygen levels provided to daughter. Await her decision and monitor oxygen requirements . Cm/Sw will continue to follow If [facility] cannot accept daughter if o2 requirement continues to be over 6 L would like [facility].  Referral given to Tyshawn from [facility] and he will inform if he can accept and if O2 requirement is still above 6L will request he start precert and then cancel [facility].  Daughter is considering LTC  per Palliative team Public benefits called.

## 2024-08-24 LAB
ALBUMIN: 4.1 G/DL (ref 3.5–5.2)
ANION GAP SERPL CALCULATED.3IONS-SCNC: 16 MMOL/L (ref 7–16)
BASOPHILS # BLD: 0.01 K/UL (ref 0–0.2)
BASOPHILS NFR BLD: 0 % (ref 0–2)
BUN SERPL-MCNC: 20 MG/DL (ref 6–23)
CALCIUM SERPL-MCNC: 9.2 MG/DL (ref 8.6–10.2)
CHLORIDE SERPL-SCNC: 99 MMOL/L (ref 98–107)
CO2 SERPL-SCNC: 24 MMOL/L (ref 22–29)
CREAT SERPL-MCNC: 1.9 MG/DL (ref 0.5–1)
EOSINOPHIL # BLD: 0 K/UL (ref 0.05–0.5)
EOSINOPHILS RELATIVE PERCENT: 0 % (ref 0–6)
ERYTHROCYTE [DISTWIDTH] IN BLOOD BY AUTOMATED COUNT: 18.5 % (ref 11.5–15)
GFR, ESTIMATED: 26 ML/MIN/1.73M2
GLUCOSE BLD-MCNC: 136 MG/DL (ref 74–99)
GLUCOSE BLD-MCNC: 174 MG/DL (ref 74–99)
GLUCOSE BLD-MCNC: 190 MG/DL (ref 74–99)
GLUCOSE BLD-MCNC: 333 MG/DL (ref 74–99)
GLUCOSE SERPL-MCNC: 162 MG/DL (ref 74–99)
HCT VFR BLD AUTO: 27.2 % (ref 34–48)
HGB BLD-MCNC: 8.8 G/DL (ref 11.5–15.5)
IMM GRANULOCYTES # BLD AUTO: 0.15 K/UL (ref 0–0.58)
IMM GRANULOCYTES NFR BLD: 2 % (ref 0–5)
LYMPHOCYTES NFR BLD: 0.64 K/UL (ref 1.5–4)
LYMPHOCYTES RELATIVE PERCENT: 8 % (ref 20–42)
MCH RBC QN AUTO: 34.5 PG (ref 26–35)
MCHC RBC AUTO-ENTMCNC: 32.4 G/DL (ref 32–34.5)
MCV RBC AUTO: 106.7 FL (ref 80–99.9)
MONOCYTES NFR BLD: 0.17 K/UL (ref 0.1–0.95)
MONOCYTES NFR BLD: 2 % (ref 2–12)
NEUTROPHILS NFR BLD: 89 % (ref 43–80)
NEUTS SEG NFR BLD: 7.61 K/UL (ref 1.8–7.3)
PHOSPHATE SERPL-MCNC: 1.7 MG/DL (ref 2.5–4.5)
PLATELET, FLUORESCENCE: 166 K/UL (ref 130–450)
PMV BLD AUTO: 13.1 FL (ref 7–12)
POTASSIUM SERPL-SCNC: 3.3 MMOL/L (ref 3.5–5)
RBC # BLD AUTO: 2.55 M/UL (ref 3.5–5.5)
SODIUM SERPL-SCNC: 139 MMOL/L (ref 132–146)
WBC OTHER # BLD: 8.6 K/UL (ref 4.5–11.5)

## 2024-08-24 PROCEDURE — 6360000002 HC RX W HCPCS

## 2024-08-24 PROCEDURE — 6370000000 HC RX 637 (ALT 250 FOR IP)

## 2024-08-24 PROCEDURE — 2500000003 HC RX 250 WO HCPCS

## 2024-08-24 PROCEDURE — 82962 GLUCOSE BLOOD TEST: CPT

## 2024-08-24 PROCEDURE — 2580000003 HC RX 258: Performed by: FAMILY MEDICINE

## 2024-08-24 PROCEDURE — 85025 COMPLETE CBC W/AUTO DIFF WBC: CPT

## 2024-08-24 PROCEDURE — 2580000003 HC RX 258

## 2024-08-24 PROCEDURE — 90935 HEMODIALYSIS ONE EVALUATION: CPT

## 2024-08-24 PROCEDURE — 6370000000 HC RX 637 (ALT 250 FOR IP): Performed by: STUDENT IN AN ORGANIZED HEALTH CARE EDUCATION/TRAINING PROGRAM

## 2024-08-24 PROCEDURE — 80069 RENAL FUNCTION PANEL: CPT

## 2024-08-24 PROCEDURE — 36415 COLL VENOUS BLD VENIPUNCTURE: CPT

## 2024-08-24 PROCEDURE — 99233 SBSQ HOSP IP/OBS HIGH 50: CPT | Performed by: HOSPITALIST

## 2024-08-24 PROCEDURE — 2700000000 HC OXYGEN THERAPY PER DAY

## 2024-08-24 PROCEDURE — 2060000000 HC ICU INTERMEDIATE R&B

## 2024-08-24 PROCEDURE — 6370000000 HC RX 637 (ALT 250 FOR IP): Performed by: FAMILY MEDICINE

## 2024-08-24 PROCEDURE — 94660 CPAP INITIATION&MGMT: CPT

## 2024-08-24 RX ADMIN — BUMETANIDE 1 MG: 1 TABLET ORAL at 08:34

## 2024-08-24 RX ADMIN — MEXILETINE HYDROCHLORIDE 150 MG: 150 CAPSULE ORAL at 05:52

## 2024-08-24 RX ADMIN — PANTOPRAZOLE SODIUM 40 MG: 40 TABLET, DELAYED RELEASE ORAL at 05:52

## 2024-08-24 RX ADMIN — WATER 40 MG: 1 INJECTION INTRAMUSCULAR; INTRAVENOUS; SUBCUTANEOUS at 20:30

## 2024-08-24 RX ADMIN — SACUBITRIL AND VALSARTAN 0.5 TABLET: 24; 26 TABLET, FILM COATED ORAL at 20:27

## 2024-08-24 RX ADMIN — INSULIN LISPRO 4 UNITS: 100 INJECTION, SOLUTION INTRAVENOUS; SUBCUTANEOUS at 20:34

## 2024-08-24 RX ADMIN — MEXILETINE HYDROCHLORIDE 150 MG: 150 CAPSULE ORAL at 20:27

## 2024-08-24 RX ADMIN — FERROUS SULFATE TAB 325 MG (65 MG ELEMENTAL FE) 325 MG: 325 (65 FE) TAB at 17:09

## 2024-08-24 RX ADMIN — WATER 40 MG: 1 INJECTION INTRAMUSCULAR; INTRAVENOUS; SUBCUTANEOUS at 10:07

## 2024-08-24 RX ADMIN — SODIUM CHLORIDE, PRESERVATIVE FREE 10 ML: 5 INJECTION INTRAVENOUS at 10:06

## 2024-08-24 RX ADMIN — MIDODRINE HYDROCHLORIDE 15 MG: 10 TABLET ORAL at 08:33

## 2024-08-24 RX ADMIN — AMIODARONE HYDROCHLORIDE 200 MG: 200 TABLET ORAL at 08:33

## 2024-08-24 RX ADMIN — AMIODARONE HYDROCHLORIDE 200 MG: 200 TABLET ORAL at 17:09

## 2024-08-24 RX ADMIN — DOXYCYCLINE 100 MG: 100 INJECTION, POWDER, LYOPHILIZED, FOR SOLUTION INTRAVENOUS at 09:58

## 2024-08-24 RX ADMIN — MIDODRINE HYDROCHLORIDE 15 MG: 10 TABLET ORAL at 17:09

## 2024-08-24 RX ADMIN — SODIUM CHLORIDE, PRESERVATIVE FREE 10 ML: 5 INJECTION INTRAVENOUS at 20:35

## 2024-08-24 RX ADMIN — GUAIFENESIN 400 MG: 400 TABLET ORAL at 08:33

## 2024-08-24 RX ADMIN — FERROUS SULFATE TAB 325 MG (65 MG ELEMENTAL FE) 325 MG: 325 (65 FE) TAB at 08:34

## 2024-08-24 RX ADMIN — DOXYCYCLINE 100 MG: 100 INJECTION, POWDER, LYOPHILIZED, FOR SOLUTION INTRAVENOUS at 20:34

## 2024-08-24 RX ADMIN — POTASSIUM PHOSPHATE, MONOBASIC AND POTASSIUM PHOSPHATE, DIBASIC 10 MMOL: 224; 236 INJECTION, SOLUTION, CONCENTRATE INTRAVENOUS at 23:10

## 2024-08-24 RX ADMIN — ASPIRIN 81 MG: 81 TABLET, COATED ORAL at 08:33

## 2024-08-24 RX ADMIN — SACUBITRIL AND VALSARTAN 0.5 TABLET: 24; 26 TABLET, FILM COATED ORAL at 08:34

## 2024-08-24 RX ADMIN — Medication 5 MG: at 20:27

## 2024-08-24 RX ADMIN — CEFEPIME 1000 MG: 1 INJECTION, POWDER, FOR SOLUTION INTRAMUSCULAR; INTRAVENOUS at 11:08

## 2024-08-24 RX ADMIN — LEVOTHYROXINE SODIUM 50 MCG: 0.05 TABLET ORAL at 05:51

## 2024-08-24 RX ADMIN — APIXABAN 2.5 MG: 2.5 TABLET, FILM COATED ORAL at 20:27

## 2024-08-24 RX ADMIN — GUAIFENESIN 400 MG: 400 TABLET ORAL at 20:27

## 2024-08-24 RX ADMIN — ATORVASTATIN CALCIUM 40 MG: 40 TABLET, FILM COATED ORAL at 08:33

## 2024-08-24 RX ADMIN — APIXABAN 2.5 MG: 2.5 TABLET, FILM COATED ORAL at 08:33

## 2024-08-24 NOTE — PROGRESS NOTES
[facility]  MD Murtaza White MD Ali Hassan, MD Lisa Kniska, CNP   Lucille Beth, JOSE Fernandez, JUDE  Progress Note    8/24/2024    SUBJECTIVE:   8/12 : Laying in bed, no acute distress. Daughter is at the bedside. For dialysis this afternoon. She denies any dyspnea. She is edematous. Blood pressure is low.     8/13: Seen while on dialysis. Tolerating therapy without issues. BP is stable, but on the lower side. She denies any dyspnea, chest pain, or palpitations. Lower extremities are edematous.     8/16: Seen while on dialysis. Tolerating treatment without issues. She denies any dyspnea, chest pain, or palpitations. For discharge later this afternoon.     8/17 HD today no reported issues   Charts reviewed .     8/18 increased o2 requirement  Clinically in HF .     8/19: Laying in bed, on 10 liters of oxygen via high flow nasal canula. Feels a bit better. Hesitant to go to HD today as she cramped yesterday. BP stable, on the lower side. RRT called last night for hypoxia and she was placed on AirVo for a short time.     8/20: Seen while on dialysis. Tolerating therapy without issues, though UF had to be decreased because her BP dropped during treatment. Fluid removal was limited. She is feeling better, though still has some dyspnea.     8/21:  Sitting up in bed.  NAD  Oxygen on per NC.  She complained of nausea on dialysis yesterday.  Denies chest pain, palpitations or SOB.  Denies nausea today.  Family at bedside.     8/22:  Laying in bed.  Agitated today because she wants to go home.  Denies chest pain, palpitations or SOB.  Denies nausea.    8/23: Laying in bed, no acute distress. She is on 5 liters of oxygen. She denies any dyspnea, chest pain, or palpitations. No lower extremity edema. Blood pressure is stable.     8/24 lying in bed , on o2/nc comfortable   HD today no reported issues 2L UF    PROBLEM LIST:    Principal Problem:    Lower extremity

## 2024-08-24 NOTE — PROGRESS NOTES
Internal Medicine Progress Note    Patient's name: Marge Callejas  : 1940  Admission date: 2024  Date of service: 2024   Room: [address] PICU  Primary care physician: Marek Andres DO  Reason for visit: metabolic encephalopathy, ESRD on dialysis, acute on chronic respiratory hypoxia and acute on chronic HFrEF.    Subjective  Marge was seen and examined at bedside. Pt was seen after HD today no acute distress.     I have personally reviewed and interpreted the most recent labs and imaging studies as summarized below:     Lab resulted not resulted      Review of Systems  There are no new complaints of chest pain, chest tightness, shortness of breath, cough, abdominal pain, vision change, nausea, vomiting, diarrhea, constipation.    Hospital Medications  Current Facility-Administered Medications   Medication Dose Route Frequency Provider Last Rate Last Admin    glucose chewable tablet 16 g  4 tablet Oral PRN Favian Mancera MD        dextrose bolus 10% 125 mL  125 mL IntraVENous PRN Favian Mancera MD        Or    dextrose bolus 10% 250 mL  250 mL IntraVENous PRN Favian Mancera MD        glucagon injection 1 mg  1 mg SubCUTAneous PRN Favian Mancera MD        dextrose 10 % infusion   IntraVENous Continuous PRN Favian Mancera MD        bumetanide (BUMEX) tablet 1 mg  1 mg Oral Daily Favian Mancera MD   1 mg at 24 0834    ferrous sulfate (IRON 325) tablet 325 mg  325 mg Oral BID WC Favian Mancera MD   325 mg at 24 0834    methylPREDNISolone sodium succ (SOLU-MEDROL) 40 mg in sterile water 1 mL injection  40 mg IntraVENous Q12H Favian Mancera MD   40 mg at 24 1007    doxycycline (VIBRAMYCIN) 100 mg in sodium chloride 0.9 % 100 mL IVPB ([id number])  100 mg IntraVENous Q12H Macrina Redd DO   Stopped at 24 1100    guaiFENesin tablet 400 mg  400 mg Oral TID Favian Mancera MD   400 mg at 24 0833    cefepime (MAXIPIME) 1,000 mg in sodium chloride 0.9 %  baseline oxygen requirement.     +++++++++++++++++++++++++++++++++++++++++++++++++  Angelo Feliciano MD PhD  [facility]  [facility], [place]  +++++++++++++++++++++++++++++++++++++++++++++++++  NOTE: Report could be transcribed using voice recognition software. Every effort was made to ensure accuracy; however, inadvertent computerized transcription errors may be present.    Electronically signed by Angelo Feliciano MD on 8/24/2024 at 3:43 PM    I can be reached through [employer].

## 2024-08-24 NOTE — PLAN OF CARE
Problem: Skin/Tissue Integrity  Goal: Absence of new skin breakdown  Description: 1.  Monitor for areas of redness and/or skin breakdown  2.  Assess vascular access sites hourly  3.  Every 4-6 hours minimum:  Change oxygen saturation probe site  4.  Every 4-6 hours:  If on nasal continuous positive airway pressure, respiratory therapy assess nares and determine need for appliance change or resting period.  8/23/2024 2019 by Loren Barnett, RN  Outcome: Progressing  8/23/2024 1446 by Maryellen Burns, RN  Outcome: Progressing     Problem: Safety - Adult  Goal: Free from fall injury  8/23/2024 2019 by Loren Barnett, RN  Outcome: Progressing  8/23/2024 1446 by Maryellen Burns, RN  Outcome: Progressing

## 2024-08-24 NOTE — PLAN OF CARE
Problem: Skin/Tissue Integrity  Goal: Absence of new skin breakdown  Description: 1.  Monitor for areas of redness and/or skin breakdown  2.  Assess vascular access sites hourly  3.  Every 4-6 hours minimum:  Change oxygen saturation probe site  4.  Every 4-6 hours:  If on nasal continuous positive airway pressure, respiratory therapy assess nares and determine need for appliance change or resting period.  Outcome: Progressing     Problem: Safety - Adult  Goal: Free from fall injury  Outcome: Progressing     Problem: Respiratory - Adult  Goal: Achieves optimal ventilation and oxygenation  Outcome: Progressing     Problem: Discharge Planning  Goal: Discharge to home or other facility with appropriate resources  Outcome: Progressing     Problem: Nutrition Deficit:  Goal: Optimize nutritional status  Outcome: Progressing

## 2024-08-24 NOTE — FLOWSHEET NOTE
08/24/24 1539   Vital Signs   BP (!) 108/58   Temp 96.9 °F (36.1 °C)   Pulse 70   Weight - Scale 52.4 kg (115 lb 8.3 oz)   Weight Method Bed scale   Percent Weight Change 0.77   Post-Hemodialysis Assessment   Post-Treatment Procedures Blood returned;Catheter capped, clamped and heparinized x 2 ports   Machine Disinfection Process Exterior Machine Disinfection   Rinseback Volume (ml) 300 ml   Blood Volume Processed (Liters) 51.4 L   Dialyzer Clearance Lightly streaked   Duration of Treatment (minutes) 180 minutes   Heparin Amount Administered During Treatment (mL) 0 mL   Hemodialysis Intake (ml) 300 ml   Hemodialysis Output (ml) 2000 ml   NET Removed (ml) 1700   Tolerated Treatment Good   Patient Response to Treatment tolerated well, blood returned, cath care per policy/procedure, lines flushed, heparin instilled ports capped

## 2024-08-25 LAB
GLUCOSE BLD-MCNC: 171 MG/DL (ref 74–99)
GLUCOSE BLD-MCNC: 228 MG/DL (ref 74–99)

## 2024-08-25 PROCEDURE — 94660 CPAP INITIATION&MGMT: CPT

## 2024-08-25 PROCEDURE — 99232 SBSQ HOSP IP/OBS MODERATE 35: CPT | Performed by: INTERNAL MEDICINE

## 2024-08-25 PROCEDURE — 6370000000 HC RX 637 (ALT 250 FOR IP)

## 2024-08-25 PROCEDURE — 6370000000 HC RX 637 (ALT 250 FOR IP): Performed by: HOSPITALIST

## 2024-08-25 PROCEDURE — 97535 SELF CARE MNGMENT TRAINING: CPT

## 2024-08-25 PROCEDURE — 6370000000 HC RX 637 (ALT 250 FOR IP): Performed by: FAMILY MEDICINE

## 2024-08-25 PROCEDURE — 2700000000 HC OXYGEN THERAPY PER DAY

## 2024-08-25 PROCEDURE — 82962 GLUCOSE BLOOD TEST: CPT

## 2024-08-25 PROCEDURE — 97530 THERAPEUTIC ACTIVITIES: CPT

## 2024-08-25 PROCEDURE — 6370000000 HC RX 637 (ALT 250 FOR IP): Performed by: STUDENT IN AN ORGANIZED HEALTH CARE EDUCATION/TRAINING PROGRAM

## 2024-08-25 PROCEDURE — 2060000000 HC ICU INTERMEDIATE R&B

## 2024-08-25 PROCEDURE — 99233 SBSQ HOSP IP/OBS HIGH 50: CPT | Performed by: HOSPITALIST

## 2024-08-25 RX ORDER — PREDNISONE 20 MG/1
40 TABLET ORAL DAILY
Status: DISCONTINUED | OUTPATIENT
Start: 2024-08-25 | End: 2024-08-29 | Stop reason: HOSPADM

## 2024-08-25 RX ORDER — CEFDINIR 300 MG/1
300 CAPSULE ORAL
Status: DISCONTINUED | OUTPATIENT
Start: 2024-08-25 | End: 2024-08-29 | Stop reason: HOSPADM

## 2024-08-25 RX ORDER — DOXYCYCLINE 100 MG/1
100 CAPSULE ORAL EVERY 12 HOURS SCHEDULED
Status: DISCONTINUED | OUTPATIENT
Start: 2024-08-25 | End: 2024-08-29 | Stop reason: HOSPADM

## 2024-08-25 RX ORDER — CEFDINIR 300 MG/1
300 CAPSULE ORAL
Status: DISCONTINUED | OUTPATIENT
Start: 2024-08-27 | End: 2024-08-29 | Stop reason: HOSPADM

## 2024-08-25 RX ADMIN — PANTOPRAZOLE SODIUM 40 MG: 40 TABLET, DELAYED RELEASE ORAL at 05:27

## 2024-08-25 RX ADMIN — SACUBITRIL AND VALSARTAN 0.5 TABLET: 24; 26 TABLET, FILM COATED ORAL at 19:00

## 2024-08-25 RX ADMIN — MEXILETINE HYDROCHLORIDE 150 MG: 150 CAPSULE ORAL at 18:40

## 2024-08-25 RX ADMIN — MEXILETINE HYDROCHLORIDE 150 MG: 150 CAPSULE ORAL at 22:32

## 2024-08-25 RX ADMIN — CEFDINIR 300 MG: 300 CAPSULE ORAL at 18:40

## 2024-08-25 RX ADMIN — ASPIRIN 81 MG: 81 TABLET, COATED ORAL at 18:39

## 2024-08-25 RX ADMIN — AMIODARONE HYDROCHLORIDE 200 MG: 200 TABLET ORAL at 18:38

## 2024-08-25 RX ADMIN — METOPROLOL SUCCINATE 12.5 MG: 25 TABLET, EXTENDED RELEASE ORAL at 18:39

## 2024-08-25 RX ADMIN — ATORVASTATIN CALCIUM 40 MG: 40 TABLET, FILM COATED ORAL at 18:40

## 2024-08-25 RX ADMIN — BUMETANIDE 1 MG: 1 TABLET ORAL at 18:38

## 2024-08-25 RX ADMIN — FERROUS SULFATE TAB 325 MG (65 MG ELEMENTAL FE) 325 MG: 325 (65 FE) TAB at 18:39

## 2024-08-25 RX ADMIN — PREDNISONE 40 MG: 20 TABLET ORAL at 18:39

## 2024-08-25 RX ADMIN — INSULIN LISPRO 2 UNITS: 100 INJECTION, SOLUTION INTRAVENOUS; SUBCUTANEOUS at 18:41

## 2024-08-25 RX ADMIN — MIDODRINE HYDROCHLORIDE 15 MG: 10 TABLET ORAL at 18:38

## 2024-08-25 RX ADMIN — Medication 5 MG: at 19:00

## 2024-08-25 RX ADMIN — APIXABAN 2.5 MG: 2.5 TABLET, FILM COATED ORAL at 20:30

## 2024-08-25 ASSESSMENT — PAIN SCALES - GENERAL: PAINLEVEL_OUTOF10: 0

## 2024-08-25 NOTE — PROGRESS NOTES
Attempted to place peripheral iv. Pt confused at this time. she very aggressively said to get get out of her room and get out of her face. Unable to assess pt at this time. Rn notified.

## 2024-08-25 NOTE — PROGRESS NOTES
Renal Dose Adjustment Policy (Generic)     This patient is on medication that requires renal, weight, and/or indication dose adjustment.      Date Body Weight IBW  Adjusted BW SCr  CrCl Dialysis status   8/25/2024 52.4 kg (115 lb 8.3 oz) Ideal body weight: 54.7 kg (120 lb 9.5 oz) Serum creatinine: 1.9 mg/dL (H) 08/24/24 1630  Estimated creatinine clearance: 19 mL/min (A) HD       Pharmacy has dose-adjusted the following medication(s):    Date Previous Order Adjusted Order   8/25/2024 Cefdinir 300 mg Q12H Cefdinir 300 mg every other day + 300  mg at the end of each dialysis  session       These changes were made per protocol according to the [facility]   Automatic Renal Dose Adjustment Policy.     *Please note this dose may need readjusted if patient's condition changes.    Please contact pharmacy with any questions regarding these changes.    Aparna Schwartz RPH  8/25/2024  9:07 AM

## 2024-08-25 NOTE — PLAN OF CARE
Problem: Skin/Tissue Integrity  Goal: Absence of new skin breakdown  Description: 1.  Monitor for areas of redness and/or skin breakdown  2.  Assess vascular access sites hourly  3.  Every 4-6 hours minimum:  Change oxygen saturation probe site  4.  Every 4-6 hours:  If on nasal continuous positive airway pressure, respiratory therapy assess nares and determine need for appliance change or resting period.  8/24/2024 2206 by Tanya Carey, RN  Outcome: Progressing  8/24/2024 1023 by Maryellen Burns, RN  Outcome: Progressing     Problem: Safety - Adult  Goal: Free from fall injury  8/24/2024 2206 by Tanya Carey, RN  Outcome: Progressing  8/24/2024 1023 by Maryellen Burns, RN  Outcome: Progressing     Problem: Risk for Elopement  Goal: Patient will not exit the unit/facility without proper excort  Outcome: Progressing

## 2024-08-25 NOTE — PLAN OF CARE
Problem: Safety - Adult  Goal: Free from fall injury  Outcome: Progressing     Problem: Chronic Conditions and Co-morbidities  Goal: Patient's chronic conditions and co-morbidity symptoms are monitored and maintained or improved  Outcome: Progressing     Problem: Respiratory - Adult  Goal: Achieves optimal ventilation and oxygenation  Outcome: Progressing     Problem: Discharge Planning  Goal: Discharge to home or other facility with appropriate resources  Outcome: Progressing

## 2024-08-25 NOTE — PROGRESS NOTES
OCCUPATIONAL THERAPY BEDSIDE TREATMENT NOTE   [facility]  [address], [place]       Date:2024  Patient Name: Marge Callejas  MRN: [id number]  : 1940  Room: [address] [place] [address]     Per OT Eval:    Evaluating OT: Dana Wilkinson, DENAE, OTR/L  # [id number]     Referring Provider:  Gama Parham MD   Specific Provider Orders:  \"OT Eval and Treat\"  8-10-24     Diagnosis: Abdominal pain, left upper quadrant [R10.12]  Acute on chronic combined systolic and diastolic heart failure (HCC) [I50.43]  Lower extremity edema [R60.0]  ESRD on dialysis (HCC) [N18.6, Z99.2]  Bilateral lower extremity edema [R60.0]  Heel pain, bilateral [M79.671, M79.672]     Pt was admitted w/ LE swelling, Left Upper Abdominal Pain, Frankie Heel Pain  24:  Pt experienced ~ 8 beats of VTach and Respiratory Distress, worsened during Dialysis.  Transferred to [facility] for continued care.       Pertinent Medical History:  Pt has a past medical history of Arthritis, Atrial fibrillation (HCC), Blood circulation, collateral, CHF (congestive heart failure) (HCC), Chronic renal insufficiency, stage IV (severe) (HCC), Coronary artery disease involving native coronary artery of native heart without angina pectoris, History of cardiovascular stress test, History of echocardiogram, Hyperlipidemia, Hypertension, and Mixed restrictive and obstructive lung disease (HCC).,  has a past surgical history that includes Ectopic pregnancy surgery; Upper gastrointestinal endoscopy (N/A, 4/3/2021); Colonoscopy (N/A, 4/3/2021); Colonoscopy (4/3/2021); Colonoscopy (4/3/2021); Colonoscopy (4/3/2021); Upper gastrointestinal endoscopy (N/A, 2021); and Colonoscopy (N/A, 2023).     Surgeries this admission: None      Precautions:  Fall Risk  Cognition - Alarms, HD TRS  8L O2 HF NC, continuous pulse-ox  Easy To Chew Solids, Thin Liquids, No Straw  TAPS     Assessment of current deficits   [x]    Neuro Re-ed [id number]     Group Therapy      Orthotic manage/training  04054     Non-Billable Time     Total Timed Treatment 12 1     Veronica SOUSA/KAYLA [id number]

## 2024-08-25 NOTE — PROGRESS NOTES
Internal Medicine Progress Note    Patient's name: Marge Callejas  : 1940  Admission date: 2024  Date of service: 2024   Room: [address] PICU  Primary care physician: Marek Andres DO  Reason for visit: metabolic encephalopathy, ESRD on dialysis, a fib,  acute on chronic respiratory hypoxia and acute on chronic HFrEF.    Subjective  Marge was seen and examined at bedside. Pt was seen on NC 5L this morning, pt refused to get another IV access today.     Changed IV meds to oral.     I have personally reviewed and interpreted the most recent labs and imaging studies as summarized below:     Lab resulted on : K 3.3, WBC normal, H/H stable.       Review of Systems  There are no new complaints of chest pain, chest tightness, shortness of breath, cough, abdominal pain, vision change, nausea, vomiting, diarrhea, constipation.    Hospital Medications  Current Facility-Administered Medications   Medication Dose Route Frequency Provider Last Rate Last Admin    doxycycline hyclate (VIBRAMYCIN) capsule 100 mg  100 mg Oral 2 times per day Angelo Feliciano MD        cefdinir (OMNICEF) capsule 300 mg  300 mg Oral Q48H Angelo Feliciano MD        predniSONE (DELTASONE) tablet 40 mg  40 mg Oral Daily Angelo Feliciano MD        [START ON 2024] cefdinir (OMNICEF) capsule 300 mg  300 mg Oral Once per day on  Angelo Feliciano MD        Polyvinyl Alcohol-Povidone PF (REFRESH) 1.4-0.6 % ophthalmic solution 1 drop  1 drop Both Eyes PRN Angelo Feliciano MD        glucose chewable tablet 16 g  4 tablet Oral PRN Favian Mancera MD        dextrose bolus 10% 125 mL  125 mL IntraVENous PRN Favian Mancera MD        Or    dextrose bolus 10% 250 mL  250 mL IntraVENous PRN Favian Mancera MD        glucagon injection 1 mg  1 mg SubCUTAneous PRN Favian Mancera MD        dextrose 10 % infusion   IntraVENous Continuous PRN Favian Mancera MD        bumetanide (BUMEX) tablet 1 mg  1 mg Oral Daily Favian Mancera MD  Gama MITCHELL MD         Facility-Administered Medications Ordered in Other Encounters   Medication Dose Route Frequency Provider Last Rate Last Admin    acyclovir (ZOVIRAX) 285 mg in sodium chloride 0.9 % 50 mL IVPB  5 mg/kg (Adjusted) IntraVENous Once Nanette Mendoza MD           PRN Medications  Polyvinyl Alcohol-Povidone PF, glucose, dextrose bolus **OR** dextrose bolus, glucagon (rDNA), dextrose, albumin human 25%, sodium chloride flush, sodium chloride, polyethylene glycol, acetaminophen **OR** acetaminophen, benzocaine-menthol, benzonatate, bisacodyl, calcium carbonate, ipratropium 0.5 mg-albuterol 2.5 mg, lactulose, meclizine, sennosides-docusate sodium, sodium chloride, prochlorperazine    Objective  Most Recent Recorded Vitals  BP (!) 127/57   Pulse 75   Temp 98.1 °F (36.7 °C) (Oral)   Resp 19   Ht 1.626 m (5' 4\")   Wt 52.4 kg (115 lb 8.3 oz)   SpO2 92%   BMI 19.83 kg/m²   I/O last 3 completed shifts:  In: 300   Out: 2000   No intake/output data recorded.    Physical Exam:  General: pt is alert  in no acute distress  Eyes: pupils round, equal , reactive to light, EOMI  Skin: no rashes or lesions  Lungs: no retractions/use of accessory muscles, no wheezing no crackles  Heart: irregular rhythm,  heart rate is in acceptable range, no big murmur  Abdomen: soft, non-tender; bowel sounds normal; no masses and no organomegaly appreciated  Extremities: atraumatic, no cyanosis, no edema  Neurologic: cranial nerves 2-12 grossly intact, no slurred speech.    Most Recent Labs  Lab Results   Component Value Date    WBC 8.6 08/24/2024    HGB 8.8 (L) 08/24/2024    HCT 27.2 (L) 08/24/2024     08/22/2024     08/24/2024    K 3.3 (L) 08/24/2024    CL 99 08/24/2024    CREATININE 1.9 (H) 08/24/2024    BUN 20 08/24/2024    CO2 24 08/24/2024    GLUCOSE 162 (H) 08/24/2024    ALT 30 08/22/2024    AST 45 (H) 08/22/2024    INR 1.5 08/11/2024    APTT 35.0 05/15/2021    TSH 15.68 (H) 08/11/2024    LABA1C 5.0

## 2024-08-25 NOTE — PROGRESS NOTES
Pulmonary Critical Care Medicine           PULMONARY  CRITICAL CARE   SERVICE DAILY PROGRESS  NOTE     2024   Hospital LOS:  LOS: 13 days     Impression / Recommendations:  Acute on chronic hypoxic respiratory failure secondary to acute on chronic pulmonary vascular congestion in the setting of heart failure with reduced ejection fraction+ ESRD with hemodialysis+ probable chronic aspiration pneumonia/pneumonitis+ questionable community-acquired pneumonia+ questionable hospital-acquired pneumonia.     A-fib with RVR    -Patient's baseline oxygen requirement is 3 L/min, currently she continues to be on 5 L.  Continue to wean as tolerated.  -Nephrology following for hemodialysis  -Continue Eliquis for A-fib she is also on amiodarone  -On Omnicef and Doxy, complete total of 7 days  -Okay from pulmonary standpoint to be discharged     Interval History/Event Changes:  Patient not interested in interview and communications  Appears sleepy  Appears awake when aroused but does not want to participate in conversation  No overnight events  Received hemodialysis yesterday()    Allergies  No Known Allergies    Review of Systems    Unable to obtain because of patient's participation issues    Vitals-     BP (!) 127/57   Pulse 75   Temp 98.1 °F (36.7 °C) (Oral)   Resp 19   Ht 1.626 m (5' 4\")   Wt 52.4 kg (115 lb 8.3 oz)   SpO2 92%   BMI 19.83 kg/m²    Tmax: Temp (24hrs), Av.8 °F (36.6 °C), Min:96.9 °F (36.1 °C), Max:98.4 °F (36.9 °C)      Hemodynamics:  Cuff:   Systolic (24hrs), Av , Min:100 , Max:127   /Diastolic (24hrs), Av, Min:56, Max:73    Cuff MAP:MAP (mmHg)  Av.1  Min: 59  Max: 98  P:   Pulse  Av.6  Min: 66  Max: 81      Airway:     Observed RR: Resp  Av.3  Min: 16  Max: 19   Observed O2 sats: SpO2  Av.6 %  Min: 82 %  Max: 100 %      Intake/Output Summary (Last 24 hours) at 2024 1332  Last data filed at 2024 1539  Gross per 24 hour   Intake 300 ml   Output

## 2024-08-25 NOTE — PROGRESS NOTES
Physical Therapy  Physical Therapy Treatment     Name: Marge Callejas  : 1940  MRN: [id number]      Date of Service: 2024    Evaluating PT:  Estiven Elise PT, DPT    Room #:  4508/4508-A  Diagnosis:  Abdominal pain, left upper quadrant [R10.12]  Acute on chronic combined systolic and diastolic heart failure (HCC) [I50.43]  Lower extremity edema [R60.0]  ESRD on dialysis (HCC) [N18.6, Z99.2]  Bilateral lower extremity edema [R60.0]  Heel pain, bilateral [M79.671, M79.672]  PMHx/PSHx:  afib, arthritis, CHF, HLD, HTN  Procedure/Surgery:  N/A  Precautions:  fall risk, TSM, bed alarm, O2, [place]  Equipment Needs:  TBD    SUBJECTIVE:    Pt admitted from [facility]. Pt reports she is non-ambulatory PTA.    OBJECTIVE:   Initial Evaluation  Date: 24 Treatment Date: 24 Short Term/ Long Term   Goals   AM-PAC 6 Clicks 10/24 11/24    Was pt agreeable to Eval/treatment? yes Yes with encouragement    Does pt have pain? Unrated RLE No specific c/o pain     Bed Mobility  Rolling: min A  Supine to sit: min A  Sit to supine: min A  Scooting: min A Rolling: Michael  Supine to sit: Michael  Sit to supine: Michael  Scooting: Michael Rolling: mod I  Supine to sit: mod I  Sit to supine: mod I  Scooting: mod I   Transfers Sit to stand: NT, pt declined  Stand to sit: NT  Stand pivot: NT Sit to stand: MaxA  Stand to sit: MaxA  Stand pivot: NT, pt refused Sit to stand: mod I  Stand to sit: mod I  Stand pivot: mod I with AAD   Ambulation    NT NT Make ambulation goal as appropriate   Stair negotiation: ascended and descended  NT NT NT     Pt is A & O x 1 to self, pt progressively agitated during session  Sensation:  NT  Edema:  none noted      Patient education  Pt educated on role of PT, safety during mobility    Patient response to education:   Pt verbalized understanding Pt demonstrated skill Pt requires further education in this area   yes yes yes     ASSESSMENT:    Comments:  pt semi-supine in bed upon entry and agreeable

## 2024-08-25 NOTE — PROGRESS NOTES
Pt is very agitated and verbally abusive.   She is refusing all medications. Pt is also refusing afternoon vitals and afternoon blood glucose check. RN provided education. Pt stated goodbye and told this RN to leave her alone.    Electronically signed by Maryellen Burns RN on 8/25/2024 at 11:48 AM      Pt refused 1500 vitals, blood sugar check, and medication. RN provided education and was told to get out the room. Dr. Feliciano notified via [employer].    Electronically signed by Maryellen Burns RN on 8/25/2024 at 4:19 PM

## 2024-08-26 ENCOUNTER — APPOINTMENT (OUTPATIENT)
Dept: GENERAL RADIOLOGY | Age: 84
DRG: 291 | End: 2024-08-26
Payer: MEDICARE

## 2024-08-26 LAB
ALBUMIN SERPL-MCNC: 3.4 G/DL (ref 3.5–5.2)
ALP SERPL-CCNC: 120 U/L (ref 35–104)
ALT SERPL-CCNC: 56 U/L (ref 0–32)
ANION GAP SERPL CALCULATED.3IONS-SCNC: 19 MMOL/L (ref 7–16)
AST SERPL-CCNC: 42 U/L (ref 0–31)
BILIRUB SERPL-MCNC: 0.6 MG/DL (ref 0–1.2)
BNP SERPL-MCNC: ABNORMAL PG/ML (ref 0–450)
BUN SERPL-MCNC: 56 MG/DL (ref 6–23)
CALCIUM SERPL-MCNC: 9.2 MG/DL (ref 8.6–10.2)
CHLORIDE SERPL-SCNC: 101 MMOL/L (ref 98–107)
CO2 SERPL-SCNC: 21 MMOL/L (ref 22–29)
CREAT SERPL-MCNC: 4.2 MG/DL (ref 0.5–1)
ERYTHROCYTE [DISTWIDTH] IN BLOOD BY AUTOMATED COUNT: 18.6 % (ref 11.5–15)
GFR, ESTIMATED: 10 ML/MIN/1.73M2
GLUCOSE BLD-MCNC: 104 MG/DL (ref 74–99)
GLUCOSE BLD-MCNC: 152 MG/DL (ref 74–99)
GLUCOSE BLD-MCNC: 254 MG/DL (ref 74–99)
GLUCOSE BLD-MCNC: 275 MG/DL (ref 74–99)
GLUCOSE SERPL-MCNC: 194 MG/DL (ref 74–99)
HCT VFR BLD AUTO: 23.1 % (ref 34–48)
HGB BLD-MCNC: 7.5 G/DL (ref 11.5–15.5)
MAGNESIUM SERPL-MCNC: 1.8 MG/DL (ref 1.6–2.6)
MCH RBC QN AUTO: 34.9 PG (ref 26–35)
MCHC RBC AUTO-ENTMCNC: 32.5 G/DL (ref 32–34.5)
MCV RBC AUTO: 107.4 FL (ref 80–99.9)
PHOSPHATE SERPL-MCNC: 2.8 MG/DL (ref 2.5–4.5)
PLATELET, FLUORESCENCE: 178 K/UL (ref 130–450)
PMV BLD AUTO: 13.4 FL (ref 7–12)
POTASSIUM SERPL-SCNC: 3.5 MMOL/L (ref 3.5–5)
PROT SERPL-MCNC: 5.7 G/DL (ref 6.4–8.3)
RBC # BLD AUTO: 2.15 M/UL (ref 3.5–5.5)
SODIUM SERPL-SCNC: 141 MMOL/L (ref 132–146)
TROPONIN I SERPL HS-MCNC: 68 NG/L (ref 0–9)
WBC OTHER # BLD: 9.2 K/UL (ref 4.5–11.5)

## 2024-08-26 PROCEDURE — 6370000000 HC RX 637 (ALT 250 FOR IP): Performed by: FAMILY MEDICINE

## 2024-08-26 PROCEDURE — 36415 COLL VENOUS BLD VENIPUNCTURE: CPT

## 2024-08-26 PROCEDURE — 83880 ASSAY OF NATRIURETIC PEPTIDE: CPT

## 2024-08-26 PROCEDURE — 2700000000 HC OXYGEN THERAPY PER DAY

## 2024-08-26 PROCEDURE — 6370000000 HC RX 637 (ALT 250 FOR IP): Performed by: STUDENT IN AN ORGANIZED HEALTH CARE EDUCATION/TRAINING PROGRAM

## 2024-08-26 PROCEDURE — 6370000000 HC RX 637 (ALT 250 FOR IP)

## 2024-08-26 PROCEDURE — 85027 COMPLETE CBC AUTOMATED: CPT

## 2024-08-26 PROCEDURE — 71045 X-RAY EXAM CHEST 1 VIEW: CPT

## 2024-08-26 PROCEDURE — 80053 COMPREHEN METABOLIC PANEL: CPT

## 2024-08-26 PROCEDURE — 6360000002 HC RX W HCPCS: Performed by: FAMILY MEDICINE

## 2024-08-26 PROCEDURE — 93005 ELECTROCARDIOGRAM TRACING: CPT | Performed by: STUDENT IN AN ORGANIZED HEALTH CARE EDUCATION/TRAINING PROGRAM

## 2024-08-26 PROCEDURE — 84484 ASSAY OF TROPONIN QUANT: CPT

## 2024-08-26 PROCEDURE — 6370000000 HC RX 637 (ALT 250 FOR IP): Performed by: HOSPITALIST

## 2024-08-26 PROCEDURE — 84100 ASSAY OF PHOSPHORUS: CPT

## 2024-08-26 PROCEDURE — 2060000000 HC ICU INTERMEDIATE R&B

## 2024-08-26 PROCEDURE — 82962 GLUCOSE BLOOD TEST: CPT

## 2024-08-26 PROCEDURE — 83735 ASSAY OF MAGNESIUM: CPT

## 2024-08-26 PROCEDURE — 99233 SBSQ HOSP IP/OBS HIGH 50: CPT | Performed by: INTERNAL MEDICINE

## 2024-08-26 PROCEDURE — 99231 SBSQ HOSP IP/OBS SF/LOW 25: CPT

## 2024-08-26 PROCEDURE — 94660 CPAP INITIATION&MGMT: CPT

## 2024-08-26 PROCEDURE — 94640 AIRWAY INHALATION TREATMENT: CPT

## 2024-08-26 RX ADMIN — IPRATROPIUM BROMIDE AND ALBUTEROL SULFATE 1 DOSE: 2.5; .5 SOLUTION RESPIRATORY (INHALATION) at 16:35

## 2024-08-26 RX ADMIN — PREDNISONE 40 MG: 20 TABLET ORAL at 09:46

## 2024-08-26 RX ADMIN — BUMETANIDE 1 MG: 1 TABLET ORAL at 09:47

## 2024-08-26 RX ADMIN — MEXILETINE HYDROCHLORIDE 150 MG: 150 CAPSULE ORAL at 09:47

## 2024-08-26 RX ADMIN — SALINE NASAL SPRAY 1 SPRAY: 1.5 SOLUTION NASAL at 09:47

## 2024-08-26 RX ADMIN — APIXABAN 2.5 MG: 2.5 TABLET, FILM COATED ORAL at 09:46

## 2024-08-26 RX ADMIN — DOXYCYCLINE HYCLATE 100 MG: 100 CAPSULE ORAL at 21:35

## 2024-08-26 RX ADMIN — LEVOTHYROXINE SODIUM 50 MCG: 0.05 TABLET ORAL at 07:30

## 2024-08-26 RX ADMIN — MIDODRINE HYDROCHLORIDE 15 MG: 10 TABLET ORAL at 11:37

## 2024-08-26 RX ADMIN — Medication 5 MG: at 21:36

## 2024-08-26 RX ADMIN — FERROUS SULFATE TAB 325 MG (65 MG ELEMENTAL FE) 325 MG: 325 (65 FE) TAB at 09:47

## 2024-08-26 RX ADMIN — DOXYCYCLINE HYCLATE 100 MG: 100 CAPSULE ORAL at 09:45

## 2024-08-26 RX ADMIN — MEXILETINE HYDROCHLORIDE 150 MG: 150 CAPSULE ORAL at 17:13

## 2024-08-26 RX ADMIN — SACUBITRIL AND VALSARTAN 0.5 TABLET: 24; 26 TABLET, FILM COATED ORAL at 09:46

## 2024-08-26 RX ADMIN — FERROUS SULFATE TAB 325 MG (65 MG ELEMENTAL FE) 325 MG: 325 (65 FE) TAB at 17:14

## 2024-08-26 RX ADMIN — MIDODRINE HYDROCHLORIDE 15 MG: 10 TABLET ORAL at 09:46

## 2024-08-26 RX ADMIN — AMIODARONE HYDROCHLORIDE 200 MG: 200 TABLET ORAL at 17:13

## 2024-08-26 RX ADMIN — INSULIN LISPRO 4 UNITS: 100 INJECTION, SOLUTION INTRAVENOUS; SUBCUTANEOUS at 11:38

## 2024-08-26 RX ADMIN — APIXABAN 2.5 MG: 2.5 TABLET, FILM COATED ORAL at 21:36

## 2024-08-26 RX ADMIN — AMIODARONE HYDROCHLORIDE 200 MG: 200 TABLET ORAL at 09:45

## 2024-08-26 RX ADMIN — METOPROLOL SUCCINATE 12.5 MG: 25 TABLET, EXTENDED RELEASE ORAL at 09:47

## 2024-08-26 RX ADMIN — ASPIRIN 81 MG: 81 TABLET, COATED ORAL at 09:46

## 2024-08-26 RX ADMIN — SACUBITRIL AND VALSARTAN 0.5 TABLET: 24; 26 TABLET, FILM COATED ORAL at 21:35

## 2024-08-26 RX ADMIN — ATORVASTATIN CALCIUM 40 MG: 40 TABLET, FILM COATED ORAL at 09:46

## 2024-08-26 RX ADMIN — EPOETIN ALFA-EPBX 2000 UNITS: 2000 INJECTION, SOLUTION INTRAVENOUS; SUBCUTANEOUS at 17:14

## 2024-08-26 ASSESSMENT — PAIN SCALES - GENERAL
PAINLEVEL_OUTOF10: 0

## 2024-08-26 NOTE — PROGRESS NOTES
[facility]  MD Murtaza White MD Ali Hassan, MD Lisa Kniska, CNP   Lucille Beth, JOSE Fernandez, JUDE  Progress Note    8/26/2024    SUBJECTIVE:   8/12 : Laying in bed, no acute distress. Daughter is at the bedside. For dialysis this afternoon. She denies any dyspnea. She is edematous. Blood pressure is low.     8/13: Seen while on dialysis. Tolerating therapy without issues. BP is stable, but on the lower side. She denies any dyspnea, chest pain, or palpitations. Lower extremities are edematous.     8/16: Seen while on dialysis. Tolerating treatment without issues. She denies any dyspnea, chest pain, or palpitations. For discharge later this afternoon.     8/17 HD today no reported issues   Charts reviewed .     8/18 increased o2 requirement  Clinically in HF .     8/19: Laying in bed, on 10 liters of oxygen via high flow nasal canula. Feels a bit better. Hesitant to go to HD today as she cramped yesterday. BP stable, on the lower side. RRT called last night for hypoxia and she was placed on AirVo for a short time.     8/20: Seen while on dialysis. Tolerating therapy without issues, though UF had to be decreased because her BP dropped during treatment. Fluid removal was limited. She is feeling better, though still has some dyspnea.     8/21:  Sitting up in bed.  NAD  Oxygen on per NC.  She complained of nausea on dialysis yesterday.  Denies chest pain, palpitations or SOB.  Denies nausea today.  Family at bedside.     8/22:  Laying in bed.  Agitated today because she wants to go home.  Denies chest pain, palpitations or SOB.  Denies nausea.    8/23: Laying in bed, no acute distress. She is on 5 liters of oxygen. She denies any dyspnea, chest pain, or palpitations. No lower extremity edema. Blood pressure is stable.     8/24 lying in bed , on o2/nc comfortable   HD today no reported issues 2L UF    8/25: Asleep, easily awakened.  Does not wish to participate in

## 2024-08-26 NOTE — PROGRESS NOTES
[address] [place] [address] [facility]  Progress Note    Chief complain:  Fatigue       SUBJECTIVE:    Patient is awake, alert , seen at bedside  Daughter present   Afebrile, no leukocytosis   Sleeping soundly but awakens   No abd pain, no nausea, vomiting, diarrhea, fever, chills, rash     ROS:  Negative except as above     OBJECTIVE:    Vitals:    04/05/21 1930 04/06/21 0507 04/06/21 0527 04/06/21 0930   BP: (!) 152/89 (!) 149/81  (!) 156/85   Pulse: 93   98   Resp: 18   18   Temp: 97.5 °F (36.4 °C)   98.6 °F (37 °C)   TempSrc: Infrared   Oral   SpO2: 95%   93%   Weight:   138 lb (62.6 kg)    Height:           HEENT :         unremarkable   Heart:             RRR,  No murmurs, no gallops  Lungs:            clear to auscultation, no wheezing , no rales  Abdomen:       soft, non tender, bowel sounds present  Extremites:     No edema, no ulcers,  Skin:                Normal turgor,normal texture  piv             Jane     Current Facility-Administered Medications   Medication Dose Route Frequency Provider Last Rate Last Admin    acetaminophen (TYLENOL) tablet 650 mg  650 mg Oral Q6H PRN Bud Juan APRN - CNP        fluconazole (DIFLUCAN) tablet 100 mg  100 mg Oral Daily Valeriano Honeycutt MD   100 mg at 04/06/21 0902    pantoprazole (PROTONIX) tablet 40 mg  40 mg Oral QAM AC Suhail Aldrich, DO   40 mg at 04/06/21 0507    chlorothiazide (DIURIL) injection 500 mg  500 mg Intravenous BID Jake Hunter MD   500 mg at 04/06/21 0933    bumetanide (BUMEX) 12.5 mg in sodium chloride 0.9 % 125 mL infusion  1 mg/hr Intravenous Continuous Hasit Maxx Power MD 10 mL/hr at 04/05/21 2100 1 mg/hr at 04/05/21 2100    sodium bicarbonate tablet 650 mg  650 mg Oral 4x Daily Hasit BRIAN Lerma MD   650 mg at 04/06/21 1416    0.9 % sodium chloride infusion   Intravenous PRN Taniya Bustillo DO        0.9 % sodium chloride infusion   Intravenous PRN Jennifer Bates DO        atorvastatin (LIPITOR) tablet 40 mg  40 mg Oral Nightly Allegra Rothbury, DO   40 mg at 04/05/21 2100    carvedilol (COREG) tablet 6.25 mg  6.25 mg Oral BID Allegra Rothbury, DO   6.25 mg at 04/06/21 2959    hydrALAZINE (APRESOLINE) tablet 25 mg  25 mg Oral 3 times per day Allegra Rothbury, DO   25 mg at 04/06/21 1416    isosorbide mononitrate (IMDUR) extended release tablet 30 mg  30 mg Oral Daily Allegra Rothbury, DO   30 mg at 04/06/21 0902    [START ON 4/7/2021] vitamin D (ERGOCALCIFEROL) capsule 50,000 Units  50,000 Units Oral Weekly Allegra Saad, DO        albuterol (PROVENTIL) nebulizer solution 2.5 mg  2.5 mg Nebulization Q6H PRN Allegra Rothbury, DO        ondansetron [facility]) injection 4 mg  4 mg Intravenous Q6H PRN Allegra Rothbury, DO        promethazine (PHENERGAN) injection 25 mg  25 mg Intravenous Q6H PRN Allegra Rothbury, DO        morphine (PF) injection 2 mg  2 mg Intravenous Q4H PRN Allegra Rothbury, DO   2 mg at 03/31/21 1802    meropenem (MERREM) 500 mg in sodium chloride 0.9 % 100 mL IVPB  500 mg Intravenous Q12H Elaine Gómez  mL/hr at 04/06/21 1416 500 mg at 04/06/21 1416        LABS    CBC:     WBC   Date Value Ref Range Status   04/06/2021 12.0 (H) 4.5 - 11.5 E9/L Final   04/05/2021 10.4 4.5 - 11.5 E9/L Final   04/04/2021 10.7 4.5 - 11.5 E9/L Final     Hematocrit   Date Value Ref Range Status   04/06/2021 27.2 (L) 34.0 - 48.0 % Final   04/05/2021 25.7 (L) 34.0 - 48.0 % Final   04/04/2021 28.8 (L) 34.0 - 48.0 % Final     Platelets   Date Value Ref Range Status   04/06/2021 196 130 - 450 E9/L Final   04/05/2021 210 130 - 450 E9/L Final   04/04/2021 285 130 - 450 E9/L Final         BMP:      Sodium   Date Value Ref Range Status   04/06/2021 142 132 - 146 mmol/L Final   04/05/2021 141 132 - 146 mmol/L Final   04/04/2021 144 132 - 146 mmol/L Final     Potassium   Date Value Ref Range Status   04/06/2021 3.3 (L) 3.5 - 5.0 mmol/L Final   04/05/2021 3.5 3.5 - 5.0 mmol/L Final   04/04/2021 3.8 3.5 - 5.0 mmol/L Final     Chloride   Date Value Ref Range Status 04/06/2021 102 98 - 107 mmol/L Final   04/05/2021 108 (H) 98 - 107 mmol/L Final   04/04/2021 113 (H) 98 - 107 mmol/L Final     CO2   Date Value Ref Range Status   04/06/2021 26 22 - 29 mmol/L Final   04/05/2021 22 22 - 29 mmol/L Final   04/04/2021 18 (L) 22 - 29 mmol/L Final     BUN   Date Value Ref Range Status   04/06/2021 35 (H) 8 - 23 mg/dL Final   04/05/2021 36 (H) 8 - 23 mg/dL Final   04/04/2021 37 (H) 8 - 23 mg/dL Final     CREATININE   Date Value Ref Range Status   04/06/2021 2.9 (H) 0.5 - 1.0 mg/dL Final   04/05/2021 3.3 (H) 0.5 - 1.0 mg/dL Final   04/04/2021 3.3 (H) 0.5 - 1.0 mg/dL Final     Glucose   Date Value Ref Range Status   04/06/2021 118 (H) 74 - 99 mg/dL Final   04/05/2021 125 (H) 74 - 99 mg/dL Final   04/04/2021 115 (H) 74 - 99 mg/dL Final         Hepatic Function Panel:    Lab Results   Component Value Date    ALKPHOS 94 04/06/2021    ALT 7 04/06/2021    AST 13 04/06/2021    PROT 5.9 04/06/2021    BILITOT 1.0 04/06/2021    LABALBU 3.6 04/06/2021        No results found for: Olean Bosworth    No results found for: CRP    Microbiology :  Blood Culture, Routine   Date Value Ref Range Status   03/31/2021 5 Days no growth  Final     Culture, Blood 2   Date Value Ref Range Status   03/31/2021 5 Days no growth  Final     Urine Culture, Routine   Date Value Ref Range Status   04/01/2021 <10,000 CFU/mL  Mixed gram positive organisms    Final     No results found for: [facility]  No results found for: [facility]    Radiology :  Reviewed     ASSESSMENT:   PT CAME IN WITH ABD PAIN AND DIARRHEA  SHE HAS LEUKOCYTOSIS/anemia better  GIB/pancreatic mass?hemorrhagic/bowel/gi perforation   S/P RX UTI KLEBSIELLA /CEFDINIR from [facility]  candiduria   CKD   AAA seen by vasc    -EGD revealed multiple esophageal diverticuli, gastritis and 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 Examined along with NP   Agree with above   UTI -- resolved     I have discussed the case, including pertinent history and physical  exam findings . I have seen and examined the patient and the key elements of the encounter have been performed by me. I agree with the assessment, plan and orders as documented.       Treatment plan as per my recommendation     Zack Vasquez MD, FACP  4/6/2021  5:37 PM [Well Nourished] : well nourished [No Acute Distress] : no acute distress [Normal Conjunctiva] : normal conjunctiva [Normal Venous Pressure] : normal venous pressure [Carotid Bruit] : carotid bruit [Normal S1, S2] : normal S1, S2 [No Rub] : no rub [No Gallop] : no gallop [Murmur] : murmur [Clear Lung Fields] : clear lung fields [Good Air Entry] : good air entry [No Respiratory Distress] : no respiratory distress  [Soft] : abdomen soft [Non Tender] : non-tender [No Masses/organomegaly] : no masses/organomegaly [Normal Bowel Sounds] : normal bowel sounds [Normal Gait] : normal gait [No Edema] : no edema [No Cyanosis] : no cyanosis [No Clubbing] : no clubbing [No Varicosities] : no varicosities [No Rash] : no rash [No Skin Lesions] : no skin lesions [Moves all extremities] : moves all extremities [No Focal Deficits] : no focal deficits [Normal Speech] : normal speech [Alert and Oriented] : alert and oriented [Normal memory] : normal memory [de-identified] : wyatt

## 2024-08-26 NOTE — PROGRESS NOTES
Patient refusing IV access since day shift. Was very reluctant to take her night medications but agreed to only take that. Will not take anything else ordered other than her eliquis, doxycycline and mexiltine as of now. Education provided on all medication ordered but patient is confused and was refusing medications and some care during day time. NP Giovanni Duran aware patient refusing potassium phos and IV access at this time.

## 2024-08-26 NOTE — CARE COORDINATION
Patient weaned to liter flow [facility] can accept on 8/24. Called liaison, she will initiate precert today. Called and cancelled referral to [facility] Ambulance on soft chart.     For questions I can be reached at [phone]. GARY Evangelista

## 2024-08-26 NOTE — PROGRESS NOTES
Comprehensive Nutrition Assessment    Type and Reason for Visit:  Reassess    Nutrition Recommendations/Plan:   Continue Diet.    Will Modify Current ONS d/t current 1200ml FR and monitor.       Malnutrition Assessment:  Malnutrition Status:  Moderate malnutrition (08/19/24 1236)    Context:  Chronic Illness     Findings of the 6 clinical characteristics of malnutrition:  Energy Intake:  75% or less estimated energy requirements for 1 month or longer  Weight Loss:  Unable to assess (2/2 fluid shifts)     Body Fat Loss:   (moderate) Orbital, Triceps, Buccal region   Muscle Mass Loss:   (moderate) Temples (temporalis), Clavicles (pectoralis & deltoids), Hand (interosseous), Scapula (trapezius)  Fluid Accumulation:  Unable to assess (2/2 CHF/ESRD)     Strength:  Not Performed    Nutrition Assessment:    Pt slowly improving however remains at risk d/t +Moderate Malnutrition w/ ongoing poor appetite/intake since adm w/ increased needs for noted wounds w/ known HD losses.  Pt adm from SNF w/ BLE edema/SOB and LUQ pain.  PMHx CHF, HTN, HLD, ICM, CAD, ESRD/HD, non-compliance, VT, anemia.  Adm w/ CHFe, small B/L pleural effusions, acute metabolic encephalopathy, Acute on Chronic Hypoxic Respiratory Failure, hypotension and noted VT.  Noted RRT for respiratory distress post-HD 8/8.  Now s/p MBSS revealing mild-mod Oropharyngeal Dysphagia/SLP rec for Easy to Chew/Thins 8/21.  Will Modify Current ONS d/t current 1200ml FR and monitor.    Nutrition Related Findings:    A&O, confused/agitated at times, U/L dentures, Abd/BS WDL, loose stools, +1 edema, -I/O's, elevated BUN/Cr/BGL/LFTs, K+/Phos WNL, +HD Wound Type: Pressure Injury, Stage I       Current Nutrition Intake & Therapies:    Average Meal Intake: 51-75% (sporadic intake noted)  Average Supplements Intake: 51-75%  ADULT ORAL NUTRITION SUPPLEMENT; Breakfast, Dinner; Wound Healing Oral Supplement  ADULT ORAL NUTRITION SUPPLEMENT; Breakfast, Dinner; Fortified Gelatin Oral

## 2024-08-26 NOTE — PLAN OF CARE
Problem: Skin/Tissue Integrity  Goal: Absence of new skin breakdown  Description: 1.  Monitor for areas of redness and/or skin breakdown  2.  Assess vascular access sites hourly  3.  Every 4-6 hours minimum:  Change oxygen saturation probe site  4.  Every 4-6 hours:  If on nasal continuous positive airway pressure, respiratory therapy assess nares and determine need for appliance change or resting period.  Outcome: Progressing     Problem: Safety - Adult  Goal: Free from fall injury  8/25/2024 2231 by Ebony Gutierrez, RN  Outcome: Progressing  8/25/2024 1217 by Maryellen Burns, RN  Outcome: Progressing     Problem: Chronic Conditions and Co-morbidities  Goal: Patient's chronic conditions and co-morbidity symptoms are monitored and maintained or improved  8/25/2024 2231 by Ebony Gutierrez, RN  Outcome: Progressing  8/25/2024 1217 by Maryellen Burns, RN  Outcome: Progressing

## 2024-08-26 NOTE — PROGRESS NOTES
[facility]  MD Murtaza White MD Ali Hassan, MD Lisa Kniska, CNP   Lucille Beth, JOSE Fernandez, JUDE  Progress Note    8/25/2024    SUBJECTIVE:   8/12 : Laying in bed, no acute distress. Daughter is at the bedside. For dialysis this afternoon. She denies any dyspnea. She is edematous. Blood pressure is low.     8/13: Seen while on dialysis. Tolerating therapy without issues. BP is stable, but on the lower side. She denies any dyspnea, chest pain, or palpitations. Lower extremities are edematous.     8/16: Seen while on dialysis. Tolerating treatment without issues. She denies any dyspnea, chest pain, or palpitations. For discharge later this afternoon.     8/17 HD today no reported issues   Charts reviewed .     8/18 increased o2 requirement  Clinically in HF .     8/19: Laying in bed, on 10 liters of oxygen via high flow nasal canula. Feels a bit better. Hesitant to go to HD today as she cramped yesterday. BP stable, on the lower side. RRT called last night for hypoxia and she was placed on AirVo for a short time.     8/20: Seen while on dialysis. Tolerating therapy without issues, though UF had to be decreased because her BP dropped during treatment. Fluid removal was limited. She is feeling better, though still has some dyspnea.     8/21:  Sitting up in bed.  NAD  Oxygen on per NC.  She complained of nausea on dialysis yesterday.  Denies chest pain, palpitations or SOB.  Denies nausea today.  Family at bedside.     8/22:  Laying in bed.  Agitated today because she wants to go home.  Denies chest pain, palpitations or SOB.  Denies nausea.    8/23: Laying in bed, no acute distress. She is on 5 liters of oxygen. She denies any dyspnea, chest pain, or palpitations. No lower extremity edema. Blood pressure is stable.     8/24 lying in bed , on o2/nc comfortable   HD today no reported issues 2L UF    8/25: Asleep, easily awakened.  Does not wish to participate in

## 2024-08-26 NOTE — PROGRESS NOTES
Palliative Care Department  [phone]  Palliative Care Progress Note  Provider Montserrat Barrios, APRN - CNP      PATIENT: Marge Callejas  : 1940  MRN: [id number]  ADMISSION DATE: 2024  6:23 PM  Referring Provider: Favian Mancera MD     Palliative Medicine was consulted on hospital day 14 for assistance with Goals of care, Symptom management     HPI:     Clinical Summary:Marge Callejas is a 84 y.o. y/o female with a history of A-fib, CHF EF 20- 25%, ICD, severe systolic dysfunction, stage IV chronic renal insufficiency, ESRD on HD, CAD, hypertension, hyperlipidemia mixed restrictive and obstructive lung disease, chronic respiratory failure on 4 L at baseline, AAA who presented to [facility] on 2024 from home with shortness of breath, and bilateral lower extremity edema.  Admitted for further medical management of acute on chronic congestive heart failure with reduced ejection fraction.  Nephrology following, reporting difficulty removing fluid due to hypotension.  She has been seen by cardiology, recommended reduction in antihypertensive therapy to prevent hypotension during dialysis and allow more fluid to be removed, cardiology signed off.  EP consulted to further evaluate discontinuation of amiodarone due to prolonged QT, patient was taking for CRT-D, recommended close monitor potassium and magnesium, continue Metroprolol, amiodarone, and home mexiletine, will plan to follow-up outpatient, EP has signed off. since.  Hospital course complicated with RRT on 2024, due to respiratory failure, found to have pneumonia, pulmonology consulted, on p.o. doxycycline and cefdinir.  Yesterday patient had an RRT was dialysis due to respiratory failure, placed on Airvo, currently on 8 L nasal cannula O2.  There were concern for aspiration, she had MBSS with easy to chew consistent solids with thin liquids diet recommendation.  Patient refused stat chest x-ray. Palliative medicine consulted to assist  further with goals of care, symptom management.    ASSESSMENT/PLAN:     Pertinent Hospital Diagnoses     Acute on chronic heart failure EF 20 to 25%  Bilateral lower extremity edema  ESRD on HD  A-fib on Eliquis  Chronic anemia  Acute hypoxic respiratory failure due to hospital-acquired pneumonia    Palliative Care Encounter / Counseling Regarding Goals of Care  Please see detailed goals of care discussion as below  At this time, Marge Callejas, Does have capacity for medical decision-making.  Capacity is time limited and situation/question specific  During encounter Jonathan was surrogate medical decision-maker  Outcome of goals of care meeting:  Continue with current medical treatment  Continue full code  Daughter has not discussed CODE STATUS with the patient    Code status Full Code  Advanced Directives: no POA or living will in epic  Surrogate/Legal NOK:  Primary Decision Maker: Jonathan Feldman - Child - [phone]  Secondary Decision Maker: Sabrina Ireland - Brother/Sister - [phone]    Spiritual assessment: no spiritual distress identified  Bereavement and grief: to be determined  Referrals to: none today    Thank you for the opportunity to participate in the care of Marge Callejas.     MADELAINE Tubbs CNP  Palliative Medicine     SUBJECTIVE:     Details of Conversation:     Chart reviewed.  Patient seen at the bedside, she was awake, alert, conversant, extremely hard of hearing, and unable to have a meaningful conversation.  Spoke with daughter Jonathan over the phone, she informed, she has not had a chance to discuss CODE STATUS with her mother, she visited her mother yesterday, however it was not a good day to discuss CODE STATUS.  She is going to try to see discussions can be done today when her aunt Sabrina visit the patient.  Case discussed with case management, pre-CERT has been initiated for [facility].  We will continue to follow.     Prognosis: Guarded    OBJECTIVE:     BP (!) 113/54   Pulse 62

## 2024-08-26 NOTE — PROGRESS NOTES
SubCUTAneous TID WC    insulin lispro  0-4 Units SubCUTAneous Nightly    midodrine  15 mg Oral TID WC    amiodarone  200 mg Oral BID WC    levothyroxine  50 mcg Oral Daily    sodium chloride flush  5-40 mL IntraVENous 2 times per day    apixaban  2.5 mg Oral BID    aspirin  81 mg Oral Daily    atorvastatin  40 mg Oral Daily    arformoterol 15 mcg-budesonide 0.25 mg neb solution   Nebulization BID RT    epoetin carmelina-epbx  2,000 Units SubCUTAneous Once per day on Monday Wednesday Friday    melatonin  5 mg Oral Nightly    metoprolol succinate  12.5 mg Oral Daily    mexiletine  150 mg Oral 3 times per day    pantoprazole  40 mg Oral QAM AC    polyethylene glycol  17 g Oral Daily    sacubitril-valsartan  0.5 tablet Oral BID     Polyvinyl Alcohol-Povidone PF, 1 drop, PRN  glucose, 4 tablet, PRN  dextrose bolus, 125 mL, PRN   Or  dextrose bolus, 250 mL, PRN  glucagon (rDNA), 1 mg, PRN  dextrose, , Continuous PRN  albumin human 25%, 25 g, PRN  sodium chloride flush, 5-40 mL, PRN  sodium chloride, , PRN  polyethylene glycol, 17 g, Daily PRN  acetaminophen, 650 mg, Q6H PRN   Or  acetaminophen, 650 mg, Q6H PRN  benzocaine-menthol, 1 lozenge, Q2H PRN  benzonatate, 100 mg, TID PRN  bisacodyl, 10 mg, Daily PRN  calcium carbonate, 500 mg, TID PRN  ipratropium 0.5 mg-albuterol 2.5 mg, 1 Dose, Q6H PRN  lactulose, 10 g, Daily PRN  meclizine, 12.5 mg, TID PRN  sennosides-docusate sodium, 1 tablet, BID PRN  sodium chloride, 1 spray, PRN  prochlorperazine, 5 mg, Q6H PRN         Objective:    /72   Pulse 64   Temp 97 °F (36.1 °C) (Tympanic)   Resp 18   Ht 1.626 m (5' 4\")   Wt 52.4 kg (115 lb 8.3 oz)   SpO2 94%   BMI 19.83 kg/m²     General Appearance: confused, no acute distress  Skin: warm and dry  Head: normocephalic and atraumatic  Eyes: pupils equal, round, and reactive to light, extraocular eye movements intact, conjunctivae normal  Neck: neck supple and non tender without mass   Pulmonary/Chest: clear to auscultation  bilaterally- no wheezes, rales or rhonchi, normal air movement, no respiratory distress  Cardiovascular: normal rate, normal S1 and S2 and no carotid bruits  Abdomen: soft, non-tender, non-distended, normal bowel sounds, no masses or organomegaly  Extremities: no cyanosis, no clubbing and no edema  Neurologic: no cranial nerve deficit and speech normal        Recent Labs     08/24/24  1630 08/26/24  0456    141   K 3.3* 3.5   CL 99 101   CO2 24 21*   BUN 20 56*   CREATININE 1.9* 4.2*   GLUCOSE 162* 194*   CALCIUM 9.2 9.2       Recent Labs     08/24/24  1630 08/26/24  0456   WBC 8.6 9.2   RBC 2.55* 2.15*   HGB 8.8* 7.5*   HCT 27.2* 23.1*   .7* 107.4*   MCH 34.5 34.9   MCHC 32.4 32.5   RDW 18.5* 18.6*   MPV 13.1* 13.4*       Labs and imaging reviewed.     Assessment:    Principal Problem:    Lower extremity edema  Active Problems:    Acute on chronic combined systolic and diastolic heart failure (HCC)    Moderate protein-calorie malnutrition (HCC)  Resolved Problems:    * No resolved hospital problems. *      Plan:    1.  Acute metabolic encephalopathy - Improved  Secondary delirium, Suspected underlying dementia  B12 folate,B1, B6, ammonia are unremarkable , TSH elevated, hormone dose adjusted  PT OT on board, needs placement  Ambulate as appropriate     2. Acute on chronic HFrEF with EF of 20-25%, moderately severe MR and AR, HTN, A fib  Episodic hypotension, Prolonged Qtc, ICD in situ , Vtachy  Bilateral lower extremity edema resolved  ECHO 07/31/2024:    Left Ventricle: Severely reduced left ventricular systolic function with a visually estimated EF of 20 - 25%. Pacemaker/ICD lead present in the right ventricle. Severely reduced systolic function.  continue midodrine to 15 mg TID   Cardiology signed off.   She has a paced rhythm, continue Eliquis,  on PO amiodarone 200mg bid  Restarted mexiletine 150 TID.  Resumed her diuretics bumex 1mg PO daily     3. Acute on chronic hypoxic respiratory failure

## 2024-08-26 NOTE — PLAN OF CARE
Problem: Skin/Tissue Integrity  Goal: Absence of new skin breakdown  Description: 1.  Monitor for areas of redness and/or skin breakdown  2.  Assess vascular access sites hourly  3.  Every 4-6 hours minimum:  Change oxygen saturation probe site  4.  Every 4-6 hours:  If on nasal continuous positive airway pressure, respiratory therapy assess nares and determine need for appliance change or resting period.  8/26/2024 1017 by Jolynn Robert RN  Outcome: Progressing  8/25/2024 2231 by Ebony Gutierrez RN  Outcome: Progressing     Problem: Safety - Adult  Goal: Free from fall injury  8/25/2024 2231 by Ebony Gutierrez RN  Outcome: Progressing     Problem: Chronic Conditions and Co-morbidities  Goal: Patient's chronic conditions and co-morbidity symptoms are monitored and maintained or improved  Recent Flowsheet Documentation  Taken 8/26/2024 0758 by Jolynn Robert, RN  Care Plan - Patient's Chronic Conditions and Co-Morbidity Symptoms are Monitored and Maintained or Improved: Monitor and assess patient's chronic conditions and comorbid symptoms for stability, deterioration, or improvement  8/25/2024 2231 by Ebony Gutierrez, RN  Outcome: Progressing

## 2024-08-27 LAB
B PARAP IS1001 DNA NPH QL NAA+NON-PROBE: NOT DETECTED
B PERT DNA SPEC QL NAA+PROBE: NOT DETECTED
C PNEUM DNA NPH QL NAA+NON-PROBE: NOT DETECTED
EKG ATRIAL RATE: 72 BPM
EKG ATRIAL RATE: 82 BPM
EKG ATRIAL RATE: 91 BPM
EKG Q-T INTERVAL: 450 MS
EKG Q-T INTERVAL: 522 MS
EKG Q-T INTERVAL: 528 MS
EKG QRS DURATION: 166 MS
EKG QRS DURATION: 194 MS
EKG QRS DURATION: 204 MS
EKG QTC CALCULATION (BAZETT): 525 MS
EKG QTC CALCULATION (BAZETT): 538 MS
EKG QTC CALCULATION (BAZETT): 649 MS
EKG R AXIS: -59 DEGREES
EKG R AXIS: 109 DEGREES
EKG R AXIS: 69 DEGREES
EKG T AXIS: -71 DEGREES
EKG T AXIS: -94 DEGREES
EKG T AXIS: 108 DEGREES
EKG VENTRICULAR RATE: 64 BPM
EKG VENTRICULAR RATE: 82 BPM
EKG VENTRICULAR RATE: 91 BPM
FLUAV RNA NPH QL NAA+NON-PROBE: NOT DETECTED
FLUBV RNA NPH QL NAA+NON-PROBE: NOT DETECTED
GLUCOSE BLD-MCNC: 134 MG/DL (ref 74–99)
GLUCOSE BLD-MCNC: 185 MG/DL (ref 74–99)
GLUCOSE BLD-MCNC: 246 MG/DL (ref 74–99)
HADV DNA NPH QL NAA+NON-PROBE: NOT DETECTED
HCOV 229E RNA NPH QL NAA+NON-PROBE: NOT DETECTED
HCOV HKU1 RNA NPH QL NAA+NON-PROBE: NOT DETECTED
HCOV NL63 RNA NPH QL NAA+NON-PROBE: NOT DETECTED
HCOV OC43 RNA NPH QL NAA+NON-PROBE: NOT DETECTED
HMPV RNA NPH QL NAA+NON-PROBE: NOT DETECTED
HPIV1 RNA NPH QL NAA+NON-PROBE: NOT DETECTED
HPIV2 RNA NPH QL NAA+NON-PROBE: NOT DETECTED
HPIV3 RNA NPH QL NAA+NON-PROBE: DETECTED
HPIV4 RNA NPH QL NAA+NON-PROBE: NOT DETECTED
M PNEUMO DNA NPH QL NAA+NON-PROBE: NOT DETECTED
RSV RNA NPH QL NAA+NON-PROBE: NOT DETECTED
RV+EV RNA NPH QL NAA+NON-PROBE: NOT DETECTED
SARS-COV-2 RNA NPH QL NAA+NON-PROBE: NOT DETECTED
SPECIMEN DESCRIPTION: ABNORMAL

## 2024-08-27 PROCEDURE — 6370000000 HC RX 637 (ALT 250 FOR IP)

## 2024-08-27 PROCEDURE — P9047 ALBUMIN (HUMAN), 25%, 50ML: HCPCS | Performed by: INTERNAL MEDICINE

## 2024-08-27 PROCEDURE — 99231 SBSQ HOSP IP/OBS SF/LOW 25: CPT

## 2024-08-27 PROCEDURE — 6370000000 HC RX 637 (ALT 250 FOR IP): Performed by: STUDENT IN AN ORGANIZED HEALTH CARE EDUCATION/TRAINING PROGRAM

## 2024-08-27 PROCEDURE — 2700000000 HC OXYGEN THERAPY PER DAY

## 2024-08-27 PROCEDURE — 0202U NFCT DS 22 TRGT SARS-COV-2: CPT

## 2024-08-27 PROCEDURE — 99232 SBSQ HOSP IP/OBS MODERATE 35: CPT | Performed by: INTERNAL MEDICINE

## 2024-08-27 PROCEDURE — 6360000002 HC RX W HCPCS: Performed by: INTERNAL MEDICINE

## 2024-08-27 PROCEDURE — 6370000000 HC RX 637 (ALT 250 FOR IP): Performed by: INTERNAL MEDICINE

## 2024-08-27 PROCEDURE — 6360000002 HC RX W HCPCS: Performed by: FAMILY MEDICINE

## 2024-08-27 PROCEDURE — 94660 CPAP INITIATION&MGMT: CPT

## 2024-08-27 PROCEDURE — 6370000000 HC RX 637 (ALT 250 FOR IP): Performed by: HOSPITALIST

## 2024-08-27 PROCEDURE — 90935 HEMODIALYSIS ONE EVALUATION: CPT

## 2024-08-27 PROCEDURE — 82962 GLUCOSE BLOOD TEST: CPT

## 2024-08-27 PROCEDURE — 2060000000 HC ICU INTERMEDIATE R&B

## 2024-08-27 PROCEDURE — 93010 ELECTROCARDIOGRAM REPORT: CPT | Performed by: INTERNAL MEDICINE

## 2024-08-27 PROCEDURE — 94640 AIRWAY INHALATION TREATMENT: CPT

## 2024-08-27 PROCEDURE — 6370000000 HC RX 637 (ALT 250 FOR IP): Performed by: FAMILY MEDICINE

## 2024-08-27 RX ORDER — DOXYCYCLINE 100 MG/1
100 CAPSULE ORAL EVERY 12 HOURS SCHEDULED
DISCHARGE
Start: 2024-08-27 | End: 2024-09-02

## 2024-08-27 RX ORDER — PREDNISONE 10 MG/1
TABLET ORAL
DISCHARGE
Start: 2024-08-27 | End: 2024-09-06

## 2024-08-27 RX ORDER — BUMETANIDE 2 MG/1
1 TABLET ORAL DAILY
DISCHARGE
Start: 2024-08-27

## 2024-08-27 RX ORDER — AMIODARONE HYDROCHLORIDE 200 MG/1
200 TABLET ORAL 2 TIMES DAILY WITH MEALS
DISCHARGE
Start: 2024-08-27

## 2024-08-27 RX ORDER — MIDODRINE HYDROCHLORIDE 5 MG/1
15 TABLET ORAL
DISCHARGE
Start: 2024-08-27

## 2024-08-27 RX ORDER — CEFDINIR 300 MG/1
300 CAPSULE ORAL
DISCHARGE
Start: 2024-08-27 | End: 2024-09-01

## 2024-08-27 RX ORDER — FERROUS SULFATE 325(65) MG
325 TABLET ORAL 2 TIMES DAILY WITH MEALS
DISCHARGE
Start: 2024-08-27

## 2024-08-27 RX ADMIN — ARFORMOTEROL TARTRATE: 15 SOLUTION RESPIRATORY (INHALATION) at 21:06

## 2024-08-27 RX ADMIN — MEXILETINE HYDROCHLORIDE 150 MG: 150 CAPSULE ORAL at 15:12

## 2024-08-27 RX ADMIN — LEVOTHYROXINE SODIUM 50 MCG: 0.05 TABLET ORAL at 11:01

## 2024-08-27 RX ADMIN — APIXABAN 2.5 MG: 2.5 TABLET, FILM COATED ORAL at 11:00

## 2024-08-27 RX ADMIN — CEFDINIR 300 MG: 300 CAPSULE ORAL at 11:03

## 2024-08-27 RX ADMIN — AMIODARONE HYDROCHLORIDE 200 MG: 200 TABLET ORAL at 11:00

## 2024-08-27 RX ADMIN — MEXILETINE HYDROCHLORIDE 150 MG: 150 CAPSULE ORAL at 01:28

## 2024-08-27 RX ADMIN — MIDODRINE HYDROCHLORIDE 15 MG: 10 TABLET ORAL at 06:40

## 2024-08-27 RX ADMIN — ASPIRIN 81 MG: 81 TABLET, COATED ORAL at 11:00

## 2024-08-27 RX ADMIN — FERROUS SULFATE TAB 325 MG (65 MG ELEMENTAL FE) 325 MG: 325 (65 FE) TAB at 15:48

## 2024-08-27 RX ADMIN — SACUBITRIL AND VALSARTAN 0.5 TABLET: 24; 26 TABLET, FILM COATED ORAL at 11:01

## 2024-08-27 RX ADMIN — BUMETANIDE 1 MG: 1 TABLET ORAL at 11:00

## 2024-08-27 RX ADMIN — FERROUS SULFATE TAB 325 MG (65 MG ELEMENTAL FE) 325 MG: 325 (65 FE) TAB at 11:01

## 2024-08-27 RX ADMIN — METOPROLOL SUCCINATE 12.5 MG: 25 TABLET, EXTENDED RELEASE ORAL at 11:00

## 2024-08-27 RX ADMIN — DOXYCYCLINE HYCLATE 100 MG: 100 CAPSULE ORAL at 20:52

## 2024-08-27 RX ADMIN — APIXABAN 2.5 MG: 2.5 TABLET, FILM COATED ORAL at 20:51

## 2024-08-27 RX ADMIN — AMIODARONE HYDROCHLORIDE 200 MG: 200 TABLET ORAL at 15:48

## 2024-08-27 RX ADMIN — MIDODRINE HYDROCHLORIDE 15 MG: 10 TABLET ORAL at 11:11

## 2024-08-27 RX ADMIN — MIDODRINE HYDROCHLORIDE 15 MG: 10 TABLET ORAL at 15:48

## 2024-08-27 RX ADMIN — DOXYCYCLINE HYCLATE 100 MG: 100 CAPSULE ORAL at 11:01

## 2024-08-27 RX ADMIN — INSULIN LISPRO 2 UNITS: 100 INJECTION, SOLUTION INTRAVENOUS; SUBCUTANEOUS at 17:28

## 2024-08-27 RX ADMIN — PREDNISONE 40 MG: 20 TABLET ORAL at 11:01

## 2024-08-27 RX ADMIN — CEFDINIR 300 MG: 300 CAPSULE ORAL at 15:12

## 2024-08-27 RX ADMIN — ALBUMIN (HUMAN) 25 G: 0.25 INJECTION, SOLUTION INTRAVENOUS at 08:31

## 2024-08-27 RX ADMIN — Medication 5 MG: at 20:51

## 2024-08-27 RX ADMIN — MEXILETINE HYDROCHLORIDE 150 MG: 150 CAPSULE ORAL at 11:01

## 2024-08-27 RX ADMIN — SACUBITRIL AND VALSARTAN 0.5 TABLET: 24; 26 TABLET, FILM COATED ORAL at 20:51

## 2024-08-27 RX ADMIN — ATORVASTATIN CALCIUM 40 MG: 40 TABLET, FILM COATED ORAL at 11:01

## 2024-08-27 RX ADMIN — PANTOPRAZOLE SODIUM 40 MG: 40 TABLET, DELAYED RELEASE ORAL at 11:01

## 2024-08-27 RX ADMIN — BENZONATATE 100 MG: 100 CAPSULE ORAL at 15:50

## 2024-08-27 ASSESSMENT — PAIN SCALES - GENERAL
PAINLEVEL_OUTOF10: 0
PAINLEVEL_OUTOF10: 0

## 2024-08-27 ASSESSMENT — PAIN SCALES - WONG BAKER: WONGBAKER_NUMERICALRESPONSE: HURTS EVEN MORE

## 2024-08-27 NOTE — PROGRESS NOTES
Palliative Care Department  [phone]  Palliative Care Progress Note  Provider Montserrat Barrios, APRN - CNP      PATIENT: Marge Callejas  : 1940  MRN: [id number]  ADMISSION DATE: 2024  6:23 PM  Referring Provider: Favian Mancera MD     Palliative Medicine was consulted on hospital day 15 for assistance with Goals of care, Symptom management     HPI:     Clinical Summary:Marge Callejas is a 84 y.o. 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  Admitted for further medical management of acute on chronic congestive heart failure with reduced ejection fraction.  Nephrology following, reporting difficulty removing fluid due to hypotension.  She has been seen by cardiology, recommended reduction in antihypertensive therapy to prevent hypotension during dialysis and allow more fluid to be removed, cardiology signed off.  EP consulted to further evaluate discontinuation of amiodarone due to prolonged QT, patient was taking for CRT-D, recommended close monitor potassium and magnesium, continue Metroprolol, amiodarone, and home mexiletine, will plan to follow-up outpatient, EP has signed off. since.  Hospital course complicated with RRT on 2024, due to respiratory failure, found to have pneumonia, pulmonology consulted, on p.o. doxycycline and cefdinir.  Yesterday patient had an RRT was dialysis due to respiratory failure, placed on Airvo, currently on 8 L nasal cannula O2.  There were concern for aspiration, she had MBSS with easy to chew consistent solids with thin liquids diet recommendation.  Patient refused stat chest x-ray. Palliative medicine consulted to assist

## 2024-08-27 NOTE — FLOWSHEET NOTE
08/27/24 1020   Vital Signs   BP (!) 114/55   Temp 96.9 °F (36.1 °C)   Pulse 63   Respirations 18   Weight - Scale 52.6 kg (115 lb 15.4 oz)   Weight Method Bed scale   Percent Weight Change 0.38   Pain Assessment   Pain Assessment None - Denies Pain   Post-Hemodialysis Assessment   Post-Treatment Procedures Blood returned;Catheter capped, clamped and heparinized x 2 ports   Machine Disinfection Process Exterior Machine Disinfection   Rinseback Volume (ml) 400 ml   Blood Volume Processed (Liters) 52.2 L   Dialyzer Clearance Lightly streaked   Duration of Treatment (minutes) 180 minutes   Heparin Amount Administered During Treatment (mL) 0 mL   Hemodialysis Intake (ml) 400 ml   Hemodialysis Output (ml) 1120 ml   NET Removed (ml) 720   Tolerated Treatment Fair   Interventions Taken Ultrafiltration stopped   Patient Response to Treatment Minimal fluid removed with albumin support   Bilateral Breath Sounds Diminished   Physician Notified No   Time Off 1017   Patient Disposition Return to room   Observations & Evaluations   Level of Consciousness 0

## 2024-08-27 NOTE — CARE COORDINATION
Precert for [facility] pending since yesterday. Facility aware patient is stable for discharge once auth obtained. Ambulance on soft chart.     1502 Insurance wanted updated. Patient refused PT. Spoke with patient, very [place] and not oriented but does not want to participate with therapy. Called daughter, Jonathan, she actually was on her way in to bedside. We talked together that very likely patient is going to be LTC even if we get approval for return. Daughter has already been considering this, if patient does not want to do therapy she is going to pay privately for patient to return to [facility]. She has already done financial check and is currently over resourced for medicaid, will have to spend down first. Called [facility], they will reach out to daughter directly about financial arrangement. Will arrange discharge once this is addressed. Likely will not be resolved until tomorrow.     For questions I can be reached at [phone]. GARY Evangelista

## 2024-08-27 NOTE — PROGRESS NOTES
Physical Therapy  Physical Therapy Missed Visit    Name: Marge Callejas  : 1940  MRN: [id number]  Room #: 4508/4508-A    Date of Service: 2024    Attempted PT treatment. Pt adamantly declined to participate this PM despite encouragement, will attempt again as schedule permits.     Estiven Elise, PT, DPT  [id number]

## 2024-08-27 NOTE — DISCHARGE SUMMARY
[facility] Hospitalist Physician Discharge Summary       [facility]  [address]  [facility]  [phone]        Paula Lara MD  [address]  [facility] [address]  [phone]    Follow up  You will be seen on 10/04/24 at 2 PM with Dr. Lara's nurse practitioner Olga Calix.      Activity level: As tolerated     Dispo: [facility]    Condition on discharge: Stable     Patient ID:  Marge Callejas  [id number]  84 y.o.  1940    Admit date: 8/9/2024    Discharge date and time:  8/29/2024    Admission Diagnoses: Principal Problem:    Lower extremity edema  Active Problems:    Acute on chronic combined systolic and diastolic heart failure (HCC)    Moderate protein-calorie malnutrition (HCC)  Resolved Problems:    * No resolved hospital problems. *      Discharge Diagnoses: Principal Problem:    Lower extremity edema  Active Problems:    Acute on chronic combined systolic and diastolic heart failure (HCC)    Moderate protein-calorie malnutrition (HCC)  Resolved Problems:    * No resolved hospital problems. *      Consults:  IP CONSULT TO INTERNAL MEDICINE  IP CONSULT TO NEPHROLOGY  IP CONSULT TO CARDIOLOGY  IP CONSULT TO ELECTROPHYSIOLOGY  IP CONSULT TO PULMONOLOGY  IP CONSULT TO VASCULAR ACCESS TEAM  IP CONSULT TO PALLIATIVE CARE  IP CONSULT TO VASCULAR ACCESS TEAM    Hospital Course:   Patient Marge Callejas is a 84 y.o. presented with Abdominal pain, left upper quadrant [R10.12]  Acute on chronic combined systolic and diastolic heart failure (HCC) [I50.43]  Lower extremity edema [R60.0]  ESRD on dialysis (HCC) [N18.6, Z99.2]  Bilateral lower extremity edema [R60.0]  Heel pain, bilateral [M79.671, M79.672]    84 y.o. female who presented to [facility] with shortness of breath and complaints of lower extremity edema that is worse in the last few days. She was at dialysis. and could not complete her treatment given that she was very short of  THE CHEST WITHOUT CONTRAST 8/9/2024 8:48 pm TECHNIQUE: CT of the chest was performed without the administration of intravenous contrast. Multiplanar reformatted images are provided for review. Automated exposure control, iterative reconstruction, and/or weight based adjustment of the mA/kV was utilized to reduce the radiation dose to as low as reasonably achievable. COMPARISON: None. HISTORY: ORDERING SYSTEM PROVIDED HISTORY: r/o pneumonia, pleural effusion TECHNOLOGIST PROVIDED HISTORY: Reason for exam:->r/o pneumonia, pleural effusion What reading provider will be dictating this exam?->CRC FINDINGS: Mediastinum:   Cardiomegaly.  Thoracic aorta and pericardium are normal. Calcified plaque involving the coronary arteries.  Scattered subcentimeter mediastinal adenopathy. Lungs/pleura:   Small bilateral pleural effusions with adjacent atelectasis. No pneumothorax.  No evidence for pneumonia. Upper Abdomen:   Unremarkable. Soft Tissues/Bones:   Degenerative changes thoracic spine.     1. Small bilateral pleural effusions with adjacent atelectasis. 2. Cardiomegaly. 3. Calcified no evidence for pneumonia     XR CHEST PORTABLE    Result Date: 8/9/2024  EXAMINATION: ONE XRAY VIEW OF THE CHEST 8/9/2024 7:39 pm COMPARISON: None. HISTORY: ORDERING SYSTEM PROVIDED HISTORY: SOB TECHNOLOGIST PROVIDED HISTORY: Reason for exam:->SOB FINDINGS: The heart is enlarged.  Pulmonary vessels are congested.  Mild bibasilar opacities.  There is no pneumothorax.  Mild bilateral pleural effusion. Pacemaker and dialysis catheter present.     1. Cardiomegaly with pulmonary vascular congestion. 2. Mild bibasilar opacities and mild bilateral pleural effusion.       Patient Instructions:      Medication List        START taking these medications      cefdinir 300 MG capsule  Commonly known as: OMNICEF  Take 1 capsule by mouth every 48 hours for 3 doses     doxycycline hyclate 100 MG capsule  Commonly known as: VIBRAMYCIN  Take 1 capsule by mouth

## 2024-08-27 NOTE — PROGRESS NOTES
Pulmonary Critical Care Medicine           PULMONARY  CRITICAL CARE   SERVICE DAILY PROGRESS  NOTE     2024   Hospital LOS:  LOS: 15 days     Impression / Recommendations:  Acute on chronic hypoxic respiratory failure secondary to acute on chronic pulmonary vascular congestion in the setting of heart failure with reduced ejection fraction+ ESRD with hemodialysis+ probable chronic aspiration pneumonia/pneumonitis+ questionable community-acquired pneumonia+ questionable hospital-acquired pneumonia.     A-fib with RVR    -Patient's baseline oxygen requirement is 3 L/min, currently she continues to be on 5 L.  Continue to wean as tolerated.  -Nephrology following for hemodialysis  -Continue Eliquis for A-fib she is also on amiodarone  -On Omnicef and Doxy, complete total of 7 days  Pulmonology will sign off. Ok to Dc from Pulm point of view.    Interval History/Event Changes:  Patient not interested in interview and communications  Patient only wants to talk to the nurse.  For hemodialysis today.    Allergies  No Known Allergies    Review of Systems    Unable to obtain because of patient's participation issues    Vitals-     BP (!) 119/58   Pulse 60   Temp 98.7 °F (37.1 °C) (Oral)   Resp 14   Ht 1.626 m (5' 4\")   Wt 52.4 kg (115 lb 8.3 oz)   SpO2 99%   BMI 19.83 kg/m²    Tmax: Temp (24hrs), Av.3 °F (36.8 °C), Min:97.9 °F (36.6 °C), Max:98.7 °F (37.1 °C)      Hemodynamics:  Cuff:   Systolic (24hrs), Av , Min:113 , Max:144   /Diastolic (24hrs), Av, Min:4, Max:70    Cuff MAP:MAP (mmHg)  Av.1  Min: 59  Max: 98  P:   Pulse  Av  Min: 60  Max: 77      Airway:     Observed RR: Resp  Av.7  Min: 12  Max: 18   Observed O2 sats: SpO2  Av.5 %  Min: 82 %  Max: 100 %    No intake or output data in the 24 hours ending 24 0942        Physical exam-  General appearance: Not ill appearing, awake  Head: Normocephalic, without obvious abnormality, atraumatic   Eyes:Pupils bilateral

## 2024-08-27 NOTE — PLAN OF CARE
Problem: Skin/Tissue Integrity  Goal: Absence of new skin breakdown  Description: 1.  Monitor for areas of redness and/or skin breakdown  2.  Assess vascular access sites hourly  3.  Every 4-6 hours minimum:  Change oxygen saturation probe site  4.  Every 4-6 hours:  If on nasal continuous positive airway pressure, respiratory therapy assess nares and determine need for appliance change or resting period.  8/27/2024 1246 by Darvin Brown RN  Outcome: Progressing  8/27/2024 0352 by Michelle Armas RN  Outcome: Progressing     Problem: Safety - Adult  Goal: Free from fall injury  8/27/2024 1246 by Darvin Brown RN  Outcome: Progressing  Flowsheets (Taken 8/27/2024 1246)  Free From Fall Injury: Instruct family/caregiver on patient safety  8/27/2024 0352 by Michelle Armas RN  Outcome: Progressing     Problem: Chronic Conditions and Co-morbidities  Goal: Patient's chronic conditions and co-morbidity symptoms are monitored and maintained or improved  8/27/2024 1246 by Darvin Brown RN  Outcome: Progressing  8/27/2024 0352 by Michelle Armas RN  Outcome: Progressing     Problem: Risk for Elopement  Goal: Patient will not exit the unit/facility without proper excort  8/27/2024 1246 by Darvin Brown RN  Outcome: Progressing  Flowsheets (Taken 8/27/2024 1100)  Nursing Interventions for Elopement Risk: Assist with personal care needs such as toileting, eating, dressing, as needed to reduce the risk of wandering  8/27/2024 0352 by Michelle Armas RN  Outcome: Progressing     Problem: Respiratory - Adult  Goal: Achieves optimal ventilation and oxygenation  8/27/2024 0352 by Michelle Armas RN  Outcome: Progressing     Problem: Musculoskeletal - Adult  Goal: Return mobility to safest level of function  Recent Flowsheet Documentation  Taken 8/27/2024 1100 by Darvin Brown RN  Return Mobility to Safest Level of Function: Assess patient stability and activity tolerance for standing, transferring and ambulating with or

## 2024-08-27 NOTE — PLAN OF CARE
Problem: Skin/Tissue Integrity  Goal: Absence of new skin breakdown  Outcome: Progressing     Problem: Safety - Adult  Goal: Free from fall injury  Outcome: Progressing     Problem: Chronic Conditions and Co-morbidities  Goal: Patient's chronic conditions and co-morbidity symptoms are monitored and maintained or improved  Outcome: Progressing     Problem: Risk for Elopement  Goal: Patient will not exit the unit/facility without proper excort  Outcome: Progressing     Problem: Respiratory - Adult  Goal: Achieves optimal ventilation and oxygenation  Outcome: Progressing     Problem: Musculoskeletal - Adult  Goal: Return mobility to safest level of function  Outcome: Progressing     Problem: Metabolic/Fluid and Electrolytes - Adult  Goal: Hemodynamic stability and optimal renal function maintained  Outcome: Progressing     Problem: Discharge Planning  Goal: Discharge to home or other facility with appropriate resources  Outcome: Progressing     Problem: Nutrition Deficit:  Goal: Optimize nutritional status  Outcome: Progressing     Problem: Pain  Goal: Verbalizes/displays adequate comfort level or baseline comfort level  Outcome: Progressing

## 2024-08-27 NOTE — PROGRESS NOTES
[facility] Hospitalist Progress Note    Admitting Date and Time: 8/9/2024  6:23 PM  Admit Dx: Abdominal pain, left upper quadrant [R10.12]  Acute on chronic combined systolic and diastolic heart failure (HCC) [I50.43]  Lower extremity edema [R60.0]  ESRD on dialysis (HCC) [N18.6, Z99.2]  Bilateral lower extremity edema [R60.0]  Heel pain, bilateral [M79.671, M79.672]  84 y.o. female who presented to [facility] with shortness of breath and complaints of lower extremity edema that is worse in the last few days.  She was at dialysis. and could not complete her treatment given that she was very short of breath.  In the ER blood pressure did fall to low of 92/56.  Patient also complained of some intermittent abdominal pain CT abdomen pelvis showed diffuse colorectal retention and a stable 5.6 cm abdominal aorta and dilatation of the proximal left iliac artery and small bilateral pleural effusion.  CT chest with small bilateral pleural effusions with no evidence of pneumonia. Patient denies any pain in her calfs formational the swelling has been significantly getting worse over the last few days.   Subjective:  Patient is being followed for Abdominal pain, left upper quadrant [R10.12]  Acute on chronic combined systolic and diastolic heart failure (HCC) [I50.43]  Lower extremity edema [R60.0]  ESRD on dialysis (HCC) [N18.6, Z99.2]  Bilateral lower extremity edema [R60.0]  Heel pain, bilateral [M79.671, M79.672]   Patient seen and examined.   Seen in dialysis  On 6L NC    ROS: denies fever, chills, cp, sob, n/v, HA unless stated above.      doxycycline hyclate  100 mg Oral 2 times per day    cefdinir  300 mg Oral Q48H    predniSONE  40 mg Oral Daily    cefdinir  300 mg Oral Once per day on Tuesday Thursday Saturday    potassium phosphate IVPB (CENTRAL LINE)  15 mmol IntraVENous Once    bumetanide  1 mg Oral Daily    ferrous sulfate  325 mg Oral BID     insulin lispro  0-8 Units SubCUTAneous TID

## 2024-08-27 NOTE — PROGRESS NOTES
Pt off the floor for dialysis when this RN arrived at 0700. Electronically signed by Darvin Brown RN on 8/27/2024 at 7:45 AM

## 2024-08-27 NOTE — PROGRESS NOTES
[facility]  MD Murtaza White MD Ali Hassan, MD Lisa Kniska, CNP   Lucille Beth, JOSE Fernandez, JUDE  Progress Note    8/27/2024    SUBJECTIVE:   8/12 : Laying in bed, no acute distress. Daughter is at the bedside. For dialysis this afternoon. She denies any dyspnea. She is edematous. Blood pressure is low.     8/13: Seen while on dialysis. Tolerating therapy without issues. BP is stable, but on the lower side. She denies any dyspnea, chest pain, or palpitations. Lower extremities are edematous.     8/16: Seen while on dialysis. Tolerating treatment without issues. She denies any dyspnea, chest pain, or palpitations. For discharge later this afternoon.     8/17 HD today no reported issues   Charts reviewed .     8/18 increased o2 requirement  Clinically in HF .     8/19: Laying in bed, on 10 liters of oxygen via high flow nasal canula. Feels a bit better. Hesitant to go to HD today as she cramped yesterday. BP stable, on the lower side. RRT called last night for hypoxia and she was placed on AirVo for a short time.     8/20: Seen while on dialysis. Tolerating therapy without issues, though UF had to be decreased because her BP dropped during treatment. Fluid removal was limited. She is feeling better, though still has some dyspnea.     8/21:  Sitting up in bed.  NAD  Oxygen on per NC.  She complained of nausea on dialysis yesterday.  Denies chest pain, palpitations or SOB.  Denies nausea today.  Family at bedside.     8/22:  Laying in bed.  Agitated today because she wants to go home.  Denies chest pain, palpitations or SOB.  Denies nausea.    8/23: Laying in bed, no acute distress. She is on 5 liters of oxygen. She denies any dyspnea, chest pain, or palpitations. No lower extremity edema. Blood pressure is stable.     8/24 lying in bed , on o2/nc comfortable   HD today no reported issues 2L UF    8/25: Asleep, easily awakened.  Does not wish to participate in  interactive      DATA:    Recent Labs     08/24/24  1630 08/26/24  0456   WBC 8.6 9.2   HGB 8.8* 7.5*   HCT 27.2* 23.1*   .7* 107.4*     Recent Labs     08/24/24  1630 08/26/24  0456    141   K 3.3* 3.5   CL 99 101   CO2 24 21*   MG  --  1.8   PHOS 1.7* 2.8   BUN 20 56*   CREATININE 1.9* 4.2*   ALT  --  56*   AST  --  42*   BILITOT  --  0.6   ALKPHOS  --  120*       Lab Results   Component Value Date    LABPROT 0.3 (H) 04/11/2021    LABPROT 0.3 04/11/2021     Assessment     ESRD  Anemia due to ESRD  Secondary hyperparathyroidism/mineral bone disease due to ESRD  History of hypertension  CHF, HFrEF, LVEF 30%  History of AF with RVR  Pulmonary HTN   Noncompliance/nonadherence to multiple aspects of her care after she is discharged from the hospital, accounting for her frequent readmissions.  Attempts at counseling her and her daughter in the past have proven unsuccessful.  This nonadherence now extends into the hospital and refusing medications, refusing IV access    Edema is better  Oxygen requirements are close to her baseline   Eating very poorly  Precert pending for discharge   UF minimized on HD today as she was hypotensive.     Recommendations   Continue HD for solute and volume management --TTS schedule   Continue midodrine 15 mg TID, hopefully this will help with fluid removal on dialysis  Ultrafiltration treatments as warranted   No phosphorus binder needed   ARLIN: Retacrit while here  Encourage her to follow her doctors and nurses recommendations  8.  Ok for Entresto, will monitor potassium levels closely and follow blood pressure.   9.  Low salt , fluid restricted diet       Electronically signed by MADELAINE Nicole CNP on 8/27/2024

## 2024-08-27 NOTE — PROGRESS NOTES
Resp panel (+) parainfluenza 3, bed placement called for new room. Electronically signed by Macrina Hoffman RN on 8/27/2024 at 7:01 PM

## 2024-08-27 NOTE — PROGRESS NOTES
Pt c/o sore throat and has had persistent cough today. Perfectserve sent to Dr. Gomez to notify her that pt daughter requesting cough drops be ordered. Electronically signed by Darvin Brown RN on 8/27/2024 at 4:58 PM    Throat lozenges ordered & respiratory panel, Per provider. Electronically signed by Darvin Brown RN on 8/27/2024 at 4:59 PM

## 2024-08-28 ENCOUNTER — TELEPHONE (OUTPATIENT)
Dept: NON INVASIVE DIAGNOSTICS | Age: 84
End: 2024-08-28

## 2024-08-28 ENCOUNTER — APPOINTMENT (OUTPATIENT)
Dept: GENERAL RADIOLOGY | Age: 84
DRG: 291 | End: 2024-08-28
Payer: MEDICARE

## 2024-08-28 LAB
ANION GAP SERPL CALCULATED.3IONS-SCNC: 15 MMOL/L (ref 7–16)
BASOPHILS # BLD: 0.01 K/UL (ref 0–0.2)
BASOPHILS NFR BLD: 0 % (ref 0–2)
BUN SERPL-MCNC: 41 MG/DL (ref 6–23)
CALCIUM SERPL-MCNC: 9.2 MG/DL (ref 8.6–10.2)
CHLORIDE SERPL-SCNC: 99 MMOL/L (ref 98–107)
CO2 SERPL-SCNC: 22 MMOL/L (ref 22–29)
CREAT SERPL-MCNC: 3.3 MG/DL (ref 0.5–1)
EOSINOPHIL # BLD: 0 K/UL (ref 0.05–0.5)
EOSINOPHILS RELATIVE PERCENT: 0 % (ref 0–6)
ERYTHROCYTE [DISTWIDTH] IN BLOOD BY AUTOMATED COUNT: 19.2 % (ref 11.5–15)
GFR, ESTIMATED: 13 ML/MIN/1.73M2
GLUCOSE BLD-MCNC: 109 MG/DL (ref 74–99)
GLUCOSE BLD-MCNC: 180 MG/DL (ref 74–99)
GLUCOSE BLD-MCNC: 207 MG/DL (ref 74–99)
GLUCOSE BLD-MCNC: 322 MG/DL (ref 74–99)
GLUCOSE SERPL-MCNC: 198 MG/DL (ref 74–99)
HCT VFR BLD AUTO: 23.5 % (ref 34–48)
HGB BLD-MCNC: 7.3 G/DL (ref 11.5–15.5)
IMM GRANULOCYTES # BLD AUTO: 0.15 K/UL (ref 0–0.58)
IMM GRANULOCYTES NFR BLD: 1 % (ref 0–5)
LYMPHOCYTES NFR BLD: 1.04 K/UL (ref 1.5–4)
LYMPHOCYTES RELATIVE PERCENT: 9 % (ref 20–42)
MCH RBC QN AUTO: 34 PG (ref 26–35)
MCHC RBC AUTO-ENTMCNC: 31.1 G/DL (ref 32–34.5)
MCV RBC AUTO: 109.3 FL (ref 80–99.9)
MONOCYTES NFR BLD: 0.7 K/UL (ref 0.1–0.95)
MONOCYTES NFR BLD: 6 % (ref 2–12)
NEUTROPHILS NFR BLD: 83 % (ref 43–80)
NEUTS SEG NFR BLD: 9.39 K/UL (ref 1.8–7.3)
PLATELET # BLD AUTO: 175 K/UL (ref 130–450)
PMV BLD AUTO: 13 FL (ref 7–12)
POTASSIUM SERPL-SCNC: 3.4 MMOL/L (ref 3.5–5)
RBC # BLD AUTO: 2.15 M/UL (ref 3.5–5.5)
SODIUM SERPL-SCNC: 136 MMOL/L (ref 132–146)
WBC OTHER # BLD: 11.3 K/UL (ref 4.5–11.5)

## 2024-08-28 PROCEDURE — 6360000002 HC RX W HCPCS: Performed by: FAMILY MEDICINE

## 2024-08-28 PROCEDURE — 97530 THERAPEUTIC ACTIVITIES: CPT

## 2024-08-28 PROCEDURE — 2580000003 HC RX 258: Performed by: FAMILY MEDICINE

## 2024-08-28 PROCEDURE — 6370000000 HC RX 637 (ALT 250 FOR IP): Performed by: INTERNAL MEDICINE

## 2024-08-28 PROCEDURE — 6370000000 HC RX 637 (ALT 250 FOR IP): Performed by: HOSPITALIST

## 2024-08-28 PROCEDURE — 2700000000 HC OXYGEN THERAPY PER DAY

## 2024-08-28 PROCEDURE — 80048 BASIC METABOLIC PNL TOTAL CA: CPT

## 2024-08-28 PROCEDURE — 94660 CPAP INITIATION&MGMT: CPT

## 2024-08-28 PROCEDURE — 85025 COMPLETE CBC W/AUTO DIFF WBC: CPT

## 2024-08-28 PROCEDURE — 2060000000 HC ICU INTERMEDIATE R&B

## 2024-08-28 PROCEDURE — 97535 SELF CARE MNGMENT TRAINING: CPT

## 2024-08-28 PROCEDURE — 6370000000 HC RX 637 (ALT 250 FOR IP)

## 2024-08-28 PROCEDURE — 71045 X-RAY EXAM CHEST 1 VIEW: CPT

## 2024-08-28 PROCEDURE — 6370000000 HC RX 637 (ALT 250 FOR IP): Performed by: STUDENT IN AN ORGANIZED HEALTH CARE EDUCATION/TRAINING PROGRAM

## 2024-08-28 PROCEDURE — 99232 SBSQ HOSP IP/OBS MODERATE 35: CPT | Performed by: INTERNAL MEDICINE

## 2024-08-28 PROCEDURE — 82962 GLUCOSE BLOOD TEST: CPT

## 2024-08-28 PROCEDURE — 6370000000 HC RX 637 (ALT 250 FOR IP): Performed by: FAMILY MEDICINE

## 2024-08-28 PROCEDURE — 36415 COLL VENOUS BLD VENIPUNCTURE: CPT

## 2024-08-28 PROCEDURE — 94640 AIRWAY INHALATION TREATMENT: CPT

## 2024-08-28 RX ORDER — IPRATROPIUM BROMIDE AND ALBUTEROL SULFATE 2.5; .5 MG/3ML; MG/3ML
1 SOLUTION RESPIRATORY (INHALATION)
Status: DISCONTINUED | OUTPATIENT
Start: 2024-08-28 | End: 2024-08-29 | Stop reason: HOSPADM

## 2024-08-28 RX ORDER — GUAIFENESIN 400 MG/1
400 TABLET ORAL 3 TIMES DAILY
DISCHARGE
Start: 2024-08-28

## 2024-08-28 RX ORDER — GUAIFENESIN 400 MG/1
400 TABLET ORAL 3 TIMES DAILY
Status: DISCONTINUED | OUTPATIENT
Start: 2024-08-28 | End: 2024-08-29 | Stop reason: HOSPADM

## 2024-08-28 RX ORDER — POTASSIUM CHLORIDE 1500 MG/1
20 TABLET, EXTENDED RELEASE ORAL ONCE
Status: COMPLETED | OUTPATIENT
Start: 2024-08-28 | End: 2024-08-28

## 2024-08-28 RX ADMIN — POTASSIUM CHLORIDE 20 MEQ: 1500 TABLET, EXTENDED RELEASE ORAL at 10:05

## 2024-08-28 RX ADMIN — PREDNISONE 40 MG: 20 TABLET ORAL at 10:05

## 2024-08-28 RX ADMIN — DOXYCYCLINE HYCLATE 100 MG: 100 CAPSULE ORAL at 10:05

## 2024-08-28 RX ADMIN — APIXABAN 2.5 MG: 2.5 TABLET, FILM COATED ORAL at 10:05

## 2024-08-28 RX ADMIN — ASPIRIN 81 MG: 81 TABLET, COATED ORAL at 10:07

## 2024-08-28 RX ADMIN — LEVOTHYROXINE SODIUM 50 MCG: 0.05 TABLET ORAL at 06:30

## 2024-08-28 RX ADMIN — SACUBITRIL AND VALSARTAN 0.5 TABLET: 24; 26 TABLET, FILM COATED ORAL at 20:16

## 2024-08-28 RX ADMIN — PANTOPRAZOLE SODIUM 40 MG: 40 TABLET, DELAYED RELEASE ORAL at 06:30

## 2024-08-28 RX ADMIN — MEXILETINE HYDROCHLORIDE 150 MG: 150 CAPSULE ORAL at 10:06

## 2024-08-28 RX ADMIN — FERROUS SULFATE TAB 325 MG (65 MG ELEMENTAL FE) 325 MG: 325 (65 FE) TAB at 16:33

## 2024-08-28 RX ADMIN — BUMETANIDE 1 MG: 1 TABLET ORAL at 10:07

## 2024-08-28 RX ADMIN — SODIUM CHLORIDE, PRESERVATIVE FREE 10 ML: 5 INJECTION INTRAVENOUS at 20:17

## 2024-08-28 RX ADMIN — METOPROLOL SUCCINATE 12.5 MG: 25 TABLET, EXTENDED RELEASE ORAL at 10:07

## 2024-08-28 RX ADMIN — GUAIFENESIN 400 MG: 400 TABLET ORAL at 16:32

## 2024-08-28 RX ADMIN — ARFORMOTEROL TARTRATE: 15 SOLUTION RESPIRATORY (INHALATION) at 10:02

## 2024-08-28 RX ADMIN — DOXYCYCLINE HYCLATE 100 MG: 100 CAPSULE ORAL at 20:16

## 2024-08-28 RX ADMIN — GUAIFENESIN 400 MG: 400 TABLET ORAL at 10:18

## 2024-08-28 RX ADMIN — IPRATROPIUM BROMIDE AND ALBUTEROL SULFATE 1 DOSE: 2.5; .5 SOLUTION RESPIRATORY (INHALATION) at 12:44

## 2024-08-28 RX ADMIN — MIDODRINE HYDROCHLORIDE 15 MG: 10 TABLET ORAL at 13:06

## 2024-08-28 RX ADMIN — EPOETIN ALFA-EPBX 2000 UNITS: 2000 INJECTION, SOLUTION INTRAVENOUS; SUBCUTANEOUS at 16:33

## 2024-08-28 RX ADMIN — AMIODARONE HYDROCHLORIDE 200 MG: 200 TABLET ORAL at 10:07

## 2024-08-28 RX ADMIN — Medication 5 MG: at 20:17

## 2024-08-28 RX ADMIN — INSULIN LISPRO 4 UNITS: 100 INJECTION, SOLUTION INTRAVENOUS; SUBCUTANEOUS at 20:17

## 2024-08-28 RX ADMIN — SACUBITRIL AND VALSARTAN 0.5 TABLET: 24; 26 TABLET, FILM COATED ORAL at 10:05

## 2024-08-28 RX ADMIN — FERROUS SULFATE TAB 325 MG (65 MG ELEMENTAL FE) 325 MG: 325 (65 FE) TAB at 10:07

## 2024-08-28 RX ADMIN — INSULIN LISPRO 2 UNITS: 100 INJECTION, SOLUTION INTRAVENOUS; SUBCUTANEOUS at 10:18

## 2024-08-28 RX ADMIN — GUAIFENESIN 400 MG: 400 TABLET ORAL at 13:06

## 2024-08-28 RX ADMIN — ATORVASTATIN CALCIUM 40 MG: 40 TABLET, FILM COATED ORAL at 10:07

## 2024-08-28 RX ADMIN — APIXABAN 2.5 MG: 2.5 TABLET, FILM COATED ORAL at 20:17

## 2024-08-28 RX ADMIN — MIDODRINE HYDROCHLORIDE 15 MG: 10 TABLET ORAL at 16:32

## 2024-08-28 ASSESSMENT — PAIN SCALES - WONG BAKER: WONGBAKER_NUMERICALRESPONSE: HURTS EVEN MORE

## 2024-08-28 NOTE — PLAN OF CARE
Called and spoke with patient's daughter Jonathan and aunt. Discussed discharge plan today and they are in agreement. Discussed with CM and will set up transportation today. All questions answered.     Electronically signed by Lucero Gomez MD on 8/28/2024 at 4:25 PM

## 2024-08-28 NOTE — PROGRESS NOTES
Physical Therapy  Treatment Note    Name: Marge Callejas  : 1940  MRN: [id number]      Date of Service: 2024    Evaluating PT:  Estiven Elise PT, DPT    Room #:  4514/4514-A  Diagnosis:  Abdominal pain, left upper quadrant [R10.12]  Acute on chronic combined systolic and diastolic heart failure (HCC) [I50.43]  Lower extremity edema [R60.0]  ESRD on dialysis (HCC) [N18.6, Z99.2]  Bilateral lower extremity edema [R60.0]  Heel pain, bilateral [M79.671, M79.672]  PMHx/PSHx:  afib, arthritis, CHF, HLD, HTN  Procedure/Surgery:  N/A  Precautions:  fall risk, TSM, bed alarm, O2, [place], contact  Equipment Needs:  TBD    SUBJECTIVE:    Pt admitted from [facility]. Pt reports she is non-ambulatory PTA.    OBJECTIVE:   Initial Evaluation  Date: 24 Treatment   Date: 24 Short Term/ Long Term   Goals   AM-PAC 6 Clicks 10/24 11/24    Was pt agreeable to Eval/treatment? yes Yes with encouragement    Does pt have pain? Unrated RLE No specific c/o pain     Bed Mobility  Rolling: min A  Supine to sit: min A  Sit to supine: min A  Scooting: min A Rolling: SBA  Supine to sit: SBA  Sit to supine: SBA  Scooting: SBA Rolling: mod I  Supine to sit: mod I  Sit to supine: mod I  Scooting: mod I   Transfers Sit to stand: NT, pt declined  Stand to sit: NT  Stand pivot: NT Sit to stand: max A  Stand to sit: max A  Stand pivot: NT Sit to stand: mod I  Stand to sit: mod I  Stand pivot: mod I with AAD   Ambulation    NT NT Make ambulation goal as appropriate   Stair negotiation: ascended and descended  NT NT NT   Pt is A & O x not formally assessed this date  Sensation:  Pt denies numbness and tingling to extremities  Edema:  unremarkable    Vitals:  SpO2 and HR were stable during session    Therapeutic Exercises:    Bed mobility: supine<>sit, cued for positioning at EOB  Balance: sitting EOB ~10'  Transfers: STS x4, cued for hand placement and postural correction  BLE AROM    Patient education  Pt educated on safety with

## 2024-08-28 NOTE — PROGRESS NOTES
[facility]  MD Murtaza White MD Ali Hassan, MD Lisa Kniska, CNP   Lucille Beth, JOSE Fernandez, JUDE  Progress Note    8/28/2024    SUBJECTIVE:   8/12 : Laying in bed, no acute distress. Daughter is at the bedside. For dialysis this afternoon. She denies any dyspnea. She is edematous. Blood pressure is low.     8/13: Seen while on dialysis. Tolerating therapy without issues. BP is stable, but on the lower side. She denies any dyspnea, chest pain, or palpitations. Lower extremities are edematous.     8/16: Seen while on dialysis. Tolerating treatment without issues. She denies any dyspnea, chest pain, or palpitations. For discharge later this afternoon.     8/17 HD today no reported issues   Charts reviewed .     8/18 increased o2 requirement  Clinically in HF .     8/19: Laying in bed, on 10 liters of oxygen via high flow nasal canula. Feels a bit better. Hesitant to go to HD today as she cramped yesterday. BP stable, on the lower side. RRT called last night for hypoxia and she was placed on AirVo for a short time.     8/20: Seen while on dialysis. Tolerating therapy without issues, though UF had to be decreased because her BP dropped during treatment. Fluid removal was limited. She is feeling better, though still has some dyspnea.     8/21:  Sitting up in bed.  NAD  Oxygen on per NC.  She complained of nausea on dialysis yesterday.  Denies chest pain, palpitations or SOB.  Denies nausea today.  Family at bedside.     8/22:  Laying in bed.  Agitated today because she wants to go home.  Denies chest pain, palpitations or SOB.  Denies nausea.    8/23: Laying in bed, no acute distress. She is on 5 liters of oxygen. She denies any dyspnea, chest pain, or palpitations. No lower extremity edema. Blood pressure is stable.     8/24 lying in bed , on o2/nc comfortable   HD today no reported issues 2L UF    8/25: Asleep, easily awakened.  Does not wish to participate in

## 2024-08-28 NOTE — PLAN OF CARE
Problem: Safety - Adult  Goal: Free from fall injury  8/28/2024 1152 by Stiven Paulino, RN  Outcome: Progressing  8/28/2024 0035 by Cait Goodwin, RN  Outcome: Progressing

## 2024-08-28 NOTE — CARE COORDINATION
[facility] called requesting updates of therapy be faxed to them to [facility] [phone], daughter to be here around 11:00am, therapy to attempt treat then, will fax once notes available. Ike also speaking with daughter this am about long term care financial arrangements, they will let me know if she can return today LTC. Ambulance on soft chart.     1228 Updates faxed to [facility] per their request for auth.    1600 Insurance auth obtained for Ike. Discharge arranged for next available physicians 8pm. Facility notified of auth and discharge time. Called [facility], scheduled for next available at 6:30pm. Cancelled physicians. Facility notified of discharge time. Daughter notified of discharge time.     1612 Daughter called me back, feels she is being discharged to soon. I told her I would reach out to attending as she is requesting to speak with physician. Message sent to Dr. Gomez requesting she call daughter.     For questions I can be reached at [phone]. GARY Evangelista

## 2024-08-28 NOTE — TELEPHONE ENCOUNTER
----- Message from Dr. Hang Cool DO sent at 8/18/2024  7:47 AM EDT -----  Regarding: Remote  Please enroll patient in remote monitoring.    -Hang Cool DO

## 2024-08-28 NOTE — PROGRESS NOTES
OCCUPATIONAL THERAPY TREATMENT NOTE   [facility]  [address], [place]       Date:2024  Patient Name: Marge Callejas  MRN: [id number]  : 1940  Room: [address] [place] [address]     Per OT Eval:    Evaluating OT: Dana Wilkinson, DENAE, OTR/L  # [id number]     Referring Provider:  Gama Parham MD   Specific Provider Orders:  \"OT Eval and Treat\"  8-10-24     Diagnosis: Abdominal pain, left upper quadrant [R10.12]  Acute on chronic combined systolic and diastolic heart failure (HCC) [I50.43]  Lower extremity edema [R60.0]  ESRD on dialysis (HCC) [N18.6, Z99.2]  Bilateral lower extremity edema [R60.0]  Heel pain, bilateral [M79.671, M79.672]     Pt was admitted w/ LE swelling, Left Upper Abdominal Pain, Frankie Heel Pain  24:  Pt experienced ~ 8 beats of VTach and Respiratory Distress, worsened during Dialysis.  Transferred to [facility] for continued care.       Pertinent Medical History:  Pt has a past medical history of Arthritis, Atrial fibrillation (HCC), Blood circulation, collateral, CHF (congestive heart failure) (HCC), Chronic renal insufficiency, stage IV (severe) (HCC), Coronary artery disease involving native coronary artery of native heart without angina pectoris, History of cardiovascular stress test, History of echocardiogram, Hyperlipidemia, Hypertension, and Mixed restrictive and obstructive lung disease (HCC).,  has a past surgical history that includes Ectopic pregnancy surgery; Upper gastrointestinal endoscopy (N/A, 4/3/2021); Colonoscopy (N/A, 4/3/2021); Colonoscopy (4/3/2021); Colonoscopy (4/3/2021); Colonoscopy (4/3/2021); Upper gastrointestinal endoscopy (N/A, 2021); and Colonoscopy (N/A, 2023).     Surgeries this admission: None      Precautions:  Fall Risk  Cognition - Alarms, HD TRS  6L O2 HF NC, continuous pulse-ox  Easy To Chew Solids, Thin Liquids, No Straw  TAPS  Contact Isolation     Assessment of current deficits  transfers, balance/posture standing upright at EOB and unsupported seated EOB and compensatory strategies to assist with ADL tasks. At end of session, pt was properly positioned in high dumont position, set up to eat meal.  All tubes and lines intact, call light within reach.  Dtr at b/s at end of session    Pt has made good progress towards set goals.   Continue with current plan of care    Treatment Time In: 1121  Treatment Time Out: 1159         Treatment Charges: Mins Units   Ther Ex  22519     Manual Therapy 03782     Thera Activities 98360     ADL/Home Mgt [id number] 38 3   Neuro Re-ed [id number]     Group Therapy      Orthotic manage/training  65610     Non-Billable Time     Total Timed Treatment 38 3     Dana Wilkinson, MOT, OTR/L  # [id number]

## 2024-08-28 NOTE — PLAN OF CARE
Problem: Skin/Tissue Integrity  Goal: Absence of new skin breakdown  Description: 1.  Monitor for areas of redness and/or skin breakdown  2.  Assess vascular access sites hourly  3.  Every 4-6 hours minimum:  Change oxygen saturation probe site  4.  Every 4-6 hours:  If on nasal continuous positive airway pressure, respiratory therapy assess nares and determine need for appliance change or resting period.  8/28/2024 0035 by Cait Goodwin RN  Outcome: Progressing  8/27/2024 1246 by Darvin Brown RN  Outcome: Progressing     Problem: Safety - Adult  Goal: Free from fall injury  8/28/2024 0035 by Cait Goodwin RN  Outcome: Progressing  8/27/2024 1246 by Darvin Brown RN  Outcome: Progressing  Flowsheets (Taken 8/27/2024 1246)  Free From Fall Injury: Instruct family/caregiver on patient safety     Problem: Chronic Conditions and Co-morbidities  Goal: Patient's chronic conditions and co-morbidity symptoms are monitored and maintained or improved  8/28/2024 0035 by Cait Goodwin RN  Outcome: Progressing  8/27/2024 1246 by Darvin Brown RN  Outcome: Progressing     Problem: Risk for Elopement  Goal: Patient will not exit the unit/facility without proper excort  8/28/2024 0035 by Cait Goodwin RN  Outcome: Progressing  Flowsheets (Taken 8/27/2024 1950)  Nursing Interventions for Elopement Risk: Assist with personal care needs such as toileting, eating, dressing, as needed to reduce the risk of wandering  8/27/2024 1246 by Darvin Brown RN  Outcome: Progressing  Flowsheets (Taken 8/27/2024 1100)  Nursing Interventions for Elopement Risk: Assist with personal care needs such as toileting, eating, dressing, as needed to reduce the risk of wandering     Problem: Respiratory - Adult  Goal: Achieves optimal ventilation and oxygenation  Outcome: Progressing     Problem: Musculoskeletal - Adult  Goal: Return mobility to safest level of function  Outcome: Progressing  Flowsheets (Taken 8/27/2024 1100 by Darvin Brown

## 2024-08-28 NOTE — PROGRESS NOTES
[facility] Hospitalist Progress Note    Admitting Date and Time: 8/9/2024  6:23 PM  Admit Dx: Abdominal pain, left upper quadrant [R10.12]  Acute on chronic combined systolic and diastolic heart failure (HCC) [I50.43]  Lower extremity edema [R60.0]  ESRD on dialysis (HCC) [N18.6, Z99.2]  Bilateral lower extremity edema [R60.0]  Heel pain, bilateral [M79.671, M79.672]  84 y.o. female who presented to [facility] with shortness of breath and complaints of lower extremity edema that is worse in the last few days.  She was at dialysis. and could not complete her treatment given that she was very short of breath.  In the ER blood pressure did fall to low of 92/56.  Patient also complained of some intermittent abdominal pain CT abdomen pelvis showed diffuse colorectal retention and a stable 5.6 cm abdominal aorta and dilatation of the proximal left iliac artery and small bilateral pleural effusion.  CT chest with small bilateral pleural effusions with no evidence of pneumonia. Patient denies any pain in her calfs formational the swelling has been significantly getting worse over the last few days.   Subjective:  Patient is being followed for Abdominal pain, left upper quadrant [R10.12]  Acute on chronic combined systolic and diastolic heart failure (HCC) [I50.43]  Lower extremity edema [R60.0]  ESRD on dialysis (HCC) [N18.6, Z99.2]  Bilateral lower extremity edema [R60.0]  Heel pain, bilateral [M79.671, M79.672]   Patient seen and examined.   +parainfluenza, having some coughing.   On 6L NC    ROS: denies fever, chills, cp, sob, n/v, HA unless stated above.      guaiFENesin  400 mg Oral TID    ipratropium 0.5 mg-albuterol 2.5 mg  1 Dose Inhalation Q4H WA RT    doxycycline hyclate  100 mg Oral 2 times per day    cefdinir  300 mg Oral Q48H    predniSONE  40 mg Oral Daily    cefdinir  300 mg Oral Once per day on Tuesday Thursday Saturday    potassium phosphate IVPB (CENTRAL LINE)  15 mmol IntraVENous  chronic hypoxic respiratory failure with aspiration pneumonia vs CAP vs HAP  Parainfluenza infection, +molecular panel 8/27   Secondary to ADHF, ejection fraction EF of 20-25%  Baseline 3 to 4 L nasal cannula  During hospitalisation, was up to 10 HFNC, Procal 1.99  Swallow study: no aspiration noted  Wean steroid  to prednisone 40mg PO daily 8/25, followed by steroid taper on discharge.   Was On cefepime + doxycycline, changed to PO doxycycline and cefdinir on 8/25 for total of 7 days therapy per pulmonary  Currently on 6L, wean as able, fluid removal per nephrology with dialysis, dialysis limited yesterday due to hypotension.   Conservative management for parainfluenza infection, mucinex and tessalon prn. Breathing treatments.      End-stage renal disease  Hypokalemia   On hemodialysis  Nephrology on board,  bumex  Dialysis per nephrology   K replaced today      Macrocytic anemia  B12 folate reassuring  ARLIN: Retacrit while here   Hb stable  transfusion if drops below 7     Ambulatory dysfunction  Recurrent hospital admission  PT OT evaluation  Approved for rehab, peer to peer completed     Hypothyroidism - TSH elevated, increased dose of synthyroid     Likely underlying dementia with sundowning.       Disposition: pre-cert started 8/26 and pending. Stable for discharge. Pulm okay for discharge.      Total time 35 minutes spent reviewing chart notes, reviewing treatment plans and prognosis with the patient/caregiver, and documenting in the chart.      NOTE: This report was transcribed using voice recognition software. Every effort was made to ensure accuracy; however, inadvertent computerized transcription errors may be present.  Electronically signed by Lucero Gomez MD on 8/28/2024 at 12:20 PM

## 2024-08-28 NOTE — TELEPHONE ENCOUNTER
Requested transfer via [facility] website.     Elodia MENDOZA,RN   [facility] [place]   [facility] Clinic

## 2024-08-29 VITALS
BODY MASS INDEX: 19.57 KG/M2 | TEMPERATURE: 97.2 F | SYSTOLIC BLOOD PRESSURE: 102 MMHG | OXYGEN SATURATION: 96 % | DIASTOLIC BLOOD PRESSURE: 43 MMHG | RESPIRATION RATE: 16 BRPM | WEIGHT: 114.64 LBS | HEART RATE: 57 BPM | HEIGHT: 64 IN

## 2024-08-29 LAB
ANION GAP SERPL CALCULATED.3IONS-SCNC: 14 MMOL/L (ref 7–16)
BASOPHILS # BLD: 0.01 K/UL (ref 0–0.2)
BASOPHILS NFR BLD: 0 % (ref 0–2)
BUN SERPL-MCNC: 60 MG/DL (ref 6–23)
CALCIUM SERPL-MCNC: 9.6 MG/DL (ref 8.6–10.2)
CHLORIDE SERPL-SCNC: 100 MMOL/L (ref 98–107)
CO2 SERPL-SCNC: 22 MMOL/L (ref 22–29)
CREAT SERPL-MCNC: 4.4 MG/DL (ref 0.5–1)
EKG ATRIAL RATE: 75 BPM
EKG Q-T INTERVAL: 480 MS
EKG QRS DURATION: 174 MS
EKG QTC CALCULATION (BAZETT): 521 MS
EKG R AXIS: 105 DEGREES
EKG T AXIS: -73 DEGREES
EKG VENTRICULAR RATE: 71 BPM
EOSINOPHIL # BLD: 0 K/UL (ref 0.05–0.5)
EOSINOPHILS RELATIVE PERCENT: 0 % (ref 0–6)
ERYTHROCYTE [DISTWIDTH] IN BLOOD BY AUTOMATED COUNT: 19.2 % (ref 11.5–15)
GFR, ESTIMATED: 10 ML/MIN/1.73M2
GLUCOSE SERPL-MCNC: 146 MG/DL (ref 74–99)
HCT VFR BLD AUTO: 22.6 % (ref 34–48)
HGB BLD-MCNC: 7.4 G/DL (ref 11.5–15.5)
IMM GRANULOCYTES # BLD AUTO: 0.13 K/UL (ref 0–0.58)
IMM GRANULOCYTES NFR BLD: 1 % (ref 0–5)
LYMPHOCYTES NFR BLD: 0.67 K/UL (ref 1.5–4)
LYMPHOCYTES RELATIVE PERCENT: 5 % (ref 20–42)
MCH RBC QN AUTO: 35.1 PG (ref 26–35)
MCHC RBC AUTO-ENTMCNC: 32.7 G/DL (ref 32–34.5)
MCV RBC AUTO: 107.1 FL (ref 80–99.9)
MONOCYTES NFR BLD: 0.44 K/UL (ref 0.1–0.95)
MONOCYTES NFR BLD: 4 % (ref 2–12)
NEUTROPHILS NFR BLD: 90 % (ref 43–80)
NEUTS SEG NFR BLD: 11.27 K/UL (ref 1.8–7.3)
PLATELET, FLUORESCENCE: 187 K/UL (ref 130–450)
PMV BLD AUTO: 13.2 FL (ref 7–12)
POTASSIUM SERPL-SCNC: 4.2 MMOL/L (ref 3.5–5)
RBC # BLD AUTO: 2.11 M/UL (ref 3.5–5.5)
SODIUM SERPL-SCNC: 136 MMOL/L (ref 132–146)
WBC OTHER # BLD: 12.5 K/UL (ref 4.5–11.5)

## 2024-08-29 PROCEDURE — 93005 ELECTROCARDIOGRAM TRACING: CPT | Performed by: STUDENT IN AN ORGANIZED HEALTH CARE EDUCATION/TRAINING PROGRAM

## 2024-08-29 PROCEDURE — 94660 CPAP INITIATION&MGMT: CPT

## 2024-08-29 PROCEDURE — P9047 ALBUMIN (HUMAN), 25%, 50ML: HCPCS | Performed by: INTERNAL MEDICINE

## 2024-08-29 PROCEDURE — 93010 ELECTROCARDIOGRAM REPORT: CPT | Performed by: INTERNAL MEDICINE

## 2024-08-29 PROCEDURE — 36415 COLL VENOUS BLD VENIPUNCTURE: CPT

## 2024-08-29 PROCEDURE — 99239 HOSP IP/OBS DSCHRG MGMT >30: CPT | Performed by: INTERNAL MEDICINE

## 2024-08-29 PROCEDURE — 80048 BASIC METABOLIC PNL TOTAL CA: CPT

## 2024-08-29 PROCEDURE — 6370000000 HC RX 637 (ALT 250 FOR IP)

## 2024-08-29 PROCEDURE — 85025 COMPLETE CBC W/AUTO DIFF WBC: CPT

## 2024-08-29 PROCEDURE — 6360000002 HC RX W HCPCS: Performed by: INTERNAL MEDICINE

## 2024-08-29 PROCEDURE — 94640 AIRWAY INHALATION TREATMENT: CPT

## 2024-08-29 PROCEDURE — 2700000000 HC OXYGEN THERAPY PER DAY

## 2024-08-29 PROCEDURE — 6370000000 HC RX 637 (ALT 250 FOR IP): Performed by: INTERNAL MEDICINE

## 2024-08-29 PROCEDURE — 6370000000 HC RX 637 (ALT 250 FOR IP): Performed by: HOSPITALIST

## 2024-08-29 PROCEDURE — 90935 HEMODIALYSIS ONE EVALUATION: CPT

## 2024-08-29 PROCEDURE — 6360000002 HC RX W HCPCS: Performed by: FAMILY MEDICINE

## 2024-08-29 PROCEDURE — 6370000000 HC RX 637 (ALT 250 FOR IP): Performed by: FAMILY MEDICINE

## 2024-08-29 RX ADMIN — ALBUMIN (HUMAN) 25 G: 0.25 INJECTION, SOLUTION INTRAVENOUS at 10:12

## 2024-08-29 RX ADMIN — APIXABAN 2.5 MG: 2.5 TABLET, FILM COATED ORAL at 11:49

## 2024-08-29 RX ADMIN — PANTOPRAZOLE SODIUM 40 MG: 40 TABLET, DELAYED RELEASE ORAL at 04:18

## 2024-08-29 RX ADMIN — MIDODRINE HYDROCHLORIDE 15 MG: 10 TABLET ORAL at 11:49

## 2024-08-29 RX ADMIN — PREDNISONE 40 MG: 20 TABLET ORAL at 11:50

## 2024-08-29 RX ADMIN — GUAIFENESIN 400 MG: 400 TABLET ORAL at 11:50

## 2024-08-29 RX ADMIN — DOXYCYCLINE HYCLATE 100 MG: 100 CAPSULE ORAL at 11:49

## 2024-08-29 RX ADMIN — ASPIRIN 81 MG: 81 TABLET, COATED ORAL at 11:49

## 2024-08-29 RX ADMIN — ALBUMIN (HUMAN) 25 G: 0.25 INJECTION, SOLUTION INTRAVENOUS at 09:25

## 2024-08-29 RX ADMIN — FERROUS SULFATE TAB 325 MG (65 MG ELEMENTAL FE) 325 MG: 325 (65 FE) TAB at 11:50

## 2024-08-29 RX ADMIN — CEFDINIR 300 MG: 300 CAPSULE ORAL at 11:49

## 2024-08-29 RX ADMIN — MIDODRINE HYDROCHLORIDE 15 MG: 10 TABLET ORAL at 09:24

## 2024-08-29 RX ADMIN — MEXILETINE HYDROCHLORIDE 150 MG: 150 CAPSULE ORAL at 04:18

## 2024-08-29 RX ADMIN — ARFORMOTEROL TARTRATE: 15 SOLUTION RESPIRATORY (INHALATION) at 09:10

## 2024-08-29 RX ADMIN — IPRATROPIUM BROMIDE AND ALBUTEROL SULFATE 1 DOSE: 2.5; .5 SOLUTION RESPIRATORY (INHALATION) at 09:10

## 2024-08-29 ASSESSMENT — PAIN SCALES - GENERAL: PAINLEVEL_OUTOF10: 0

## 2024-08-29 ASSESSMENT — PAIN SCALES - WONG BAKER: WONGBAKER_NUMERICALRESPONSE: NO HURT

## 2024-08-29 NOTE — PLAN OF CARE
Problem: Skin/Tissue Integrity  Goal: Absence of new skin breakdown  Description: 1.  Monitor for areas of redness and/or skin breakdown  2.  Assess vascular access sites hourly  3.  Every 4-6 hours minimum:  Change oxygen saturation probe site  4.  Every 4-6 hours:  If on nasal continuous positive airway pressure, respiratory therapy assess nares and determine need for appliance change or resting period.  8/29/2024 1034 by Stiven Paulino RN  Outcome: Adequate for Discharge  8/29/2024 0147 by Cait Goodwin RN  Outcome: Progressing     Problem: Safety - Adult  Goal: Free from fall injury  8/29/2024 1034 by Stiven Paulino RN  Outcome: Adequate for Discharge  8/29/2024 0147 by Cait Goodwin RN  Outcome: Progressing     Problem: Chronic Conditions and Co-morbidities  Goal: Patient's chronic conditions and co-morbidity symptoms are monitored and maintained or improved  8/29/2024 1034 by Stiven Paulino RN  Outcome: Adequate for Discharge  8/29/2024 0147 by Cait Goodwin RN  Outcome: Progressing     Problem: Risk for Elopement  Goal: Patient will not exit the unit/facility without proper excort  8/29/2024 1034 by Stiven Paulino RN  Outcome: Adequate for Discharge  8/29/2024 0147 by Cait Goodwin RN  Outcome: Progressing  Flowsheets (Taken 8/28/2024 1959)  Nursing Interventions for Elopement Risk: Assist with personal care needs such as toileting, eating, dressing, as needed to reduce the risk of wandering     Problem: Respiratory - Adult  Goal: Achieves optimal ventilation and oxygenation  8/29/2024 1034 by Stiven Paulino RN  Outcome: Adequate for Discharge  8/29/2024 0147 by Cait Goodwin RN  Outcome: Progressing     Problem: Musculoskeletal - Adult  Goal: Return mobility to safest level of function  8/29/2024 1034 by Stiven Paulino RN  Outcome: Adequate for Discharge  8/29/2024 0147 by Cait Goodwin RN  Outcome: Progressing     Problem: Metabolic/Fluid and Electrolytes - Adult  Goal: Hemodynamic  stability and optimal renal function maintained  8/29/2024 1034 by Stiven Paulino RN  Outcome: Adequate for Discharge  8/29/2024 0147 by Cait Goodwin RN  Outcome: Progressing     Problem: Discharge Planning  Goal: Discharge to home or other facility with appropriate resources  8/29/2024 1034 by Stiven Paulino RN  Outcome: Adequate for Discharge  8/29/2024 0147 by Cait Goodwin RN  Outcome: Progressing     Problem: Nutrition Deficit:  Goal: Optimize nutritional status  8/29/2024 1034 by Stiven Paulino RN  Outcome: Adequate for Discharge  8/29/2024 0147 by Cait Goodwin RN  Outcome: Progressing     Problem: Pain  Goal: Verbalizes/displays adequate comfort level or baseline comfort level  8/29/2024 1034 by Stiven Paulino RN  Outcome: Adequate for Discharge  Flowsheets (Taken 8/29/2024 0408 by Cait Goodwin, RN)  Verbalizes/displays adequate comfort level or baseline comfort level: Encourage patient to monitor pain and request assistance  8/29/2024 0147 by Cait Goodwin RN  Outcome: Progressing  Flowsheets  Taken 8/29/2024 0019  Verbalizes/displays adequate comfort level or baseline comfort level: Encourage patient to monitor pain and request assistance  Taken 8/28/2024 2345  Verbalizes/displays adequate comfort level or baseline comfort level: Encourage patient to monitor pain and request assistance  Taken 8/28/2024 1958  Verbalizes/displays adequate comfort level or baseline comfort level: Encourage patient to monitor pain and request assistance

## 2024-08-29 NOTE — CARE COORDINATION
CM Update: Discharge Order Noted: Discharge plan is [facility]. Destination and [facility] updated. Face Sheet, Ambulance Form, and Envelope in soft chart. Transport set with [facility] for 1200. Nursing Notified, Family Notified, Facility Notified. CM/SW to follow. MT

## 2024-08-29 NOTE — FLOWSHEET NOTE
08/29/24 1100   Vital Signs   BP (!) 101/48   Pulse 60   Respirations 12   Weight - Scale 52 kg (114 lb 10.2 oz)   Percent Weight Change -2.62   Pain Assessment   Pain Assessment None - Denies Pain   Post-Hemodialysis Assessment   Post-Treatment Procedures Blood returned;Catheter capped, clamped and heparinized x 2 ports   Machine Disinfection Process Exterior Machine Disinfection   Rinseback Volume (ml) 300 ml   Blood Volume Processed (Liters) 48.6 L   Dialyzer Clearance Lightly streaked   Duration of Treatment (minutes) 180 minutes   Hemodialysis Intake (ml) 300 ml   Hemodialysis Output (ml) 2000 ml   NET Removed (ml) 1700   Tolerated Treatment Fair   Interventions Taken Medication;Ultrafiltration goal decreased;Fluid bolus   Patient Response to Treatment Pt tolerated fair, hypotensive but asymptomatic. Albumin given x 2

## 2024-08-29 NOTE — PLAN OF CARE
Problem: Skin/Tissue Integrity  Goal: Absence of new skin breakdown  Description: 1.  Monitor for areas of redness and/or skin breakdown  2.  Assess vascular access sites hourly  3.  Every 4-6 hours minimum:  Change oxygen saturation probe site  4.  Every 4-6 hours:  If on nasal continuous positive airway pressure, respiratory therapy assess nares and determine need for appliance change or resting period.  Outcome: Progressing     Problem: Safety - Adult  Goal: Free from fall injury  8/29/2024 0147 by Cait Goodwin, RN  Outcome: Progressing  8/28/2024 1152 by Stiven Paulino, RN  Outcome: Progressing     Problem: Chronic Conditions and Co-morbidities  Goal: Patient's chronic conditions and co-morbidity symptoms are monitored and maintained or improved  Outcome: Progressing     Problem: Risk for Elopement  Goal: Patient will not exit the unit/facility without proper excort  Outcome: Progressing  Flowsheets (Taken 8/28/2024 1959)  Nursing Interventions for Elopement Risk: Assist with personal care needs such as toileting, eating, dressing, as needed to reduce the risk of wandering     Problem: Respiratory - Adult  Goal: Achieves optimal ventilation and oxygenation  Outcome: Progressing     Problem: Musculoskeletal - Adult  Goal: Return mobility to safest level of function  Outcome: Progressing     Problem: Metabolic/Fluid and Electrolytes - Adult  Goal: Hemodynamic stability and optimal renal function maintained  Outcome: Progressing     Problem: Discharge Planning  Goal: Discharge to home or other facility with appropriate resources  Outcome: Progressing     Problem: Nutrition Deficit:  Goal: Optimize nutritional status  Outcome: Progressing     Problem: Pain  Goal: Verbalizes/displays adequate comfort level or baseline comfort level  Outcome: Progressing  Flowsheets  Taken 8/29/2024 0019  Verbalizes/displays adequate comfort level or baseline comfort level: Encourage patient to monitor pain and request  assistance  Taken 8/28/2024 1958  Verbalizes/displays adequate comfort level or baseline comfort level: Encourage patient to monitor pain and request assistance

## 2024-08-30 ENCOUNTER — OUTSIDE SERVICES (OUTPATIENT)
Dept: PRIMARY CARE CLINIC | Age: 84
End: 2024-08-30

## 2024-08-30 VITALS
SYSTOLIC BLOOD PRESSURE: 122 MMHG | OXYGEN SATURATION: 98 % | DIASTOLIC BLOOD PRESSURE: 72 MMHG | TEMPERATURE: 98 F | HEART RATE: 76 BPM | RESPIRATION RATE: 17 BRPM

## 2024-08-30 DIAGNOSIS — I48.0 PAROXYSMAL ATRIAL FIBRILLATION (HCC): ICD-10-CM

## 2024-08-30 DIAGNOSIS — I35.1 AORTIC VALVE INSUFFICIENCY, ETIOLOGY OF CARDIAC VALVE DISEASE UNSPECIFIED: ICD-10-CM

## 2024-08-30 DIAGNOSIS — I95.9 HYPOTENSION, UNSPECIFIED HYPOTENSION TYPE: ICD-10-CM

## 2024-08-30 DIAGNOSIS — J44.1 CHRONIC OBSTRUCTIVE PULMONARY DISEASE WITH ACUTE EXACERBATION (HCC): ICD-10-CM

## 2024-08-30 DIAGNOSIS — E03.9 HYPOTHYROIDISM, UNSPECIFIED TYPE: ICD-10-CM

## 2024-08-30 DIAGNOSIS — R26.2 AMBULATORY DYSFUNCTION: ICD-10-CM

## 2024-08-30 DIAGNOSIS — D64.9 ANEMIA, UNSPECIFIED TYPE: ICD-10-CM

## 2024-08-30 DIAGNOSIS — I50.43 ACUTE ON CHRONIC COMBINED SYSTOLIC AND DIASTOLIC CONGESTIVE HEART FAILURE (HCC): ICD-10-CM

## 2024-08-30 DIAGNOSIS — I34.0 MITRAL VALVE INSUFFICIENCY, UNSPECIFIED ETIOLOGY: ICD-10-CM

## 2024-08-30 DIAGNOSIS — N18.4 CHRONIC KIDNEY DISEASE, STAGE 4 (SEVERE) (HCC): ICD-10-CM

## 2024-08-30 DIAGNOSIS — R53.1 GENERALIZED WEAKNESS: ICD-10-CM

## 2024-08-30 DIAGNOSIS — K21.9 GASTRO-ESOPHAGEAL REFLUX DISEASE WITHOUT ESOPHAGITIS: ICD-10-CM

## 2024-08-30 DIAGNOSIS — I50.20 HFREF (HEART FAILURE WITH REDUCED EJECTION FRACTION) (HCC): ICD-10-CM

## 2024-08-30 DIAGNOSIS — B34.8 PARAINFLUENZA INFECTION: ICD-10-CM

## 2024-08-30 DIAGNOSIS — E78.5 HYPERLIPIDEMIA, UNSPECIFIED HYPERLIPIDEMIA TYPE: ICD-10-CM

## 2024-08-30 DIAGNOSIS — I10 ESSENTIAL (PRIMARY) HYPERTENSION: ICD-10-CM

## 2024-08-30 DIAGNOSIS — J18.9 PNEUMONIA DUE TO INFECTIOUS ORGANISM, UNSPECIFIED LATERALITY, UNSPECIFIED PART OF LUNG: Primary | ICD-10-CM

## 2024-08-30 DIAGNOSIS — Z99.2 DEPENDENCE ON RENAL DIALYSIS (HCC): ICD-10-CM

## 2024-08-30 LAB
ALBUMIN SERPL-MCNC: 4.5 G/DL (ref 3.5–5.2)
ALP SERPL-CCNC: 129 U/L (ref 35–104)
ALT SERPL-CCNC: 33 U/L (ref 0–32)
ANION GAP SERPL CALCULATED.3IONS-SCNC: 19 MMOL/L (ref 7–16)
AST SERPL-CCNC: 24 U/L (ref 0–31)
BILIRUB SERPL-MCNC: 0.7 MG/DL (ref 0–1.2)
BUN SERPL-MCNC: 33 MG/DL (ref 6–23)
CALCIUM SERPL-MCNC: 9.8 MG/DL (ref 8.6–10.2)
CHLORIDE SERPL-SCNC: 98 MMOL/L (ref 98–107)
CHOLEST SERPL-MCNC: 130 MG/DL
CO2 SERPL-SCNC: 23 MMOL/L (ref 22–29)
CREAT SERPL-MCNC: 3 MG/DL (ref 0.5–1)
ERYTHROCYTE [DISTWIDTH] IN BLOOD BY AUTOMATED COUNT: 19.9 % (ref 11.5–15)
GFR, ESTIMATED: 15 ML/MIN/1.73M2
GLUCOSE SERPL-MCNC: 195 MG/DL (ref 74–99)
HBA1C MFR BLD: 5.6 % (ref 4–5.6)
HCT VFR BLD AUTO: 21.8 % (ref 34–48)
HDLC SERPL-MCNC: 60 MG/DL
HGB BLD-MCNC: 7 G/DL (ref 11.5–15.5)
LDLC SERPL CALC-MCNC: 51 MG/DL
MCH RBC QN AUTO: 35.5 PG (ref 26–35)
MCHC RBC AUTO-ENTMCNC: 32.1 G/DL (ref 32–34.5)
MCV RBC AUTO: 110.7 FL (ref 80–99.9)
PLATELET, FLUORESCENCE: 184 K/UL (ref 130–450)
PMV BLD AUTO: 12.7 FL (ref 7–12)
POTASSIUM SERPL-SCNC: 3.8 MMOL/L (ref 3.5–5)
PROT SERPL-MCNC: 6.5 G/DL (ref 6.4–8.3)
RBC # BLD AUTO: 1.97 M/UL (ref 3.5–5.5)
SODIUM SERPL-SCNC: 140 MMOL/L (ref 132–146)
TRIGL SERPL-MCNC: 93 MG/DL
TSH SERPL DL<=0.05 MIU/L-ACNC: 4.93 UIU/ML (ref 0.27–4.2)
VLDLC SERPL CALC-MCNC: 19 MG/DL
WBC OTHER # BLD: 14.6 K/UL (ref 4.5–11.5)

## 2024-08-30 NOTE — PROGRESS NOTES
Visit Date: 2024  Marge Callejas (:  1940) is a 84 y.o. female.    History & Physical    Subjective   SUBJECTIVE/OBJECTIVE:  Past Medical History:     Diagnosis Date    Arthritis     Atrial fibrillation (HCC)     Blood circulation, collateral     CHF (congestive heart failure) (HCC)     Chronic renal insufficiency, stage IV (severe) (HCC) 2016    Coronary artery disease involving native coronary artery of native heart without angina pectoris 2024    History of cardiovascular stress test 2015    Lexiscan stress test    History of echocardiogram 2015    EF 29%    Hyperlipidemia     Hypertension     Mixed restrictive and obstructive lung disease (HCC) 2023     Past Surgical History:   Procedure Laterality Date    COLONOSCOPY N/A 4/3/2021    COLONOSCOPY POLYPECTOMY HOT SNARE performed by Lucio Nelson DO at [facility] ENDOSCOPY    COLONOSCOPY  4/3/2021    COLONOSCOPY WITH BIOPSY performed by Lucio Nelson DO at [facility] ENDOSCOPY    COLONOSCOPY  4/3/2021    COLONOSCOPY CONTROL HEMORRHAGE performed by Lucio Nelson DO at [facility] ENDOSCOPY    COLONOSCOPY  4/3/2021    COLONOSCOPY SUBMUCOSAL SPOT INJECTION performed by Lucio Nelson DO at [facility] ENDOSCOPY    COLONOSCOPY N/A 2023    COLONOSCOPY DIAGNOSTIC performed by Scooby Cormier MD at [facility] ENDOSCOPY    ECTOPIC PREGNANCY SURGERY      UPPER GASTROINTESTINAL ENDOSCOPY N/A 4/3/2021    EGD BIOPSY performed by Lucio Nelson DO at [facility] ENDOSCOPY    UPPER GASTROINTESTINAL ENDOSCOPY N/A 2021    EGD W/EUS FNA performed by Ana Gandhi MD at [facility] ENDOSCOPY      Family History   Problem Relation Age of Onset    Heart Disease Mother     No Known Problems Father     Other Sister         CHF    Cancer Sister     Brain Cancer Sister       Social History     Socioeconomic History    Marital status:    Tobacco Use    Smoking status: Former     Current packs/day: 0.00     Average packs/day: 0.5 packs/day for 51.0 years (25.5 ttl pk-yrs)

## 2024-09-02 ASSESSMENT — ENCOUNTER SYMPTOMS: COUGH: 1

## 2024-09-03 ENCOUNTER — TELEPHONE (OUTPATIENT)
Dept: NON INVASIVE DIAGNOSTICS | Age: 84
End: 2024-09-03

## 2024-09-03 LAB
BASOPHILS # BLD: 0.01 K/UL (ref 0–0.2)
BASOPHILS NFR BLD: 0 % (ref 0–2)
EOSINOPHIL # BLD: 0 K/UL (ref 0.05–0.5)
EOSINOPHILS RELATIVE PERCENT: 0 % (ref 0–6)
ERYTHROCYTE [DISTWIDTH] IN BLOOD BY AUTOMATED COUNT: 20.3 % (ref 11.5–15)
HCT VFR BLD AUTO: 24 % (ref 34–48)
HGB BLD-MCNC: 7.8 G/DL (ref 11.5–15.5)
IMM GRANULOCYTES # BLD AUTO: 0.2 K/UL (ref 0–0.58)
IMM GRANULOCYTES NFR BLD: 1 % (ref 0–5)
LYMPHOCYTES NFR BLD: 0.98 K/UL (ref 1.5–4)
LYMPHOCYTES RELATIVE PERCENT: 5 % (ref 20–42)
MCH RBC QN AUTO: 35 PG (ref 26–35)
MCHC RBC AUTO-ENTMCNC: 32.5 G/DL (ref 32–34.5)
MCV RBC AUTO: 107.6 FL (ref 80–99.9)
MONOCYTES NFR BLD: 0.94 K/UL (ref 0.1–0.95)
MONOCYTES NFR BLD: 5 % (ref 2–12)
NEUTROPHILS NFR BLD: 90 % (ref 43–80)
NEUTS SEG NFR BLD: 18.6 K/UL (ref 1.8–7.3)
PLATELET, FLUORESCENCE: 174 K/UL (ref 130–450)
PMV BLD AUTO: 13.7 FL (ref 7–12)
RBC # BLD AUTO: 2.23 M/UL (ref 3.5–5.5)
RBC # BLD: ABNORMAL 10*6/UL
WBC OTHER # BLD: 20.7 K/UL (ref 4.5–11.5)

## 2024-09-03 NOTE — TELEPHONE ENCOUNTER
Daughter, Joanthan, called. She states her mother's previous provider asked about her mother's remotes. I explained to Jonathan that Elodia had requested her mother's remote transmissions to be transferred to our clinic. I asked Jonathan to have the previous provider release her mother in the [facility] [employer] website. Jonathan will contact the provider.    Dayanna Moise RN, BSN  [facility] [place]   Device Clinic

## 2024-09-04 ENCOUNTER — OUTSIDE SERVICES (OUTPATIENT)
Dept: PRIMARY CARE CLINIC | Age: 84
End: 2024-09-04

## 2024-09-04 DIAGNOSIS — I35.1 AORTIC VALVE INSUFFICIENCY, ETIOLOGY OF CARDIAC VALVE DISEASE UNSPECIFIED: ICD-10-CM

## 2024-09-04 DIAGNOSIS — R53.1 GENERALIZED WEAKNESS: ICD-10-CM

## 2024-09-04 DIAGNOSIS — I50.20 HFREF (HEART FAILURE WITH REDUCED EJECTION FRACTION) (HCC): ICD-10-CM

## 2024-09-04 DIAGNOSIS — N18.4 CHRONIC KIDNEY DISEASE, STAGE 4 (SEVERE) (HCC): ICD-10-CM

## 2024-09-04 DIAGNOSIS — I48.0 PAROXYSMAL ATRIAL FIBRILLATION (HCC): ICD-10-CM

## 2024-09-04 DIAGNOSIS — R26.2 AMBULATORY DYSFUNCTION: ICD-10-CM

## 2024-09-04 DIAGNOSIS — D64.9 ANEMIA, UNSPECIFIED TYPE: ICD-10-CM

## 2024-09-04 DIAGNOSIS — I34.0 MITRAL VALVE INSUFFICIENCY, UNSPECIFIED ETIOLOGY: ICD-10-CM

## 2024-09-04 DIAGNOSIS — J44.9 CHRONIC OBSTRUCTIVE PULMONARY DISEASE, UNSPECIFIED COPD TYPE (HCC): ICD-10-CM

## 2024-09-04 DIAGNOSIS — Z99.2 DEPENDENCE ON RENAL DIALYSIS (HCC): ICD-10-CM

## 2024-09-04 DIAGNOSIS — J18.9 PNEUMONIA DUE TO INFECTIOUS ORGANISM, UNSPECIFIED LATERALITY, UNSPECIFIED PART OF LUNG: Primary | ICD-10-CM

## 2024-09-04 DIAGNOSIS — I95.9 HYPOTENSION, UNSPECIFIED HYPOTENSION TYPE: ICD-10-CM

## 2024-09-04 DIAGNOSIS — E78.5 HYPERLIPIDEMIA, UNSPECIFIED HYPERLIPIDEMIA TYPE: ICD-10-CM

## 2024-09-04 DIAGNOSIS — K21.9 GASTRO-ESOPHAGEAL REFLUX DISEASE WITHOUT ESOPHAGITIS: ICD-10-CM

## 2024-09-04 DIAGNOSIS — I10 ESSENTIAL (PRIMARY) HYPERTENSION: ICD-10-CM

## 2024-09-04 ASSESSMENT — ENCOUNTER SYMPTOMS
BACK PAIN: 0
NAUSEA: 0
TROUBLE SWALLOWING: 0
VOICE CHANGE: 0
PHOTOPHOBIA: 0
CONSTIPATION: 0
EYE PAIN: 0
WHEEZING: 0
BLOOD IN STOOL: 0
RHINORRHEA: 0
VOMITING: 0
SORE THROAT: 0
SHORTNESS OF BREATH: 0
DIARRHEA: 0
ABDOMINAL PAIN: 0
CHEST TIGHTNESS: 0

## 2024-09-04 NOTE — PROGRESS NOTES
Visit Date: 2024  Marge Callejas (:  1940) is a 84 y.o. female.    Subjective   SUBJECTIVE/OBJECTIVE:  Past Medical History:     Diagnosis Date    Arthritis     Atrial fibrillation (HCC)     Blood circulation, collateral     CHF (congestive heart failure) (HCC)     Chronic renal insufficiency, stage IV (severe) (HCC) 2016    Coronary artery disease involving native coronary artery of native heart without angina pectoris 2024    History of cardiovascular stress test 2015    Lexiscan stress test    History of echocardiogram 2015    EF 29%    Hyperlipidemia     Hypertension     Mixed restrictive and obstructive lung disease (HCC) 2023     Past Surgical History:   Procedure Laterality Date    COLONOSCOPY N/A 4/3/2021    COLONOSCOPY POLYPECTOMY HOT SNARE performed by Lucio Nelson DO at [facility] ENDOSCOPY    COLONOSCOPY  4/3/2021    COLONOSCOPY WITH BIOPSY performed by Lucio Nelson DO at [facility] ENDOSCOPY    COLONOSCOPY  4/3/2021    COLONOSCOPY CONTROL HEMORRHAGE performed by Lucio Nelson DO at [facility] ENDOSCOPY    COLONOSCOPY  4/3/2021    COLONOSCOPY SUBMUCOSAL SPOT INJECTION performed by Lucio Nelson DO at [facility] ENDOSCOPY    COLONOSCOPY N/A 2023    COLONOSCOPY DIAGNOSTIC performed by Scooby Cormier MD at [facility] ENDOSCOPY    ECTOPIC PREGNANCY SURGERY      UPPER GASTROINTESTINAL ENDOSCOPY N/A 4/3/2021    EGD BIOPSY performed by Lucio Nelson DO at [facility] ENDOSCOPY    UPPER GASTROINTESTINAL ENDOSCOPY N/A 2021    EGD W/EUS FNA performed by Ana Gandhi MD at [facility] ENDOSCOPY      Family History   Problem Relation Age of Onset    Heart Disease Mother     No Known Problems Father     Other Sister         CHF    Cancer Sister     Brain Cancer Sister       Social History     Socioeconomic History    Marital status:    Tobacco Use    Smoking status: Former     Current packs/day: 0.00     Average packs/day: 0.5 packs/day for 51.0 years (25.5 ttl pk-yrs)     Types: Cigarettes

## 2024-09-05 VITALS
HEART RATE: 72 BPM | OXYGEN SATURATION: 95 % | TEMPERATURE: 98.2 F | DIASTOLIC BLOOD PRESSURE: 68 MMHG | RESPIRATION RATE: 17 BRPM | SYSTOLIC BLOOD PRESSURE: 110 MMHG

## 2024-09-06 LAB
ALBUMIN SERPL-MCNC: 3.5 G/DL (ref 3.5–5.2)
ALP SERPL-CCNC: 169 U/L (ref 35–104)
ALT SERPL-CCNC: 28 U/L (ref 0–32)
ANION GAP SERPL CALCULATED.3IONS-SCNC: 19 MMOL/L (ref 7–16)
AST SERPL-CCNC: 25 U/L (ref 0–31)
BILIRUB SERPL-MCNC: 0.4 MG/DL (ref 0–1.2)
BUN SERPL-MCNC: 48 MG/DL (ref 6–23)
CALCIUM SERPL-MCNC: 9.1 MG/DL (ref 8.6–10.2)
CHLORIDE SERPL-SCNC: 92 MMOL/L (ref 98–107)
CO2 SERPL-SCNC: 22 MMOL/L (ref 22–29)
CREAT SERPL-MCNC: 3.4 MG/DL (ref 0.5–1)
ERYTHROCYTE [DISTWIDTH] IN BLOOD BY AUTOMATED COUNT: 21.1 % (ref 11.5–15)
GFR, ESTIMATED: 13 ML/MIN/1.73M2
GLUCOSE SERPL-MCNC: 231 MG/DL (ref 74–99)
HCT VFR BLD AUTO: 22.3 % (ref 34–48)
HGB BLD-MCNC: 7.1 G/DL (ref 11.5–15.5)
MCH RBC QN AUTO: 34.5 PG (ref 26–35)
MCHC RBC AUTO-ENTMCNC: 31.8 G/DL (ref 32–34.5)
MCV RBC AUTO: 108.3 FL (ref 80–99.9)
PLATELET, FLUORESCENCE: 123 K/UL (ref 130–450)
PMV BLD AUTO: 14.1 FL (ref 7–12)
POTASSIUM SERPL-SCNC: 4.3 MMOL/L (ref 3.5–5)
PROT SERPL-MCNC: 5.4 G/DL (ref 6.4–8.3)
RBC # BLD AUTO: 2.06 M/UL (ref 3.5–5.5)
SODIUM SERPL-SCNC: 133 MMOL/L (ref 132–146)
WBC OTHER # BLD: 11.9 K/UL (ref 4.5–11.5)

## 2024-09-09 LAB
ALBUMIN SERPL-MCNC: 3.6 G/DL (ref 3.5–5.2)
ALP SERPL-CCNC: 169 U/L (ref 35–104)
ALT SERPL-CCNC: 36 U/L (ref 0–32)
ANION GAP SERPL CALCULATED.3IONS-SCNC: 19 MMOL/L (ref 7–16)
AST SERPL-CCNC: 26 U/L (ref 0–31)
BILIRUB SERPL-MCNC: 0.4 MG/DL (ref 0–1.2)
BUN SERPL-MCNC: 79 MG/DL (ref 6–23)
CALCIUM SERPL-MCNC: 9.3 MG/DL (ref 8.6–10.2)
CHLORIDE SERPL-SCNC: 90 MMOL/L (ref 98–107)
CO2 SERPL-SCNC: 22 MMOL/L (ref 22–29)
CREAT SERPL-MCNC: 5 MG/DL (ref 0.5–1)
ERYTHROCYTE [DISTWIDTH] IN BLOOD BY AUTOMATED COUNT: 20.8 % (ref 11.5–15)
GFR, ESTIMATED: 8 ML/MIN/1.73M2
GLUCOSE SERPL-MCNC: 269 MG/DL (ref 74–99)
HCT VFR BLD AUTO: 24 % (ref 34–48)
HGB BLD-MCNC: 7.5 G/DL (ref 11.5–15.5)
MCH RBC QN AUTO: 34.1 PG (ref 26–35)
MCHC RBC AUTO-ENTMCNC: 31.3 G/DL (ref 32–34.5)
MCV RBC AUTO: 109.1 FL (ref 80–99.9)
PLATELET, FLUORESCENCE: 113 K/UL (ref 130–450)
PMV BLD AUTO: 14.4 FL (ref 7–12)
POTASSIUM SERPL-SCNC: 5.1 MMOL/L (ref 3.5–5)
PROT SERPL-MCNC: 5.9 G/DL (ref 6.4–8.3)
RBC # BLD AUTO: 2.2 M/UL (ref 3.5–5.5)
SODIUM SERPL-SCNC: 131 MMOL/L (ref 132–146)
WBC OTHER # BLD: 11.9 K/UL (ref 4.5–11.5)

## 2024-09-10 ENCOUNTER — OUTSIDE SERVICES (OUTPATIENT)
Dept: PRIMARY CARE CLINIC | Age: 84
End: 2024-09-10

## 2024-09-10 DIAGNOSIS — I48.0 PAROXYSMAL ATRIAL FIBRILLATION (HCC): ICD-10-CM

## 2024-09-10 DIAGNOSIS — I50.20 HFREF (HEART FAILURE WITH REDUCED EJECTION FRACTION) (HCC): Primary | ICD-10-CM

## 2024-09-10 DIAGNOSIS — I10 PRIMARY HYPERTENSION: ICD-10-CM

## 2024-09-10 DIAGNOSIS — J44.9 CHRONIC OBSTRUCTIVE PULMONARY DISEASE, UNSPECIFIED COPD TYPE (HCC): ICD-10-CM

## 2024-09-10 DIAGNOSIS — Z99.2 DEPENDENCE ON RENAL DIALYSIS (HCC): ICD-10-CM

## 2024-09-10 LAB
ERYTHROCYTE [DISTWIDTH] IN BLOOD BY AUTOMATED COUNT: 20.6 % (ref 11.5–15)
HCT VFR BLD AUTO: 21.8 % (ref 34–48)
HGB BLD-MCNC: 7.1 G/DL (ref 11.5–15.5)
MCH RBC QN AUTO: 36 PG (ref 26–35)
MCHC RBC AUTO-ENTMCNC: 32.6 G/DL (ref 32–34.5)
MCV RBC AUTO: 110.7 FL (ref 80–99.9)
PLATELET, FLUORESCENCE: 107 K/UL (ref 130–450)
PMV BLD AUTO: 14.1 FL (ref 7–12)
RBC # BLD AUTO: 1.97 M/UL (ref 3.5–5.5)
WBC OTHER # BLD: 11.4 K/UL (ref 4.5–11.5)

## 2024-09-13 ENCOUNTER — APPOINTMENT (OUTPATIENT)
Dept: GENERAL RADIOLOGY | Age: 84
End: 2024-09-13
Payer: MEDICARE

## 2024-09-13 ENCOUNTER — HOSPITAL ENCOUNTER (EMERGENCY)
Age: 84
Discharge: HOME | End: 2024-09-14
Attending: EMERGENCY MEDICINE
Payer: MEDICARE

## 2024-09-13 DIAGNOSIS — N18.6 ESRD (END STAGE RENAL DISEASE) (HCC): Primary | ICD-10-CM

## 2024-09-13 LAB
ALBUMIN SERPL-MCNC: 3.6 G/DL (ref 3.5–5.2)
ALP SERPL-CCNC: 147 U/L (ref 35–104)
ALT SERPL-CCNC: 47 U/L (ref 0–32)
ANION GAP SERPL CALCULATED.3IONS-SCNC: 19 MMOL/L (ref 7–16)
AST SERPL-CCNC: 27 U/L (ref 0–31)
BASOPHILS # BLD: 0 K/UL (ref 0–0.2)
BASOPHILS NFR BLD: 0 % (ref 0–2)
BILIRUB SERPL-MCNC: 0.5 MG/DL (ref 0–1.2)
BUN SERPL-MCNC: 69 MG/DL (ref 6–23)
CALCIUM SERPL-MCNC: 9.4 MG/DL (ref 8.6–10.2)
CHLORIDE SERPL-SCNC: 87 MMOL/L (ref 98–107)
CO2 SERPL-SCNC: 23 MMOL/L (ref 22–29)
CREAT SERPL-MCNC: 4.9 MG/DL (ref 0.5–1)
EOSINOPHIL # BLD: 0 K/UL (ref 0.05–0.5)
EOSINOPHILS RELATIVE PERCENT: 0 % (ref 0–6)
ERYTHROCYTE [DISTWIDTH] IN BLOOD BY AUTOMATED COUNT: 19.8 % (ref 11.5–15)
GFR, ESTIMATED: 8 ML/MIN/1.73M2
GLUCOSE BLD-MCNC: 222 MG/DL (ref 74–99)
GLUCOSE SERPL-MCNC: 201 MG/DL (ref 74–99)
HCT VFR BLD AUTO: 25.2 % (ref 34–48)
HGB BLD-MCNC: 8.1 G/DL (ref 11.5–15.5)
LYMPHOCYTES NFR BLD: 0.42 K/UL (ref 1.5–4)
LYMPHOCYTES RELATIVE PERCENT: 5 % (ref 20–42)
MCH RBC QN AUTO: 34.9 PG (ref 26–35)
MCHC RBC AUTO-ENTMCNC: 32.1 G/DL (ref 32–34.5)
MCV RBC AUTO: 108.6 FL (ref 80–99.9)
MONOCYTES NFR BLD: 0.14 K/UL (ref 0.1–0.95)
MONOCYTES NFR BLD: 2 % (ref 2–12)
NEUTROPHILS NFR BLD: 93 % (ref 43–80)
NEUTS SEG NFR BLD: 7.54 K/UL (ref 1.8–7.3)
NUCLEATED RED BLOOD CELLS: 1 PER 100 WBC
PLATELET, FLUORESCENCE: 119 K/UL (ref 130–450)
PMV BLD AUTO: 12.9 FL (ref 7–12)
POTASSIUM SERPL-SCNC: 4.7 MMOL/L (ref 3.5–5)
PROT SERPL-MCNC: 6.3 G/DL (ref 6.4–8.3)
RBC # BLD AUTO: 2.32 M/UL (ref 3.5–5.5)
RBC # BLD: ABNORMAL 10*6/UL
SODIUM SERPL-SCNC: 129 MMOL/L (ref 132–146)
WBC OTHER # BLD: 8.1 K/UL (ref 4.5–11.5)

## 2024-09-13 PROCEDURE — 71045 X-RAY EXAM CHEST 1 VIEW: CPT

## 2024-09-13 PROCEDURE — 80053 COMPREHEN METABOLIC PANEL: CPT

## 2024-09-13 PROCEDURE — 82962 GLUCOSE BLOOD TEST: CPT

## 2024-09-13 PROCEDURE — 93005 ELECTROCARDIOGRAM TRACING: CPT | Performed by: EMERGENCY MEDICINE

## 2024-09-13 PROCEDURE — 85025 COMPLETE CBC W/AUTO DIFF WBC: CPT

## 2024-09-13 PROCEDURE — 99285 EMERGENCY DEPT VISIT HI MDM: CPT

## 2024-09-13 PROCEDURE — 90935 HEMODIALYSIS ONE EVALUATION: CPT

## 2024-09-13 RX ORDER — TRAMADOL HYDROCHLORIDE 25 MG/1
25 TABLET, COATED ORAL 2 TIMES DAILY PRN
Status: ON HOLD | COMMUNITY
Start: 2024-09-12 | End: 2024-09-19

## 2024-09-13 RX ORDER — NICOTINE POLACRILEX 4 MG
15 LOZENGE BUCCAL PRN
Status: ON HOLD | COMMUNITY

## 2024-09-13 RX ORDER — HYDRALAZINE HYDROCHLORIDE 10 MG/1
10 TABLET, FILM COATED ORAL SEE ADMIN INSTRUCTIONS
Status: ON HOLD | COMMUNITY

## 2024-09-13 RX ORDER — CALCIUM CARBONATE 500 MG/1
1 TABLET, CHEWABLE ORAL 3 TIMES DAILY PRN
Status: ON HOLD | COMMUNITY

## 2024-09-13 RX ORDER — BUMETANIDE 1 MG/1
1 TABLET ORAL DAILY
Status: ON HOLD | COMMUNITY

## 2024-09-13 RX ORDER — LEVOTHYROXINE SODIUM 125 UG/1
125 TABLET ORAL DAILY
Status: ON HOLD | COMMUNITY

## 2024-09-14 VITALS
RESPIRATION RATE: 13 BRPM | HEART RATE: 71 BPM | DIASTOLIC BLOOD PRESSURE: 60 MMHG | SYSTOLIC BLOOD PRESSURE: 112 MMHG | OXYGEN SATURATION: 97 % | TEMPERATURE: 96.9 F

## 2024-09-14 LAB
EKG ATRIAL RATE: 54 BPM
EKG Q-T INTERVAL: 542 MS
EKG QRS DURATION: 190 MS
EKG QTC CALCULATION (BAZETT): 536 MS
EKG R AXIS: 114 DEGREES
EKG T AXIS: -156 DEGREES
EKG VENTRICULAR RATE: 59 BPM

## 2024-09-14 PROCEDURE — 93010 ELECTROCARDIOGRAM REPORT: CPT | Performed by: INTERNAL MEDICINE

## 2024-09-17 ENCOUNTER — OUTSIDE SERVICES (OUTPATIENT)
Dept: PRIMARY CARE CLINIC | Age: 84
End: 2024-09-17

## 2024-09-17 VITALS
HEART RATE: 52 BPM | SYSTOLIC BLOOD PRESSURE: 88 MMHG | WEIGHT: 122.2 LBS | TEMPERATURE: 97.8 F | OXYGEN SATURATION: 96 % | BODY MASS INDEX: 20.98 KG/M2 | DIASTOLIC BLOOD PRESSURE: 52 MMHG | RESPIRATION RATE: 18 BRPM

## 2024-09-17 DIAGNOSIS — I50.20 HFREF (HEART FAILURE WITH REDUCED EJECTION FRACTION) (HCC): ICD-10-CM

## 2024-09-17 DIAGNOSIS — R53.1 GENERALIZED WEAKNESS: ICD-10-CM

## 2024-09-17 DIAGNOSIS — E78.5 HYPERLIPIDEMIA, UNSPECIFIED HYPERLIPIDEMIA TYPE: ICD-10-CM

## 2024-09-17 DIAGNOSIS — R26.2 AMBULATORY DYSFUNCTION: ICD-10-CM

## 2024-09-17 DIAGNOSIS — N18.6 ESRD (END STAGE RENAL DISEASE) ON DIALYSIS (HCC): ICD-10-CM

## 2024-09-17 DIAGNOSIS — Z99.2 ESRD (END STAGE RENAL DISEASE) ON DIALYSIS (HCC): ICD-10-CM

## 2024-09-17 DIAGNOSIS — D64.9 ANEMIA, UNSPECIFIED TYPE: ICD-10-CM

## 2024-09-17 DIAGNOSIS — I95.9 HYPOTENSION, UNSPECIFIED HYPOTENSION TYPE: Primary | ICD-10-CM

## 2024-09-17 DIAGNOSIS — E87.1 HYPONATREMIA: ICD-10-CM

## 2024-09-17 DIAGNOSIS — Z99.2 DEPENDENCE ON RENAL DIALYSIS (HCC): ICD-10-CM

## 2024-09-17 DIAGNOSIS — F01.A0 MILD VASCULAR DEMENTIA WITHOUT BEHAVIORAL DISTURBANCE, PSYCHOTIC DISTURBANCE, MOOD DISTURBANCE, OR ANXIETY (HCC): ICD-10-CM

## 2024-09-17 DIAGNOSIS — E03.9 HYPOTHYROIDISM, UNSPECIFIED TYPE: ICD-10-CM

## 2024-09-17 DIAGNOSIS — J44.9 CHRONIC OBSTRUCTIVE PULMONARY DISEASE, UNSPECIFIED COPD TYPE (HCC): ICD-10-CM

## 2024-09-17 DIAGNOSIS — J18.9 PNEUMONIA DUE TO INFECTIOUS ORGANISM, UNSPECIFIED LATERALITY, UNSPECIFIED PART OF LUNG: ICD-10-CM

## 2024-09-17 DIAGNOSIS — I48.0 PAROXYSMAL ATRIAL FIBRILLATION (HCC): ICD-10-CM

## 2024-09-17 ASSESSMENT — ENCOUNTER SYMPTOMS
BLOOD IN STOOL: 0
CHEST TIGHTNESS: 0
NAUSEA: 0
BACK PAIN: 0
PHOTOPHOBIA: 0
RHINORRHEA: 0
VOMITING: 0
VOICE CHANGE: 0
WHEEZING: 0
SORE THROAT: 0
EYE PAIN: 0
TROUBLE SWALLOWING: 0
ABDOMINAL PAIN: 0
CONSTIPATION: 0
COUGH: 0
DIARRHEA: 0

## 2024-09-18 ENCOUNTER — HOSPITAL ENCOUNTER (INPATIENT)
Age: 84
LOS: 7 days | Discharge: NURSING, REHAB OR LONG-TERM CARE | DRG: 884 | End: 2024-09-25
Attending: EMERGENCY MEDICINE | Admitting: INTERNAL MEDICINE
Payer: MEDICARE

## 2024-09-18 ENCOUNTER — APPOINTMENT (OUTPATIENT)
Dept: GENERAL RADIOLOGY | Age: 84
DRG: 884 | End: 2024-09-18
Payer: MEDICARE

## 2024-09-18 ENCOUNTER — APPOINTMENT (OUTPATIENT)
Dept: CT IMAGING | Age: 84
DRG: 884 | End: 2024-09-18
Attending: EMERGENCY MEDICINE
Payer: MEDICARE

## 2024-09-18 DIAGNOSIS — Z99.81 SUPPLEMENTAL OXYGEN DEPENDENT: ICD-10-CM

## 2024-09-18 DIAGNOSIS — Z99.2 END STAGE RENAL DISEASE ON DIALYSIS (HCC): ICD-10-CM

## 2024-09-18 DIAGNOSIS — D64.9 SYMPTOMATIC ANEMIA: Primary | ICD-10-CM

## 2024-09-18 DIAGNOSIS — R40.2412 GLASGOW COMA SCALE TOTAL SCORE 13-15, AT ARRIVAL TO EMERGENCY DEPARTMENT: ICD-10-CM

## 2024-09-18 DIAGNOSIS — N18.6 END STAGE RENAL DISEASE ON DIALYSIS (HCC): ICD-10-CM
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
## 2024-09-18 LAB
ALBUMIN SERPL-MCNC: 3.2 G/DL (ref 3.5–5.2)
ALP SERPL-CCNC: 148 U/L (ref 35–104)
ALT SERPL-CCNC: 31 U/L (ref 0–32)
ANION GAP SERPL CALCULATED.3IONS-SCNC: 17 MMOL/L (ref 7–16)
AST SERPL-CCNC: 20 U/L (ref 0–31)
BASOPHILS # BLD: 0.01 K/UL (ref 0–0.2)
BASOPHILS NFR BLD: 0 % (ref 0–2)
BILIRUB SERPL-MCNC: 0.4 MG/DL (ref 0–1.2)
BUN SERPL-MCNC: 31 MG/DL (ref 6–23)
CALCIUM SERPL-MCNC: 8.5 MG/DL (ref 8.6–10.2)
CHLORIDE SERPL-SCNC: 95 MMOL/L (ref 98–107)
CO2 SERPL-SCNC: 26 MMOL/L (ref 22–29)
CREAT SERPL-MCNC: 3.3 MG/DL (ref 0.5–1)
EKG ATRIAL RATE: 76 BPM
EKG Q-T INTERVAL: 474 MS
EKG QRS DURATION: 192 MS
EKG QTC CALCULATION (BAZETT): 481 MS
EKG R AXIS: 103 DEGREES
EKG T AXIS: -139 DEGREES
EKG VENTRICULAR RATE: 62 BPM
EOSINOPHIL # BLD: 0.06 K/UL (ref 0.05–0.5)
EOSINOPHILS RELATIVE PERCENT: 1 % (ref 0–6)
ERYTHROCYTE [DISTWIDTH] IN BLOOD BY AUTOMATED COUNT: 19.3 % (ref 11.5–15)
GFR, ESTIMATED: 14 ML/MIN/1.73M2
GLUCOSE SERPL-MCNC: 158 MG/DL (ref 74–99)
HCT VFR BLD AUTO: 20.8 % (ref 34–48)
HCT VFR BLD AUTO: 26.9 % (ref 34–48)
HGB BLD-MCNC: 6.7 G/DL (ref 11.5–15.5)
HGB BLD-MCNC: 8.5 G/DL (ref 11.5–15.5)
IMM GRANULOCYTES # BLD AUTO: 0.06 K/UL (ref 0–0.58)
IMM GRANULOCYTES NFR BLD: 1 % (ref 0–5)
IRON SATN MFR SERPL: NORMAL % (ref 15–50)
IRON SERPL-MCNC: 125 UG/DL (ref 37–145)
LYMPHOCYTES NFR BLD: 0.9 K/UL (ref 1.5–4)
LYMPHOCYTES RELATIVE PERCENT: 11 % (ref 20–42)
MAGNESIUM SERPL-MCNC: 1.7 MG/DL (ref 1.6–2.6)
MCH RBC QN AUTO: 35.1 PG (ref 26–35)
MCHC RBC AUTO-ENTMCNC: 32.2 G/DL (ref 32–34.5)
MCV RBC AUTO: 108.9 FL (ref 80–99.9)
MONOCYTES NFR BLD: 0.42 K/UL (ref 0.1–0.95)
MONOCYTES NFR BLD: 5 % (ref 2–12)
NEUTROPHILS NFR BLD: 82 % (ref 43–80)
NEUTS SEG NFR BLD: 6.7 K/UL (ref 1.8–7.3)
PHOSPHATE SERPL-MCNC: 3 MG/DL (ref 2.5–4.5)
PLATELET CONFIRMATION: NORMAL
PLATELET, FLUORESCENCE: 97 K/UL (ref 130–450)
PMV BLD AUTO: 13.8 FL (ref 7–12)
POTASSIUM SERPL-SCNC: 3.2 MMOL/L (ref 3.5–5)
PROT SERPL-MCNC: 5.5 G/DL (ref 6.4–8.3)
RBC # BLD AUTO: 1.91 M/UL (ref 3.5–5.5)
RBC # BLD: ABNORMAL 10*6/UL
SODIUM SERPL-SCNC: 138 MMOL/L (ref 132–146)
TIBC SERPL-MCNC: NORMAL UG/DL (ref 250–450)
TSH SERPL DL<=0.05 MIU/L-ACNC: 14.79 UIU/ML (ref 0.27–4.2)
WBC OTHER # BLD: 8.2 K/UL (ref 4.5–11.5)

## 2024-09-18 PROCEDURE — 1200000000 HC SEMI PRIVATE

## 2024-09-18 PROCEDURE — 85025 COMPLETE CBC W/AUTO DIFF WBC: CPT

## 2024-09-18 PROCEDURE — 70450 CT HEAD/BRAIN W/O DYE: CPT

## 2024-09-18 PROCEDURE — 73060 X-RAY EXAM OF HUMERUS: CPT

## 2024-09-18 PROCEDURE — 86923 COMPATIBILITY TEST ELECTRIC: CPT

## 2024-09-18 PROCEDURE — 72125 CT NECK SPINE W/O DYE: CPT

## 2024-09-18 PROCEDURE — 94640 AIRWAY INHALATION TREATMENT: CPT

## 2024-09-18 PROCEDURE — 6360000002 HC RX W HCPCS: Performed by: INTERNAL MEDICINE

## 2024-09-18 PROCEDURE — 71045 X-RAY EXAM CHEST 1 VIEW: CPT

## 2024-09-18 PROCEDURE — 80053 COMPREHEN METABOLIC PANEL: CPT

## 2024-09-18 PROCEDURE — 83540 ASSAY OF IRON: CPT

## 2024-09-18 PROCEDURE — 86900 BLOOD TYPING SEROLOGIC ABO: CPT

## 2024-09-18 PROCEDURE — P9016 RBC LEUKOCYTES REDUCED: HCPCS

## 2024-09-18 PROCEDURE — 99285 EMERGENCY DEPT VISIT HI MDM: CPT

## 2024-09-18 PROCEDURE — 84443 ASSAY THYROID STIM HORMONE: CPT

## 2024-09-18 PROCEDURE — 6370000000 HC RX 637 (ALT 250 FOR IP): Performed by: EMERGENCY MEDICINE

## 2024-09-18 PROCEDURE — 83735 ASSAY OF MAGNESIUM: CPT

## 2024-09-18 PROCEDURE — 84100 ASSAY OF PHOSPHORUS: CPT

## 2024-09-18 PROCEDURE — 85014 HEMATOCRIT: CPT

## 2024-09-18 PROCEDURE — 2580000003 HC RX 258: Performed by: EMERGENCY MEDICINE

## 2024-09-18 PROCEDURE — 86850 RBC ANTIBODY SCREEN: CPT

## 2024-09-18 PROCEDURE — 6370000000 HC RX 637 (ALT 250 FOR IP): Performed by: INTERNAL MEDICINE

## 2024-09-18 PROCEDURE — 73090 X-RAY EXAM OF FOREARM: CPT

## 2024-09-18 PROCEDURE — 36415 COLL VENOUS BLD VENIPUNCTURE: CPT

## 2024-09-18 PROCEDURE — 86901 BLOOD TYPING SEROLOGIC RH(D): CPT

## 2024-09-18 PROCEDURE — 83550 IRON BINDING TEST: CPT

## 2024-09-18 PROCEDURE — 85018 HEMOGLOBIN: CPT

## 2024-09-18 PROCEDURE — 73521 X-RAY EXAM HIPS BI 2 VIEWS: CPT

## 2024-09-18 RX ORDER — ATORVASTATIN CALCIUM 40 MG/1
40 TABLET, FILM COATED ORAL NIGHTLY
Status: DISCONTINUED | OUTPATIENT
Start: 2024-09-18 | End: 2024-09-25 | Stop reason: HOSPADM

## 2024-09-18 RX ORDER — ONDANSETRON 4 MG/1
4 TABLET, ORALLY DISINTEGRATING ORAL EVERY 8 HOURS PRN
Status: DISCONTINUED | OUTPATIENT
Start: 2024-09-18 | End: 2024-09-25 | Stop reason: HOSPADM

## 2024-09-18 RX ORDER — MEXILETINE HYDROCHLORIDE 150 MG/1
150 CAPSULE ORAL EVERY 8 HOURS SCHEDULED
Status: DISCONTINUED | OUTPATIENT
Start: 2024-09-18 | End: 2024-09-19

## 2024-09-18 RX ORDER — ONDANSETRON 2 MG/ML
4 INJECTION INTRAMUSCULAR; INTRAVENOUS EVERY 6 HOURS PRN
Status: DISCONTINUED | OUTPATIENT
Start: 2024-09-18 | End: 2024-09-25 | Stop reason: HOSPADM

## 2024-09-18 RX ORDER — IPRATROPIUM BROMIDE AND ALBUTEROL SULFATE 2.5; .5 MG/3ML; MG/3ML
1 SOLUTION RESPIRATORY (INHALATION) EVERY 6 HOURS PRN
Status: DISCONTINUED | OUTPATIENT
Start: 2024-09-18 | End: 2024-09-25 | Stop reason: HOSPADM

## 2024-09-18 RX ORDER — SODIUM CHLORIDE 0.9 % (FLUSH) 0.9 %
5-40 SYRINGE (ML) INJECTION EVERY 12 HOURS SCHEDULED
Status: DISCONTINUED | OUTPATIENT
Start: 2024-09-18 | End: 2024-09-25 | Stop reason: HOSPADM

## 2024-09-18 RX ORDER — SODIUM CHLORIDE 0.9 % (FLUSH) 0.9 %
5-40 SYRINGE (ML) INJECTION PRN
Status: DISCONTINUED | OUTPATIENT
Start: 2024-09-18 | End: 2024-09-25 | Stop reason: HOSPADM

## 2024-09-18 RX ORDER — 0.9 % SODIUM CHLORIDE 0.9 %
500 INTRAVENOUS SOLUTION INTRAVENOUS ONCE
Status: COMPLETED | OUTPATIENT
Start: 2024-09-18 | End: 2024-09-18

## 2024-09-18 RX ORDER — MECOBALAMIN 5000 MCG
5 TABLET,DISINTEGRATING ORAL NIGHTLY
Status: DISCONTINUED | OUTPATIENT
Start: 2024-09-18 | End: 2024-09-25 | Stop reason: HOSPADM

## 2024-09-18 RX ORDER — MIDODRINE HYDROCHLORIDE 5 MG/1
5 TABLET ORAL ONCE
Status: COMPLETED | OUTPATIENT
Start: 2024-09-18 | End: 2024-09-18

## 2024-09-18 RX ORDER — SODIUM CHLORIDE 9 MG/ML
INJECTION, SOLUTION INTRAVENOUS PRN
Status: DISCONTINUED | OUTPATIENT
Start: 2024-09-18 | End: 2024-09-25 | Stop reason: HOSPADM

## 2024-09-18 RX ORDER — ACETAMINOPHEN 325 MG/1
650 TABLET ORAL EVERY 6 HOURS PRN
Status: DISCONTINUED | OUTPATIENT
Start: 2024-09-18 | End: 2024-09-25 | Stop reason: HOSPADM

## 2024-09-18 RX ORDER — FERROUS SULFATE 325(65) MG
325 TABLET ORAL 2 TIMES DAILY WITH MEALS
Status: DISCONTINUED | OUTPATIENT
Start: 2024-09-18 | End: 2024-09-25 | Stop reason: HOSPADM

## 2024-09-18 RX ORDER — LACTULOSE 10 G/15ML
10 SOLUTION ORAL DAILY PRN
Status: DISCONTINUED | OUTPATIENT
Start: 2024-09-18 | End: 2024-09-25 | Stop reason: HOSPADM

## 2024-09-18 RX ORDER — AMIODARONE HYDROCHLORIDE 200 MG/1
200 TABLET ORAL 2 TIMES DAILY WITH MEALS
Status: DISCONTINUED | OUTPATIENT
Start: 2024-09-18 | End: 2024-09-25 | Stop reason: HOSPADM

## 2024-09-18 RX ORDER — POLYETHYLENE GLYCOL 3350 17 G/17G
17 POWDER, FOR SOLUTION ORAL DAILY PRN
Status: DISCONTINUED | OUTPATIENT
Start: 2024-09-18 | End: 2024-09-25 | Stop reason: HOSPADM

## 2024-09-18 RX ORDER — ACETAMINOPHEN 650 MG/1
650 SUPPOSITORY RECTAL EVERY 6 HOURS PRN
Status: DISCONTINUED | OUTPATIENT
Start: 2024-09-18 | End: 2024-09-25 | Stop reason: HOSPADM

## 2024-09-18 RX ORDER — MECLIZINE HCL 12.5 MG 12.5 MG/1
12.5 TABLET ORAL 3 TIMES DAILY PRN
Status: DISCONTINUED | OUTPATIENT
Start: 2024-09-18 | End: 2024-09-25 | Stop reason: HOSPADM

## 2024-09-18 RX ORDER — MIDODRINE HYDROCHLORIDE 5 MG/1
15 TABLET ORAL
Status: DISCONTINUED | OUTPATIENT
Start: 2024-09-18 | End: 2024-09-25 | Stop reason: HOSPADM

## 2024-09-18 RX ADMIN — MIDODRINE HYDROCHLORIDE 5 MG: 5 TABLET ORAL at 11:37

## 2024-09-18 RX ADMIN — ARFORMOTEROL TARTRATE: 15 SOLUTION RESPIRATORY (INHALATION) at 21:25

## 2024-09-18 RX ADMIN — Medication 5 MG: at 22:20

## 2024-09-18 RX ADMIN — SODIUM CHLORIDE 500 ML: 9 INJECTION, SOLUTION INTRAVENOUS at 11:36

## 2024-09-18 ASSESSMENT — PAIN SCALES - GENERAL: PAINLEVEL_OUTOF10: 0

## 2024-09-19 PROBLEM — Z95.810 PRESENCE OF IMPLANTABLE CARDIOVERTER-DEFIBRILLATOR (ICD): Status: ACTIVE | Noted: 2024-09-19

## 2024-09-19 PROBLEM — I47.20 VENTRICULAR TACHYCARDIA (HCC): Status: ACTIVE | Noted: 2024-09-19

## 2024-09-19 LAB
ABO/RH: NORMAL
ANION GAP SERPL CALCULATED.3IONS-SCNC: 15 MMOL/L (ref 7–16)
ANTIBODY SCREEN: NEGATIVE
ARM BAND NUMBER: NORMAL
BASOPHILS # BLD: 0.02 K/UL (ref 0–0.2)
BASOPHILS NFR BLD: 0 % (ref 0–2)
BLOOD BANK BLOOD PRODUCT EXPIRATION DATE: NORMAL
BLOOD BANK DISPENSE STATUS: NORMAL
BLOOD BANK ISBT PRODUCT BLOOD TYPE: 7300
BLOOD BANK PRODUCT CODE: NORMAL
BLOOD BANK SAMPLE EXPIRATION: NORMAL
BLOOD BANK UNIT TYPE AND RH: NORMAL
BPU ID: NORMAL
BUN SERPL-MCNC: 36 MG/DL (ref 6–23)
CALCIUM SERPL-MCNC: 8.6 MG/DL (ref 8.6–10.2)
CHLORIDE SERPL-SCNC: 96 MMOL/L (ref 98–107)
CO2 SERPL-SCNC: 25 MMOL/L (ref 22–29)
COMPONENT: NORMAL
CREAT SERPL-MCNC: 3.7 MG/DL (ref 0.5–1)
CROSSMATCH RESULT: NORMAL
EOSINOPHIL # BLD: 0.03 K/UL (ref 0.05–0.5)
EOSINOPHILS RELATIVE PERCENT: 0 % (ref 0–6)
ERYTHROCYTE [DISTWIDTH] IN BLOOD BY AUTOMATED COUNT: 21.5 % (ref 11.5–15)
FOLATE SERPL-MCNC: 12.7 NG/ML (ref 4.8–24.2)
GFR, ESTIMATED: 12 ML/MIN/1.73M2
GLUCOSE SERPL-MCNC: 102 MG/DL (ref 74–99)
HCT VFR BLD AUTO: 24.5 % (ref 34–48)
HGB BLD-MCNC: 7.9 G/DL (ref 11.5–15.5)
IMM GRANULOCYTES # BLD AUTO: 0.03 K/UL (ref 0–0.58)
IMM GRANULOCYTES NFR BLD: 0 % (ref 0–5)
LYMPHOCYTES NFR BLD: 1.22 K/UL (ref 1.5–4)
LYMPHOCYTES RELATIVE PERCENT: 18 % (ref 20–42)
MAGNESIUM SERPL-MCNC: 1.8 MG/DL (ref 1.6–2.6)
MCH RBC QN AUTO: 33.5 PG (ref 26–35)
MCHC RBC AUTO-ENTMCNC: 32.2 G/DL (ref 32–34.5)
MCV RBC AUTO: 103.8 FL (ref 80–99.9)
MONOCYTES NFR BLD: 0.48 K/UL (ref 0.1–0.95)
MONOCYTES NFR BLD: 7 % (ref 2–12)
NEUTROPHILS NFR BLD: 74 % (ref 43–80)
NEUTS SEG NFR BLD: 4.92 K/UL (ref 1.8–7.3)
PLATELET # BLD AUTO: 90 K/UL (ref 130–450)
PLATELET CONFIRMATION: NORMAL
PMV BLD AUTO: 12.9 FL (ref 7–12)
POTASSIUM SERPL-SCNC: 3.3 MMOL/L (ref 3.5–5)
RBC # BLD AUTO: 2.36 M/UL (ref 3.5–5.5)
RBC # BLD: ABNORMAL 10*6/UL
SODIUM SERPL-SCNC: 136 MMOL/L (ref 132–146)
TRANSFUSION STATUS: NORMAL
UNIT DIVISION: 0
UNIT ISSUE DATE/TIME: NORMAL
VIT B12 SERPL-MCNC: 1019 PG/ML (ref 211–946)
WBC OTHER # BLD: 6.7 K/UL (ref 4.5–11.5)

## 2024-09-19 PROCEDURE — 6370000000 HC RX 637 (ALT 250 FOR IP): Performed by: NURSE PRACTITIONER

## 2024-09-19 PROCEDURE — 5A1D70Z PERFORMANCE OF URINARY FILTRATION, INTERMITTENT, LESS THAN 6 HOURS PER DAY: ICD-10-PCS | Performed by: INTERNAL MEDICINE

## 2024-09-19 PROCEDURE — 1200000000 HC SEMI PRIVATE

## 2024-09-19 PROCEDURE — 80048 BASIC METABOLIC PNL TOTAL CA: CPT

## 2024-09-19 PROCEDURE — 6370000000 HC RX 637 (ALT 250 FOR IP): Performed by: INTERNAL MEDICINE

## 2024-09-19 PROCEDURE — 82746 ASSAY OF FOLIC ACID SERUM: CPT

## 2024-09-19 PROCEDURE — 30233N1 TRANSFUSION OF NONAUTOLOGOUS RED BLOOD CELLS INTO PERIPHERAL VEIN, PERCUTANEOUS APPROACH: ICD-10-PCS | Performed by: INTERNAL MEDICINE

## 2024-09-19 PROCEDURE — 99223 1ST HOSP IP/OBS HIGH 75: CPT | Performed by: EMERGENCY MEDICINE

## 2024-09-19 PROCEDURE — 2700000000 HC OXYGEN THERAPY PER DAY

## 2024-09-19 PROCEDURE — 99232 SBSQ HOSP IP/OBS MODERATE 35: CPT | Performed by: INTERNAL MEDICINE

## 2024-09-19 PROCEDURE — 6360000002 HC RX W HCPCS: Performed by: INTERNAL MEDICINE

## 2024-09-19 PROCEDURE — 80069 RENAL FUNCTION PANEL: CPT

## 2024-09-19 PROCEDURE — 2580000003 HC RX 258: Performed by: INTERNAL MEDICINE

## 2024-09-19 PROCEDURE — 82607 VITAMIN B-12: CPT

## 2024-09-19 PROCEDURE — 36415 COLL VENOUS BLD VENIPUNCTURE: CPT

## 2024-09-19 PROCEDURE — 90935 HEMODIALYSIS ONE EVALUATION: CPT

## 2024-09-19 PROCEDURE — 94640 AIRWAY INHALATION TREATMENT: CPT

## 2024-09-19 PROCEDURE — 85025 COMPLETE CBC W/AUTO DIFF WBC: CPT

## 2024-09-19 PROCEDURE — 83735 ASSAY OF MAGNESIUM: CPT

## 2024-09-19 PROCEDURE — 99223 1ST HOSP IP/OBS HIGH 75: CPT | Performed by: STUDENT IN AN ORGANIZED HEALTH CARE EDUCATION/TRAINING PROGRAM

## 2024-09-19 RX ORDER — MEXILETINE HYDROCHLORIDE 200 MG/1
200 CAPSULE ORAL EVERY 8 HOURS SCHEDULED
Status: DISCONTINUED | OUTPATIENT
Start: 2024-09-19 | End: 2024-09-25 | Stop reason: HOSPADM

## 2024-09-19 RX ADMIN — MIDODRINE HYDROCHLORIDE 15 MG: 5 TABLET ORAL at 11:39

## 2024-09-19 RX ADMIN — ARFORMOTEROL TARTRATE: 15 SOLUTION RESPIRATORY (INHALATION) at 19:13

## 2024-09-19 RX ADMIN — SODIUM CHLORIDE, PRESERVATIVE FREE 10 ML: 5 INJECTION INTRAVENOUS at 10:01

## 2024-09-19 RX ADMIN — MEXILETINE HYDROCHLORIDE 200 MG: 200 CAPSULE ORAL at 21:58

## 2024-09-19 RX ADMIN — Medication 5 MG: at 21:58

## 2024-09-19 RX ADMIN — ARFORMOTEROL TARTRATE: 15 SOLUTION RESPIRATORY (INHALATION) at 09:49

## 2024-09-19 RX ADMIN — ATORVASTATIN CALCIUM 40 MG: 40 TABLET, FILM COATED ORAL at 21:58

## 2024-09-19 RX ADMIN — SODIUM CHLORIDE, PRESERVATIVE FREE 10 ML: 5 INJECTION INTRAVENOUS at 21:59

## 2024-09-19 ASSESSMENT — PAIN SCALES - GENERAL
PAINLEVEL_OUTOF10: 0
PAINLEVEL_OUTOF10: 0

## 2024-09-20 PROBLEM — N18.6 END STAGE RENAL DISEASE ON DIALYSIS (HCC): Status: ACTIVE | Noted: 2024-09-20

## 2024-09-20 PROBLEM — Z51.5 PALLIATIVE CARE ENCOUNTER: Status: ACTIVE | Noted: 2024-09-20

## 2024-09-20 PROBLEM — Z99.2 END STAGE RENAL DISEASE ON DIALYSIS (HCC): Status: ACTIVE | Noted: 2024-09-20

## 2024-09-20 LAB
ANION GAP SERPL CALCULATED.3IONS-SCNC: 12 MMOL/L (ref 7–16)
BASOPHILS # BLD: 0.06 K/UL (ref 0–0.2)
BASOPHILS NFR BLD: 1 % (ref 0–2)
BUN SERPL-MCNC: 14 MG/DL (ref 6–23)
CALCIUM SERPL-MCNC: 8.2 MG/DL (ref 8.6–10.2)
CHLORIDE SERPL-SCNC: 101 MMOL/L (ref 98–107)
CO2 SERPL-SCNC: 27 MMOL/L (ref 22–29)
CREAT SERPL-MCNC: 2 MG/DL (ref 0.5–1)
EOSINOPHIL # BLD: 0.17 K/UL (ref 0.05–0.5)
EOSINOPHILS RELATIVE PERCENT: 3 % (ref 0–6)
ERYTHROCYTE [DISTWIDTH] IN BLOOD BY AUTOMATED COUNT: 21.4 % (ref 11.5–15)
FERRITIN SERPL-MCNC: 5049 NG/ML
GFR, ESTIMATED: 24 ML/MIN/1.73M2
GLUCOSE SERPL-MCNC: 135 MG/DL (ref 74–99)
HCT VFR BLD AUTO: 22.7 % (ref 34–48)
HGB BLD-MCNC: 7.3 G/DL (ref 11.5–15.5)
IRON SATN MFR SERPL: 42 % (ref 15–50)
IRON SERPL-MCNC: 45 UG/DL (ref 37–145)
LYMPHOCYTES NFR BLD: 0.9 K/UL (ref 1.5–4)
LYMPHOCYTES RELATIVE PERCENT: 14 % (ref 20–42)
MCH RBC QN AUTO: 33.8 PG (ref 26–35)
MCHC RBC AUTO-ENTMCNC: 32.2 G/DL (ref 32–34.5)
MCV RBC AUTO: 105.1 FL (ref 80–99.9)
MONOCYTES NFR BLD: 0.34 K/UL (ref 0.1–0.95)
MONOCYTES NFR BLD: 5 % (ref 2–12)
NEUTROPHILS NFR BLD: 77 % (ref 43–80)
NEUTS SEG NFR BLD: 4.94 K/UL (ref 1.8–7.3)
NUCLEATED RED BLOOD CELLS: 1 PER 100 WBC
PHOSPHATE SERPL-MCNC: 2.3 MG/DL (ref 2.5–4.5)
PLATELET CONFIRMATION: NORMAL
PLATELET, FLUORESCENCE: 87 K/UL (ref 130–450)
PMV BLD AUTO: 12.7 FL (ref 7–12)
POTASSIUM SERPL-SCNC: 3.5 MMOL/L (ref 3.5–5)
RBC # BLD AUTO: 2.16 M/UL (ref 3.5–5.5)
RBC # BLD: ABNORMAL 10*6/UL
SODIUM SERPL-SCNC: 140 MMOL/L (ref 132–146)
TIBC SERPL-MCNC: 108 UG/DL (ref 250–450)
WBC OTHER # BLD: 6.4 K/UL (ref 4.5–11.5)

## 2024-09-20 PROCEDURE — 2580000003 HC RX 258: Performed by: INTERNAL MEDICINE

## 2024-09-20 PROCEDURE — 2700000000 HC OXYGEN THERAPY PER DAY

## 2024-09-20 PROCEDURE — 6370000000 HC RX 637 (ALT 250 FOR IP): Performed by: INTERNAL MEDICINE

## 2024-09-20 PROCEDURE — 80048 BASIC METABOLIC PNL TOTAL CA: CPT

## 2024-09-20 PROCEDURE — 36415 COLL VENOUS BLD VENIPUNCTURE: CPT

## 2024-09-20 PROCEDURE — 99233 SBSQ HOSP IP/OBS HIGH 50: CPT | Performed by: STUDENT IN AN ORGANIZED HEALTH CARE EDUCATION/TRAINING PROGRAM

## 2024-09-20 PROCEDURE — 82728 ASSAY OF FERRITIN: CPT

## 2024-09-20 PROCEDURE — 6360000002 HC RX W HCPCS: Performed by: INTERNAL MEDICINE

## 2024-09-20 PROCEDURE — 99232 SBSQ HOSP IP/OBS MODERATE 35: CPT | Performed by: INTERNAL MEDICINE

## 2024-09-20 PROCEDURE — 6360000002 HC RX W HCPCS: Performed by: NURSE PRACTITIONER

## 2024-09-20 PROCEDURE — 83540 ASSAY OF IRON: CPT

## 2024-09-20 PROCEDURE — 94640 AIRWAY INHALATION TREATMENT: CPT

## 2024-09-20 PROCEDURE — 6370000000 HC RX 637 (ALT 250 FOR IP): Performed by: NURSE PRACTITIONER

## 2024-09-20 PROCEDURE — 1200000000 HC SEMI PRIVATE

## 2024-09-20 PROCEDURE — 85025 COMPLETE CBC W/AUTO DIFF WBC: CPT

## 2024-09-20 PROCEDURE — 84100 ASSAY OF PHOSPHORUS: CPT

## 2024-09-20 PROCEDURE — 99233 SBSQ HOSP IP/OBS HIGH 50: CPT | Performed by: EMERGENCY MEDICINE

## 2024-09-20 PROCEDURE — 83550 IRON BINDING TEST: CPT

## 2024-09-20 RX ORDER — MEXILETINE HYDROCHLORIDE 200 MG/1
200 CAPSULE ORAL EVERY 8 HOURS SCHEDULED
Qty: 90 CAPSULE | Refills: 3 | Status: SHIPPED | OUTPATIENT
Start: 2024-09-20

## 2024-09-20 RX ADMIN — FERROUS SULFATE TAB 325 MG (65 MG ELEMENTAL FE) 325 MG: 325 (65 FE) TAB at 17:16

## 2024-09-20 RX ADMIN — MEXILETINE HYDROCHLORIDE 200 MG: 200 CAPSULE ORAL at 05:34

## 2024-09-20 RX ADMIN — ATORVASTATIN CALCIUM 40 MG: 40 TABLET, FILM COATED ORAL at 20:41

## 2024-09-20 RX ADMIN — Medication 5 MG: at 20:40

## 2024-09-20 RX ADMIN — EPOETIN ALFA-EPBX 2000 UNITS: 2000 INJECTION, SOLUTION INTRAVENOUS; SUBCUTANEOUS at 17:16

## 2024-09-20 RX ADMIN — SODIUM CHLORIDE, PRESERVATIVE FREE 10 ML: 5 INJECTION INTRAVENOUS at 20:41

## 2024-09-20 RX ADMIN — SODIUM CHLORIDE, PRESERVATIVE FREE 10 ML: 5 INJECTION INTRAVENOUS at 09:04

## 2024-09-20 RX ADMIN — FERROUS SULFATE TAB 325 MG (65 MG ELEMENTAL FE) 325 MG: 325 (65 FE) TAB at 09:04

## 2024-09-20 RX ADMIN — LEVOTHYROXINE SODIUM 125 MCG: 0.1 TABLET ORAL at 05:34

## 2024-09-20 RX ADMIN — ARFORMOTEROL TARTRATE: 15 SOLUTION RESPIRATORY (INHALATION) at 19:14

## 2024-09-20 RX ADMIN — MEXILETINE HYDROCHLORIDE 200 MG: 200 CAPSULE ORAL at 14:42

## 2024-09-21 PROBLEM — E44.0 MODERATE PROTEIN-CALORIE MALNUTRITION (HCC): Chronic | Status: ACTIVE | Noted: 2024-09-21

## 2024-09-21 PROCEDURE — 6360000002 HC RX W HCPCS: Performed by: INTERNAL MEDICINE

## 2024-09-21 PROCEDURE — 6370000000 HC RX 637 (ALT 250 FOR IP): Performed by: INTERNAL MEDICINE

## 2024-09-21 PROCEDURE — 90935 HEMODIALYSIS ONE EVALUATION: CPT

## 2024-09-21 PROCEDURE — 2700000000 HC OXYGEN THERAPY PER DAY

## 2024-09-21 PROCEDURE — 1200000000 HC SEMI PRIVATE

## 2024-09-21 PROCEDURE — 99232 SBSQ HOSP IP/OBS MODERATE 35: CPT | Performed by: INTERNAL MEDICINE

## 2024-09-21 RX ADMIN — FERROUS SULFATE TAB 325 MG (65 MG ELEMENTAL FE) 325 MG: 325 (65 FE) TAB at 12:02

## 2024-09-21 RX ADMIN — MIDODRINE HYDROCHLORIDE 15 MG: 5 TABLET ORAL at 17:48

## 2024-09-21 RX ADMIN — ONDANSETRON 4 MG: 2 INJECTION INTRAMUSCULAR; INTRAVENOUS at 12:02

## 2024-09-21 RX ADMIN — AMIODARONE HYDROCHLORIDE 200 MG: 200 TABLET ORAL at 17:48

## 2024-09-21 RX ADMIN — MIDODRINE HYDROCHLORIDE 15 MG: 5 TABLET ORAL at 12:02

## 2024-09-21 RX ADMIN — FERROUS SULFATE TAB 325 MG (65 MG ELEMENTAL FE) 325 MG: 325 (65 FE) TAB at 17:48

## 2024-09-21 RX ADMIN — AMIODARONE HYDROCHLORIDE 200 MG: 200 TABLET ORAL at 12:02

## 2024-09-21 ASSESSMENT — PAIN SCALES - GENERAL: PAINLEVEL_OUTOF10: 0

## 2024-09-22 LAB
ANION GAP SERPL CALCULATED.3IONS-SCNC: 10 MMOL/L (ref 7–16)
BASOPHILS # BLD: 0.11 K/UL (ref 0–0.2)
BASOPHILS NFR BLD: 2 % (ref 0–2)
BUN SERPL-MCNC: 21 MG/DL (ref 6–23)
CALCIUM SERPL-MCNC: 8.5 MG/DL (ref 8.6–10.2)
CHLORIDE SERPL-SCNC: 100 MMOL/L (ref 98–107)
CO2 SERPL-SCNC: 26 MMOL/L (ref 22–29)
CREAT SERPL-MCNC: 2.1 MG/DL (ref 0.5–1)
EOSINOPHIL # BLD: 0 K/UL (ref 0.05–0.5)
EOSINOPHILS RELATIVE PERCENT: 0 % (ref 0–6)
ERYTHROCYTE [DISTWIDTH] IN BLOOD BY AUTOMATED COUNT: 20.2 % (ref 11.5–15)
GFR, ESTIMATED: 23 ML/MIN/1.73M2
GLUCOSE SERPL-MCNC: 113 MG/DL (ref 74–99)
HCT VFR BLD AUTO: 24.3 % (ref 34–48)
HGB BLD-MCNC: 7.5 G/DL (ref 11.5–15.5)
LYMPHOCYTES NFR BLD: 1.06 K/UL (ref 1.5–4)
LYMPHOCYTES RELATIVE PERCENT: 17 % (ref 20–42)
MCH RBC QN AUTO: 34.1 PG (ref 26–35)
MCHC RBC AUTO-ENTMCNC: 30.9 G/DL (ref 32–34.5)
MCV RBC AUTO: 110.5 FL (ref 80–99.9)
MONOCYTES NFR BLD: 0.32 K/UL (ref 0.1–0.95)
MONOCYTES NFR BLD: 5 % (ref 2–12)
NEUTROPHILS NFR BLD: 76 % (ref 43–80)
NEUTS SEG NFR BLD: 4.61 K/UL (ref 1.8–7.3)
PLATELET CONFIRMATION: NORMAL
PLATELET, FLUORESCENCE: 81 K/UL (ref 130–450)
PMV BLD AUTO: 13.3 FL (ref 7–12)
POTASSIUM SERPL-SCNC: 3.8 MMOL/L (ref 3.5–5)
RBC # BLD AUTO: 2.2 M/UL (ref 3.5–5.5)
RBC # BLD: ABNORMAL 10*6/UL
SODIUM SERPL-SCNC: 136 MMOL/L (ref 132–146)
WBC # BLD: ABNORMAL 10*3/UL
WBC OTHER # BLD: 6.1 K/UL (ref 4.5–11.5)

## 2024-09-22 PROCEDURE — 2700000000 HC OXYGEN THERAPY PER DAY

## 2024-09-22 PROCEDURE — 6360000002 HC RX W HCPCS: Performed by: INTERNAL MEDICINE

## 2024-09-22 PROCEDURE — 80048 BASIC METABOLIC PNL TOTAL CA: CPT

## 2024-09-22 PROCEDURE — 94640 AIRWAY INHALATION TREATMENT: CPT

## 2024-09-22 PROCEDURE — 6370000000 HC RX 637 (ALT 250 FOR IP): Performed by: INTERNAL MEDICINE

## 2024-09-22 PROCEDURE — 2580000003 HC RX 258: Performed by: INTERNAL MEDICINE

## 2024-09-22 PROCEDURE — 6370000000 HC RX 637 (ALT 250 FOR IP): Performed by: NURSE PRACTITIONER

## 2024-09-22 PROCEDURE — 1200000000 HC SEMI PRIVATE

## 2024-09-22 PROCEDURE — 85025 COMPLETE CBC W/AUTO DIFF WBC: CPT

## 2024-09-22 PROCEDURE — 36415 COLL VENOUS BLD VENIPUNCTURE: CPT

## 2024-09-22 PROCEDURE — 99232 SBSQ HOSP IP/OBS MODERATE 35: CPT | Performed by: INTERNAL MEDICINE

## 2024-09-22 RX ORDER — LEVOTHYROXINE SODIUM 150 UG/1
150 TABLET ORAL DAILY
Status: DISCONTINUED | OUTPATIENT
Start: 2024-09-23 | End: 2024-09-25 | Stop reason: HOSPADM

## 2024-09-22 RX ORDER — NEPHROCAP 1 MG
1 CAP ORAL DAILY
Status: DISCONTINUED | OUTPATIENT
Start: 2024-09-22 | End: 2024-09-25 | Stop reason: HOSPADM

## 2024-09-22 RX ADMIN — MEXILETINE HYDROCHLORIDE 200 MG: 200 CAPSULE ORAL at 13:55

## 2024-09-22 RX ADMIN — MIDODRINE HYDROCHLORIDE 15 MG: 5 TABLET ORAL at 13:55

## 2024-09-22 RX ADMIN — ATORVASTATIN CALCIUM 40 MG: 40 TABLET, FILM COATED ORAL at 21:35

## 2024-09-22 RX ADMIN — AMIODARONE HYDROCHLORIDE 200 MG: 200 TABLET ORAL at 18:15

## 2024-09-22 RX ADMIN — MIDODRINE HYDROCHLORIDE 15 MG: 5 TABLET ORAL at 18:15

## 2024-09-22 RX ADMIN — FERROUS SULFATE TAB 325 MG (65 MG ELEMENTAL FE) 325 MG: 325 (65 FE) TAB at 18:15

## 2024-09-22 RX ADMIN — Medication 5 MG: at 21:35

## 2024-09-22 RX ADMIN — ARFORMOTEROL TARTRATE: 15 SOLUTION RESPIRATORY (INHALATION) at 19:37

## 2024-09-22 RX ADMIN — SODIUM CHLORIDE, PRESERVATIVE FREE 10 ML: 5 INJECTION INTRAVENOUS at 21:54

## 2024-09-22 RX ADMIN — ARFORMOTEROL TARTRATE: 15 SOLUTION RESPIRATORY (INHALATION) at 08:48

## 2024-09-22 RX ADMIN — AMIODARONE HYDROCHLORIDE 200 MG: 200 TABLET ORAL at 08:11

## 2024-09-22 RX ADMIN — SODIUM CHLORIDE, PRESERVATIVE FREE 10 ML: 5 INJECTION INTRAVENOUS at 08:11

## 2024-09-22 RX ADMIN — MEXILETINE HYDROCHLORIDE 200 MG: 200 CAPSULE ORAL at 21:35

## 2024-09-22 RX ADMIN — Medication 1 MG: at 13:55

## 2024-09-22 RX ADMIN — FERROUS SULFATE TAB 325 MG (65 MG ELEMENTAL FE) 325 MG: 325 (65 FE) TAB at 08:11

## 2024-09-22 RX ADMIN — MIDODRINE HYDROCHLORIDE 15 MG: 5 TABLET ORAL at 08:11

## 2024-09-23 LAB
ALBUMIN SERPL-MCNC: 3 G/DL (ref 3.5–5.2)
ALP SERPL-CCNC: 138 U/L (ref 35–104)
ALT SERPL-CCNC: 21 U/L (ref 0–32)
ANION GAP SERPL CALCULATED.3IONS-SCNC: 13 MMOL/L (ref 7–16)
AST SERPL-CCNC: 18 U/L (ref 0–31)
BASOPHILS # BLD: 0.03 K/UL (ref 0–0.2)
BASOPHILS NFR BLD: 0 % (ref 0–2)
BILIRUB SERPL-MCNC: 0.5 MG/DL (ref 0–1.2)
BUN SERPL-MCNC: 37 MG/DL (ref 6–23)
CALCIUM SERPL-MCNC: 9.3 MG/DL (ref 8.6–10.2)
CHLORIDE SERPL-SCNC: 97 MMOL/L (ref 98–107)
CO2 SERPL-SCNC: 24 MMOL/L (ref 22–29)
CREAT SERPL-MCNC: 3.2 MG/DL (ref 0.5–1)
EOSINOPHIL # BLD: 0.06 K/UL (ref 0.05–0.5)
EOSINOPHILS RELATIVE PERCENT: 1 % (ref 0–6)
ERYTHROCYTE [DISTWIDTH] IN BLOOD BY AUTOMATED COUNT: 19.3 % (ref 11.5–15)
GFR, ESTIMATED: 14 ML/MIN/1.73M2
GLUCOSE BLD-MCNC: 120 MG/DL (ref 74–99)
GLUCOSE SERPL-MCNC: 118 MG/DL (ref 74–99)
HCT VFR BLD AUTO: 25.3 % (ref 34–48)
HGB BLD-MCNC: 7.8 G/DL (ref 11.5–15.5)
IMM GRANULOCYTES # BLD AUTO: 0.07 K/UL (ref 0–0.58)
IMM GRANULOCYTES NFR BLD: 1 % (ref 0–5)
LYMPHOCYTES NFR BLD: 3.43 K/UL (ref 1.5–4)
LYMPHOCYTES RELATIVE PERCENT: 40 % (ref 20–42)
MAGNESIUM SERPL-MCNC: 1.9 MG/DL (ref 1.6–2.6)
MCH RBC QN AUTO: 33.6 PG (ref 26–35)
MCHC RBC AUTO-ENTMCNC: 30.8 G/DL (ref 32–34.5)
MCV RBC AUTO: 109.1 FL (ref 80–99.9)
MONOCYTES NFR BLD: 0.83 K/UL (ref 0.1–0.95)
MONOCYTES NFR BLD: 10 % (ref 2–12)
NEUTROPHILS NFR BLD: 49 % (ref 43–80)
NEUTS SEG NFR BLD: 4.18 K/UL (ref 1.8–7.3)
PHOSPHATE SERPL-MCNC: 1.9 MG/DL (ref 2.5–4.5)
PLATELET # BLD AUTO: 96 K/UL (ref 130–450)
PLATELET CONFIRMATION: NORMAL
PMV BLD AUTO: 12.4 FL (ref 7–12)
POTASSIUM SERPL-SCNC: 4.1 MMOL/L (ref 3.5–5)
PROT SERPL-MCNC: 5.2 G/DL (ref 6.4–8.3)
RBC # BLD AUTO: 2.32 M/UL (ref 3.5–5.5)
RBC # BLD: ABNORMAL 10*6/UL
SODIUM SERPL-SCNC: 134 MMOL/L (ref 132–146)
WBC OTHER # BLD: 8.6 K/UL (ref 4.5–11.5)

## 2024-09-23 PROCEDURE — 6370000000 HC RX 637 (ALT 250 FOR IP): Performed by: NURSE PRACTITIONER

## 2024-09-23 PROCEDURE — 80053 COMPREHEN METABOLIC PANEL: CPT

## 2024-09-23 PROCEDURE — 83735 ASSAY OF MAGNESIUM: CPT

## 2024-09-23 PROCEDURE — 2580000003 HC RX 258: Performed by: INTERNAL MEDICINE

## 2024-09-23 PROCEDURE — 2700000000 HC OXYGEN THERAPY PER DAY

## 2024-09-23 PROCEDURE — 6370000000 HC RX 637 (ALT 250 FOR IP): Performed by: INTERNAL MEDICINE

## 2024-09-23 PROCEDURE — 6370000000 HC RX 637 (ALT 250 FOR IP)

## 2024-09-23 PROCEDURE — 36415 COLL VENOUS BLD VENIPUNCTURE: CPT

## 2024-09-23 PROCEDURE — 1200000000 HC SEMI PRIVATE

## 2024-09-23 PROCEDURE — 6360000002 HC RX W HCPCS: Performed by: NURSE PRACTITIONER

## 2024-09-23 PROCEDURE — 84100 ASSAY OF PHOSPHORUS: CPT

## 2024-09-23 PROCEDURE — 85025 COMPLETE CBC W/AUTO DIFF WBC: CPT

## 2024-09-23 PROCEDURE — 82962 GLUCOSE BLOOD TEST: CPT

## 2024-09-23 PROCEDURE — 90945 DIALYSIS ONE EVALUATION: CPT

## 2024-09-23 RX ADMIN — EPOETIN ALFA-EPBX 2000 UNITS: 2000 INJECTION, SOLUTION INTRAVENOUS; SUBCUTANEOUS at 16:54

## 2024-09-23 RX ADMIN — Medication 250 MG: at 16:54

## 2024-09-23 RX ADMIN — MEXILETINE HYDROCHLORIDE 200 MG: 200 CAPSULE ORAL at 13:30

## 2024-09-23 RX ADMIN — AMIODARONE HYDROCHLORIDE 200 MG: 200 TABLET ORAL at 16:54

## 2024-09-23 RX ADMIN — Medication 1 MG: at 10:49

## 2024-09-23 RX ADMIN — MIDODRINE HYDROCHLORIDE 15 MG: 5 TABLET ORAL at 09:19

## 2024-09-23 RX ADMIN — FERROUS SULFATE TAB 325 MG (65 MG ELEMENTAL FE) 325 MG: 325 (65 FE) TAB at 16:54

## 2024-09-23 RX ADMIN — ATORVASTATIN CALCIUM 40 MG: 40 TABLET, FILM COATED ORAL at 19:56

## 2024-09-23 RX ADMIN — SODIUM CHLORIDE, PRESERVATIVE FREE 5 ML: 5 INJECTION INTRAVENOUS at 19:56

## 2024-09-23 RX ADMIN — Medication 250 MG: at 19:55

## 2024-09-23 RX ADMIN — Medication 5 MG: at 19:55

## 2024-09-23 RX ADMIN — MIDODRINE HYDROCHLORIDE 15 MG: 5 TABLET ORAL at 16:54

## 2024-09-23 RX ADMIN — FERROUS SULFATE TAB 325 MG (65 MG ELEMENTAL FE) 325 MG: 325 (65 FE) TAB at 10:50

## 2024-09-23 RX ADMIN — AMIODARONE HYDROCHLORIDE 200 MG: 200 TABLET ORAL at 10:50

## 2024-09-23 RX ADMIN — MEXILETINE HYDROCHLORIDE 200 MG: 200 CAPSULE ORAL at 19:57

## 2024-09-23 ASSESSMENT — PAIN SCALES - GENERAL: PAINLEVEL_OUTOF10: 0

## 2024-09-24 LAB
ANION GAP SERPL CALCULATED.3IONS-SCNC: 13 MMOL/L (ref 7–16)
BUN SERPL-MCNC: 46 MG/DL (ref 6–23)
CALCIUM SERPL-MCNC: 8.9 MG/DL (ref 8.6–10.2)
CHLORIDE SERPL-SCNC: 97 MMOL/L (ref 98–107)
CO2 SERPL-SCNC: 24 MMOL/L (ref 22–29)
CREAT SERPL-MCNC: 4.1 MG/DL (ref 0.5–1)
GFR, ESTIMATED: 10 ML/MIN/1.73M2
GLUCOSE SERPL-MCNC: 136 MG/DL (ref 74–99)
PHOSPHATE SERPL-MCNC: 2.5 MG/DL (ref 2.5–4.5)
POTASSIUM SERPL-SCNC: 4.2 MMOL/L (ref 3.5–5)
SODIUM SERPL-SCNC: 134 MMOL/L (ref 132–146)

## 2024-09-24 PROCEDURE — 94640 AIRWAY INHALATION TREATMENT: CPT

## 2024-09-24 PROCEDURE — 80048 BASIC METABOLIC PNL TOTAL CA: CPT

## 2024-09-24 PROCEDURE — 6370000000 HC RX 637 (ALT 250 FOR IP): Performed by: NURSE PRACTITIONER

## 2024-09-24 PROCEDURE — 84100 ASSAY OF PHOSPHORUS: CPT

## 2024-09-24 PROCEDURE — 2700000000 HC OXYGEN THERAPY PER DAY

## 2024-09-24 PROCEDURE — 6370000000 HC RX 637 (ALT 250 FOR IP): Performed by: INTERNAL MEDICINE

## 2024-09-24 PROCEDURE — 6360000002 HC RX W HCPCS: Performed by: INTERNAL MEDICINE

## 2024-09-24 PROCEDURE — 1200000000 HC SEMI PRIVATE

## 2024-09-24 PROCEDURE — 90935 HEMODIALYSIS ONE EVALUATION: CPT

## 2024-09-24 RX ADMIN — ARFORMOTEROL TARTRATE: 15 SOLUTION RESPIRATORY (INHALATION) at 19:04

## 2024-09-24 RX ADMIN — MIDODRINE HYDROCHLORIDE 15 MG: 5 TABLET ORAL at 12:30

## 2024-09-24 RX ADMIN — MEXILETINE HYDROCHLORIDE 200 MG: 200 CAPSULE ORAL at 12:31

## 2024-09-24 RX ADMIN — AMIODARONE HYDROCHLORIDE 200 MG: 200 TABLET ORAL at 17:58

## 2024-09-24 RX ADMIN — LEVOTHYROXINE SODIUM 150 MCG: 0.15 TABLET ORAL at 05:52

## 2024-09-24 RX ADMIN — FERROUS SULFATE TAB 325 MG (65 MG ELEMENTAL FE) 325 MG: 325 (65 FE) TAB at 12:29

## 2024-09-24 RX ADMIN — AMIODARONE HYDROCHLORIDE 200 MG: 200 TABLET ORAL at 12:29

## 2024-09-24 RX ADMIN — ATORVASTATIN CALCIUM 40 MG: 40 TABLET, FILM COATED ORAL at 20:01

## 2024-09-24 RX ADMIN — Medication 1 MG: at 12:50

## 2024-09-24 RX ADMIN — Medication 5 MG: at 20:01

## 2024-09-24 RX ADMIN — MEXILETINE HYDROCHLORIDE 200 MG: 200 CAPSULE ORAL at 05:52

## 2024-09-24 ASSESSMENT — PAIN SCALES - GENERAL
PAINLEVEL_OUTOF10: 0
PAINLEVEL_OUTOF10: 3

## 2024-09-24 ASSESSMENT — PAIN DESCRIPTION - ORIENTATION: ORIENTATION: RIGHT;LEFT

## 2024-09-24 ASSESSMENT — PAIN DESCRIPTION - DESCRIPTORS: DESCRIPTORS: ACHING

## 2024-09-24 ASSESSMENT — PAIN DESCRIPTION - LOCATION: LOCATION: FOOT

## 2024-09-25 VITALS
BODY MASS INDEX: 19.53 KG/M2 | WEIGHT: 114.42 LBS | OXYGEN SATURATION: 94 % | SYSTOLIC BLOOD PRESSURE: 127 MMHG | RESPIRATION RATE: 18 BRPM | HEIGHT: 64 IN | DIASTOLIC BLOOD PRESSURE: 71 MMHG | HEART RATE: 86 BPM | TEMPERATURE: 97.1 F

## 2024-09-25 PROCEDURE — 2580000003 HC RX 258: Performed by: INTERNAL MEDICINE

## 2024-09-25 PROCEDURE — 2700000000 HC OXYGEN THERAPY PER DAY

## 2024-09-25 PROCEDURE — 6360000002 HC RX W HCPCS: Performed by: INTERNAL MEDICINE

## 2024-09-25 PROCEDURE — 6370000000 HC RX 637 (ALT 250 FOR IP): Performed by: NURSE PRACTITIONER

## 2024-09-25 PROCEDURE — 92610 EVALUATE SWALLOWING FUNCTION: CPT

## 2024-09-25 PROCEDURE — 6370000000 HC RX 637 (ALT 250 FOR IP): Performed by: INTERNAL MEDICINE

## 2024-09-25 PROCEDURE — 94640 AIRWAY INHALATION TREATMENT: CPT

## 2024-09-25 RX ORDER — LEVOTHYROXINE SODIUM 150 UG/1
150 TABLET ORAL DAILY
Qty: 30 TABLET | Refills: 3 | Status: SHIPPED | OUTPATIENT
Start: 2024-09-26

## 2024-09-25 RX ORDER — NEPHROCAP 1 MG
1 CAP ORAL DAILY
Qty: 30 CAPSULE | Refills: 0 | Status: SHIPPED | OUTPATIENT
Start: 2024-09-25

## 2024-09-25 RX ADMIN — EPOETIN ALFA-EPBX 3000 UNITS: 3000 INJECTION, SOLUTION INTRAVENOUS; SUBCUTANEOUS at 15:02

## 2024-09-25 RX ADMIN — ARFORMOTEROL TARTRATE: 15 SOLUTION RESPIRATORY (INHALATION) at 07:49

## 2024-09-25 RX ADMIN — AMIODARONE HYDROCHLORIDE 200 MG: 200 TABLET ORAL at 08:22

## 2024-09-25 RX ADMIN — FERROUS SULFATE TAB 325 MG (65 MG ELEMENTAL FE) 325 MG: 325 (65 FE) TAB at 08:22

## 2024-09-25 RX ADMIN — SODIUM CHLORIDE, PRESERVATIVE FREE 10 ML: 5 INJECTION INTRAVENOUS at 08:23

## 2024-09-25 RX ADMIN — MEXILETINE HYDROCHLORIDE 200 MG: 200 CAPSULE ORAL at 12:03

## 2024-09-25 RX ADMIN — MIDODRINE HYDROCHLORIDE 15 MG: 5 TABLET ORAL at 12:04

## 2024-09-25 RX ADMIN — MIDODRINE HYDROCHLORIDE 15 MG: 5 TABLET ORAL at 08:21

## 2024-09-25 RX ADMIN — Medication 1 MG: at 08:22

## 2024-09-25 ASSESSMENT — PAIN SCALES - GENERAL: PAINLEVEL_OUTOF10: 0

## 2024-09-26 ASSESSMENT — ENCOUNTER SYMPTOMS
RHINORRHEA: 0
VOMITING: 0
ABDOMINAL PAIN: 0
WHEEZING: 0
PHOTOPHOBIA: 0
BACK PAIN: 0
SORE THROAT: 0
COUGH: 0
CONSTIPATION: 0
SHORTNESS OF BREATH: 0
DIARRHEA: 0

## 2025-02-20 ENCOUNTER — CLINICAL DOCUMENTATION (OUTPATIENT)
Dept: PEDIATRIC ICU | Age: 85
End: 2025-02-20

## 2025-02-20 NOTE — PROGRESS NOTES
Chart accessed for documentation review r/t amiodarone. Electronically signed by Britany Lubin RN on 2/20/2025 at 3:01 PM

## (undated) DEVICE — FIAPC® PROBE W/ FILTER 2200 A OD 2.3MM/6.9FR; L 2.2M/7.2FT: Brand: ERBE

## (undated) DEVICE — 6 X 9  1.75MIL 4-WALL LABGUARD: Brand: MINIGRIP COMMERCIAL LLC

## (undated) DEVICE — Device: Brand: DEFENDO VALVE AND CONNECTOR KIT

## (undated) DEVICE — BLOCK BITE 60FR CAREGUARD

## (undated) DEVICE — CONTAINER SPEC COLL 960ML POLYPR TRIANG GRAD INTAKE/OUTPUT

## (undated) DEVICE — JELLY,LUBE,STERILE,FLIP TOP,TUBE,4-OZ: Brand: MEDLINE

## (undated) DEVICE — GOWN ISOLATN REG YEL M WT MULTIPLY SIDETIE LEV 2

## (undated) DEVICE — FORCEPS BX L240CM JAW DIA2.8MM L CAP W/ NDL MIC MESH TOOTH

## (undated) DEVICE — KENDALL 450 SERIES MONITORING FOAM ELECTRODE - RECTANGULAR SHAPE ( 3/PK): Brand: KENDALL

## (undated) DEVICE — KIT BEDSIDE REVITAL OX 500ML

## (undated) DEVICE — WIPES SKIN CLOTH READYPREP 9 X 10.5 IN 2% CHLORHEX GLUCONATE CHG PREOP

## (undated) DEVICE — COVER,LIGHT HANDLE,FLX,1/PK: Brand: MEDLINE INDUSTRIES, INC.

## (undated) DEVICE — MASK,FACE,MAXFLUIDPROTECT,SHIELD/ERLPS: Brand: MEDLINE

## (undated) DEVICE — NEEDLE SCLERO 25GA L240CM OD0.51MM ID0.24MM EXTN L4MM SHTH

## (undated) DEVICE — LUBRICANT SURG JELLY ST BACTER TUBE 4.25OZ

## (undated) DEVICE — YANKAUER,BULB TIP,W/O VENT,RIGID,STERILE: Brand: MEDLINE

## (undated) DEVICE — ENDOSCOPIC ULTRASOUND FINE NEEDLE BIOPSY (FNB) DEVICE: Brand: ACQUIRE

## (undated) DEVICE — 4-PORT MANIFOLD: Brand: NEPTUNE 2

## (undated) DEVICE — ELECTRODE PT RET AD L9FT HI MOIST COND ADH HYDRGEL CORDED

## (undated) DEVICE — TUBING, SUCTION, 1/4" X 10', STRAIGHT: Brand: MEDLINE

## (undated) DEVICE — SPONGE GZ 4IN 4IN 4 PLY N WVN AVANT

## (undated) DEVICE — ADAPTER CLEANING PORPOISE CLEANING

## (undated) DEVICE — SINGLE-USE POLYPECTOMY SNARE: Brand: CAPTIVATOR

## (undated) DEVICE — Device: Brand: SPOT EX ENDOSCOPIC TATTOO

## (undated) DEVICE — Device

## (undated) DEVICE — TOWEL,OR,DSP,ST,BLUE,STD,6/PK,12PK/CS: Brand: MEDLINE